# Patient Record
Sex: MALE | Race: WHITE | Employment: FULL TIME | URBAN - METROPOLITAN AREA
[De-identification: names, ages, dates, MRNs, and addresses within clinical notes are randomized per-mention and may not be internally consistent; named-entity substitution may affect disease eponyms.]

---

## 2020-08-05 ENCOUNTER — APPOINTMENT (OUTPATIENT)
Dept: GENERAL RADIOLOGY | Age: 30
DRG: 870 | End: 2020-08-05
Attending: PHYSICIAN ASSISTANT
Payer: COMMERCIAL

## 2020-08-05 ENCOUNTER — HOSPITAL ENCOUNTER (INPATIENT)
Age: 30
LOS: 13 days | Discharge: OTHER INSTITUTION | DRG: 870 | End: 2020-08-18
Attending: EMERGENCY MEDICINE | Admitting: INTERNAL MEDICINE
Payer: COMMERCIAL

## 2020-08-05 DIAGNOSIS — J12.82 PNEUMONIA DUE TO COVID-19 VIRUS: Primary | ICD-10-CM

## 2020-08-05 DIAGNOSIS — R00.0 TACHYCARDIA: ICD-10-CM

## 2020-08-05 DIAGNOSIS — R09.02 HYPOXIA: ICD-10-CM

## 2020-08-05 DIAGNOSIS — U07.1 PNEUMONIA DUE TO COVID-19 VIRUS: Primary | ICD-10-CM

## 2020-08-05 PROBLEM — J96.91 RESPIRATORY FAILURE WITH HYPOXIA (HCC): Status: ACTIVE | Noted: 2020-08-05

## 2020-08-05 LAB
ALBUMIN SERPL-MCNC: 2.9 G/DL (ref 3.5–5)
ALBUMIN/GLOB SERPL: 0.5 {RATIO} (ref 1.1–2.2)
ALP SERPL-CCNC: 151 U/L (ref 45–117)
ALT SERPL-CCNC: 140 U/L (ref 12–78)
ANION GAP SERPL CALC-SCNC: 7 MMOL/L (ref 5–15)
AST SERPL-CCNC: 86 U/L (ref 15–37)
BASOPHILS # BLD: 0 K/UL (ref 0–0.1)
BASOPHILS NFR BLD: 0 % (ref 0–1)
BILIRUB SERPL-MCNC: 0.8 MG/DL (ref 0.2–1)
BUN SERPL-MCNC: 13 MG/DL (ref 6–20)
BUN/CREAT SERPL: 15 (ref 12–20)
CALCIUM SERPL-MCNC: 9 MG/DL (ref 8.5–10.1)
CHLORIDE SERPL-SCNC: 97 MMOL/L (ref 97–108)
CO2 SERPL-SCNC: 25 MMOL/L (ref 21–32)
CREAT SERPL-MCNC: 0.84 MG/DL (ref 0.7–1.3)
DIFFERENTIAL METHOD BLD: ABNORMAL
EOSINOPHIL # BLD: 0 K/UL (ref 0–0.4)
EOSINOPHIL NFR BLD: 0 % (ref 0–7)
ERYTHROCYTE [DISTWIDTH] IN BLOOD BY AUTOMATED COUNT: 12.4 % (ref 11.5–14.5)
GLOBULIN SER CALC-MCNC: 5.3 G/DL (ref 2–4)
GLUCOSE SERPL-MCNC: 155 MG/DL (ref 65–100)
HCT VFR BLD AUTO: 44.1 % (ref 36.6–50.3)
HGB BLD-MCNC: 15.7 G/DL (ref 12.1–17)
IMM GRANULOCYTES # BLD AUTO: 0 K/UL
IMM GRANULOCYTES NFR BLD AUTO: 0 %
LACTATE BLD-SCNC: 0.99 MMOL/L (ref 0.4–2)
LYMPHOCYTES # BLD: 0.7 K/UL (ref 0.8–3.5)
LYMPHOCYTES NFR BLD: 4 % (ref 12–49)
MCH RBC QN AUTO: 31 PG (ref 26–34)
MCHC RBC AUTO-ENTMCNC: 35.6 G/DL (ref 30–36.5)
MCV RBC AUTO: 87 FL (ref 80–99)
METAMYELOCYTES NFR BLD MANUAL: 1 %
MONOCYTES # BLD: 0.3 K/UL (ref 0–1)
MONOCYTES NFR BLD: 2 % (ref 5–13)
NEUTS BAND NFR BLD MANUAL: 1 % (ref 0–6)
NEUTS SEG # BLD: 15.3 K/UL (ref 1.8–8)
NEUTS SEG NFR BLD: 92 % (ref 32–75)
NRBC # BLD: 0 K/UL (ref 0–0.01)
NRBC BLD-RTO: 0 PER 100 WBC
PLATELET # BLD AUTO: 315 K/UL (ref 150–400)
PLATELET COMMENTS,PCOM: ABNORMAL
PMV BLD AUTO: 9.7 FL (ref 8.9–12.9)
POTASSIUM SERPL-SCNC: 3.8 MMOL/L (ref 3.5–5.1)
PROT SERPL-MCNC: 8.2 G/DL (ref 6.4–8.2)
RBC # BLD AUTO: 5.07 M/UL (ref 4.1–5.7)
RBC MORPH BLD: ABNORMAL
SODIUM SERPL-SCNC: 129 MMOL/L (ref 136–145)
TROPONIN I SERPL-MCNC: <0.05 NG/ML
WBC # BLD AUTO: 16.5 K/UL (ref 4.1–11.1)

## 2020-08-05 PROCEDURE — 84484 ASSAY OF TROPONIN QUANT: CPT

## 2020-08-05 PROCEDURE — 96375 TX/PRO/DX INJ NEW DRUG ADDON: CPT

## 2020-08-05 PROCEDURE — 74011250636 HC RX REV CODE- 250/636: Performed by: PHYSICIAN ASSISTANT

## 2020-08-05 PROCEDURE — 99285 EMERGENCY DEPT VISIT HI MDM: CPT

## 2020-08-05 PROCEDURE — 65660000000 HC RM CCU STEPDOWN

## 2020-08-05 PROCEDURE — 71045 X-RAY EXAM CHEST 1 VIEW: CPT

## 2020-08-05 PROCEDURE — 85025 COMPLETE CBC W/AUTO DIFF WBC: CPT

## 2020-08-05 PROCEDURE — 5A09557 ASSISTANCE WITH RESPIRATORY VENTILATION, GREATER THAN 96 CONSECUTIVE HOURS, CONTINUOUS POSITIVE AIRWAY PRESSURE: ICD-10-PCS | Performed by: INTERNAL MEDICINE

## 2020-08-05 PROCEDURE — 94660 CPAP INITIATION&MGMT: CPT

## 2020-08-05 PROCEDURE — 82728 ASSAY OF FERRITIN: CPT

## 2020-08-05 PROCEDURE — 82803 BLOOD GASES ANY COMBINATION: CPT

## 2020-08-05 PROCEDURE — 36600 WITHDRAWAL OF ARTERIAL BLOOD: CPT

## 2020-08-05 PROCEDURE — 96365 THER/PROPH/DIAG IV INF INIT: CPT

## 2020-08-05 PROCEDURE — 85379 FIBRIN DEGRADATION QUANT: CPT

## 2020-08-05 PROCEDURE — 84145 PROCALCITONIN (PCT): CPT

## 2020-08-05 PROCEDURE — 83520 IMMUNOASSAY QUANT NOS NONAB: CPT

## 2020-08-05 PROCEDURE — 93005 ELECTROCARDIOGRAM TRACING: CPT

## 2020-08-05 PROCEDURE — 83605 ASSAY OF LACTIC ACID: CPT

## 2020-08-05 PROCEDURE — 87040 BLOOD CULTURE FOR BACTERIA: CPT

## 2020-08-05 PROCEDURE — 83615 LACTATE (LD) (LDH) ENZYME: CPT

## 2020-08-05 PROCEDURE — 74011000250 HC RX REV CODE- 250: Performed by: PHYSICIAN ASSISTANT

## 2020-08-05 PROCEDURE — 36415 COLL VENOUS BLD VENIPUNCTURE: CPT

## 2020-08-05 PROCEDURE — 80053 COMPREHEN METABOLIC PANEL: CPT

## 2020-08-05 RX ADMIN — SODIUM CHLORIDE 1000 ML: 900 INJECTION, SOLUTION INTRAVENOUS at 22:00

## 2020-08-05 RX ADMIN — AZITHROMYCIN 500 MG: 500 INJECTION, POWDER, LYOPHILIZED, FOR SOLUTION INTRAVENOUS at 21:28

## 2020-08-05 RX ADMIN — WATER 1 G: 1 INJECTION INTRAMUSCULAR; INTRAVENOUS; SUBCUTANEOUS at 21:27

## 2020-08-05 NOTE — Clinical Note
Dr. Mela Hampton gained access via upper left chest with micropuncture. Wire inserted with drain following. Drain sutured in place, tegaderm placed over in sterile fashion. Chest tube connected to box. Spoke with MD and RN concerning placing box to suction.

## 2020-08-06 ENCOUNTER — APPOINTMENT (OUTPATIENT)
Dept: VASCULAR SURGERY | Age: 30
DRG: 870 | End: 2020-08-06
Attending: INTERNAL MEDICINE
Payer: COMMERCIAL

## 2020-08-06 LAB
ALBUMIN SERPL-MCNC: 2.7 G/DL (ref 3.5–5)
ALBUMIN/GLOB SERPL: 0.5 {RATIO} (ref 1.1–2.2)
ALP SERPL-CCNC: 144 U/L (ref 45–117)
ALT SERPL-CCNC: 159 U/L (ref 12–78)
ANION GAP SERPL CALC-SCNC: 7 MMOL/L (ref 5–15)
ARTERIAL PATENCY WRIST A: YES
AST SERPL-CCNC: 92 U/L (ref 15–37)
ATRIAL RATE: 123 BPM
BASE DEFICIT BLDA-SCNC: 1.9 MMOL/L
BASOPHILS # BLD: 0 K/UL (ref 0–0.1)
BASOPHILS NFR BLD: 0 % (ref 0–1)
BDY SITE: ABNORMAL
BILIRUB SERPL-MCNC: 0.6 MG/DL (ref 0.2–1)
BUN SERPL-MCNC: 16 MG/DL (ref 6–20)
BUN/CREAT SERPL: 18 (ref 12–20)
CALCIUM SERPL-MCNC: 9 MG/DL (ref 8.5–10.1)
CALCULATED P AXIS, ECG09: 46 DEGREES
CALCULATED R AXIS, ECG10: -14 DEGREES
CALCULATED T AXIS, ECG11: 23 DEGREES
CHLORIDE SERPL-SCNC: 102 MMOL/L (ref 97–108)
CO2 SERPL-SCNC: 28 MMOL/L (ref 21–32)
COMMENT, HOLDF: NORMAL
CREAT SERPL-MCNC: 0.88 MG/DL (ref 0.7–1.3)
CRP SERPL-MCNC: 19.3 MG/DL (ref 0–0.6)
D DIMER PPP FEU-MCNC: 0.81 MG/L FEU (ref 0–0.65)
D DIMER PPP FEU-MCNC: 0.86 MG/L FEU (ref 0–0.65)
DIAGNOSIS, 93000: NORMAL
DIFFERENTIAL METHOD BLD: ABNORMAL
EOSINOPHIL # BLD: 0 K/UL (ref 0–0.4)
EOSINOPHIL NFR BLD: 0 % (ref 0–7)
ERYTHROCYTE [DISTWIDTH] IN BLOOD BY AUTOMATED COUNT: 12.8 % (ref 11.5–14.5)
FERRITIN SERPL-MCNC: 5114 NG/ML (ref 26–388)
FIO2 ON VENT: ABNORMAL %
GAS FLOW.O2 O2 DELIVERY SYS: 35 L/MIN
GLOBULIN SER CALC-MCNC: 5.3 G/DL (ref 2–4)
GLUCOSE SERPL-MCNC: 115 MG/DL (ref 65–100)
HCO3 BLDA-SCNC: 21 MMOL/L (ref 22–26)
HCT VFR BLD AUTO: 44.1 % (ref 36.6–50.3)
HGB BLD-MCNC: 15.1 G/DL (ref 12.1–17)
IMM GRANULOCYTES # BLD AUTO: 0 K/UL
IMM GRANULOCYTES NFR BLD AUTO: 0 %
LACTATE SERPL-SCNC: 1.2 MMOL/L (ref 0.4–2)
LDH SERPL L TO P-CCNC: 796 U/L (ref 85–241)
LYMPHOCYTES # BLD: 0.9 K/UL (ref 0.8–3.5)
LYMPHOCYTES NFR BLD: 7 % (ref 12–49)
MAGNESIUM SERPL-MCNC: 3 MG/DL (ref 1.6–2.4)
MCH RBC QN AUTO: 30.9 PG (ref 26–34)
MCHC RBC AUTO-ENTMCNC: 34.2 G/DL (ref 30–36.5)
MCV RBC AUTO: 90.2 FL (ref 80–99)
METAMYELOCYTES NFR BLD MANUAL: 1 %
MONOCYTES # BLD: 0.1 K/UL (ref 0–1)
MONOCYTES NFR BLD: 1 % (ref 5–13)
NEUTS BAND NFR BLD MANUAL: 1 % (ref 0–6)
NEUTS SEG # BLD: 11.8 K/UL (ref 1.8–8)
NEUTS SEG NFR BLD: 90 % (ref 32–75)
NRBC # BLD: 0 K/UL (ref 0–0.01)
NRBC BLD-RTO: 0 PER 100 WBC
P-R INTERVAL, ECG05: 154 MS
PCO2 BLDA: 29 MMHG (ref 35–45)
PH BLDA: 7.46 [PH] (ref 7.35–7.45)
PHOSPHATE SERPL-MCNC: 5.3 MG/DL (ref 2.6–4.7)
PLATELET # BLD AUTO: 338 K/UL (ref 150–400)
PLATELET COMMENTS,PCOM: ABNORMAL
PMV BLD AUTO: 9.8 FL (ref 8.9–12.9)
PO2 BLDA: 71 MMHG (ref 80–100)
POTASSIUM SERPL-SCNC: 4.2 MMOL/L (ref 3.5–5.1)
PROCALCITONIN SERPL-MCNC: 0.29 NG/ML
PROCALCITONIN SERPL-MCNC: 0.33 NG/ML
PROT SERPL-MCNC: 8 G/DL (ref 6.4–8.2)
Q-T INTERVAL, ECG07: 318 MS
QRS DURATION, ECG06: 106 MS
QTC CALCULATION (BEZET), ECG08: 455 MS
RBC # BLD AUTO: 4.89 M/UL (ref 4.1–5.7)
RBC MORPH BLD: ABNORMAL
SAMPLES BEING HELD,HOLD: NORMAL
SAO2 % BLD: 95 % (ref 92–97)
SAO2% DEVICE SAO2% SENSOR NAME: ABNORMAL
SODIUM SERPL-SCNC: 137 MMOL/L (ref 136–145)
SPECIMEN SITE: ABNORMAL
VENTRICULAR RATE, ECG03: 123 BPM
WBC # BLD AUTO: 13 K/UL (ref 4.1–11.1)

## 2020-08-06 PROCEDURE — 85379 FIBRIN DEGRADATION QUANT: CPT

## 2020-08-06 PROCEDURE — 74011000258 HC RX REV CODE- 258: Performed by: INTERNAL MEDICINE

## 2020-08-06 PROCEDURE — 94660 CPAP INITIATION&MGMT: CPT

## 2020-08-06 PROCEDURE — 85025 COMPLETE CBC W/AUTO DIFF WBC: CPT

## 2020-08-06 PROCEDURE — 74011000250 HC RX REV CODE- 250: Performed by: INTERNAL MEDICINE

## 2020-08-06 PROCEDURE — 84145 PROCALCITONIN (PCT): CPT

## 2020-08-06 PROCEDURE — 74011250636 HC RX REV CODE- 250/636: Performed by: INTERNAL MEDICINE

## 2020-08-06 PROCEDURE — 83605 ASSAY OF LACTIC ACID: CPT

## 2020-08-06 PROCEDURE — 93970 EXTREMITY STUDY: CPT

## 2020-08-06 PROCEDURE — 36415 COLL VENOUS BLD VENIPUNCTURE: CPT

## 2020-08-06 PROCEDURE — 86140 C-REACTIVE PROTEIN: CPT

## 2020-08-06 PROCEDURE — 83735 ASSAY OF MAGNESIUM: CPT

## 2020-08-06 PROCEDURE — 94762 N-INVAS EAR/PLS OXIMTRY CONT: CPT

## 2020-08-06 PROCEDURE — 84100 ASSAY OF PHOSPHORUS: CPT

## 2020-08-06 PROCEDURE — 80053 COMPREHEN METABOLIC PANEL: CPT

## 2020-08-06 PROCEDURE — 65660000000 HC RM CCU STEPDOWN

## 2020-08-06 PROCEDURE — 74011250637 HC RX REV CODE- 250/637: Performed by: INTERNAL MEDICINE

## 2020-08-06 RX ORDER — ENOXAPARIN SODIUM 100 MG/ML
40 INJECTION SUBCUTANEOUS DAILY
Status: DISCONTINUED | OUTPATIENT
Start: 2020-08-06 | End: 2020-08-07

## 2020-08-06 RX ORDER — ZINC SULFATE 50(220)MG
1 CAPSULE ORAL DAILY
Status: DISCONTINUED | OUTPATIENT
Start: 2020-08-06 | End: 2020-08-14

## 2020-08-06 RX ORDER — ASCORBIC ACID 500 MG
500 TABLET ORAL 2 TIMES DAILY
Status: DISCONTINUED | OUTPATIENT
Start: 2020-08-06 | End: 2020-08-17

## 2020-08-06 RX ORDER — ONDANSETRON 2 MG/ML
4 INJECTION INTRAMUSCULAR; INTRAVENOUS
Status: DISCONTINUED | OUTPATIENT
Start: 2020-08-06 | End: 2020-08-18 | Stop reason: HOSPADM

## 2020-08-06 RX ORDER — ALBUTEROL SULFATE 90 UG/1
4 AEROSOL, METERED RESPIRATORY (INHALATION)
Status: DISCONTINUED | OUTPATIENT
Start: 2020-08-06 | End: 2020-08-10

## 2020-08-06 RX ORDER — SODIUM CHLORIDE 9 MG/ML
100 INJECTION, SOLUTION INTRAVENOUS CONTINUOUS
Status: DISCONTINUED | OUTPATIENT
Start: 2020-08-06 | End: 2020-08-06

## 2020-08-06 RX ORDER — SODIUM CHLORIDE 9 MG/ML
100 INJECTION, SOLUTION INTRAVENOUS CONTINUOUS
Status: DISCONTINUED | OUTPATIENT
Start: 2020-08-06 | End: 2020-08-08

## 2020-08-06 RX ORDER — DEXAMETHASONE SODIUM PHOSPHATE 10 MG/ML
6 INJECTION INTRAMUSCULAR; INTRAVENOUS DAILY
Status: DISCONTINUED | OUTPATIENT
Start: 2020-08-06 | End: 2020-08-06

## 2020-08-06 RX ORDER — POLYETHYLENE GLYCOL 3350 17 G/17G
17 POWDER, FOR SOLUTION ORAL DAILY PRN
Status: DISCONTINUED | OUTPATIENT
Start: 2020-08-06 | End: 2020-08-18 | Stop reason: HOSPADM

## 2020-08-06 RX ORDER — SODIUM CHLORIDE 0.9 % (FLUSH) 0.9 %
5-40 SYRINGE (ML) INJECTION EVERY 8 HOURS
Status: DISCONTINUED | OUTPATIENT
Start: 2020-08-06 | End: 2020-08-18 | Stop reason: HOSPADM

## 2020-08-06 RX ORDER — PROMETHAZINE HYDROCHLORIDE 25 MG/1
12.5 TABLET ORAL
Status: DISCONTINUED | OUTPATIENT
Start: 2020-08-06 | End: 2020-08-18 | Stop reason: HOSPADM

## 2020-08-06 RX ORDER — ACETAMINOPHEN 325 MG/1
650 TABLET ORAL
Status: DISCONTINUED | OUTPATIENT
Start: 2020-08-06 | End: 2020-08-18 | Stop reason: HOSPADM

## 2020-08-06 RX ORDER — BENZONATATE 100 MG/1
100 CAPSULE ORAL
Status: DISCONTINUED | OUTPATIENT
Start: 2020-08-06 | End: 2020-08-18 | Stop reason: HOSPADM

## 2020-08-06 RX ORDER — BENZONATATE 100 MG/1
100 CAPSULE ORAL
COMMUNITY
End: 2020-11-16

## 2020-08-06 RX ORDER — PREDNISONE 10 MG/1
TABLET ORAL SEE ADMIN INSTRUCTIONS
COMMUNITY
Start: 2020-07-31 | End: 2020-08-11

## 2020-08-06 RX ORDER — DEXAMETHASONE SODIUM PHOSPHATE 10 MG/ML
6 INJECTION INTRAMUSCULAR; INTRAVENOUS EVERY 6 HOURS
Status: DISCONTINUED | OUTPATIENT
Start: 2020-08-06 | End: 2020-08-09

## 2020-08-06 RX ORDER — ALPRAZOLAM 0.5 MG/1
0.5 TABLET ORAL
Status: DISCONTINUED | OUTPATIENT
Start: 2020-08-06 | End: 2020-08-08

## 2020-08-06 RX ORDER — SODIUM CHLORIDE 0.9 % (FLUSH) 0.9 %
5-40 SYRINGE (ML) INJECTION AS NEEDED
Status: DISCONTINUED | OUTPATIENT
Start: 2020-08-06 | End: 2020-08-18 | Stop reason: HOSPADM

## 2020-08-06 RX ORDER — ACETAMINOPHEN 650 MG/1
650 SUPPOSITORY RECTAL
Status: DISCONTINUED | OUTPATIENT
Start: 2020-08-06 | End: 2020-08-18 | Stop reason: HOSPADM

## 2020-08-06 RX ADMIN — AZITHROMYCIN MONOHYDRATE 500 MG: 500 INJECTION, POWDER, LYOPHILIZED, FOR SOLUTION INTRAVENOUS at 02:17

## 2020-08-06 RX ADMIN — ENOXAPARIN SODIUM 40 MG: 40 INJECTION SUBCUTANEOUS at 10:09

## 2020-08-06 RX ADMIN — Medication 10 ML: at 12:33

## 2020-08-06 RX ADMIN — SODIUM CHLORIDE 100 ML/HR: 900 INJECTION, SOLUTION INTRAVENOUS at 13:30

## 2020-08-06 RX ADMIN — DEXAMETHASONE SODIUM PHOSPHATE 6 MG: 10 INJECTION INTRAMUSCULAR; INTRAVENOUS at 02:16

## 2020-08-06 RX ADMIN — Medication 10 ML: at 20:47

## 2020-08-06 RX ADMIN — SODIUM CHLORIDE 100 ML/HR: 900 INJECTION, SOLUTION INTRAVENOUS at 02:19

## 2020-08-06 RX ADMIN — Medication 1 CAPSULE: at 10:08

## 2020-08-06 RX ADMIN — Medication 500 MG: at 10:08

## 2020-08-06 RX ADMIN — SODIUM CHLORIDE 100 ML/HR: 900 INJECTION, SOLUTION INTRAVENOUS at 23:21

## 2020-08-06 RX ADMIN — CEFTRIAXONE SODIUM 1 G: 1 INJECTION, POWDER, FOR SOLUTION INTRAMUSCULAR; INTRAVENOUS at 20:47

## 2020-08-06 RX ADMIN — REMDESIVIR 200 MG: 100 INJECTION, POWDER, LYOPHILIZED, FOR SOLUTION INTRAVENOUS at 12:17

## 2020-08-06 RX ADMIN — DEXAMETHASONE SODIUM PHOSPHATE 6 MG: 10 INJECTION INTRAMUSCULAR; INTRAVENOUS at 23:23

## 2020-08-06 RX ADMIN — Medication 500 MG: at 18:20

## 2020-08-06 RX ADMIN — DEXAMETHASONE SODIUM PHOSPHATE 6 MG: 10 INJECTION INTRAMUSCULAR; INTRAVENOUS at 12:17

## 2020-08-06 RX ADMIN — DEXAMETHASONE SODIUM PHOSPHATE 6 MG: 10 INJECTION INTRAMUSCULAR; INTRAVENOUS at 18:30

## 2020-08-06 RX ADMIN — ALPRAZOLAM 0.5 MG: 0.5 TABLET ORAL at 21:03

## 2020-08-06 RX ADMIN — Medication 10 ML: at 07:00

## 2020-08-06 NOTE — H&P
[facility] [facility] [facility]  HISTORY AND PHYSICAL    Name:  Shaniqua Calvert  MR#:  [id number]  :  1990  ACCOUNT #:  [de-identified]    DATE OF SERVICE:  2020    PRIMARY CARE PHYSICIAN:  None. CHIEF COMPLAINT:  Cough, shortness of breath, chills and fevers. HISTORY OF PRESENT ILLNESS:  68-year-old gentleman with no significant past medical history was brought to the emergency room from home via EMS for complaints of an approximately two- to three-day history of progressively worsening cough, shortness of breath, chills and fevers as high as 103 degrees Fahrenheit. Of note, patient stated that he tested positive for COVID-19 virus about nine days prior to this emergency room presentation of 2020. Further, patient stated that despite taking the prescribed Zithromax, prednisone, and other respiratory therapies, did not have any significant improvement in symptoms. Patient denied associated headaches, dizziness, visual deficits, chest pains, palpitations, sore throat, nausea, vomiting, abdominal pain, bladder or bowel irregularities. PAST MEDICAL HISTORY:  Nil of note. PAST SURGICAL HISTORY:  Nil of note. HOME MEDICATIONS:  1. Tylenol as needed for fever. 2.  Z-Terry. 3.  Prednisone taper. ALLERGIES:  NO KNOWN DRUG ALLERGIES. SOCIAL HISTORY:  Significant for building granite countertops, admits to smoking half a pack to one pack of cigarettes every few days. Denied alcohol use disorder. Denied any recent travels or known sick contacts. FAMILY HISTORY:  Reviewed and negative for any premature coronary artery disease or death. Negative for malignancy. Negative for diabetes mellitus. REVIEW OF SYSTEMS:  A complete system review conducted essentially negative, otherwise as outlined in the history of the presenting illness. PHYSICAL EXAMINATION:  GENERAL: Patient appeared mildly agitated, however, no acute distress.   VITAL SIGNS:  Blood pressure of 143/86, heart rate 94, respiratory rate 32, afebrile, saturating 97% on BiPAP. HEENT:  Head exam, pupils equal and reactive to light without any nystagmus. No scleral icterus. ENT exam, ear, nose, throat clear. NECK:  No jugular venous distention or carotid bruits. CARDIOVASCULAR:  S1, S2 tachycardic without murmurs. RESPIRATORY:  Lungs with decreased air entry at both bases without any crepitations or rhonchi. GI:  Bowel sounds present. The abdomen is soft, nontender. No rebound, no guarding. GENITOURINARY:  No suprapubic or flank tenderness. MUSCULOSKELETAL:  No asymmetry of the extremities. SKIN:  No rashes, petechiae, or ecchymosis. CNS: Awake; alert; oriented to time, date, place, person. LABORATORY/RADIOGRAPHIC FINDINGS:    Portable chest x-ray with hypoinflated lungs and hazy interstitial airspace opacity. This could be seen with COVID-19 pneumonia or other atypical infection. No pneumothorax or pleural effusion. 12-lead EKG personally reviewed with sinus tachycardia at 123 per minute. metabolic panel with a sodium of 129, potassium 3.8, chloride 97, bicarb 25, BUN of 13, creatinine 0.84, glucose 185, calcium 9, albumin 2.9. Total bilirubin 0.8, ALT of 140, AST of 86, alkaline phosphatase 151. First troponin less than 0.05. CBC with a WBC of 16.5, 92% polys, 1% bands, 4% lymphocytes, hemoglobin 15.7, hematocrit 44.1, platelet count of 907. ASSESSMENT/PLAN:  1. Respiratory:  Acute hypoxemic respiratory failure secondary to COVID-19 viral pneumonia, present on admission. As-needed BiPAP, FiO2 to maintain saturation at least 94%, metered-dose inhaler bronchodilators, judicious steroids, ascorbic acid, zinc, as-needed followup chest imaging. Pulmonary consult. 2.  Infectious Disease:  Sepsis secondary to COVID-19 viral pneumonia without shock, present on admission.  Fevers, leukocytosis with a marked left shift, bandemia, lymphocytopenia, cultures, inflammatory markers, repeat SARS-CoV-2 PCR.  IV azithromycin. As-needed Infectious Disease consult. 3.  Electrolytes. Probable multifactorial hyponatremia, present on admission. 4.  Gastroenterology:  Probable transaminitis. Trending of liver function tests. 5.  Prophylaxis for deep venous thrombosis with Lovenox. 6.  Directives:  Full code with a very guarded prognosis. In summary, a 80-year-old gentleman without any significant past medical history except a recent COVID-19 positive test, is being admitted to the inpatient level for acute hypoxemic respiratory failure and sepsis secondary to probable COVID-19 viral pneumonia, probable multifactorial hyponatremia and transaminitis, necessitating ongoing close monitoring and management including with respiratory therapies, cultures, inflammatory markers, repeat SARS-CoV-2 PCR, empiric anti-infective therapy, trending of liver function tests, as-needed followup chest imaging, and Pulmonary consult. The entire admission plan discussed in detail with patient. All questions and concerns were addressed. Patient's functional status prior to admission significant for patient being able to ambulate unassisted. Total time for admission, 40 minutes, of which at least 50% of the time was spent in plan of care and counseling of patient.       Jael Portillo MD      [id number]  D:  08/05/2020 23:59  T:  08/06/2020 1:23  JOB #:  [id number]

## 2020-08-06 NOTE — ED PROVIDER NOTES
77-year-old male with no significant past medical history presents to emergency department via EMS due to shortness of breath. Patient began having fevers, fatigue, cough 9 days ago and had a positive COVID test at that time. The last few days he has been having more difficulty getting around the house due to being short of breath and sweating. Patient has headaches exacerbated by coughing. Today he presents because he feels like he has to breathe more quickly. Notes less coughing today, fevers have been as high as 103 °F.  Last took 1 g Tylenol at 5 PM.  Has been trying to drink extra fluids at home. Takes no regular medications. Denies chest pain, nausea, vomiting, diarrhea. No past medical history on file. No past surgical history on file. No family history on file.     Social History     Socioeconomic History    Marital status: SINGLE     Spouse name: Not on file    Number of children: Not on file    Years of education: Not on file    Highest education level: Not on file   Occupational History    Not on file   Social Needs    Financial resource strain: Not on file    Food insecurity     Worry: Not on file     Inability: Not on file    Transportation needs     Medical: Not on file     Non-medical: Not on file   Tobacco Use    Smoking status: Not on file   Substance and Sexual Activity    Alcohol use: Not on file    Drug use: Not on file    Sexual activity: Not on file   Lifestyle    Physical activity     Days per week: Not on file     Minutes per session: Not on file    Stress: Not on file   Relationships    Social connections     Talks on phone: Not on file     Gets together: Not on file     Attends Yarsani service: Not on file     Active member of club or organization: Not on file     Attends meetings of clubs or organizations: Not on file     Relationship status: Not on file    Intimate partner violence     Fear of current or ex partner: Not on file     Emotionally abused: Not on file     Physically abused: Not on file     Forced sexual activity: Not on file   Other Topics Concern    Not on file   Social History Narrative    Not on file         ALLERGIES: Patient has no known allergies. Review of Systems   Constitutional: Positive for chills, fatigue and fever. HENT: Negative for congestion and sore throat. Respiratory: Positive for cough and shortness of breath. Cardiovascular: Negative for chest pain and leg swelling. Gastrointestinal: Negative for abdominal pain, nausea and vomiting. Genitourinary: Negative for dysuria. Musculoskeletal: Negative for myalgias. Skin: Negative for rash. Neurological: Positive for headaches. Negative for dizziness and weakness. Vitals:    08/05/20 2058 08/05/20 2100 08/05/20 2105 08/05/20 2130   BP:  127/77  131/70   Pulse:       Resp:       Temp:       SpO2: 92% 94% 95% 93%   Weight:       Height:                Physical Exam  Vitals signs and nursing note reviewed. Constitutional:       General: He is not in acute distress. Appearance: He is ill-appearing and diaphoretic. HENT:      Head: Normocephalic. Nose: No rhinorrhea. Mouth/Throat:      Mouth: Mucous membranes are moist.      Pharynx: Oropharynx is clear. No posterior oropharyngeal erythema. Eyes:      Pupils: Pupils are equal, round, and reactive to light. Neck:      Musculoskeletal: Normal range of motion. Cardiovascular:      Rate and Rhythm: Regular rhythm. Tachycardia present. Pulses: Normal pulses. Heart sounds: Normal heart sounds. Pulmonary:      Effort: Pulmonary effort is normal. Tachypnea present. No accessory muscle usage or respiratory distress. Breath sounds: Normal breath sounds. No decreased breath sounds or wheezing. Abdominal:      General: Bowel sounds are normal. There is no distension. Palpations: Abdomen is soft. Tenderness: There is no abdominal tenderness.    Musculoskeletal: Right lower leg: No edema. Left lower leg: No edema. Skin:     General: Skin is warm. Capillary Refill: Capillary refill takes less than 2 seconds. Findings: No erythema or rash. Neurological:      Mental Status: He is alert. Psychiatric:         Mood and Affect: Mood normal.         Behavior: Behavior normal.        Medications   azithromycin (ZITHROMAX) 500 mg in 0.9% sodium chloride (MBP/ADV) 250 mL (0 mg IntraVENous IV Completed 8/5/20 2236)   cefTRIAXone (ROCEPHIN) 1 g in sterile water (preservative free) 10 mL IV syringe (1 g IntraVENous Given 8/5/20 2127)   sodium chloride 0.9 % bolus infusion 1,000 mL (1,000 mL IntraVENous New Bag 8/5/20 2200)     Labs Reviewed   CBC WITH AUTOMATED DIFF - Abnormal; Notable for the following components:       Result Value    WBC 16.5 (*)     NEUTROPHILS 92 (*)     LYMPHOCYTES 4 (*)     MONOCYTES 2 (*)     METAMYELOCYTES 1 (*)     ABS. NEUTROPHILS 15.3 (*)     ABS. LYMPHOCYTES 0.7 (*)     All other components within normal limits   METABOLIC PANEL, COMPREHENSIVE - Abnormal; Notable for the following components:    Sodium 129 (*)     Glucose 155 (*)     ALT (SGPT) 140 (*)     AST (SGOT) 86 (*)     Alk. phosphatase 151 (*)     Albumin 2.9 (*)     Globulin 5.3 (*)     A-G Ratio 0.5 (*)     All other components within normal limits   CULTURE, BLOOD, PAIRED   TROPONIN I   LACTIC ACID   BLOOD GAS, ARTERIAL   FERRITIN   D DIMER   INTERLEUKIN 6   LD   PROCALCITONIN   POC LACTIC ACID     XR CHEST PORT   Final Result   IMPRESSION:    COVID-19 pneumonia suspected. The findings were called to Ottoniel Rizo on 8/5/2020 8:54 PM by Dr. Lanny Joseph. The patient is known COVID-19 positive.  1               MDM  Number of Diagnoses or Management Options  Hypoxia:   Pneumonia due to COVID-19 virus:   Tachycardia:      Amount and/or Complexity of Data Reviewed  Clinical lab tests: reviewed  Tests in the radiology section of CPT®: reviewed    Critical Care  Total time providing critical care: 30-74 minutes (45)    ED Course as of Aug 05 2329   Wed Aug 05, 2020   2118 EKG: sinus tachycardia, rate of 123 bpm, no ST elevation or depression indicative of acute ischemia    [AS]      ED Course User Index  [AS] Kinsey Hoffman PA-C       Procedures      Hospitalist Perfect Serve for Admission  10:32 PM    ED Room Number: ER10/10  Patient Name and age:  Jose Serrano 27 y.o.  male  Working Diagnosis:   1. Pneumonia due to COVID-19 virus    2. Hypoxia    3. Tachycardia        COVID-19 Suspicion:  yes    Code Status:  Full Code  Readmission: no  Isolation Requirements:  yes  Recommended Level of Care:  step down  Department:[facility] - [phone]  Other: 80-year-old male with no significant past medical history who is known COVID positive presents to ED with shortness of breath. Initially satting 76% on room air. ABG reveals ph 7.464, pCO2 29.2, pO2 71.3. Initially trial of high flow O2 then switched to bipap, tolerating well, o2 sat 98%, pt appears comfortable. Xray consistent with COVID pneumonia. WBC 16.5 suggestive of possible secondary pneumonia. Started on azithromycin and rocephin. Vitals stable.

## 2020-08-06 NOTE — PROGRESS NOTES
Shift Change:  Bedside and Verbal shift change report given to Garrett Quinones RN (oncoming nurse) by Christian Casillas RN (offgoing nurse). Report included the following information SBAR, Kardex and Quality Measures. End of Shift Report:  Bedside and Verbal shift change report given to Jasmeet DOMINIQUE (oncoming nurse) by Garrett Quinones RN (offgoing nurse). Report included the following information SBAR, Kardex and Quality Measures.

## 2020-08-06 NOTE — PROGRESS NOTES
Remdesivir Initiation Note    Maury Johnson meets criteria for initiation of remdesivir under the emergency use authorization (EUA) based on the following:   Known or suspected COVID-19   Severe disease (SpO2 ? 94% on RA, requiring supplemental O2, or requiring invasive mechanical ventilation)   Acceptable renal function  o CrCl ? 30 ml/min based on SCr obtained prior to initiation OR   o CrCl < 30 ml/min but the potential benefit of remdesivir outweighs the risk   Acceptable hepatic function (ALT within 5 times ULN)    I have discussed with the patient/proxy information consistent with the Fact Sheet for Patients and Parents/Caregivers\" and the patient/proxy has agreed to initiating remdesivir after being:   Given the Fact Sheet for Patients and Parents/Caregivers   Informed of the alternatives to receiving remdesivir, and    Informed that remdesivir is an unapproved drug that is authorized for use under EUA    Liver function tests will be monitored daily while on remdesivir.

## 2020-08-06 NOTE — ED NOTES
TRANSFER - OUT REPORT:    Verbal report given to Ridge Huitron RN(name) on [employer]  being transferred to 3rd floor(unit) for routine progression of care       Report consisted of patients Situation, Background, Assessment and   Recommendations(SBAR). Information from the following report(s) SBAR, ED Summary, Intake/Output, MAR, Recent Results and Cardiac Rhythm Sinus Tach was reviewed with the receiving nurse. Lines:   Peripheral IV 08/05/20 Right Antecubital (Active)   Site Assessment Clean, dry, & intact 08/05/20 2055   Phlebitis Assessment 0 08/05/20 2055   Infiltration Assessment 0 08/05/20 2055   Dressing Status Clean, dry, & intact 08/05/20 2055       Peripheral IV 08/05/20 Left Antecubital (Active)   Site Assessment Clean, dry, & intact 08/05/20 2056   Phlebitis Assessment 0 08/05/20 2056   Infiltration Assessment 0 08/05/20 2056   Dressing Status Clean, dry, & intact 08/05/20 2056        Opportunity for questions and clarification was provided.       Patient transported with:   Monitor  O2 @ BiPap liters  Registered Nurse

## 2020-08-06 NOTE — PROGRESS NOTES
[address] [address] Pharmacy Dosing Services: 8/6/2020    Consult for Remdesivir dosing by Dr. Lyndon Mccollum therapy  Pharmacist ordered   Hospitalized adults not requiring invasive mechanical ventilation: Remdesivir 200 mg by IV infusion on day 1, followed by 100 mg by IV infusion once daily on days 2-5.     Pharmacist: Tiffani Fam Contact information:[phone]

## 2020-08-06 NOTE — PROGRESS NOTES
Admission Medication Reconciliation:     Pharmacist called the patient's room and cell phone with no answer. Pharmacist will call back at a later time.     Thank you,      Yanelis Watts, PharmD, BCPS

## 2020-08-06 NOTE — ED TRIAGE NOTES
Triage: pt reports having a positive covid test lat Monday. Today per ems he presents with shortness of breath worsening on exertion.

## 2020-08-06 NOTE — PROGRESS NOTES
Reason for Admission:   Covid 19 positive,pneumonia due to Covid 19,acute hypoxemic respiratory failure. sepsis                   RUR Score:        8%             Plan for utilizing home health: There are no current home health needs identified. PCP: First and Last name:  I tried to contact pt but he did not answer phone   Name of Practice: ?? Are you a current patient: Yes/No: ?? Approximate date of last visit: ?? Can you participate in a virtual visit with your PCP: OLIVER                    Current Advanced Directive/Advance Care Plan: full code,no AMD                         Transition of Care Plan:                    Pt was [facility] to [facility] with worsening tachypnea and acute hypoxemic respiratory failure. In talking with pt.'s nurse was on BiPaP this am.In talking with pulmonologist,pt has gomer from high flow to BiPaP and now back on high flow oxygen. Plan:  1. I attempted to contact pt by hospital phone and on his cell phone but pt did not answer. 2.I was not able to complete a thorough initial assessment or ACP because of this. 3.I am following pt for home oxygen needs and any other discharge needs. 4.Emergency contact is Thom Ibrahim @ [phone]. This is his mother. I did not contact her as I do not currently have pt.'s consent. Pt has [employer]. Dolly Recio  [phone]  5. P

## 2020-08-06 NOTE — ED NOTES
Bedside and Verbal shift change report given to Kiana Suarez RN (oncoming nurse) by Braulio Cho RN (offgoing nurse). Report included the following information SBAR, ED Summary, MAR and Recent Results.

## 2020-08-06 NOTE — PROGRESS NOTES
Admission Medication Reconciliation:     Information obtained from:    Patient via phone call to room [address]:  YES    Comments/Recommendations:   Patient able to confirm name, , allergies, and preferred pharmacy  Updated PTA medication list       ¹[employer] pharmacy benefit data reflects medications filled and processed through the patient's insurance, however   this data does NOT capture whether the medication was picked up or is currently being taken by the patient. Prior to Admission Medications   Prescriptions Last Dose Informant Taking?   benzonatate (Tessalon Perles) 100 mg capsule   Yes   Sig: Take 100 mg by mouth three (3) times daily as needed for Cough. predniSONE (STERAPRED DS) 10 mg dose pack 2020 at Unknown time  Yes   Sig: Take  by mouth See Admin Instructions. See administration instruction per 10mg dose pack      Facility-Administered Medications: None         Please contact the main inpatient pharmacy with any questions or concerns at [phone] and we will direct you to the clinical pharmacist covering this patient's care while in-house.    Nate Fall, RcD, BCPS

## 2020-08-06 NOTE — PROGRESS NOTES
[facility]  [address] [facility], [address] [place] [address]  [phone]      Medical Progress Note      NAME: Maikol Harry   :  1990  MRM:  [id number]    Date/Time: 2020        Assessment / Plan:       Acute respiratory failure with hypoxia: due to COVID-19 infection/pneumonia. Continue NPPV. Prognosis guarded, monitor closely. Appreciate PCCM evaluation. Agree with remdesivir, dexamethasone, vitamin C, zinc. Will defer statin for now due to transaminitis. Also on azithromycin. Will cont IV CTX for now as well. Trend inflammatory markers. Low threshold to increase Lovenox dose. D-dimer 0.8, remaining flat. LE duplex negative for DVT. elevated liver enzymes: likely due to COVID-19. No RUQ pain/TTP. No hyperbilirubinemia to suggest biliary obstruction. Monitor on remdesivir. Obesity:  Body mass index is 33.19 kg/m². Would benefit from weight loss and dietary / lifestyle modifications    Total time spent: 35 minutes  Time spent in the care of this patient including reviewing records, discussing with nursing and/or other providers on the treatment team, obtaining history and examining the patient, and discussing treatment plans. Care Plan discussed with: Patient, Nursing Staff and >50% of time spent in counseling and coordination of care    Discussed:  Care Plan and D/C Planning    Prophylaxis:  Lovenox    Disposition:  Home w/Family         Subjective:     Chief Complaint:  Follow up dyspnea    Chart/notes/labs/studies reviewed, patient examined. Feels \"so so\". +dyspnea. No fevers. Objective:       Vitals:        Last 24hrs VS reviewed since prior progress note.  Most recent are:    Visit Vitals  /83 (BP 1 Location: Left arm, BP Patient Position: At rest)   Pulse 87   Temp 97.8 °F (36.6 °C)   Resp 18   Ht 5' 11\" (1.803 m)   Wt 108 kg (238 lb)   SpO2 95%   BMI 33.19 kg/m²     SpO2 Readings from Last 6 Encounters: 08/06/20 95%    O2 Flow Rate (L/min): 36 l/min       Intake/Output Summary (Last 24 hours) at 8/6/2020 1726  Last data filed at 8/6/2020 1252  Gross per 24 hour   Intake 500 ml   Output 450 ml   Net 50 ml          Exam:     Physical Exam:    Gen:  Well-developed, well-nourished, in no acute distress. HEENT:  Sclerae nonicteric, hearing intact to voice. BiPAP mask in place  Neck:  Supple, without masses. Resp:  No accessory muscle use, increased WOB. Mildly diminished without overt wheezing, rales, or rhonchi  Card: RRR, without m/r/g. No LE edema. Abd:  +bowel sounds, soft, NTTP, nondistended. No HSM. Neuro: Face symmetric, follows commands appropriately. No focal weakness. Psych:  Alert, oriented x 3.  Good insight     Medications Reviewed: (see below)    Lab Data Reviewed: (see below)    ______________________________________________________________________    Medications:     Current Facility-Administered Medications   Medication Dose Route Frequency    sodium chloride (NS) flush 5-40 mL  5-40 mL IntraVENous Q8H    sodium chloride (NS) flush 5-40 mL  5-40 mL IntraVENous PRN    acetaminophen (TYLENOL) tablet 650 mg  650 mg Oral Q6H PRN    Or    acetaminophen (TYLENOL) suppository 650 mg  650 mg Rectal Q6H PRN    polyethylene glycol (MIRALAX) packet 17 g  17 g Oral DAILY PRN    promethazine (PHENERGAN) tablet 12.5 mg  12.5 mg Oral Q6H PRN    Or    ondansetron (ZOFRAN) injection 4 mg  4 mg IntraVENous Q6H PRN    enoxaparin (LOVENOX) injection 40 mg  40 mg SubCUTAneous DAILY    azithromycin (ZITHROMAX) 500 mg in 0.9% sodium chloride (MBP/ADV) 250 mL  500 mg IntraVENous Q24H    ascorbic acid (vitamin C) (VITAMIN C) tablet 500 mg  500 mg Oral BID    zinc sulfate (ZINCATE) 220 (50) mg capsule 1 Cap  1 Cap Oral DAILY    albuterol (PROVENTIL HFA, VENTOLIN HFA, PROAIR HFA) inhaler 4 Puff  4 Puff Inhalation Q4H PRN    benzonatate (TESSALON) capsule 100 mg  100 mg Oral TID PRN    dexamethasone (PF) (DECADRON) 10 mg/mL injection 6 mg  6 mg IntraVENous Q6H    [START ON 8/7/2020] remdesivir 100 mg in 0.9% sodium chloride 250 mL IVPB  100 mg IntraVENous Q24H    0.9% sodium chloride infusion  100 mL/hr IntraVENous CONTINUOUS            Lab Review:     Recent Labs     08/06/20  1324 08/05/20 2049   WBC 13.0* 16.5*   HGB 15.1 15.7   HCT 44.1 44.1    315     Recent Labs     08/06/20  1324 08/05/20  2049    129*   K 4.2 3.8    97   CO2 28 25   * 155*   BUN 16 13   CREA 0.88 0.84   CA 9.0 9.0   MG 3.0*  --    PHOS 5.3*  --    ALB 2.7* 2.9*   * 140*     No components found for: [place]  Recent Labs     08/05/20  2145   PH 7.46*   PCO2 29*   PO2 71*   HCO3 21*   FIO2 90%     No results for input(s): INR, INREXT in the last 72 hours.   No results found for: SDES  Lab Results   Component Value Date/Time    Culture result: NO GROWTH AFTER 7 HOURS 08/05/2020 08:49 PM              ___________________________________________________    Attending Physician: Holli Garcia MD

## 2020-08-06 NOTE — CONSULTS
Name: [facility]: [address]   : 1990 Admit Date: 2020   Phone: [phone]  Room: [facility]/01   PCP: Suze Kilgore MD  MRN: [id number]   Date: 2020  Code: Full Code          Chart and notes reviewed. Data reviewed. I review the patient's current medications in the medical record at each encounter. I have evaluated and examined the patient. HPI:    10:47 AM       History was obtained from patient. I was asked by Salo Ryder MD to see David Petersone in consultation for a chief complaint of COVID-19 Pneumonia. History of Present Illness:  Mr. Shelli White is a 28 yo gentleman with a history of tobacco use who is admitted with COVID-19 pneumonia and acute respiratory failure with hypoxia. He was diagnosed about 10 days ago at an urgent care facility with COVID-19. Last Thursday he began to have fevers, inability to take a deep breath that was progressively worse, dry cough, and general malaise/faitigue. He was prescribed azithromycin and prednisone without improvement. Denies n/v, abdominal pain, changes in taste/smell. Hypoxic as low as 76% on RA. Placed on BiPAP yesterday evening due to increased work of breathing.     Labs reviewed: WBC 16.5, d-dimer 0.86, Na 129, cr 0.84, AST 86,, , Procalcitonin 0.33, ferritin 5114, IL6 in lab; crp pending    Images:  CXR with hazy interstitial changes and airspace opacification      PMH: tobacco use    FH: no family history pertinent to presents complaint    SH: +tobacco use    Social History     Tobacco Use    Smoking status: Current Some Day Smoker    Smokeless tobacco: Never Used   Substance Use Topics    Alcohol use: Not on file       No Known Allergies    Current Facility-Administered Medications   Medication Dose Route Frequency    sodium chloride (NS) flush 5-40 mL  5-40 mL IntraVENous Q8H    sodium chloride (NS) flush 5-40 mL  5-40 mL IntraVENous PRN    acetaminophen (TYLENOL) tablet 650 mg  650 mg Oral Q6H PRN    Or    acetaminophen (TYLENOL) suppository 650 mg  650 mg Rectal Q6H PRN    polyethylene glycol (MIRALAX) packet 17 g  17 g Oral DAILY PRN    promethazine (PHENERGAN) tablet 12.5 mg  12.5 mg Oral Q6H PRN    Or    ondansetron (ZOFRAN) injection 4 mg  4 mg IntraVENous Q6H PRN    enoxaparin (LOVENOX) injection 40 mg  40 mg SubCUTAneous DAILY    azithromycin (ZITHROMAX) 500 mg in 0.9% sodium chloride (MBP/ADV) 250 mL  500 mg IntraVENous Q24H    ascorbic acid (vitamin C) (VITAMIN C) tablet 500 mg  500 mg Oral BID    zinc sulfate (ZINCATE) 220 (50) mg capsule 1 Cap  1 Cap Oral DAILY    albuterol (PROVENTIL HFA, VENTOLIN HFA, PROAIR HFA) inhaler 4 Puff  4 Puff Inhalation Q4H PRN    benzonatate (TESSALON) capsule 100 mg  100 mg Oral TID PRN    dexamethasone (PF) (DECADRON) 10 mg/mL injection 6 mg  6 mg IntraVENous Q6H         REVIEW OF SYSTEMS   12 point ROS negative except as stated in the HPI. Physical Exam:   Visit Vitals  /83 (BP 1 Location: Left arm, BP Patient Position: At rest)   Pulse 94   Temp 98.6 °F (37 °C)   Resp 30   Ht 5' 11\" (1.803 m)   Wt 108 kg (238 lb)   SpO2 96%   BMI 33.19 kg/m²       General:  Alert, cooperative, on BiPAP   Head:  Normocephalic, without obvious abnormality, atraumatic. Eyes:  Conjunctivae/corneas clear. Nose: Nares normal   Throat: Lips, mucosa, and tongue normal.    Neck: Supple, symmetrical, trachea midline   Lungs:   Clear to auscultation bilaterally. Respirations non-labored on BiPAP   Chest wall:  No tenderness or deformity. Heart:  Regular rate and rhythm, S1, S2 normal, no murmur, click, rub or gallop. Abdomen:   Soft, non-tender. Bowel sounds normal. Obese   Extremities: Extremities normal, atraumatic, no cyanosis or edema. Pulses: 2+ and symmetric all extremities.    Skin: Skin color, texture, turgor normal. No rashes or lesions       Neurologic: Grossly nonfocal       Lab Results   Component Value Date/Time    Sodium 129 (L) 08/05/2020 08:49 PM    Potassium 3.8 08/05/2020 08:49 PM    Chloride 97 08/05/2020 08:49 PM    CO2 25 08/05/2020 08:49 PM    BUN 13 08/05/2020 08:49 PM    Creatinine 0.84 08/05/2020 08:49 PM    Glucose 155 (H) 08/05/2020 08:49 PM    Calcium 9.0 08/05/2020 08:49 PM       Lab Results   Component Value Date/Time    WBC 16.5 (H) 08/05/2020 08:49 PM    HGB 15.7 08/05/2020 08:49 PM    PLATELET 342 43/69/6078 08:49 PM    MCV 87.0 08/05/2020 08:49 PM       Lab Results   Component Value Date/Time    Alk.  phosphatase 151 (H) 08/05/2020 08:49 PM    Protein, total 8.2 08/05/2020 08:49 PM    Albumin 2.9 (L) 08/05/2020 08:49 PM    Globulin 5.3 (H) 08/05/2020 08:49 PM       Lab Results   Component Value Date/Time    Ferritin 5,114 (H) 08/05/2020 11:57 PM       No results found for: SR, CRP, RAMILA, ANAIGG, RA, RPR, RPRT, VDRLT, VDRLS, TSH, TSHEXT     Lab Results   Component Value Date/Time    PH 7.46 (H) 08/05/2020 09:45 PM    PCO2 29 (L) 08/05/2020 09:45 PM    PO2 71 (L) 08/05/2020 09:45 PM    HCO3 21 (L) 08/05/2020 09:45 PM    FIO2 90% 08/05/2020 09:45 PM       Lab Results   Component Value Date/Time    Troponin-I, Qt. <0.05 08/05/2020 08:49 PM        Lab Results   Component Value Date/Time    Culture result: NO GROWTH AFTER 7 HOURS 08/05/2020 08:49 PM       No results found for: TOXA1, RPR, HBCM, HBSAG, HAAB, HCAB1, HAAT, G6PD, CRYAC, HIVGT, HIVR, HIV1, HIV12, HIVPC, HIVRPI    No results found for: VANCT, CPK    No results found for: COLOR, APPRN, SPGRU, RAE, PROTU, GLUCU, KETU, BILU, BLDU, UROU, ROBLES, LEUKU, WBCU, RBCU, UEPI, BACTU, CASTS, UCRY    IMPRESSION  · COVID-19 Pneumonia  · Acute respiratory failure with hypoxia  · Tobacco  use    PLAN  · Goal sats 88% or higher, can try off of BiPAP on HF NC and would place back on BiPAP for worsening hypoxia and/or increased WOB  · Zinc, vitamin C  · Azithromycin  · Will increase decadron dosing  · Will initiate remdesivir  · Monitor renal function, LFTs, inflammatory markers, d-dimer  · Follow-up IL6  · PRN proventil  · Lovenox, low threshold to increase to therapeutic dosing  · NPO except meds until pulmonary status stabilizes      Thank you for allowing us to participate in the care of this patient. We will be happy to follow along in his/her progress with you.     Tahira Valencia MD

## 2020-08-07 ENCOUNTER — APPOINTMENT (OUTPATIENT)
Dept: GENERAL RADIOLOGY | Age: 30
DRG: 870 | End: 2020-08-07
Attending: PHYSICIAN ASSISTANT
Payer: COMMERCIAL

## 2020-08-07 LAB
ABO + RH BLD: NORMAL
ALBUMIN SERPL-MCNC: 2.7 G/DL (ref 3.5–5)
ALBUMIN/GLOB SERPL: 0.6 {RATIO} (ref 1.1–2.2)
ALP SERPL-CCNC: 149 U/L (ref 45–117)
ALT SERPL-CCNC: 173 U/L (ref 12–78)
ANION GAP SERPL CALC-SCNC: 7 MMOL/L (ref 5–15)
AST SERPL-CCNC: 66 U/L (ref 15–37)
BASOPHILS # BLD: 0 K/UL (ref 0–0.1)
BASOPHILS NFR BLD: 0 % (ref 0–1)
BILIRUB SERPL-MCNC: 0.6 MG/DL (ref 0.2–1)
BLOOD GROUP ANTIBODIES SERPL: NORMAL
BNP SERPL-MCNC: 26 PG/ML
BUN SERPL-MCNC: 23 MG/DL (ref 6–20)
BUN/CREAT SERPL: 29 (ref 12–20)
CALCIUM SERPL-MCNC: 8.7 MG/DL (ref 8.5–10.1)
CHLORIDE SERPL-SCNC: 104 MMOL/L (ref 97–108)
CO2 SERPL-SCNC: 26 MMOL/L (ref 21–32)
CREAT SERPL-MCNC: 0.78 MG/DL (ref 0.7–1.3)
CRP SERPL HS-MCNC: >9.5 MG/L
CRP SERPL-MCNC: 12.7 MG/DL (ref 0–0.6)
D DIMER PPP FEU-MCNC: 2.06 MG/L FEU (ref 0–0.65)
DIFFERENTIAL METHOD BLD: ABNORMAL
EOSINOPHIL # BLD: 0 K/UL (ref 0–0.4)
EOSINOPHIL NFR BLD: 0 % (ref 0–7)
ERYTHROCYTE [DISTWIDTH] IN BLOOD BY AUTOMATED COUNT: 12.7 % (ref 11.5–14.5)
FERRITIN SERPL-MCNC: 4623 NG/ML (ref 26–388)
FIBRINOGEN PPP-MCNC: >800 MG/DL (ref 200–475)
GLOBULIN SER CALC-MCNC: 4.4 G/DL (ref 2–4)
GLUCOSE SERPL-MCNC: 129 MG/DL (ref 65–100)
HCT VFR BLD AUTO: 44 % (ref 36.6–50.3)
HGB BLD-MCNC: 14.8 G/DL (ref 12.1–17)
IMM GRANULOCYTES # BLD AUTO: 0 K/UL (ref 0–0.04)
IMM GRANULOCYTES NFR BLD AUTO: 0 % (ref 0–0.5)
LDH SERPL L TO P-CCNC: 697 U/L (ref 85–241)
LYMPHOCYTES # BLD: 0.7 K/UL (ref 0.8–3.5)
LYMPHOCYTES NFR BLD: 5 % (ref 12–49)
MAGNESIUM SERPL-MCNC: 2.9 MG/DL (ref 1.6–2.4)
MCH RBC QN AUTO: 30.6 PG (ref 26–34)
MCHC RBC AUTO-ENTMCNC: 33.6 G/DL (ref 30–36.5)
MCV RBC AUTO: 90.9 FL (ref 80–99)
METAMYELOCYTES NFR BLD MANUAL: 2 %
MONOCYTES # BLD: 0.6 K/UL (ref 0–1)
MONOCYTES NFR BLD: 4 % (ref 5–13)
MYELOCYTES NFR BLD MANUAL: 1 %
NEUTS BAND NFR BLD MANUAL: 1 %
NEUTS SEG # BLD: 13.1 K/UL (ref 1.8–8)
NEUTS SEG NFR BLD: 87 % (ref 32–75)
NRBC # BLD: 0 K/UL (ref 0–0.01)
NRBC BLD-RTO: 0 PER 100 WBC
PHOSPHATE SERPL-MCNC: 5.2 MG/DL (ref 2.6–4.7)
PLATELET # BLD AUTO: 302 K/UL (ref 150–400)
PMV BLD AUTO: 10.2 FL (ref 8.9–12.9)
POTASSIUM SERPL-SCNC: 4.8 MMOL/L (ref 3.5–5.1)
PROT SERPL-MCNC: 7.1 G/DL (ref 6.4–8.2)
RBC # BLD AUTO: 4.84 M/UL (ref 4.1–5.7)
RBC MORPH BLD: ABNORMAL
SODIUM SERPL-SCNC: 137 MMOL/L (ref 136–145)
SPECIMEN EXP DATE BLD: NORMAL
WBC # BLD AUTO: 14.9 K/UL (ref 4.1–11.1)

## 2020-08-07 PROCEDURE — 94760 N-INVAS EAR/PLS OXIMETRY 1: CPT

## 2020-08-07 PROCEDURE — 83615 LACTATE (LD) (LDH) ENZYME: CPT

## 2020-08-07 PROCEDURE — 77030011256 HC DRSG MEPILEX <16IN NO BORD MOLN -A

## 2020-08-07 PROCEDURE — 86900 BLOOD TYPING SEROLOGIC ABO: CPT

## 2020-08-07 PROCEDURE — 74011250636 HC RX REV CODE- 250/636: Performed by: INTERNAL MEDICINE

## 2020-08-07 PROCEDURE — 85379 FIBRIN DEGRADATION QUANT: CPT

## 2020-08-07 PROCEDURE — 82728 ASSAY OF FERRITIN: CPT

## 2020-08-07 PROCEDURE — 84100 ASSAY OF PHOSPHORUS: CPT

## 2020-08-07 PROCEDURE — 74011000258 HC RX REV CODE- 258: Performed by: INTERNAL MEDICINE

## 2020-08-07 PROCEDURE — 83735 ASSAY OF MAGNESIUM: CPT

## 2020-08-07 PROCEDURE — 85384 FIBRINOGEN ACTIVITY: CPT

## 2020-08-07 PROCEDURE — 65660000000 HC RM CCU STEPDOWN

## 2020-08-07 PROCEDURE — 36415 COLL VENOUS BLD VENIPUNCTURE: CPT

## 2020-08-07 PROCEDURE — 83880 ASSAY OF NATRIURETIC PEPTIDE: CPT

## 2020-08-07 PROCEDURE — 80053 COMPREHEN METABOLIC PANEL: CPT

## 2020-08-07 PROCEDURE — 86140 C-REACTIVE PROTEIN: CPT

## 2020-08-07 PROCEDURE — 71045 X-RAY EXAM CHEST 1 VIEW: CPT

## 2020-08-07 PROCEDURE — 77010033711 HC HIGH FLOW OXYGEN

## 2020-08-07 PROCEDURE — 85025 COMPLETE CBC W/AUTO DIFF WBC: CPT

## 2020-08-07 PROCEDURE — 74011000250 HC RX REV CODE- 250: Performed by: INTERNAL MEDICINE

## 2020-08-07 PROCEDURE — 86141 C-REACTIVE PROTEIN HS: CPT

## 2020-08-07 PROCEDURE — 74011250637 HC RX REV CODE- 250/637: Performed by: INTERNAL MEDICINE

## 2020-08-07 PROCEDURE — 94660 CPAP INITIATION&MGMT: CPT

## 2020-08-07 PROCEDURE — 74011250636 HC RX REV CODE- 250/636: Performed by: PHYSICIAN ASSISTANT

## 2020-08-07 RX ORDER — ENOXAPARIN SODIUM 100 MG/ML
60 INJECTION SUBCUTANEOUS EVERY 12 HOURS
Status: DISCONTINUED | OUTPATIENT
Start: 2020-08-07 | End: 2020-08-07

## 2020-08-07 RX ORDER — SODIUM CHLORIDE 9 MG/ML
250 INJECTION, SOLUTION INTRAVENOUS AS NEEDED
Status: DISCONTINUED | OUTPATIENT
Start: 2020-08-07 | End: 2020-08-18 | Stop reason: HOSPADM

## 2020-08-07 RX ORDER — ENOXAPARIN SODIUM 100 MG/ML
100 INJECTION SUBCUTANEOUS DAILY
Status: DISCONTINUED | OUTPATIENT
Start: 2020-08-08 | End: 2020-08-07

## 2020-08-07 RX ORDER — ENOXAPARIN SODIUM 100 MG/ML
40 INJECTION SUBCUTANEOUS EVERY 12 HOURS
Status: DISCONTINUED | OUTPATIENT
Start: 2020-08-07 | End: 2020-08-08

## 2020-08-07 RX ADMIN — DEXAMETHASONE SODIUM PHOSPHATE 6 MG: 10 INJECTION INTRAMUSCULAR; INTRAVENOUS at 04:38

## 2020-08-07 RX ADMIN — REMDESIVIR 100 MG: 100 INJECTION, POWDER, LYOPHILIZED, FOR SOLUTION INTRAVENOUS at 17:14

## 2020-08-07 RX ADMIN — ENOXAPARIN SODIUM 40 MG: 40 INJECTION SUBCUTANEOUS at 19:55

## 2020-08-07 RX ADMIN — DEXAMETHASONE SODIUM PHOSPHATE 6 MG: 10 INJECTION INTRAMUSCULAR; INTRAVENOUS at 23:04

## 2020-08-07 RX ADMIN — AZITHROMYCIN MONOHYDRATE 500 MG: 500 INJECTION, POWDER, LYOPHILIZED, FOR SOLUTION INTRAVENOUS at 04:22

## 2020-08-07 RX ADMIN — Medication 10 ML: at 23:04

## 2020-08-07 RX ADMIN — DEXAMETHASONE SODIUM PHOSPHATE 6 MG: 10 INJECTION INTRAMUSCULAR; INTRAVENOUS at 17:12

## 2020-08-07 RX ADMIN — Medication 1 CAPSULE: at 08:14

## 2020-08-07 RX ADMIN — Medication 10 ML: at 17:11

## 2020-08-07 RX ADMIN — Medication 10 ML: at 04:38

## 2020-08-07 RX ADMIN — DEXAMETHASONE SODIUM PHOSPHATE 6 MG: 10 INJECTION INTRAMUSCULAR; INTRAVENOUS at 11:26

## 2020-08-07 RX ADMIN — Medication 500 MG: at 08:14

## 2020-08-07 RX ADMIN — Medication 500 MG: at 17:12

## 2020-08-07 RX ADMIN — SODIUM CHLORIDE 100 ML/HR: 900 INJECTION, SOLUTION INTRAVENOUS at 11:26

## 2020-08-07 RX ADMIN — ENOXAPARIN SODIUM 40 MG: 40 INJECTION SUBCUTANEOUS at 08:10

## 2020-08-07 RX ADMIN — ALPRAZOLAM 0.5 MG: 0.5 TABLET ORAL at 10:16

## 2020-08-07 NOTE — PROGRESS NOTES
1900- Bedside and Verbal shift change report given to Rai Reed (oncoming nurse) by [facility] RN (offgoing nurse). Report included the following information SBAR, Kardex, MAR, Recent Results and Cardiac Rhythm NSR.     2000- Shift assessment completed. Patient in NSR with HR in the 70's, BP within range   On Bipap 14/5, 100% FiO2 satting @ 94%, lung sounds coarse/diminished bilaterally, dyspnea @ rest   A&O x4, pupils round and reactive, follows commands. 0000- Reassessment completed. No changes    0300- Patient had large watery stool     0430- Had another large watery BM    0700- Bedside and Verbal shift change report given to Marielos Lopez RN (oncoming nurse) by Karrie Suarez RN (offgoing nurse). Report included the following information SBAR, Kardex, MAR, Recent Results and Cardiac Rhythm NSR.

## 2020-08-07 NOTE — CONSULTS
Infectious Disease Consult    Impression/Plan   · Severe COVID-19 pneumonia with hypoxic respiratory failure. Continue remdesivir. Will order convalescent plasma once type and screen resulted. This was discussed with the patient at bedside and he has agreed to sign consent form. Continue dexamethasone, azithromycin, zinc, and vitamin C. Follow inflammatory markers. · Elevated transaminases. Likely related to COVID-19. Check acute hepatitis panel  · Tobacco abuse        Anti-infectives:     Subjective:   Date of Consultation:  August 7, 2020  Date of Admission: 8/5/2020   Referring Physician:     Mr. Tahira Vides is a 26 yo gentleman with a history of tobacco use who is admitted with COVID-19 pneumonia and acute respiratory failure with hypoxia. He was diagnosed about 10 days ago at an urgent care facility with COVID-19. Last Thursday he began to have fevers, inability to take a deep breath that was progressively worse, dry cough, and general malaise/faitigue. He was prescribed azithromycin and prednisone without improvement. Denies n/v, abdominal pain, changes in taste/smell. Hypoxic as low as 76% on RA. Placed on BiPAP yesterday evening due to increased work of breathing. Started remdesivir yesterday. The infectious diseases service has been asked to evaluate the patient for convalescent plasma    Patient Active Problem List   Diagnosis Code    [facility] J96.91     History reviewed. No pertinent past medical history. History reviewed. No pertinent family history. Social History     Tobacco Use    Smoking status: Current Some Day Smoker    Smokeless tobacco: Never Used   Substance Use Topics    Alcohol use: Not on file     History reviewed. No pertinent surgical history. Prior to Admission medications    Medication Sig Start Date End Date Taking?  Authorizing Provider   benzonatate (Tessalon Perles) 100 mg capsule Take 100 mg by mouth three (3) times daily as needed for Cough. Yes Provider, Historical   predniSONE (STERAPRED DS) 10 mg dose pack Take  by mouth See Admin Instructions. See administration instruction per 10mg dose pack 20 Yes Provider, Historical     No Known Allergies     Review of Systems:  A comprehensive review of systems was negative except for that written in the History of Present Illness. Objective:   Blood pressure 98/76, pulse 90, temperature 99.2 °F (37.3 °C), resp. rate 25, height 5' 11\" (1.803 m), weight 238 lb (108 kg), SpO2 92 %. Temp (24hrs), Av.8 °F (37.1 °C), Min:98.6 °F (37 °C), Max:99.2 °F (37.3 °C)       Exam:    General:  Alert, cooperative. Moderate distress   Eyes:  Sclera anicteric. Mouth/Throat:  BiPAP   Neck: Supple   Lungs: Moderate distress   CV:     Abdomen:    Nondistended   Extremities:  Moves all   Skin:  No rash   Lymph nodes:    Musculoskeletal:    Lines/Devices:     Psych: Alert and oriented, normal mood affect given the setting       Data Review:   Recent Results (from the past 24 hour(s))   CRP, HIGH SENSITIVITY    Collection Time: 20  4:37 AM   Result Value Ref Range    CRP, High sensitivity >9.5 mg/L   FERRITIN    Collection Time: 20  4:37 AM   Result Value Ref Range    Ferritin 4,623 (H) 26 - 388 NG/ML   CBC WITH AUTOMATED DIFF    Collection Time: 20  4:37 AM   Result Value Ref Range    WBC 14.9 (H) 4.1 - 11.1 K/uL    RBC 4.84 4.10 - 5.70 M/uL    HGB 14.8 12.1 - 17.0 g/dL    HCT 44.0 36.6 - 50.3 %    MCV 90.9 80.0 - 99.0 FL    MCH 30.6 26.0 - 34.0 PG    MCHC 33.6 30.0 - 36.5 g/dL    RDW 12.7 11.5 - 14.5 %    PLATELET 794 109 - 359 K/uL    MPV 10.2 8.9 - 12.9 FL    NRBC 0.0 0  WBC    ABSOLUTE NRBC 0.00 0.00 - 0.01 K/uL    NEUTROPHILS 87 (H) 32 - 75 %    BAND NEUTROPHILS 1 %    LYMPHOCYTES 5 (L) 12 - 49 %    MONOCYTES 4 (L) 5 - 13 %    EOSINOPHILS 0 0 - 7 %    BASOPHILS 0 0 - 1 %    METAMYELOCYTES 2 %    MYELOCYTES 1 %    IMMATURE GRANULOCYTES 0 0.0 - 0.5 %    ABS.  NEUTROPHILS 13.1 (H) 1.8 - 8.0 K/UL    ABS. LYMPHOCYTES 0.7 (L) 0.8 - 3.5 K/UL    ABS. MONOCYTES 0.6 0.0 - 1.0 K/UL    ABS. EOSINOPHILS 0.0 0.0 - 0.4 K/UL    ABS. BASOPHILS 0.0 0.0 - 0.1 K/UL    ABS. IMM. GRANS. 0.0 0.00 - 0.04 K/UL    DF MANUAL      RBC COMMENTS NORMOCYTIC, NORMOCHROMIC     METABOLIC PANEL, COMPREHENSIVE    Collection Time: 08/07/20  4:37 AM   Result Value Ref Range    Sodium 137 136 - 145 mmol/L    Potassium 4.8 3.5 - 5.1 mmol/L    Chloride 104 97 - 108 mmol/L    CO2 26 21 - 32 mmol/L    Anion gap 7 5 - 15 mmol/L    Glucose 129 (H) 65 - 100 mg/dL    BUN 23 (H) 6 - 20 MG/DL    Creatinine 0.78 0.70 - 1.30 MG/DL    BUN/Creatinine ratio 29 (H) 12 - 20      GFR est AA >60 >60 ml/min/1.73m2    GFR est non-AA >60 >60 ml/min/1.73m2    Calcium 8.7 8.5 - 10.1 MG/DL    Bilirubin, total 0.6 0.2 - 1.0 MG/DL    ALT (SGPT) 173 (H) 12 - 78 U/L    AST (SGOT) 66 (H) 15 - 37 U/L    Alk.  phosphatase 149 (H) 45 - 117 U/L    Protein, total 7.1 6.4 - 8.2 g/dL    Albumin 2.7 (L) 3.5 - 5.0 g/dL    Globulin 4.4 (H) 2.0 - 4.0 g/dL    A-G Ratio 0.6 (L) 1.1 - 2.2     MAGNESIUM    Collection Time: 08/07/20  4:37 AM   Result Value Ref Range    Magnesium 2.9 (H) 1.6 - 2.4 mg/dL   PHOSPHORUS    Collection Time: 08/07/20  4:37 AM   Result Value Ref Range    Phosphorus 5.2 (H) 2.6 - 4.7 MG/DL   LD    Collection Time: 08/07/20  4:37 AM   Result Value Ref Range     (H) 85 - 241 U/L   C REACTIVE PROTEIN, QT    Collection Time: 08/07/20  4:37 AM   Result Value Ref Range    C-Reactive protein 12.70 (H) 0.00 - 0.60 mg/dL   NT-PRO BNP    Collection Time: 08/07/20  4:37 AM   Result Value Ref Range    NT pro-BNP 26 <125 PG/ML   D DIMER    Collection Time: 08/07/20  8:27 AM   Result Value Ref Range    D-dimer 2.06 (H) 0.00 - 0.65 mg/L FEU   FIBRINOGEN    Collection Time: 08/07/20  8:27 AM   Result Value Ref Range    Fibrinogen >800 (H) 200 - 475 mg/dL        Microbiology:      Studies:      Signed By: Johana Alba DO     August 7, 2020

## 2020-08-07 NOTE — PROGRESS NOTES
Problem: Falls - Risk of  Goal: *Absence of Falls  Description: Document Shelly Mchugh Fall Risk and appropriate interventions in the flowsheet. Outcome: Progressing Towards Goal  Note: Fall Risk Interventions:            Medication Interventions: Bed/chair exit alarm, Patient to call before getting OOB                   Problem: Patient Education: Go to Patient Education Activity  Goal: Patient/Family Education  Outcome: Progressing Towards Goal     Problem: Pressure Injury - Risk of  Goal: *Prevention of pressure injury  Description: Document Enrique Scale and appropriate interventions in the flowsheet. Outcome: Progressing Towards Goal  Note: Pressure Injury Interventions: Activity Interventions: Increase time out of bed, Pressure redistribution bed/mattress(bed type), PT/OT evaluation    Mobility Interventions: Assess need for specialty bed, Pressure redistribution bed/mattress (bed type), PT/OT evaluation, Turn and reposition approx.  every two hours(pillow and wedges)    Nutrition Interventions: Document food/fluid/supplement intake, Discuss nutritional consult with provider              Problem: Patient Education: Go to Patient Education Activity  Goal: Patient/Family Education  Outcome: Progressing Towards Goal

## 2020-08-07 NOTE — PROGRESS NOTES
[facility]  [address] [facility], [address] [place] [address]  [phone]      Medical Progress Note      NAME: Keyon Steinberg   :  1990  MRM:  [id number]    Date/Time: 2020        Assessment / Plan:       Acute respiratory failure with hypoxia: Due to COVID-19 infection / pneumonia. Severe hypoxia requiring NPPV. Remains on BiPAP. CXR worse , showing worsening diffuse bilateral interstitial and patchy airspace disease. Also seen was new, bilateral supraclavicular subcutaneous emphysema and pneumomediastinum. Discussed with PCCM and BiPAP setting adjusted. Agree with convalescent plasma. Consider Actemra, IL-6 level pending. Continue remdesivir (day 2/5), dexamethasone, vitamin C, zinc, azithromycin. Statin deferred for now due to transaminitis. LE duplex negative for DVT. Lovenox increased to intermediate dose. Trend inflammatory markers. Elevated liver enzymes: likely due to COVID-19. No RUQ pain/TTP. No hyperbilirubinemia to suggest biliary obstruction. Follow closely on remdesivir. Obesity:  Body mass index is 33.19 kg/m². Would benefit from weight loss and dietary / lifestyle modifications      Total time spent: 35 minutes, updated pt's mother on phone  Time spent in the care of this patient including reviewing records, discussing with nursing and/or other providers on the treatment team, obtaining history and examining the patient, and discussing treatment plans. Care Plan discussed with: Patient, Nursing Staff and >50% of time spent in counseling and coordination of care    Discussed:  Care Plan and D/C Planning    Prophylaxis:  Lovenox    Disposition:  Home w/Family         Subjective:     Chief Complaint:  Follow up dyspnea    Chart/notes/labs/studies reviewed, patient examined. Feels \"okay\". No fevers. SOB. Objective:       Vitals:        Last 24hrs VS reviewed since prior progress note.  Most recent are:    Visit Vitals  BP 98/76 (BP 1 Location: Right arm, BP Patient Position: At rest)   Pulse 90   Temp 99.2 °F (37.3 °C)   Resp 25   Ht 5' 11\" (1.803 m)   Wt 108 kg (238 lb)   SpO2 92%   BMI 33.19 kg/m²     SpO2 Readings from Last 6 Encounters:   08/07/20 92%    O2 Flow Rate (L/min): 36 l/min       Intake/Output Summary (Last 24 hours) at 8/7/2020 1638  Last data filed at 8/7/2020 1508  Gross per 24 hour   Intake 2493.33 ml   Output 2080 ml   Net 413.33 ml          Exam:     Physical Exam:    Gen:  Well-developed, well-nourished, in no acute distress. HEENT:  Sclerae nonicteric, hearing intact to voice. BiPAP mask in place  Neck:  Supple, without masses. Resp:  No accessory muscle use, increased WOB. Mildly diminished without wheezing, rales, or rhonchi  Card: HR 90s, without m/r/g. No LE edema. Abd:  +bowel sounds, soft, NTTP, nondistended. No HSM. Neuro: Face symmetric, follows commands appropriately. No focal weakness. Psych:  Alert, oriented x 3.  Good insight     Medications Reviewed: (see below)    Lab Data Reviewed: (see below)    ______________________________________________________________________    Medications:     Current Facility-Administered Medications   Medication Dose Route Frequency    enoxaparin (LOVENOX) injection 40 mg  40 mg SubCUTAneous Q12H    sodium chloride (NS) flush 5-40 mL  5-40 mL IntraVENous Q8H    sodium chloride (NS) flush 5-40 mL  5-40 mL IntraVENous PRN    acetaminophen (TYLENOL) tablet 650 mg  650 mg Oral Q6H PRN    Or    acetaminophen (TYLENOL) suppository 650 mg  650 mg Rectal Q6H PRN    polyethylene glycol (MIRALAX) packet 17 g  17 g Oral DAILY PRN    promethazine (PHENERGAN) tablet 12.5 mg  12.5 mg Oral Q6H PRN    Or    ondansetron (ZOFRAN) injection 4 mg  4 mg IntraVENous Q6H PRN    azithromycin (ZITHROMAX) 500 mg in 0.9% sodium chloride (MBP/ADV) 250 mL  500 mg IntraVENous Q24H    ascorbic acid (vitamin C) (VITAMIN C) tablet 500 mg  500 mg Oral BID    zinc sulfate (ZINCATE) 220 (50) mg capsule 1 Cap  1 Cap Oral DAILY    albuterol (PROVENTIL HFA, VENTOLIN HFA, PROAIR HFA) inhaler 4 Puff  4 Puff Inhalation Q4H PRN    benzonatate (TESSALON) capsule 100 mg  100 mg Oral TID PRN    dexamethasone (PF) (DECADRON) 10 mg/mL injection 6 mg  6 mg IntraVENous Q6H    remdesivir 100 mg in 0.9% sodium chloride 250 mL IVPB  100 mg IntraVENous Q24H    0.9% sodium chloride infusion  100 mL/hr IntraVENous CONTINUOUS    ALPRAZolam (XANAX) tablet 0.5 mg  0.5 mg Oral BID PRN            Lab Review:     Recent Labs     08/07/20  0437 08/06/20  1324 08/05/20  2049   WBC 14.9* 13.0* 16.5*   HGB 14.8 15.1 15.7   HCT 44.0 44.1 44.1    338 315     Recent Labs     08/07/20  0437 08/06/20  1324 08/05/20  2049    137 129*   K 4.8 4.2 3.8    102 97   CO2 26 28 25   * 115* 155*   BUN 23* 16 13   CREA 0.78 0.88 0.84   CA 8.7 9.0 9.0   MG 2.9* 3.0*  --    PHOS 5.2* 5.3*  --    ALB 2.7* 2.7* 2.9*   * 159* 140*     No components found for: [place]  Recent Labs     08/05/20  2145   PH 7.46*   PCO2 29*   PO2 71*   HCO3 21*   FIO2 90%     No results for input(s): INR, INREXT, INREXT in the last 72 hours.   No results found for: SDES  Lab Results   Component Value Date/Time    Culture result: NO GROWTH 2 DAYS 08/05/2020 08:49 PM              ___________________________________________________    Attending Physician: Ligia Quigley MD

## 2020-08-07 NOTE — PROGRESS NOTES
Name: [facility]: [facility]   : 1990 Admit Date: 2020   Phone: [phone]  Room: [facility]/01   PCP: Veronica Mcgovern MD  MRN: [id number]   Date: 2020  Code: Full Code          Chart and notes reviewed. Data reviewed. I review the patient's current medications in the medical record at each encounter. I have evaluated and examined the patient. History of Present Illness:  Mr. Stephanie Tapia is a [address] [place] yo gentleman with a history of tobacco use who is admitted with COVID-19 pneumonia and acute respiratory failure with hypoxia. He was diagnosed about 10 days ago at an urgent care facility with COVID-19. Last Thursday he began to have fevers, inability to take a deep breath that was progressively worse, dry cough, and general malaise/faitigue. He was prescribed azithromycin and prednisone without improvement. Denies n/v, abdominal pain, changes in taste/smell. Hypoxic as low as 76% on RA. Placed on BiPAP yesterday evening due to increased work of breathing. Labs reviewed: WBC 16.5, d-dimer 0.86, Na 129, cr 0.84, AST 86, , , Procalcitonin 0.33, ferritin 5114, IL6 in lab; crp pending    Images:  CXR with hazy interstitial changes and airspace opacification      PMH: tobacco use    FH: no family history pertinent to presents complaint    SH: +tobacco use    Interval history  Afebrile  Sats 94% on BiPAP 16/10 FiO2 100%; Vt 600-700s  BP stable  RR 30s during my exam  WBC 14.9  Ferritin 4623 - down from 5114  CRP 12.70 - down from 19.30  Ld 697 - down from 796  LFTs mild elevated but stable  PCT 0.29  8/6 pm d-dimer 0.81; am value pending  IL-6 in process  Blood cx no growth x 2 days  BLE VD: technically difficult exam due to body habitus; negative for DVT    ROS: Patient reports continued SOB. Notes BiPAP helping with increased WOB. Denies CP. Denies fever or chills. Denies abd pain or LE pain/swelling.   Per nursing, placed on HF briefly for meds and desats to 87%. Social History     Tobacco Use    Smoking status: Current Some Day Smoker    Smokeless tobacco: Never Used   Substance Use Topics    Alcohol use: Not on file       No Known Allergies    Current Facility-Administered Medications   Medication Dose Route Frequency    sodium chloride (NS) flush 5-40 mL  5-40 mL IntraVENous Q8H    sodium chloride (NS) flush 5-40 mL  5-40 mL IntraVENous PRN    acetaminophen (TYLENOL) tablet 650 mg  650 mg Oral Q6H PRN    Or    acetaminophen (TYLENOL) suppository 650 mg  650 mg Rectal Q6H PRN    polyethylene glycol (MIRALAX) packet 17 g  17 g Oral DAILY PRN    promethazine (PHENERGAN) tablet 12.5 mg  12.5 mg Oral Q6H PRN    Or    ondansetron (ZOFRAN) injection 4 mg  4 mg IntraVENous Q6H PRN    enoxaparin (LOVENOX) injection 40 mg  40 mg SubCUTAneous DAILY    azithromycin (ZITHROMAX) 500 mg in 0.9% sodium chloride (MBP/ADV) 250 mL  500 mg IntraVENous Q24H    ascorbic acid (vitamin C) (VITAMIN C) tablet 500 mg  500 mg Oral BID    zinc sulfate (ZINCATE) 220 (50) mg capsule 1 Cap  1 Cap Oral DAILY    albuterol (PROVENTIL HFA, VENTOLIN HFA, PROAIR HFA) inhaler 4 Puff  4 Puff Inhalation Q4H PRN    benzonatate (TESSALON) capsule 100 mg  100 mg Oral TID PRN    dexamethasone (PF) (DECADRON) 10 mg/mL injection 6 mg  6 mg IntraVENous Q6H    remdesivir 100 mg in 0.9% sodium chloride 250 mL IVPB  100 mg IntraVENous Q24H    0.9% sodium chloride infusion  100 mL/hr IntraVENous CONTINUOUS    cefTRIAXone (ROCEPHIN) 1 g in 0.9% sodium chloride (MBP/ADV) 50 mL  1 g IntraVENous Q24H    ALPRAZolam (XANAX) tablet 0.5 mg  0.5 mg Oral BID PRN         REVIEW OF SYSTEMS   12 point ROS negative except as stated in the HPI.       Physical Exam:   Visit Vitals  /81   Pulse 86   Temp 98.6 °F (37 °C)   Resp 22   Ht 5' 11\" (1.803 m)   Wt 108 kg (238 lb)   SpO2 94%   BMI 33.19 kg/m²       General:  Alert, cooperative, on BiPAP   Head:  Normocephalic, without obvious abnormality, atraumatic. Eyes:  Conjunctivae/corneas clear. Nose: Nares normal   Throat: Lips, mucosa, and tongue normal.    Neck: Supple, symmetrical, trachea midline   Lungs:   Diminished bilaterally but mostly clear to auscultation. Mild increase WOB with BiPAP in place. Chest wall:  No tenderness or deformity. Heart:  Regular rate and rhythm, S1, S2 normal, no murmur, click, rub or gallop. Abdomen:   Soft, non-tender. Bowel sounds normal. Obese   Extremities: Extremities normal, atraumatic, no cyanosis or edema. Pulses: 2+ and symmetric all extremities. Skin: Skin color, texture, turgor normal. No rashes or lesions   Neurologic: Grossly nonfocal       Lab Results   Component Value Date/Time    Sodium 137 08/07/2020 04:37 AM    Potassium 4.8 08/07/2020 04:37 AM    Chloride 104 08/07/2020 04:37 AM    CO2 26 08/07/2020 04:37 AM    BUN 23 (H) 08/07/2020 04:37 AM    Creatinine 0.78 08/07/2020 04:37 AM    Glucose 129 (H) 08/07/2020 04:37 AM    Calcium 8.7 08/07/2020 04:37 AM    Magnesium 2.9 (H) 08/07/2020 04:37 AM    Phosphorus 5.2 (H) 08/07/2020 04:37 AM    Lactic acid 1.2 08/06/2020 01:24 PM       Lab Results   Component Value Date/Time    WBC 14.9 (H) 08/07/2020 04:37 AM    HGB 14.8 08/07/2020 04:37 AM    PLATELET 713 63/84/6795 04:37 AM    MCV 90.9 08/07/2020 04:37 AM       Lab Results   Component Value Date/Time    Alk.  phosphatase 149 (H) 08/07/2020 04:37 AM    Protein, total 7.1 08/07/2020 04:37 AM    Albumin 2.7 (L) 08/07/2020 04:37 AM    Globulin 4.4 (H) 08/07/2020 04:37 AM       Lab Results   Component Value Date/Time    Ferritin 4,623 (H) 08/07/2020 04:37 AM       Lab Results   Component Value Date/Time    C-Reactive protein 12.70 (H) 08/07/2020 04:37 AM        No results found for: PH, PHI, PCO2, PCO2I, PO2, PO2I, HCO3, HCO3I, FIO2, FIO2I    Lab Results   Component Value Date/Time    Troponin-I, Qt. <0.05 08/05/2020 08:49 PM        Lab Results   Component Value Date/Time    Culture result: NO GROWTH 2 DAYS 08/05/2020 08:49 PM       No results found for: TOXA1, RPR, HBCM, HBSAG, HAAB, HCAB1, HAAT, G6PD, CRYAC, HIVGT, HIVR, HIV1, HIV12, HIVPC, HIVRPI    No results found for: VANCT, CPK    No results found for: COLOR, APPRN, SPGRU, RAE, PROTU, GLUCU, KETU, BILU, BLDU, UROU, ROBLES, LEUKU, WBCU, RBCU, UEPI, BACTU, CASTS, UCRY    IMPRESSION  · COVID-19 Pneumonia  · Acute respiratory failure with hypoxia  · Elevated LFTs  · Tobacco  use    PLAN  · Continuous BiPAP for worsening hypoxia and/ increased WOB. Desats noted on short trial of HF this morning. · Continue Zinc, vitamin C  · Continue Azithromycin for 5 days. Can stop ceftriaxone. · Continue decadron for 10 days  · Cotinue remdesivir - day 2/5  · Repeat CXR  · Will consult ID for opinion on convalescent plasma  · Monitor renal function, LFTs, inflammatory markers, d-dimer  · Follow-up IL6  · PRN proventil  · Increase Lovenox dose to 40mg q 12hr - discussed with pharmacy  · NPO except meds until pulmonary status stabilizes  · Discussed with patient potential for intubation if he decompensates further on continuous BiPAP. He affirms his wish for intubation if needed for worsening respiratory failure. · Full code    Patient is critically ill with worsening acute hypoxic respiratory failure requiring continuous BiPAP and is high risk for further decompensation. CC time EOP 40min. Discussed with nursing and RT.     Ottoniel Phelan

## 2020-08-07 NOTE — PROGRESS NOTES
Transition of care note:  Covid 19 positive    RUR 10%    Plan: 1. Continue to manage pt.'s Covid 19/pneumonia with remdesivir,dexamethasone,vitamin C , zinc and antibiotics. 2.Pt remains on BiPaP. 3.I am following pt for potential home oxygen needs and other discharge needs.     Pepper Reilly  [phone]

## 2020-08-07 NOTE — PROGRESS NOTES
1900  Bedside and Verbal shift change report given to [address] [place] [address] (oncoming nurse) by Bing Cruz (offgoing nurse). Report included the following information SBAR, Kardex, Procedure Summary, Intake/Output, MAR, Accordion and Recent Results. Initial assessment done. No complaints of pain but still tachypneic. Patient anxious. Dr. Jl Steen aware and ordered xanax. Visit Vitals  /81   Pulse 75   Temp 98.6 °F (37 °C)   Resp 22   Ht 5' 11\" (1.803 m)   Wt 108 kg (238 lb)   SpO2 93%   BMI 33.19 kg/m²     0730  Bedside and Verbal shift change report given to [facility] RN (oncoming nurse) by Quinn Hartman RN (offgoing nurse). Report included the following information SBAR, Kardex, Procedure Summary, Intake/Output, MAR, Accordion and Recent Results.

## 2020-08-07 NOTE — PROGRESS NOTES
RRT RN Note:    1903:  Accessed patient chart for MEWS of 3. Patient is COVID +, PNA, w/Acute resp. fail. O2 sats 76% yesterday and put on BIPAP. O2 sats 92%. Patient on BiPAP now with O2 sat at 94%. Patient's temp 99.2, with soft BP. MAP still 81. Will f/u with night shift RN.

## 2020-08-08 ENCOUNTER — APPOINTMENT (OUTPATIENT)
Dept: GENERAL RADIOLOGY | Age: 30
DRG: 870 | End: 2020-08-08
Attending: INTERNAL MEDICINE
Payer: COMMERCIAL

## 2020-08-08 PROBLEM — U07.1 PNEUMONIA DUE TO SEVERE ACUTE RESPIRATORY SYNDROME CORONAVIRUS 2 (SARS-COV-2): Status: ACTIVE | Noted: 2020-08-08

## 2020-08-08 PROBLEM — J12.82 PNEUMONIA DUE TO SEVERE ACUTE RESPIRATORY SYNDROME CORONAVIRUS 2 (SARS-COV-2): Status: ACTIVE | Noted: 2020-08-08

## 2020-08-08 LAB
ALBUMIN SERPL-MCNC: 2.5 G/DL (ref 3.5–5)
ALBUMIN/GLOB SERPL: 0.5 {RATIO} (ref 1.1–2.2)
ALP SERPL-CCNC: 123 U/L (ref 45–117)
ALT SERPL-CCNC: 123 U/L (ref 12–78)
ANION GAP SERPL CALC-SCNC: 9 MMOL/L (ref 5–15)
ARTERIAL PATENCY WRIST A: YES
AST SERPL-CCNC: 36 U/L (ref 15–37)
BASE DEFICIT BLDA-SCNC: 2 MMOL/L
BDY SITE: ABNORMAL
BILIRUB SERPL-MCNC: 0.7 MG/DL (ref 0.2–1)
BREATHS.SPONTANEOUS ON VENT: 39
BUN SERPL-MCNC: 22 MG/DL (ref 6–20)
BUN/CREAT SERPL: 24 (ref 12–20)
CALCIUM SERPL-MCNC: 8.9 MG/DL (ref 8.5–10.1)
CHLORIDE SERPL-SCNC: 101 MMOL/L (ref 97–108)
CO2 SERPL-SCNC: 25 MMOL/L (ref 21–32)
CREAT SERPL-MCNC: 0.91 MG/DL (ref 0.7–1.3)
CRP SERPL HS-MCNC: >9.5 MG/L
D DIMER PPP FEU-MCNC: 1.68 MG/L FEU (ref 0–0.65)
EPAP/CPAP/PEEP, PAPEEP: 5
ERYTHROCYTE [DISTWIDTH] IN BLOOD BY AUTOMATED COUNT: 12.3 % (ref 11.5–14.5)
FERRITIN SERPL-MCNC: 2905 NG/ML (ref 26–388)
FIO2 ON VENT: 100 %
GLOBULIN SER CALC-MCNC: 4.9 G/DL (ref 2–4)
GLUCOSE SERPL-MCNC: 135 MG/DL (ref 65–100)
HCO3 BLDA-SCNC: 21 MMOL/L (ref 22–26)
HCT VFR BLD AUTO: 43 % (ref 36.6–50.3)
HGB BLD-MCNC: 14.5 G/DL (ref 12.1–17)
IL6 SERPL-MCNC: 39.5 PG/ML (ref 0–15.5)
IPAP/PIP, IPAPIP: 14
LDH SERPL L TO P-CCNC: 472 U/L (ref 85–241)
MAGNESIUM SERPL-MCNC: 2.5 MG/DL (ref 1.6–2.4)
MCH RBC QN AUTO: 30.3 PG (ref 26–34)
MCHC RBC AUTO-ENTMCNC: 33.7 G/DL (ref 30–36.5)
MCV RBC AUTO: 90 FL (ref 80–99)
NRBC # BLD: 0 K/UL (ref 0–0.01)
NRBC BLD-RTO: 0 PER 100 WBC
PCO2 BLDA: 32 MMHG (ref 35–45)
PH BLDA: 7.44 [PH] (ref 7.35–7.45)
PHOSPHATE SERPL-MCNC: 5.3 MG/DL (ref 2.6–4.7)
PLATELET # BLD AUTO: 325 K/UL (ref 150–400)
PMV BLD AUTO: 10 FL (ref 8.9–12.9)
PO2 BLDA: 66 MMHG (ref 80–100)
POTASSIUM SERPL-SCNC: 4.2 MMOL/L (ref 3.5–5.1)
PROT SERPL-MCNC: 7.4 G/DL (ref 6.4–8.2)
RBC # BLD AUTO: 4.78 M/UL (ref 4.1–5.7)
SAO2 % BLD: 94 % (ref 92–97)
SAO2% DEVICE SAO2% SENSOR NAME: ABNORMAL
SODIUM SERPL-SCNC: 135 MMOL/L (ref 136–145)
SPECIMEN SITE: ABNORMAL
TROPONIN I SERPL-MCNC: <0.05 NG/ML
VENTILATION MODE VENT: ABNORMAL
WBC # BLD AUTO: 17.7 K/UL (ref 4.1–11.1)

## 2020-08-08 PROCEDURE — 0107U C DIFF TOX AG DETCJ IA STOOL: CPT

## 2020-08-08 PROCEDURE — 74011250636 HC RX REV CODE- 250/636: Performed by: INTERNAL MEDICINE

## 2020-08-08 PROCEDURE — 82803 BLOOD GASES ANY COMBINATION: CPT

## 2020-08-08 PROCEDURE — 80053 COMPREHEN METABOLIC PANEL: CPT

## 2020-08-08 PROCEDURE — 83735 ASSAY OF MAGNESIUM: CPT

## 2020-08-08 PROCEDURE — 74011000250 HC RX REV CODE- 250: Performed by: INTERNAL MEDICINE

## 2020-08-08 PROCEDURE — 74011250636 HC RX REV CODE- 250/636: Performed by: PHYSICIAN ASSISTANT

## 2020-08-08 PROCEDURE — 74011250637 HC RX REV CODE- 250/637: Performed by: INTERNAL MEDICINE

## 2020-08-08 PROCEDURE — 94660 CPAP INITIATION&MGMT: CPT

## 2020-08-08 PROCEDURE — 94645 CONT INHLJ TX EACH ADDL HOUR: CPT

## 2020-08-08 PROCEDURE — 74011000258 HC RX REV CODE- 258: Performed by: INTERNAL MEDICINE

## 2020-08-08 PROCEDURE — 83615 LACTATE (LD) (LDH) ENZYME: CPT

## 2020-08-08 PROCEDURE — 94002 VENT MGMT INPAT INIT DAY: CPT

## 2020-08-08 PROCEDURE — 94644 CONT INHLJ TX 1ST HOUR: CPT

## 2020-08-08 PROCEDURE — 71045 X-RAY EXAM CHEST 1 VIEW: CPT

## 2020-08-08 PROCEDURE — 65610000006 HC RM INTENSIVE CARE

## 2020-08-08 PROCEDURE — 86480 TB TEST CELL IMMUN MEASURE: CPT

## 2020-08-08 PROCEDURE — 85027 COMPLETE CBC AUTOMATED: CPT

## 2020-08-08 PROCEDURE — 36415 COLL VENOUS BLD VENIPUNCTURE: CPT

## 2020-08-08 PROCEDURE — 86141 C-REACTIVE PROTEIN HS: CPT

## 2020-08-08 PROCEDURE — 36600 WITHDRAWAL OF ARTERIAL BLOOD: CPT

## 2020-08-08 PROCEDURE — 84484 ASSAY OF TROPONIN QUANT: CPT

## 2020-08-08 PROCEDURE — 85379 FIBRIN DEGRADATION QUANT: CPT

## 2020-08-08 PROCEDURE — 77010033678 HC OXYGEN DAILY

## 2020-08-08 PROCEDURE — 82728 ASSAY OF FERRITIN: CPT

## 2020-08-08 PROCEDURE — 84100 ASSAY OF PHOSPHORUS: CPT

## 2020-08-08 RX ORDER — MORPHINE SULFATE 2 MG/ML
1 INJECTION, SOLUTION INTRAMUSCULAR; INTRAVENOUS
Status: DISPENSED | OUTPATIENT
Start: 2020-08-08 | End: 2020-08-15

## 2020-08-08 RX ORDER — MORPHINE SULFATE 2 MG/ML
1 INJECTION, SOLUTION INTRAMUSCULAR; INTRAVENOUS ONCE
Status: COMPLETED | OUTPATIENT
Start: 2020-08-08 | End: 2020-08-08

## 2020-08-08 RX ORDER — LORAZEPAM 2 MG/ML
0.5 INJECTION INTRAMUSCULAR
Status: DISCONTINUED | OUTPATIENT
Start: 2020-08-08 | End: 2020-08-18 | Stop reason: HOSPADM

## 2020-08-08 RX ORDER — BUMETANIDE 0.25 MG/ML
1 INJECTION INTRAMUSCULAR; INTRAVENOUS ONCE
Status: COMPLETED | OUTPATIENT
Start: 2020-08-08 | End: 2020-08-08

## 2020-08-08 RX ORDER — ENOXAPARIN SODIUM 100 MG/ML
60 INJECTION SUBCUTANEOUS ONCE
Status: COMPLETED | OUTPATIENT
Start: 2020-08-08 | End: 2020-08-08

## 2020-08-08 RX ORDER — ENOXAPARIN SODIUM 150 MG/ML
105 INJECTION SUBCUTANEOUS EVERY 12 HOURS
Status: DISCONTINUED | OUTPATIENT
Start: 2020-08-08 | End: 2020-08-12

## 2020-08-08 RX ADMIN — MORPHINE SULFATE 1 MG: 2 INJECTION, SOLUTION INTRAMUSCULAR; INTRAVENOUS at 11:49

## 2020-08-08 RX ADMIN — Medication 1 CAPSULE: at 08:23

## 2020-08-08 RX ADMIN — DEXAMETHASONE SODIUM PHOSPHATE 6 MG: 10 INJECTION INTRAMUSCULAR; INTRAVENOUS at 06:28

## 2020-08-08 RX ADMIN — MORPHINE SULFATE 1 MG: 2 INJECTION, SOLUTION INTRAMUSCULAR; INTRAVENOUS at 18:05

## 2020-08-08 RX ADMIN — Medication 10 ML: at 06:28

## 2020-08-08 RX ADMIN — Medication 500 MG: at 08:23

## 2020-08-08 RX ADMIN — DEXAMETHASONE SODIUM PHOSPHATE 6 MG: 10 INJECTION INTRAMUSCULAR; INTRAVENOUS at 18:06

## 2020-08-08 RX ADMIN — ENOXAPARIN SODIUM 105 MG: 120 INJECTION SUBCUTANEOUS at 21:13

## 2020-08-08 RX ADMIN — DEXAMETHASONE SODIUM PHOSPHATE 6 MG: 10 INJECTION INTRAMUSCULAR; INTRAVENOUS at 11:49

## 2020-08-08 RX ADMIN — ENOXAPARIN SODIUM 60 MG: 100 INJECTION SUBCUTANEOUS at 11:40

## 2020-08-08 RX ADMIN — LORAZEPAM 0.5 MG: 2 INJECTION INTRAMUSCULAR; INTRAVENOUS at 22:15

## 2020-08-08 RX ADMIN — Medication 10 ML: at 14:00

## 2020-08-08 RX ADMIN — REMDESIVIR 100 MG: 100 INJECTION, POWDER, LYOPHILIZED, FOR SOLUTION INTRAVENOUS at 18:21

## 2020-08-08 RX ADMIN — BUMETANIDE 1 MG: 0.25 INJECTION INTRAMUSCULAR; INTRAVENOUS at 11:49

## 2020-08-08 RX ADMIN — LORAZEPAM: 2 INJECTION INTRAMUSCULAR; INTRAVENOUS at 16:09

## 2020-08-08 RX ADMIN — EPOPROSTENOL 50 NG/KG/MIN: 1.5 INJECTION, POWDER, LYOPHILIZED, FOR SOLUTION INTRAVENOUS at 18:17

## 2020-08-08 RX ADMIN — ENOXAPARIN SODIUM 40 MG: 40 INJECTION SUBCUTANEOUS at 08:23

## 2020-08-08 RX ADMIN — BENZONATATE 100 MG: 100 CAPSULE ORAL at 22:19

## 2020-08-08 RX ADMIN — BUMETANIDE 1 MG: 0.25 INJECTION INTRAMUSCULAR; INTRAVENOUS at 19:48

## 2020-08-08 RX ADMIN — AZITHROMYCIN MONOHYDRATE 500 MG: 500 INJECTION, POWDER, LYOPHILIZED, FOR SOLUTION INTRAVENOUS at 02:43

## 2020-08-08 NOTE — PROGRESS NOTES
@1700 TRANSFER - IN REPORT:    Verbal report received from [facility]enc[facility] CATINA(name) on [employer]  being received from Telemetry(unit) for routine progression of care      Report consisted of patients Situation, Background, Assessment and   Recommendations(SBAR). Information from the following report(s) SBAR, Kardex, ED Summary, Intake/Output, MAR, Accordion, Recent Results, Med Rec Status, Cardiac Rhythm Sinus and Alarm Parameters  was reviewed with the receiving nurse. Opportunity for questions and clarification was provided. Assessment completed upon patients arrival to unit and care assumed. @3920 Notified Dr. Cook Friday of chest pressure associated with breathing relieved when leaning forward. Orders received to give morphine 1 mg IV push for effect. @8113 Veletri started. @3370 Dr. Promise Vargas paged regarding CXR results. @1900 Bedside and Verbal shift change report given to Kadie Reveles RN (oncoming nurse) by Wolf Mendoza RN (offgoing nurse). Report included the following information SBAR, Kardex, ED Summary, Procedure Summary, Intake/Output, MAR, Accordion, Recent Results, Med Rec Status, Cardiac Rhythm Sinus and Alarm Parameters .

## 2020-08-08 NOTE — PROGRESS NOTES
Full note to follow. Chest pressure and REHMAN with minimal ambulation. SpO2 drops to 80s within seconds of taking off BiPAP to take meds. Discussed with PCCM Dr. Iris Blank. Will use therapeutic Lovenox. Hg WNR. No known hx of major bleeding. Hopefully can receive convalescent plasma today. PCCM considering use of Veletri.

## 2020-08-08 NOTE — PROGRESS NOTES
Patient transferred from [facility] #[id number] to ICU #3016 negative pressure room to begin veletri. Patient tolerated transfer well with stable vitals.

## 2020-08-08 NOTE — PROGRESS NOTES
Shift Summary  0730: Bedside and Verbal shift change report given to Jazmyn Murillo RN (oncoming nurse) by Renae Bolanos RN (offgoing nurse). Report included the following information SBAR, Kardex, Procedure Summary, Intake/Output, MAR, Accordion, Recent Results and Cardiac Rhythm NSR.     0850: Patient on continuous BIPAP, sats around 94%. Placed on high flow for AM meds, tolerated for about 5-10 minutes before started desaturating in mid 80s, returned to BIPAP    1020: Administered Xanax for anxiety    1110: Assisted to bedside commode, tolerated well. Gave bath/linen change    1320: CXR showed worsening airspace disease, subcut emphema. Notified Dr. Fleming [facility]. Bipap settings adjusted down. ID already consulted    1450: Patient signed consent for convalescent plasma, placed in paper chart. 1600: type and screen sent for convalescent plasma. 1930: Bedside and Verbal shift change report given to Florian Bone RN (oncoming nurse) by Jazmyn Murillo RN (offgoing nurse). Report included the following information SBAR, Kardex, Procedure Summary, Intake/Output, MAR, Accordion, Recent Results and Cardiac Rhythm NSR, Stac.

## 2020-08-08 NOTE — PROGRESS NOTES
[facility]  [address] [facility], [address] [place] [address]  [phone]      Medical Progress Note      NAME: Camron Wen   :  1990  MRM:  [id number]    Date/Time: 2020        Assessment / Plan:       Acute respiratory failure with hypoxia: from pneumonia due to severe acute respiratory syndrome coronavirus 2 (SARS-CoV-2). Severe hypoxia, remains on NPPV, 100% FiO2. CXR worse , showing worsening diffuse bilateral interstitial and patchy airspace disease. Also seen was new, bilateral supraclavicular subcutaneous emphysema and pneumomediastinum. Discussed with PCCM and BiPAP setting adjusted. PCCM added diuresis. Patient to be transferred to ICU for Veletri. Administer convalescent plasma when available. Check quantiferon gold in anticipation of possible use of IL-6 inhibitor (Actemra). Lovenox increased to therapeutic dose. Continue remdesivir (day 3/5), dexamethasone, vitamin C, zinc, azithromycin. Statin deferred for now due to transaminitis. Trend inflammatory markers. Elevated liver enzymes: likely due to COVID-19. No RUQ pain/TTP. No hyperbilirubinemia to suggest biliary obstruction. Monitoron remdesivir. Obesity:  Body mass index is 33.19 kg/m². Would benefit from weight loss and dietary / lifestyle modifications      Total time spent: 35 minutes CC  Time spent in the care of this patient including reviewing records, discussing with nursing and/or other providers on the treatment team, obtaining history and examining the patient, and discussing treatment plans. Critical Care:  I personally spent 35 minutes in providing critical care.  The reason for providing this level of medical care for this critically ill patient was due to a critical illness (severe acute hypoxic respiratory failure due to severe acute respiratory syndrome coronavirus 2 (SARS-CoV-2)) that impaired one or more vital organ systems such that there was a high probability of imminent or life threatening deterioration in the patient's condition. This care involved high complexity decision making to assess, manipulate, and support vital system functions. Care Plan discussed with: Patient, Nursing Staff and >50% of time spent in counseling and coordination of care    Discussed:  Care Plan and D/C Planning    Prophylaxis:  Lovenox    Disposition:  Home w/Family         Subjective:     Chief Complaint:  Follow up dyspnea    Chart/notes/labs/studies reviewed, patient examined. Feeling anxious. +SOB and chest tightness with little exertion. No fevers         Objective:       Vitals:        Last 24hrs VS reviewed since prior progress note. Most recent are:    Visit Vitals  /89   Pulse (!) 102   Temp 98.6 °F (37 °C)   Resp 29   Ht 5' 11\" (1.803 m)   Wt 108 kg (238 lb)   SpO2 95%   BMI 33.19 kg/m²     SpO2 Readings from Last 6 Encounters:   08/08/20 95%    O2 Flow Rate (L/min): 36 l/min       Intake/Output Summary (Last 24 hours) at 8/8/2020 1507  Last data filed at 8/8/2020 1332  Gross per 24 hour   Intake 2436.67 ml   Output 2700 ml   Net -263.33 ml          Exam:     Physical Exam:    Gen:  Well-developed, well-nourished, in mild distress  HEENT:  Sclerae nonicteric, hearing intact to voice. BiPAP mask in place  Neck:  Supple, without masses. Resp:  Accessory muscle use, increased WOB. Diminished without wheezing, rales, or rhonchi  Card: HR 90s, without m/r/g. No LE edema. Abd:  +bowel sounds, soft, NTTP, nondistended. No HSM. Neuro: Face symmetric, follows commands appropriately. No focal weakness. Psych:  Alert, oriented x 3.  Good insight     Medications Reviewed: (see below)    Lab Data Reviewed: (see below)    ______________________________________________________________________    Medications:     Current Facility-Administered Medications   Medication Dose Route Frequency    enoxaparin (LOVENOX) injection 105 mg  105 mg SubCUTAneous Q12H    LORazepam (ATIVAN) injection 0.5 mg  0.5 mg IntraVENous Q6H PRN    0.9% sodium chloride infusion 250 mL  250 mL IntraVENous PRN    sodium chloride (NS) flush 5-40 mL  5-40 mL IntraVENous Q8H    sodium chloride (NS) flush 5-40 mL  5-40 mL IntraVENous PRN    acetaminophen (TYLENOL) tablet 650 mg  650 mg Oral Q6H PRN    Or    acetaminophen (TYLENOL) suppository 650 mg  650 mg Rectal Q6H PRN    polyethylene glycol (MIRALAX) packet 17 g  17 g Oral DAILY PRN    promethazine (PHENERGAN) tablet 12.5 mg  12.5 mg Oral Q6H PRN    Or    ondansetron (ZOFRAN) injection 4 mg  4 mg IntraVENous Q6H PRN    azithromycin (ZITHROMAX) 500 mg in 0.9% sodium chloride (MBP/ADV) 250 mL  500 mg IntraVENous Q24H    ascorbic acid (vitamin C) (VITAMIN C) tablet 500 mg  500 mg Oral BID    zinc sulfate (ZINCATE) 220 (50) mg capsule 1 Cap  1 Cap Oral DAILY    albuterol (PROVENTIL HFA, VENTOLIN HFA, PROAIR HFA) inhaler 4 Puff  4 Puff Inhalation Q4H PRN    benzonatate (TESSALON) capsule 100 mg  100 mg Oral TID PRN    dexamethasone (PF) (DECADRON) 10 mg/mL injection 6 mg  6 mg IntraVENous Q6H    remdesivir 100 mg in 0.9% sodium chloride 250 mL IVPB  100 mg IntraVENous Q24H    0.9% sodium chloride infusion  100 mL/hr IntraVENous CONTINUOUS            Lab Review:     Recent Labs     08/08/20  0500 08/07/20  0437 08/06/20  1324   WBC 17.7* 14.9* 13.0*   HGB 14.5 14.8 15.1   HCT 43.0 44.0 44.1    302 338     Recent Labs     08/08/20  0500 08/07/20  0437 08/06/20  1324   * 137 137   K 4.2 4.8 4.2    104 102   CO2 25 26 28   * 129* 115*   BUN 22* 23* 16   CREA 0.91 0.78 0.88   CA 8.9 8.7 9.0   MG 2.5* 2.9* 3.0*   PHOS 5.3* 5.2* 5.3*   ALB 2.5* 2.7* 2.7*   * 173* 159*     No components found for: [place]  Recent Labs     08/08/20  0510 08/05/20 2145   PH 7.44 7.46*   PCO2 32* 29*   PO2 66* 71*   HCO3 21* 21*   FIO2 100 90%     No results for input(s): INR, INREXT, INREXT in the last 72 hours.   No results found for: OLIVER  Lab Results   Component Value Date/Time    Culture result: NO GROWTH 3 DAYS 08/05/2020 08:49 PM              ___________________________________________________    Attending Physician: Juan Carlos Pradhan MD

## 2020-08-08 NOTE — PROGRESS NOTES
Bedside and Verbal shift change report given to Shae Conrad RN (oncoming nurse) by Marc Perkins RN (offgoing nurse). Report included the following information SBAR, Kardex, MAR and Cardiac Rhythm NSR, Sinus Tach. 0730: Patient on BiPAP. FiO2 at 90%. O2 sats >90%. 0830: O2 sats dropped to the mid 80% while giving patient PO medications. Increased FiO2 to 100%. O2 sats >90%. FiO2 back to 90%. O2 sats 91-93%. 0935: Patient c/o chest pressure after coughing and getting up to [facility]. Pt also stated he was very tired even after getting some sleep last night. 1200: Helped patient to bedside commode. Blood drawn and C-Diff collected and sent to lab. 1515: Patient stated he feels a little anxious. PO Xanax on MAR. Dr. Gay Gonsalez informed. TRANSFER - OUT REPORT:    Verbal report given to [employer] RN(name) on [facility]  being transferred to ICU (unit) for routine progression of care       Report consisted of patients Situation, Background, Assessment and   Recommendations(SBAR). Information from the following report(s) SBAR, Kardex, [facility] and Cardiac Rhythm NSR/Sinus Tach was reviewed with the receiving nurse. Lines:   Peripheral IV 08/05/20 Right Antecubital (Active)   Site Assessment Clean, dry, & intact 08/08/20 0700   Phlebitis Assessment 0 08/08/20 0700   Infiltration Assessment 0 08/08/20 0700   Dressing Status Clean, dry, & intact 08/08/20 0700   Dressing Type Transparent;Tape 08/08/20 0700   Hub Color/Line Status Green; Infusing;Flushed 08/08/20 0700   Action Taken Open ports on tubing capped 08/08/20 0700   Alcohol Cap Used Yes 08/08/20 0700       Peripheral IV 08/05/20 Left Antecubital (Active)   Site Assessment Clean, dry, & intact 08/08/20 0700   Phlebitis Assessment 0 08/08/20 0700   Infiltration Assessment 0 08/08/20 0700   Dressing Status Clean, dry, & intact 08/08/20 0700   Dressing Type Transparent;Tape 08/08/20 0700   Hub Color/Line Status Pink;Capped;Flushed;Patent 08/08/20 0777 Action Taken Open ports on tubing capped 08/08/20 0700   Alcohol Cap Used Yes 08/08/20 0700        Opportunity for questions and clarification was provided.       Patient transported with:   Monitor  O2 @ BIPAP liters  Registered Nurse  Tech

## 2020-08-08 NOTE — PROGRESS NOTES
Name: [facility] CAMPUS: [facility]   : 1990 Admit Date: 2020   Phone: [phone]  Room: [facility]/   PCP: Price Howard MD  MRN: [id number]   Date: 2020  Code: Full Code          Chart and notes reviewed. Data reviewed. I review the patient's current medications in the medical record at each encounter. I have evaluated and examined the patient. History of Present Illness:  Mr. Tahira Vides is a 28 yo gentleman with a history of tobacco use who is admitted with COVID-19 pneumonia and acute respiratory failure with hypoxia. He was diagnosed about 10 days ago at an urgent care facility with COVID-19. Last Thursday he began to have fevers, inability to take a deep breath that was progressively worse, dry cough, and general malaise/faitigue. He was prescribed azithromycin and prednisone without improvement. Denies n/v, abdominal pain, changes in taste/smell. Hypoxic as low as 76% on RA. Placed on BiPAP yesterday evening due to increased work of breathing. Labs reviewed: WBC 16.5, d-dimer 0.86, Na 129, cr 0.84, AST 86, , , Procalcitonin 0.33, ferritin 5114, IL6 in lab; crp pending    Images:  CXR with hazy interstitial changes and airspace opacification      PMH: tobacco use    FH: no family history pertinent to presents complaint    SH: +tobacco use    Interval history  Breathing unchanged since yesterday. BIPAP settings unchanged. Is complaining of some chest tightness with deep breathing.     Social History     Tobacco Use    Smoking status: Current Some Day Smoker    Smokeless tobacco: Never Used   Substance Use Topics    Alcohol use: Not on file       No Known Allergies    Current Facility-Administered Medications   Medication Dose Route Frequency    enoxaparin (LOVENOX) injection 105 mg  105 mg SubCUTAneous Q12H    enoxaparin (LOVENOX) injection 60 mg  60 mg SubCUTAneous ONCE    0.9% sodium chloride infusion 250 mL  250 mL IntraVENous PRN    sodium chloride (NS) flush 5-40 mL  5-40 mL IntraVENous Q8H    sodium chloride (NS) flush 5-40 mL  5-40 mL IntraVENous PRN    acetaminophen (TYLENOL) tablet 650 mg  650 mg Oral Q6H PRN    Or    acetaminophen (TYLENOL) suppository 650 mg  650 mg Rectal Q6H PRN    polyethylene glycol (MIRALAX) packet 17 g  17 g Oral DAILY PRN    promethazine (PHENERGAN) tablet 12.5 mg  12.5 mg Oral Q6H PRN    Or    ondansetron (ZOFRAN) injection 4 mg  4 mg IntraVENous Q6H PRN    azithromycin (ZITHROMAX) 500 mg in 0.9% sodium chloride (MBP/ADV) 250 mL  500 mg IntraVENous Q24H    ascorbic acid (vitamin C) (VITAMIN C) tablet 500 mg  500 mg Oral BID    zinc sulfate (ZINCATE) 220 (50) mg capsule 1 Cap  1 Cap Oral DAILY    albuterol (PROVENTIL HFA, VENTOLIN HFA, PROAIR HFA) inhaler 4 Puff  4 Puff Inhalation Q4H PRN    benzonatate (TESSALON) capsule 100 mg  100 mg Oral TID PRN    dexamethasone (PF) (DECADRON) 10 mg/mL injection 6 mg  6 mg IntraVENous Q6H    remdesivir 100 mg in 0.9% sodium chloride 250 mL IVPB  100 mg IntraVENous Q24H    0.9% sodium chloride infusion  100 mL/hr IntraVENous CONTINUOUS    ALPRAZolam (XANAX) tablet 0.5 mg  0.5 mg Oral BID PRN         REVIEW OF SYSTEMS   12 point ROS negative except as stated in the HPI. Physical Exam:   Visit Vitals  /85   Pulse 99   Temp 98.5 °F (36.9 °C)   Resp 26   Ht 5' 11\" (1.803 m)   Wt 108 kg (238 lb)   SpO2 90%   BMI 33.19 kg/m²       General:  Alert, cooperative, on BiPAP   Head:  Normocephalic, without obvious abnormality, atraumatic. Eyes:  Conjunctivae/corneas clear. Nose: Nares normal   Throat: Lips, mucosa, and tongue normal.    Neck: Supple, symmetrical, trachea midline   Lungs:   Mild increase WOB with BiPAP in place. Chest wall:  No tenderness or deformity. Heart:  Regular rate and rhythm, S1, S2 normal, no murmur, click, rub or gallop. Abdomen:   Soft, non-tender.  Bowel sounds normal. Obese   Extremities: Extremities normal, atraumatic, no cyanosis or edema. Skin: Skin color, texture, turgor normal. No rashes or lesions   Neurologic: Grossly nonfocal       Lab Results   Component Value Date/Time    Sodium 135 (L) 08/08/2020 05:00 AM    Potassium 4.2 08/08/2020 05:00 AM    Chloride 101 08/08/2020 05:00 AM    CO2 25 08/08/2020 05:00 AM    BUN 22 (H) 08/08/2020 05:00 AM    Creatinine 0.91 08/08/2020 05:00 AM    Glucose 135 (H) 08/08/2020 05:00 AM    Calcium 8.9 08/08/2020 05:00 AM    Magnesium 2.5 (H) 08/08/2020 05:00 AM    Phosphorus 5.3 (H) 08/08/2020 05:00 AM    Lactic acid 1.2 08/06/2020 01:24 PM       Lab Results   Component Value Date/Time    WBC 17.7 (H) 08/08/2020 05:00 AM    HGB 14.5 08/08/2020 05:00 AM    PLATELET 427 31/96/5841 05:00 AM    MCV 90.0 08/08/2020 05:00 AM       Lab Results   Component Value Date/Time    Alk.  phosphatase 123 (H) 08/08/2020 05:00 AM    Protein, total 7.4 08/08/2020 05:00 AM    Albumin 2.5 (L) 08/08/2020 05:00 AM    Globulin 4.9 (H) 08/08/2020 05:00 AM       Lab Results   Component Value Date/Time    Ferritin 4,623 (H) 08/07/2020 04:37 AM       Lab Results   Component Value Date/Time    C-Reactive protein 12.70 (H) 08/07/2020 04:37 AM        Lab Results   Component Value Date/Time    PH 7.44 08/08/2020 05:10 AM    PCO2 32 (L) 08/08/2020 05:10 AM    PO2 66 (L) 08/08/2020 05:10 AM    HCO3 21 (L) 08/08/2020 05:10 AM    FIO2 100 08/08/2020 05:10 AM       Lab Results   Component Value Date/Time    Troponin-I, Qt. <0.05 08/05/2020 08:49 PM        Lab Results   Component Value Date/Time    Culture result: NO GROWTH 3 DAYS 08/05/2020 08:49 PM       No results found for: TOXA1, RPR, HBCM, HBSAG, HAAB, HCAB1, HAAT, G6PD, CRYAC, HIVGT, HIVR, HIV1, HIV12, HIVPC, HIVRPI    No results found for: VANCT, CPK    No results found for: COLOR, APPRN, SPGRU, RAE, PROTU, GLUCU, KETU, BILU, BLDU, UROU, ROBLES, LEUKU, WBCU, RBCU, UEPI, BACTU, CASTS, UCRY    IMPRESSION  · COVID-19 Pneumonia  · Acute respiratory failure with hypoxia  · Elevated LFTs  · Tobacco  use    PLAN  · C/w BIPAP, FiO2 decreased to 80% this AM  · Will plan to transfer to ICU to use veletri via NIV  · Repeat CXR  · diurese  · Continue Zinc, vitamin C  · Continue Azithromycin for 5 days. Can stop ceftriaxone. · Continue decadron for 10 days  · Cotinue remdesivir   · convalescent plasma pending  · Monitor renal function, LFTs, inflammatory markers, d-dimer, lfts  · Follow-up IL6  · PRN proventil  · Increase Lovenox to therapeutic dosing   · NPO except meds until pulmonary status stabilizes  · Discussed with patient potential for intubation if he decompensates further on continuous BiPAP. He affirms his wish for intubation if needed for worsening respiratory failure. · Full code    Patient is critically ill with worsening acute hypoxic respiratory failure requiring continuous BiPAP and is high risk for further decompensation. CC time EOP 35min. Discussed with nursing and RT.     Tawana Shah MD

## 2020-08-09 ENCOUNTER — APPOINTMENT (OUTPATIENT)
Dept: GENERAL RADIOLOGY | Age: 30
DRG: 870 | End: 2020-08-09
Attending: INTERNAL MEDICINE
Payer: COMMERCIAL

## 2020-08-09 LAB
ALBUMIN SERPL-MCNC: 2.8 G/DL (ref 3.5–5)
ALBUMIN/GLOB SERPL: 0.5 {RATIO} (ref 1.1–2.2)
ALP SERPL-CCNC: 116 U/L (ref 45–117)
ALT SERPL-CCNC: 94 U/L (ref 12–78)
ANION GAP SERPL CALC-SCNC: 9 MMOL/L (ref 5–15)
ARTERIAL PATENCY WRIST A: YES
AST SERPL-CCNC: 31 U/L (ref 15–37)
BASE DEFICIT BLDA-SCNC: 0.2 MMOL/L
BDY SITE: ABNORMAL
BILIRUB SERPL-MCNC: 0.9 MG/DL (ref 0.2–1)
BUN SERPL-MCNC: 26 MG/DL (ref 6–20)
BUN/CREAT SERPL: 29 (ref 12–20)
C DIFF GDH STL QL: NEGATIVE
C DIFF TOX A+B STL QL IA: NEGATIVE
CALCIUM SERPL-MCNC: 9.5 MG/DL (ref 8.5–10.1)
CHLORIDE SERPL-SCNC: 100 MMOL/L (ref 97–108)
CO2 SERPL-SCNC: 25 MMOL/L (ref 21–32)
COMMENT, HOLDF: NORMAL
CREAT SERPL-MCNC: 0.91 MG/DL (ref 0.7–1.3)
CRP SERPL HS-MCNC: >9.5 MG/L
CRP SERPL-MCNC: 8.21 MG/DL (ref 0–0.6)
D DIMER PPP FEU-MCNC: 0.68 MG/L FEU (ref 0–0.65)
EPAP/CPAP/PEEP, PAPEEP: 6
ERYTHROCYTE [DISTWIDTH] IN BLOOD BY AUTOMATED COUNT: 12 % (ref 11.5–14.5)
FERRITIN SERPL-MCNC: 2673 NG/ML (ref 26–388)
FIO2 ON VENT: 100 %
GLOBULIN SER CALC-MCNC: 5.5 G/DL (ref 2–4)
GLUCOSE SERPL-MCNC: 148 MG/DL (ref 65–100)
HCO3 BLDA-SCNC: 23 MMOL/L (ref 22–26)
HCT VFR BLD AUTO: 46.4 % (ref 36.6–50.3)
HGB BLD-MCNC: 15.9 G/DL (ref 12.1–17)
INTERPRETATION: NORMAL
LDH SERPL L TO P-CCNC: 526 U/L (ref 85–241)
MAGNESIUM SERPL-MCNC: 2.8 MG/DL (ref 1.6–2.4)
MCH RBC QN AUTO: 30.5 PG (ref 26–34)
MCHC RBC AUTO-ENTMCNC: 34.3 G/DL (ref 30–36.5)
MCV RBC AUTO: 89.1 FL (ref 80–99)
NRBC # BLD: 0 K/UL (ref 0–0.01)
NRBC BLD-RTO: 0 PER 100 WBC
PCO2 BLDA: 34 MMHG (ref 35–45)
PH BLDA: 7.45 [PH] (ref 7.35–7.45)
PHOSPHATE SERPL-MCNC: 4.4 MG/DL (ref 2.6–4.7)
PLATELET # BLD AUTO: 336 K/UL (ref 150–400)
PMV BLD AUTO: 10.3 FL (ref 8.9–12.9)
PO2 BLDA: 68 MMHG (ref 80–100)
POTASSIUM SERPL-SCNC: 4.2 MMOL/L (ref 3.5–5.1)
PRESSURE SUPPORT SETTING VENT: 12 CM[H2O]
PROCALCITONIN SERPL-MCNC: 0.26 NG/ML
PROT SERPL-MCNC: 8.3 G/DL (ref 6.4–8.2)
RBC # BLD AUTO: 5.21 M/UL (ref 4.1–5.7)
SAMPLES BEING HELD,HOLD: NORMAL
SAO2 % BLD: 95 % (ref 92–97)
SAO2% DEVICE SAO2% SENSOR NAME: ABNORMAL
SODIUM SERPL-SCNC: 134 MMOL/L (ref 136–145)
SPECIMEN SITE: ABNORMAL
VENTILATION MODE VENT: ABNORMAL
WBC # BLD AUTO: 20.7 K/UL (ref 4.1–11.1)

## 2020-08-09 PROCEDURE — 74011250637 HC RX REV CODE- 250/637: Performed by: INTERNAL MEDICINE

## 2020-08-09 PROCEDURE — 74011250636 HC RX REV CODE- 250/636: Performed by: INTERNAL MEDICINE

## 2020-08-09 PROCEDURE — 83615 LACTATE (LD) (LDH) ENZYME: CPT

## 2020-08-09 PROCEDURE — 74011000258 HC RX REV CODE- 258: Performed by: INTERNAL MEDICINE

## 2020-08-09 PROCEDURE — 82728 ASSAY OF FERRITIN: CPT

## 2020-08-09 PROCEDURE — 86141 C-REACTIVE PROTEIN HS: CPT

## 2020-08-09 PROCEDURE — 83735 ASSAY OF MAGNESIUM: CPT

## 2020-08-09 PROCEDURE — 77030011256 HC DRSG MEPILEX <16IN NO BORD MOLN -A

## 2020-08-09 PROCEDURE — 80053 COMPREHEN METABOLIC PANEL: CPT

## 2020-08-09 PROCEDURE — 94645 CONT INHLJ TX EACH ADDL HOUR: CPT

## 2020-08-09 PROCEDURE — 94003 VENT MGMT INPAT SUBQ DAY: CPT

## 2020-08-09 PROCEDURE — 36415 COLL VENOUS BLD VENIPUNCTURE: CPT

## 2020-08-09 PROCEDURE — 84100 ASSAY OF PHOSPHORUS: CPT

## 2020-08-09 PROCEDURE — 94644 CONT INHLJ TX 1ST HOUR: CPT

## 2020-08-09 PROCEDURE — 82803 BLOOD GASES ANY COMBINATION: CPT

## 2020-08-09 PROCEDURE — 36600 WITHDRAWAL OF ARTERIAL BLOOD: CPT

## 2020-08-09 PROCEDURE — 74011000250 HC RX REV CODE- 250: Performed by: INTERNAL MEDICINE

## 2020-08-09 PROCEDURE — 87040 BLOOD CULTURE FOR BACTERIA: CPT

## 2020-08-09 PROCEDURE — 71045 X-RAY EXAM CHEST 1 VIEW: CPT

## 2020-08-09 PROCEDURE — 85379 FIBRIN DEGRADATION QUANT: CPT

## 2020-08-09 PROCEDURE — 85027 COMPLETE CBC AUTOMATED: CPT

## 2020-08-09 PROCEDURE — 84145 PROCALCITONIN (PCT): CPT

## 2020-08-09 PROCEDURE — 65610000006 HC RM INTENSIVE CARE

## 2020-08-09 PROCEDURE — 86140 C-REACTIVE PROTEIN: CPT

## 2020-08-09 RX ORDER — DEXAMETHASONE SODIUM PHOSPHATE 10 MG/ML
6 INJECTION INTRAMUSCULAR; INTRAVENOUS DAILY
Status: DISCONTINUED | OUTPATIENT
Start: 2020-08-10 | End: 2020-08-10

## 2020-08-09 RX ORDER — BUMETANIDE 0.25 MG/ML
1 INJECTION INTRAMUSCULAR; INTRAVENOUS ONCE
Status: COMPLETED | OUTPATIENT
Start: 2020-08-09 | End: 2020-08-09

## 2020-08-09 RX ORDER — ATORVASTATIN CALCIUM 20 MG/1
20 TABLET, FILM COATED ORAL
Status: DISCONTINUED | OUTPATIENT
Start: 2020-08-09 | End: 2020-08-17

## 2020-08-09 RX ADMIN — FAMOTIDINE 20 MG: 10 INJECTION, SOLUTION INTRAVENOUS at 09:08

## 2020-08-09 RX ADMIN — Medication 10 ML: at 13:58

## 2020-08-09 RX ADMIN — Medication 1 CAPSULE: at 09:00

## 2020-08-09 RX ADMIN — EPOPROSTENOL 50 NG/KG/MIN: 1.5 INJECTION, POWDER, LYOPHILIZED, FOR SOLUTION INTRAVENOUS at 00:26

## 2020-08-09 RX ADMIN — EPOPROSTENOL 50 NG/KG/MIN: 1.5 INJECTION, POWDER, LYOPHILIZED, FOR SOLUTION INTRAVENOUS at 21:52

## 2020-08-09 RX ADMIN — DEXAMETHASONE SODIUM PHOSPHATE 6 MG: 10 INJECTION INTRAMUSCULAR; INTRAVENOUS at 00:29

## 2020-08-09 RX ADMIN — CEFEPIME HYDROCHLORIDE 2 G: 2 INJECTION, POWDER, FOR SOLUTION INTRAVENOUS at 19:35

## 2020-08-09 RX ADMIN — LORAZEPAM 0.5 MG: 2 INJECTION INTRAMUSCULAR; INTRAVENOUS at 22:50

## 2020-08-09 RX ADMIN — Medication 500 MG: at 17:00

## 2020-08-09 RX ADMIN — MORPHINE SULFATE 1 MG: 2 INJECTION, SOLUTION INTRAMUSCULAR; INTRAVENOUS at 21:34

## 2020-08-09 RX ADMIN — ATORVASTATIN CALCIUM 20 MG: 20 TABLET, FILM COATED ORAL at 21:33

## 2020-08-09 RX ADMIN — ENOXAPARIN SODIUM 105 MG: 120 INJECTION SUBCUTANEOUS at 20:00

## 2020-08-09 RX ADMIN — ENOXAPARIN SODIUM 105 MG: 120 INJECTION SUBCUTANEOUS at 09:11

## 2020-08-09 RX ADMIN — BUMETANIDE 1 MG: 0.25 INJECTION INTRAMUSCULAR; INTRAVENOUS at 09:07

## 2020-08-09 RX ADMIN — LORAZEPAM 0.5 MG: 2 INJECTION INTRAMUSCULAR; INTRAVENOUS at 16:53

## 2020-08-09 RX ADMIN — REMDESIVIR 100 MG: 100 INJECTION, POWDER, LYOPHILIZED, FOR SOLUTION INTRAVENOUS at 16:17

## 2020-08-09 RX ADMIN — ACETAMINOPHEN 650 MG: 325 TABLET ORAL at 10:02

## 2020-08-09 RX ADMIN — EPOPROSTENOL 50 NG/KG/MIN: 1.5 INJECTION, POWDER, LYOPHILIZED, FOR SOLUTION INTRAVENOUS at 06:53

## 2020-08-09 RX ADMIN — AZITHROMYCIN MONOHYDRATE 500 MG: 500 INJECTION, POWDER, LYOPHILIZED, FOR SOLUTION INTRAVENOUS at 04:30

## 2020-08-09 RX ADMIN — Medication 500 MG: at 09:08

## 2020-08-09 RX ADMIN — FAMOTIDINE 20 MG: 10 INJECTION, SOLUTION INTRAVENOUS at 20:02

## 2020-08-09 RX ADMIN — EPOPROSTENOL 50 NG/KG/MIN: 1.5 INJECTION, POWDER, LYOPHILIZED, FOR SOLUTION INTRAVENOUS at 13:40

## 2020-08-09 RX ADMIN — DEXAMETHASONE SODIUM PHOSPHATE 6 MG: 10 INJECTION INTRAMUSCULAR; INTRAVENOUS at 05:39

## 2020-08-09 NOTE — PROGRESS NOTES
Name: [facility] CAMPUS: [address] [place] [address] Dr QUINTANA: 1990 Admit Date: 2020   Phone: [phone]  Room: [address] [place] [address]   PCP: Mague Aiken MD  MRN: [id number]   Date: 2020  Code: Full Code          Chart and notes reviewed. Data reviewed. I review the patient's current medications in the medical record at each encounter. I have evaluated and examined the patient. History of Present Illness:  Mr. Eric Garcia is a 26 yo gentleman with a history of tobacco use who is admitted with COVID-19 pneumonia and acute respiratory failure with hypoxia. He was diagnosed about 10 days ago at an urgent care facility with COVID-19. Last Thursday he began to have fevers, inability to take a deep breath that was progressively worse, dry cough, and general malaise/faitigue. He was prescribed azithromycin and prednisone without improvement. Denies n/v, abdominal pain, changes in taste/smell. Hypoxic as low as 76% on RA. Placed on BiPAP yesterday evening due to increased work of breathing. Labs reviewed: WBC 16.5, d-dimer 0.86, Na 129, cr 0.84, AST 86, , , Procalcitonin 0.33, ferritin 5114, IL6 in lab; crp pending    Images:  CXR with hazy interstitial changes and airspace opacification      PMH: tobacco use    FH: no family history pertinent to presents complaint    SH: +tobacco use    Interval history  Afebrile. Transferred to ICU yesterday for veletri. States that he is feeling better this AM. I/Os: -2.4L.      Social History     Tobacco Use    Smoking status: Current Some Day Smoker    Smokeless tobacco: Never Used   Substance Use Topics    Alcohol use: Not on file       No Known Allergies    Current Facility-Administered Medications   Medication Dose Route Frequency    enoxaparin (LOVENOX) injection 105 mg  105 mg SubCUTAneous Q12H    LORazepam (ATIVAN) injection 0.5 mg  0.5 mg IntraVENous Q6H PRN    epoprostenol (VELETRI) 30 mcg/mL in 0.9% sodium chloride 50 mL inhalation solution  50 ng/kg/min (Ideal) Inhalation CONTINUOUS    morphine injection 1 mg  1 mg IntraVENous Q6H PRN    0.9% sodium chloride infusion 250 mL  250 mL IntraVENous PRN    sodium chloride (NS) flush 5-40 mL  5-40 mL IntraVENous Q8H    sodium chloride (NS) flush 5-40 mL  5-40 mL IntraVENous PRN    acetaminophen (TYLENOL) tablet 650 mg  650 mg Oral Q6H PRN    Or    acetaminophen (TYLENOL) suppository 650 mg  650 mg Rectal Q6H PRN    polyethylene glycol (MIRALAX) packet 17 g  17 g Oral DAILY PRN    promethazine (PHENERGAN) tablet 12.5 mg  12.5 mg Oral Q6H PRN    Or    ondansetron (ZOFRAN) injection 4 mg  4 mg IntraVENous Q6H PRN    azithromycin (ZITHROMAX) 500 mg in 0.9% sodium chloride (MBP/ADV) 250 mL  500 mg IntraVENous Q24H    ascorbic acid (vitamin C) (VITAMIN C) tablet 500 mg  500 mg Oral BID    zinc sulfate (ZINCATE) 220 (50) mg capsule 1 Cap  1 Cap Oral DAILY    albuterol (PROVENTIL HFA, VENTOLIN HFA, PROAIR HFA) inhaler 4 Puff  4 Puff Inhalation Q4H PRN    benzonatate (TESSALON) capsule 100 mg  100 mg Oral TID PRN    dexamethasone (PF) (DECADRON) 10 mg/mL injection 6 mg  6 mg IntraVENous Q6H    remdesivir 100 mg in 0.9% sodium chloride 250 mL IVPB  100 mg IntraVENous Q24H         REVIEW OF SYSTEMS   12 point ROS negative except as stated in the HPI. Physical Exam:   Visit Vitals  /75   Pulse 95   Temp 98.3 °F (36.8 °C)   Resp 29   Ht 5' 11\" (1.803 m)   Wt 108 kg (238 lb)   SpO2 90%   BMI 33.19 kg/m²       General:  Alert, cooperative, on BiPAP   Head:  Normocephalic, without obvious abnormality, atraumatic. Eyes:  Conjunctivae/corneas clear. Nose: Nares normal   Throat: Lips, mucosa, and tongue normal.    Neck: Supple, symmetrical, trachea midline   Lungs:   Mild increase WOB with BiPAP in place. Chest wall:  No tenderness or deformity. Heart:  Regular rate and rhythm, S1, S2 normal, no murmur, click, rub or gallop. Abdomen:   Soft, non-tender.  Bowel sounds normal. Obese Extremities: Extremities normal, atraumatic, no cyanosis or edema. Skin: Skin color, texture, turgor normal. No rashes or lesions   Neurologic: Grossly nonfocal       Lab Results   Component Value Date/Time    Sodium 134 (L) 08/09/2020 04:35 AM    Potassium 4.2 08/09/2020 04:35 AM    Chloride 100 08/09/2020 04:35 AM    CO2 25 08/09/2020 04:35 AM    BUN 26 (H) 08/09/2020 04:35 AM    Creatinine 0.91 08/09/2020 04:35 AM    Glucose 148 (H) 08/09/2020 04:35 AM    Calcium 9.5 08/09/2020 04:35 AM    Magnesium 2.8 (H) 08/09/2020 04:35 AM    Phosphorus 4.4 08/09/2020 04:35 AM    Lactic acid 1.2 08/06/2020 01:24 PM       Lab Results   Component Value Date/Time    WBC 20.7 (H) 08/09/2020 04:35 AM    HGB 15.9 08/09/2020 04:35 AM    PLATELET 864 44/45/6749 04:35 AM    MCV 89.1 08/09/2020 04:35 AM       Lab Results   Component Value Date/Time    Alk.  phosphatase 116 08/09/2020 04:35 AM    Protein, total 8.3 (H) 08/09/2020 04:35 AM    Albumin 2.8 (L) 08/09/2020 04:35 AM    Globulin 5.5 (H) 08/09/2020 04:35 AM       Lab Results   Component Value Date/Time    Ferritin 2,905 (H) 08/08/2020 05:00 AM       Lab Results   Component Value Date/Time    C-Reactive protein 8.21 (H) 08/09/2020 04:35 AM        No results found for: PH, PHI, PCO2, PCO2I, PO2, PO2I, HCO3, HCO3I, FIO2, FIO2I    Lab Results   Component Value Date/Time    Troponin-I, Qt. <0.05 08/08/2020 12:13 PM        Lab Results   Component Value Date/Time    Culture result: NO GROWTH 4 DAYS 08/05/2020 08:49 PM       No results found for: TOXA1, RPR, HBCM, HBSAG, HAAB, HCAB1, HAAT, G6PD, CRYAC, HIVGT, HIVR, HIV1, HIV12, HIVPC, HIVRPI    No results found for: VANCT, CPK    No results found for: COLOR, APPRN, SPGRU, RAE, PROTU, GLUCU, KETU, BILU, BLDU, UROU, ROBLES, LEUKU, WBCU, RBCU, UEPI, BACTU, CASTS, UCRY    IMPRESSION  · COVID-19 Pneumonia  · Acute respiratory failure with hypoxia  · Pneumomediastinum with small b/l pneumothoraces  · Elevated LFTs  · Tobacco use    PLAN  · C/w NIV with veletri, FiO2 decreased to 90% this AM  · Follow CXR (this PM and tomorrow AM)  · diurese  · Continue Zinc, vitamin C  · Continue Azithromycin for 5 days. Can stop ceftriaxone. · Continue decadron for 10 days  · Cotinue remdesivir   · convalescent plasma pending  · Monitor renal function, LFTs, inflammatory markers, d-dimer, lfts  · Follow-up IL6  · PRN proventil  · Increase Lovenox to therapeutic dosing   · NPO except meds until pulmonary status stabilizes  · Full code    Patient is critically ill with worsening acute hypoxic respiratory failure requiring continuous BiPAP and is high risk for further decompensation. CC time EOP 40min. Discussed with nursing.     Jad Diaz MD

## 2020-08-09 NOTE — PROGRESS NOTES
1930 Bedside and Verbal shift change report given to Ele Bruno RN (oncoming nurse) by Carlos Rios RN. (offgoing nurse). Report included the following information SBAR, Kardex, ED Summary, Procedure Summary, Intake/Output, MAR, and Recent Results. Primary Nurse Christiana Ferrari RN and Carlos Rios, RN performed a dual skin assessment on this patient No impairment noted. Enrique score is 16. Patient is COVID 19 positive and also rule out C-diff. On isolation for Airborne contact and droplet. Patient is on BIPAP and Veletri. Remdesivir is currently infusing. Carlos Rios RN spoke with Dr. Ilda Montana and Bumex was ordered and administered per MD. Patient is AAO times 4 and gave permission for his mother to receive information and updates on his condition. 2100 Care plan updated. Patient given sponge for dry throat. 2200 Patient given tussin pearls and ativan for cough and anxiety. 0400 Patient has requested ice packs throughout the night because he stated that he feels hot in the negative pressure rooms. Pt is not febrile at this time. WIll continue to monitor.

## 2020-08-09 NOTE — PROGRESS NOTES
0700- Bedside and Verbal shift change report given to Mesha Vee RN (oncoming nurse) by Dima Lowery (offgoing nurse). Report included the following information SBAR, Kardex, ED Summary, Procedure Summary, Intake/Output, MAR, Recent Results, Cardiac Rhythm NSR/SINUS U [place] and Alarm Parameters . Primary Nurse Mayra Munson and Jeferson Estrella., RN performed a dual skin assessment on this patient No impairment noted  Enrique score is 16  Received patient in bed AAOx4, able to make needs known; on BIPAP pressure support @ 10, Rate 15, PEEP 5, FiO2 100% with Veletri running. Respirations even easy unlabored. No SOB noted while paralyzed. Abdomen soft nontender nondistended. +BS noted. Bed locked in lowest position, bed alarm on. Pt able to turn self, turned to right at this time. Urinal at bedside. Call bell in reach for safety. Patient on airborne precautions for COVID, rule out CDiff- waiting for results. 523 [facility]- Spoke to Dr. Lai Side- to follow up on convalescent plasma. 0720- Spoke to blood bank- no plasma has been received yet to blood bank, may take up to 72 hours to receive plasma. 0800- VSS; no changes to patient. Coughing up thick yellow sputum, able to pull bipap on and off- education provided on how to do this incase of more sputum. Afebrile. Provided with ice packs. 1186- Dr. Hector Morin at bedside, discussing pneumothorax, stable. Will complete CXR later to monitor any changes. No chest tube at this time. Pt dropped to 78% when BiPAP removed, coughed up sputum and MD made aware. Reduced to 90% by Dr. Hilario Landeros, [place] to continue to wean if Sp02 >90%. 0900- Condom catheter replaced, old one falling off.  0910- Provided with medictions, tolerated well. To complete blood cultures per order. 0919- Tolerated Po medications well, only dropped to 80% while off BiPAP for 1 minute. Rebounded well with replacing BiPAP, deep breathing techqniue. 0945- Blood culture drawn and sent. Placed patient on bedpan, passing gas.   1000- No BM noted, removed from bedpan. Provided with tylenol for headache. Tolerated well. 1100- VSS.; Noted nose is red where bipap is, placing mepilex to site. 1200- Reassessment completed- no changes noted. Spoke to patient's mom on phone, provided with update. 1220- Provided with new ice packs, helping to cool down patient in warm room and with headache.  1249- Pt resting at this time. Pt remains NPO with BIPAP.  1300- VSS at this time. 1345- Veletri changed with RT, Pt decreased to 80% FiO2.  1400- VSS, no changes noted at this time. 1439- Increased FiO2 to 90%, pt felt he was more SOB @ 80%. Tolerating well with 90% FiO2, education provided to patient, will attempt weaning when patient feels ready. 1500- Pt assisted to bedpan, had medium bowel movement. 1515- Pt was OOB to recliner, cleaned up, gown changed and linen changed. Assisted back into bed. Pt tolerated exercise well, mild SOB but remained on BiPAP the entire time. Back in bed, locked, call bell in reach. CXR being complete now. Found fan for pt- to place in room and provide with more ice bags. 1600- No changes noted at this time. 1620- Remdesivir started on patient per order. 1655- Pt feeling anxious, tightness in chest, providing with Ativan to help. Also, patient coughed up large amount of secretions, helped to clear airway. Increased FiO2 to 100%. 1700- Education provided on proning, pt wants to try tonight and even now going to left side. 1708- RT at bedside. 1800- Pt feeling condom cath keeps coming off despite utilizing spray to \"stick. \" Pt encouraged to utilize urinal, condom cath may not be able to stay in place. 1900- Bedside and Verbal shift change report given to Aditya Maldonado (oncoming nurse) by Kristel Irving RN (offgoing nurse). Report included the following information SBAR, Kardex, ED Summary, Procedure Summary, Intake/Output, MAR, Recent Results, Cardiac Rhythm NSR/Sinus Tach and Alarm Parameters .

## 2020-08-09 NOTE — PROGRESS NOTES
Bill Zepeda [facility] [address]  [address] [facility], [address] [place] [address]  [phone]      Medical Progress Note      NAME: Camron Wen   :  1990  MRM:  [id number]    Date/Time: 2020        Assessment / Plan:       Acute respiratory failure with hypoxia: from pneumonia due to severe acute respiratory syndrome coronavirus 2 (SARS-CoV-2). Severe hypoxia, remains on NPPV, 100% FiO2. CXR  again showing diffuse interstitial and airspace opacities without significant change. Small biapical pneumothoraces are again note. Discussed with PCCM and BiPAP setting adjusted. On Veletri per PCCM. Continue remdesivir (day 4/5), dexamethasone, vitamin C, zinc, azithromycin, therapeutic Lovenox. Start statin but monitor LFTs. Will start cefepime given rise in WBC in case bacterial PNA     Elevated liver enzymes: mild, improved. Likely due to COVID-19. No RUQ pain/TTP. Monitor on remdesivir. Obesity:  Body mass index is 33.19 kg/m². Would benefit from weight loss and dietary / lifestyle modifications      Total time spent: 25 minutes                    Care Plan discussed with: Patient, Nursing Staff and >50% of time spent in counseling and coordination of care    Discussed:  Care Plan and D/C Planning    Prophylaxis:  Lovenox    Disposition:  Home w/Family         Subjective:     Chief Complaint:  Follow up dyspnea    Chart/notes/labs/studies reviewed, patient examined. +SOB. No fevers       Objective:       Vitals:        Last 24hrs VS reviewed since prior progress note.  Most recent are:    Visit Vitals  /79   Pulse 98   Temp 98.5 °F (36.9 °C)   Resp 27   Ht 5' 11\" (1.803 m)   Wt 108 kg (238 lb)   SpO2 92%   BMI 33.19 kg/m²     SpO2 Readings from Last 6 Encounters:   20 92%    O2 Flow Rate (L/min): 36 l/min       Intake/Output Summary (Last 24 hours) at 2020 1604  Last data filed at 2020 1530  Gross per 24 hour   Intake 350.82 ml   Output 2850 ml   Net -8549.18 ml          Exam:     Physical Exam:    Gen:  Well-developed, well-nourished, in mild distress  HEENT:  BiPAP mask in place  Resp:  Accessory muscle use, increased WOB. Card: HR 90s, reg rhythm   Abd:  nondistended  Neuro:  Face symmetric, moving extremities     Medications Reviewed: (see below)    Lab Data Reviewed: (see below)    ______________________________________________________________________    Medications:     Current Facility-Administered Medications   Medication Dose Route Frequency    [START ON 8/10/2020] dexamethasone (PF) (DECADRON) 10 mg/mL injection 6 mg  6 mg IntraVENous DAILY    famotidine (PF) (PEPCID) 20 mg in 0.9% sodium chloride 10 mL injection  20 mg IntraVENous Q12H    enoxaparin (LOVENOX) injection 105 mg  105 mg SubCUTAneous Q12H    LORazepam (ATIVAN) injection 0.5 mg  0.5 mg IntraVENous Q6H PRN    epoprostenol (VELETRI) 30 mcg/mL in 0.9% sodium chloride 50 mL inhalation solution  50 ng/kg/min (Ideal) Inhalation CONTINUOUS    morphine injection 1 mg  1 mg IntraVENous Q6H PRN    0.9% sodium chloride infusion 250 mL  250 mL IntraVENous PRN    sodium chloride (NS) flush 5-40 mL  5-40 mL IntraVENous Q8H    sodium chloride (NS) flush 5-40 mL  5-40 mL IntraVENous PRN    acetaminophen (TYLENOL) tablet 650 mg  650 mg Oral Q6H PRN    Or    acetaminophen (TYLENOL) suppository 650 mg  650 mg Rectal Q6H PRN    polyethylene glycol (MIRALAX) packet 17 g  17 g Oral DAILY PRN    promethazine (PHENERGAN) tablet 12.5 mg  12.5 mg Oral Q6H PRN    Or    ondansetron (ZOFRAN) injection 4 mg  4 mg IntraVENous Q6H PRN    azithromycin (ZITHROMAX) 500 mg in 0.9% sodium chloride (MBP/ADV) 250 mL  500 mg IntraVENous Q24H    ascorbic acid (vitamin C) (VITAMIN C) tablet 500 mg  500 mg Oral BID    zinc sulfate (ZINCATE) 220 (50) mg capsule 1 Cap  1 Cap Oral DAILY    albuterol (PROVENTIL HFA, VENTOLIN HFA, PROAIR HFA) inhaler 4 Puff  4 Puff Inhalation Q4H PRN    benzonatate (TESSALON) capsule 100 mg  100 mg Oral TID PRN    remdesivir 100 mg in 0.9% sodium chloride 250 mL IVPB  100 mg IntraVENous Q24H            Lab Review:     Recent Labs     08/09/20  0435 08/08/20  0500 08/07/20  0437   WBC 20.7* 17.7* 14.9*   HGB 15.9 14.5 14.8   HCT 46.4 43.0 44.0    325 302     Recent Labs     08/09/20  0435 08/08/20  0500 08/07/20  0437   * 135* 137   K 4.2 4.2 4.8    101 104   CO2 25 25 26   * 135* 129*   BUN 26* 22* 23*   CREA 0.91 0.91 0.78   CA 9.5 8.9 8.7   MG 2.8* 2.5* 2.9*   PHOS 4.4 5.3* 5.2*   ALB 2.8* 2.5* 2.7*   ALT 94* 123* 173*     No components found for: GLPOC  Recent Labs     08/08/20  0510   PH 7.44   PCO2 32*   PO2 66*   HCO3 21*   FIO2 100     No results for input(s): INR, INREXT, INREXT in the last 72 hours.   No results found for: SDES  Lab Results   Component Value Date/Time    Culture result: NO GROWTH 4 DAYS 08/05/2020 08:49 PM              ___________________________________________________    Attending Physician: Saran Echeverria MD

## 2020-08-10 ENCOUNTER — APPOINTMENT (OUTPATIENT)
Dept: GENERAL RADIOLOGY | Age: 30
DRG: 870 | End: 2020-08-10
Attending: INTERNAL MEDICINE
Payer: COMMERCIAL

## 2020-08-10 LAB
ALBUMIN SERPL-MCNC: 2.2 G/DL (ref 3.5–5)
ALBUMIN/GLOB SERPL: 0.4 {RATIO} (ref 1.1–2.2)
ALP SERPL-CCNC: 97 U/L (ref 45–117)
ALT SERPL-CCNC: 51 U/L (ref 12–78)
ANION GAP SERPL CALC-SCNC: 8 MMOL/L (ref 5–15)
ARTERIAL PATENCY WRIST A: YES
ARTERIAL PATENCY WRIST A: YES
AST SERPL-CCNC: 33 U/L (ref 15–37)
ATRIAL RATE: 144 BPM
BACTERIA SPEC CULT: NORMAL
BASE DEFICIT BLDA-SCNC: 4 MMOL/L
BASE DEFICIT BLDA-SCNC: 4.3 MMOL/L
BASOPHILS # BLD: 0 K/UL (ref 0–0.1)
BASOPHILS NFR BLD: 0 % (ref 0–1)
BDY SITE: ABNORMAL
BDY SITE: ABNORMAL
BILIRUB DIRECT SERPL-MCNC: 1.4 MG/DL (ref 0–0.2)
BILIRUB SERPL-MCNC: 2 MG/DL (ref 0.2–1)
BUN SERPL-MCNC: 35 MG/DL (ref 6–20)
BUN/CREAT SERPL: 32 (ref 12–20)
CALCIUM SERPL-MCNC: 8.9 MG/DL (ref 8.5–10.1)
CALCULATED P AXIS, ECG09: 60 DEGREES
CALCULATED R AXIS, ECG10: -16 DEGREES
CALCULATED T AXIS, ECG11: 50 DEGREES
CHLORIDE SERPL-SCNC: 104 MMOL/L (ref 97–108)
CO2 SERPL-SCNC: 24 MMOL/L (ref 21–32)
CREAT SERPL-MCNC: 1.1 MG/DL (ref 0.7–1.3)
CRP SERPL HS-MCNC: >9.5 MG/L
CRP SERPL-MCNC: 17.5 MG/DL (ref 0–0.6)
D DIMER PPP FEU-MCNC: 1.92 MG/L FEU (ref 0–0.65)
DIAGNOSIS, 93000: NORMAL
DIFFERENTIAL METHOD BLD: ABNORMAL
EOSINOPHIL # BLD: 0 K/UL (ref 0–0.4)
EOSINOPHIL NFR BLD: 0 % (ref 0–7)
EPAP/CPAP/PEEP, PAPEEP: 15
ERYTHROCYTE [DISTWIDTH] IN BLOOD BY AUTOMATED COUNT: 12.2 % (ref 11.5–14.5)
FERRITIN SERPL-MCNC: 3227 NG/ML (ref 26–388)
FIO2 ON VENT: 100 %
FIO2 ON VENT: 100 %
GAS FLOW.O2 SETTING OXYMISER: 22 L/MIN
GAS FLOW.O2 SETTING OXYMISER: 22 L/MIN
GLOBULIN SER CALC-MCNC: 4.9 G/DL (ref 2–4)
GLUCOSE SERPL-MCNC: 147 MG/DL (ref 65–100)
HCO3 BLDA-SCNC: 21 MMOL/L (ref 22–26)
HCO3 BLDA-SCNC: 23 MMOL/L (ref 22–26)
HCT VFR BLD AUTO: 44.8 % (ref 36.6–50.3)
HGB BLD-MCNC: 14.9 G/DL (ref 12.1–17)
IMM GRANULOCYTES # BLD AUTO: 0 K/UL
IMM GRANULOCYTES NFR BLD AUTO: 0 %
LDH SERPL L TO P-CCNC: 596 U/L (ref 85–241)
LYMPHOCYTES # BLD: 1.2 K/UL (ref 0.8–3.5)
LYMPHOCYTES NFR BLD: 4 % (ref 12–49)
MAGNESIUM SERPL-MCNC: 2.8 MG/DL (ref 1.6–2.4)
MCH RBC QN AUTO: 30.4 PG (ref 26–34)
MCHC RBC AUTO-ENTMCNC: 33.3 G/DL (ref 30–36.5)
MCV RBC AUTO: 91.4 FL (ref 80–99)
METAMYELOCYTES NFR BLD MANUAL: 3 %
MONOCYTES # BLD: 0.3 K/UL (ref 0–1)
MONOCYTES NFR BLD: 1 % (ref 5–13)
NEUTS BAND NFR BLD MANUAL: 3 % (ref 0–6)
NEUTS SEG # BLD: 26.7 K/UL (ref 1.8–8)
NEUTS SEG NFR BLD: 89 % (ref 32–75)
NRBC # BLD: 0 K/UL (ref 0–0.01)
NRBC BLD-RTO: 0 PER 100 WBC
P-R INTERVAL, ECG05: 146 MS
PCO2 BLDA: 38 MMHG (ref 35–45)
PCO2 BLDA: 48 MMHG (ref 35–45)
PEEP MAX SETTING VENT: 15 CM[H2O]
PH BLDA: 7.29 [PH] (ref 7.35–7.45)
PH BLDA: 7.36 [PH] (ref 7.35–7.45)
PHOSPHATE SERPL-MCNC: 4.1 MG/DL (ref 2.6–4.7)
PLATELET # BLD AUTO: 383 K/UL (ref 150–400)
PMV BLD AUTO: 10.7 FL (ref 8.9–12.9)
PO2 BLDA: 91 MMHG (ref 80–100)
PO2 BLDA: 92 MMHG (ref 80–100)
POTASSIUM SERPL-SCNC: 4.2 MMOL/L (ref 3.5–5.1)
PROT SERPL-MCNC: 7.1 G/DL (ref 6.4–8.2)
Q-T INTERVAL, ECG07: 264 MS
QRS DURATION, ECG06: 88 MS
QTC CALCULATION (BEZET), ECG08: 408 MS
RBC # BLD AUTO: 4.9 M/UL (ref 4.1–5.7)
RBC MORPH BLD: ABNORMAL
SAO2 % BLD: 96 % (ref 92–97)
SAO2 % BLD: 97 % (ref 92–97)
SAO2% DEVICE SAO2% SENSOR NAME: ABNORMAL
SAO2% DEVICE SAO2% SENSOR NAME: ABNORMAL
SERVICE CMNT-IMP: NORMAL
SODIUM SERPL-SCNC: 136 MMOL/L (ref 136–145)
SPECIMEN SITE: ABNORMAL
SPECIMEN SITE: ABNORMAL
VENTILATION MODE VENT: ABNORMAL
VENTILATION MODE VENT: ABNORMAL
VENTRICULAR RATE, ECG03: 144 BPM
VT SETTING VENT: 400 ML
VT SETTING VENT: 450 ML
WBC # BLD AUTO: 29 K/UL (ref 4.1–11.1)

## 2020-08-10 PROCEDURE — 74011000250 HC RX REV CODE- 250: Performed by: INTERNAL MEDICINE

## 2020-08-10 PROCEDURE — 86141 C-REACTIVE PROTEIN HS: CPT

## 2020-08-10 PROCEDURE — 84100 ASSAY OF PHOSPHORUS: CPT

## 2020-08-10 PROCEDURE — 74011000258 HC RX REV CODE- 258: Performed by: INTERNAL MEDICINE

## 2020-08-10 PROCEDURE — 74011250636 HC RX REV CODE- 250/636: Performed by: INTERNAL MEDICINE

## 2020-08-10 PROCEDURE — 02HV33Z INSERTION OF INFUSION DEVICE INTO SUPERIOR VENA CAVA, PERCUTANEOUS APPROACH: ICD-10-PCS | Performed by: INTERNAL MEDICINE

## 2020-08-10 PROCEDURE — 94003 VENT MGMT INPAT SUBQ DAY: CPT

## 2020-08-10 PROCEDURE — 0BH17EZ INSERTION OF ENDOTRACHEAL AIRWAY INTO TRACHEA, VIA NATURAL OR ARTIFICIAL OPENING: ICD-10-PCS | Performed by: INTERNAL MEDICINE

## 2020-08-10 PROCEDURE — 76937 US GUIDE VASCULAR ACCESS: CPT

## 2020-08-10 PROCEDURE — 94645 CONT INHLJ TX EACH ADDL HOUR: CPT

## 2020-08-10 PROCEDURE — 74018 RADEX ABDOMEN 1 VIEW: CPT

## 2020-08-10 PROCEDURE — 83735 ASSAY OF MAGNESIUM: CPT

## 2020-08-10 PROCEDURE — 36573 INSJ PICC RS&I 5 YR+: CPT | Performed by: INTERNAL MEDICINE

## 2020-08-10 PROCEDURE — 77030008771 HC TU NG SALEM SUMP -A

## 2020-08-10 PROCEDURE — 5A1955Z RESPIRATORY VENTILATION, GREATER THAN 96 CONSECUTIVE HOURS: ICD-10-PCS | Performed by: INTERNAL MEDICINE

## 2020-08-10 PROCEDURE — 80048 BASIC METABOLIC PNL TOTAL CA: CPT

## 2020-08-10 PROCEDURE — 83615 LACTATE (LD) (LDH) ENZYME: CPT

## 2020-08-10 PROCEDURE — 94644 CONT INHLJ TX 1ST HOUR: CPT

## 2020-08-10 PROCEDURE — 77010033678 HC OXYGEN DAILY

## 2020-08-10 PROCEDURE — 86140 C-REACTIVE PROTEIN: CPT

## 2020-08-10 PROCEDURE — 77030018786 HC NDL GD F/USND BARD -B

## 2020-08-10 PROCEDURE — 82803 BLOOD GASES ANY COMBINATION: CPT

## 2020-08-10 PROCEDURE — 93005 ELECTROCARDIOGRAM TRACING: CPT

## 2020-08-10 PROCEDURE — 74011250637 HC RX REV CODE- 250/637: Performed by: INTERNAL MEDICINE

## 2020-08-10 PROCEDURE — 74011250636 HC RX REV CODE- 250/636

## 2020-08-10 PROCEDURE — C1751 CATH, INF, PER/CENT/MIDLINE: HCPCS

## 2020-08-10 PROCEDURE — 36430 TRANSFUSION BLD/BLD COMPNT: CPT

## 2020-08-10 PROCEDURE — 77030041247 HC PROTECTOR HEEL HEELMEDIX MDII -B

## 2020-08-10 PROCEDURE — 94002 VENT MGMT INPAT INIT DAY: CPT

## 2020-08-10 PROCEDURE — 71045 X-RAY EXAM CHEST 1 VIEW: CPT

## 2020-08-10 PROCEDURE — 31500 INSERT EMERGENCY AIRWAY: CPT

## 2020-08-10 PROCEDURE — 85379 FIBRIN DEGRADATION QUANT: CPT

## 2020-08-10 PROCEDURE — 82728 ASSAY OF FERRITIN: CPT

## 2020-08-10 PROCEDURE — 36415 COLL VENOUS BLD VENIPUNCTURE: CPT

## 2020-08-10 PROCEDURE — 77030005513 HC CATH URETH FOL11 MDII -B

## 2020-08-10 PROCEDURE — 36600 WITHDRAWAL OF ARTERIAL BLOOD: CPT

## 2020-08-10 PROCEDURE — 80076 HEPATIC FUNCTION PANEL: CPT

## 2020-08-10 PROCEDURE — 65610000006 HC RM INTENSIVE CARE

## 2020-08-10 PROCEDURE — 85025 COMPLETE CBC W/AUTO DIFF WBC: CPT

## 2020-08-10 RX ORDER — PROPOFOL 10 MG/ML
0-50 VIAL (ML) INTRAVENOUS
Status: DISCONTINUED | OUTPATIENT
Start: 2020-08-10 | End: 2020-08-10

## 2020-08-10 RX ORDER — PROPOFOL 10 MG/ML
0-40 VIAL (ML) INTRAVENOUS
Status: DISCONTINUED | OUTPATIENT
Start: 2020-08-10 | End: 2020-08-16

## 2020-08-10 RX ORDER — DEXAMETHASONE SODIUM PHOSPHATE 10 MG/ML
6 INJECTION INTRAMUSCULAR; INTRAVENOUS EVERY 6 HOURS
Status: DISCONTINUED | OUTPATIENT
Start: 2020-08-10 | End: 2020-08-18 | Stop reason: HOSPADM

## 2020-08-10 RX ORDER — VANCOMYCIN/0.9 % SOD CHLORIDE 1.5G/250ML
1500 PLASTIC BAG, INJECTION (ML) INTRAVENOUS EVERY 8 HOURS
Status: COMPLETED | OUTPATIENT
Start: 2020-08-10 | End: 2020-08-11

## 2020-08-10 RX ORDER — PROPOFOL 10 MG/ML
INJECTION, EMULSION INTRAVENOUS
Status: DISPENSED
Start: 2020-08-10 | End: 2020-08-10

## 2020-08-10 RX ORDER — SODIUM CHLORIDE 9 MG/ML
250 INJECTION, SOLUTION INTRAVENOUS AS NEEDED
Status: DISCONTINUED | OUTPATIENT
Start: 2020-08-10 | End: 2020-08-18 | Stop reason: HOSPADM

## 2020-08-10 RX ADMIN — SODIUM CHLORIDE 3 MCG/KG/MIN: 900 INJECTION, SOLUTION INTRAVENOUS at 04:14

## 2020-08-10 RX ADMIN — CEFEPIME HYDROCHLORIDE 2 G: 2 INJECTION, POWDER, FOR SOLUTION INTRAVENOUS at 09:22

## 2020-08-10 RX ADMIN — CEFEPIME HYDROCHLORIDE 2 G: 2 INJECTION, POWDER, FOR SOLUTION INTRAVENOUS at 17:09

## 2020-08-10 RX ADMIN — SODIUM CHLORIDE 1 MCG/KG/MIN: 900 INJECTION, SOLUTION INTRAVENOUS at 20:24

## 2020-08-10 RX ADMIN — ENOXAPARIN SODIUM 105 MG: 120 INJECTION SUBCUTANEOUS at 10:13

## 2020-08-10 RX ADMIN — FAMOTIDINE 20 MG: 10 INJECTION, SOLUTION INTRAVENOUS at 21:10

## 2020-08-10 RX ADMIN — PROPOFOL 50 MCG/KG/MIN: 10 INJECTION, EMULSION INTRAVENOUS at 22:08

## 2020-08-10 RX ADMIN — PHENYLEPHRINE HYDROCHLORIDE 30 MCG/MIN: 10 INJECTION INTRAVENOUS at 04:00

## 2020-08-10 RX ADMIN — Medication 100 MCG/HR: at 14:21

## 2020-08-10 RX ADMIN — AZITHROMYCIN MONOHYDRATE 500 MG: 500 INJECTION, POWDER, LYOPHILIZED, FOR SOLUTION INTRAVENOUS at 09:56

## 2020-08-10 RX ADMIN — Medication 500 MG: at 17:09

## 2020-08-10 RX ADMIN — PHENYLEPHRINE HYDROCHLORIDE 90 MCG/MIN: 10 INJECTION INTRAVENOUS at 09:53

## 2020-08-10 RX ADMIN — PROPOFOL 50 MCG/KG/MIN: 10 INJECTION, EMULSION INTRAVENOUS at 13:31

## 2020-08-10 RX ADMIN — SODIUM CHLORIDE 2.5 MCG/KG/MIN: 900 INJECTION, SOLUTION INTRAVENOUS at 07:00

## 2020-08-10 RX ADMIN — Medication 50 MCG/HR: at 03:34

## 2020-08-10 RX ADMIN — PROPOFOL 50 MCG/KG/MIN: 10 INJECTION, EMULSION INTRAVENOUS at 18:53

## 2020-08-10 RX ADMIN — EPOPROSTENOL 50 NG/KG/MIN: 1.5 INJECTION, POWDER, LYOPHILIZED, FOR SOLUTION INTRAVENOUS at 21:07

## 2020-08-10 RX ADMIN — REMDESIVIR 100 MG: 100 INJECTION, POWDER, LYOPHILIZED, FOR SOLUTION INTRAVENOUS at 16:06

## 2020-08-10 RX ADMIN — Medication 10 ML: at 21:10

## 2020-08-10 RX ADMIN — FAMOTIDINE 20 MG: 10 INJECTION, SOLUTION INTRAVENOUS at 09:22

## 2020-08-10 RX ADMIN — ATORVASTATIN CALCIUM 20 MG: 20 TABLET, FILM COATED ORAL at 21:11

## 2020-08-10 RX ADMIN — Medication 75 MCG/HR: at 05:00

## 2020-08-10 RX ADMIN — DEXAMETHASONE SODIUM PHOSPHATE 6 MG: 10 INJECTION, SOLUTION INTRAMUSCULAR; INTRAVENOUS at 17:09

## 2020-08-10 RX ADMIN — VANCOMYCIN HYDROCHLORIDE 2500 MG: 10 INJECTION, POWDER, LYOPHILIZED, FOR SOLUTION INTRAVENOUS at 11:37

## 2020-08-10 RX ADMIN — EPOPROSTENOL 50 NG/KG/MIN: 1.5 INJECTION, POWDER, LYOPHILIZED, FOR SOLUTION INTRAVENOUS at 14:04

## 2020-08-10 RX ADMIN — PROPOFOL 50 MCG/KG/MIN: 10 INJECTION, EMULSION INTRAVENOUS at 15:40

## 2020-08-10 RX ADMIN — Medication 10 ML: at 13:34

## 2020-08-10 RX ADMIN — PROPOFOL INJECTABLE EMULSION 25 MCG/KG/MIN: 10 INJECTION, EMULSION INTRAVENOUS at 03:05

## 2020-08-10 RX ADMIN — VANCOMYCIN HYDROCHLORIDE 1500 MG: 10 INJECTION, POWDER, LYOPHILIZED, FOR SOLUTION INTRAVENOUS at 19:40

## 2020-08-10 RX ADMIN — DEXAMETHASONE SODIUM PHOSPHATE 6 MG: 10 INJECTION, SOLUTION INTRAMUSCULAR; INTRAVENOUS at 09:21

## 2020-08-10 RX ADMIN — PROPOFOL INJECTABLE EMULSION 50 MCG/KG/MIN: 10 INJECTION, EMULSION INTRAVENOUS at 11:00

## 2020-08-10 RX ADMIN — EPOPROSTENOL 50 NG/KG/MIN: 1.5 INJECTION, POWDER, LYOPHILIZED, FOR SOLUTION INTRAVENOUS at 02:12

## 2020-08-10 RX ADMIN — PROPOFOL INJECTABLE EMULSION 50 MCG/KG/MIN: 10 INJECTION, EMULSION INTRAVENOUS at 05:15

## 2020-08-10 RX ADMIN — ENOXAPARIN SODIUM 105 MG: 120 INJECTION SUBCUTANEOUS at 21:11

## 2020-08-10 RX ADMIN — EPOPROSTENOL 50 NG/KG/MIN: 1.5 INJECTION, POWDER, LYOPHILIZED, FOR SOLUTION INTRAVENOUS at 07:42

## 2020-08-10 NOTE — PROGRESS NOTES
VAT note- To acknowledge order for PICC placement due to hemodynamically unstable pt with covid. .  Chart reviewed for PICC placement evaluation. Spoke with Dr Caryle Aus regarding WBC 29 and recent blood cultures. She feels this is due to Covid and being treated with steroids which has exacerbated the WBCs. Will plan to place PICC this morning. Will obtain consent from pts Kelsea Hernandez.

## 2020-08-10 NOTE — WOUND CARE
Wound care consult per nursing - intubated in ICU-- on ICU surface. Plan of care and skin assessment discussed with staff nurse. COVID positive isolation   Skin intact--per staff area on intact blanching redness on bridge of nose from BIPAP  Now intubated    Recommendations/Plan  Skin care preventative measures- add off loading boots and comfort glide system  Turn, reposition every 2 hours as tolerated, float heels, off loading boots   Incontinent care --- Apply Zinc to all open areas, moisture barrier as needed. Will follow, reconsult as needed.     [address] [facility]

## 2020-08-10 NOTE — PROGRESS NOTES
2000 Bedside and Verbal shift change report given to Lawrence Gaitan RN (oncoming nurse) by Donald Juan, RN (offgoing nurse). Report included the following information SBAR, Kardex, ED Summary, Procedure Summary, Intake/Output, MAR, and Recent Results. Primary Nurse Sonia Castro RN and Donald Juan, RN., performed a dual skin assessment on this patient No impairment noted. Enrique score is 15. Attempted to get patient to cough up a sputum sample into cup for lab. Pt unable to expectorate enough to send at this time. Will attempt again later. 2030 RT to bedside to assess patient and hang Veletri. 0000 Since last note patient has been c/o pressure in lungs and morphine was administered. Patient was anxious and ativan was administered. HR continues to elevate to 140 in ST[facility] ABG and ordered CXR. Still awaiting CXR and ABG's resulted. Paged Pulmonary to update on situation.

## 2020-08-10 NOTE — PROGRESS NOTES
Transition of care note:    1. RUR 14%  2. Pt is Covid 19 positive  3. Pt had discussed intubation with the covering weekend  intensivist before he was intubated and pt chose to be intubated and to be a full code. 3.I am reaching out to attending to ask about a Palliative Medicine consult to provide additional support to the family plus goals of care discussions. 4.Attending discussed pt.'s clinical status with pt.'s mother and plans to considr Palliative consult tomorrow.     Bandar Yousif

## 2020-08-10 NOTE — PROGRESS NOTES
Chart accessed for primary RN. Noted TOF- 0 twitches, reducing Nimbex to 1. Cameron placed back on @ 5 for low BP.  1705- Increased Cameron to 10.

## 2020-08-10 NOTE — PROGRESS NOTES
[facility]       ICU -  Resident Daily Progress Note  Name: Meryle Lapine   : 1990   MRN: [id number]   Date: 8/10/2020 9:29 AM     I have reviewed the flowsheet and previous days notes. The patient is unable to give any meaningful history or review of systems because the patient is intubated and critically ill. Overnight events reviewed:  · Patient was intubated around 0200     Vital Signs:    Visit Vitals  /66   Pulse (!) 111   Temp (!) 49.1 °F (9.5 °C)   Resp 22   Ht 5' 11\" (1.803 m)   Wt 238 lb (108 kg)   SpO2 91%   BMI 33.19 kg/m²       O2 Device: Ventilator   O2 Flow Rate (L/min): 36 l/min   Temp (24hrs), Av.1 °F (30.1 °C), Min:49.1 °F (9.5 °C), Max:98.5 °F (36.9 °C)       Intake/Output:   Last shift:      08/10 07 - 08/10 1900  In: 294.5 [I.V.:294.5]  Out: -   Last 3 shifts:  190 - 08/10 0700  In: 911.1 [P.O.:240;  I.V.:671.1]  Out: 2950 [Urine:2950]    Intake/Output Summary (Last 24 hours) at 8/10/2020 0929  Last data filed at 8/10/2020 0925  Gross per 24 hour   Intake 974.78 ml   Output 1550 ml   Net -575.22 ml       Ventilator Settings:  Ventilator Mode: PRVC  Respiratory Rate  Back-Up Rate: 15  Insp Time (sec): 1 sec  I:E Ratio: 1:1.2  Ventilator Volumes  Vt Set (ml): 450 ml  Vt Exhaled (Machine Breath) (ml): 469 ml  Vt Spont (ml): 629 ml  Ve Observed (l/min): 10.3 l/min  Ventilator Pressures  Pressure Support (cm H2O): 10 cm H2O  PIP Observed (cm H2O): 31 cm H2O  Plateau Pressure (cm H2O): 19 cm H2O  MAP (cm H2O): 22  PEEP/VENT (cm H2O): 15 cm H20      Physical Exam: *Physical exam deferred to attending physician as patient is COVID+ to limit exposure*    DATA:   Current Facility-Administered Medications   Medication Dose Route Frequency    propofol (DIPRIVAN) 10 mg/mL infusion  0-50 mcg/kg/min IntraVENous TITRATE    fentaNYL (PF) 1,500 mcg/30 mL (50 mcg/mL) infusion  0-200 mcg/hr IntraVENous TITRATE    cisatracurium (NIMBEX) 200 mg in 0.9% sodium chloride 100 mL (2 mg/mL) infusion  0-10 mcg/kg/min IntraVENous TITRATE    PHENYLephrine (TONNY-SYNEPHRINE) 30 mg in 0.9% sodium chloride 250 mL infusion   mcg/min IntraVENous TITRATE    dexamethasone (PF) (DECADRON) 10 mg/mL injection 6 mg  6 mg IntraVENous DAILY    famotidine (PF) (PEPCID) 20 mg in 0.9% sodium chloride 10 mL injection  20 mg IntraVENous Q12H    atorvastatin (LIPITOR) tablet 20 mg  20 mg Oral QHS    cefepime (MAXIPIME) 2 g in 0.9% sodium chloride (MBP/ADV) 100 mL  2 g IntraVENous Q8H    enoxaparin (LOVENOX) injection 105 mg  105 mg SubCUTAneous Q12H    LORazepam (ATIVAN) injection 0.5 mg  0.5 mg IntraVENous Q6H PRN    epoprostenol (VELETRI) 30 mcg/mL in 0.9% sodium chloride 50 mL inhalation solution  50 ng/kg/min (Ideal) Inhalation CONTINUOUS    morphine injection 1 mg  1 mg IntraVENous Q6H PRN    0.9% sodium chloride infusion 250 mL  250 mL IntraVENous PRN    sodium chloride (NS) flush 5-40 mL  5-40 mL IntraVENous Q8H    sodium chloride (NS) flush 5-40 mL  5-40 mL IntraVENous PRN    acetaminophen (TYLENOL) tablet 650 mg  650 mg Oral Q6H PRN    Or    acetaminophen (TYLENOL) suppository 650 mg  650 mg Rectal Q6H PRN    polyethylene glycol (MIRALAX) packet 17 g  17 g Oral DAILY PRN    promethazine (PHENERGAN) tablet 12.5 mg  12.5 mg Oral Q6H PRN    Or    ondansetron (ZOFRAN) injection 4 mg  4 mg IntraVENous Q6H PRN    azithromycin (ZITHROMAX) 500 mg in 0.9% sodium chloride (MBP/ADV) 250 mL  500 mg IntraVENous Q24H    ascorbic acid (vitamin C) (VITAMIN C) tablet 500 mg  500 mg Oral BID    zinc sulfate (ZINCATE) 220 (50) mg capsule 1 Cap  1 Cap Oral DAILY    albuterol (PROVENTIL HFA, VENTOLIN HFA, PROAIR HFA) inhaler 4 Puff  4 Puff Inhalation Q4H PRN    benzonatate (TESSALON) capsule 100 mg  100 mg Oral TID PRN    remdesivir 100 mg in 0.9% sodium chloride 250 mL IVPB  100 mg IntraVENous Q24H             Labs:  Recent Labs     08/10/20  0502 08/09/20  0435 08/08/20  0500   WBC 29.0* 20.7* 17.7*   HGB 14.9 15.9 14.5   HCT 44.8 46.4 43.0    336 325     Recent Labs     08/10/20  0555 08/09/20  0435 08/08/20  0500    134* 135*   K 4.2 4.2 4.2    100 101   CO2 24 25 25   * 148* 135*   BUN 35* 26* 22*   CREA 1.10 0.91 0.91   CA 8.9 9.5 8.9   MG 2.8* 2.8* 2.5*   PHOS 4.1 4.4 5.3*   ALB 2.2* 2.8* 2.5*   ALT 51 94* 123*     Recent Labs     08/10/20  0502 08/09/20  2333 08/08/20  0510   PH 7.29* 7.45 7.44   PCO2 48* 34* 32*   PO2 92 68* 66*   HCO3 23 23 21*   FIO2 100 100 100       Imaging:  I have personally reviewed the patients radiographs and reports.     Micro:  Results     Procedure Component Value Units Date/Time    CULTURE, RESPIRATORY/SPUTUM/BRONCH Rohan Payer [[id number]]     Order Status:  Sent Specimen:  Sputum     CULTURE, BLOOD [[id number]] Collected:  08/09/20 0924    Order Status:  Completed Specimen:  Blood Updated:  08/10/20 0608     Special Requests: NO SPECIAL REQUESTS        Culture result: NO GROWTH AFTER 20 HOURS       C. DIFFICILE AG & TOXIN A/B [[id number]] Collected:  08/08/20 1213    Order Status:  Completed Specimen:  Stool Updated:  08/09/20 0927     [address] ANTIGEN Negative        C. difficile toxin Negative        INTERPRETATION       NEGATIVE FOR TOXIGENIC C. DIFFICILE          CULTURE, BLOOD, PAIRED [[id number]] Collected:  08/05/20 2049    Order Status:  Completed Specimen:  Blood Updated:  08/10/20 0608     Special Requests: NO SPECIAL REQUESTS        Culture result: NO GROWTH 5 DAYS              IMPRESSION:   · COVID pneumonia  · Acute hypoxic respiratory failure   · Tobacco use  · Transaminitis   PLAN:   · Continue vent support  · Fentanyl, propofol  · Ativan prn  · Decadron, Remdesivir   · Convalescent plasma pending   · Monitor COVID labs   · Cefepime  · Zinc, Vit C, lipitor   · Cameron  · Therapeutic lovenox  · Replete electrolytes as needed  · Tight glycemic control  · Nutrition: NPO except meds    GLOBAL ISSUES:   · Head of bed elevated  · GI Prophylaxis: Pepcid  · DVT Prophylaxis: Therapeutic lovenox   · ETT placed on 8/10     My assessment/plan will be discussed with Dr. Paul Hudson, ICU Attending Physician.     Sujey Corey DO  Family Medicine Resident, PGY-2

## 2020-08-10 NOTE — PROGRESS NOTES
Name: [facility]: [address]   : 1990 Admit Date: 2020   Phone: [phone]  Room: [facility]/   PCP: Sydney Marsh MD  MRN: [id number]   Date: 8/10/2020  Code: Full Code          Chart and notes reviewed. Data reviewed. I review the patient's current medications in the medical record at each encounter. I have evaluated and examined the patient. History of Present Illness:  Mr. Ying Jimenez is a 26 yo gentleman with a history of tobacco use who is admitted with COVID-19 pneumonia and acute respiratory failure with hypoxia. He was diagnosed about 10 days ago at an urgent care facility with COVID-19. Last Thursday he began to have fevers, inability to take a deep breath that was progressively worse, dry cough, and general malaise/faitigue. He was prescribed azithromycin and prednisone without improvement. Denies n/v, abdominal pain, changes in taste/smell. Hypoxic as low as 76% on RA. Placed on BiPAP yesterday evening due to increased work of breathing.     Labs reviewed: WBC 16.5, d-dimer 0.86, Na 129, cr 0.84, AST 86, , , Procalcitonin 0.33, ferritin 5114, IL6 in lab; crp pending    Images:  CXR with hazy interstitial changes and airspace opacification      PMH: tobacco use    FH: no family history pertinent to presents complaint    SH: +tobacco use    Interval history  Intubated at 3 am  On Veletri  Paralyzed  Currently on PEEP 15, Peak pressure 31  CXR with small apical bilateral pneumothoraxes and pneumomediastinum    Social History     Tobacco Use    Smoking status: Current Some Day Smoker    Smokeless tobacco: Never Used   Substance Use Topics    Alcohol use: Not on file       No Known Allergies    Current Facility-Administered Medications   Medication Dose Route Frequency    propofol (DIPRIVAN) 10 mg/mL infusion  0-50 mcg/kg/min IntraVENous TITRATE    fentaNYL (PF) 1,500 mcg/30 mL (50 mcg/mL) infusion  0-200 mcg/hr IntraVENous TITRATE    cisatracurium (NIMBEX) 200 mg in 0.9% sodium chloride 100 mL (2 mg/mL) infusion  0-10 mcg/kg/min IntraVENous TITRATE    PHENYLephrine (TONNY-SYNEPHRINE) 30 mg in 0.9% sodium chloride 250 mL infusion   mcg/min IntraVENous TITRATE    dexamethasone (PF) (DECADRON) 10 mg/mL injection 6 mg  6 mg IntraVENous DAILY    famotidine (PF) (PEPCID) 20 mg in 0.9% sodium chloride 10 mL injection  20 mg IntraVENous Q12H    atorvastatin (LIPITOR) tablet 20 mg  20 mg Oral QHS    cefepime (MAXIPIME) 2 g in 0.9% sodium chloride (MBP/ADV) 100 mL  2 g IntraVENous Q8H    enoxaparin (LOVENOX) injection 105 mg  105 mg SubCUTAneous Q12H    LORazepam (ATIVAN) injection 0.5 mg  0.5 mg IntraVENous Q6H PRN    epoprostenol (VELETRI) 30 mcg/mL in 0.9% sodium chloride 50 mL inhalation solution  50 ng/kg/min (Ideal) Inhalation CONTINUOUS    morphine injection 1 mg  1 mg IntraVENous Q6H PRN    0.9% sodium chloride infusion 250 mL  250 mL IntraVENous PRN    sodium chloride (NS) flush 5-40 mL  5-40 mL IntraVENous Q8H    sodium chloride (NS) flush 5-40 mL  5-40 mL IntraVENous PRN    acetaminophen (TYLENOL) tablet 650 mg  650 mg Oral Q6H PRN    Or    acetaminophen (TYLENOL) suppository 650 mg  650 mg Rectal Q6H PRN    polyethylene glycol (MIRALAX) packet 17 g  17 g Oral DAILY PRN    promethazine (PHENERGAN) tablet 12.5 mg  12.5 mg Oral Q6H PRN    Or    ondansetron (ZOFRAN) injection 4 mg  4 mg IntraVENous Q6H PRN    azithromycin (ZITHROMAX) 500 mg in 0.9% sodium chloride (MBP/ADV) 250 mL  500 mg IntraVENous Q24H    ascorbic acid (vitamin C) (VITAMIN C) tablet 500 mg  500 mg Oral BID    zinc sulfate (ZINCATE) 220 (50) mg capsule 1 Cap  1 Cap Oral DAILY    albuterol (PROVENTIL HFA, VENTOLIN HFA, PROAIR HFA) inhaler 4 Puff  4 Puff Inhalation Q4H PRN    benzonatate (TESSALON) capsule 100 mg  100 mg Oral TID PRN    remdesivir 100 mg in 0.9% sodium chloride 250 mL IVPB  100 mg IntraVENous Q24H         REVIEW OF SYSTEMS   12 point ROS negative except as stated in the HPI. Physical Exam:   Visit Vitals  /66   Pulse (!) 111   Temp 98.3 °F (36.8 °C)   Resp 22   Ht 5' 11\" (1.803 m)   Wt 108 kg (238 lb)   SpO2 91%   BMI 33.19 kg/m²       General:  Intubated, sedated,paralyzed   Head:  Normocephalic, without obvious abnormality, atraumatic. Eyes:  Conjunctivae/corneas clear. Nose: Nares normal   Throat: Lips, mucosa, and tongue normal.    Neck: Supple, symmetrical, trachea midline   Lungs:   Course bilaterally   Chest wall:  No tenderness or deformity. Heart:  Regular rate and rhythm, S1, S2 normal, no murmur, click, rub or gallop. Abdomen:   Soft, non-tender. Bowel sounds normal. Obese   Extremities: Extremities normal, atraumatic, no cyanosis or edema. Skin: Skin color, texture, turgor normal. No rashes or lesions   Neurologic: Paralyzed, pupils pinpoint       Lab Results   Component Value Date/Time    Sodium 136 08/10/2020 05:55 AM    Potassium 4.2 08/10/2020 05:55 AM    Chloride 104 08/10/2020 05:55 AM    CO2 24 08/10/2020 05:55 AM    BUN 35 (H) 08/10/2020 05:55 AM    Creatinine 1.10 08/10/2020 05:55 AM    Glucose 147 (H) 08/10/2020 05:55 AM    Calcium 8.9 08/10/2020 05:55 AM    Magnesium 2.8 (H) 08/10/2020 05:55 AM    Phosphorus 4.1 08/10/2020 05:55 AM    Lactic acid 1.2 08/06/2020 01:24 PM       Lab Results   Component Value Date/Time    WBC 29.0 (H) 08/10/2020 05:55 AM    HGB 14.9 08/10/2020 05:55 AM    PLATELET 915 88/33/4072 05:55 AM    MCV 91.4 08/10/2020 05:55 AM       Lab Results   Component Value Date/Time    Alk.  phosphatase 97 08/10/2020 05:55 AM    Protein, total 7.1 08/10/2020 05:55 AM    Albumin 2.2 (L) 08/10/2020 05:55 AM    Globulin 4.9 (H) 08/10/2020 05:55 AM       Lab Results   Component Value Date/Time    Ferritin 2,673 (H) 08/09/2020 04:35 AM       Lab Results   Component Value Date/Time    C-Reactive protein 17.50 (H) 08/10/2020 05:55 AM        Lab Results   Component Value Date/Time    PH 7.29 (L) 08/10/2020 05:02 AM    PCO2 48 (H) 08/10/2020 05:02 AM    PO2 92 08/10/2020 05:02 AM    HCO3 23 08/10/2020 05:02 AM    FIO2 100 08/10/2020 05:02 AM       Lab Results   Component Value Date/Time    Troponin-I, Qt. <0.05 08/08/2020 12:13 PM        Lab Results   Component Value Date/Time    Culture result: NO GROWTH AFTER 20 HOURS 08/09/2020 09:24 AM    Culture result: NO GROWTH 5 DAYS 08/05/2020 08:49 PM       No results found for: TOXA1, RPR, HBCM, HBSAG, HAAB, HCAB1, HAAT, G6PD, CRYAC, HIVGT, HIVR, HIV1, HIV12, HIVPC, HIVRPI    No results found for: VANCT, CPK    No results found for: COLOR, APPRN, SPGRU, RAE, PROTU, GLUCU, KETU, BILU, BLDU, UROU, ROBLES, LEUKU, WBCU, RBCU, UEPI, BACTU, CASTS, UCRY    IMPRESSION  · COVID-19 Pneumonia  · Acute respiratory failure with hypoxia  · Elevated LFTs  · Tobacco  use    PLAN  · Continue full vent support  · Continue Zinc, vitamin C  · Stop Azithromycin   · Start cefepime  · Continue yfn  · PICC line ordered  · Continue decadron for 10 days  · Cotinue remdesivir -last day today  · convalescent plasma pending  · Monitor renal function, LFTs, inflammatory markers, d-dimer, lfts  · PRN proventil  · Continue Lovenox therapeutic dosing for now- d-dimer trending up  · NPO except meds until pulmonary status stabilizes  · Full code    Patient is critically ill with worsening acute hypoxic respiratory failure and COVID 19  Pneumonia. CC time EOP 41min. Discussed with nursing and RT.     Jose Orozco MD

## 2020-08-10 NOTE — PROGRESS NOTES
Comprehensive Nutrition Assessment    Type and Reason for Visit: Initial, RD nutrition re-screen/LOS    Nutrition Recommendations/Plan:     1. Recommend initiating nutrition support as pt has been NPO x 5 days. Recommend ordering EN via OGT as medically appropriate:  TF Rec's:  2CalHN at 20ml/hr, increasing by 10ml q8h to goal rate of 40ml/hr while receiving Propofol. Add ProSource TF 4x/day and flush with 160ml h20 q3h. (Goal TF will provide 3175 kcals including 855 kcals from Propofol; 134 gm protein; 2036 ml fluid - this will meet 100% pt's kcal needs, 95% protein needs). If unable to initiate EN, recommend ordering TPN:  TPN Rec's: Day 1: D20, 5% AA at 42ml/hr            Day 2: D20, 5% AA at 63ml/hr            Day 3: Goal of D20, 5% AA at 83ml/hr with 500ml of 20% lipids at 42 ml/hr x 12 hrs  3x/wk. (Goal TPN will provide 3040 kcals including 855kcals from Propofol, 100 gm protein - this will meet 99% kcal needs and 71% protein needs). Nutrition Assessment:      7/7: 28 yo male admitted for respiratory failure with hypoxia. No PMhx. Class I obese per BMI of 33 (based on stated weight). No weight hx available in EMR. Pt has been on Bipap and NPO since admission. Transferred to ICU and was intubated 8/9. Now paralyzed, requiring pressor support, and  being sedated with Propofol. Order for OGT placement. Pt is COVID-19 +. Unable to obtain nutrition hx at this time. Labs reviewed. Meds: Cameron at 50mcg/min, Propofol at 50mcg/kg/min, Nimbex, Remdesivir, Decadron, statin, Vit C, zinc.  Loose BM noted 8/9.      Malnutrition Assessment:  Malnutrition Status:    N/A    Estimated Daily Nutrient Needs:  Energy (kcal):  4521 kcals(PAL 2552 x AF 1.2)  Protein (g):  140-156 gm(1.8-2gm/kg IBW/d)       Fluid (ml/day):  6807 ml(1ml/kg)    Nutrition Related Findings:  N/A      Wounds:    (PI to nose)       Current Nutrition Therapies:   DIET NPO Except Meds    Anthropometric Measures:  · Height:  5' 11\" (180.3 cm)  · Current Body Wt:  107.9 kg (237 lb 14 oz)   · Admission Body Wt:       · Usual Body Wt:        · Ideal Body Wt:   :      · Adjusted Body Weight:   ; Weight Adjustment for: No adjustment   · Adjusted BMI:       · BMI Category:  Obese class 1 (BMI 30.0-34. 9)       Nutrition Diagnosis:   · Inadequate energy intake related to inadequate protein-energy intake as evidenced by intubation, NPO or clear liquid status due to medical condition      Nutrition Interventions:   Food and/or Nutrient Delivery: Start tube feeding  Nutrition Education and Counseling: No recommendations at this time  Coordination of Nutrition Care: Continued inpatient monitoring    Goals:  Initiate EN to meet > 60% EEN's within next 2-3 days       Nutrition Monitoring and Evaluation:   Behavioral-Environmental Outcomes:    Food/Nutrient Intake Outcomes: Enteral nutrition intake/tolerance  Physical Signs/Symptoms Outcomes: GI status, Weight    Discharge Planning:     Too soon to determine     Electronically signed by Иван Moser, [address] [place] [address] on 8/10/2020 at 2:57 PM    Contact: [phone]

## 2020-08-10 NOTE — PROGRESS NOTES
Bill Zepeda [facility] 79  [address] [facility], [address] [place] [address]  [phone]      Medical Progress Note      NAME: Tristan Mead   :  1990  MRM:  [id number]    Date of service: 8/10/2020  2:35 PM       Assessment and Plan:   1. Acute respiratory failure with hypoxia due to COVID 19 pneumonia. Intubated on 8/10. Vent support per intensivist.  CXR 8/10: significantly increased diffuse bilateral airspace  disease. Persistent pneumomediastinum, small biapical pneumothoraces, and subcutaneous emphysema. Remdesivir, dexamethasone, vitamin C, zinc, therapeutic Lovenox, broad spectrum ABx, statin       2.  Elevated liver enzymes: improved. 3.  Obesity:  Body mass index is 33.19 kg/m². Would benefit from weight loss and dietary / lifestyle modifications          Subjective:     Chief Complaint[de-identified] Patient was seen and examined as a follow up for COVID 19. Chart was reviewed. intubated and sedated. I discussed with Dr Reilly Silverio, intensivist. Didn't see pt in person. Objective:     Last 24hrs VS reviewed since prior progress note. Most recent are:    Visit Vitals  /64   Pulse 76   Temp (!) 101.7 °F (38.7 °C)   Resp 22   Ht 5' 11\" (1.803 m)   Wt 108 kg (238 lb)   SpO2 90%   BMI 33.19 kg/m²     SpO2 Readings from Last 6 Encounters:   08/10/20 90%    O2 Flow Rate (L/min): 36 l/min       Intake/Output Summary (Last 24 hours) at 8/10/2020 1435  Last data filed at 8/10/2020 1100  Gross per 24 hour   Intake 1105.38 ml   Output 950 ml   Net 155.38 ml        Physical Exam:    Gen:  Well-developed, well-nourished, in no acute distress   Resp:  No accessory muscle use, intubated  Abd:  Moves with respiration  Neuro:  Intubated and sedated   Psych:   Intubated and sedated  __________________________________________________________________  Medications Reviewed: (see below)  Medications:     Current Facility-Administered Medications   Medication Dose Route Frequency    cisatracurium (NIMBEX) 200 mg in 0.9% sodium chloride 100 mL (2 mg/mL) infusion  0-10 mcg/kg/min IntraVENous TITRATE    PHENYLephrine (TONNY-SYNEPHRINE) 30 mg in 0.9% sodium chloride 250 mL infusion   mcg/min IntraVENous TITRATE    alteplase (CATHFLO) 1 mg in sterile water (preservative free) 1 mL injection  1 mg InterCATHeter PRN    fentaNYL (PF) 1,500 mcg/30 mL (50 mcg/mL) infusion  0-200 mcg/hr IntraVENous TITRATE    propofol (DIPRIVAN) 10 mg/mL infusion  0-50 mcg/kg/min IntraVENous TITRATE    dexamethasone (PF) (DECADRON) 10 mg/mL injection 6 mg  6 mg IntraVENous Q6H    vancomycin (VANCOCIN) 1500 mg in  ml infusion  1,500 mg IntraVENous Q8H    [START ON 8/11/2020] Vancomycin trough 8/11 prior to 1200 dose   Other ONCE    famotidine (PF) (PEPCID) 20 mg in 0.9% sodium chloride 10 mL injection  20 mg IntraVENous Q12H    atorvastatin (LIPITOR) tablet 20 mg  20 mg Oral QHS    cefepime (MAXIPIME) 2 g in 0.9% sodium chloride (MBP/ADV) 100 mL  2 g IntraVENous Q8H    enoxaparin (LOVENOX) injection 105 mg  105 mg SubCUTAneous Q12H    LORazepam (ATIVAN) injection 0.5 mg  0.5 mg IntraVENous Q6H PRN    epoprostenol (VELETRI) 30 mcg/mL in 0.9% sodium chloride 50 mL inhalation solution  50 ng/kg/min (Ideal) Inhalation CONTINUOUS    morphine injection 1 mg  1 mg IntraVENous Q6H PRN    0.9% sodium chloride infusion 250 mL  250 mL IntraVENous PRN    sodium chloride (NS) flush 5-40 mL  5-40 mL IntraVENous Q8H    sodium chloride (NS) flush 5-40 mL  5-40 mL IntraVENous PRN    acetaminophen (TYLENOL) tablet 650 mg  650 mg Oral Q6H PRN    Or    acetaminophen (TYLENOL) suppository 650 mg  650 mg Rectal Q6H PRN    polyethylene glycol (MIRALAX) packet 17 g  17 g Oral DAILY PRN    promethazine (PHENERGAN) tablet 12.5 mg  12.5 mg Oral Q6H PRN    Or    ondansetron (ZOFRAN) injection 4 mg  4 mg IntraVENous Q6H PRN    ascorbic acid (vitamin C) (VITAMIN C) tablet 500 mg  500 mg Oral BID    zinc sulfate (ZINCATE) 220 (50) mg capsule 1 Cap  1 Cap Oral DAILY    benzonatate (TESSALON) capsule 100 mg  100 mg Oral TID PRN    remdesivir 100 mg in 0.9% sodium chloride 250 mL IVPB  100 mg IntraVENous Q24H        Lab Data Reviewed: (see below)  Lab Review:     Recent Labs     08/10/20  0555 08/09/20  0435 08/08/20  0500   WBC 29.0* 20.7* 17.7*   HGB 14.9 15.9 14.5   HCT 44.8 46.4 43.0    336 325     Recent Labs     08/10/20  0555 08/09/20  0435 08/08/20  0500    134* 135*   K 4.2 4.2 4.2    100 101   CO2 24 25 25   * 148* 135*   BUN 35* 26* 22*   CREA 1.10 0.91 0.91   CA 8.9 9.5 8.9   MG 2.8* 2.8* 2.5*   PHOS 4.1 4.4 5.3*   ALB 2.2* 2.8* 2.5*   TBILI 2.0* 0.9 0.7   ALT 51 94* 123*     No results found for: GLUCPOC  Recent Labs     08/10/20  1359 08/10/20  0502 08/09/20  2333   PH 7.36 7.29* 7.45   PCO2 38 48* 34*   PO2 91 92 68*   HCO3 21* 23 23   FIO2 100 100 100     No results for input(s): INR, INREXT in the last 72 hours. All Micro Results     Procedure Component Value Units Date/Time    CULTURE, BLOOD, PAIRED [[id number]] Collected:  08/05/20 2049    Order Status:  Completed Specimen:  Blood Updated:  08/10/20 0608     Special Requests: NO SPECIAL REQUESTS        Culture result: NO GROWTH 5 DAYS       CULTURE, BLOOD [[id number]] Collected:  08/09/20 0924    Order Status:  Completed Specimen:  Blood Updated:  08/10/20 0608     Special Requests: NO SPECIAL REQUESTS        Culture result: NO GROWTH AFTER 20 HOURS       CULTURE, RESPIRATORY/SPUTUM/BRONCH Brooke Pimple STAIN [[id number]]     Order Status:  Sent Specimen:  Sputum     C. DIFFICILE AG & TOXIN A/B [[id number]] Collected:  08/08/20 1213    Order Status:  Completed Specimen:  Stool Updated:  08/09/20 0927     GD ANTIGEN Negative        C. difficile toxin Negative        INTERPRETATION       NEGATIVE FOR TOXIGENIC C. DIFFICILE                I have reviewed notes of prior 24hr. Other pertinent lab:      Total time spent with patient: 30 Minutes                  Care Plan discussed with: Family, Nursing Staff, Consultant/Specialist and >50% of time spent in counseling and coordination of care    Discussed:  Care Plan    Prophylaxis:  Lovenox    Disposition:  prognosis: guarded            ___________________________________________________    Attending Physician: Pancho Hopkins MD

## 2020-08-10 NOTE — PROGRESS NOTES
PICC Placement Note    PRE-PROCEDURE VERIFICATION  Correct Procedure: yes  Correct Site:  yes  Temperature: Temp: (!) 101.5 °F (38.6 °C), Temperature Source: Temp Source: Axillary  Recent Labs     08/10/20  0555   BUN 35*   CREA 1.10      WBC 29.0*     Allergies: Patient has no known allergies. Education materials for PICC Care given: yes. See Patient Education activity for further details. PICC Booklet placed at bedside: yes    Closed Ended PICC Catheters:  Flush Lumens as Follows:  Intermittent Medication:   Flush before and after each medication with 10 ml NS. Unused Ports:  Flush every 8 hours with 10 ml NS.  TPN Ports:  Flush every 24 hours with 20 ml NS prior to hanging new bag. Blood Draws: Stop infusion, draw off and waste 10 ml of blood. Draw sample with 10cc syringe or greater. DO NOT USE VACUTAINER . Transfer with appropriate device to lab  tubes. Flush with 20 ml NS. Dressing Change:  Every 7 days, and PRN using sterile technique if integrity of dressing is compromised. Initial dressing change for central line 24-48 hours post insertion if gauze is used. Apply new dressing per policy. PROCEDURE DETAIL  Consent was obtained and all questions were answered related to risks and benefits. A triple lumen PICC line was inserted, as a sterile procedure using ultrasound and modified Seldinger technique for vascular access. The following documentation is in addition to the PICC properties in the lines/airways flowsheet :  Lot #: [id number]  Lidocaine 1% administered intradermally :yes  Internal Catheter Total Length: 45 (cm)  Vein Selection for PICC:left basilic  Central Line Bundle followed yes  Complication Related to Insertion:no    The placement was verified by EKG, MAX P WAVE @ 45 (cm). PER EKG PICC TIP @ C/A junction.       X-ray: no.     Line is okay to use: yes    Foster Kerns RN

## 2020-08-10 NOTE — PROGRESS NOTES
Called by ICU for intubation in patient with worsening hypoxia  Pt evaluated at bedside, COVID pos in isolation sats 85-88% on Bipap, tachypneic  CHart reviewed  Attempted to preoxygenate but patient began to desaturate  RSI with 120mg Propofol and 120mg Succinylcholine IV  Glidescope intubation and placement of #8 ETT  Pos ETCO2 and b/l chest rise  CHest Xray pending  O2 sats dropped to 70s during intubation but returned to high 80s after tube placement and ambu bag ventilation    CHest X ray pending    Jose Lee MD

## 2020-08-10 NOTE — PROGRESS NOTES
0830- Bedside and Verbal shift change report given to Kelley Bernal RN (oncoming nurse) by Yessi Zavala RN (offgoing nurse). Report included the following information SBAR, Kardex, Intake/Output, MAR, Recent Results and Cardiac Rhythm sinus tach. 0900- assessment complete. Pt paralyzed, no twitches noted will titrate Nimbex appropriately. Pt remains on Cameron @150, changed to 100 d/t order. Nimbex @ 1.5, propofol @ 50. Lines assessed for patency, no issues noted. Pt turned and repositioned, cooling blanket placed under pt for temp of 101.7,linens changed,  OGT placed per Dr. Reilly Silverio order- waiting on Xray for confirmation of placement. Pt unable to communicate d/t parlayzed and intubated. Unable to follow commands, move extremities. Pt pupils very sluggish, pt has zero twitches noted. Titrated Nimbex to 0.5, pt then became more alert, pt attempting to open eyes on his own, 4 twitches noted. Nimbex titrated to 1. PICC team at bedside for PICC placement. 1200- reassessment complete. PICC line placed at this time. Spoke to mother, gave update. Reassessment complete. 2 twitches noted. 1300- TOF assessed. See flowsheet. 1400- TOF assessed. See flowsheet. 1552- called blood bank to have plasma thawed. Cooling blanket turned off of patient. 1700- pt repositioned. TOF noted. 1900- Bedside and Verbal shift change report given to Joe Grimaldo RN (oncoming nurse) by Kelley Bernal RN (offgoing nurse). Report included the following information SBAR, Kardex, Intake/Output, MAR, Recent Results and Cardiac Rhythm NSR.

## 2020-08-10 NOTE — PROGRESS NOTES
Spiritual Care Assessment/Progress Note  [address]      NAME: Maikol Harry      MRN: [id number]  AGE: 27 y.o. SEX: male  Scientologist Affiliation:    Language: English     8/10/2020     Total Time (in minutes): 30     Spiritual Assessment begun in [facility] INTENSIVE CARE through conversation with:         [x]Patient        [x] Family    [] Friend(s)        Reason for Consult: Initial/Spiritual assessment, critical care     Spiritual beliefs: (Please include comment if needed)     [x] Identifies with a eloisa tradition: Presbyterian         [] Supported by a eloisa community:            [] Claims no spiritual orientation:           [] Seeking spiritual identity:                [] Adheres to an individual form of spirituality:           [] Not able to assess:                           Identified resources for coping:      [x] Prayer                               [] Music                  [] Guided Imagery     [] Family/friends                 [] Pet visits     [] Devotional reading                         [] Unknown     [] Other:                                           Interventions offered during this visit: (See comments for more details)    Patient Interventions: Other (comment)(Unable to assess)     Family/Friend(s):  Affirmation of emotions/emotional suffering, Catharsis/review of pertinent events in supportive environment, Prayer (assurance of), Prayer (actual), Normalization of emotional/spiritual concerns     Plan of Care:     [] Support spiritual and/or cultural needs    [] Support AMD and/or advance care planning process      [] Support grieving process   [] Coordinate Rites and/or Rituals    [] Coordination with community clergy   [] No spiritual needs identified at this time   [] Detailed Plan of Care below (See Comments)  [] Make referral to Music Therapy  [] Make referral to Pet Therapy     [] Make referral to Addiction services  [] Make referral to [facility]  [] Make referral to Spiritual Care Partner  [] No future visits requested        [x] Follow up visits as needed     Comments:  visited Mr. James Mckenzie for an initial spiritual assessment in the ICU. Mr. James Mckenzie is on contact precautions, intubated, and sedated. Consulted with his nurse who suggested the family could use some additional support. While on the unit,  called Mr. Cochran's mother, Shirleyann Galeazzi, introduced herself and extended support. Shirleyann Galeazzi was very tearful and seemed to be in great distress. She shared that this is her baby boy in our ICU and her older son is in [place] dealing with health challenged of his own.  affirmed her feelings and acknowledged the difficulty of the situation. Continued to provide emotional support as Shirleyann Galeazzi shared her fears, feelings of uncertainty, and her hopes for her son. She tearfully shared about the community of friends and family who are surrounding her during this time and expressed gratitude for such great support. She shared that numerous prayers are being spoken on her son's behalf.  inquired how she could support her son during this time. Mercedes shared that Mr. James Mckenzie is [place] and requested chaplains continue to pray for him.  provided prayer outside of Mr. Zhanna Soares room. Mercedes thanked the  for her call and is aware of the 's availability. 's will follow up as able and/or needed  [employer]. Jaron Marc.      Paging Service: [phone])

## 2020-08-10 NOTE — PROGRESS NOTES
[address] [address] Pharmacy Dosing Services: Antimicrobial Stewardship Daily Doc 8/10/2020    Consult for antibiotic dosing of Vancomycin by Dr. Babita Cassidy  Indication: Worsening bilat PNA, leukocytosis and fevers in setting of COVID-19 infection  Day of Therapy 1    Ht Readings from Last 1 Encounters:   08/05/20 180.3 cm (71\")        Wt Readings from Last 1 Encounters:   08/05/20 108 kg (238 lb)      Vancomycin therapy:  Current maintenance dose: Initial dosing  Dose calculated to approximate a therapeutic trough of 15-20 mcg/mL. Last trough level: Initial dosing   Plan for level / Adjustment in Therapy: Patient does not have a vancomycin trough history in CC. Will give 2500 mg (~23 mg/kg) loading dose followed by 1500 mg Q8 hrs in setting of good UOP, stable creatinine, age and CrCl >100 ml/min. Calculated kinetics predict a trough of ~16 mcg/ml on this regimen AUC/. Plan for trough prior to 4th total dose on this regimen. Dose administration notes:   Doses given appropriately as scheduled    Date Dose & Interval Measured (mcg/mL) Extrapolated (mcg/mL)   ? ? ? ?   ? ? ? ?   ? ? ? ? Other Antimicrobial   (not dosed by pharmacist) Cefepime 2 grams Q8 hrs (CrCl >100 ml/min)   Cultures 8/9 Blood: NGTD, Prelim    8/5 Blood (paired): NG, Final  8/8 C. Diff: Negative   Serum Creatinine Lab Results   Component Value Date/Time    Creatinine 1.10 08/10/2020 05:55 AM         Creatinine Clearance Estimated Creatinine Clearance: 122.8 mL/min (based on SCr of 1.1 mg/dL).      Temp Temp: (!) 101.7 °F (38.7 °C)       WBC Lab Results   Component Value Date/Time    WBC 29.0 (H) 08/10/2020 05:55 AM        H/H Lab Results   Component Value Date/Time    HGB 14.9 08/10/2020 05:55 AM        Platelets    Lab Results   Component Value Date/Time    PLATELET 794 16/17/9262 05:55 AM          Thanks,  Pharmacist Ruthie Alcantar, PHARMD Contact information: [phone]

## 2020-08-10 NOTE — PROGRESS NOTES
1900 - Bedside and Verbal shift change report given to Chelo Jansen (oncoming nurse) by Marcelo Schuler RN (offgoing nurse). Report included the following information SBAR, Kardex, ED Summary, Intake/Output, MAR, Recent Results and Cardiac Rhythm NSR. Primary Nurse Le Jacobo and Marcelo Schuler RN performed a dual skin assessment on this patient Impairment noted- see wound doc flow sheet (bridge of nose). Enrique score is 11.  1942 - MAP < 65, Cameron increased to 15 mcg/min. 2000 - Shift assessment completed. See flow sheet. Patient withdraws from pain. No eye opening to any stimulus at this time. Cameron infusing at 15 mcg/min, Fentanyl at 100 mcg/hr, Propofol at 50 mcg/kg/min, and Nimbex at 1 mcg/kg/min. Mcwilliams in place and draining. Mcwilliams care done. Mouth care and suctioning completed. Patient turned and repositioned, heel boots on.  2100 - Patient becoming restless, coughing with suction and reaching for ETT. Fentanyl increased to 125 mcg/hr. 2127 - Patient continues to cough with suctioning. Patient now moving all limbs, reaching for ETT. When reorienting patient to place and situation, patient nods heads. Nimbex increased to 1.5 mcg/kg/min. 2140 - Patient increasingly restless. Fentanyl increased to 150 mcg/hr. Nimbex increased to 2 mcg/kg/min. 2200 -  Mouth care and suctioning completed. Patient turned and repositioned, heel boots on.  2257 - MAP < 65, Cameron increased to 20 mcg/min. 0000 - Reassessment completed. Changes documented on flow sheet. Mouth care and suctioning completed. Patient turned and repositioned, heel boots on.  0103 - MAP < 65, Cameron increased to 25 mcg/min.  0200 -  Mouth care and suctioning completed. Patient turned and repositioned, heel boots on.  0300 - Temp 100 (bladder). Cooling blanket turned on.  0400 - Reassessment completed. No changes to previous assessment. Mouth care and suctioning completed. Patient turned and repositioned, heel boots on.  0500 - AM labs drawn.   0600 - Mouth care and suctioning completed. Patient turned and repositioned, heel boots on.  0705 - Bedside and Verbal shift change report given to Martha Irene RN (oncoming nurse) by Kapil Johns RN (offgoing nurse). Report included the following information SBAR, Kardex, ED Summary, Intake/Output, MAR, Recent Results and Cardiac Rhythm NSR.

## 2020-08-10 NOTE — PROGRESS NOTES
2000 Bedside and Verbal shift change report given to Loi Rivera RN (oncoming nurse) by Estefani Singh, RN (offgoing nurse). Report included the following information SBAR, Kardex, ED Summary, Procedure Summary, Intake/Output, MAR, and Recent Results. Primary Nurse Rohan Euceda RN and Estefani Singh, RN., performed a dual skin assessment on this patient No impairment noted. Enrique score is 15. Attempted to get patient to cough up a sputum sample into cup for lab. Pt unable to expectorate enough to send at this time. Will attempt again later. Pt. Coughs frequently and repositions mask causing alarm to sound. 2030 RT to bedside to assess patient and hang Veletri. Pt very anxious and cannot be restrained due to BIPAP. Per nursing supervisor no sitter available at this time. Pt. Unable to prone at this time. Preferred to sit up.  0000 Since last note patient has been c/o pressure in lungs and morphine was administered. Patient was anxious and ativan was administered. HR continues to elevate to 140 in ST. Jarod ABG and ordered CXR. Still awaiting CXR and ABG's resulted. Paged Pulmonary to update on situation. 3660 Did EKG showing ST and placed on the chart. 56 Talked with Dr. Kamilla Coronado and updated him on patient using SBAR format. Informed MD of Prior CXR results. Reviewed ABG and still awaiting CXR to be done. Per MD he does not want to order anymore anxiety medication at this time. Per MD patient HR up to 140 and RR to 40. Sat upper 80's to 92%. No new orders at this time. Will continue to monitor and await. CXR to be done. Will continue to monitor. 0300 Since last note patient very agitated and pulling at lines and BIPAP. Pt pulled BIPAP apart from machine. RT and RN to bedside. Patient very agitated. Anesthesia called to intubate patient. Pt Sat's low, Tacky and hypotensive. Dr. Nichelle Suggs pushed 120 mg Propofol, 120 sucks and 50 of [place]. See MD note. After intubation patient very restless.  Started Propofol, Cameron for low BP, Fentanyl, and Nimbex. Neuro checks and TOF performed per protocol. See TOF and neuro check documentation. Placed TOF on patient forehead. Started a new IV and thao labs. Spoke with pharmacy and rescheduled IV antibiotics now that a new line is available. Tube needed to be advanced per CXR advance done and Diane Vilchis called and aware. Repeat CXR performed. 0700 Since intubation patient placed on cooling blanket. Temp 101. Will continue to monitor. Spoke with Dr. Alex Gao at patient bedside and updated MD on last nights events and gave report to Prachi Montanez and verified medication. 0830 Called and spoke with patients mother, Gerardine Dys, and updated her on situation and answered questions.

## 2020-08-11 ENCOUNTER — APPOINTMENT (OUTPATIENT)
Dept: GENERAL RADIOLOGY | Age: 30
DRG: 870 | End: 2020-08-11
Attending: INTERNAL MEDICINE
Payer: COMMERCIAL

## 2020-08-11 ENCOUNTER — APPOINTMENT (OUTPATIENT)
Dept: GENERAL RADIOLOGY | Age: 30
DRG: 870 | End: 2020-08-11
Attending: RADIOLOGY
Payer: COMMERCIAL

## 2020-08-11 ENCOUNTER — APPOINTMENT (OUTPATIENT)
Dept: INTERVENTIONAL RADIOLOGY/VASCULAR | Age: 30
DRG: 870 | End: 2020-08-11
Attending: INTERNAL MEDICINE
Payer: COMMERCIAL

## 2020-08-11 LAB
ALBUMIN SERPL-MCNC: 2 G/DL (ref 3.5–5)
ALBUMIN/GLOB SERPL: 0.4 {RATIO} (ref 1.1–2.2)
ALP SERPL-CCNC: 77 U/L (ref 45–117)
ALT SERPL-CCNC: 34 U/L (ref 12–78)
ANION GAP SERPL CALC-SCNC: 4 MMOL/L (ref 5–15)
ARTERIAL PATENCY WRIST A: YES
AST SERPL-CCNC: 19 U/L (ref 15–37)
BASE DEFICIT BLDA-SCNC: 1.7 MMOL/L
BDY SITE: NORMAL
BILIRUB SERPL-MCNC: 0.7 MG/DL (ref 0.2–1)
BLD PROD TYP BPU: NORMAL
BPU ID: NORMAL
BUN SERPL-MCNC: 19 MG/DL (ref 6–20)
BUN/CREAT SERPL: 32 (ref 12–20)
CALCIUM SERPL-MCNC: 8.4 MG/DL (ref 8.5–10.1)
CHLORIDE SERPL-SCNC: 106 MMOL/L (ref 97–108)
CO2 SERPL-SCNC: 28 MMOL/L (ref 21–32)
CREAT SERPL-MCNC: 0.59 MG/DL (ref 0.7–1.3)
CRP SERPL HS-MCNC: >9.5 MG/L
D DIMER PPP FEU-MCNC: 0.81 MG/L FEU (ref 0–0.65)
DATE LAST DOSE: NORMAL
EPAP/CPAP/PEEP, PAPEEP: 15
ERYTHROCYTE [DISTWIDTH] IN BLOOD BY AUTOMATED COUNT: 12.4 % (ref 11.5–14.5)
FIO2 ON VENT: 90 %
GAS FLOW.O2 SETTING OXYMISER: 22 L/MIN
GLOBULIN SER CALC-MCNC: 4.5 G/DL (ref 2–4)
GLUCOSE SERPL-MCNC: 122 MG/DL (ref 65–100)
HCO3 BLDA-SCNC: 23 MMOL/L (ref 22–26)
HCT VFR BLD AUTO: 39 % (ref 36.6–50.3)
HGB BLD-MCNC: 12.6 G/DL (ref 12.1–17)
LDH SERPL L TO P-CCNC: 397 U/L (ref 85–241)
M TB IFN-G BLD-IMP: ABNORMAL
MCH RBC QN AUTO: 30 PG (ref 26–34)
MCHC RBC AUTO-ENTMCNC: 32.3 G/DL (ref 30–36.5)
MCV RBC AUTO: 92.9 FL (ref 80–99)
NRBC # BLD: 0 K/UL (ref 0–0.01)
NRBC BLD-RTO: 0 PER 100 WBC
PCO2 BLDA: 39 MMHG (ref 35–45)
PH BLDA: 7.39 [PH] (ref 7.35–7.45)
PLATELET # BLD AUTO: 263 K/UL (ref 150–400)
PMV BLD AUTO: 10.8 FL (ref 8.9–12.9)
PO2 BLDA: 81 MMHG (ref 80–100)
POTASSIUM SERPL-SCNC: 4.3 MMOL/L (ref 3.5–5.1)
PROT SERPL-MCNC: 6.5 G/DL (ref 6.4–8.2)
QUANTIFERON CRITERIA, QFI1T: ABNORMAL
QUANTIFERON MITOGEN VALUE: 0.04 IU/ML
QUANTIFERON NIL VALUE: 0.01 IU/ML
QUANTIFERON TB1 AG: 0.02 IU/ML
QUANTIFERON TB2 AG: 0.01 IU/ML
RBC # BLD AUTO: 4.2 M/UL (ref 4.1–5.7)
REPORTED DOSE,DOSE: NORMAL UNITS
REPORTED DOSE/TIME,TMG: NORMAL
SAO2 % BLD: 96 % (ref 92–97)
SAO2% DEVICE SAO2% SENSOR NAME: NORMAL
SODIUM SERPL-SCNC: 138 MMOL/L (ref 136–145)
SPECIMEN SITE: NORMAL
STATUS OF UNIT,%ST: NORMAL
UNIT DIVISION, %UDIV: 0
VANCOMYCIN TROUGH SERPL-MCNC: 7.6 UG/ML (ref 5–10)
VENTILATION MODE VENT: NORMAL
VT SETTING VENT: 45 ML
WBC # BLD AUTO: 11.5 K/UL (ref 4.1–11.1)

## 2020-08-11 PROCEDURE — 94644 CONT INHLJ TX 1ST HOUR: CPT

## 2020-08-11 PROCEDURE — 74011250637 HC RX REV CODE- 250/637: Performed by: INTERNAL MEDICINE

## 2020-08-11 PROCEDURE — 82803 BLOOD GASES ANY COMBINATION: CPT

## 2020-08-11 PROCEDURE — 85027 COMPLETE CBC AUTOMATED: CPT

## 2020-08-11 PROCEDURE — 32556 INSERT CATH PLEURA W/O IMAGE: CPT

## 2020-08-11 PROCEDURE — 85379 FIBRIN DEGRADATION QUANT: CPT

## 2020-08-11 PROCEDURE — C1769 GUIDE WIRE: HCPCS

## 2020-08-11 PROCEDURE — 94003 VENT MGMT INPAT SUBQ DAY: CPT

## 2020-08-11 PROCEDURE — 80053 COMPREHEN METABOLIC PANEL: CPT

## 2020-08-11 PROCEDURE — 94645 CONT INHLJ TX EACH ADDL HOUR: CPT

## 2020-08-11 PROCEDURE — 36600 WITHDRAWAL OF ARTERIAL BLOOD: CPT

## 2020-08-11 PROCEDURE — 80202 ASSAY OF VANCOMYCIN: CPT

## 2020-08-11 PROCEDURE — 36415 COLL VENOUS BLD VENIPUNCTURE: CPT

## 2020-08-11 PROCEDURE — C1894 INTRO/SHEATH, NON-LASER: HCPCS

## 2020-08-11 PROCEDURE — 71045 X-RAY EXAM CHEST 1 VIEW: CPT

## 2020-08-11 PROCEDURE — 74011000258 HC RX REV CODE- 258: Performed by: INTERNAL MEDICINE

## 2020-08-11 PROCEDURE — 77030012390 HC DRN CHST BTL GTNG -B

## 2020-08-11 PROCEDURE — 74011250636 HC RX REV CODE- 250/636: Performed by: INTERNAL MEDICINE

## 2020-08-11 PROCEDURE — 83615 LACTATE (LD) (LDH) ENZYME: CPT

## 2020-08-11 PROCEDURE — 74011000250 HC RX REV CODE- 250: Performed by: INTERNAL MEDICINE

## 2020-08-11 PROCEDURE — C1729 CATH, DRAINAGE: HCPCS

## 2020-08-11 PROCEDURE — 0W9B30Z DRAINAGE OF LEFT PLEURAL CAVITY WITH DRAINAGE DEVICE, PERCUTANEOUS APPROACH: ICD-10-PCS | Performed by: RADIOLOGY

## 2020-08-11 PROCEDURE — 86141 C-REACTIVE PROTEIN HS: CPT

## 2020-08-11 PROCEDURE — 65610000006 HC RM INTENSIVE CARE

## 2020-08-11 RX ORDER — MIDAZOLAM IN 0.9 % SOD.CHLORID 1 MG/ML
0-20 PLASTIC BAG, INJECTION (ML) INTRAVENOUS
Status: DISCONTINUED | OUTPATIENT
Start: 2020-08-11 | End: 2020-08-17

## 2020-08-11 RX ADMIN — Medication 500 MG: at 08:39

## 2020-08-11 RX ADMIN — Medication 10 ML: at 13:26

## 2020-08-11 RX ADMIN — VANCOMYCIN HYDROCHLORIDE 1500 MG: 10 INJECTION, POWDER, LYOPHILIZED, FOR SOLUTION INTRAVENOUS at 03:37

## 2020-08-11 RX ADMIN — EPOPROSTENOL 50 NG/KG/MIN: 1.5 INJECTION, POWDER, LYOPHILIZED, FOR SOLUTION INTRAVENOUS at 23:22

## 2020-08-11 RX ADMIN — PROPOFOL 30 MCG/KG/MIN: 10 INJECTION, EMULSION INTRAVENOUS at 19:27

## 2020-08-11 RX ADMIN — DEXAMETHASONE SODIUM PHOSPHATE 6 MG: 10 INJECTION, SOLUTION INTRAMUSCULAR; INTRAVENOUS at 17:31

## 2020-08-11 RX ADMIN — SODIUM CHLORIDE 2 MCG/KG/MIN: 900 INJECTION, SOLUTION INTRAVENOUS at 18:19

## 2020-08-11 RX ADMIN — DEXAMETHASONE SODIUM PHOSPHATE 6 MG: 10 INJECTION, SOLUTION INTRAMUSCULAR; INTRAVENOUS at 11:33

## 2020-08-11 RX ADMIN — PHENYLEPHRINE HYDROCHLORIDE 20 MCG/MIN: 10 INJECTION INTRAVENOUS at 00:03

## 2020-08-11 RX ADMIN — ENOXAPARIN SODIUM 105 MG: 120 INJECTION SUBCUTANEOUS at 21:11

## 2020-08-11 RX ADMIN — MIDAZOLAM HYDROCHLORIDE 6 MG/HR: 5 INJECTION, SOLUTION INTRAMUSCULAR; INTRAVENOUS at 17:29

## 2020-08-11 RX ADMIN — VANCOMYCIN HYDROCHLORIDE 1750 MG: 10 INJECTION, POWDER, LYOPHILIZED, FOR SOLUTION INTRAVENOUS at 19:27

## 2020-08-11 RX ADMIN — MIDAZOLAM HYDROCHLORIDE 6 MG/HR: 5 INJECTION, SOLUTION INTRAMUSCULAR; INTRAVENOUS at 21:05

## 2020-08-11 RX ADMIN — Medication 10 ML: at 21:11

## 2020-08-11 RX ADMIN — EPOPROSTENOL 50 NG/KG/MIN: 1.5 INJECTION, POWDER, LYOPHILIZED, FOR SOLUTION INTRAVENOUS at 10:53

## 2020-08-11 RX ADMIN — DEXAMETHASONE SODIUM PHOSPHATE 6 MG: 10 INJECTION, SOLUTION INTRAMUSCULAR; INTRAVENOUS at 05:00

## 2020-08-11 RX ADMIN — Medication 500 MG: at 17:31

## 2020-08-11 RX ADMIN — Medication 150 MCG/HR: at 03:10

## 2020-08-11 RX ADMIN — DEXAMETHASONE SODIUM PHOSPHATE 6 MG: 10 INJECTION, SOLUTION INTRAMUSCULAR; INTRAVENOUS at 23:49

## 2020-08-11 RX ADMIN — CEFEPIME HYDROCHLORIDE 2 G: 2 INJECTION, POWDER, FOR SOLUTION INTRAVENOUS at 18:18

## 2020-08-11 RX ADMIN — PROPOFOL 50 MCG/KG/MIN: 10 INJECTION, EMULSION INTRAVENOUS at 04:26

## 2020-08-11 RX ADMIN — PROPOFOL 50 MCG/KG/MIN: 10 INJECTION, EMULSION INTRAVENOUS at 01:17

## 2020-08-11 RX ADMIN — Medication 200 MCG/HR: at 12:24

## 2020-08-11 RX ADMIN — Medication 10 ML: at 05:01

## 2020-08-11 RX ADMIN — CEFEPIME HYDROCHLORIDE 2 G: 2 INJECTION, POWDER, FOR SOLUTION INTRAVENOUS at 08:48

## 2020-08-11 RX ADMIN — CEFEPIME HYDROCHLORIDE 2 G: 2 INJECTION, POWDER, FOR SOLUTION INTRAVENOUS at 00:43

## 2020-08-11 RX ADMIN — DEXAMETHASONE SODIUM PHOSPHATE 6 MG: 10 INJECTION, SOLUTION INTRAMUSCULAR; INTRAVENOUS at 00:06

## 2020-08-11 RX ADMIN — FAMOTIDINE 20 MG: 10 INJECTION, SOLUTION INTRAVENOUS at 21:11

## 2020-08-11 RX ADMIN — FAMOTIDINE 20 MG: 10 INJECTION, SOLUTION INTRAVENOUS at 08:39

## 2020-08-11 RX ADMIN — EPOPROSTENOL 50 NG/KG/MIN: 1.5 INJECTION, POWDER, LYOPHILIZED, FOR SOLUTION INTRAVENOUS at 04:00

## 2020-08-11 RX ADMIN — PROPOFOL 50 MCG/KG/MIN: 10 INJECTION, EMULSION INTRAVENOUS at 07:36

## 2020-08-11 RX ADMIN — Medication 1 CAPSULE: at 08:39

## 2020-08-11 RX ADMIN — ENOXAPARIN SODIUM 105 MG: 120 INJECTION SUBCUTANEOUS at 08:39

## 2020-08-11 RX ADMIN — PROPOFOL 30 MCG/KG/MIN: 10 INJECTION, EMULSION INTRAVENOUS at 13:44

## 2020-08-11 RX ADMIN — MIDAZOLAM HYDROCHLORIDE 1 MG/HR: 5 INJECTION, SOLUTION INTRAMUSCULAR; INTRAVENOUS at 09:09

## 2020-08-11 RX ADMIN — VANCOMYCIN HYDROCHLORIDE 1500 MG: 10 INJECTION, POWDER, LYOPHILIZED, FOR SOLUTION INTRAVENOUS at 13:25

## 2020-08-11 RX ADMIN — EPOPROSTENOL 50 NG/KG/MIN: 1.5 INJECTION, POWDER, LYOPHILIZED, FOR SOLUTION INTRAVENOUS at 17:00

## 2020-08-11 RX ADMIN — PROPOFOL 25 MCG/KG/MIN: 10 INJECTION, EMULSION INTRAVENOUS at 21:45

## 2020-08-11 RX ADMIN — Medication 200 MCG/HR: at 20:17

## 2020-08-11 RX ADMIN — ATORVASTATIN CALCIUM 20 MG: 20 TABLET, FILM COATED ORAL at 21:10

## 2020-08-11 NOTE — PROGRESS NOTES
On my review of CXR left PTX noted to be extending. Discussed case with Dr. Shirin Allen who will place left sided pigtail this morning. Updated Mrs Gloria Wallace (mother) over the phone and explained the procedure as well as the risks. Answered all her questions. She consented to the chest tube.  I will call her again later this morning to update her again

## 2020-08-11 NOTE — PROGRESS NOTES
Bill Zepeda [facility] 79  [address] [facility], [address] [place] [address]  [phone]      Medical Progress Note      NAME: Joanna Blake   :  1990  MRM:  [id number]    Date of service: 2020  2:35 PM       Assessment and Plan:   1. Acute respiratory failure with hypoxia due to COVID 19 pneumonia. Intubated on 8/10. Vent support per intensivist.  CXR 8/10: significantly increased diffuse bilateral airspace  disease. Persistent pneumomediastinum, small biapical pneumothoraces, and subcutaneous emphysema. Remdesivir(finished on 8/10) and convalesce plasma on 8/10. Continue  dexamethasone, vitamin C, zinc, therapeutic Lovenox, broad spectrum ABx, statin. Poor prognosis.        2.  Elevated liver enzymes: improved. 3.  Obesity:  Body mass index is 33.19 kg/m². Would benefit from weight loss and dietary / lifestyle modifications          Subjective:     Chief Complaint[de-identified] Patient was seen and examined as a follow up for COVID 19. Chart was reviewed. intubated and sedated. I discussed with Dr Paul Hudson, intensivist. Didn't see pt in person. Objective:     Last 24hrs VS reviewed since prior progress note. Most recent are:    Visit Vitals  /50   Pulse (!) 54   Temp 97.7 °F (36.5 °C)   Resp 22   Ht 5' 11\" (1.803 m)   Wt 108 kg (238 lb)   SpO2 94%   BMI 33.19 kg/m²     SpO2 Readings from Last 6 Encounters:   20 94%    O2 Flow Rate (L/min): 36 l/min       Intake/Output Summary (Last 24 hours) at 2020 1336  Last data filed at 2020 1200  Gross per 24 hour   Intake 4206.74 ml   Output 2385 ml   Net 1821.74 ml        Physical Exam:    Gen:  Well-developed, well-nourished, in no acute distress   Resp:  No accessory muscle use, intubated  Abd:  Moves with respiration  Neuro:  Intubated and sedated   Psych:   Intubated and sedated  __________________________________________________________________  Medications Reviewed: (see below)  Medications:     Current Facility-Administered Medications   Medication Dose Route Frequency    midazolam in normal saline (VERSED) 1 mg/mL infusion  0-4 mg/hr IntraVENous TITRATE    cisatracurium (NIMBEX) 200 mg in 0.9% sodium chloride 100 mL (2 mg/mL) infusion  0-10 mcg/kg/min IntraVENous TITRATE    PHENYLephrine (TONNY-SYNEPHRINE) 30 mg in 0.9% sodium chloride 250 mL infusion   mcg/min IntraVENous TITRATE    alteplase (CATHFLO) 1 mg in sterile water (preservative free) 1 mL injection  1 mg InterCATHeter PRN    fentaNYL (PF) 1,500 mcg/30 mL (50 mcg/mL) infusion  0-200 mcg/hr IntraVENous TITRATE    propofol (DIPRIVAN) 10 mg/mL infusion  0-50 mcg/kg/min IntraVENous TITRATE    dexamethasone (PF) (DECADRON) 10 mg/mL injection 6 mg  6 mg IntraVENous Q6H    vancomycin (VANCOCIN) 1500 mg in  ml infusion  1,500 mg IntraVENous Q8H    0.9% sodium chloride infusion 250 mL  250 mL IntraVENous PRN    famotidine (PF) (PEPCID) 20 mg in 0.9% sodium chloride 10 mL injection  20 mg IntraVENous Q12H    atorvastatin (LIPITOR) tablet 20 mg  20 mg Oral QHS    cefepime (MAXIPIME) 2 g in 0.9% sodium chloride (MBP/ADV) 100 mL  2 g IntraVENous Q8H    enoxaparin (LOVENOX) injection 105 mg  105 mg SubCUTAneous Q12H    LORazepam (ATIVAN) injection 0.5 mg  0.5 mg IntraVENous Q6H PRN    epoprostenol (VELETRI) 30 mcg/mL in 0.9% sodium chloride 50 mL inhalation solution  50 ng/kg/min (Ideal) Inhalation CONTINUOUS    morphine injection 1 mg  1 mg IntraVENous Q6H PRN    0.9% sodium chloride infusion 250 mL  250 mL IntraVENous PRN    sodium chloride (NS) flush 5-40 mL  5-40 mL IntraVENous Q8H    sodium chloride (NS) flush 5-40 mL  5-40 mL IntraVENous PRN    acetaminophen (TYLENOL) tablet 650 mg  650 mg Oral Q6H PRN    Or    acetaminophen (TYLENOL) suppository 650 mg  650 mg Rectal Q6H PRN    polyethylene glycol (MIRALAX) packet 17 g  17 g Oral DAILY PRN    promethazine (PHENERGAN) tablet 12.5 mg  12.5 mg Oral Q6H PRN    Or    ondansetron [facility]) injection 4 mg  4 mg IntraVENous Q6H PRN    ascorbic acid (vitamin C) (VITAMIN C) tablet 500 mg  500 mg Oral BID    zinc sulfate (ZINCATE) 220 (50) mg capsule 1 Cap  1 Cap Oral DAILY    benzonatate (TESSALON) capsule 100 mg  100 mg Oral TID PRN        Lab Data Reviewed: (see below)  Lab Review:     Recent Labs     08/11/20  0509 08/10/20  0555 08/09/20  0435   WBC 11.5* 29.0* 20.7*   HGB 12.6 14.9 15.9   HCT 39.0 44.8 46.4    383 336     Recent Labs     08/11/20  0509 08/10/20  0555 08/09/20  0435    136 134*   K 4.3 4.2 4.2    104 100   CO2 28 24 25   * 147* 148*   BUN 19 35* 26*   CREA 0.59* 1.10 0.91   CA 8.4* 8.9 9.5   MG  --  2.8* 2.8*   PHOS  --  4.1 4.4   ALB 2.0* 2.2* 2.8*   TBILI 0.7 2.0* 0.9   ALT 34 51 94*     No results found for: [facility]  Recent Labs     08/11/20  0505 08/10/20  1359 08/10/20  0502   PH 7.39 7.36 7.29*   PCO2 39 38 48*   PO2 81 91 92   HCO3 23 21* 23   FIO2 90 100 100     No results for input(s): INR, INREXT, INREXT in the last 72 hours. All Micro Results     Procedure Component Value Units Date/Time    CULTURE, BLOOD [[id number]] Collected:  08/09/20 0924    Order Status:  Completed Specimen:  Blood Updated:  08/11/20 0618     Special Requests: NO SPECIAL REQUESTS        Culture result: NO GROWTH 2 DAYS       CULTURE, BLOOD, PAIRED [[id number]] Collected:  08/05/20 2049    Order Status:  Completed Specimen:  Blood Updated:  08/10/20 0608     Special Requests: NO SPECIAL REQUESTS        Culture result: NO GROWTH 5 DAYS       CULTURE, RESPIRATORY/SPUTUM/BRONCH Rolena [place] STAIN [[id number]]     Order Status:  Sent Specimen:  Sputum     C. DIFFICILE AG & TOXIN A/B [[id number]] Collected:  08/08/20 1213    Order Status:  Completed Specimen:  Stool Updated:  08/09/20 0927     GDH ANTIGEN Negative        C. difficile toxin Negative        INTERPRETATION       NEGATIVE FOR TOXIGENIC C. DIFFICILE                I have reviewed notes of prior 24hr.     Other pertinent lab:      Total time spent with patient: [id number] Traci discussed with: Family, Nursing Staff, Consultant/Specialist and >50% of time spent in counseling and coordination of care    Discussed:  Care Plan    Prophylaxis:  Lovenox    Disposition:  prognosis: guarded            ___________________________________________________    Attending Physician: Arianna Martínez MD

## 2020-08-11 NOTE — PROGRESS NOTES
0700- Bedside and Verbal shift change report given to Lilliam Painter RN (oncoming nurse) by Rusty Soto RN (offgoing nurse). Report included the following information SBAR, Kardex, Intake/Output, MAR, Recent Results and Cardiac Rhythm Sinus Jose Armando Denton. Pt is sinus martine in the 40s. Dr. Sulema Harmon aware. 26- IR and Dr. Sulema Harmon gaining consent and performing the chest tube insertion. At bedside at this time. Unable to titrate drips appropriately d/t pt in procedure. 0830  Assessment complete. TOF NOTED no TWITCHES NOTED. Pt paralyzed, sedated, intubated. Dr. Sulema Harmon wants to titrate the propofol down by 10 and titrate Versed up until HR is in mid 50s-60s sustained. Dr. Sulema Harmon advised the bradycardia may be d/t pneumothorax or the propofol. 1000- TOF noted. No twitches noted. Erica Ng RN at bedside as well, no twitched noted at lowest dose of Nimbex, Dr. Sulema Harmon states to leave the Nimbex at 0.5mcg/kg/min. Mouth care performed, pt turned and repositioned. Pt suctioned, scant secretions noted. 1130- pt turned down to 80% FiO2. Pt opens and closed eyes. Sedation titrated appropriately. Per Dr. Sulema Harmon, leave Nimbex at 0.5 where it is. Pt tolerating ventilator well. Mouth care performed, pt repositioned. 1200- reassessment complete. TOF noted, mouth care performed, pt already repositioned. 1300- TOF noted. titrating Nimbex up d/t pt able to move R hand, open eyes and nod head. Propofol titrated up as well. Pt tolerating vent. Will continue to monitor. 1400- TOF noted. Pt tolerating vent well. .   1500- TOF noted. Pt able to open eyes and move R hand again. Pt redirected easily. Paging Dr. Sulema Harmon to speak about plan for sedation and paralytic. 1530- TOF noted. Spoke to Dr. Sulema Harmon to get a clear plan. She states to turn the Nimbex OFF, titrate Versed up, change Max of 8ml/hr, and place the patient in restraints d/t to him reaching up and potential to grab tubes. Chest tube in place.  IR on the way to advance tube to correct position. 1545- Restraints started, bilateral wrist restraints. IR at bedside to start procedure. 1630- IR left, states they hooked chest tube back to suction, placed STAT chest Xray. Will continue to monitor. 1700- Xray at bedside. Knocked over chest tube system. system replaced by Lit Harris RN. Pt tolerated well. System taped to floor. Pt turned and repositioned. Pt then began to desat to the 70s, pt coughing. Pt suctioned, repositioned, placed back on Nimbex, and sedation titrated up by [facility] CAT[facility] ROJAS and [facility], pt placed on 100% FiO2 by Rukhsana White RN. Pt sats are at adequate level. pt synchronized with the vent. Pt tolerating vent at this time. Restraints removed d/t pt placed on Nimbex. 1740- TOF 4/4 @10. serosanguianous drainage noted in chest tube. Nimbex titrated accordingly. Pt  has large  crepitus area noted to chest.   Will continue to monitor. 5259 [facility]- Paged on call pulmonologist.     7978- Dr. Bria Diggs made aware of changes. States keep him sedated and on paralytic.     1900- Bedside and Verbal shift change report given to Indy Hernandez RN (oncoming nurse) by Dali Gilmore RN (offgoing nurse). Report included the following information SBAR, Kardex, Intake/Output, MAR, Recent Results and Cardiac Rhythm NSR.

## 2020-08-11 NOTE — PROGRESS NOTES
TRANSFER - IN REPORT:    Verbal report received from ICU RN on Agata Skains  being received from for ordered procedure      Report consisted of patients Situation, Background, Assessment and   Recommendations(SBAR). Information from the following report(s) SBAR was reviewed with the receiving nurse. Opportunity for questions and clarification was provided. Assessment completed upon patients arrival to unit and care assumed.

## 2020-08-11 NOTE — PROGRESS NOTES
Physician Progress Note      PATIENT:               Kyle Meek  CSN #:                  [id number]  :                       1990  ADMIT DATE:       2020 8:23 PM  [address] [place] DATE:  RESPONDING  PROVIDER #:        Mendez Ochoa MD          QUERY TEXT:    Dear Hospitalist Team,    Pt admitted with Acute Hypoxic respiratory Failure with Positive COVID-19. Pt noted to have hypotension noted on 8/10 in which patient was placed on vasopressors for BP support and management. If possible, please document in the progress notes and discharge summary if you are evaluating and/or treating any of the following: The medical record reflects the following:    Risk Factors: 27 Yr M admitted with Acute Hypoxic Respiratory Failure and Positive COVID-19    Clinical Indicators: Patient undergoing treatment since 20 for COVID-19 with Acute Hypoxic Respiratory Failure and noted to become hypotensive and hypoxic on bipap with agitation needing intubation and placed on vasopressor support for BP management. On 8/10 at 0345 patient's BP dropped to 79/57, 64/39, 88/47 with RR 40 and O2 saturation of 78% on Bipap. Patient was provided phenylephrine gtt IVF initially at 30 mcg now at 5 mcg. Patient remains in ICU on ventilator. Treatment: Daily CBC/BMP, Frequent CXR, ICU level of care, Pulmonary consult, Intubation/ventilation and  phenylephrine gtt IVF initially at 30 mcg now at 5 mcg. Thank you,  Farshad Matamoros RN, [facility]  [phone]  Options provided:  -- Cardiogenic Shock  -- Hypovolemic Shock  -- Hypovolemia without Shock  -- Hypotension without Shock  -- Other - I will add my own diagnosis  -- Disagree - Not applicable / Not valid  -- Disagree - Clinically unable to determine / Unknown  -- Refer to Clinical Documentation Reviewer    PROVIDER RESPONSE TEXT:    This patient has hypotension without shock. Query created by:  Kate Rodriguez on 2020 3:46 PM      Electronically signed by:  Mendez Ochoa MD Dustin Gomez MD 8/11/2020 6:31 PM

## 2020-08-11 NOTE — PROGRESS NOTES
Transition of care note:  RUR 15%    Pt is covid 19 positive. Plan: 1. Pt remains intubated ,has been on nimbex and veletri. 2.Pt has Covid pneumonia and bilateral pneumothoraces. 3.Left pigtail cathether  Placed today(8/11)  4. Pt completed the remdesivir on 8/10. 5.He received convalescent plasma on 8/10/20. 6.I discussed Palliative consult with both intensivist and attending. 7.Currently,the plan is for the intensivist and/or attending to update pt.'s parents daily on pt.'s clinical status. 8.Case Management will continue to follow pt for discharge needs.     Maria G Hidalgo

## 2020-08-11 NOTE — PROGRESS NOTES
Called mother again and went through her son's current status and the severity of his lung dz. Explained that he is on maximum vent settings and at risk of dying from COVID 19 PNA. Explained that he is sedated/paralyzed so we can ventilate and that COVID 19 patients are at high risk for thromboembolic dz when they are this ill. She understands the severity of his illness and is trying to think positive through all of this. I answered all questions that she has currently. Pt's father was on speaker phone as well.

## 2020-08-11 NOTE — PROGRESS NOTES
1900 - Bedside and Verbal shift change report given to Rosi Gomez (oncoming nurse) by Quinn Rene RN (offgoing nurse). Report included the following information SBAR, Kardex, ED Summary, Intake/Output, MAR, Recent Results and Cardiac Rhythm NSR. Primary Nurse Dahiana Andres and Quinn Rene RN performed a dual skin assessment on this patient Impairment noted- see wound doc flow sheet (bridge of nose). Enrique score is 11.  2000 - Shift assessment completed. See flow sheet. Patient unresponsive due to being paralyzed and sedated. No response to any stimulus. TOF baseline 10, 3 twitches at 40. Nimbex infusing at 2 mcg/kg/min, Propofol at 30 mcg/kg/min, Versed at 6 mg/hr, and Fentanyl at 200 mcg/hr. ETT at 28 at the lip. OGT clamped at 54. Mouth care and suctioning completed. Chest tube in place connected to continuous suction. Mcwilliams in place and draining. Mcwilliams care completed. Cooling blanket on. Patient turned and repositioned, heel boots on.  2100 - TOF baseline 10, 3 twitches at 40.  2144 - HR dropping to the 50s. Propofol decreased to 25 mcg/kg/min. 2200 -TOF baseline 10, 3 twitches at 40. Mouth care and suctioning completed. Patient turned and repositioned, heel boots on.  2219 - HR continues to drop to the 50s. Propofol decreased to 20 mcg/kg/min.  2300 - TOF baseline 10, 3 twitches at 40.  2310 - HR sustaining in the 50s. Propofol decreased to 15 mcg/kg/min. 2327 - HR sustaining in the 46s. Propofol decreased to 10 mcg/kg/min. 0000 - Reassessment completed. No change to previous assessment. TOF baseline 10, 4 twitches at 40. Nimbex increased to 2.5 mcg/kg/min. HR sustaining in the 50s. Propofol decreased to 5 mcg/kg/min. Mouth care and suctioning completed. Patient turned and repositioned, heel boots on. Cooling blanket turned off.  0036 - HR continues to sustain in the 50s. Propofol stopped. 0100 - TOF baseline 10, 2 twitches at 40.  0200 - TOF baseline 10, 2 twitches at 40. Mouth care and suctioning completed. Patient turned and repositioned, heel boots on. AM labs drawn. 0300 - TOF baseline 10, 2 twitches at 40.  0400 - Reassessment completed. No changes to previous assessment. TOF baseline 10, 2 twitches at 40. Mouth care and suctioning completed. Patient turned and repositioned, heel boots on.  0500 - TOF baseline 10, 2 twitches at 40.  0600 - TOF baseline 10, 2 twitches at 40. Mouth care and suctioning completed. Patient turned and repositioned, heel boots on.  9045 - Patient HR sustaining in the 130s to 140s. Propofol restarted at 25 mcg/kg/min.  0643 - HR still in the 130s to 140s. Propofol increased to 30 mcg/kg/min. 8405 - HR still in the 130s to 140s. Propofol increased to 35 mcg/kg/min.  0653 - HR still in the 130s to 140s. Propofol increased to 40 mcg/kg/min.  6938 - Bedside and Verbal shift change report given to Joey Tierney RN (oncoming nurse) by Travis Russo RN (offgoing nurse). Report included the following information SBAR, Kardex, ED Summary, Intake/Output, MAR, Recent Results and Cardiac Rhythm NSR.

## 2020-08-11 NOTE — ACP (ADVANCE CARE PLANNING)
Official ACP not completed but I reviewed the weekend intensivist's note who discussed code status with pt prior to pt being intubated. Pt chose to be intubated and to be a full code so an official ACP is not being completed @ this time due to pt.'s current clinical status and daily discussions physicians are having with pt.'s mother & father.     Angie Pappas

## 2020-08-11 NOTE — PROGRESS NOTES
[address] Dosing Services: Antimicrobial Stewardship Daily Doc 8/11/2020    Consult for antibiotic dosing of Vancomycin by Dr. Babita Cassidy  Indication: Worsening bilat PNA, leukocytosis and fevers in setting of COVID-19 infection  Day of Therapy 2    Ht Readings from Last 1 Encounters:   08/05/20 180.3 cm (71\")        Wt Readings from Last 1 Encounters:   08/05/20 108 kg (238 lb)      Vancomycin therapy:  MD: 1500 mg Q8 hrs   Goal trough: 15-20 mcg/ml    Trough before 4th dose 7.6 mcg/ml  Increase dose to 1750 mg IV Q6h for an estimated trough 15 mcg/ml    Date Dose & Interval Measured (mcg/mL) Extrapolated (mcg/mL)   ? 8/11/2020 ?1500 mg IV Q8h ?7.6 ? 7.6   ? ? ? ?   ? ? ? ? Other Antimicrobial   (not dosed by pharmacist) Continue Cefepime 2 grams Q8 hrs (CrCl >100 ml/min)   Cultures 8/9 Blood: NGTD, Prelim    8/5 Blood (paired): NG, Final  8/8 C. Diff: Negative   Serum Creatinine Lab Results   Component Value Date/Time    Creatinine 0.59 (L) 08/11/2020 05:09 AM         Creatinine Clearance Estimated Creatinine Clearance: 192.9 mL/min (A) (by C-G formula based on SCr of 0.59 mg/dL (L)).      Temp Temp: 97.7 °F (36.5 °C)       WBC Lab Results   Component Value Date/Time    WBC 11.5 (H) 08/11/2020 05:09 AM        H/H Lab Results   Component Value Date/Time    HGB 12.6 08/11/2020 05:09 AM        Platelets    Lab Results   Component Value Date/Time    PLATELET 825 80/40/8446 05:09 AM          Thanks,  Pharmacist Steph Dhillon, PHARMD Contact information: [phone]

## 2020-08-11 NOTE — PROGRESS NOTES
[facility]       ICU -  Resident Daily Progress Note  Name: Edgar Rothman   : 1990   MRN: [id number]   Date: 2020 9:29 AM     I have reviewed the flowsheet and previous days notes. The patient is unable to give any meaningful history or review of systems because the patient is intubated and critically ill. Overnight events reviewed:  · Pigtail catheter was placed this morning due to extension of L pneumothorax on CXR    Vital Signs:    Visit Vitals  /70   Pulse (!) 44   Temp 99.4 °F (37.4 °C)   Resp 18   Ht 5' 11\" (1.803 m)   Wt 238 lb (108 kg)   SpO2 98%   BMI 33.19 kg/m²       O2 Device: Ventilator   O2 Flow Rate (L/min): 36 l/min   Temp (24hrs), Av.3 °F (37.4 °C), Min:98 °F (36.7 °C), Max:101.7 °F (38.7 °C)       Intake/Output:   Last shift:      No intake/output data recorded.   Last 3 shifts:  1901 -  0700  In: 4392.7 [I.V.:3863.2]  Out: 2535 [Urine:2535]    Intake/Output Summary (Last 24 hours) at 2020 0840  Last data filed at 2020 0600  Gross per 24 hour   Intake 4392.67 ml   Output 2435 ml   Net 1957.67 ml       Ventilator Settings:  Ventilator Mode: (P) PRVC  Respiratory Rate  Back-Up Rate: 15  Insp Time (sec): (P) 1.25 sec  I:E Ratio: 1:1.2  Ventilator Volumes  Vt Set (ml): (P) 450 ml  Vt Exhaled (Machine Breath) (ml): (P) 455 ml  Vt Spont (ml): 629 ml  Ve Observed (l/min): (P) 10.2 l/min  Ventilator Pressures  Pressure Support (cm H2O): 10 cm H2O  PIP Observed (cm H2O): (P) 32 cm H2O  Plateau Pressure (cm H2O): (P) 31 cm H2O  MAP (cm H2O): (P) 22  PEEP/VENT (cm H2O): (P) 15 cm H20  Auto PEEP Observed (cm H2O): (P) 17 cm H2O      Physical Exam: *Physical exam deferred to attending physician as patient is COVID+ to limit exposure*    DATA:   Current Facility-Administered Medications   Medication Dose Route Frequency    midazolam in normal saline (VERSED) 1 mg/mL infusion  0-4 mg/hr IntraVENous TITRATE    cisatracurium (NIMBEX) 200 mg in 0.9% sodium chloride 100 mL (2 mg/mL) infusion  0-10 mcg/kg/min IntraVENous TITRATE    PHENYLephrine (TONNY-SYNEPHRINE) 30 mg in 0.9% sodium chloride 250 mL infusion   mcg/min IntraVENous TITRATE    alteplase (CATHFLO) 1 mg in sterile water (preservative free) 1 mL injection  1 mg InterCATHeter PRN    fentaNYL (PF) 1,500 mcg/30 mL (50 mcg/mL) infusion  0-200 mcg/hr IntraVENous TITRATE    propofol (DIPRIVAN) 10 mg/mL infusion  0-50 mcg/kg/min IntraVENous TITRATE    dexamethasone (PF) (DECADRON) 10 mg/mL injection 6 mg  6 mg IntraVENous Q6H    vancomycin (VANCOCIN) 1500 mg in  ml infusion  1,500 mg IntraVENous Q8H    Vancomycin trough 8/11 prior to 1200 dose   Other ONCE    0.9% sodium chloride infusion 250 mL  250 mL IntraVENous PRN    famotidine (PF) (PEPCID) 20 mg in 0.9% sodium chloride 10 mL injection  20 mg IntraVENous Q12H    atorvastatin (LIPITOR) tablet 20 mg  20 mg Oral QHS    cefepime (MAXIPIME) 2 g in 0.9% sodium chloride (MBP/ADV) 100 mL  2 g IntraVENous Q8H    enoxaparin (LOVENOX) injection 105 mg  105 mg SubCUTAneous Q12H    LORazepam (ATIVAN) injection 0.5 mg  0.5 mg IntraVENous Q6H PRN    epoprostenol (VELETRI) 30 mcg/mL in 0.9% sodium chloride 50 mL inhalation solution  50 ng/kg/min (Ideal) Inhalation CONTINUOUS    morphine injection 1 mg  1 mg IntraVENous Q6H PRN    0.9% sodium chloride infusion 250 mL  250 mL IntraVENous PRN    sodium chloride (NS) flush 5-40 mL  5-40 mL IntraVENous Q8H    sodium chloride (NS) flush 5-40 mL  5-40 mL IntraVENous PRN    acetaminophen (TYLENOL) tablet 650 mg  650 mg Oral Q6H PRN    Or    acetaminophen (TYLENOL) suppository 650 mg  650 mg Rectal Q6H PRN    polyethylene glycol (MIRALAX) packet 17 g  17 g Oral DAILY PRN    promethazine (PHENERGAN) tablet 12.5 mg  12.5 mg Oral Q6H PRN    Or    ondansetron (ZOFRAN) injection 4 mg  4 mg IntraVENous Q6H PRN    ascorbic acid (vitamin C) (VITAMIN C) tablet 500 mg  500 mg Oral BID    zinc sulfate (ZINCATE) 220 (50) mg capsule 1 Cap  1 Cap Oral DAILY    benzonatate (TESSALON) capsule 100 mg  100 mg Oral TID PRN             Labs:  Recent Labs     08/11/20  0509 08/10/20  0555 08/09/20  0435   WBC 11.5* 29.0* 20.7*   HGB 12.6 14.9 15.9   HCT 39.0 44.8 46.4    383 336     Recent Labs     08/11/20  0509 08/10/20  0555 08/09/20  0435    136 134*   K 4.3 4.2 4.2    104 100   CO2 28 24 25   * 147* 148*   BUN 19 35* 26*   CREA 0.59* 1.10 0.91   CA 8.4* 8.9 9.5   MG  --  2.8* 2.8*   PHOS  --  4.1 4.4   ALB 2.0* 2.2* 2.8*   ALT 34 51 94*     Recent Labs     08/11/20  0505 08/10/20  1359 08/10/20  0502   PH 7.39 7.36 7.29*   PCO2 39 38 48*   PO2 81 91 92   HCO3 23 21* 23   FIO2 90 100 100       Imaging:  I have personally reviewed the patients radiographs and reports.     Micro:  Results     Procedure Component Value Units Date/Time    CULTURE, RESPIRATORY/SPUTUM/BRONCH Boom Officer [[id number]]     Order Status:  Sent Specimen:  Sputum     CULTURE, BLOOD [[id number]] Collected:  08/09/20 0924    Order Status:  Completed Specimen:  Blood Updated:  08/11/20 0618     Special Requests: NO SPECIAL REQUESTS        Culture result: NO GROWTH 2 DAYS       C. DIFFICILE AG & TOXIN A/B [[id number]] Collected:  08/08/20 1213    Order Status:  Completed Specimen:  Stool Updated:  08/09/20 0927     [address] ANTIGEN Negative        C. difficile toxin Negative        INTERPRETATION       NEGATIVE FOR TOXIGENIC C. DIFFICILE          CULTURE, BLOOD, PAIRED [[id number]] Collected:  08/05/20 2049    Order Status:  Completed Specimen:  Blood Updated:  08/10/20 0608     Special Requests: NO SPECIAL REQUESTS        Culture result: NO GROWTH 5 DAYS              IMPRESSION:   · COVID pneumonia  · Acute hypoxic respiratory failure   · Bilateral pneumothoraces (L>R)  · Tobacco use  · Transaminitis   PLAN:   · Continue full vent support  · Fentanyl, propofol (decreased today due to bradycardia), versed  · Decadron (day 6/10)  · S/p Convalescent plasma on 8/10 and Remdesivir completed on 8/10  · Veletri  · Pigtail catheter placed 8/11 due to extension of L pneumothorax. Set to suction  · Monitor COVID labs daily  · Cameron  · Cefepime, Vanc  · Zinc, Vit C, lipitor   · Therapeutic lovenox for elevated D dimer   · Replete electrolytes as needed  · Nutrition: NPO except meds    GLOBAL ISSUES:   · Head of bed elevated  · GI Prophylaxis: Pepcid  · DVT Prophylaxis: Therapeutic lovenox   · ETT placed on 8/10     My assessment/plan will be discussed with Dr. Sydnie Duckworth, ICU Attending Physician.     Anish Germain DO  Family Medicine Resident, PGY-2

## 2020-08-11 NOTE — PROGRESS NOTES
Name: [facility]: [address]   : 1990 Admit Date: 2020   Phone: [phone]  Room: [facility] [place]/   PCP: Mariusz Hooker MD  MRN: [id number]   Date: 2020  Code: Full Code          Chart and notes reviewed. Data reviewed. I review the patient's current medications in the medical record at each encounter. I have evaluated and examined the patient. History of Present Illness:  Mr. Aleja Bazzi is a 26 yo gentleman with a history of tobacco use who is admitted with COVID-19 pneumonia and acute respiratory failure with hypoxia. He was diagnosed about 10 days ago at an urgent care facility with COVID-19. Last Thursday he began to have fevers, inability to take a deep breath that was progressively worse, dry cough, and general malaise/faitigue. He was prescribed azithromycin and prednisone without improvement. Denies n/v, abdominal pain, changes in taste/smell. Hypoxic as low as 76% on RA. Placed on BiPAP yesterday evening due to increased work of breathing. Labs reviewed: WBC 16.5, d-dimer 0.86, Na 129, cr 0.84, AST 86, , , Procalcitonin 0.33, ferritin 5114, IL6 in lab; crp pending    Images:  CXR with hazy interstitial changes and airspace opacification      PMH: tobacco use    FH: no family history pertinent to presents complaint    SH: +tobacco use    Interval history  Intubated  On Veletri  Paralyzed  CXR with expanding left sided PTX, IR called and pigtailed placed.  CXR ordered to verify placement  New bradycardia this AM, sats 98% and yfn 25  Fentanyl 150  Prop 50  Currently on PEEP 15, Peak pressure 32    Social History     Tobacco Use    Smoking status: Current Some Day Smoker    Smokeless tobacco: Never Used   Substance Use Topics    Alcohol use: Not on file       No Known Allergies    Current Facility-Administered Medications   Medication Dose Route Frequency    midazolam in normal saline (VERSED) 1 mg/mL infusion  0-4 mg/hr IntraVENous TITRATE    cisatracurium (NIMBEX) 200 mg in 0.9% sodium chloride 100 mL (2 mg/mL) infusion  0-10 mcg/kg/min IntraVENous TITRATE    PHENYLephrine (TONNY-SYNEPHRINE) 30 mg in 0.9% sodium chloride 250 mL infusion   mcg/min IntraVENous TITRATE    alteplase (CATHFLO) 1 mg in sterile water (preservative free) 1 mL injection  1 mg InterCATHeter PRN    fentaNYL (PF) 1,500 mcg/30 mL (50 mcg/mL) infusion  0-200 mcg/hr IntraVENous TITRATE    propofol (DIPRIVAN) 10 mg/mL infusion  0-50 mcg/kg/min IntraVENous TITRATE    dexamethasone (PF) (DECADRON) 10 mg/mL injection 6 mg  6 mg IntraVENous Q6H    vancomycin (VANCOCIN) 1500 mg in  ml infusion  1,500 mg IntraVENous Q8H    Vancomycin trough 8/11 prior to 1200 dose   Other ONCE    0.9% sodium chloride infusion 250 mL  250 mL IntraVENous PRN    famotidine (PF) (PEPCID) 20 mg in 0.9% sodium chloride 10 mL injection  20 mg IntraVENous Q12H    atorvastatin (LIPITOR) tablet 20 mg  20 mg Oral QHS    cefepime (MAXIPIME) 2 g in 0.9% sodium chloride (MBP/ADV) 100 mL  2 g IntraVENous Q8H    enoxaparin (LOVENOX) injection 105 mg  105 mg SubCUTAneous Q12H    LORazepam (ATIVAN) injection 0.5 mg  0.5 mg IntraVENous Q6H PRN    epoprostenol (VELETRI) 30 mcg/mL in 0.9% sodium chloride 50 mL inhalation solution  50 ng/kg/min (Ideal) Inhalation CONTINUOUS    morphine injection 1 mg  1 mg IntraVENous Q6H PRN    0.9% sodium chloride infusion 250 mL  250 mL IntraVENous PRN    sodium chloride (NS) flush 5-40 mL  5-40 mL IntraVENous Q8H    sodium chloride (NS) flush 5-40 mL  5-40 mL IntraVENous PRN    acetaminophen (TYLENOL) tablet 650 mg  650 mg Oral Q6H PRN    Or    acetaminophen (TYLENOL) suppository 650 mg  650 mg Rectal Q6H PRN    polyethylene glycol (MIRALAX) packet 17 g  17 g Oral DAILY PRN    promethazine (PHENERGAN) tablet 12.5 mg  12.5 mg Oral Q6H PRN    Or    ondansetron (ZOFRAN) injection 4 mg  4 mg IntraVENous Q6H PRN    ascorbic acid (vitamin C) (VITAMIN C) tablet 500 mg  500 mg Oral BID    zinc sulfate (ZINCATE) 220 (50) mg capsule 1 Cap  1 Cap Oral DAILY    benzonatate (TESSALON) capsule 100 mg  100 mg Oral TID PRN         REVIEW OF SYSTEMS   12 point ROS negative except as stated in the HPI. Physical Exam:   Visit Vitals  /70   Pulse (!) 44   Temp 99.4 °F (37.4 °C)   Resp 18   Ht 5' 11\" (1.803 m)   Wt 108 kg (238 lb)   SpO2 98%   BMI 33.19 kg/m²       General:  Intubated, sedated,paralyzed   Head:  Normocephalic, without obvious abnormality, atraumatic. Eyes:  Conjunctivae/corneas clear. Nose: Nares normal   Throat: Lips, mucosa, and tongue normal.    Neck: Supple, symmetrical, trachea midline   Lungs:   Course bilaterally   Chest wall:  No tenderness or deformity. Heart:  Regular rate and rhythm, S1, S2 normal, no murmur, click, rub or gallop. Abdomen:   Soft, non-tender. Bowel sounds normal. Obese   Extremities: Extremities normal, atraumatic, no cyanosis or edema. Skin: Skin color, texture, turgor normal. No rashes or lesions   Neurologic: Paralyzed, pupils pinpoint       Lab Results   Component Value Date/Time    Sodium 138 08/11/2020 05:09 AM    Potassium 4.3 08/11/2020 05:09 AM    Chloride 106 08/11/2020 05:09 AM    CO2 28 08/11/2020 05:09 AM    BUN 19 08/11/2020 05:09 AM    Creatinine 0.59 (L) 08/11/2020 05:09 AM    Glucose 122 (H) 08/11/2020 05:09 AM    Calcium 8.4 (L) 08/11/2020 05:09 AM    Magnesium 2.8 (H) 08/10/2020 05:55 AM    Phosphorus 4.1 08/10/2020 05:55 AM    Lactic acid 1.2 08/06/2020 01:24 PM       Lab Results   Component Value Date/Time    WBC 11.5 (H) 08/11/2020 05:09 AM    HGB 12.6 08/11/2020 05:09 AM    PLATELET 858 93/64/3136 05:09 AM    MCV 92.9 08/11/2020 05:09 AM       Lab Results   Component Value Date/Time    Alk.  phosphatase 77 08/11/2020 05:09 AM    Protein, total 6.5 08/11/2020 05:09 AM    Albumin 2.0 (L) 08/11/2020 05:09 AM    Globulin 4.5 (H) 08/11/2020 05:09 AM       Lab Results   Component Value Date/Time    Ferritin 3,227 (H) 08/10/2020 05:55 AM       Lab Results   Component Value Date/Time    C-Reactive protein 17.50 (H) 08/10/2020 05:55 AM        Lab Results   Component Value Date/Time    PH 7.39 08/11/2020 05:05 AM    PCO2 39 08/11/2020 05:05 AM    PO2 81 08/11/2020 05:05 AM    HCO3 23 08/11/2020 05:05 AM    FIO2 90 08/11/2020 05:05 AM       Lab Results   Component Value Date/Time    Troponin-I, Qt. <0.05 08/08/2020 12:13 PM        Lab Results   Component Value Date/Time    Culture result: NO GROWTH 2 DAYS 08/09/2020 09:24 AM    Culture result: NO GROWTH 5 DAYS 08/05/2020 08:49 PM       No results found for: TOXA1, RPR, HBCM, HBSAG, HAAB, HCAB1, HAAT, G6PD, CRYAC, HIVGT, HIVR, HIV1, HIV12, HIVPC, HIVRPI    No results found for: VANCT, CPK    No results found for: COLOR, APPRN, SPGRU, RAE, PROTU, GLUCU, KETU, BILU, BLDU, UROU, ROBLES, LEUKU, WBCU, RBCU, UEPI, BACTU, CASTS, UCRY    IMPRESSION  · COVID-19 Pneumonia  · Acute respiratory failure with hypoxia  · PTX bilaterally  · Elevated LFTs  · Tobacco  use    PLAN  · Continue full vent support  · Pigtail to suction, CXR pending, suspect chest tube will need to be re-adjusted  · Reduced prop in hopes to improve bradycardia, add versed for goal rass of -4  · Continue Zinc, vitamin C  · Continue cefepime and Vanc  · Continue yfn  · PICC line placed 8/10  · Continue decadron for 10 days  · remdesivir finished 8/10   · convalescent plasma given 8/10  · Monitor renal function, LFTs,  · Inflammatory markers, d-dimer increasing  · PRN proventil  · Continue Lovenox therapeutic dosing for now- d-dimer trending up  · NPO except meds until pulmonary status stabilizes  · Full code  · Update mother over the phone  · Prognosis very guarded    Patient is critically ill with worsening acute hypoxic respiratory failure and COVID 19  Pneumonia. CC time EOP 61min. Discussed with nursing and RT.     Jordyn Ortiz MD

## 2020-08-12 ENCOUNTER — APPOINTMENT (OUTPATIENT)
Dept: GENERAL RADIOLOGY | Age: 30
DRG: 870 | End: 2020-08-12
Attending: INTERNAL MEDICINE
Payer: COMMERCIAL

## 2020-08-12 LAB
ALBUMIN SERPL-MCNC: 1.9 G/DL (ref 3.5–5)
ALBUMIN/GLOB SERPL: 0.4 {RATIO} (ref 1.1–2.2)
ALP SERPL-CCNC: 67 U/L (ref 45–117)
ALT SERPL-CCNC: 29 U/L (ref 12–78)
ANION GAP SERPL CALC-SCNC: 6 MMOL/L (ref 5–15)
ARTERIAL PATENCY WRIST A: YES
AST SERPL-CCNC: 15 U/L (ref 15–37)
BASE DEFICIT BLDA-SCNC: 1.1 MMOL/L
BDY SITE: ABNORMAL
BILIRUB SERPL-MCNC: 0.7 MG/DL (ref 0.2–1)
BUN SERPL-MCNC: 21 MG/DL (ref 6–20)
BUN/CREAT SERPL: 36 (ref 12–20)
CALCIUM SERPL-MCNC: 8.4 MG/DL (ref 8.5–10.1)
CHLORIDE SERPL-SCNC: 106 MMOL/L (ref 97–108)
CO2 SERPL-SCNC: 26 MMOL/L (ref 21–32)
CREAT SERPL-MCNC: 0.58 MG/DL (ref 0.7–1.3)
CRP SERPL HS-MCNC: >9.5 MG/L
D DIMER PPP FEU-MCNC: 0.64 MG/L FEU (ref 0–0.65)
DATE LAST DOSE: ABNORMAL
EPAP/CPAP/PEEP, PAPEEP: 15
ERYTHROCYTE [DISTWIDTH] IN BLOOD BY AUTOMATED COUNT: 12.5 % (ref 11.5–14.5)
FERRITIN SERPL-MCNC: 2412 NG/ML (ref 26–388)
FIO2 ON VENT: 95 %
GAS FLOW.O2 SETTING OXYMISER: 22 L/MIN
GLOBULIN SER CALC-MCNC: 4.3 G/DL (ref 2–4)
GLUCOSE SERPL-MCNC: 137 MG/DL (ref 65–100)
HCO3 BLDA-SCNC: 24 MMOL/L (ref 22–26)
HCT VFR BLD AUTO: 36.9 % (ref 36.6–50.3)
HGB BLD-MCNC: 12.4 G/DL (ref 12.1–17)
LDH SERPL L TO P-CCNC: 330 U/L (ref 85–241)
MCH RBC QN AUTO: 30.9 PG (ref 26–34)
MCHC RBC AUTO-ENTMCNC: 33.6 G/DL (ref 30–36.5)
MCV RBC AUTO: 92 FL (ref 80–99)
NRBC # BLD: 0 K/UL (ref 0–0.01)
NRBC BLD-RTO: 0 PER 100 WBC
PCO2 BLDA: 42 MMHG (ref 35–45)
PH BLDA: 7.38 [PH] (ref 7.35–7.45)
PLATELET # BLD AUTO: 214 K/UL (ref 150–400)
PMV BLD AUTO: 11.1 FL (ref 8.9–12.9)
PO2 BLDA: 114 MMHG (ref 80–100)
POTASSIUM SERPL-SCNC: 4.3 MMOL/L (ref 3.5–5.1)
PROT SERPL-MCNC: 6.2 G/DL (ref 6.4–8.2)
RBC # BLD AUTO: 4.01 M/UL (ref 4.1–5.7)
REPORTED DOSE,DOSE: ABNORMAL UNITS
REPORTED DOSE/TIME,TMG: ABNORMAL
SAO2 % BLD: 98 % (ref 92–97)
SAO2% DEVICE SAO2% SENSOR NAME: ABNORMAL
SODIUM SERPL-SCNC: 138 MMOL/L (ref 136–145)
SPECIMEN SITE: ABNORMAL
VANCOMYCIN TROUGH SERPL-MCNC: 16.4 UG/ML (ref 5–10)
VENTILATION MODE VENT: ABNORMAL
VT SETTING VENT: 450 ML
WBC # BLD AUTO: 11.1 K/UL (ref 4.1–11.1)

## 2020-08-12 PROCEDURE — 83615 LACTATE (LD) (LDH) ENZYME: CPT

## 2020-08-12 PROCEDURE — 74011000250 HC RX REV CODE- 250: Performed by: INTERNAL MEDICINE

## 2020-08-12 PROCEDURE — 74011000258 HC RX REV CODE- 258: Performed by: INTERNAL MEDICINE

## 2020-08-12 PROCEDURE — 71045 X-RAY EXAM CHEST 1 VIEW: CPT

## 2020-08-12 PROCEDURE — 80202 ASSAY OF VANCOMYCIN: CPT

## 2020-08-12 PROCEDURE — 82728 ASSAY OF FERRITIN: CPT

## 2020-08-12 PROCEDURE — 74011250637 HC RX REV CODE- 250/637: Performed by: INTERNAL MEDICINE

## 2020-08-12 PROCEDURE — 36415 COLL VENOUS BLD VENIPUNCTURE: CPT

## 2020-08-12 PROCEDURE — 85379 FIBRIN DEGRADATION QUANT: CPT

## 2020-08-12 PROCEDURE — 74011250636 HC RX REV CODE- 250/636: Performed by: INTERNAL MEDICINE

## 2020-08-12 PROCEDURE — 36600 WITHDRAWAL OF ARTERIAL BLOOD: CPT

## 2020-08-12 PROCEDURE — 94645 CONT INHLJ TX EACH ADDL HOUR: CPT

## 2020-08-12 PROCEDURE — 86141 C-REACTIVE PROTEIN HS: CPT

## 2020-08-12 PROCEDURE — 65610000006 HC RM INTENSIVE CARE

## 2020-08-12 PROCEDURE — 94003 VENT MGMT INPAT SUBQ DAY: CPT

## 2020-08-12 PROCEDURE — 85027 COMPLETE CBC AUTOMATED: CPT

## 2020-08-12 PROCEDURE — 82803 BLOOD GASES ANY COMBINATION: CPT

## 2020-08-12 PROCEDURE — 80053 COMPREHEN METABOLIC PANEL: CPT

## 2020-08-12 RX ORDER — ENOXAPARIN SODIUM 100 MG/ML
0.5 INJECTION SUBCUTANEOUS EVERY 12 HOURS
Status: DISCONTINUED | OUTPATIENT
Start: 2020-08-12 | End: 2020-08-18 | Stop reason: HOSPADM

## 2020-08-12 RX ORDER — SODIUM CHLORIDE 9 MG/ML
250 INJECTION, SOLUTION INTRAVENOUS AS NEEDED
Status: DISPENSED | OUTPATIENT
Start: 2020-08-12 | End: 2020-08-12

## 2020-08-12 RX ADMIN — MIDAZOLAM HYDROCHLORIDE 7 MG/HR: 5 INJECTION, SOLUTION INTRAMUSCULAR; INTRAVENOUS at 13:15

## 2020-08-12 RX ADMIN — DEXAMETHASONE SODIUM PHOSPHATE 6 MG: 10 INJECTION, SOLUTION INTRAMUSCULAR; INTRAVENOUS at 23:35

## 2020-08-12 RX ADMIN — CEFEPIME HYDROCHLORIDE 2 G: 2 INJECTION, POWDER, FOR SOLUTION INTRAVENOUS at 00:34

## 2020-08-12 RX ADMIN — PROPOFOL 30 MCG/KG/MIN: 10 INJECTION, EMULSION INTRAVENOUS at 09:33

## 2020-08-12 RX ADMIN — DEXAMETHASONE SODIUM PHOSPHATE 6 MG: 10 INJECTION, SOLUTION INTRAMUSCULAR; INTRAVENOUS at 17:32

## 2020-08-12 RX ADMIN — ATORVASTATIN CALCIUM 20 MG: 20 TABLET, FILM COATED ORAL at 21:04

## 2020-08-12 RX ADMIN — PROPOFOL 20 MCG/KG/MIN: 10 INJECTION, EMULSION INTRAVENOUS at 17:00

## 2020-08-12 RX ADMIN — Medication 10 ML: at 05:20

## 2020-08-12 RX ADMIN — Medication 1 CAPSULE: at 08:15

## 2020-08-12 RX ADMIN — ENOXAPARIN SODIUM 50 MG: 100 INJECTION SUBCUTANEOUS at 21:03

## 2020-08-12 RX ADMIN — CEFEPIME HYDROCHLORIDE 2 G: 2 INJECTION, POWDER, FOR SOLUTION INTRAVENOUS at 16:37

## 2020-08-12 RX ADMIN — Medication 10 ML: at 21:03

## 2020-08-12 RX ADMIN — EPOPROSTENOL 50 NG/KG/MIN: 1.5 INJECTION, POWDER, LYOPHILIZED, FOR SOLUTION INTRAVENOUS at 12:01

## 2020-08-12 RX ADMIN — Medication 500 MG: at 17:32

## 2020-08-12 RX ADMIN — Medication 200 MCG/HR: at 12:14

## 2020-08-12 RX ADMIN — VANCOMYCIN HYDROCHLORIDE 1750 MG: 10 INJECTION, POWDER, LYOPHILIZED, FOR SOLUTION INTRAVENOUS at 01:22

## 2020-08-12 RX ADMIN — Medication 10 ML: at 13:15

## 2020-08-12 RX ADMIN — SODIUM CHLORIDE 3.5 MCG/KG/MIN: 900 INJECTION, SOLUTION INTRAVENOUS at 19:02

## 2020-08-12 RX ADMIN — EPOPROSTENOL 50 NG/KG/MIN: 1.5 INJECTION, POWDER, LYOPHILIZED, FOR SOLUTION INTRAVENOUS at 06:19

## 2020-08-12 RX ADMIN — VANCOMYCIN HYDROCHLORIDE 1750 MG: 10 INJECTION, POWDER, LYOPHILIZED, FOR SOLUTION INTRAVENOUS at 18:37

## 2020-08-12 RX ADMIN — DEXAMETHASONE SODIUM PHOSPHATE 6 MG: 10 INJECTION, SOLUTION INTRAMUSCULAR; INTRAVENOUS at 05:20

## 2020-08-12 RX ADMIN — VANCOMYCIN HYDROCHLORIDE 1750 MG: 10 INJECTION, POWDER, LYOPHILIZED, FOR SOLUTION INTRAVENOUS at 13:11

## 2020-08-12 RX ADMIN — FAMOTIDINE 20 MG: 10 INJECTION, SOLUTION INTRAVENOUS at 21:03

## 2020-08-12 RX ADMIN — Medication 200 MCG/HR: at 19:59

## 2020-08-12 RX ADMIN — SODIUM CHLORIDE 3 MCG/KG/MIN: 900 INJECTION, SOLUTION INTRAVENOUS at 11:04

## 2020-08-12 RX ADMIN — ENOXAPARIN SODIUM 105 MG: 120 INJECTION SUBCUTANEOUS at 08:15

## 2020-08-12 RX ADMIN — FAMOTIDINE 20 MG: 10 INJECTION, SOLUTION INTRAVENOUS at 08:15

## 2020-08-12 RX ADMIN — VANCOMYCIN HYDROCHLORIDE 1750 MG: 10 INJECTION, POWDER, LYOPHILIZED, FOR SOLUTION INTRAVENOUS at 06:48

## 2020-08-12 RX ADMIN — CEFEPIME HYDROCHLORIDE 2 G: 2 INJECTION, POWDER, FOR SOLUTION INTRAVENOUS at 08:32

## 2020-08-12 RX ADMIN — DEXAMETHASONE SODIUM PHOSPHATE 6 MG: 10 INJECTION, SOLUTION INTRAMUSCULAR; INTRAVENOUS at 12:08

## 2020-08-12 RX ADMIN — Medication 500 MG: at 08:15

## 2020-08-12 RX ADMIN — PROPOFOL 10 MCG/KG/MIN: 10 INJECTION, EMULSION INTRAVENOUS at 17:01

## 2020-08-12 RX ADMIN — EPOPROSTENOL 50 NG/KG/MIN: 1.5 INJECTION, POWDER, LYOPHILIZED, FOR SOLUTION INTRAVENOUS at 18:05

## 2020-08-12 RX ADMIN — Medication 200 MCG/HR: at 04:10

## 2020-08-12 NOTE — PROGRESS NOTES
Comprehensive Nutrition Assessment    Type and Reason for Visit: Reassess    Nutrition Recommendations/Plan:     1. Recommend initiating nutrition support as pt has been NPO x 7 days. Recommend ordering EN via OGT as medically appropriate:  TF Rec's:  2CalHN at 20ml/hr, increasing by 10ml q8h to goal rate of 40ml/hr while receiving Propofol. Add ProSource TF 4x/day and flush with 160ml h20 q3h. (Goal TF will provide 2432 kcals including 512 kcals from Propofol; 134 gm protein; 2036 ml fluid - this will meet 96% pt's kcal needs, 95% protein needs). If unable to initiate EN, recommend ordering TPN:  TPN Rec's: Day 1: D20, 5% AA at 42ml/hr            Day 2: D20, 5% AA at 63ml/hr            Day 3: Goal of D20, 5% AA at 83ml/hr with 500ml of 20% lipids at 42 ml/hr x 12 hrs  3x/wk. (Goal TPN will provide 2697 kcals including 855kcals from Propofol, 100 gm protein - this will meet 100% kcal needs and 71% protein needs). Nutrition Assessment:      8/12: F/u. Pt remains intubated, paralyzed, and sedated with Propofol at 30mcg/kg/min (now providing 512 kcals/d). Still NPO (x 7 days today). MD notes no plan for nutrition support at this time due to his condition. Labs reviewed. Meds: Vit C, zinc, statin, Decadron, Nimbex, Propofol. Loose BM noted on 8/9.    8/10: 26 yo male admitted for respiratory failure with hypoxia. No PMhx. Class I obese per BMI of 33 (based on stated weight). No weight hx available in EMR. Pt has been on Bipap and NPO since admission. Transferred to ICU and was intubated 8/9. Now paralyzed, requiring pressor support, and  being sedated with Propofol. Order for OGT placement. Pt is COVID-19 +. Unable to obtain nutrition hx at this time. Labs reviewed. Meds: Cameron at 50mcg/min, Propofol at 50mcg/kg/min, Nimbex, Remdesivir, Decadron, statin, Vit C, zinc.  Loose BM noted 8/9.      Malnutrition Assessment:  Malnutrition Status:    N/A    Estimated Daily Nutrient Needs:  Energy (kcal):  0308 kcals(PAL 2097 x AF 1.2)  Protein (g):  140-156 gm(1.8-2gm/kg IBW/d)       Fluid (ml/day):  2516ml    Nutrition Related Findings:  N/A      Wounds:    (PI to nose)       Current Nutrition Therapies:   DIET NPO Except Meds    Anthropometric Measures:  · Height:  5' 11\" (180.3 cm)  · Current Body Wt:  107.9 kg (237 lb 14 oz)   · Admission Body Wt:       · Usual Body Wt:        · Ideal Body Wt:   :      · Adjusted Body Weight:   ; Weight Adjustment for: No adjustment   · Adjusted BMI:       · BMI Category:  Obese class 1 (BMI 30.0-34. 9)       Nutrition Diagnosis:   · Inadequate energy intake related to inadequate protein-energy intake as evidenced by intubation, NPO or clear liquid status due to medical condition    8/12: Nutrition Dx continues. Nutrition Interventions:   Food and/or Nutrient Delivery: Start tube feeding  Nutrition Education and Counseling: No recommendations at this time  Coordination of Nutrition Care: Continued inpatient monitoring    Goals:  Initiate EN to meet > 60% EEN's within next 2-3 days       Nutrition Monitoring and Evaluation:   Behavioral-Environmental Outcomes:    Food/Nutrient Intake Outcomes: Enteral nutrition intake/tolerance  Physical Signs/Symptoms Outcomes: GI status, Weight    Discharge Planning:     Too soon to determine     Electronically signed by Clint Tidwell, [address] [place] [address] on 8/12/2020 at 1:57 PM    Contact: [phone]

## 2020-08-12 NOTE — PROGRESS NOTES
Transition of Care Plan - RUR 17%  1. CM following  2. COVID-19 Pt remains intubated   3. Intensivist and/or attending have been providing updates to pt.'s parents daily on pt.'s clinical status.     Beau Barnes LCSW

## 2020-08-12 NOTE — PROGRESS NOTES
1900 - Bedside and Verbal shift change report given to Lilli Au RN (oncoming nurse) by Danette Walker RN (offgoing nurse). Report included the following information SBAR, Kardex, ED Summary, Intake/Output, MAR, Recent Results and Cardiac Rhythm NSR. Primary Nurse Mal Recinos and Danette Walker RN performed a dual skin assessment on this patient Impairment noted- see wound doc flow sheet (bridge of nose). Enrique score is 10. Drips verified with Danette Walker RN (Versed at 7 mg/hr, Nimbex at 4 mcg/kg/min, Fentanyl at 200 mcg/hr, Propofol at 10 mcg/kg/min). 2000 - Shift assessment completed. See flow sheet. Patient paralyzed and sedated. Unresponsive to all stimuli. No eye opening. TOF baseline 10, 2 twitches at 60. Nimbex infusing at 3.5 mg/kg/min, Versed at 7 mg/hr, Fentanyl at 200 mcg/hr, Propofol at 10 mcg/kg/min. ETT at 28 at the lip. OGT clamped at 54. Mouth care and suctioning completed. Chest tube in place connected to continuous suction. Mcwilliams in place and draining. Mcwilliams care given. Patient turned and repositioned, heel boots on.  2100 - TOF baseline 10 with 2 twitches at 60.  2115 - Patient HR increasing to the 130s to 140s with stimulation. Versed increased to 8 mg/hr. 2120 - TOF baseline 10 with 2 twitches at 60. Nimbex decreased to 3 mcg/kg/min. 2200 - TOF baseline 10 with 2 twitches at 50. Mouth care and suctioning completed. Patient turned and repositioned, heel boots on.  2300 - TOF baseline 10 with 2 twitches at 40.   0000 - Reassessment completed. No changes to previous assessment. TOF baseline 10 with 2 twitches at 40. Mouth care and suctioning completed. Mcwilliams care given. Patient turned and repositioned, heel boots on.  0100 - TOF baseline 10 with 2 twitches at 40.   0154 - AM labs drawn. 0200 - TOF baseline 10 with 2 twitches at 40. Mouth care and suctioning completed. Patient turned and repositioned, heel boots on.  0300 - TOF baseline 10 with 2 twitches at 40.  0400 - Reassessment completed.  No changes to previous assessment. TOF baseline 10 with 1 twitch at 40. Mouth care and suctioning completed. Mcwilliams care given. Patient turned and repositioned, heel boots on.  0500 - TOF baseline 10 with 1 twitch at 40.  0600 - TOF baseline 10 with 1 twitch at 50. Nimbex decreased to 2.5 mcg/kg/min. Mouth care and suctioning completed. Patient turned and repositioned, heel boots on.  0608 - HR in the 120s to 130s. Versed increased to 9 mg/hr. Propofol increased to 15 mcg/kg/min.  0616 - HR continues to be in the 120s to 130s. Versed increased to 10 mg/hr. Propofol increased to 20 mcg/kg/min.  0621 - HR continues to be in the 120s to 130s. Propofol increased to 25 mcg/kg/min.  0628 - HR continues to be in the 120s to 130s. Propofol increased to 30 mcg/kg/min. 7840 - O2 dropping to 88%. FiO2 increased to 80%. Respiratory at bedside to assess cuff.  0705 - Bedside and Verbal shift change report given to Debbie Solitario RN (oncoming nurse) by Carter Pavon RN (offgoing nurse). Report included the following information SBAR, Kardex, ED Summary, Intake/Output, MAR, Recent Results and Cardiac Rhythm NSR/Sinus Tach.

## 2020-08-12 NOTE — PROGRESS NOTES
visited Mr. Stephanie Tapia for a follow up visit in the ICU.  unable to visit with Mr. Stephanie Tapia at this time. He is on contact precautions and is intubated and sedated. Consulted with his RN who informed  that he appeared to be holding steady and family had just been updated. Provided silent prayer outside of Mr. Casimiro Cavazos room per family's previous request. 's will follow up as able and/or needed  [facility]. Amrit López.      Paging Service: [phone])

## 2020-08-12 NOTE — PROGRESS NOTES
Pharmacy Dosing Services:     Vancomycin Level 16.4 mcg/ml at 1300  Dose # 3 was given at 0648  So this is a trough level and it is therapeutic   Scr stable  Continue Vancomycin 1750 mg IV Q6h    Thank you,  Michael Ramirez, PharmD

## 2020-08-12 NOTE — PROGRESS NOTES
Name: [facility] CAMPUS: [facility]   : 1990 Admit Date: 2020   Phone: [phone]  Room: [address] [place] [address]   PCP: John Schrader MD  MRN: [id number]   Date: 2020  Code: Full Code          Chart and notes reviewed. Data reviewed. I review the patient's current medications in the medical record at each encounter. I have evaluated and examined the patient. History of Present Illness:  Mr. Zbigniew Stratton is a 26 yo gentleman with a history of tobacco use who is admitted with COVID-19 pneumonia and acute respiratory failure with hypoxia. He was diagnosed about 10 days ago at an urgent care facility with COVID-19. Last Thursday he began to have fevers, inability to take a deep breath that was progressively worse, dry cough, and general malaise/faitigue. He was prescribed azithromycin and prednisone without improvement. Denies n/v, abdominal pain, changes in taste/smell. Hypoxic as low as 76% on RA. Placed on BiPAP yesterday evening due to increased work of breathing. Labs reviewed: WBC 16.5, d-dimer 0.86, Na 129, cr 0.84, AST 86, , , Procalcitonin 0.33, ferritin 5114, IL6 in lab; crp pending    Images:  CXR with hazy interstitial changes and airspace opacification      PMH: tobacco use    FH: no family history pertinent to presents complaint    SH: +tobacco use    Interval history  Intubated, sedated  On Veletri  Paralyzed  Paralytics temporarily off yesterday afternoon. Pt desaturated to 70s when turned or coughing, Paralytics turned on again.    Left lung re-expanded with chest tube  New bradycardia this AM, sats 98% and yfn 25  Fentanyl 200  Prop 40  Versed 7  Currently on PEEP 15, Peak pressure 29    Social History     Tobacco Use    Smoking status: Current Some Day Smoker    Smokeless tobacco: Never Used   Substance Use Topics    Alcohol use: Not on file       No Known Allergies    Current Facility-Administered Medications   Medication Dose Route Frequency  midazolam in normal saline (VERSED) 1 mg/mL infusion  0-8 mg/hr IntraVENous TITRATE    vancomycin (VANCOCIN) 1,750 mg in 0.9% sodium chloride 500 mL IVPB  1,750 mg IntraVENous Q6H    cisatracurium (NIMBEX) 200 mg in 0.9% sodium chloride 100 mL (2 mg/mL) infusion  0-10 mcg/kg/min IntraVENous TITRATE    PHENYLephrine (TONNY-SYNEPHRINE) 30 mg in 0.9% sodium chloride 250 mL infusion   mcg/min IntraVENous TITRATE    alteplase (CATHFLO) 1 mg in sterile water (preservative free) 1 mL injection  1 mg InterCATHeter PRN    fentaNYL (PF) 1,500 mcg/30 mL (50 mcg/mL) infusion  0-200 mcg/hr IntraVENous TITRATE    propofol (DIPRIVAN) 10 mg/mL infusion  0-50 mcg/kg/min IntraVENous TITRATE    dexamethasone (PF) (DECADRON) 10 mg/mL injection 6 mg  6 mg IntraVENous Q6H    0.9% sodium chloride infusion 250 mL  250 mL IntraVENous PRN    famotidine (PF) (PEPCID) 20 mg in 0.9% sodium chloride 10 mL injection  20 mg IntraVENous Q12H    atorvastatin (LIPITOR) tablet 20 mg  20 mg Oral QHS    cefepime (MAXIPIME) 2 g in 0.9% sodium chloride (MBP/ADV) 100 mL  2 g IntraVENous Q8H    enoxaparin (LOVENOX) injection 105 mg  105 mg SubCUTAneous Q12H    LORazepam (ATIVAN) injection 0.5 mg  0.5 mg IntraVENous Q6H PRN    epoprostenol (VELETRI) 30 mcg/mL in 0.9% sodium chloride 50 mL inhalation solution  50 ng/kg/min (Ideal) Inhalation CONTINUOUS    morphine injection 1 mg  1 mg IntraVENous Q6H PRN    0.9% sodium chloride infusion 250 mL  250 mL IntraVENous PRN    sodium chloride (NS) flush 5-40 mL  5-40 mL IntraVENous Q8H    sodium chloride (NS) flush 5-40 mL  5-40 mL IntraVENous PRN    acetaminophen (TYLENOL) tablet 650 mg  650 mg Oral Q6H PRN    Or    acetaminophen (TYLENOL) suppository 650 mg  650 mg Rectal Q6H PRN    polyethylene glycol (MIRALAX) packet 17 g  17 g Oral DAILY PRN    promethazine (PHENERGAN) tablet 12.5 mg  12.5 mg Oral Q6H PRN    Or    ondansetron (ZOFRAN) injection 4 mg  4 mg IntraVENous Q6H PRN    ascorbic acid (vitamin C) (VITAMIN C) tablet 500 mg  500 mg Oral BID    zinc sulfate (ZINCATE) 220 (50) mg capsule 1 Cap  1 Cap Oral DAILY    benzonatate (TESSALON) capsule 100 mg  100 mg Oral TID PRN         REVIEW OF SYSTEMS   12 point ROS negative except as stated in the HPI. Physical Exam:   Visit Vitals  /87   Pulse 97   Temp 97.6 °F (36.4 °C)   Resp 22   Ht 5' 11\" (1.803 m)   Wt 108 kg (238 lb)   SpO2 95%   BMI 33.19 kg/m²       General:  Intubated, sedated,paralyzed   Head:  Normocephalic, without obvious abnormality, atraumatic. Eyes:  Conjunctivae/corneas clear. Nose: Nares normal   Throat: Lips, mucosa, and tongue normal.    Neck: Supple, symmetrical, trachea midline   Lungs:   CTA, no w/r/r   Chest wall:  No tenderness or deformity. Heart:  Regular rate and rhythm, S1, S2 normal, no murmur, click, rub or gallop. Abdomen:   Soft, non-tender. Bowel sounds normal. Obese   Extremities: Extremities normal, atraumatic, no cyanosis or edema. Skin: Skin color, texture, turgor normal. No rashes or lesions   Neurologic: Paralyzed, pupils pinpoint       Lab Results   Component Value Date/Time    Sodium 138 08/12/2020 02:07 AM    Potassium 4.3 08/12/2020 02:07 AM    Chloride 106 08/12/2020 02:07 AM    CO2 26 08/12/2020 02:07 AM    BUN 21 (H) 08/12/2020 02:07 AM    Creatinine 0.58 (L) 08/12/2020 02:07 AM    Glucose 137 (H) 08/12/2020 02:07 AM    Calcium 8.4 (L) 08/12/2020 02:07 AM    Magnesium 2.8 (H) 08/10/2020 05:55 AM    Phosphorus 4.1 08/10/2020 05:55 AM    Lactic acid 1.2 08/06/2020 01:24 PM       Lab Results   Component Value Date/Time    WBC 11.1 08/12/2020 02:07 AM    HGB 12.4 08/12/2020 02:07 AM    PLATELET 187 87/74/8594 02:07 AM    MCV 92.0 08/12/2020 02:07 AM       Lab Results   Component Value Date/Time    Alk.  phosphatase 67 08/12/2020 02:07 AM    Protein, total 6.2 (L) 08/12/2020 02:07 AM    Albumin 1.9 (L) 08/12/2020 02:07 AM    Globulin 4.3 (H) 08/12/2020 02:07 AM       Lab Results   Component Value Date/Time    Ferritin 3,227 (H) 08/10/2020 05:55 AM       Lab Results   Component Value Date/Time    C-Reactive protein 17.50 (H) 08/10/2020 05:55 AM        Lab Results   Component Value Date/Time    PH 7.38 08/12/2020 04:48 AM    PCO2 42 08/12/2020 04:48 AM    PO2 114 (H) 08/12/2020 04:48 AM    HCO3 24 08/12/2020 04:48 AM    FIO2 95 08/12/2020 04:48 AM       Lab Results   Component Value Date/Time    Troponin-I, Qt. <0.05 08/08/2020 12:13 PM        Lab Results   Component Value Date/Time    Culture result: NO GROWTH 3 DAYS 08/09/2020 09:24 AM    Culture result: NO GROWTH 5 DAYS 08/05/2020 08:49 PM       No results found for: TOXA1, RPR, HBCM, HBSAG, HAAB, HCAB1, HAAT, G6PD, CRYAC, HIVGT, HIVR, HIV1, HIV12, HIVPC, HIVRPI    Lab Results   Component Value Date/Time    Vancomycin,trough 7.6 08/11/2020 11:34 AM       No results found for: COLOR, APPRN, SPGRU, RAE, PROTU, GLUCU, KETU, BILU, BLDU, UROU, ROBLES, LEUKU, WBCU, RBCU, UEPI, BACTU, CASTS, UCRY    IMPRESSION  · COVID-19 Pneumonia  · Acute respiratory failure with hypoxia  · PTX bilaterally  · Elevated LFTs  · Tobacco  use    PLAN  · Continue full vent support  · Pigtail to suction  · Keep prop dose below 30 due bradycardia in higher doses, cont versed/fentanyl. · Continue Zinc, vitamin C  · Continue cefepime and Vanc  · Cameron off  · PICC line placed 8/10  · Continue decadron for 10 days  · remdesivir finished 8/10   · convalescent plasma given 8/10, may give second dose today  · Monitor renal function, LFTs,  · Inflammatory markers, d-dimer  · PRN proventil  · Continue Lovenox therapeutic dosing for now- d-dimer trending up  · NPO except meds until pulmonary status stabilizes  · Full code  · Prognosis very guarded    Patient is critically ill with worsening acute hypoxic respiratory failure and COVID 19  Pneumonia. CC time EOP 41 min. Discussed with nursing and RT.     Franklin Barros MD

## 2020-08-12 NOTE — PROGRESS NOTES
0700: Bedside and Verbal shift change report given to CATINA Alfaro (oncoming nurse) by Indy Hernandez RN (offgoing nurse). Report included the following information SBAR, Procedure Summary, Intake/Output, MAR, Recent Results and Cardiac Rhythm NSR. Primary Nurse Danica Flores and CATINA Plaza performed a dual skin assessment on this patient No impairment noted  Enrique score is see flow sheet  2865: All drips verified. 0730: Dr. Bria Diggs present at bedside. Decreased FiO2 to 85%. Orders to keep chest tube to suction. 0800: Assessment completed. Patient on nimbex drip, TOF 2/4, PERRLA, HR ranging from 120-150, BP stable, lungs diminished, on ventilator  FiO2 85% PEEP 15 RR 22 SpO2 mid 90s, bowel sounds hypoactive, OGT present 40 mL yellow/green gastric residual, live in place and draining, live care provided, skin intact with small abrasion to bridge of nose sacrum pink and blanchable. Linens and gown changed, CHG bath completed. Endotracheal and mouth care completed. 0827: HR up to 150, per Dr Bria Diggs can increase Versed drip to 10 mcg. Keep propofol 20-30 mcg. See MAR.  0900: TOF 1/4. Nimbex drip at 2.5 mcg. Goal 1-2 twitches. Labs drawn and sent. PERRLA  1000: TOF 1/4. Patient turned and repositioned. Endotracheal and mouth care provided. PERRLA. TV at 320 and gurgling noted suspect cuff leak, 1 mL air added to patients cuff, TV Improved to 450. RT and Dr Bria Diggs informed. Will continue to monitor. 1100: TOF 3/4. Nimbex rate changed to 3 mcg. PERRLA. 1120: TOF 2/4. Will continue to monitor. 1130: Dr B[facility] Dig[facility] Orders to modify dosage of propofol to max of 30 mcg.   1200: Reassessment completed. TOF 2/4. PERRLA. Endotracheal and mouth care provided. Patient turned and repositioned. 1230: Call from blood bank states [place] is low on convalescent plasma, could be 24-72 hours. 1255: Called Dr. Lavelle Amaya to let him know there is a delay on convalescent plasma. 1300: TOF 1/4. PERRLA. Labs drawn and sent. 1400: TOF 2/4. PERRLA. Patient turned and repositioned. Endotracheal and mouth care provided. 1500: TOF 0/4. PERRLA. TV at 260, audible gurgling from patient. RT present at bedside, more air added to cuff, TV up to 450. SpO2 mid to upper 90s.  1507: Nimbex changed to 2.5 mcg.  1522: TOF 0/4. Nimbex changed to 2 mcg.  1530: Dr. Russell Medrano paged. Awaiting return call. 1538: TOF 1/4.  1552: Return call from Dr. Russell Medrano, informed on suspected cuff leak and bloody drainage around chest tube insertion site. Orders to have RT check cuff pressures. Patient does not need to be reintubated at this time. 1600: TOF 2/4. PERRLA. Reassessment completed. Patient turned and repositioned. Endotracheal and mouth care provided. 1605: Neal RT present at bedside to assess cuff pressure and placed  on cuff end.  1700: TOF 4/4. Nimbex changed to 2.5 mcg. PERRLA. SpO2 89% on FiO2 70%, patient suctioned, scant amount of clear from Et  Tube. SpO2 increased to 93 on 100% boost, quickly decreased back to 89%. Respiratory called and FiO2 increased to 75%. Patient tolerating with SpO2 at 93%. 1715: TOF 4/4. Nimbex changed to 3 mcg.  1731: TOF 4/4/. Nimbex changed to 3.5 mcg.  1746: TOF 4/4. Nimbex changed to 4 mcg.  1800: TOF 1/4. PERRLA. Patient turned and repositioned. Endotracheal and mouth care provided. Mcwilliams bag emptied. 1814: RT present at bedside. 1900: TOF 0/4. Nimbex changed to 3.5 mcg. PERRLA  Bedside shift change report given to Stella Acosta RN (oncoming nurse) by Stefania/CATINA Casey (offgoing nurse). Report included the following information SBAR, Intake/Output, MAR, Recent Results and Cardiac Rhythm NSR.

## 2020-08-12 NOTE — PROGRESS NOTES
Bill Zepeda [facility] [address]  [address]  [phone]      Medical Progress Note      NAME: Pennie Quinones   :  1990  MRM:  [id number]    Date of service: 2020  2:35 PM       Assessment and Plan:   1. Acute respiratory failure with hypoxia due to COVID 19 pneumonia. Intubated on 8/10. Vent support per intensivist.  CXR 8/10: significantly increased diffuse bilateral airspace  disease. Persistent pneumomediastinum, small biapical pneumothoraces, and subcutaneous emphysema. Remdesivir(finished on 8/10) and convalesce plasma on 8/10. Continue  dexamethasone, vitamin C, zinc, therapeutic Lovenox, broad spectrum ABx, statin. Poor prognosis.        2.  Elevated liver enzymes: improved. 3.  Obesity:  Body mass index is 33.19 kg/m². Would benefit from weight loss and dietary / lifestyle modifications          Subjective:     Chief Complaint[de-identified] Patient was seen and examined as a follow up for COVID 19. Chart was reviewed. intubated and sedated. I discussed with Dr Nataliia Brown, intensivist. Didn't see pt in person due to [place]. Objective:     Last 24hrs VS reviewed since prior progress note. Most recent are:    Visit Vitals  /62   Pulse 80   Temp 99.6 °F (37.6 °C)   Resp 22   Ht 5' 11\" (1.803 m)   Wt 108 kg (238 lb)   SpO2 93%   BMI 33.19 kg/m²     SpO2 Readings from Last 6 Encounters:   20 93%    O2 Flow Rate (L/min): 36 l/min       Intake/Output Summary (Last 24 hours) at 2020 1402  Last data filed at 2020 1300  Gross per 24 hour   Intake 3394.21 ml   Output 2646 ml   Net 748.21 ml        Physical Exam:    Gen:  Well-developed, well-nourished, in no acute distress   Resp:  No accessory muscle use, intubated  Abd:  Moves with respiration  Neuro:  Intubated and sedated   Psych:   Intubated and sedated  __________________________________________________________________  Medications Reviewed: (see below)  Medications: Current Facility-Administered Medications   Medication Dose Route Frequency    0.9% sodium chloride infusion 250 mL  250 mL IntraVENous PRN    enoxaparin (LOVENOX) injection 50 mg  0.5 mg/kg SubCUTAneous Q12H    midazolam in normal saline (VERSED) 1 mg/mL infusion  0-10 mg/hr IntraVENous TITRATE    vancomycin (VANCOCIN) 1,750 mg in 0.9% sodium chloride 500 mL IVPB  1,750 mg IntraVENous Q6H    cisatracurium (NIMBEX) 200 mg in 0.9% sodium chloride 100 mL (2 mg/mL) infusion  0-10 mcg/kg/min IntraVENous TITRATE    PHENYLephrine (TONNY-SYNEPHRINE) 30 mg in 0.9% sodium chloride 250 mL infusion   mcg/min IntraVENous TITRATE    alteplase (CATHFLO) 1 mg in sterile water (preservative free) 1 mL injection  1 mg InterCATHeter PRN    fentaNYL (PF) 1,500 mcg/30 mL (50 mcg/mL) infusion  0-200 mcg/hr IntraVENous TITRATE    propofol (DIPRIVAN) 10 mg/mL infusion  0-30 mcg/kg/min IntraVENous TITRATE    dexamethasone (PF) (DECADRON) 10 mg/mL injection 6 mg  6 mg IntraVENous Q6H    0.9% sodium chloride infusion 250 mL  250 mL IntraVENous PRN    famotidine (PF) (PEPCID) 20 mg in 0.9% sodium chloride 10 mL injection  20 mg IntraVENous Q12H    atorvastatin (LIPITOR) tablet 20 mg  20 mg Oral QHS    cefepime (MAXIPIME) 2 g in 0.9% sodium chloride (MBP/ADV) 100 mL  2 g IntraVENous Q8H    LORazepam (ATIVAN) injection 0.5 mg  0.5 mg IntraVENous Q6H PRN    epoprostenol (VELETRI) 30 mcg/mL in 0.9% sodium chloride 50 mL inhalation solution  50 ng/kg/min (Ideal) Inhalation CONTINUOUS    morphine injection 1 mg  1 mg IntraVENous Q6H PRN    0.9% sodium chloride infusion 250 mL  250 mL IntraVENous PRN    sodium chloride (NS) flush 5-40 mL  5-40 mL IntraVENous Q8H    sodium chloride (NS) flush 5-40 mL  5-40 mL IntraVENous PRN    acetaminophen (TYLENOL) tablet 650 mg  650 mg Oral Q6H PRN    Or    acetaminophen (TYLENOL) suppository 650 mg  650 mg Rectal Q6H PRN    polyethylene glycol (MIRALAX) packet 17 g  17 g Oral DAILY PRN    promethazine (PHENERGAN) tablet 12.5 mg  12.5 mg Oral Q6H PRN    Or    ondansetron (ZOFRAN) injection 4 mg  4 mg IntraVENous Q6H PRN    ascorbic acid (vitamin C) (VITAMIN C) tablet 500 mg  500 mg Oral BID    zinc sulfate (ZINCATE) 220 (50) mg capsule 1 Cap  1 Cap Oral DAILY    benzonatate (TESSALON) capsule 100 mg  100 mg Oral TID PRN        Lab Data Reviewed: (see below)  Lab Review:     Recent Labs     08/12/20  0207 08/11/20  0509 08/10/20  0555   WBC 11.1 11.5* 29.0*   HGB 12.4 12.6 14.9   HCT 36.9 39.0 44.8    263 383     Recent Labs     08/12/20  0207 08/11/20  0509 08/10/20  0555    138 136   K 4.3 4.3 4.2    106 104   CO2 26 28 24   * 122* 147*   BUN 21* 19 35*   CREA 0.58* 0.59* 1.10   CA 8.4* 8.4* 8.9   MG  --   --  2.8*   PHOS  --   --  4.1   ALB 1.9* 2.0* 2.2*   TBILI 0.7 0.7 2.0*   ALT 29 34 51     No results found for: GLUCPOC  Recent Labs     08/12/20  0448 08/11/20  0505 08/10/20  1359   PH 7.38 7.39 7.36   PCO2 42 39 38   PO2 114* 81 91   HCO3 24 23 21*   FIO2 95 90 100     No results for input(s): INR, INREXT, INREXT in the last 72 hours.   All Micro Results     Procedure Component Value Units Date/Time    CULTURE, BLOOD [[id number]] Collected:  08/09/20 0924    Order Status:  Completed Specimen:  Blood Updated:  08/12/20 0436     Special Requests: NO SPECIAL REQUESTS        Culture result: NO GROWTH 3 DAYS       CULTURE, BLOOD, PAIRED [[id number]] Collected:  08/05/20 2049    Order Status:  Completed Specimen:  Blood Updated:  08/10/20 0608     Special Requests: NO SPECIAL REQUESTS        Culture result: NO GROWTH 5 DAYS       CULTURE, RESPIRATORY/SPUTUM/BRONCH Thereasa Gravely STAIN [[id number]] Collected:  08/09/20 1845    Order Status:  Canceled Specimen:  Sputum     C. DIFFICILE AG & TOXIN A/B [[id number]] Collected:  08/08/20 1213    Order Status:  Completed Specimen:  Stool Updated:  08/09/20 0927     GD ANTIGEN Negative        C. difficile toxin Negative INTERPRETATION       NEGATIVE FOR TOXIGENIC C. DIFFICILE                I have reviewed notes of prior 24hr. Other pertinent lab:      Total time spent with patient: [address] discussed with: Family, Nursing Staff, Consultant/Specialist and >50% of time spent in counseling and coordination of care    Discussed:  Care Plan    Prophylaxis:  Lovenox    Disposition:  prognosis: guarded            ___________________________________________________    Attending Physician: Amadou Honeycutt MD

## 2020-08-12 NOTE — PROGRESS NOTES
Update mother over the phone. Informed her that this morning there is not much change from yesterday.

## 2020-08-13 ENCOUNTER — APPOINTMENT (OUTPATIENT)
Dept: GENERAL RADIOLOGY | Age: 30
DRG: 870 | End: 2020-08-13
Attending: INTERNAL MEDICINE
Payer: COMMERCIAL

## 2020-08-13 LAB
ALBUMIN SERPL-MCNC: 1.9 G/DL (ref 3.5–5)
ALBUMIN/GLOB SERPL: 0.5 {RATIO} (ref 1.1–2.2)
ALP SERPL-CCNC: 68 U/L (ref 45–117)
ALT SERPL-CCNC: 27 U/L (ref 12–78)
ANION GAP SERPL CALC-SCNC: 4 MMOL/L (ref 5–15)
ARTERIAL PATENCY WRIST A: YES
AST SERPL-CCNC: 17 U/L (ref 15–37)
BASE DEFICIT BLDA-SCNC: 1.6 MMOL/L
BDY SITE: NORMAL
BILIRUB SERPL-MCNC: 0.7 MG/DL (ref 0.2–1)
BUN SERPL-MCNC: 23 MG/DL (ref 6–20)
BUN/CREAT SERPL: 42 (ref 12–20)
CALCIUM SERPL-MCNC: 8.4 MG/DL (ref 8.5–10.1)
CHLORIDE SERPL-SCNC: 105 MMOL/L (ref 97–108)
CO2 SERPL-SCNC: 28 MMOL/L (ref 21–32)
CREAT SERPL-MCNC: 0.55 MG/DL (ref 0.7–1.3)
CRP SERPL HS-MCNC: >9.5 MG/L
D DIMER PPP FEU-MCNC: 0.66 MG/L FEU (ref 0–0.65)
EPAP/CPAP/PEEP, PAPEEP: 15
ERYTHROCYTE [DISTWIDTH] IN BLOOD BY AUTOMATED COUNT: 12 % (ref 11.5–14.5)
FERRITIN SERPL-MCNC: 1762 NG/ML (ref 26–388)
FIO2 ON VENT: 75 %
GAS FLOW.O2 SETTING OXYMISER: 22 L/MIN
GLOBULIN SER CALC-MCNC: 4.1 G/DL (ref 2–4)
GLUCOSE SERPL-MCNC: 129 MG/DL (ref 65–100)
HCO3 BLDA-SCNC: 23 MMOL/L (ref 22–26)
HCT VFR BLD AUTO: 36.1 % (ref 36.6–50.3)
HGB BLD-MCNC: 11.7 G/DL (ref 12.1–17)
LDH SERPL L TO P-CCNC: 287 U/L (ref 85–241)
MCH RBC QN AUTO: 29.9 PG (ref 26–34)
MCHC RBC AUTO-ENTMCNC: 32.4 G/DL (ref 30–36.5)
MCV RBC AUTO: 92.3 FL (ref 80–99)
NRBC # BLD: 0 K/UL (ref 0–0.01)
NRBC BLD-RTO: 0 PER 100 WBC
PCO2 BLDA: 37 MMHG (ref 35–45)
PH BLDA: 7.41 [PH] (ref 7.35–7.45)
PLATELET # BLD AUTO: 204 K/UL (ref 150–400)
PMV BLD AUTO: 10.8 FL (ref 8.9–12.9)
PO2 BLDA: 81 MMHG (ref 80–100)
POTASSIUM SERPL-SCNC: 4.2 MMOL/L (ref 3.5–5.1)
PROT SERPL-MCNC: 6 G/DL (ref 6.4–8.2)
RBC # BLD AUTO: 3.91 M/UL (ref 4.1–5.7)
SAO2 % BLD: 96 % (ref 92–97)
SAO2% DEVICE SAO2% SENSOR NAME: NORMAL
SODIUM SERPL-SCNC: 137 MMOL/L (ref 136–145)
SPECIMEN SITE: NORMAL
VENTILATION MODE VENT: NORMAL
VT SETTING VENT: 450 ML
WBC # BLD AUTO: 12.3 K/UL (ref 4.1–11.1)

## 2020-08-13 PROCEDURE — 74011250636 HC RX REV CODE- 250/636: Performed by: INTERNAL MEDICINE

## 2020-08-13 PROCEDURE — 82728 ASSAY OF FERRITIN: CPT

## 2020-08-13 PROCEDURE — 71045 X-RAY EXAM CHEST 1 VIEW: CPT

## 2020-08-13 PROCEDURE — 74011250637 HC RX REV CODE- 250/637: Performed by: INTERNAL MEDICINE

## 2020-08-13 PROCEDURE — 74011000258 HC RX REV CODE- 258: Performed by: INTERNAL MEDICINE

## 2020-08-13 PROCEDURE — 94645 CONT INHLJ TX EACH ADDL HOUR: CPT

## 2020-08-13 PROCEDURE — 94003 VENT MGMT INPAT SUBQ DAY: CPT

## 2020-08-13 PROCEDURE — 82803 BLOOD GASES ANY COMBINATION: CPT

## 2020-08-13 PROCEDURE — 74011000250 HC RX REV CODE- 250: Performed by: INTERNAL MEDICINE

## 2020-08-13 PROCEDURE — 85379 FIBRIN DEGRADATION QUANT: CPT

## 2020-08-13 PROCEDURE — 65610000006 HC RM INTENSIVE CARE

## 2020-08-13 PROCEDURE — 36600 WITHDRAWAL OF ARTERIAL BLOOD: CPT

## 2020-08-13 PROCEDURE — 36415 COLL VENOUS BLD VENIPUNCTURE: CPT

## 2020-08-13 PROCEDURE — 83615 LACTATE (LD) (LDH) ENZYME: CPT

## 2020-08-13 PROCEDURE — 94644 CONT INHLJ TX 1ST HOUR: CPT

## 2020-08-13 PROCEDURE — 80053 COMPREHEN METABOLIC PANEL: CPT

## 2020-08-13 PROCEDURE — 85027 COMPLETE CBC AUTOMATED: CPT

## 2020-08-13 PROCEDURE — 86141 C-REACTIVE PROTEIN HS: CPT

## 2020-08-13 RX ORDER — FUROSEMIDE 10 MG/ML
20 INJECTION INTRAMUSCULAR; INTRAVENOUS ONCE
Status: COMPLETED | OUTPATIENT
Start: 2020-08-13 | End: 2020-08-13

## 2020-08-13 RX ADMIN — MORPHINE SULFATE 1 MG: 2 INJECTION, SOLUTION INTRAMUSCULAR; INTRAVENOUS at 23:01

## 2020-08-13 RX ADMIN — MIDAZOLAM HYDROCHLORIDE 8 MG/HR: 5 INJECTION, SOLUTION INTRAMUSCULAR; INTRAVENOUS at 03:31

## 2020-08-13 RX ADMIN — ATORVASTATIN CALCIUM 20 MG: 20 TABLET, FILM COATED ORAL at 21:00

## 2020-08-13 RX ADMIN — Medication 200 MCG/HR: at 19:45

## 2020-08-13 RX ADMIN — SODIUM CHLORIDE 3 MCG/KG/MIN: 900 INJECTION, SOLUTION INTRAVENOUS at 05:43

## 2020-08-13 RX ADMIN — Medication 500 MG: at 17:44

## 2020-08-13 RX ADMIN — DEXAMETHASONE SODIUM PHOSPHATE 6 MG: 10 INJECTION, SOLUTION INTRAMUSCULAR; INTRAVENOUS at 05:20

## 2020-08-13 RX ADMIN — FAMOTIDINE 20 MG: 10 INJECTION, SOLUTION INTRAVENOUS at 20:58

## 2020-08-13 RX ADMIN — CEFEPIME HYDROCHLORIDE 2 G: 2 INJECTION, POWDER, FOR SOLUTION INTRAVENOUS at 00:27

## 2020-08-13 RX ADMIN — EPOPROSTENOL 50 NG/KG/MIN: 1.5 INJECTION, POWDER, LYOPHILIZED, FOR SOLUTION INTRAVENOUS at 13:37

## 2020-08-13 RX ADMIN — VANCOMYCIN HYDROCHLORIDE 1750 MG: 10 INJECTION, POWDER, LYOPHILIZED, FOR SOLUTION INTRAVENOUS at 01:10

## 2020-08-13 RX ADMIN — CEFEPIME HYDROCHLORIDE 2 G: 2 INJECTION, POWDER, FOR SOLUTION INTRAVENOUS at 08:06

## 2020-08-13 RX ADMIN — FUROSEMIDE 20 MG: 10 INJECTION, SOLUTION INTRAMUSCULAR; INTRAVENOUS at 09:19

## 2020-08-13 RX ADMIN — ENOXAPARIN SODIUM 50 MG: 100 INJECTION SUBCUTANEOUS at 20:58

## 2020-08-13 RX ADMIN — PROPOFOL 10 MCG/KG/MIN: 10 INJECTION, EMULSION INTRAVENOUS at 00:29

## 2020-08-13 RX ADMIN — Medication 10 ML: at 14:56

## 2020-08-13 RX ADMIN — EPOPROSTENOL 50 NG/KG/MIN: 1.5 INJECTION, POWDER, LYOPHILIZED, FOR SOLUTION INTRAVENOUS at 20:55

## 2020-08-13 RX ADMIN — VANCOMYCIN HYDROCHLORIDE 1750 MG: 10 INJECTION, POWDER, LYOPHILIZED, FOR SOLUTION INTRAVENOUS at 13:06

## 2020-08-13 RX ADMIN — DEXAMETHASONE SODIUM PHOSPHATE 0.6 MG: 10 INJECTION, SOLUTION INTRAMUSCULAR; INTRAVENOUS at 17:43

## 2020-08-13 RX ADMIN — Medication 200 MCG/HR: at 11:45

## 2020-08-13 RX ADMIN — FAMOTIDINE 20 MG: 10 INJECTION, SOLUTION INTRAVENOUS at 08:05

## 2020-08-13 RX ADMIN — ENOXAPARIN SODIUM 50 MG: 100 INJECTION SUBCUTANEOUS at 08:08

## 2020-08-13 RX ADMIN — PROPOFOL 30 MCG/KG/MIN: 10 INJECTION, EMULSION INTRAVENOUS at 09:17

## 2020-08-13 RX ADMIN — PROPOFOL 40 MCG/KG/MIN: 10 INJECTION, EMULSION INTRAVENOUS at 22:06

## 2020-08-13 RX ADMIN — MIDAZOLAM HYDROCHLORIDE 10 MG/HR: 5 INJECTION, SOLUTION INTRAMUSCULAR; INTRAVENOUS at 16:07

## 2020-08-13 RX ADMIN — Medication 1 CAPSULE: at 08:06

## 2020-08-13 RX ADMIN — VANCOMYCIN HYDROCHLORIDE 1750 MG: 10 INJECTION, POWDER, LYOPHILIZED, FOR SOLUTION INTRAVENOUS at 06:30

## 2020-08-13 RX ADMIN — DEXAMETHASONE SODIUM PHOSPHATE 6 MG: 10 INJECTION, SOLUTION INTRAMUSCULAR; INTRAVENOUS at 11:53

## 2020-08-13 RX ADMIN — EPOPROSTENOL 50 NG/KG/MIN: 1.5 INJECTION, POWDER, LYOPHILIZED, FOR SOLUTION INTRAVENOUS at 00:50

## 2020-08-13 RX ADMIN — PROPOFOL 30 MCG/KG/MIN: 10 INJECTION, EMULSION INTRAVENOUS at 18:49

## 2020-08-13 RX ADMIN — Medication 10 ML: at 05:20

## 2020-08-13 RX ADMIN — CEFEPIME HYDROCHLORIDE 2 G: 2 INJECTION, POWDER, FOR SOLUTION INTRAVENOUS at 18:17

## 2020-08-13 RX ADMIN — VANCOMYCIN HYDROCHLORIDE 1750 MG: 10 INJECTION, POWDER, LYOPHILIZED, FOR SOLUTION INTRAVENOUS at 19:40

## 2020-08-13 RX ADMIN — Medication 200 MCG/HR: at 03:54

## 2020-08-13 RX ADMIN — Medication 500 MG: at 08:06

## 2020-08-13 RX ADMIN — PROPOFOL 30 MCG/KG/MIN: 10 INJECTION, EMULSION INTRAVENOUS at 13:22

## 2020-08-13 RX ADMIN — EPOPROSTENOL 50 NG/KG/MIN: 1.5 INJECTION, POWDER, LYOPHILIZED, FOR SOLUTION INTRAVENOUS at 06:44

## 2020-08-13 RX ADMIN — DEXAMETHASONE SODIUM PHOSPHATE 6 MG: 10 INJECTION, SOLUTION INTRAMUSCULAR; INTRAVENOUS at 23:01

## 2020-08-13 RX ADMIN — Medication 10 ML: at 23:01

## 2020-08-13 NOTE — PROGRESS NOTES
Bill Mckeonelsen [facility] 79  [address] [facility], [address] [place] [address]  [phone]      Medical Progress Note      NAME: Rosi Baker   :  1990  MRM:  [id number]    Date of service: 2020  2:35 PM       Assessment and Plan:   1. Acute respiratory failure with hypoxia due to COVID 19 pneumonia. Intubated on 8/10. Vent support per intensivist.  CXR: significantly increased diffuse bilateral airspace  disease. Remdesivir(finished on 8/10) and convalesce plasma on 8/10. Continue  dexamethasone, vitamin C, zinc, therapeutic Lovenox, broad spectrum ABx, statin. Poor prognosis.        2. Pneumothorax/ subcutaneous emphysema. Pigtail placed on  with expansion of L lung. CXR shows improvement. 3.  Elevated liver enzymes: improved. 4.  Obesity:  Body mass index is 33.19 kg/m². Would benefit from weight loss and dietary / lifestyle modifications          Subjective:     Chief Complaint[de-identified] Patient was seen and examined as a follow up for COVID 19. Chart was reviewed. intubated and sedated. I discussed with Dr Jesse Stubbs, intensivist. Didn't see pt in person due to [place]. Objective:     Last 24hrs VS reviewed since prior progress note. Most recent are:    Visit Vitals  /73   Pulse 70   Temp 97.6 °F (36.4 °C)   Resp 22   Ht 5' 11\" (1.803 m)   Wt 108 kg (238 lb)   SpO2 90%   BMI 33.19 kg/m²     SpO2 Readings from Last 6 Encounters:   20 90%    O2 Flow Rate (L/min): 36 l/min       Intake/Output Summary (Last 24 hours) at 2020 1213  Last data filed at 2020 1100  Gross per 24 hour   Intake 3299.66 ml   Output 3154 ml   Net 145.66 ml        Physical Exam:    Gen:  Well-developed, well-nourished, in no acute distress   Resp:  No accessory muscle use, intubated  Abd:  Moves with respiration  Neuro:  Intubated and sedated   Psych:   Intubated and sedated  __________________________________________________________________  Medications Reviewed: (see below)  Medications:     Current Facility-Administered Medications   Medication Dose Route Frequency    enoxaparin (LOVENOX) injection 50 mg  0.5 mg/kg SubCUTAneous Q12H    midazolam in normal saline (VERSED) 1 mg/mL infusion  0-10 mg/hr IntraVENous TITRATE    vancomycin (VANCOCIN) 1,750 mg in 0.9% sodium chloride 500 mL IVPB  1,750 mg IntraVENous Q6H    cisatracurium (NIMBEX) 200 mg in 0.9% sodium chloride 100 mL (2 mg/mL) infusion  0-10 mcg/kg/min IntraVENous TITRATE    PHENYLephrine (TONNY-SYNEPHRINE) 30 mg in 0.9% sodium chloride 250 mL infusion   mcg/min IntraVENous TITRATE    alteplase (CATHFLO) 1 mg in sterile water (preservative free) 1 mL injection  1 mg InterCATHeter PRN    fentaNYL (PF) 1,500 mcg/30 mL (50 mcg/mL) infusion  0-200 mcg/hr IntraVENous TITRATE    propofol (DIPRIVAN) 10 mg/mL infusion  0-30 mcg/kg/min IntraVENous TITRATE    dexamethasone (PF) (DECADRON) 10 mg/mL injection 6 mg  6 mg IntraVENous Q6H    0.9% sodium chloride infusion 250 mL  250 mL IntraVENous PRN    famotidine (PF) (PEPCID) 20 mg in 0.9% sodium chloride 10 mL injection  20 mg IntraVENous Q12H    atorvastatin (LIPITOR) tablet 20 mg  20 mg Oral QHS    cefepime (MAXIPIME) 2 g in 0.9% sodium chloride (MBP/ADV) 100 mL  2 g IntraVENous Q8H    LORazepam (ATIVAN) injection 0.5 mg  0.5 mg IntraVENous Q6H PRN    epoprostenol (VELETRI) 30 mcg/mL in 0.9% sodium chloride 50 mL inhalation solution  50 ng/kg/min (Ideal) Inhalation CONTINUOUS    morphine injection 1 mg  1 mg IntraVENous Q6H PRN    0.9% sodium chloride infusion 250 mL  250 mL IntraVENous PRN    sodium chloride (NS) flush 5-40 mL  5-40 mL IntraVENous Q8H    sodium chloride (NS) flush 5-40 mL  5-40 mL IntraVENous PRN    acetaminophen (TYLENOL) tablet 650 mg  650 mg Oral Q6H PRN    Or    acetaminophen (TYLENOL) suppository 650 mg  650 mg Rectal Q6H PRN    polyethylene glycol (MIRALAX) packet 17 g  17 g Oral DAILY PRN    promethazine (PHENERGAN) tablet 12.5 mg  12.5 mg Oral Q6H PRN    Or    ondansetron (ZOFRAN) injection 4 mg  4 mg IntraVENous Q6H PRN    ascorbic acid (vitamin C) (VITAMIN C) tablet 500 mg  500 mg Oral BID    zinc sulfate (ZINCATE) 220 (50) mg capsule 1 Cap  1 Cap Oral DAILY    benzonatate (TESSALON) capsule 100 mg  100 mg Oral TID PRN        Lab Data Reviewed: (see below)  Lab Review:     Recent Labs     08/13/20  0154 08/12/20  0207 08/11/20  0509   WBC 12.3* 11.1 11.5*   HGB 11.7* 12.4 12.6   HCT 36.1* 36.9 39.0    214 263     Recent Labs     08/13/20  0154 08/12/20  0207 08/11/20  0509    138 138   K 4.2 4.3 4.3    106 106   CO2 28 26 28   * 137* 122*   BUN 23* 21* 19   CREA 0.55* 0.58* 0.59*   CA 8.4* 8.4* 8.4*   ALB 1.9* 1.9* 2.0*   TBILI 0.7 0.7 0.7   ALT 27 29 34     No results found for: [facility]  Recent Labs     08/13/20  0433 08/12/20  0448 08/11/20  0505   PH 7.41 7.38 7.39   PCO2 37 42 39   PO2 81 114* 81   HCO3 23 24 23   FIO2 75 95 90     No results for input(s): INR, INREXT, INREXT in the last 72 hours.   All Micro Results     Procedure Component Value Units Date/Time    CULTURE, BLOOD [[id number]] Collected:  08/09/20 0924    Order Status:  Completed Specimen:  Blood Updated:  08/13/20 0601     Special Requests: NO SPECIAL REQUESTS        Culture result: NO GROWTH 4 DAYS       CULTURE, BLOOD, PAIRED [[id number]] Collected:  08/05/20 2049    Order Status:  Completed Specimen:  Blood Updated:  08/10/20 0608     Special Requests: NO SPECIAL REQUESTS        Culture result: NO GROWTH 5 DAYS       CULTURE, RESPIRATORY/SPUTUM/BRONCH Danitza Brian STAIN [[id number]] Collected:  08/09/20 1845    Order Status:  Canceled Specimen:  Sputum     C. DIFFICILE AG & TOXIN A/B [[id number]] Collected:  08/08/20 1213    Order Status:  Completed Specimen:  Stool Updated:  08/09/20 0927     [facility] ANTIGEN Negative        C. difficile toxin Negative        INTERPRETATION       NEGATIVE FOR TOXIGENIC C. DIFFICILE                I have reviewed notes of prior 24hr. Other pertinent lab:      Total time spent with patient: 30 [address] discussed with: Family, Nursing Staff, Consultant/Specialist and >50% of time spent in counseling and coordination of care    Discussed:  Care Plan    Prophylaxis:  Lovenox    Disposition:  prognosis: guarded            ___________________________________________________    Attending Physician: Loreto Winters MD

## 2020-08-13 NOTE — PROGRESS NOTES
[address] [facility]       ICU -  Resident Daily Progress Note  Name: Belle Hylton   : 1990   MRN: [id number]   Date: 2020 9:29 AM     I have reviewed the flowsheet and previous days notes. The patient is unable to give any meaningful history or review of systems because the patient is intubated and critically ill. Overnight events reviewed:  · HR increased to 120-130s, propofol increased  · O2 dropped to 88%, FiO2 increased to 80%    Vital Signs:    Visit Vitals  /77   Pulse 70   Temp 97.7 °F (36.5 °C)   Resp 22   Ht 5' 11\" (1.803 m)   Wt 238 lb (108 kg)   SpO2 91%   BMI 33.19 kg/m²       O2 Device: Endotracheal tube, Ventilator   O2 Flow Rate (L/min): 36 l/min   Temp (24hrs), Av °F (37.2 °C), Min:97.7 °F (36.5 °C), Max:99.6 °F (37.6 °C)       Intake/Output:   Last shift:      No intake/output data recorded.   Last 3 shifts:  190 -  0700  In: 5313.1 [I.V.:5233.1]  Out: 4517 [Urine:4475]    Intake/Output Summary (Last 24 hours) at 2020 9787  Last data filed at 2020 0700  Gross per 24 hour   Intake 3253.41 ml   Output 2917 ml   Net 336.41 ml       Ventilator Settings:  Ventilator Mode: PRVC  Respiratory Rate  Back-Up Rate: 22  Insp Time (sec): 0.9 sec  I:E Ratio: 1:1.2  Ventilator Volumes  Vt Set (ml): 450 ml  Vt Exhaled (Machine Breath) (ml): 466 ml  Vt Spont (ml): 629 ml  Ve Observed (l/min): 10.2 l/min  Ventilator Pressures  Pressure Support (cm H2O): 10 cm H2O  PIP Observed (cm H2O): 34 cm H2O  Plateau Pressure (cm H2O): 32 cm H2O  MAP (cm H2O): 22  PEEP/VENT (cm H2O): 15 cm H20  Auto PEEP Observed (cm H2O): 17 cm H2O      Physical Exam: *Physical exam deferred to attending physician as patient is COVID+ to limit exposure*    DATA:   Current Facility-Administered Medications   Medication Dose Route Frequency    enoxaparin (LOVENOX) injection 50 mg  0.5 mg/kg SubCUTAneous Q12H    midazolam in normal saline (VERSED) 1 mg/mL infusion  0-10 mg/hr IntraVENous TITRATE    vancomycin (VANCOCIN) 1,750 mg in 0.9% sodium chloride 500 mL IVPB  1,750 mg IntraVENous Q6H    cisatracurium (NIMBEX) 200 mg in 0.9% sodium chloride 100 mL (2 mg/mL) infusion  0-10 mcg/kg/min IntraVENous TITRATE    PHENYLephrine (TONNY-SYNEPHRINE) 30 mg in 0.9% sodium chloride 250 mL infusion   mcg/min IntraVENous TITRATE    alteplase (CATHFLO) 1 mg in sterile water (preservative free) 1 mL injection  1 mg InterCATHeter PRN    fentaNYL (PF) 1,500 mcg/30 mL (50 mcg/mL) infusion  0-200 mcg/hr IntraVENous TITRATE    propofol (DIPRIVAN) 10 mg/mL infusion  0-30 mcg/kg/min IntraVENous TITRATE    dexamethasone (PF) (DECADRON) 10 mg/mL injection 6 mg  6 mg IntraVENous Q6H    0.9% sodium chloride infusion 250 mL  250 mL IntraVENous PRN    famotidine (PF) (PEPCID) 20 mg in 0.9% sodium chloride 10 mL injection  20 mg IntraVENous Q12H    atorvastatin (LIPITOR) tablet 20 mg  20 mg Oral QHS    cefepime (MAXIPIME) 2 g in 0.9% sodium chloride (MBP/ADV) 100 mL  2 g IntraVENous Q8H    LORazepam (ATIVAN) injection 0.5 mg  0.5 mg IntraVENous Q6H PRN    epoprostenol (VELETRI) 30 mcg/mL in 0.9% sodium chloride 50 mL inhalation solution  50 ng/kg/min (Ideal) Inhalation CONTINUOUS    morphine injection 1 mg  1 mg IntraVENous Q6H PRN    0.9% sodium chloride infusion 250 mL  250 mL IntraVENous PRN    sodium chloride (NS) flush 5-40 mL  5-40 mL IntraVENous Q8H    sodium chloride (NS) flush 5-40 mL  5-40 mL IntraVENous PRN    acetaminophen (TYLENOL) tablet 650 mg  650 mg Oral Q6H PRN    Or    acetaminophen (TYLENOL) suppository 650 mg  650 mg Rectal Q6H PRN    polyethylene glycol (MIRALAX) packet 17 g  17 g Oral DAILY PRN    promethazine (PHENERGAN) tablet 12.5 mg  12.5 mg Oral Q6H PRN    Or    ondansetron (ZOFRAN) injection 4 mg  4 mg IntraVENous Q6H PRN    ascorbic acid (vitamin C) (VITAMIN C) tablet 500 mg  500 mg Oral BID    zinc sulfate (ZINCATE) 220 (50) mg capsule 1 Cap  1 Cap Oral DAILY    benzonatate (TESSALON) capsule 100 mg  100 mg Oral TID PRN             Labs:  Recent Labs     08/13/20  0154 08/12/20  0207 08/11/20  0509   WBC 12.3* 11.1 11.5*   HGB 11.7* 12.4 12.6   HCT 36.1* 36.9 39.0    214 263     Recent Labs     08/13/20  0154 08/12/20  0207 08/11/20  0509    138 138   K 4.2 4.3 4.3    106 106   CO2 28 26 28   * 137* 122*   BUN 23* 21* 19   CREA 0.55* 0.58* 0.59*   CA 8.4* 8.4* 8.4*   ALB 1.9* 1.9* 2.0*   ALT 27 29 34     Recent Labs     08/13/20  0433 08/12/20  0448 08/11/20  0505   PH 7.41 7.38 7.39   PCO2 37 42 39   PO2 81 114* 81   HCO3 23 24 23   FIO2 75 95 90       Imaging:  I have personally reviewed the patients radiographs and reports. Micro:  Results     Procedure Component Value Units Date/Time    CULTURE, RESPIRATORY/SPUTUM/BRONCH Romi Ledesma [[id number]] Collected:  08/09/20 1845    Order Status:  Canceled Specimen:  Sputum     CULTURE, BLOOD [[id number]] Collected:  08/09/20 0924    Order Status:  Completed Specimen:  Blood Updated:  08/13/20 0601     Special Requests: NO SPECIAL REQUESTS        Culture result: NO GROWTH 4 DAYS       C. DIFFICILE AG & TOXIN A/B [[id number]] Collected:  08/08/20 1213    Order Status:  Completed Specimen:  Stool Updated:  08/09/20 0927     [address] [place] ANTIGEN Negative        C. difficile toxin Negative        INTERPRETATION       NEGATIVE FOR TOXIGENIC C. DIFFICILE          CULTURE, BLOOD, PAIRED [[id number]] Collected:  08/05/20 2049    Order Status:  Completed Specimen:  Blood Updated:  08/10/20 0608     Special Requests: NO SPECIAL REQUESTS        Culture result: NO GROWTH 5 DAYS              IMPRESSION:   · COVID pneumonia  · Acute hypoxic respiratory failure   · Bilateral pneumothoraces  · Tobacco use  · Transaminitis   PLAN:   · Continue full vent support  · Fentanyl, propofol, versed  · Nimbex  · Decadron (day 8/10)  · S/p Convalescent plasma on 8/10.  Second dose ordered on 8/12  · S/p Remdesivir on 8/10  · Veletri  · Pigtail placed on 8/11 with expansion of L lung  · Monitor COVID labs daily  · Cameron  · Lasix 20 IV x1 today for positive fluid status  · Cefepime, Vanc  · Zinc, Vit C, lipitor   · Lovenox for elevated D dimer   · Replete electrolytes as needed  · Nutrition: NPO except meds    GLOBAL ISSUES:   · Head of bed elevated  · GI Prophylaxis: Pepcid  · DVT Prophylaxis: Lovenox   · ETT placed on 8/10     My assessment/plan will be discussed with Dr. Jesse Stubbs, ICU Attending Physician.     Sydney Sharma DO  Family Medicine Resident, PGY-2

## 2020-08-13 NOTE — PROGRESS NOTES
Name: [facility]: [address]   : 1990 Admit Date: 2020   Phone: [phone]  Room: [facility]6/   PCP: Yifan Denise MD  MRN: [id number]   Date: 2020  Code: Full Code          Chart and notes reviewed. Data reviewed. I review the patient's current medications in the medical record at each encounter. I have evaluated and examined the patient. History of Present Illness:  Mr. Devon Prasad is a 28 yo gentleman with a history of tobacco use who is admitted with COVID-19 pneumonia and acute respiratory failure with hypoxia. He was diagnosed about 10 days ago at an urgent care facility with COVID-19. Last Thursday he began to have fevers, inability to take a deep breath that was progressively worse, dry cough, and general malaise/faitigue. He was prescribed azithromycin and prednisone without improvement. Denies n/v, abdominal pain, changes in taste/smell. Hypoxic as low as 76% on RA. Placed on BiPAP yesterday evening due to increased work of breathing.     Labs reviewed: WBC 16.5, d-dimer 0.86, Na 129, cr 0.84, AST 86, , , Procalcitonin 0.33, ferritin 5114, IL6 in lab; crp pending    Images:  CXR with hazy interstitial changes and airspace opacification      PMH: tobacco use    FH: no family history pertinent to presents complaint    SH: +tobacco use    Interval history  Intubated, sedated  On Veletri  Paralyzed  Desaturated overnight and now up to FiO2 90% from 70%  Left lung remains re-expanded with chest tube  Fentanyl 200  Prop 30  Versed 10      Social History     Tobacco Use    Smoking status: Current Some Day Smoker    Smokeless tobacco: Never Used   Substance Use Topics    Alcohol use: Not on file       No Known Allergies    Current Facility-Administered Medications   Medication Dose Route Frequency    furosemide (LASIX) injection 20 mg  20 mg IntraVENous ONCE    enoxaparin (LOVENOX) injection 50 mg  0.5 mg/kg SubCUTAneous Q12H    midazolam in normal saline (VERSED) 1 mg/mL infusion  0-10 mg/hr IntraVENous TITRATE    vancomycin (VANCOCIN) 1,750 mg in 0.9% sodium chloride 500 mL IVPB  1,750 mg IntraVENous Q6H    cisatracurium (NIMBEX) 200 mg in 0.9% sodium chloride 100 mL (2 mg/mL) infusion  0-10 mcg/kg/min IntraVENous TITRATE    PHENYLephrine (TONNY-SYNEPHRINE) 30 mg in 0.9% sodium chloride 250 mL infusion   mcg/min IntraVENous TITRATE    alteplase (CATHFLO) 1 mg in sterile water (preservative free) 1 mL injection  1 mg InterCATHeter PRN    fentaNYL (PF) 1,500 mcg/30 mL (50 mcg/mL) infusion  0-200 mcg/hr IntraVENous TITRATE    propofol (DIPRIVAN) 10 mg/mL infusion  0-30 mcg/kg/min IntraVENous TITRATE    dexamethasone (PF) (DECADRON) 10 mg/mL injection 6 mg  6 mg IntraVENous Q6H    0.9% sodium chloride infusion 250 mL  250 mL IntraVENous PRN    famotidine (PF) (PEPCID) 20 mg in 0.9% sodium chloride 10 mL injection  20 mg IntraVENous Q12H    atorvastatin (LIPITOR) tablet 20 mg  20 mg Oral QHS    cefepime (MAXIPIME) 2 g in 0.9% sodium chloride (MBP/ADV) 100 mL  2 g IntraVENous Q8H    LORazepam (ATIVAN) injection 0.5 mg  0.5 mg IntraVENous Q6H PRN    epoprostenol (VELETRI) 30 mcg/mL in 0.9% sodium chloride 50 mL inhalation solution  50 ng/kg/min (Ideal) Inhalation CONTINUOUS    morphine injection 1 mg  1 mg IntraVENous Q6H PRN    0.9% sodium chloride infusion 250 mL  250 mL IntraVENous PRN    sodium chloride (NS) flush 5-40 mL  5-40 mL IntraVENous Q8H    sodium chloride (NS) flush 5-40 mL  5-40 mL IntraVENous PRN    acetaminophen (TYLENOL) tablet 650 mg  650 mg Oral Q6H PRN    Or    acetaminophen (TYLENOL) suppository 650 mg  650 mg Rectal Q6H PRN    polyethylene glycol (MIRALAX) packet 17 g  17 g Oral DAILY PRN    promethazine (PHENERGAN) tablet 12.5 mg  12.5 mg Oral Q6H PRN    Or    ondansetron (ZOFRAN) injection 4 mg  4 mg IntraVENous Q6H PRN    ascorbic acid (vitamin C) (VITAMIN C) tablet 500 mg  500 mg Oral BID    zinc sulfate (ZINCATE) 220 (50) mg capsule 1 Cap  1 Cap Oral DAILY    benzonatate (TESSALON) capsule 100 mg  100 mg Oral TID PRN         REVIEW OF SYSTEMS   12 point ROS negative except as stated in the HPI. Physical Exam:   Visit Vitals  /77   Pulse 70   Temp 97.7 °F (36.5 °C)   Resp 22   Ht 5' 11\" (1.803 m)   Wt 108 kg (238 lb)   SpO2 91%   BMI 33.19 kg/m²       General:  Intubated, sedated,paralyzed   Head:  Normocephalic, without obvious abnormality, atraumatic. Eyes:  Conjunctivae/corneas clear. Nose: Nares normal   Throat: Lips, mucosa, and tongue normal.    Neck: Supple, symmetrical, trachea midline   Lungs:   CTA, no w/r/r   Chest wall:  No tenderness or deformity. Heart:  Regular rate and rhythm, S1, S2 normal, no murmur, click, rub or gallop. Abdomen:   Soft, non-tender. Bowel sounds normal. Obese   Extremities: Extremities normal, atraumatic, no cyanosis or edema. Skin: Skin color, texture, turgor normal. No rashes or lesions   Neurologic: Paralyzed, pupils pinpoint       Lab Results   Component Value Date/Time    Sodium 137 08/13/2020 01:54 AM    Potassium 4.2 08/13/2020 01:54 AM    Chloride 105 08/13/2020 01:54 AM    CO2 28 08/13/2020 01:54 AM    BUN 23 (H) 08/13/2020 01:54 AM    Creatinine 0.55 (L) 08/13/2020 01:54 AM    Glucose 129 (H) 08/13/2020 01:54 AM    Calcium 8.4 (L) 08/13/2020 01:54 AM    Magnesium 2.8 (H) 08/10/2020 05:55 AM    Phosphorus 4.1 08/10/2020 05:55 AM    Lactic acid 1.2 08/06/2020 01:24 PM       Lab Results   Component Value Date/Time    WBC 12.3 (H) 08/13/2020 01:54 AM    HGB 11.7 (L) 08/13/2020 01:54 AM    PLATELET 668 04/49/6597 01:54 AM    MCV 92.3 08/13/2020 01:54 AM       Lab Results   Component Value Date/Time    Alk.  phosphatase 68 08/13/2020 01:54 AM    Protein, total 6.0 (L) 08/13/2020 01:54 AM    Albumin 1.9 (L) 08/13/2020 01:54 AM    Globulin 4.1 (H) 08/13/2020 01:54 AM       Lab Results   Component Value Date/Time    Ferritin 1,762 (H) 08/13/2020 01:54 AM       Lab Results   Component Value Date/Time    C-Reactive protein 17.50 (H) 08/10/2020 05:55 AM        Lab Results   Component Value Date/Time    PH 7.41 08/13/2020 04:33 AM    PCO2 37 08/13/2020 04:33 AM    PO2 81 08/13/2020 04:33 AM    HCO3 23 08/13/2020 04:33 AM    FIO2 75 08/13/2020 04:33 AM       Lab Results   Component Value Date/Time    Troponin-I, Qt. <0.05 08/08/2020 12:13 PM        Lab Results   Component Value Date/Time    Culture result: NO GROWTH 4 DAYS 08/09/2020 09:24 AM    Culture result: NO GROWTH 5 DAYS 08/05/2020 08:49 PM       No results found for: TOXA1, RPR, HBCM, HBSAG, HAAB, HCAB1, HAAT, G6PD, CRYAC, HIVGT, HIVR, HIV1, HIV12, HIVPC, HIVRPI    Lab Results   Component Value Date/Time    Vancomycin,trough 16.4 (H) 08/12/2020 01:00 PM    Vancomycin,trough 7.6 08/11/2020 11:34 AM       No results found for: COLOR, APPRN, SPGRU, RAE, PROTU, GLUCU, KETU, BILU, BLDU, UROU, ROBLES, LEUKU, WBCU, RBCU, UEPI, BACTU, CASTS, UCRY    IMPRESSION  · COVID-19 Pneumonia  · Acute respiratory failure with hypoxia  · PTX bilaterally  · Elevated LFTs  · Tobacco  use    PLAN  · Continue full vent support  · Pigtail to suction  · Keep prop dose below 30 due bradycardia in higher doses, cont versed/fentanyl. · Continue nimbex  · Continue Zinc, vitamin C  · Continue cefepime and Vanc  · Cameron 8/11  · PICC line placed 8/10  · Continue decadron for 10 days  · remdesivir finished 8/10   · convalescent plasma given 8/10, second dose ordered 8/12  · Monitor renal function, LFTs,  · Inflammatory markers decreasing some, d-dimer only slightly elevated. · Enoxaparin decreased to 0.5 mg/kg q12  · Lasix 20 mg IV once today to help with overall positive fluid status  · PRN proventil  · NPO except meds until pulmonary status stabilizes  · Full code  · Prognosis very guarded  · Discussed with nursing    Patient is critically ill with worsening acute hypoxic respiratory failure and COVID 19  Pneumonia. CC time EOP 37 min. Discussed with nursing and RT.     Tez Braswell MD

## 2020-08-13 NOTE — PROGRESS NOTES
Updated patient mother Sisi Salguero over the phone. Informed her that pt is doing a little worse today from an oxygenation standpoint. She was enquiring about second dose of convalescent plasma and I informed her that it was ordered, but there is a delay due to shortage. She states that patient's cousin works at a blood bank in [place] and they are ready to send us some. I told her that we would talk to our blood bank and look into it.

## 2020-08-13 NOTE — WOUND CARE
Follow up visit for skin assessment, low enrique, remains in ICU- intubated- Enrique 9 LOS 6  days- In covid isolation  Plan of care and skin care preventative care discussed with staff- will add foam dressing to sacrum to assist with moisture management and shear prevention.     Will follow  [address]

## 2020-08-13 NOTE — PROGRESS NOTES
8- CASE MANAGEMENT NOTE:  Transition of Care:  1. Patient is COVID 19 positive  2. Pt is intubated and sedated  3. MD providing parents medical updates daily  4.  CM to follow and assist with discharge needs as indiecate    ERIC Kessler, CM

## 2020-08-13 NOTE — PROGRESS NOTES
0700- Bedside and Verbal shift change report given to Leigha Figueroa RN (oncoming nurse) by Heidi Chen (offgoing nurse). Report included the following information SBAR, Kardex, ED Summary, Procedure Summary, Intake/Output, MAR, Recent Results and Cardiac Rhythm NSR. Primary Nurse Maddi Lennon and Roman Leung, RN performed a dual skin assessment on this patient No impairment noted  Enrique score is 9  Drips verified with Lindy RN (Fentanyl at 200 mcg/hr, Propofol at 30 mcg/kg/min, Versed at 10 mg/hr, Nimbex at 2.5 mcg/kg/min). 0800- Shift assessment performed. Sedated, paralyzed, intubated. TOF performed 0/4 will adjust nimbex. Pupils round, reactive. No cough on ET suction. No movement to extremities. Lungs course throughout, diminised in bases. Subq crepitous to bilateral upper chest, Suctioned ET tube, no secretions, mouth care performed. Remains on 90% FI02. Sat 92% respiratory rate 22. Left anterior chest tube. Draining serous, no air leak, connected to 20 cm suction. Veletri infusion through ventilator. NSR, BP stable, Edema in upper extremities 1+ non pitting. Abdomen soft, obese, bowel sounds hypoactive in all four quadrants. OG tube verified placement with auscultation, clamped at 54 cm at the teeth. Live cath site assessed and live care performed, draining, patent. Bilateral pedal pulses present, skin intact, turned and repositioned on left side. 0830- Dr. Diana Troy at bedside, assessed patient, updated regarding patient condition. Orders received. 4741-  Nimbex decreased to 2.0 mcg/kg/min d/t TOF 0/4. Will assess TOF in 15 min  0925- reassessed TOF, now 2/4   1000- pt. Turned, applied foam dressing to buttox to prevent skin breakdown. Sacrum and buttock intact. Pt. Repositioned onto right side. Mouth care performed. TOF 4/4.   1038-  Nimbex increased to 2.5 mcg/kg/min d/t TOF 4/4, will reassess TOF in 15 mins.    1057- Pt. TOF reassessed at 4/4  1058- Nimbex rate change to 3.0 mcg/kg/min d/t 4/4 TOF, will reassess in 15 mins. O2 SAT 88%, FIO2 increased to 100%, RT Neal made aware. 0- mother called about update on pt. Mother informed of pt. Current condition by Rito Morales RN.   1163- TOF reassessed 4/4. Pt. Lavaged and suctioned, no secretions. 1132- Nimbex rate changed to 3.5 mcg/kg/min due to 4/4 TOF, will reassess in 15 mins. 1135- IDR bedside. Dr. Cindy Hein updated on pt condition and TOF of 4/4 on 3.5 mcg/kg/min. Will wait for updated orders for paralytics. 1150- Pt. Reassessed, TOF 2/4 twitches. Turned and repositioned, mouth care performed, SAT 96% decreased FIO2 to 90%. SAT 91-92%. 12- Dr. Cindy Hein updated pt's mother, Carly, over phone. Updated her on patient condition. 1325- TOF assessed at 0/4.   1342- Nimbex rate change to 3.25 mcg/kg/min. Will reassess TOF in 15 mins. 1400- pt. Turned, repositioned onto right side. Mouth care performed. TOF 0/4. Spoke with Dr. Cindy Hein earlier regarding changing paralytic. Notified pharmacy to reenter order. SAT 86%, increased FIO2 to 100%. Cooling blanket turned on d/t diaphoresis on patient's forehead, temperature 98.6 bladder. 1458- Stopped Nimbex d/t 0/4 TOF per Dr. Cindy Hein. Will assess TOF baseline in 30 mins. Will start on vecuronium once baseline TOF is established. 1530-  TOF remains at 0/4. If patient remains stable on no paralytic, may remain off paralytics per Dr. Edgard Meza. May restart if patient becomes unstable. 1545- reassessed patient. Sedated and intubated. Pupils round, reactive to light. No cough on ET suction, No movement to extremities, no response to pain stimulus. Bilateral rhonchi, diminished breath sounds in bases. Subq crepitous to bilateral upper chest, Suctioned ET tube, no secretions, mouth care performed. Live cath site assessed and live care performed, draining, patent. Diaphoresis on forehead has resolved, Temp. 98.8 via bladder. 1800- TOF 4/4 twitches, pt.  Opening eyes to stimuli, moves all four extremities, cough with suction, follows commands, remains off paralytics. VSS at this time. Performed mouth care, suctioned ETT and oral suctioning, placed in supine position. Removed RAC PIV d/t unable to flush and blood around site w/o redness, swelling, bleeding; gauze applied. 1910- Bedside and Verbal shift change report given to Rai Reed (oncoming nurse) by Annalee Rico RN (offgoing nurse). Report included the following information SBAR, Kardex, ED Summary, Procedure Summary, Intake/Output, MAR, Recent Results and Cardiac Rhythm NSR. During coughing spells, RASS +1 restless, patient desats into 70s, low 80s, suctioned ETT moderate amount of white secretions. Once coughing stops the pt. Remains calm and SATs low 90s. Sinus tach during coughing, patient without bradycardia. Telephone order may increase propofol to 50 mcg/kg/min MAX if needed per Dr. Cassie Waterman. Watch HR closely. Propofol remains at 30 mcg/kg/min. RASS now -1.

## 2020-08-13 NOTE — PROGRESS NOTES
1900- Bedside and Verbal shift change report given to Rai 60 (oncoming nurse) by Neela Rivera RN (offgoing nurse). Report included the following information SBAR, Kardex, MAR, Recent Results and Cardiac Rhythm NSR.    2000- Shift assessment completed   Patient in NSR with HR in the 80's  On ventilator- FiO2 100%, rate 22, and peep 15. ETT 27 @ lip. Satting @ 92%. Desatting to 70's when coughing- recovered to 92%    Pupils round and reactive,  equal bilaterally, follows commands.   -Mouth care completed. Suctioned patient- thick, white secretions. Turned and repositioned to right side   -Primary Nurse Haroldo Fall and Kathy SCHWAB RN performed a dual skin assessment on this patient No impairment noted  Enrique score is 12  -Blanchable redness on sacrum, wound care consulted, foam dressing intact. Heel boots on     2045- Order received for bilateral wrist restraints. Notified mother of patient     2100- Patients temp increased to 100.1, ice packs placed     2200- Mouth care completed. Suctioned. Small amount thick white secretions. .   Turned and repositioned     2250- Patient's HR decreasing, titrated propofol gtt down to 30.     2300- Patient grimacing and tearing. RASS +1, unable to titrate sedation d/t HR. Treated with morphine 1mg. Satting @ 95%. Will continue to monitor. 0000-Suctioned- Large amounts of clear, thick secretions  Turned/repositioned     0045- Patient fighting vent, very restless. Unable to titrate propfol d/t HR. Paged pulmonary. Orders received from Dr. Lela Berry to increase fentanyl range 0-300 and versed 0-20    0200- Oral care completed. Suctioned. Large amounts of thick, tan secretions patient is coughing up    0300- Patient tachycardic, restless, pulling against restraints. Administered 0.5mg ativan     0400- Patient resting quietly in bed, compliant with vent settings. Suctioned- large amounts of thick secretions. Chg bath and live care completed.    Mouth care completed     0600- Mouth care and suctioned completed. Patient very restless, HR in the 120's and pulling against restraints. Propofol increased to 25. Will monitor HR     0700- Bedside and Verbal shift change report given to Jasmeet DOMINIQUE (oncoming nurse) by Zoran Jenkins RN (offgoing nurse). Report included the following information SBAR, Kardex, MAR, Recent Results and Cardiac Rhythm NSR/Sinus Tach.

## 2020-08-14 ENCOUNTER — APPOINTMENT (OUTPATIENT)
Dept: GENERAL RADIOLOGY | Age: 30
DRG: 870 | End: 2020-08-14
Attending: INTERNAL MEDICINE
Payer: COMMERCIAL

## 2020-08-14 PROBLEM — J93.9 PNEUMOTHORAX ON LEFT: Status: ACTIVE | Noted: 2020-08-14

## 2020-08-14 LAB
ALBUMIN SERPL-MCNC: 2 G/DL (ref 3.5–5)
ALBUMIN/GLOB SERPL: 0.5 {RATIO} (ref 1.1–2.2)
ALP SERPL-CCNC: 79 U/L (ref 45–117)
ALT SERPL-CCNC: 38 U/L (ref 12–78)
ANION GAP SERPL CALC-SCNC: 4 MMOL/L (ref 5–15)
ARTERIAL PATENCY WRIST A: ABNORMAL
AST SERPL-CCNC: 44 U/L (ref 15–37)
BASE EXCESS BLDA CALC-SCNC: 3 MMOL/L
BDY SITE: ABNORMAL
BILIRUB SERPL-MCNC: 0.8 MG/DL (ref 0.2–1)
BUN SERPL-MCNC: 18 MG/DL (ref 6–20)
BUN/CREAT SERPL: 49 (ref 12–20)
CALCIUM SERPL-MCNC: 8.1 MG/DL (ref 8.5–10.1)
CHLORIDE SERPL-SCNC: 102 MMOL/L (ref 97–108)
CO2 SERPL-SCNC: 29 MMOL/L (ref 21–32)
CREAT SERPL-MCNC: 0.37 MG/DL (ref 0.7–1.3)
CRP SERPL HS-MCNC: >9.5 MG/L
D DIMER PPP FEU-MCNC: 1.46 MG/L FEU (ref 0–0.65)
EPAP/CPAP/PEEP, PAPEEP: 15
ERYTHROCYTE [DISTWIDTH] IN BLOOD BY AUTOMATED COUNT: 11.8 % (ref 11.5–14.5)
FERRITIN SERPL-MCNC: 1472 NG/ML (ref 26–388)
FIO2 ON VENT: 100 %
GAS FLOW.O2 SETTING OXYMISER: 22 L/MIN
GLOBULIN SER CALC-MCNC: 4 G/DL (ref 2–4)
GLUCOSE SERPL-MCNC: 102 MG/DL (ref 65–100)
HCO3 BLDA-SCNC: 28 MMOL/L (ref 22–26)
HCT VFR BLD AUTO: 36.3 % (ref 36.6–50.3)
HGB BLD-MCNC: 11.9 G/DL (ref 12.1–17)
LDH SERPL L TO P-CCNC: 436 U/L (ref 85–241)
MCH RBC QN AUTO: 30.3 PG (ref 26–34)
MCHC RBC AUTO-ENTMCNC: 32.8 G/DL (ref 30–36.5)
MCV RBC AUTO: 92.4 FL (ref 80–99)
NRBC # BLD: 0 K/UL (ref 0–0.01)
NRBC BLD-RTO: 0 PER 100 WBC
PCO2 BLDA: 43 MMHG (ref 35–45)
PH BLDA: 7.43 [PH] (ref 7.35–7.45)
PHOSPHATE SERPL-MCNC: 3.8 MG/DL (ref 2.6–4.7)
PLATELET # BLD AUTO: 183 K/UL (ref 150–400)
PMV BLD AUTO: 10.8 FL (ref 8.9–12.9)
PO2 BLDA: 64 MMHG (ref 80–100)
POTASSIUM SERPL-SCNC: 3.8 MMOL/L (ref 3.5–5.1)
PROT SERPL-MCNC: 6 G/DL (ref 6.4–8.2)
RBC # BLD AUTO: 3.93 M/UL (ref 4.1–5.7)
SAO2 % BLD: 93 % (ref 92–97)
SAO2% DEVICE SAO2% SENSOR NAME: ABNORMAL
SODIUM SERPL-SCNC: 135 MMOL/L (ref 136–145)
SPECIMEN SITE: ABNORMAL
VENTILATION MODE VENT: ABNORMAL
VT SETTING VENT: 450 ML
WBC # BLD AUTO: 12.2 K/UL (ref 4.1–11.1)

## 2020-08-14 PROCEDURE — 36430 TRANSFUSION BLD/BLD COMPNT: CPT

## 2020-08-14 PROCEDURE — 74011250636 HC RX REV CODE- 250/636: Performed by: INTERNAL MEDICINE

## 2020-08-14 PROCEDURE — 74011000258 HC RX REV CODE- 258: Performed by: INTERNAL MEDICINE

## 2020-08-14 PROCEDURE — 71045 X-RAY EXAM CHEST 1 VIEW: CPT

## 2020-08-14 PROCEDURE — 94645 CONT INHLJ TX EACH ADDL HOUR: CPT

## 2020-08-14 PROCEDURE — 94003 VENT MGMT INPAT SUBQ DAY: CPT

## 2020-08-14 PROCEDURE — 74011250637 HC RX REV CODE- 250/637: Performed by: INTERNAL MEDICINE

## 2020-08-14 PROCEDURE — 82803 BLOOD GASES ANY COMBINATION: CPT

## 2020-08-14 PROCEDURE — 80053 COMPREHEN METABOLIC PANEL: CPT

## 2020-08-14 PROCEDURE — 83615 LACTATE (LD) (LDH) ENZYME: CPT

## 2020-08-14 PROCEDURE — 84100 ASSAY OF PHOSPHORUS: CPT

## 2020-08-14 PROCEDURE — 83520 IMMUNOASSAY QUANT NOS NONAB: CPT

## 2020-08-14 PROCEDURE — 82728 ASSAY OF FERRITIN: CPT

## 2020-08-14 PROCEDURE — 86141 C-REACTIVE PROTEIN HS: CPT

## 2020-08-14 PROCEDURE — 77030037759

## 2020-08-14 PROCEDURE — 94644 CONT INHLJ TX 1ST HOUR: CPT

## 2020-08-14 PROCEDURE — 36415 COLL VENOUS BLD VENIPUNCTURE: CPT

## 2020-08-14 PROCEDURE — 85027 COMPLETE CBC AUTOMATED: CPT

## 2020-08-14 PROCEDURE — 74011000250 HC RX REV CODE- 250: Performed by: INTERNAL MEDICINE

## 2020-08-14 PROCEDURE — 65610000006 HC RM INTENSIVE CARE

## 2020-08-14 PROCEDURE — 85379 FIBRIN DEGRADATION QUANT: CPT

## 2020-08-14 PROCEDURE — 36600 WITHDRAWAL OF ARTERIAL BLOOD: CPT

## 2020-08-14 RX ADMIN — ENOXAPARIN SODIUM 50 MG: 100 INJECTION SUBCUTANEOUS at 20:00

## 2020-08-14 RX ADMIN — ATORVASTATIN CALCIUM 20 MG: 20 TABLET, FILM COATED ORAL at 21:16

## 2020-08-14 RX ADMIN — VECURONIUM BROMIDE 0.8 MCG/KG/MIN: 1 INJECTION, POWDER, LYOPHILIZED, FOR SOLUTION INTRAVENOUS at 12:07

## 2020-08-14 RX ADMIN — CEFEPIME HYDROCHLORIDE 2 G: 2 INJECTION, POWDER, FOR SOLUTION INTRAVENOUS at 00:28

## 2020-08-14 RX ADMIN — Medication 225 MCG/HR: at 10:36

## 2020-08-14 RX ADMIN — Medication 500 MG: at 08:00

## 2020-08-14 RX ADMIN — MIDAZOLAM HYDROCHLORIDE 11 MG/HR: 5 INJECTION, SOLUTION INTRAMUSCULAR; INTRAVENOUS at 01:51

## 2020-08-14 RX ADMIN — VANCOMYCIN HYDROCHLORIDE 1750 MG: 10 INJECTION, POWDER, LYOPHILIZED, FOR SOLUTION INTRAVENOUS at 19:31

## 2020-08-14 RX ADMIN — DEXAMETHASONE SODIUM PHOSPHATE 6 MG: 10 INJECTION, SOLUTION INTRAMUSCULAR; INTRAVENOUS at 12:12

## 2020-08-14 RX ADMIN — CEFEPIME HYDROCHLORIDE 2 G: 2 INJECTION, POWDER, FOR SOLUTION INTRAVENOUS at 09:36

## 2020-08-14 RX ADMIN — ASCORBIC ACID, VITAMIN A PALMITATE, CHOLECALCIFEROL, THIAMINE HYDROCHLORIDE, RIBOFLAVIN-5 PHOSPHATE SODIUM, PYRIDOXINE HYDROCHLORIDE, NIACINAMIDE, DEXPANTHENOL, ALPHA-TOCOPHEROL ACETATE, VITAMIN K1, FOLIC ACID, BIOTIN, CYANOCOBALAMIN: 200; 3300; 200; 6; 3.6; 6; 40; 15; 10; 150; 600; 60; 5 INJECTION, SOLUTION INTRAVENOUS at 17:14

## 2020-08-14 RX ADMIN — FAMOTIDINE 20 MG: 10 INJECTION, SOLUTION INTRAVENOUS at 08:00

## 2020-08-14 RX ADMIN — DEXAMETHASONE SODIUM PHOSPHATE 6 MG: 10 INJECTION, SOLUTION INTRAMUSCULAR; INTRAVENOUS at 06:43

## 2020-08-14 RX ADMIN — VECURONIUM BROMIDE 1.2 MCG/KG/MIN: 1 INJECTION, POWDER, LYOPHILIZED, FOR SOLUTION INTRAVENOUS at 16:58

## 2020-08-14 RX ADMIN — EPOPROSTENOL 50 NG/KG/MIN: 1.5 INJECTION, POWDER, LYOPHILIZED, FOR SOLUTION INTRAVENOUS at 22:01

## 2020-08-14 RX ADMIN — Medication 225 MCG/HR: at 03:36

## 2020-08-14 RX ADMIN — VANCOMYCIN HYDROCHLORIDE 1750 MG: 10 INJECTION, POWDER, LYOPHILIZED, FOR SOLUTION INTRAVENOUS at 01:35

## 2020-08-14 RX ADMIN — FAMOTIDINE 20 MG: 10 INJECTION, SOLUTION INTRAVENOUS at 20:00

## 2020-08-14 RX ADMIN — Medication 10 ML: at 13:34

## 2020-08-14 RX ADMIN — Medication 10 ML: at 06:43

## 2020-08-14 RX ADMIN — PROPOFOL 25 MCG/KG/MIN: 10 INJECTION, EMULSION INTRAVENOUS at 07:23

## 2020-08-14 RX ADMIN — VANCOMYCIN HYDROCHLORIDE 1750 MG: 10 INJECTION, POWDER, LYOPHILIZED, FOR SOLUTION INTRAVENOUS at 07:38

## 2020-08-14 RX ADMIN — EPOPROSTENOL 50 NG/KG/MIN: 1.5 INJECTION, POWDER, LYOPHILIZED, FOR SOLUTION INTRAVENOUS at 08:16

## 2020-08-14 RX ADMIN — VANCOMYCIN HYDROCHLORIDE 1750 MG: 10 INJECTION, POWDER, LYOPHILIZED, FOR SOLUTION INTRAVENOUS at 13:34

## 2020-08-14 RX ADMIN — MIDAZOLAM HYDROCHLORIDE 13 MG/HR: 5 INJECTION, SOLUTION INTRAMUSCULAR; INTRAVENOUS at 19:48

## 2020-08-14 RX ADMIN — PROPOFOL 25 MCG/KG/MIN: 10 INJECTION, EMULSION INTRAVENOUS at 00:32

## 2020-08-14 RX ADMIN — Medication 1 CAPSULE: at 08:00

## 2020-08-14 RX ADMIN — Medication 10 ML: at 21:16

## 2020-08-14 RX ADMIN — MIDAZOLAM HYDROCHLORIDE 12 MG/HR: 5 INJECTION, SOLUTION INTRAMUSCULAR; INTRAVENOUS at 11:27

## 2020-08-14 RX ADMIN — Medication 300 MCG/HR: at 16:04

## 2020-08-14 RX ADMIN — Medication 300 MCG/HR: at 21:25

## 2020-08-14 RX ADMIN — PROPOFOL 25 MCG/KG/MIN: 10 INJECTION, EMULSION INTRAVENOUS at 06:06

## 2020-08-14 RX ADMIN — ENOXAPARIN SODIUM 50 MG: 100 INJECTION SUBCUTANEOUS at 08:00

## 2020-08-14 RX ADMIN — LORAZEPAM 0.5 MG: 2 INJECTION INTRAMUSCULAR; INTRAVENOUS at 03:03

## 2020-08-14 RX ADMIN — WATER 1 MG: 1 INJECTION INTRAMUSCULAR; INTRAVENOUS; SUBCUTANEOUS at 11:40

## 2020-08-14 RX ADMIN — ACETAMINOPHEN 650 MG: 325 TABLET ORAL at 13:00

## 2020-08-14 RX ADMIN — DEXAMETHASONE SODIUM PHOSPHATE 6 MG: 10 INJECTION, SOLUTION INTRAMUSCULAR; INTRAVENOUS at 18:01

## 2020-08-14 RX ADMIN — EPOPROSTENOL 50 NG/KG/MIN: 1.5 INJECTION, POWDER, LYOPHILIZED, FOR SOLUTION INTRAVENOUS at 15:20

## 2020-08-14 RX ADMIN — CEFEPIME HYDROCHLORIDE 2 G: 2 INJECTION, POWDER, FOR SOLUTION INTRAVENOUS at 17:00

## 2020-08-14 RX ADMIN — PROPOFOL 25 MCG/KG/MIN: 10 INJECTION, EMULSION INTRAVENOUS at 17:50

## 2020-08-14 RX ADMIN — Medication 500 MG: at 18:00

## 2020-08-14 RX ADMIN — EPOPROSTENOL 50 NG/KG/MIN: 1.5 INJECTION, POWDER, LYOPHILIZED, FOR SOLUTION INTRAVENOUS at 03:28

## 2020-08-14 NOTE — PROGRESS NOTES
participated in interdisciplinary rounds with staff on the ICU, where pt's care plan was dicussed. Visited by: Min Feliciano.   To letitia valenciain: [phone])

## 2020-08-14 NOTE — PROGRESS NOTES
Problem: Falls - Risk of  Goal: *Absence of Falls  Description: Document Joceline Umana Fall Risk and appropriate interventions in the flowsheet.   Outcome: Progressing Towards Goal  Note: Fall Risk Interventions:  Mobility Interventions: Bed/chair exit alarm, Communicate number of staff needed for ambulation/transfer  Mentation Interventions: Adequate sleep, hydration, pain control, Bed/chair exit alarm, Reorient patient, Room close to nurse's station  Medication Interventions: Bed/chair exit alarm  Elimination Interventions: Bed/chair exit alarm  History of Falls Interventions: Bed/chair exit alarm     Problem: Ventilator Management  Goal: *Adequate oxygenation and ventilation  Outcome: Progressing Towards Goal  Goal: *Patient maintains clear airway/free of aspiration  Outcome: Progressing Towards Goal  Goal: *Absence of infection signs and symptoms  Outcome: Progressing Towards Goal     Problem: Non-Violent Restraints  Goal: *Removal from restraints as soon as assessed to be safe  Outcome: Progressing Towards Goal  Goal: *No harm/injury to patient while restraints in use  Outcome: Progressing Towards Goal  Goal: *Patient's dignity will be maintained  Outcome: Progressing Towards Goal  Goal: Non-violent Restaints:Standard Interventions  Outcome: Progressing Towards Goal

## 2020-08-14 NOTE — PROGRESS NOTES
[facility]  [address] [facility] [place], [place], [address] [place] [address]  [phone]      Medical Progress Note      NAME: Ariadna Ford   :  1990  MRM:  [id number]    Date of service: 2020  2:35 PM       Assessment and Plan:   1. Acute respiratory failure with hypoxia due to COVID 19 pneumonia. Intubated on 8/10. Vent support per intensivist.  CXR: significantly increased diffuse bilateral airspace  disease. Remdesivir(finished on 8/10) and convalesce plasma on 8/10. Continue vitamin C, zinc, therapeutic Lovenox, broad spectrum ABx, statin. Poor prognosis.        2. Pneumothorax/ subcutaneous emphysema. Chest tube placed placed on  with expansion of L lung. CXR shows improvement. 3.  Elevated liver enzymes: improved. 4.  Obesity:  Body mass index is 33.19 kg/m². Would benefit from weight loss and dietary / lifestyle modifications          Subjective:     Chief Complaint[de-identified] Patient was seen and examined as a follow up for COVID 19. Chart was reviewed. intubated and sedated. I discussed with Dr Marc Vila, intensivist. Didn't see pt in person due to COVID to,preserve PPE. Objective:     Last 24hrs VS reviewed since prior progress note. Most recent are:    Visit Vitals  /72   Pulse (!) 103   Temp 100 °F (37.8 °C)   Resp 27   Ht 5' 11\" (1.803 m)   Wt 108 kg (238 lb)   SpO2 (!) 77%   BMI 33.19 kg/m²     SpO2 Readings from Last 6 Encounters:   20 (!) 77%    O2 Flow Rate (L/min): 36 l/min       Intake/Output Summary (Last 24 hours) at 2020 1226  Last data filed at 2020 1200  Gross per 24 hour   Intake 2037.54 ml   Output 3043 ml   Net -1005.46 ml        Physical Exam:    Gen:  Well-developed, well-nourished, in no acute distress   Resp:  No accessory muscle use, intubated  Abd:  Moves with respiration  Neuro:  Intubated and sedated   Psych:   Intubated and sedated  __________________________________________________________________  Medications Reviewed: (see below)  Medications:     Current Facility-Administered Medications   Medication Dose Route Frequency    [START ON 8/15/2020] Vancomycin - Please draw trough prior to 0100 dose on 8/15/20 (Saturday).  Thank you!!   Other ONCE    vecuronium (NORCURON) 50 mg in 0.9% sodium chloride 100 mL infusion  0-1.2 mcg/kg/min (Order-Specific) IntraVENous TITRATE    enoxaparin (LOVENOX) injection 50 mg  0.5 mg/kg SubCUTAneous Q12H    midazolam in normal saline (VERSED) 1 mg/mL infusion  0-20 mg/hr IntraVENous TITRATE    vancomycin (VANCOCIN) 1,750 mg in 0.9% sodium chloride 500 mL IVPB  1,750 mg IntraVENous Q6H    PHENYLephrine (TONNY-SYNEPHRINE) 30 mg in 0.9% sodium chloride 250 mL infusion   mcg/min IntraVENous TITRATE    alteplase (CATHFLO) 1 mg in sterile water (preservative free) 1 mL injection  1 mg InterCATHeter PRN    fentaNYL (PF) 1,500 mcg/30 mL (50 mcg/mL) infusion  0-300 mcg/hr IntraVENous TITRATE    propofol (DIPRIVAN) 10 mg/mL infusion  0-40 mcg/kg/min IntraVENous TITRATE    dexamethasone (PF) (DECADRON) 10 mg/mL injection 6 mg  6 mg IntraVENous Q6H    0.9% sodium chloride infusion 250 mL  250 mL IntraVENous PRN    famotidine (PF) (PEPCID) 20 mg in 0.9% sodium chloride 10 mL injection  20 mg IntraVENous Q12H    atorvastatin (LIPITOR) tablet 20 mg  20 mg Oral QHS    cefepime (MAXIPIME) 2 g in 0.9% sodium chloride (MBP/ADV) 100 mL  2 g IntraVENous Q8H    LORazepam (ATIVAN) injection 0.5 mg  0.5 mg IntraVENous Q6H PRN    epoprostenol (VELETRI) 30 mcg/mL in 0.9% sodium chloride 50 mL inhalation solution  50 ng/kg/min (Ideal) Inhalation CONTINUOUS    morphine injection 1 mg  1 mg IntraVENous Q6H PRN    0.9% sodium chloride infusion 250 mL  250 mL IntraVENous PRN    sodium chloride (NS) flush 5-40 mL  5-40 mL IntraVENous Q8H    sodium chloride (NS) flush 5-40 mL  5-40 mL IntraVENous PRN    acetaminophen (TYLENOL) tablet 650 mg  650 mg Oral Q6H PRN    Or    acetaminophen (TYLENOL) suppository 650 mg  650 mg Rectal Q6H PRN    polyethylene glycol (MIRALAX) packet 17 g  17 g Oral DAILY PRN    promethazine (PHENERGAN) tablet 12.5 mg  12.5 mg Oral Q6H PRN    Or    ondansetron (ZOFRAN) injection 4 mg  4 mg IntraVENous Q6H PRN    ascorbic acid (vitamin C) (VITAMIN C) tablet 500 mg  500 mg Oral BID    zinc sulfate (ZINCATE) 220 (50) mg capsule 1 Cap  1 Cap Oral DAILY    benzonatate (TESSALON) capsule 100 mg  100 mg Oral TID PRN        Lab Data Reviewed: (see below)  Lab Review:     Recent Labs     08/14/20 0440 08/13/20  0154 08/12/20  0207   WBC 12.2* 12.3* 11.1   HGB 11.9* 11.7* 12.4   HCT 36.3* 36.1* 36.9    204 214     Recent Labs     08/14/20 0440 08/13/20  0154 08/12/20  0207   * 137 138   K 3.8 4.2 4.3    105 106   CO2 29 28 26   * 129* 137*   BUN 18 23* 21*   CREA 0.37* 0.55* 0.58*   CA 8.1* 8.4* 8.4*   ALB 2.0* 1.9* 1.9*   TBILI 0.8 0.7 0.7   ALT 38 27 29     No results found for: [facility]  Recent Labs     08/14/20  0558 08/13/20  0433 08/12/20  0448   PH 7.43 7.41 7.38   PCO2 43 37 42   PO2 64* 81 114*   HCO3 28* 23 24   FIO2 100 75 95     No results for input(s): INR, INREXT, INREXT in the last 72 hours.   All Micro Results     Procedure Component Value Units Date/Time    CULTURE, BLOOD [[id number]] Collected:  08/09/20 0924    Order Status:  Completed Specimen:  Blood Updated:  08/13/20 0601     Special Requests: NO SPECIAL REQUESTS        Culture result: NO GROWTH 4 DAYS       CULTURE, BLOOD, PAIRED [[id number]] Collected:  08/05/20 2049    Order Status:  Completed Specimen:  Blood Updated:  08/10/20 0608     Special Requests: NO SPECIAL REQUESTS        Culture result: NO GROWTH 5 DAYS       CULTURE, RESPIRATORY/SPUTUM/BRONCH Aneita Comer STAIN [[id number]] Collected:  08/09/20 1845    Order Status:  Canceled Specimen:  Sputum     C. DIFFICILE AG & TOXIN A/B [[id number]] Collected:  08/08/20 1213    Order Status:  Completed Specimen:  Stool Updated:  08/09/20 0927     [facility] ANTIGEN Negative        C. difficile toxin Negative        INTERPRETATION       NEGATIVE FOR TOXIGENIC C. DIFFICILE                I have reviewed notes of prior 24hr. Other pertinent lab:      Total time spent with patient: [address] discussed with: Family, Nursing Staff, Consultant/Specialist and >50% of time spent in counseling and coordination of care    Discussed:  Care Plan    Prophylaxis:  Lovenox    Disposition:  prognosis: guarded            ___________________________________________________    Attending Physician: Gricel Burris MD

## 2020-08-14 NOTE — PROGRESS NOTES
[address]       ICU -  Resident Daily Progress Note  Name: Belle Hylton   : 1990   MRN: [id number]   Date: 2020 9:29 AM     I have reviewed the flowsheet and previous days notes. The patient is unable to give any meaningful history or review of systems because the patient is intubated and critically ill. Overnight events reviewed:  - Paralytics stopped yesterday afternoon      Vital Signs:    Visit Vitals  /65   Pulse 80   Temp 97.9 °F (36.6 °C)   Resp 22   Ht 5' 11\" (1.803 m)   Wt 238 lb (108 kg)   SpO2 96%   BMI 33.19 kg/m²       O2 Device: Endotracheal tube, Ventilator   O2 Flow Rate (L/min): 36 l/min   Temp (24hrs), Av.1 °F (37.3 °C), Min:97.9 °F (36.6 °C), Max:100 °F (37.8 °C)       Intake/Output:   Last shift:      No intake/output data recorded.   Last 3 shifts:  1901 -  0700  In: 3984.3 [I.V.:3764.3]  Out: 5263 [Urine:5220]    Intake/Output Summary (Last 24 hours) at 2020 0841  Last data filed at 2020 0600  Gross per 24 hour   Intake 2226.13 ml   Output 3313 ml   Net -1086.87 ml       Ventilator Settings:  Ventilator Mode: PRVC  Respiratory Rate  Back-Up Rate: 22  Insp Time (sec): 1.25 sec  I:E Ratio: 1:1.2  Ventilator Volumes  Vt Set (ml): 450 ml  Vt Exhaled (Machine Breath) (ml): 468 ml  Vt Spont (ml): 629 ml  Ve Observed (l/min): 10.3 l/min  Ventilator Pressures  Pressure Support (cm H2O): 10 cm H2O  PIP Observed (cm H2O): 37 cm H2O  Plateau Pressure (cm H2O): 34 cm H2O  MAP (cm H2O): 25  PEEP/VENT (cm H2O): 15 cm H20  Auto PEEP Observed (cm H2O): 3 cm H2O      Physical Exam: *Physical exam deferred to attending physician as patient is COVID+ to limit exposure*    DATA:   Current Facility-Administered Medications   Medication Dose Route Frequency    vecuronium (NORCURON) 50 mg in 0.9% sodium chloride 100 mL infusion  0-1.2 mcg/kg/min (Order-Specific) IntraVENous TITRATE    enoxaparin (LOVENOX) injection 50 mg 0.5 mg/kg SubCUTAneous Q12H    midazolam in normal saline (VERSED) 1 mg/mL infusion  0-20 mg/hr IntraVENous TITRATE    vancomycin (VANCOCIN) 1,750 mg in 0.9% sodium chloride 500 mL IVPB  1,750 mg IntraVENous Q6H    PHENYLephrine (TONNY-SYNEPHRINE) 30 mg in 0.9% sodium chloride 250 mL infusion   mcg/min IntraVENous TITRATE    alteplase (CATHFLO) 1 mg in sterile water (preservative free) 1 mL injection  1 mg InterCATHeter PRN    fentaNYL (PF) 1,500 mcg/30 mL (50 mcg/mL) infusion  0-300 mcg/hr IntraVENous TITRATE    propofol (DIPRIVAN) 10 mg/mL infusion  0-50 mcg/kg/min IntraVENous TITRATE    dexamethasone (PF) (DECADRON) 10 mg/mL injection 6 mg  6 mg IntraVENous Q6H    0.9% sodium chloride infusion 250 mL  250 mL IntraVENous PRN    famotidine (PF) (PEPCID) 20 mg in 0.9% sodium chloride 10 mL injection  20 mg IntraVENous Q12H    atorvastatin (LIPITOR) tablet 20 mg  20 mg Oral QHS    cefepime (MAXIPIME) 2 g in 0.9% sodium chloride (MBP/ADV) 100 mL  2 g IntraVENous Q8H    LORazepam (ATIVAN) injection 0.5 mg  0.5 mg IntraVENous Q6H PRN    epoprostenol (VELETRI) 30 mcg/mL in 0.9% sodium chloride 50 mL inhalation solution  50 ng/kg/min (Ideal) Inhalation CONTINUOUS    morphine injection 1 mg  1 mg IntraVENous Q6H PRN    0.9% sodium chloride infusion 250 mL  250 mL IntraVENous PRN    sodium chloride (NS) flush 5-40 mL  5-40 mL IntraVENous Q8H    sodium chloride (NS) flush 5-40 mL  5-40 mL IntraVENous PRN    acetaminophen (TYLENOL) tablet 650 mg  650 mg Oral Q6H PRN    Or    acetaminophen (TYLENOL) suppository 650 mg  650 mg Rectal Q6H PRN    polyethylene glycol (MIRALAX) packet 17 g  17 g Oral DAILY PRN    promethazine (PHENERGAN) tablet 12.5 mg  12.5 mg Oral Q6H PRN    Or    ondansetron (ZOFRAN) injection 4 mg  4 mg IntraVENous Q6H PRN    ascorbic acid (vitamin C) (VITAMIN C) tablet 500 mg  500 mg Oral BID    zinc sulfate (ZINCATE) 220 (50) mg capsule 1 Cap  1 Cap Oral DAILY    benzonatate (TESSALON) capsule 100 mg  100 mg Oral TID PRN             Labs:  Recent Labs     08/14/20  0440 08/13/20  0154 08/12/20  0207   WBC 12.2* 12.3* 11.1   HGB 11.9* 11.7* 12.4   HCT 36.3* 36.1* 36.9    204 214     Recent Labs     08/14/20  0440 08/13/20  0154 08/12/20  0207   * 137 138   K 3.8 4.2 4.3    105 106   CO2 29 28 26   * 129* 137*   BUN 18 23* 21*   CREA 0.37* 0.55* 0.58*   CA 8.1* 8.4* 8.4*   ALB 2.0* 1.9* 1.9*   ALT 38 27 29     Recent Labs     08/14/20  0558 08/13/20  0433 08/12/20  0448   PH 7.43 7.41 7.38   PCO2 43 37 42   PO2 64* 81 114*   HCO3 28* 23 24   FIO2 100 75 95       Imaging:  I have personally reviewed the patients radiographs and reports. Micro:  Results     Procedure Component Value Units Date/Time    CULTURE, RESPIRATORY/SPUTUM/BRONCH Cristo Sullivan [[id number]] Collected:  08/09/20 1845    Order Status:  Canceled Specimen:  Sputum     CULTURE, BLOOD [[id number]] Collected:  08/09/20 0924    Order Status:  Completed Specimen:  Blood Updated:  08/13/20 0601     Special Requests: NO SPECIAL REQUESTS        Culture result: NO GROWTH 4 DAYS       C. DIFFICILE AG & TOXIN A/B [[id number]] Collected:  08/08/20 1213    Order Status:  Completed Specimen:  Stool Updated:  08/09/20 0927     [address] ANTIGEN Negative        C. difficile toxin Negative        INTERPRETATION       NEGATIVE FOR TOXIGENIC C. DIFFICILE          CULTURE, BLOOD, PAIRED [[id number]] Collected:  08/05/20 2049    Order Status:  Completed Specimen:  Blood Updated:  08/10/20 0608     Special Requests: NO SPECIAL REQUESTS        Culture result: NO GROWTH 5 DAYS              IMPRESSION:   · COVID pneumonia  · Acute hypoxic respiratory failure   · Bilateral pneumothoraces  · Tobacco use  · Transaminitis   PLAN:   · Continue full vent support  · Fentanyl, propofol, versed  · Decadron (day 9/10)  · S/p Convalescent plasma on 8/10.  Second dose ordered, pending availability  · S/p Remdesivir on 8/10  · IL 6 reordered  · Off paralytics since yesterday afternoon  · Veletri  · Pigtail placed on 8/11 with expansion of L lung  · Unable to prone due to chest tube  · Monitor COVID labs daily  · S/p Cameron  · Lasix 20 IV x1 today for positive fluid status  · Cefepime, Vanc  · Zinc, Vit C, lipitor   · Lovenox for elevated D dimer   · Replete electrolytes as needed  · Nutrition: NPO except meds    GLOBAL ISSUES:   · Head of bed elevated  · GI Prophylaxis: Pepcid  · DVT Prophylaxis: Lovenox   · ETT placed on 8/10     My assessment/plan will be discussed with Dr. Jeffery Miranda, ICU Attending Physician.     Lyndon Mccollum DO  Family Medicine Resident, PGY-2

## 2020-08-14 NOTE — PROGRESS NOTES
46- Spoke with Dr. Rosalia Staples about plan for the day. Dr. Rosalia Staples reduced O2 will continue to monitor and reduce O2 as tolerated. 0750- Vent check performed patient currently on Vt- 450, RR- 22, O2- 90%, PEEP- 15. Tube moved to left side to prevent breakdown. Suctions copious amounts of clear/yellow thick secretions. BS are diminished. Veletri checked continuing to run at 7.53 mL/HR, Nebulizer on continuous and neb cup full. 2821- New Veletri syringe started. Verified by CATINA Zavala. 7.53mL/HR, 30 mcg/mL, 50 ng/kg/min. 1130- Check check performed currently on Vt-450, RR- 22, O2-85%, PEEP 15. Sating 87%. Oxygen increased to 90% due to low saturations. Veletri checked currently running at 7.53 mL/hr. Air added to cuff due to audible cuff leak, Cuff patent and capped. 1230- Patient had severe episode desated into low 80's. Patient placed on 100% FiO2 with a PEEP of 15. Patient sating 96%. Cuff pressure checked with cuff manometer Air added to a safe level and re-capped    1520- New Veletri started verified with CATINA Alamo. 7.53mL/HR, 30 mcg/,L, 50 ng/kg/min. Vent check completed patient currently on Vt, 450, RR- 22, O2- 85%, PEEP 15. Suctioned large amount of tan/bloody thick secretions.

## 2020-08-14 NOTE — PROGRESS NOTES
Name: [facility]: [address]   : 1990 Admit Date: 2020   Phone: [phone]  Room: [address] [place] [address]   PCP: Suze Kilgore MD  MRN: [id number]   Date: 2020  Code: Full Code          Chart and notes reviewed. Data reviewed. I review the patient's current medications in the medical record at each encounter. I have evaluated and examined the patient. History of Present Illness:  Mr. Shelli White is a 26 yo gentleman with a history of tobacco use who is admitted with COVID-19 pneumonia and acute respiratory failure with hypoxia. He was diagnosed about 10 days ago at an urgent care facility with COVID-19. Last Thursday he began to have fevers, inability to take a deep breath that was progressively worse, dry cough, and general malaise/faitigue. He was prescribed azithromycin and prednisone without improvement. Denies n/v, abdominal pain, changes in taste/smell. Hypoxic as low as 76% on RA. Placed on BiPAP yesterday evening due to increased work of breathing.     Labs reviewed: WBC 16.5, d-dimer 0.86, Na 129, cr 0.84, AST 86, , , Procalcitonin 0.33, ferritin 5114, IL6 in lab; crp pending    Images:  CXR with hazy interstitial changes and airspace opacification      PMH: tobacco use    FH: no family history pertinent to presents complaint    SH: +tobacco use    Interval history  Intubated, sedated  On Veletri  Paralytics stopped yesterday afternoon  Afebrile  Left lung remains re-expanded with chest tube  Fentanyl 225  Prop 25  Versed 12  I/O: -1261      Social History     Tobacco Use    Smoking status: Current Some Day Smoker    Smokeless tobacco: Never Used   Substance Use Topics    Alcohol use: Not on file       No Known Allergies    Current Facility-Administered Medications   Medication Dose Route Frequency    vecuronium (NORCURON) 50 mg in 0.9% sodium chloride 100 mL infusion  0-1.2 mcg/kg/min (Order-Specific) IntraVENous TITRATE    enoxaparin (LOVENOX) injection 50 mg  0.5 mg/kg SubCUTAneous Q12H    midazolam in normal saline (VERSED) 1 mg/mL infusion  0-20 mg/hr IntraVENous TITRATE    vancomycin (VANCOCIN) 1,750 mg in 0.9% sodium chloride 500 mL IVPB  1,750 mg IntraVENous Q6H    PHENYLephrine (TONNY-SYNEPHRINE) 30 mg in 0.9% sodium chloride 250 mL infusion   mcg/min IntraVENous TITRATE    alteplase (CATHFLO) 1 mg in sterile water (preservative free) 1 mL injection  1 mg InterCATHeter PRN    fentaNYL (PF) 1,500 mcg/30 mL (50 mcg/mL) infusion  0-300 mcg/hr IntraVENous TITRATE    propofol (DIPRIVAN) 10 mg/mL infusion  0-50 mcg/kg/min IntraVENous TITRATE    dexamethasone (PF) (DECADRON) 10 mg/mL injection 6 mg  6 mg IntraVENous Q6H    0.9% sodium chloride infusion 250 mL  250 mL IntraVENous PRN    famotidine (PF) (PEPCID) 20 mg in 0.9% sodium chloride 10 mL injection  20 mg IntraVENous Q12H    atorvastatin (LIPITOR) tablet 20 mg  20 mg Oral QHS    cefepime (MAXIPIME) 2 g in 0.9% sodium chloride (MBP/ADV) 100 mL  2 g IntraVENous Q8H    LORazepam (ATIVAN) injection 0.5 mg  0.5 mg IntraVENous Q6H PRN    epoprostenol (VELETRI) 30 mcg/mL in 0.9% sodium chloride 50 mL inhalation solution  50 ng/kg/min (Ideal) Inhalation CONTINUOUS    morphine injection 1 mg  1 mg IntraVENous Q6H PRN    0.9% sodium chloride infusion 250 mL  250 mL IntraVENous PRN    sodium chloride (NS) flush 5-40 mL  5-40 mL IntraVENous Q8H    sodium chloride (NS) flush 5-40 mL  5-40 mL IntraVENous PRN    acetaminophen (TYLENOL) tablet 650 mg  650 mg Oral Q6H PRN    Or    acetaminophen (TYLENOL) suppository 650 mg  650 mg Rectal Q6H PRN    polyethylene glycol (MIRALAX) packet 17 g  17 g Oral DAILY PRN    promethazine (PHENERGAN) tablet 12.5 mg  12.5 mg Oral Q6H PRN    Or    ondansetron (ZOFRAN) injection 4 mg  4 mg IntraVENous Q6H PRN    ascorbic acid (vitamin C) (VITAMIN C) tablet 500 mg  500 mg Oral BID    zinc sulfate (ZINCATE) 220 (50) mg capsule 1 Cap  1 Cap Oral DAILY    benzonatate (TESSALON) capsule 100 mg  100 mg Oral TID PRN         REVIEW OF SYSTEMS   12 point ROS negative except as stated in the HPI. Physical Exam:   Visit Vitals  /65   Pulse 80   Temp 97.9 °F (36.6 °C)   Resp 22   Ht 5' 11\" (1.803 m)   Wt 108 kg (238 lb)   SpO2 95%   BMI 33.19 kg/m²       General:  Intubated, sedated,   Head:  Normocephalic, without obvious abnormality, atraumatic. Eyes:  Conjunctivae/corneas clear. Nose: Nares normal   Throat: Lips, mucosa, and tongue normal.    Neck: Supple, symmetrical, trachea midline   Lungs:   CTA, no w/r/r   Chest wall:  No tenderness or deformity. Heart:  Regular rate and rhythm, S1, S2 normal, no murmur, click, rub or gallop. Abdomen:   Soft, non-tender. Bowel sounds normal. Obese   Extremities: Extremities normal, atraumatic, trace edema in arms and legs       Skin: Skin color, texture, turgor normal. No rashes or lesions   Neurologic: Paralyzed, pupils reactive, RASS -4       Lab Results   Component Value Date/Time    Sodium 135 (L) 08/14/2020 04:40 AM    Potassium 3.8 08/14/2020 04:40 AM    Chloride 102 08/14/2020 04:40 AM    CO2 29 08/14/2020 04:40 AM    BUN 18 08/14/2020 04:40 AM    Creatinine 0.37 (L) 08/14/2020 04:40 AM    Glucose 102 (H) 08/14/2020 04:40 AM    Calcium 8.1 (L) 08/14/2020 04:40 AM    Magnesium 2.8 (H) 08/10/2020 05:55 AM    Phosphorus 4.1 08/10/2020 05:55 AM    Lactic acid 1.2 08/06/2020 01:24 PM       Lab Results   Component Value Date/Time    WBC 12.2 (H) 08/14/2020 04:40 AM    HGB 11.9 (L) 08/14/2020 04:40 AM    PLATELET 253 03/89/8695 04:40 AM    MCV 92.4 08/14/2020 04:40 AM       Lab Results   Component Value Date/Time    Alk.  phosphatase 79 08/14/2020 04:40 AM    Protein, total 6.0 (L) 08/14/2020 04:40 AM    Albumin 2.0 (L) 08/14/2020 04:40 AM    Globulin 4.0 08/14/2020 04:40 AM       Lab Results   Component Value Date/Time    Ferritin 1,762 (H) 08/13/2020 01:54 AM       Lab Results   Component Value Date/Time C-Reactive protein 17.50 (H) 08/10/2020 05:55 AM        Lab Results   Component Value Date/Time    PH 7.43 08/14/2020 05:58 AM    PCO2 43 08/14/2020 05:58 AM    PO2 64 (L) 08/14/2020 05:58 AM    HCO3 28 (H) 08/14/2020 05:58 AM    FIO2 100 08/14/2020 05:58 AM       Lab Results   Component Value Date/Time    Troponin-I, Qt. <0.05 08/08/2020 12:13 PM        Lab Results   Component Value Date/Time    Culture result: NO GROWTH 4 DAYS 08/09/2020 09:24 AM    Culture result: NO GROWTH 5 DAYS 08/05/2020 08:49 PM       No results found for: TOXA1, RPR, HBCM, HBSAG, HAAB, HCAB1, HAAT, G6PD, CRYAC, HIVGT, HIVR, HIV1, HIV12, HIVPC, HIVRPI    Lab Results   Component Value Date/Time    Vancomycin,trough 16.4 (H) 08/12/2020 01:00 PM    Vancomycin,trough 7.6 08/11/2020 11:34 AM       No results found for: COLOR, APPRN, SPGRU, RAE, PROTU, GLUCU, KETU, BILU, BLDU, UROU, ROBLES, LEUKU, WBCU, RBCU, UEPI, BACTU, CASTS, UCRY    IMPRESSION  · COVID-19 Pneumonia  · Acute respiratory failure with hypoxia  · PTX bilaterally  · Elevated LFTs  · Tobacco  use    PLAN  · Continue full vent support  · Unable to do proning protocol due to left sided PTX and chest tube  · Pigtail to suction  · Keep prop dose below 30 due bradycardia in higher doses, cont versed/fentanyl. · Low threshold to restart paralytics  · Continue Zinc, vitamin C  · Continue cefepime and Vanc  · Cameron off 8/11  · PICC line placed 8/10  · Continue decadron for 10 days  · remdesivir finished 8/10   · convalescent plasma given 8/10, second dose ordered 8/12- pending availability  · Monitor renal function, LFTs,  · Inflammatory markers decreasing some, d-dimer only slightly elevated.   · IL 6 reordered  · Enoxaparin decreased to 0.5 mg/kg q12  · Lasix 20 mg IV once today to help with overall positive fluid status  · PRN proventil  · NPO except meds until pulmonary status stabilizes  · Full code  · Prognosis very guarded  · Discussed with nursing and RT    Patient is critically ill with worsening acute hypoxic respiratory failure and COVID 19  Pneumonia. CC time EOP 39 min. Discussed with nursing and RT.     Les Forrest MD

## 2020-08-14 NOTE — PROGRESS NOTES
Comprehensive Nutrition Assessment    Type and Reason for Visit: Reassess    Nutrition Recommendations/Plan:     1. Recommend initiating nutrition support now that paralytics d/c as pt has been NPO x 9 days. Recommend ordering EN via OGT as medically appropriate:  TF Rec's:  2CalHN at 20ml/hr, increasing by 10ml q8h to goal rate of 40ml/hr while receiving Propofol. Add ProSource TF 4x/day and flush with 160ml h20 q3h. (Goal TF will provide 2347 kcals including 427 kcals from Propofol; 134 gm protein; 2036 ml fluid - this will meet 93% pt's kcal needs, 95% protein needs). If unable to initiate EN, recommend ordering TPN:  TPN Rec's: Day 1: D20, 5% AA at 42ml/hr            Day 2: D20, 5% AA at 63ml/hr            Day 3: Goal of D20, 5% AA at 83ml/hr with 500ml of 20% lipids at 42 ml/hr x 12 hrs  3x/wk. (Goal TPN will provide 2612 kcals including 427kcals from Propofol, 100 gm protein - this will meet 100% kcal needs and 71% protein needs). Nutrition Assessment:      8/14:  F/u. Pt remains intubated but has been off paralytics for last 24h. Still being sedated with Propofol now at 25mcg/kg/min (providing 427 kcals/d). Still NPO (x 9 days now). OGT in place. Will recommend starting EN now that pt is no longer paralyzed. Labs reviewed. Meds: Vit C, zinc, statin, Decadron. No new BM's documented. 8/12: F/u. Pt remains intubated, paralyzed, and sedated with Propofol at 30mcg/kg/min (now providing 512 kcals/d). Still NPO (x 7 days today). MD notes no plan for nutrition support at this time due to his condition. Labs reviewed. Meds: Vit C, zinc, statin, Decadron, Nimbex, Propofol. Loose BM noted on 8/9.    8/10: 26 yo male admitted for respiratory failure with hypoxia. No PMhx. Class I obese per BMI of 33 (based on stated weight). No weight hx available in EMR. Pt has been on Bipap and NPO since admission. Transferred to ICU and was intubated 8/9.   Now paralyzed, requiring pressor support, and  being sedated with Propofol. Order for OGT placement. Pt is COVID-19 +. Unable to obtain nutrition hx at this time. Labs reviewed. Meds: Cameron at 50mcg/min, Propofol at 50mcg/kg/min, Nimbex, Remdesivir, Decadron, statin, Vit C, zinc.  Loose BM noted 8/9. Malnutrition Assessment:  Malnutrition Status:    N/A    Estimated Daily Nutrient Needs:  Energy (kcal):  7528 kcals(PAL 2097 x AF 1.2)  Protein (g):  140-156 gm(1.8-2gm/kg IBW/d)       Fluid (ml/day):  2516ml    Nutrition Related Findings:  N/A      Wounds:    (PI to nose)       Current Nutrition Therapies:   DIET NPO Except Meds    Anthropometric Measures:  · Height:  5' 11\" (180.3 cm)  · Current Body Wt:  107.9 kg (237 lb 14 oz)   · Admission Body Wt:       · Usual Body Wt:        · Ideal Body Wt:   :      · Adjusted Body Weight:   ; Weight Adjustment for: No adjustment   · Adjusted BMI:       · BMI Category:  Obese class 1 (BMI 30.0-34. 9)       Nutrition Diagnosis:   · Inadequate energy intake related to inadequate protein-energy intake as evidenced by intubation, NPO or clear liquid status due to medical condition    8/12: Nutrition Dx continues. 8/14: Nutrition Dx continues. Nutrition Interventions:   Food and/or Nutrient Delivery: Start tube feeding  Nutrition Education and Counseling: No recommendations at this time  Coordination of Nutrition Care: Continued inpatient monitoring    Goals:  Initiate EN to meet > 60% EEN's within next 2-3 days       Nutrition Monitoring and Evaluation:   Behavioral-Environmental Outcomes:    Food/Nutrient Intake Outcomes: Enteral nutrition intake/tolerance  Physical Signs/Symptoms Outcomes: GI status, Weight    Discharge Planning:     Too soon to determine     Electronically signed by Aleja Johnson, [address] [place] [address] on 8/14/2020 at 10:57 AM    Contact: [phone]

## 2020-08-14 NOTE — PROGRESS NOTES
1900- Bedside and Verbal shift change report given to Rai Reed (oncoming nurse) by Fam Martell (offgoing nurse). Report included the following information SBAR, Kardex, MAR, Recent Results and Cardiac Rhythm NSR.     2000- Shift assessment completed  Patient in NSR with HR in the 60's  Compliant with vent settings- FiO2 85%, PEEP 15, and rate 22  Chest tube intact-drainage level @ 65  -Drips verified. Vec @ 1.2, fentanyl @ 300, versed @ 13, and propofol @ 20   -cooling blanket turned off for temp 97.4    2200- TOF had 1 twitch @ 70 and baseline is 40, titrated vec down to 0.9   -Suction and mouth care completed     2230- Rechecked TOF, 1 twitch @ 60. Titrated vec down to 0.6    2300- TOF 1 twitch @ 40     0000- Reassessment completed   No changes. Patient TOF 2 twitches @ 40     0200- Turned/repositioned. Mouth care and suction completed     0400- TOF 2 twitches @ 40   Full chg bath, linen change, and turned/repositioned. Mouth care/suction  -Patients temp trending up, currently 99.6, placed ice packs    0500- TOF 3 twitches @ 40, increased vec to 0.9    0600- TOF 2 twitch @ 40    0700- Bedside and Verbal shift change report given to Jasmeet DOMINIQUE (oncoming nurse) by Bella Rangel RN (offgoing nurse). Report included the following information SBAR, Kardex, [facility], Recent Results and Cardiac Rhythm NSR/Sinus Jose Armando Denton.

## 2020-08-14 NOTE — DISCHARGE SUMMARY
Physician Interim Summary   Patient ID:  Belle Hylton  [id number]  56 y.o.  1990    PCP: Mariam Lowe MD     Consults: Pulmonary/Intensive care    Covering dates: 8/5/2020 through 8/14/2020    Admission Diagnoses: Respiratory failure with hypoxia [place] Course:    40-year-old gentleman without significant past medical history was admitted with  progressively worsening cough, shortness of breath, chills and fevers. Patient stated that he tested positive for COVID-19 virus before this admission. After admission, patient's condition continue to worsen requiring more oxygen and BiPAP. Later in the week, patient was transferred to ICU was intubated. His inflammatory markers worsened. Status post Remdesivir, convalesce plasma on 8/10. Intubated on 8/10. On vitamin C, zinc, therapeutic Lovenox, broad spectrum ABx, statin. Poor prognosis    Please see daily progress note for detail      Additional hospital course and discharge summary will be done by discharging physician.

## 2020-08-14 NOTE — PROGRESS NOTES
8/14/2020  4:04 PM   COVID 19+  Pt discussed in IDR rounds, is continuing to require medical management for Acute respiratory failure w/ hypoxia, COVID 19 PNA  Transitions of Care Plan:  COVID 19+   RUR 16%  1. Acute Respiratory Failure, pt intubated, sedated, full vent support, chest tube Lt lung, IV Vanc, IV decadron  2. Convalescent plasma given 8/10, second dose pending availability  3. Monitor LFT and renal function  4. CM to follow for treatment/response  5. Dispo pending medical stability and progress   TIKA Lira

## 2020-08-14 NOTE — PROGRESS NOTES
Updated mother Afsaneh Machado over the phone. Informed her that pt still critical and no major change in the last 24 hrs. Informed her that second dose of convalescent plasma being transfused this afternoon.

## 2020-08-15 ENCOUNTER — APPOINTMENT (OUTPATIENT)
Dept: GENERAL RADIOLOGY | Age: 30
DRG: 870 | End: 2020-08-15
Attending: INTERNAL MEDICINE
Payer: COMMERCIAL

## 2020-08-15 LAB
ALBUMIN SERPL-MCNC: 1.8 G/DL (ref 3.5–5)
ALBUMIN/GLOB SERPL: 0.5 {RATIO} (ref 1.1–2.2)
ALP SERPL-CCNC: 90 U/L (ref 45–117)
ALT SERPL-CCNC: 52 U/L (ref 12–78)
ANION GAP SERPL CALC-SCNC: 6 MMOL/L (ref 5–15)
ARTERIAL PATENCY WRIST A: ABNORMAL
AST SERPL-CCNC: 48 U/L (ref 15–37)
BACTERIA SPEC CULT: NORMAL
BASE EXCESS BLDA CALC-SCNC: 5.1 MMOL/L
BDY SITE: ABNORMAL
BILIRUB SERPL-MCNC: 0.8 MG/DL (ref 0.2–1)
BLD PROD TYP BPU: NORMAL
BPU ID: NORMAL
BUN SERPL-MCNC: 15 MG/DL (ref 6–20)
BUN/CREAT SERPL: 31 (ref 12–20)
CALCIUM SERPL-MCNC: 7.8 MG/DL (ref 8.5–10.1)
CHLORIDE SERPL-SCNC: 106 MMOL/L (ref 97–108)
CO2 SERPL-SCNC: 28 MMOL/L (ref 21–32)
CREAT SERPL-MCNC: 0.49 MG/DL (ref 0.7–1.3)
CRP SERPL HS-MCNC: >9.5 MG/L
D DIMER PPP FEU-MCNC: 1.38 MG/L FEU (ref 0–0.65)
DATE LAST DOSE: ABNORMAL
EPAP/CPAP/PEEP, PAPEEP: 15
ERYTHROCYTE [DISTWIDTH] IN BLOOD BY AUTOMATED COUNT: 12.1 % (ref 11.5–14.5)
FERRITIN SERPL-MCNC: 1557 NG/ML (ref 26–388)
FIO2 ON VENT: 90 %
GAS FLOW.O2 SETTING OXYMISER: 22 L/MIN
GLOBULIN SER CALC-MCNC: 3.4 G/DL (ref 2–4)
GLUCOSE SERPL-MCNC: 123 MG/DL (ref 65–100)
HCO3 BLDA-SCNC: 30 MMOL/L (ref 22–26)
HCT VFR BLD AUTO: 30.7 % (ref 36.6–50.3)
HGB BLD-MCNC: 10.4 G/DL (ref 12.1–17)
LDH SERPL L TO P-CCNC: 582 U/L (ref 85–241)
MCH RBC QN AUTO: 30.8 PG (ref 26–34)
MCHC RBC AUTO-ENTMCNC: 33.9 G/DL (ref 30–36.5)
MCV RBC AUTO: 90.8 FL (ref 80–99)
NRBC # BLD: 0 K/UL (ref 0–0.01)
NRBC BLD-RTO: 0 PER 100 WBC
PCO2 BLDA: 47 MMHG (ref 35–45)
PH BLDA: 7.43 [PH] (ref 7.35–7.45)
PHOSPHATE SERPL-MCNC: 3.1 MG/DL (ref 2.6–4.7)
PLATELET # BLD AUTO: 206 K/UL (ref 150–400)
PMV BLD AUTO: 11.6 FL (ref 8.9–12.9)
PO2 BLDA: 113 MMHG (ref 80–100)
POTASSIUM SERPL-SCNC: 4.8 MMOL/L (ref 3.5–5.1)
PROT SERPL-MCNC: 5.2 G/DL (ref 6.4–8.2)
RBC # BLD AUTO: 3.38 M/UL (ref 4.1–5.7)
REPORTED DOSE,DOSE: ABNORMAL UNITS
REPORTED DOSE/TIME,TMG: ABNORMAL
SAO2 % BLD: 98 % (ref 92–97)
SAO2% DEVICE SAO2% SENSOR NAME: ABNORMAL
SERVICE CMNT-IMP: NORMAL
SODIUM SERPL-SCNC: 140 MMOL/L (ref 136–145)
SPECIMEN SITE: ABNORMAL
STATUS OF UNIT,%ST: NORMAL
UNIT DIVISION, %UDIV: 0
VANCOMYCIN TROUGH SERPL-MCNC: 19.7 UG/ML (ref 5–10)
VENTILATION MODE VENT: ABNORMAL
VT SETTING VENT: 450 ML
WBC # BLD AUTO: 9 K/UL (ref 4.1–11.1)

## 2020-08-15 PROCEDURE — 74011250637 HC RX REV CODE- 250/637: Performed by: INTERNAL MEDICINE

## 2020-08-15 PROCEDURE — 80202 ASSAY OF VANCOMYCIN: CPT

## 2020-08-15 PROCEDURE — 74011000250 HC RX REV CODE- 250: Performed by: INTERNAL MEDICINE

## 2020-08-15 PROCEDURE — 94645 CONT INHLJ TX EACH ADDL HOUR: CPT

## 2020-08-15 PROCEDURE — 74011250636 HC RX REV CODE- 250/636: Performed by: INTERNAL MEDICINE

## 2020-08-15 PROCEDURE — 85379 FIBRIN DEGRADATION QUANT: CPT

## 2020-08-15 PROCEDURE — 94664 DEMO&/EVAL PT USE INHALER: CPT

## 2020-08-15 PROCEDURE — 82728 ASSAY OF FERRITIN: CPT

## 2020-08-15 PROCEDURE — 94644 CONT INHLJ TX 1ST HOUR: CPT

## 2020-08-15 PROCEDURE — 94003 VENT MGMT INPAT SUBQ DAY: CPT

## 2020-08-15 PROCEDURE — 74011000258 HC RX REV CODE- 258: Performed by: INTERNAL MEDICINE

## 2020-08-15 PROCEDURE — 65610000006 HC RM INTENSIVE CARE

## 2020-08-15 PROCEDURE — 82803 BLOOD GASES ANY COMBINATION: CPT

## 2020-08-15 PROCEDURE — 85027 COMPLETE CBC AUTOMATED: CPT

## 2020-08-15 PROCEDURE — 71045 X-RAY EXAM CHEST 1 VIEW: CPT

## 2020-08-15 PROCEDURE — 80053 COMPREHEN METABOLIC PANEL: CPT

## 2020-08-15 PROCEDURE — 84100 ASSAY OF PHOSPHORUS: CPT

## 2020-08-15 PROCEDURE — 83615 LACTATE (LD) (LDH) ENZYME: CPT

## 2020-08-15 PROCEDURE — 36415 COLL VENOUS BLD VENIPUNCTURE: CPT

## 2020-08-15 PROCEDURE — 86141 C-REACTIVE PROTEIN HS: CPT

## 2020-08-15 RX ORDER — HYDROMORPHONE HCL IN 0.9% NACL 15 MG/30ML
.25-1 PATIENT CONTROLLED ANALGESIA VIAL INTRAVENOUS
Status: DISCONTINUED | OUTPATIENT
Start: 2020-08-15 | End: 2020-08-17

## 2020-08-15 RX ORDER — HYDROMORPHONE HCL IN 0.9% NACL 15 MG/30ML
.25-1 PATIENT CONTROLLED ANALGESIA VIAL INTRAVENOUS
Status: DISCONTINUED | OUTPATIENT
Start: 2020-08-15 | End: 2020-08-15

## 2020-08-15 RX ADMIN — Medication 500 MG: at 08:42

## 2020-08-15 RX ADMIN — Medication 10 ML: at 11:30

## 2020-08-15 RX ADMIN — DEXAMETHASONE SODIUM PHOSPHATE 6 MG: 10 INJECTION, SOLUTION INTRAMUSCULAR; INTRAVENOUS at 00:55

## 2020-08-15 RX ADMIN — VANCOMYCIN HYDROCHLORIDE 1750 MG: 10 INJECTION, POWDER, LYOPHILIZED, FOR SOLUTION INTRAVENOUS at 06:54

## 2020-08-15 RX ADMIN — VECURONIUM BROMIDE 1.2 MCG/KG/MIN: 1 INJECTION, POWDER, LYOPHILIZED, FOR SOLUTION INTRAVENOUS at 15:35

## 2020-08-15 RX ADMIN — EPOPROSTENOL 50 NG/KG/MIN: 1.5 INJECTION, POWDER, LYOPHILIZED, FOR SOLUTION INTRAVENOUS at 16:48

## 2020-08-15 RX ADMIN — FAMOTIDINE 20 MG: 10 INJECTION, SOLUTION INTRAVENOUS at 21:05

## 2020-08-15 RX ADMIN — DEXTRAN 70, GLYCERIN, HYPROMELLOSE 1 DROP: 1; 2; 3 SOLUTION/ DROPS OPHTHALMIC at 18:14

## 2020-08-15 RX ADMIN — CEFEPIME HYDROCHLORIDE 2 G: 2 INJECTION, POWDER, FOR SOLUTION INTRAVENOUS at 08:42

## 2020-08-15 RX ADMIN — DEXAMETHASONE SODIUM PHOSPHATE 6 MG: 10 INJECTION, SOLUTION INTRAMUSCULAR; INTRAVENOUS at 17:26

## 2020-08-15 RX ADMIN — ATORVASTATIN CALCIUM 20 MG: 20 TABLET, FILM COATED ORAL at 21:05

## 2020-08-15 RX ADMIN — Medication 10 ML: at 21:05

## 2020-08-15 RX ADMIN — VANCOMYCIN HYDROCHLORIDE 1750 MG: 10 INJECTION, POWDER, LYOPHILIZED, FOR SOLUTION INTRAVENOUS at 19:28

## 2020-08-15 RX ADMIN — EPOPROSTENOL 50 NG/KG/MIN: 1.5 INJECTION, POWDER, LYOPHILIZED, FOR SOLUTION INTRAVENOUS at 04:57

## 2020-08-15 RX ADMIN — MIDAZOLAM HYDROCHLORIDE 15 MG/HR: 5 INJECTION, SOLUTION INTRAMUSCULAR; INTRAVENOUS at 10:57

## 2020-08-15 RX ADMIN — CEFEPIME HYDROCHLORIDE 2 G: 2 INJECTION, POWDER, FOR SOLUTION INTRAVENOUS at 00:50

## 2020-08-15 RX ADMIN — DEXTRAN 70, GLYCERIN, HYPROMELLOSE 1 DROP: 1; 2; 3 SOLUTION/ DROPS OPHTHALMIC at 02:13

## 2020-08-15 RX ADMIN — DEXAMETHASONE SODIUM PHOSPHATE 6 MG: 10 INJECTION, SOLUTION INTRAMUSCULAR; INTRAVENOUS at 23:05

## 2020-08-15 RX ADMIN — VECURONIUM BROMIDE 0.6 MCG/KG/MIN: 1 INJECTION, POWDER, LYOPHILIZED, FOR SOLUTION INTRAVENOUS at 01:33

## 2020-08-15 RX ADMIN — Medication 10 ML: at 05:40

## 2020-08-15 RX ADMIN — Medication 500 MG: at 17:26

## 2020-08-15 RX ADMIN — FAMOTIDINE 20 MG: 10 INJECTION, SOLUTION INTRAVENOUS at 08:42

## 2020-08-15 RX ADMIN — Medication 300 MCG/HR: at 08:02

## 2020-08-15 RX ADMIN — Medication 300 MCG/HR: at 02:42

## 2020-08-15 RX ADMIN — DEXAMETHASONE SODIUM PHOSPHATE 6 MG: 10 INJECTION, SOLUTION INTRAMUSCULAR; INTRAVENOUS at 11:31

## 2020-08-15 RX ADMIN — ENOXAPARIN SODIUM 50 MG: 100 INJECTION SUBCUTANEOUS at 21:04

## 2020-08-15 RX ADMIN — VANCOMYCIN HYDROCHLORIDE 1750 MG: 10 INJECTION, POWDER, LYOPHILIZED, FOR SOLUTION INTRAVENOUS at 12:30

## 2020-08-15 RX ADMIN — VANCOMYCIN HYDROCHLORIDE 1750 MG: 10 INJECTION, POWDER, LYOPHILIZED, FOR SOLUTION INTRAVENOUS at 01:23

## 2020-08-15 RX ADMIN — PROPOFOL 25 MCG/KG/MIN: 10 INJECTION, EMULSION INTRAVENOUS at 08:41

## 2020-08-15 RX ADMIN — DEXAMETHASONE SODIUM PHOSPHATE 6 MG: 10 INJECTION, SOLUTION INTRAMUSCULAR; INTRAVENOUS at 05:37

## 2020-08-15 RX ADMIN — EPOPROSTENOL 50 NG/KG/MIN: 1.5 INJECTION, POWDER, LYOPHILIZED, FOR SOLUTION INTRAVENOUS at 10:47

## 2020-08-15 RX ADMIN — MIDAZOLAM HYDROCHLORIDE 17 MG/HR: 5 INJECTION, SOLUTION INTRAMUSCULAR; INTRAVENOUS at 18:34

## 2020-08-15 RX ADMIN — Medication 1.35 MG/HR: at 16:25

## 2020-08-15 RX ADMIN — CEFEPIME HYDROCHLORIDE 2 G: 2 INJECTION, POWDER, FOR SOLUTION INTRAVENOUS at 17:26

## 2020-08-15 RX ADMIN — MIDAZOLAM HYDROCHLORIDE 14 MG/HR: 5 INJECTION, SOLUTION INTRAMUSCULAR; INTRAVENOUS at 03:28

## 2020-08-15 RX ADMIN — ASCORBIC ACID, VITAMIN A PALMITATE, CHOLECALCIFEROL, THIAMINE HYDROCHLORIDE, RIBOFLAVIN-5 PHOSPHATE SODIUM, PYRIDOXINE HYDROCHLORIDE, NIACINAMIDE, DEXPANTHENOL, ALPHA-TOCOPHEROL ACETATE, VITAMIN K1, FOLIC ACID, BIOTIN, CYANOCOBALAMIN: 200; 3300; 200; 6; 3.6; 6; 40; 15; 10; 150; 600; 60; 5 INJECTION, SOLUTION INTRAVENOUS at 17:44

## 2020-08-15 RX ADMIN — ENOXAPARIN SODIUM 50 MG: 100 INJECTION SUBCUTANEOUS at 08:46

## 2020-08-15 RX ADMIN — DEXTRAN 70, GLYCERIN, HYPROMELLOSE 1 DROP: 1; 2; 3 SOLUTION/ DROPS OPHTHALMIC at 08:14

## 2020-08-15 RX ADMIN — PROPOFOL 22 MCG/KG/MIN: 10 INJECTION, EMULSION INTRAVENOUS at 22:41

## 2020-08-15 RX ADMIN — EPOPROSTENOL 50 NG/KG/MIN: 1.5 INJECTION, POWDER, LYOPHILIZED, FOR SOLUTION INTRAVENOUS at 23:52

## 2020-08-15 RX ADMIN — PROPOFOL 18 MCG/KG/MIN: 10 INJECTION, EMULSION INTRAVENOUS at 14:17

## 2020-08-15 NOTE — PROGRESS NOTES
Name: [facility]: Minerva Chacon   : 1990 Admit Date: 2020   Phone: [phone]  Room: [address] [place] [address]   PCP: Danika Yates MD  MRN: [id number]   Date: 8/15/2020  Code: Full Code          Chart and notes reviewed. Data reviewed. I review the patient's current medications in the medical record at each encounter. I have evaluated and examined the patient. History of Present Illness:  Mr. Chun Coughlin is a 26 yo gentleman with a history of tobacco use who is admitted with COVID-19 pneumonia and acute respiratory failure with hypoxia. He was diagnosed about 10 days ago at an urgent care facility with COVID-19. Last Thursday he began to have fevers, inability to take a deep breath that was progressively worse, dry cough, and general malaise/faitigue. He was prescribed azithromycin and prednisone without improvement. Denies n/v, abdominal pain, changes in taste/smell. Hypoxic as low as 76% on RA. Placed on BiPAP yesterday evening due to increased work of breathing. Labs reviewed: WBC 16.5, d-dimer 0.86, Na 129, cr 0.84, AST 86, , , Procalcitonin 0.33, ferritin 5114, IL6 in lab; crp pending    Images:  CXR with hazy interstitial changes and airspace opacification      PMH: tobacco use    FH: no family history pertinent to presents complaint    SH: +tobacco use    Interval history  Intubated, sedated  On Veletri  On vec at 0.9, per report opened eyes twice overnight. Has been synchronous with vent.   Afebrile  Left lung remains re-expanded with chest tube  Fentanyl 300  Prop 25  Versed 15  I/O: +1084      Social History     Tobacco Use    Smoking status: Current Some Day Smoker    Smokeless tobacco: Never Used   Substance Use Topics    Alcohol use: Not on file       No Known Allergies    Current Facility-Administered Medications   Medication Dose Route Frequency    HYDROmorphone (DILAUDID) 100 mg/100 mL NS infusion  0.3-10 mg/hr IntraVENous TITRATE    TPN ADULT - CENTRAL AA 5% D20% W/ ELECTROLYTES AND CA   IntraVENous CONTINUOUS    artificial tears (dextran-hypromellose-glycerin) (GENTEAL) ophthalmic solution 1 Drop  1 Drop Both Eyes PRN    vecuronium (NORCURON) 50 mg in 0.9% sodium chloride 100 mL infusion  0-1.2 mcg/kg/min (Order-Specific) IntraVENous TITRATE    enoxaparin (LOVENOX) injection 50 mg  0.5 mg/kg SubCUTAneous Q12H    midazolam in normal saline (VERSED) 1 mg/mL infusion  0-20 mg/hr IntraVENous TITRATE    vancomycin (VANCOCIN) 1,750 mg in 0.9% sodium chloride 500 mL IVPB  1,750 mg IntraVENous Q6H    PHENYLephrine (TONNY-SYNEPHRINE) 30 mg in 0.9% sodium chloride 250 mL infusion   mcg/min IntraVENous TITRATE    alteplase (CATHFLO) 1 mg in sterile water (preservative free) 1 mL injection  1 mg InterCATHeter PRN    fentaNYL (PF) 1,500 mcg/30 mL (50 mcg/mL) infusion  0-300 mcg/hr IntraVENous TITRATE    propofol (DIPRIVAN) 10 mg/mL infusion  0-40 mcg/kg/min IntraVENous TITRATE    dexamethasone (PF) (DECADRON) 10 mg/mL injection 6 mg  6 mg IntraVENous Q6H    0.9% sodium chloride infusion 250 mL  250 mL IntraVENous PRN    famotidine (PF) (PEPCID) 20 mg in 0.9% sodium chloride 10 mL injection  20 mg IntraVENous Q12H    atorvastatin (LIPITOR) tablet 20 mg  20 mg Oral QHS    cefepime (MAXIPIME) 2 g in 0.9% sodium chloride (MBP/ADV) 100 mL  2 g IntraVENous Q8H    LORazepam (ATIVAN) injection 0.5 mg  0.5 mg IntraVENous Q6H PRN    epoprostenol (VELETRI) 30 mcg/mL in 0.9% sodium chloride 50 mL inhalation solution  50 ng/kg/min (Ideal) Inhalation CONTINUOUS    morphine injection 1 mg  1 mg IntraVENous Q6H PRN    0.9% sodium chloride infusion 250 mL  250 mL IntraVENous PRN    sodium chloride (NS) flush 5-40 mL  5-40 mL IntraVENous Q8H    sodium chloride (NS) flush 5-40 mL  5-40 mL IntraVENous PRN    acetaminophen (TYLENOL) tablet 650 mg  650 mg Oral Q6H PRN    Or    acetaminophen (TYLENOL) suppository 650 mg  650 mg Rectal Q6H PRN    polyethylene glycol (MIRALAX) packet 17 g  17 g Oral DAILY PRN    promethazine (PHENERGAN) tablet 12.5 mg  12.5 mg Oral Q6H PRN    Or    ondansetron (ZOFRAN) injection 4 mg  4 mg IntraVENous Q6H PRN    ascorbic acid (vitamin C) (VITAMIN C) tablet 500 mg  500 mg Oral BID    benzonatate (TESSALON) capsule 100 mg  100 mg Oral TID PRN         REVIEW OF SYSTEMS   12 point ROS negative except as stated in the HPI. Physical Exam:   Visit Vitals  /74   Pulse (!) 55   Temp 98 °F (36.7 °C)   Resp 22   Ht 5' 11\" (1.803 m)   Wt 110 kg (242 lb 8.1 oz)   SpO2 98%   BMI 33.82 kg/m²       General:  Intubated, sedated,   Head:  Normocephalic, without obvious abnormality, atraumatic. Eyes:  Conjunctivae/corneas clear. Nose: Nares normal   Throat: Lips, mucosa, and tongue normal.    Neck: Supple, symmetrical, trachea midline   Lungs:   CTA, no w/r/r   Chest wall:  No tenderness or deformity. Heart:  Regular rate and rhythm, S1, S2 normal, no murmur, click, rub or gallop. Abdomen:   Soft, non-tender. Bowel sounds normal. Obese   Extremities: Extremities normal, atraumatic, trace edema in arms and legs       Skin: Skin color, texture, turgor normal. No rashes or lesions   Neurologic: Paralyzed, pupils reactive and equal, RASS -4       Lab Results   Component Value Date/Time    Sodium 140 08/15/2020 05:15 AM    Potassium 4.8 08/15/2020 05:15 AM    Chloride 106 08/15/2020 05:15 AM    CO2 28 08/15/2020 05:15 AM    BUN 15 08/15/2020 05:15 AM    Creatinine 0.49 (L) 08/15/2020 05:15 AM    Glucose 123 (H) 08/15/2020 05:15 AM    Calcium 7.8 (L) 08/15/2020 05:15 AM    Magnesium 2.8 (H) 08/10/2020 05:55 AM    Phosphorus 3.1 08/15/2020 05:15 AM    Lactic acid 1.2 08/06/2020 01:24 PM       Lab Results   Component Value Date/Time    WBC 9.0 08/15/2020 03:17 AM    HGB 10.4 (L) 08/15/2020 03:17 AM    PLATELET 826 56/14/0224 03:17 AM    MCV 90.8 08/15/2020 03:17 AM       Lab Results   Component Value Date/Time    Alk.  phosphatase 90 08/15/2020 05:15 AM    Protein, total 5.2 (L) 08/15/2020 05:15 AM    Albumin 1.8 (L) 08/15/2020 05:15 AM    Globulin 3.4 08/15/2020 05:15 AM       Lab Results   Component Value Date/Time    Ferritin 1,472 (H) 08/14/2020 04:40 AM       Lab Results   Component Value Date/Time    C-Reactive protein 17.50 (H) 08/10/2020 05:55 AM        Lab Results   Component Value Date/Time    PH 7.43 08/15/2020 05:17 AM    PCO2 47 (H) 08/15/2020 05:17 AM    PO2 113 (H) 08/15/2020 05:17 AM    HCO3 30 (H) 08/15/2020 05:17 AM    FIO2 90 08/15/2020 05:17 AM       Lab Results   Component Value Date/Time    Troponin-I, Qt. <0.05 08/08/2020 12:13 PM        Lab Results   Component Value Date/Time    Culture result: NO GROWTH 6 DAYS 08/09/2020 09:24 AM    Culture result: NO GROWTH 5 DAYS 08/05/2020 08:49 PM       No results found for: TOXA1, RPR, HBCM, HBSAG, HAAB, HCAB1, HAAT, G6PD, CRYAC, HIVGT, HIVR, HIV1, HIV12, HIVPC, HIVRPI    Lab Results   Component Value Date/Time    Vancomycin,trough 19.7 (H) 08/15/2020 01:11 AM    Vancomycin,trough 16.4 (H) 08/12/2020 01:00 PM       No results found for: COLOR, APPRN, SPGRU, RAE, PROTU, GLUCU, KETU, BILU, BLDU, UROU, ROBLES, LEUKU, WBCU, RBCU, UEPI, BACTU, CASTS, UCRY    IMPRESSION  · COVID-19 Pneumonia  · Acute respiratory failure with hypoxia  · PTX bilaterally  · Elevated LFTs  · Tobacco  use    PLAN  · Continue full vent support  · Unable to do proning protocol due to left sided PTX and chest tube  · Pigtail to suction  · Keep prop dose below 30 due bradycardia in higher doses, cont versed/fentanyl.    · Will transition fentanyl to dilaudid as he still appears awake at times  · Paralytics, minimize as above  · Continue Zinc, vitamin C  · Continue cefepime and Vanc  · Cameron off 8/11  · PICC line placed 8/10  · Continue decadron for 10 days  · remdesivir finished 8/10   · convalescent plasma given 8/10, second dose 8/12  · Monitor renal function, LFTs,  · Inflammatory markers decreasing some, d-dimer only slightly elevated. · IL 6 reordered  · Enoxaparin 0.5 mg/kg q12  · Can give additional lasix if needed  · PRN proventil  · NPO except meds until pulmonary status stabilizes  · Full code  · Prognosis very guarded  · Discussed with nursing and RT  · TPN    Patient is critically ill with worsening acute hypoxic respiratory failure and COVID 19  Pneumonia. CC time EOP 40 min. Discussed with nursing and RT.     Nayeli Ayala MD

## 2020-08-15 NOTE — PROGRESS NOTES
Vancomycin trough, drawn 5.7 hours post-dose, was 19.7 mcg/ml. This extrapolates to 19.0 mcg/ml, which is within goal.  Will continue same dosing.

## 2020-08-15 NOTE — PROGRESS NOTES
1900 - Bedside and Verbal shift change report given to Mary Ann Chew (oncoming nurse) by Jae Wisdom RN (offgoing nurse). Report included the following information SBAR, Kardex, ED Summary, Intake/Output, MAR, Recent Results and Cardiac Rhythm NSR. Primary Nurse Rhoda Lamar and Jae Wisdom RN performed a dual skin assessment on this patient No impairment noted. Enrique score is 10. Drips verified with off going nurse. 2000 - Shift assessment completed. See flow sheet. Patient not interactive due to being paralyzed and sedated. No movement, but eyes open spontaneously. TOF baseline 40, 4 twitches at 40. Dilaudid infusing at 2 mg/hr, Versed at 17 mg/hr, Propofol at 17 mcg/kg/min, Vecuronium at 1.2 mcg/kg/min, TPN at 63 ml/hr. ETT at 28 at the lip. OGT clamped at 54. Mouth care and suctioning completed. Left chest tube in place connected to continuous suction. Mcwilliams in place and draining. Mcwilliams care given. Patient turned and repositioned, heel boots on.  2100 - TOF baseline 40, 4 twitches at 40.  2113 - Patient eyes continue to open spontaneously. Propofol increased to 22 mcg/kg/min and Dilaudid increased to 3 mg/hr. 2200 - Mouth care and suctioning done. Patient turned and repositioned, heel boots on.  2300 - TOF baseline 40, 4 twitches at 40.  0000 - Reassessment completed. Changes documented on flow sheet. Eyes do not open to any stimuli. TOF baseline 40, 4 twitches at 40. Mouth care and suctioning completed. Left chest tube in place connected to continuous suction. Mcwilliams in place and draining. Mcwilliams care given. Patient turned and repositioned, heel boots on.  0100 - TOF baseline 40, 4 twitches at 40.  0153 - AM labs drawn. 0200 - TOF baseline 40, 4 twitches at 40. Propofol increased to 25 mcg/kg/min.  0300 - TOF baseline 40, 4 twitches at 40. CHG bath given. 0400 - Reassessment completed. No changes to previous assessment. Eyes do not open to any stimuli. TOF baseline 40, 4 twitches at 40.  Mouth care and suctioning completed. Left chest tube in place connected to continuous suction. Mcwilliams in place and draining. Mcwilliams care given. Patient turned and repositioned, heel boots on.  0455 - HR < 60. Propofol decreased to 22 mcg/kg/min.  0500 - TOF baseline 40, 4 twitches at 40.  0600 -  Mouth care and suctioning done. Patient turned and repositioned, heels elevated on pillows. TOF baseline 40, 4 twitches at 40.  0617 - HR < 60. Propofol decreased to 17 mcg/kg/min. 0647 - HR < 60. Propofol decreased to 12 mcg/kg/min.  9708 - Bedside and Verbal shift change report given to Jeanne Bryant RN (oncoming nurse) by Ruth Barton RN (offgoing nurse). Report included the following information SBAR, Kardex, ED Summary, Intake/Output, MAR, Recent Results and Cardiac Rhythm Sinus Jose Armando Denton.

## 2020-08-15 NOTE — ROUTINE PROCESS
While giving patient care/repositioning patient, patient spontaneously opened his eyes, despite being on max dose of paralytic, and sedation as high as his vitals can tolerate. Patient sensitive to propofol. MD aware of these findings. Orders received: retain max dose of 1.2 of Vecuronium, continue up-titrating sedation for RASS of -4, as vital signs can tolerate. Also, switching to Dilaudid infusion because patient is maxed out on fentanyl infusion. Will wean fentanyl off. Have had success weaning FiO2 today. Began shift with patient on 90%. Have been able to gradually wean down to 65%, patient's oxygen saturation holding steady at 93-94%. HR in 60's, NSR. 's/70's. Afebrile today thus far.

## 2020-08-15 NOTE — PROGRESS NOTES
Problem: Falls - Risk of  Goal: *Absence of Falls  Description: Document John Hutton Fall Risk and appropriate interventions in the flowsheet. Outcome: Progressing Towards Goal  Note: Fall Risk Interventions:  Mobility Interventions: Bed/chair exit alarm    Mentation Interventions: Evaluate medications/consider consulting pharmacy, More frequent rounding    Medication Interventions: Evaluate medications/consider consulting pharmacy    Elimination Interventions: Bed/chair exit alarm    History of Falls Interventions: Evaluate medications/consider consulting pharmacy         Problem: Pressure Injury - Risk of  Goal: *Prevention of pressure injury  Description: Document Enrique Scale and appropriate interventions in the flowsheet. Outcome: Progressing Towards Goal  Note: Pressure Injury Interventions:  Sensory Interventions: Assess changes in LOC, Assess need for specialty bed, Float heels, Keep linens dry and wrinkle-free, Minimize linen layers, Monitor skin under medical devices, Pressure redistribution bed/mattress (bed type), Turn and reposition approx. every two hours (pillows and wedges if needed)    Moisture Interventions: Absorbent underpads, Assess need for specialty bed, Check for incontinence Q2 hours and as needed, Internal/External urinary devices, Minimize layers    Activity Interventions: Assess need for specialty bed, Pressure redistribution bed/mattress(bed type), PT/OT evaluation    Mobility Interventions: Assess need for specialty bed, Float heels, HOB 30 degrees or less, Pressure redistribution bed/mattress (bed type), PT/OT evaluation, Turn and reposition approx.  every two hours(pillow and wedges)    Nutrition Interventions: Document food/fluid/supplement intake, Discuss nutritional consult with provider    Friction and Shear Interventions: Foam dressings/transparent film/skin sealants, HOB 30 degrees or less, Minimize layers, Transferring/repositioning devices                Problem: Risk for Spread of Infection  Goal: Prevent transmission of infectious organism to others  Description: Prevent the transmission of infectious organisms to other patients, staff members, and visitors.   Outcome: Progressing Towards Goal     Problem: Ventilator Management  Goal: *Adequate oxygenation and ventilation  Outcome: Progressing Towards Goal

## 2020-08-15 NOTE — PROGRESS NOTES
Bill Zepeda [facility] [address]  [address] [facility], [address] [place] [address]  [phone]      Medical Progress Note      NAME:         Annie Burr   :        1990  MRM:        [id number]    Date of service: 8/15/2020      Subjective: Patient has been seen and examined as a follow up for multiple medical issues. Chart, labs, diagnostics reviewed. Patient remains sedated and paralysed. Discussed with his nurse for collaborative Hx    Objective:    Vital Signs:    Visit Vitals  /65   Pulse 60   Temp 98.3 °F (36.8 °C)   Resp 22   Ht 5' 11\" (1.803 m)   Wt 110 kg (242 lb 8.1 oz)   SpO2 95%   BMI 33.82 kg/m²          Intake/Output Summary (Last 24 hours) at 8/15/2020 1403  Last data filed at 8/15/2020 1200  Gross per 24 hour   Intake 3604.2 ml   Output 3215 ml   Net 389.2 ml        Physical Examination:    General: critically ill male patient, intubated   Eyes:   pink conjunctivae, no discharge. ENT:   no ottorrhea or rhinorrhea with dry mucous membranes, orally intubated  Pulm:  decreased breath sounds with isolated crackles. No wheezes  Card:  has regular and normal S1, S2 without thrills, bruits or peripheral edema  Abd:  Soft, non-tender, non-distended, normoactive bowel sounds   Musc:  No cyanosis, clubbing, atrophy or deformities. Skin:  No rashes, bruising or ulcers.    Neuro: sedated and intubated     Current Facility-Administered Medications   Medication Dose Route Frequency    HYDROmorphone (PF) 25 mg/50 mL (0.5 mg/mL) infusion  0.25-10 mg/hr IntraVENous TITRATE    TPN ADULT - CENTRAL AA 5% D20% W/ ELECTROLYTES AND CA   IntraVENous CONTINUOUS    TPN ADULT - CENTRAL AA 5% D20% W/ ELECTROLYTES AND CA   IntraVENous CONTINUOUS    artificial tears (dextran-hypromellose-glycerin) (GENTEAL) ophthalmic solution 1 Drop  1 Drop Both Eyes PRN    vecuronium (NORCURON) 50 mg in 0.9% sodium chloride 100 mL infusion  0-1.2 mcg/kg/min (Order-Specific) IntraVENous TITRATE    enoxaparin (LOVENOX) injection 50 mg  0.5 mg/kg SubCUTAneous Q12H    midazolam in normal saline (VERSED) 1 mg/mL infusion  0-20 mg/hr IntraVENous TITRATE    vancomycin (VANCOCIN) 1,750 mg in 0.9% sodium chloride 500 mL IVPB  1,750 mg IntraVENous Q6H    PHENYLephrine (TONNY-SYNEPHRINE) 30 mg in 0.9% sodium chloride 250 mL infusion   mcg/min IntraVENous TITRATE    alteplase (CATHFLO) 1 mg in sterile water (preservative free) 1 mL injection  1 mg InterCATHeter PRN    fentaNYL (PF) 1,500 mcg/30 mL (50 mcg/mL) infusion  0-300 mcg/hr IntraVENous TITRATE    propofol (DIPRIVAN) 10 mg/mL infusion  0-40 mcg/kg/min IntraVENous TITRATE    dexamethasone (PF) (DECADRON) 10 mg/mL injection 6 mg  6 mg IntraVENous Q6H    0.9% sodium chloride infusion 250 mL  250 mL IntraVENous PRN    famotidine (PF) (PEPCID) 20 mg in 0.9% sodium chloride 10 mL injection  20 mg IntraVENous Q12H    atorvastatin (LIPITOR) tablet 20 mg  20 mg Oral QHS    cefepime (MAXIPIME) 2 g in 0.9% sodium chloride (MBP/ADV) 100 mL  2 g IntraVENous Q8H    LORazepam (ATIVAN) injection 0.5 mg  0.5 mg IntraVENous Q6H PRN    epoprostenol (VELETRI) 30 mcg/mL in 0.9% sodium chloride 50 mL inhalation solution  50 ng/kg/min (Ideal) Inhalation CONTINUOUS    morphine injection 1 mg  1 mg IntraVENous Q6H PRN    0.9% sodium chloride infusion 250 mL  250 mL IntraVENous PRN    sodium chloride (NS) flush 5-40 mL  5-40 mL IntraVENous Q8H    sodium chloride (NS) flush 5-40 mL  5-40 mL IntraVENous PRN    acetaminophen (TYLENOL) tablet 650 mg  650 mg Oral Q6H PRN    Or    acetaminophen (TYLENOL) suppository 650 mg  650 mg Rectal Q6H PRN    polyethylene glycol (MIRALAX) packet 17 g  17 g Oral DAILY PRN    promethazine (PHENERGAN) tablet 12.5 mg  12.5 mg Oral Q6H PRN    Or    ondansetron (ZOFRAN) injection 4 mg  4 mg IntraVENous Q6H PRN    ascorbic acid (vitamin C) (VITAMIN C) tablet 500 mg  500 mg Oral BID  benzonatate (TESSALON) capsule 100 mg  100 mg Oral TID PRN        Laboratory data and review:    Recent Labs     08/15/20  0317 08/14/20  0440 08/13/20  0154   WBC 9.0 12.2* 12.3*   HGB 10.4* 11.9* 11.7*   HCT 30.7* 36.3* 36.1*    183 204     Recent Labs     08/15/20  0515 08/14/20  1857 08/14/20  0440 08/13/20  0154     --  135* 137   K 4.8  --  3.8 4.2     --  102 105   CO2 28  --  29 28   *  --  102* 129*   BUN 15  --  18 23*   CREA 0.49*  --  0.37* 0.55*   CA 7.8*  --  8.1* 8.4*   PHOS 3.1 3.8  --   --    ALB 1.8*  --  2.0* 1.9*   ALT 52  --  38 27     No components found for: [place]    Diagnostics: CXR reviewed    Telemetry reviewed by me:   normal sinus rhythm    Assessment and Plan:    Pneumonia due to severe acute respiratory syndrome coronavirus 2 (SARS-CoV-2) (8/8/2020) POA: diagnosed 7/27. Progressively worsened leading to intubation. He is sp Remdesivir (completed 8/10) and convalescent plasma x 2 (8/10 and 8/12). Continue dexamethasone, cefepime, Ascorbic acid, Lipitor and enoxaparin. Supportive care    Acute respiratory failure with hypoxia ([address] (8/5/2020): due to COVID pneumonia. Intensivist managing vent. Pneumothorax bilaterally (8/14/2020): sp chest tube placement 8/11.  Continue to monitor    Total time spent for the patient's care: Kevin1 Magdalene Walsh discussed with: Nursing Staff and Consultant/Specialist    Discussed:  Care Plan    Prophylaxis:  Lovenox    Anticipated Disposition:  Home w/Family           ___________________________________________________    Attending Physician:   Marvene Pallas, MD

## 2020-08-16 ENCOUNTER — APPOINTMENT (OUTPATIENT)
Dept: GENERAL RADIOLOGY | Age: 30
DRG: 870 | End: 2020-08-16
Attending: INTERNAL MEDICINE
Payer: COMMERCIAL

## 2020-08-16 LAB
ALBUMIN SERPL-MCNC: 1.9 G/DL (ref 3.5–5)
ALBUMIN/GLOB SERPL: 0.5 {RATIO} (ref 1.1–2.2)
ALP SERPL-CCNC: 85 U/L (ref 45–117)
ALT SERPL-CCNC: 53 U/L (ref 12–78)
ANION GAP SERPL CALC-SCNC: 5 MMOL/L (ref 5–15)
ARTERIAL PATENCY WRIST A: ABNORMAL
AST SERPL-CCNC: 24 U/L (ref 15–37)
BASE EXCESS BLDA CALC-SCNC: 4.9 MMOL/L
BDY SITE: ABNORMAL
BILIRUB SERPL-MCNC: 0.5 MG/DL (ref 0.2–1)
BUN SERPL-MCNC: 14 MG/DL (ref 6–20)
BUN/CREAT SERPL: 29 (ref 12–20)
CALCIUM SERPL-MCNC: 8.2 MG/DL (ref 8.5–10.1)
CHLORIDE SERPL-SCNC: 108 MMOL/L (ref 97–108)
CO2 SERPL-SCNC: 31 MMOL/L (ref 21–32)
CREAT SERPL-MCNC: 0.49 MG/DL (ref 0.7–1.3)
CRP SERPL HS-MCNC: >9.5 MG/L
D DIMER PPP FEU-MCNC: 1.54 MG/L FEU (ref 0–0.65)
EPAP/CPAP/PEEP, PAPEEP: 15
ERYTHROCYTE [DISTWIDTH] IN BLOOD BY AUTOMATED COUNT: 12 % (ref 11.5–14.5)
FERRITIN SERPL-MCNC: 1610 NG/ML (ref 26–388)
FIO2 ON VENT: 90 %
GAS FLOW.O2 SETTING OXYMISER: 22 L/MIN
GLOBULIN SER CALC-MCNC: 4.1 G/DL (ref 2–4)
GLUCOSE BLD STRIP.AUTO-MCNC: 168 MG/DL (ref 65–100)
GLUCOSE BLD STRIP.AUTO-MCNC: 197 MG/DL (ref 65–100)
GLUCOSE BLD STRIP.AUTO-MCNC: 199 MG/DL (ref 65–100)
GLUCOSE SERPL-MCNC: 192 MG/DL (ref 65–100)
HCO3 BLDA-SCNC: 30 MMOL/L (ref 22–26)
HCT VFR BLD AUTO: 32.2 % (ref 36.6–50.3)
HGB BLD-MCNC: 10.8 G/DL (ref 12.1–17)
LDH SERPL L TO P-CCNC: 268 U/L (ref 85–241)
MCH RBC QN AUTO: 30.9 PG (ref 26–34)
MCHC RBC AUTO-ENTMCNC: 33.5 G/DL (ref 30–36.5)
MCV RBC AUTO: 92 FL (ref 80–99)
NRBC # BLD: 0 K/UL (ref 0–0.01)
NRBC BLD-RTO: 0 PER 100 WBC
PCO2 BLDA: 48 MMHG (ref 35–45)
PH BLDA: 7.42 [PH] (ref 7.35–7.45)
PHOSPHATE SERPL-MCNC: 2.7 MG/DL (ref 2.6–4.7)
PLATELET # BLD AUTO: 186 K/UL (ref 150–400)
PMV BLD AUTO: 11.4 FL (ref 8.9–12.9)
PO2 BLDA: 81 MMHG (ref 80–100)
POTASSIUM SERPL-SCNC: 3.9 MMOL/L (ref 3.5–5.1)
PROT SERPL-MCNC: 6 G/DL (ref 6.4–8.2)
RBC # BLD AUTO: 3.5 M/UL (ref 4.1–5.7)
SAO2 % BLD: 96 % (ref 92–97)
SAO2% DEVICE SAO2% SENSOR NAME: ABNORMAL
SERVICE CMNT-IMP: ABNORMAL
SODIUM SERPL-SCNC: 144 MMOL/L (ref 136–145)
SPECIMEN SITE: ABNORMAL
VENTILATION MODE VENT: ABNORMAL
VT SETTING VENT: 450 ML
WBC # BLD AUTO: 8.8 K/UL (ref 4.1–11.1)

## 2020-08-16 PROCEDURE — 74011000250 HC RX REV CODE- 250: Performed by: INTERNAL MEDICINE

## 2020-08-16 PROCEDURE — 74011000258 HC RX REV CODE- 258: Performed by: INTERNAL MEDICINE

## 2020-08-16 PROCEDURE — 94645 CONT INHLJ TX EACH ADDL HOUR: CPT

## 2020-08-16 PROCEDURE — 74011250636 HC RX REV CODE- 250/636: Performed by: INTERNAL MEDICINE

## 2020-08-16 PROCEDURE — 65610000006 HC RM INTENSIVE CARE

## 2020-08-16 PROCEDURE — 83615 LACTATE (LD) (LDH) ENZYME: CPT

## 2020-08-16 PROCEDURE — 71045 X-RAY EXAM CHEST 1 VIEW: CPT

## 2020-08-16 PROCEDURE — 80053 COMPREHEN METABOLIC PANEL: CPT

## 2020-08-16 PROCEDURE — 82803 BLOOD GASES ANY COMBINATION: CPT

## 2020-08-16 PROCEDURE — 74011250637 HC RX REV CODE- 250/637: Performed by: INTERNAL MEDICINE

## 2020-08-16 PROCEDURE — 94644 CONT INHLJ TX 1ST HOUR: CPT

## 2020-08-16 PROCEDURE — 84100 ASSAY OF PHOSPHORUS: CPT

## 2020-08-16 PROCEDURE — 94003 VENT MGMT INPAT SUBQ DAY: CPT

## 2020-08-16 PROCEDURE — 82728 ASSAY OF FERRITIN: CPT

## 2020-08-16 PROCEDURE — 36415 COLL VENOUS BLD VENIPUNCTURE: CPT

## 2020-08-16 PROCEDURE — 85027 COMPLETE CBC AUTOMATED: CPT

## 2020-08-16 PROCEDURE — 86141 C-REACTIVE PROTEIN HS: CPT

## 2020-08-16 PROCEDURE — 36600 WITHDRAWAL OF ARTERIAL BLOOD: CPT

## 2020-08-16 PROCEDURE — 77010033678 HC OXYGEN DAILY

## 2020-08-16 PROCEDURE — 82962 GLUCOSE BLOOD TEST: CPT

## 2020-08-16 PROCEDURE — 85379 FIBRIN DEGRADATION QUANT: CPT

## 2020-08-16 PROCEDURE — 94640 AIRWAY INHALATION TREATMENT: CPT

## 2020-08-16 RX ORDER — BUMETANIDE 0.25 MG/ML
1 INJECTION INTRAMUSCULAR; INTRAVENOUS ONCE
Status: COMPLETED | OUTPATIENT
Start: 2020-08-16 | End: 2020-08-16

## 2020-08-16 RX ADMIN — EPOPROSTENOL 50 NG/KG/MIN: 1.5 INJECTION, POWDER, LYOPHILIZED, FOR SOLUTION INTRAVENOUS at 06:24

## 2020-08-16 RX ADMIN — Medication 4 MG/HR: at 15:24

## 2020-08-16 RX ADMIN — Medication 500 MG: at 17:18

## 2020-08-16 RX ADMIN — ENOXAPARIN SODIUM 50 MG: 100 INJECTION SUBCUTANEOUS at 08:08

## 2020-08-16 RX ADMIN — MIDAZOLAM HYDROCHLORIDE 19 MG/HR: 5 INJECTION, SOLUTION INTRAMUSCULAR; INTRAVENOUS at 13:25

## 2020-08-16 RX ADMIN — DEXAMETHASONE SODIUM PHOSPHATE 6 MG: 10 INJECTION, SOLUTION INTRAMUSCULAR; INTRAVENOUS at 12:03

## 2020-08-16 RX ADMIN — ATORVASTATIN CALCIUM 20 MG: 20 TABLET, FILM COATED ORAL at 21:01

## 2020-08-16 RX ADMIN — VANCOMYCIN HYDROCHLORIDE 1750 MG: 10 INJECTION, POWDER, LYOPHILIZED, FOR SOLUTION INTRAVENOUS at 01:21

## 2020-08-16 RX ADMIN — EPOPROSTENOL 50 NG/KG/MIN: 1.5 INJECTION, POWDER, LYOPHILIZED, FOR SOLUTION INTRAVENOUS at 23:47

## 2020-08-16 RX ADMIN — VECURONIUM BROMIDE 1.2 MCG/KG/MIN: 1 INJECTION, POWDER, LYOPHILIZED, FOR SOLUTION INTRAVENOUS at 00:43

## 2020-08-16 RX ADMIN — VECURONIUM BROMIDE 1.2 MCG/KG/MIN: 1 INJECTION, POWDER, LYOPHILIZED, FOR SOLUTION INTRAVENOUS at 10:31

## 2020-08-16 RX ADMIN — MIDAZOLAM HYDROCHLORIDE 17 MG/HR: 5 INJECTION, SOLUTION INTRAMUSCULAR; INTRAVENOUS at 00:42

## 2020-08-16 RX ADMIN — Medication 500 MG: at 08:08

## 2020-08-16 RX ADMIN — MIDAZOLAM HYDROCHLORIDE 20 MG/HR: 5 INJECTION, SOLUTION INTRAMUSCULAR; INTRAVENOUS at 19:34

## 2020-08-16 RX ADMIN — DEXAMETHASONE SODIUM PHOSPHATE 6 MG: 10 INJECTION, SOLUTION INTRAMUSCULAR; INTRAVENOUS at 23:48

## 2020-08-16 RX ADMIN — EPOPROSTENOL 50 NG/KG/MIN: 1.5 INJECTION, POWDER, LYOPHILIZED, FOR SOLUTION INTRAVENOUS at 17:23

## 2020-08-16 RX ADMIN — Medication 10 ML: at 14:08

## 2020-08-16 RX ADMIN — DEXAMETHASONE SODIUM PHOSPHATE 6 MG: 10 INJECTION, SOLUTION INTRAMUSCULAR; INTRAVENOUS at 17:19

## 2020-08-16 RX ADMIN — VANCOMYCIN HYDROCHLORIDE 1750 MG: 10 INJECTION, POWDER, LYOPHILIZED, FOR SOLUTION INTRAVENOUS at 12:51

## 2020-08-16 RX ADMIN — FAMOTIDINE 20 MG: 10 INJECTION, SOLUTION INTRAVENOUS at 20:58

## 2020-08-16 RX ADMIN — VANCOMYCIN HYDROCHLORIDE 1750 MG: 10 INJECTION, POWDER, LYOPHILIZED, FOR SOLUTION INTRAVENOUS at 19:28

## 2020-08-16 RX ADMIN — VECURONIUM BROMIDE 1.3 MCG/KG/MIN: 1 INJECTION, POWDER, LYOPHILIZED, FOR SOLUTION INTRAVENOUS at 20:26

## 2020-08-16 RX ADMIN — CEFEPIME HYDROCHLORIDE 2 G: 2 INJECTION, POWDER, FOR SOLUTION INTRAVENOUS at 00:36

## 2020-08-16 RX ADMIN — VANCOMYCIN HYDROCHLORIDE 1750 MG: 10 INJECTION, POWDER, LYOPHILIZED, FOR SOLUTION INTRAVENOUS at 06:25

## 2020-08-16 RX ADMIN — BUMETANIDE 1 MG: 0.25 INJECTION INTRAMUSCULAR; INTRAVENOUS at 15:43

## 2020-08-16 RX ADMIN — FAMOTIDINE 20 MG: 10 INJECTION, SOLUTION INTRAVENOUS at 08:08

## 2020-08-16 RX ADMIN — ENOXAPARIN SODIUM 50 MG: 100 INJECTION SUBCUTANEOUS at 20:58

## 2020-08-16 RX ADMIN — MIDAZOLAM HYDROCHLORIDE 18 MG/HR: 5 INJECTION, SOLUTION INTRAMUSCULAR; INTRAVENOUS at 07:14

## 2020-08-16 RX ADMIN — CEFEPIME HYDROCHLORIDE 2 G: 2 INJECTION, POWDER, FOR SOLUTION INTRAVENOUS at 08:13

## 2020-08-16 RX ADMIN — Medication 5 MG/HR: at 21:29

## 2020-08-16 RX ADMIN — Medication 3 MG/HR: at 00:38

## 2020-08-16 RX ADMIN — Medication 4 MG/HR: at 08:53

## 2020-08-16 RX ADMIN — EPOPROSTENOL 50 NG/KG/MIN: 1.5 INJECTION, POWDER, LYOPHILIZED, FOR SOLUTION INTRAVENOUS at 11:09

## 2020-08-16 RX ADMIN — ASCORBIC ACID, VITAMIN A PALMITATE, CHOLECALCIFEROL, THIAMINE HYDROCHLORIDE, RIBOFLAVIN-5 PHOSPHATE SODIUM, PYRIDOXINE HYDROCHLORIDE, NIACINAMIDE, DEXPANTHENOL, ALPHA-TOCOPHEROL ACETATE, VITAMIN K1, FOLIC ACID, BIOTIN, CYANOCOBALAMIN: 200; 3300; 200; 6; 3.6; 6; 40; 15; 10; 150; 600; 60; 5 INJECTION, SOLUTION INTRAVENOUS at 17:01

## 2020-08-16 RX ADMIN — Medication 10 ML: at 21:01

## 2020-08-16 RX ADMIN — DEXAMETHASONE SODIUM PHOSPHATE 6 MG: 10 INJECTION, SOLUTION INTRAMUSCULAR; INTRAVENOUS at 05:22

## 2020-08-16 RX ADMIN — PROPOFOL 10 MCG/KG/MIN: 10 INJECTION, EMULSION INTRAVENOUS at 07:15

## 2020-08-16 RX ADMIN — PROPOFOL 22 MCG/KG/MIN: 10 INJECTION, EMULSION INTRAVENOUS at 05:39

## 2020-08-16 RX ADMIN — Medication 10 ML: at 05:22

## 2020-08-16 NOTE — PROGRESS NOTES
0700: Bedside shift change report given to CATINA Alfaro (oncoming nurse) by Joanne Rodarte RN (offgoing nurse). Report included the following information SBAR, MAR, Recent Results, Med Rec Status and Cardiac Rhythm Sinus Jim Blades. Primary Nurse Car Candelario and CATINA Plaza performed a dual skin assessment on this patient No impairment noted  Enrique score is see flowsheet. 0727: HR 40-50s, propofol stopped. Will update Dr. Raine Thomas. Versed and Dilaudid increased for sedation, see MAR.  0800: Assessment completed. TOF 4/4. PERRLA. Eyes open spontaneously. Patient on 4 dilaudid, versed at 17, vec at 1.2. HR ranging . Lung sounds coarse. On ventilator PRVC mode , PEEP 15, RR 2, FiO2 75%, SpO2 mid 90s. Small amount of clear, blood tinged sputum with clots from ET tube. Chest tube in place, dressing with old bloody drainage, chamber at 60 mL. BS hypoactive, OGT in place. Gastric residual 65, green. Live in place, live care provided. Redness on bridge of nose and sacrum. 0830: Dr. Raine Thomas present at bedside. Informed Dr. Raine Thomas of HR and eye opening, propofol discontinuation, 4/4 twitches on maxed out vec, bloody sputum, blood in chest tube, accuchecks, and increased urine output. Per Dr. Raine Thomas okay to increase dilaudid and versed for patient sedation, keep propofol off. TOF 4/4 okay as long as patient synchronous with vent. Chest tube, sputum, and urine output okay. Will continue to monitor. Orders for q6 accuchecks. 0900: TOF 2/4. PERRLA. Eyes do not open to stimulus. 5180: Updated Dr. Sarah Morales. No new orders. 1000: TOF 4/4. PERRLA. Patient turned and repositioned. Endotracheal and mouth care provided. 1100: TOF 4/4. PERRLA. 1200: Reassessment completed. TOF 0/4. Vec changed to 1.1. PERRLA. Eyes open spontaneously. FiO2 at 65%, SpO2 dropped to 88, endotracheal suctioning, turned and repositioned, and O2 boost given. FIO2 increased to 85%, SpO2 92%. 1228: TOF 4/4. Vec changed to 1.2.  1300: TOF 4/4. PERRLA. 1400: TOF 4/4. PERRLA. Patient turned and repositioned. Endotracheal and mouth care provided. During turning noticed section of blue tubing exposed from chest tube, unable to see insertion site due to multiple dressings and reinforcements. Patient satting 90% on 80% FiO2, no crepitus palpitated. Dr. Anamika Guevara, awaiting return call. 1419: Telephone orders for STAT chest xray by Dr. Hilaria Zaman. 1430: Xray present at bedside. 1500: TOF 4/4. PERRLA.  2078: On call intensivist paged in regards to chest tube. 1516: Return call from Dr. Hilaria Zaman, updated on chest xray results and patient condition. Chest tube okay, orders for 1 mg bumex IV now. 1545: Chest tube dressing changed. Mcwilliams bag emptied. 1600: Reassessment completed. TOF 4/4/. PERRLA. Eyes open spontaneously. Patient turned and repositioned. Endotracheal and mouth care provided. 1700: TOF 4/4. PERRLA. 1800: TOF 4/4. PERRLA. Patient turned and repositioned. Endotracheal and mouth care provided. 1900: TOF 4/4. PERRLA. Bedside shift change report given to Gordon Amor RN (oncoming nurse) by Stefania/CATINA Casey (offgoing nurse). Report included the following information SBAR, Intake/Output, MAR, Recent Results and Cardiac Rhythm NSR.

## 2020-08-16 NOTE — PROGRESS NOTES
visited Mr. Nena Gomez for a follow up visit in the ICU. He continued to be on contact precautions, intubated, and sedated. Consulted with his nurse who suggested family may like the  to reach out. While on the unit,  contacted Mr. Cochran's mother, Julissa Mcfadden, introduced herself and extended support. Mercedes shared that she is hanging in there and trying to keep her strength up as well. They have their moments, good days and bad, but overall are doing as well as they can.  affirmed her feelings and shared that the chaplains have been praying for Mr. Nena Gomez and their family as requested. Julissa Mcfadden also shared that her son is on various prayer list, across numerous states, and that they are well supported during this time. She thanked the  for her call. 's will follow up as able and/or needed  Nonp[employer]il. aSrah Kinsey.      Paging Service: [phone])

## 2020-08-16 NOTE — PROGRESS NOTES
Name: [facility]: Minerva Chacon   : 1990 Admit Date: 2020   Phone: [phone]  Room: [address] [place] [address]   PCP: Danika Yates MD  MRN: [id number]   Date: 2020  Code: Full Code          Chart and notes reviewed. Data reviewed. I review the patient's current medications in the medical record at each encounter. I have evaluated and examined the patient. History of Present Illness:  Mr. Chun Coughlin is a 26 yo gentleman with a history of tobacco use who is admitted with COVID-19 pneumonia and acute respiratory failure with hypoxia. He was diagnosed about 10 days ago at an urgent care facility with COVID-19. Last Thursday he began to have fevers, inability to take a deep breath that was progressively worse, dry cough, and general malaise/faitigue. He was prescribed azithromycin and prednisone without improvement. Denies n/v, abdominal pain, changes in taste/smell. Hypoxic as low as 76% on RA. Placed on BiPAP yesterday evening due to increased work of breathing. Labs reviewed: WBC 16.5, d-dimer 0.86, Na 129, cr 0.84, AST 86, , , Procalcitonin 0.33, ferritin 5114, IL6 in lab; crp pending    Images:  CXR with hazy interstitial changes and airspace opacification      PMH: tobacco use    FH: no family history pertinent to presents complaint    SH: +tobacco use    Interval history  Intubated, sedated  On Veletri  On vec at 1.2, continues to opens eyes, has not moved extremities. Has been synchronous with vent.   Afebrile  Left lung remains re-expanded with chest tube  Fentanyl 300  Prop off as he had sustained bradycardia in the 30's on 12 of propofol  Versed 18  I/O: 66758/8295/9118      Social History     Tobacco Use    Smoking status: Current Some Day Smoker    Smokeless tobacco: Never Used   Substance Use Topics    Alcohol use: Not on file       No Known Allergies    Current Facility-Administered Medications   Medication Dose Route Frequency    TPN ADULT - CENTRAL AA 5% D20% W/ ELECTROLYTES AND CA   IntraVENous CONTINUOUS    TPN ADULT - CENTRAL AA 5% D20% W/ ELECTROLYTES AND CA   IntraVENous CONTINUOUS    HYDROmorphone (PF) 25 mg/50 mL (0.5 mg/mL) infusion  0.25-10 mg/hr IntraVENous TITRATE    artificial tears (dextran-hypromellose-glycerin) (GENTEAL) ophthalmic solution 1 Drop  1 Drop Both Eyes PRN    vecuronium (NORCURON) 50 mg in 0.9% sodium chloride 100 mL infusion  0-1.2 mcg/kg/min (Order-Specific) IntraVENous TITRATE    enoxaparin (LOVENOX) injection 50 mg  0.5 mg/kg SubCUTAneous Q12H    midazolam in normal saline (VERSED) 1 mg/mL infusion  0-20 mg/hr IntraVENous TITRATE    vancomycin (VANCOCIN) 1,750 mg in 0.9% sodium chloride 500 mL IVPB  1,750 mg IntraVENous Q6H    PHENYLephrine (TONNY-SYNEPHRINE) 30 mg in 0.9% sodium chloride 250 mL infusion   mcg/min IntraVENous TITRATE    alteplase (CATHFLO) 1 mg in sterile water (preservative free) 1 mL injection  1 mg InterCATHeter PRN    dexamethasone (PF) (DECADRON) 10 mg/mL injection 6 mg  6 mg IntraVENous Q6H    0.9% sodium chloride infusion 250 mL  250 mL IntraVENous PRN    famotidine (PF) (PEPCID) 20 mg in 0.9% sodium chloride 10 mL injection  20 mg IntraVENous Q12H    atorvastatin (LIPITOR) tablet 20 mg  20 mg Oral QHS    cefepime (MAXIPIME) 2 g in 0.9% sodium chloride (MBP/ADV) 100 mL  2 g IntraVENous Q8H    LORazepam (ATIVAN) injection 0.5 mg  0.5 mg IntraVENous Q6H PRN    epoprostenol (VELETRI) 30 mcg/mL in 0.9% sodium chloride 50 mL inhalation solution  50 ng/kg/min (Ideal) Inhalation CONTINUOUS    0.9% sodium chloride infusion 250 mL  250 mL IntraVENous PRN    sodium chloride (NS) flush 5-40 mL  5-40 mL IntraVENous Q8H    sodium chloride (NS) flush 5-40 mL  5-40 mL IntraVENous PRN    acetaminophen (TYLENOL) tablet 650 mg  650 mg Oral Q6H PRN    Or    acetaminophen (TYLENOL) suppository 650 mg  650 mg Rectal Q6H PRN    polyethylene glycol (MIRALAX) packet 17 g  17 g Oral DAILY PRN    promethazine (PHENERGAN) tablet 12.5 mg  12.5 mg Oral Q6H PRN    Or    ondansetron (ZOFRAN) injection 4 mg  4 mg IntraVENous Q6H PRN    ascorbic acid (vitamin C) (VITAMIN C) tablet 500 mg  500 mg Oral BID    benzonatate (TESSALON) capsule 100 mg  100 mg Oral TID PRN         REVIEW OF SYSTEMS   12 point ROS negative except as stated in the HPI. Physical Exam:   Visit Vitals  /82   Pulse (!) 47   Temp 97.1 °F (36.2 °C)   Resp 22   Ht 5' 11\" (1.803 m)   Wt 109 kg (240 lb 4.8 oz)   SpO2 95%   BMI 33.52 kg/m²       General:  Intubated, sedated,   Head:  Normocephalic, without obvious abnormality, atraumatic. Eyes:  Conjunctivae/corneas clear. Nose: Nares normal   Throat: Lips, mucosa, and tongue normal.    Neck: Supple, symmetrical, trachea midline   Lungs:   CTA, no w/r/r   Chest wall:  No tenderness or deformity. Heart:  Regular rate and rhythm, S1, S2 normal, no murmur, click, rub or gallop. Abdomen:   Soft, non-tender. Bowel sounds normal. Obese   Extremities: Extremities normal, atraumatic, trace edema in arms and legs       Skin: Skin color, texture, turgor normal. No rashes or lesions   Neurologic: Paralyzed, pupils reactive and equal, RASS -4       Lab Results   Component Value Date/Time    Sodium 144 08/16/2020 01:53 AM    Potassium 3.9 08/16/2020 01:53 AM    Chloride 108 08/16/2020 01:53 AM    CO2 31 08/16/2020 01:53 AM    BUN 14 08/16/2020 01:53 AM    Creatinine 0.49 (L) 08/16/2020 01:53 AM    Glucose 192 (H) 08/16/2020 01:53 AM    Calcium 8.2 (L) 08/16/2020 01:53 AM    Magnesium 2.8 (H) 08/10/2020 05:55 AM    Phosphorus 2.7 08/16/2020 01:53 AM    Lactic acid 1.2 08/06/2020 01:24 PM       Lab Results   Component Value Date/Time    WBC 8.8 08/16/2020 01:53 AM    HGB 10.8 (L) 08/16/2020 01:53 AM    PLATELET 174 22/13/7185 01:53 AM    MCV 92.0 08/16/2020 01:53 AM       Lab Results   Component Value Date/Time    Alk.  phosphatase 85 08/16/2020 01:53 AM    Protein, total 6.0 (L) 08/16/2020 01:53 AM    Albumin 1.9 (L) 08/16/2020 01:53 AM    Globulin 4.1 (H) 08/16/2020 01:53 AM       Lab Results   Component Value Date/Time    Ferritin 1,557 (H) 08/15/2020 10:11 AM       Lab Results   Component Value Date/Time    C-Reactive protein 17.50 (H) 08/10/2020 05:55 AM        Lab Results   Component Value Date/Time    PH 7.42 08/16/2020 04:11 AM    PCO2 48 (H) 08/16/2020 04:11 AM    PO2 81 08/16/2020 04:11 AM    HCO3 30 (H) 08/16/2020 04:11 AM    FIO2 90 08/16/2020 04:11 AM       Lab Results   Component Value Date/Time    Troponin-I, Qt. <0.05 08/08/2020 12:13 PM        Lab Results   Component Value Date/Time    Culture result: NO GROWTH 6 DAYS 08/09/2020 09:24 AM    Culture result: NO GROWTH 5 DAYS 08/05/2020 08:49 PM       No results found for: TOXA1, RPR, HBCM, HBSAG, HAAB, HCAB1, HAAT, G6PD, CRYAC, HIVGT, HIVR, HIV1, HIV12, HIVPC, HIVRPI    Lab Results   Component Value Date/Time    Vancomycin,trough 19.7 (H) 08/15/2020 01:11 AM    Vancomycin,trough 16.4 (H) 08/12/2020 01:00 PM       No results found for: COLOR, APPRN, SPGRU, RAE, PROTU, GLUCU, KETU, BILU, BLDU, UROU, ROBLES, LEUKU, WBCU, RBCU, UEPI, BACTU, CASTS, UCRY    IMPRESSION  · COVID-19 Pneumonia  · Acute respiratory failure with hypoxia  · PTX bilaterally  · Elevated LFTs  · Tobacco  use    PLAN  · Continue full vent support  · Unable to do proning protocol due to left sided PTX and chest tube  · Pigtail to suction  · Stop propofol due to sustained bradycardia on low dose propofol, cont versed/fentanyl. · Transitioned fentanyl to dilaudid as he still appears awake at times, can increase rate if needed  · Paralytics  · Continue Zinc, vitamin C  · Continue cefepime and Vanc  · Cameron off 8/11  · PICC line placed 8/10  · Continue decadron for 10 days  · remdesivir finished 8/10   · convalescent plasma given 8/10, second dose 8/12  · Monitor renal function, LFTs,  · Inflammatory markers decreasing some, d-dimer only slightly elevated.   · IL 6 reordered  · Enoxaparin 0.5 mg/kg q12  · Can give additional lasix if needed, putting out significant UOP today but if having issues with worsening O2 status will given a prn dose  · PRN proventil  · NPO except meds until pulmonary status stabilizes  · Full code  · Prognosis very guarded  · Discussed with nursing and RT  · TPN    Patient is critically ill with worsening acute hypoxic respiratory failure and COVID 19  Pneumonia. CC time EOP 40 min. Discussed with nursing and RT.     Alison Cabrera MD

## 2020-08-16 NOTE — PROGRESS NOTES
1900 - Bedside and Verbal shift change report given to Brady Brandon RN (oncoming nurse) by Bridget Dietz RN (offgoing nurse). Report included the following information SBAR, Kardex, ED Summary, Intake/Output, MAR, Recent Results and Cardiac Rhythm NSR. Primary Nurse Omaira Kramer and Bridget Dietz RN performed a dual skin assessment on this patient No impairment noted. Enrique score is 12. Drips verified. Patient opening eyes and moving head. Versed increased to 20 mg/hr and Vecuronium increased to 1.3 mcg/kg/min. 2000 - Shift assessment completed. See flow sheet. Patient not interactive, paralyzed and sedated. No movement, but eyes open spontaneously. TOF baseline 40, 4 twitches at 40. Dilaudid infusing at 4 mg/hr, Versed at 20 mg/hr, Vecuronium at 1.3 mcg/kg/min, and TPN at 83 ml/hr. ETT at 28 at the lip. OGT clamped at 54. Mouth care and suctioning completed. Left chest tube in place connected to continuous suction. Mcwilliams in place and draining. Mcwilliams care given. Patient turned and repositioned, heel boots on.  2100 - TOF baseline 40, 4 twitches at 40.  2200 - TOF baseline 40, 4 twitches at 40. Mouth care and suctioning completed. Patient turned and repositioned, heel boots on.  2218 - HR < 60. Versed decreased to 19 mg/hr. 2248 - HR < 60. Versed decreased to 18 mg/hr. 2300 - TOF baseline 40, 4 twitches at 40.  0000 - Reassessment completed. No changes to previous assessment. Mouth care and suctioning completed. Patient turned and repositioned, heel boots on. TOF baseline 40, 3 twitches at 40. HR increasing to the 140s/150s and sustaining. Versed increased to 19 mg/hr and Dilaudid increased to 6 mg/hr. 0016 - HR still in the 140s/150s. Versed increased to 20 mg/hr. 0028 - HR remains greater than 100. Dilaudid increased to 7 mg/hr. 0100 - TOF baseline 40, 3 twitches at 36.   0108 - Dr. Nic Tejada notified of patient's HR in the 140s/150s sustaining. Order given for Precedex gtt.   0117 - Precedex gtt started at 0.4 mcg/kg/hr. 0130 - Patient O2 sats dropping to mid 80s. Respiratory therapist called to bedside. FiO2 gradually increased to 100%. 0134 - AM labs drawn. 0153 - Dilaudid decreased to 6 mg/hr and Versed decreased to 19 mg/hr. 0200 - Mouth care and suctioning completed. Patient turned and repositioned, heel boots on. TOF baseline 40, 3 twitches at 40.   0216 - MAP < 65, Dilaudid decreased to 4 mg/hr and Versed decreased to 18 mg/hr. 0300 - TOF baseline 40, 4 twitches at 40.  0400 - Reassessment completed. Changes documented on flow sheet. Patient not interactive, paralyzed and sedated. Eyes do not open to any stimuli. TOF baseline 40, 4 twitches at 40. Mouth care and suctioning completed. Patient turned and repositioned, heel boots on.   0500 - TOF baseline 40, 4 twitches at 40.  0600 - Mouth care and suctioning completed. Patient repositioned, heel boots on. TOF baseline 40, 4 twitches at 40.   0645 - Vanc trough drawn. 0700 - TOF baseline 40, 4 twitches at 40.  0705 - Bedside and Verbal shift change report given to Jade Gorman RN (oncoming nurse) by Edgard Lam RN (offgoing nurse). Report included the following information SBAR, Kardex, ED Summary, Intake/Output, MAR, Recent Results and Cardiac Rhythm NSR.

## 2020-08-16 NOTE — PROGRESS NOTES
Bill Mckeonelsen [facility]  [address] [facility], [address] [place] [address]  [phone]      Medical Progress Note      NAME:         Lazarus Mcbride   :        1990  MRM:        [id number]    Date of service: 2020      Subjective: Patient has been seen and examined as a follow up for multiple medical issues. Chart, labs, diagnostics reviewed. Patient remains sedated and paralysed. Not much change.  Discussed with ICU staff for collaborative Hx    Objective:    Vital Signs:    Visit Vitals  /64   Pulse 71   Temp 97.1 °F (36.2 °C)   Resp 22   Ht 5' 11\" (1.803 m)   Wt 109 kg (240 lb 4.8 oz)   SpO2 95%   BMI 33.52 kg/m²          Intake/Output Summary (Last 24 hours) at 2020 1136  Last data filed at 2020 1100  Gross per 24 hour   Intake 20536.07 ml   Output 4665 ml   Net 6217.07 ml      Patient observed from door window, to save PPE and minimize exposure - having been no sig change and having been seen by intensivist    General: critically ill male patient, intubated and not restless  Pulm:  Not tachypneic or in any distress   Neuro: sedated and intubated     Current Facility-Administered Medications   Medication Dose Route Frequency    TPN ADULT - CENTRAL AA 5% D20% W/ ELECTROLYTES AND CA   IntraVENous CONTINUOUS    TPN ADULT - CENTRAL AA 5% D20% W/ ELECTROLYTES AND CA   IntraVENous CONTINUOUS    HYDROmorphone (PF) 25 mg/50 mL (0.5 mg/mL) infusion  0.25-10 mg/hr IntraVENous TITRATE    artificial tears (dextran-hypromellose-glycerin) (GENTEAL) ophthalmic solution 1 Drop  1 Drop Both Eyes PRN    vecuronium (NORCURON) 50 mg in 0.9% sodium chloride 100 mL infusion  0-1.7 mcg/kg/min (Order-Specific) IntraVENous TITRATE    enoxaparin (LOVENOX) injection 50 mg  0.5 mg/kg SubCUTAneous Q12H    midazolam in normal saline (VERSED) 1 mg/mL infusion  0-20 mg/hr IntraVENous TITRATE    vancomycin (VANCOCIN) 1,750 mg in 0.9% sodium chloride 500 mL IVPB  1,750 mg IntraVENous Q6H    PHENYLephrine (TONNY-SYNEPHRINE) 30 mg in 0.9% sodium chloride 250 mL infusion   mcg/min IntraVENous TITRATE    alteplase (CATHFLO) 1 mg in sterile water (preservative free) 1 mL injection  1 mg InterCATHeter PRN    dexamethasone (PF) (DECADRON) 10 mg/mL injection 6 mg  6 mg IntraVENous Q6H    0.9% sodium chloride infusion 250 mL  250 mL IntraVENous PRN    famotidine (PF) (PEPCID) 20 mg in 0.9% sodium chloride 10 mL injection  20 mg IntraVENous Q12H    atorvastatin (LIPITOR) tablet 20 mg  20 mg Oral QHS    cefepime (MAXIPIME) 2 g in 0.9% sodium chloride (MBP/ADV) 100 mL  2 g IntraVENous Q8H    LORazepam (ATIVAN) injection 0.5 mg  0.5 mg IntraVENous Q6H PRN    epoprostenol (VELETRI) 30 mcg/mL in 0.9% sodium chloride 50 mL inhalation solution  50 ng/kg/min (Ideal) Inhalation CONTINUOUS    0.9% sodium chloride infusion 250 mL  250 mL IntraVENous PRN    sodium chloride (NS) flush 5-40 mL  5-40 mL IntraVENous Q8H    sodium chloride (NS) flush 5-40 mL  5-40 mL IntraVENous PRN    acetaminophen (TYLENOL) tablet 650 mg  650 mg Oral Q6H PRN    Or    acetaminophen (TYLENOL) suppository 650 mg  650 mg Rectal Q6H PRN    polyethylene glycol (MIRALAX) packet 17 g  17 g Oral DAILY PRN    promethazine (PHENERGAN) tablet 12.5 mg  12.5 mg Oral Q6H PRN    Or    ondansetron (ZOFRAN) injection 4 mg  4 mg IntraVENous Q6H PRN    ascorbic acid (vitamin C) (VITAMIN C) tablet 500 mg  500 mg Oral BID    benzonatate (TESSALON) capsule 100 mg  100 mg Oral TID PRN        Laboratory data and review:    Recent Labs     08/16/20  0153 08/15/20  0317 08/14/20  0440   WBC 8.8 9.0 12.2*   HGB 10.8* 10.4* 11.9*   HCT 32.2* 30.7* 36.3*    206 183     Recent Labs     08/16/20  0153 08/15/20  0515 08/14/20  1857 08/14/20  0440    140  --  135*   K 3.9 4.8  --  3.8    106  --  102   CO2 31 28  --  29   * 123*  --  102*   BUN 14 15  --  18   CREA 0.49* 0.49*  --  0.37*   CA 8.2* 7.8*  --  8.1*   PHOS 2.7 3.1 3.8  --    ALB 1.9* 1.8*  --  2.0*   ALT 53 52  --  38     No components found for: [place]    Diagnostics: CXR reviewed    Telemetry reviewed by me:   normal sinus rhythm    Assessment and Plan:    Pneumonia due to severe acute respiratory syndrome coronavirus 2 (SARS-CoV-2) (8/8/2020) POA: diagnosed 7/27. Progressively worsened leading to intubation. He is sp Remdesivir (completed 8/10) and convalescent plasma x 2 (8/10 and 8/12). Continue dexamethasone, Cefepime, Ascorbic acid, Lipitor and enoxaparin. Continue supportive care    Acute respiratory failure with hypoxia ([address] (8/5/2020): due to COVID pneumonia. Intensivist managing vent. Pneumothorax bilaterally (8/14/2020): sp chest tube placement 8/11.  Continue to monitor    Total time spent for the patient's care: [facility] [facility] discussed with: Nursing Staff and Consultant/Specialist    Discussed:  Care Plan    Prophylaxis:  Lovenox, Famotidine    Anticipated Disposition:  Home w/Family           ___________________________________________________    Attending Physician:   Elicia Parker MD

## 2020-08-17 ENCOUNTER — APPOINTMENT (OUTPATIENT)
Dept: GENERAL RADIOLOGY | Age: 30
DRG: 870 | End: 2020-08-17
Attending: INTERNAL MEDICINE
Payer: COMMERCIAL

## 2020-08-17 LAB
ALBUMIN SERPL-MCNC: 2.1 G/DL (ref 3.5–5)
ALBUMIN/GLOB SERPL: 0.5 {RATIO} (ref 1.1–2.2)
ALP SERPL-CCNC: 94 U/L (ref 45–117)
ALT SERPL-CCNC: 81 U/L (ref 12–78)
ANION GAP SERPL CALC-SCNC: 4 MMOL/L (ref 5–15)
ARTERIAL PATENCY WRIST A: ABNORMAL
AST SERPL-CCNC: 32 U/L (ref 15–37)
BASE EXCESS BLDA CALC-SCNC: 4.9 MMOL/L
BDY SITE: ABNORMAL
BILIRUB SERPL-MCNC: 0.5 MG/DL (ref 0.2–1)
BNP SERPL-MCNC: 8 PG/ML
BUN SERPL-MCNC: 15 MG/DL (ref 6–20)
BUN/CREAT SERPL: 29 (ref 12–20)
CALCIUM SERPL-MCNC: 8.1 MG/DL (ref 8.5–10.1)
CHLORIDE SERPL-SCNC: 104 MMOL/L (ref 97–108)
CO2 SERPL-SCNC: 33 MMOL/L (ref 21–32)
CREAT SERPL-MCNC: 0.52 MG/DL (ref 0.7–1.3)
CRP SERPL HS-MCNC: >9.5 MG/L
D DIMER PPP FEU-MCNC: 1.83 MG/L FEU (ref 0–0.65)
DATE LAST DOSE: ABNORMAL
EPAP/CPAP/PEEP, PAPEEP: 15
ERYTHROCYTE [DISTWIDTH] IN BLOOD BY AUTOMATED COUNT: 12.1 % (ref 11.5–14.5)
FERRITIN SERPL-MCNC: 1194 NG/ML (ref 26–388)
FIO2 ON VENT: 85 %
GAS FLOW.O2 SETTING OXYMISER: 22 L/MIN
GLOBULIN SER CALC-MCNC: 4 G/DL (ref 2–4)
GLUCOSE BLD STRIP.AUTO-MCNC: 186 MG/DL (ref 65–100)
GLUCOSE BLD STRIP.AUTO-MCNC: 219 MG/DL (ref 65–100)
GLUCOSE BLD STRIP.AUTO-MCNC: 227 MG/DL (ref 65–100)
GLUCOSE BLD STRIP.AUTO-MCNC: 309 MG/DL (ref 65–100)
GLUCOSE SERPL-MCNC: 177 MG/DL (ref 65–100)
HAV IGM SER QL: NONREACTIVE
HBV CORE IGM SER QL: NONREACTIVE
HBV SURFACE AG SER QL: <0.1 INDEX
HBV SURFACE AG SER QL: NEGATIVE
HCO3 BLDA-SCNC: 31 MMOL/L (ref 22–26)
HCT VFR BLD AUTO: 35.7 % (ref 36.6–50.3)
HCV AB SERPL QL IA: NONREACTIVE
HCV COMMENT,HCGAC: NORMAL
HGB BLD-MCNC: 11.8 G/DL (ref 12.1–17)
HIV 1+2 AB+HIV1 P24 AG SERPL QL IA: NONREACTIVE
HIV12 RESULT COMMENT, HHIVC: NORMAL
LDH SERPL L TO P-CCNC: 287 U/L (ref 85–241)
M PNEUMO IGM SER IA-ACNC: NONREACTIVE
MCH RBC QN AUTO: 30.6 PG (ref 26–34)
MCHC RBC AUTO-ENTMCNC: 33.1 G/DL (ref 30–36.5)
MCV RBC AUTO: 92.5 FL (ref 80–99)
NRBC # BLD: 0 K/UL (ref 0–0.01)
NRBC BLD-RTO: 0 PER 100 WBC
PCO2 BLDA: 51 MMHG (ref 35–45)
PH BLDA: 7.4 [PH] (ref 7.35–7.45)
PHOSPHATE SERPL-MCNC: 2.9 MG/DL (ref 2.6–4.7)
PLATELET # BLD AUTO: 213 K/UL (ref 150–400)
PMV BLD AUTO: 10.6 FL (ref 8.9–12.9)
PO2 BLDA: 84 MMHG (ref 80–100)
POTASSIUM SERPL-SCNC: 3.2 MMOL/L (ref 3.5–5.1)
PROT SERPL-MCNC: 6.1 G/DL (ref 6.4–8.2)
RBC # BLD AUTO: 3.86 M/UL (ref 4.1–5.7)
REPORTED DOSE,DOSE: ABNORMAL UNITS
REPORTED DOSE/TIME,TMG: ABNORMAL
SAO2 % BLD: 96 % (ref 92–97)
SAO2% DEVICE SAO2% SENSOR NAME: ABNORMAL
SERVICE CMNT-IMP: ABNORMAL
SODIUM SERPL-SCNC: 141 MMOL/L (ref 136–145)
SP1: NORMAL
SP2: NORMAL
SP3: NORMAL
SPECIMEN SITE: ABNORMAL
TRIGL SERPL-MCNC: 89 MG/DL (ref ?–150)
VANCOMYCIN TROUGH SERPL-MCNC: 18.2 UG/ML (ref 5–10)
VENTILATION MODE VENT: ABNORMAL
VT SETTING VENT: 450 ML
WBC # BLD AUTO: 15.3 K/UL (ref 4.1–11.1)

## 2020-08-17 PROCEDURE — 65610000006 HC RM INTENSIVE CARE

## 2020-08-17 PROCEDURE — 74011250636 HC RX REV CODE- 250/636: Performed by: INTERNAL MEDICINE

## 2020-08-17 PROCEDURE — 80053 COMPREHEN METABOLIC PANEL: CPT

## 2020-08-17 PROCEDURE — 74011000250 HC RX REV CODE- 250: Performed by: INTERNAL MEDICINE

## 2020-08-17 PROCEDURE — 87070 CULTURE OTHR SPECIMN AEROBIC: CPT

## 2020-08-17 PROCEDURE — 84478 ASSAY OF TRIGLYCERIDES: CPT

## 2020-08-17 PROCEDURE — 74011000258 HC RX REV CODE- 258: Performed by: INTERNAL MEDICINE

## 2020-08-17 PROCEDURE — 94003 VENT MGMT INPAT SUBQ DAY: CPT

## 2020-08-17 PROCEDURE — 74011250637 HC RX REV CODE- 250/637: Performed by: INTERNAL MEDICINE

## 2020-08-17 PROCEDURE — 85379 FIBRIN DEGRADATION QUANT: CPT

## 2020-08-17 PROCEDURE — 36415 COLL VENOUS BLD VENIPUNCTURE: CPT

## 2020-08-17 PROCEDURE — 80202 ASSAY OF VANCOMYCIN: CPT

## 2020-08-17 PROCEDURE — 82728 ASSAY OF FERRITIN: CPT

## 2020-08-17 PROCEDURE — 80074 ACUTE HEPATITIS PANEL: CPT

## 2020-08-17 PROCEDURE — 71045 X-RAY EXAM CHEST 1 VIEW: CPT

## 2020-08-17 PROCEDURE — 87899 AGENT NOS ASSAY W/OPTIC: CPT

## 2020-08-17 PROCEDURE — 74011636637 HC RX REV CODE- 636/637: Performed by: INTERNAL MEDICINE

## 2020-08-17 PROCEDURE — 94640 AIRWAY INHALATION TREATMENT: CPT

## 2020-08-17 PROCEDURE — 82962 GLUCOSE BLOOD TEST: CPT

## 2020-08-17 PROCEDURE — 87389 HIV-1 AG W/HIV-1&-2 AB AG IA: CPT

## 2020-08-17 PROCEDURE — 36600 WITHDRAWAL OF ARTERIAL BLOOD: CPT

## 2020-08-17 PROCEDURE — 86141 C-REACTIVE PROTEIN HS: CPT

## 2020-08-17 PROCEDURE — 87449 NOS EACH ORGANISM AG IA: CPT

## 2020-08-17 PROCEDURE — 83615 LACTATE (LD) (LDH) ENZYME: CPT

## 2020-08-17 PROCEDURE — 84100 ASSAY OF PHOSPHORUS: CPT

## 2020-08-17 PROCEDURE — 94644 CONT INHLJ TX 1ST HOUR: CPT

## 2020-08-17 PROCEDURE — 83880 ASSAY OF NATRIURETIC PEPTIDE: CPT

## 2020-08-17 PROCEDURE — 86738 MYCOPLASMA ANTIBODY: CPT

## 2020-08-17 PROCEDURE — 85027 COMPLETE CBC AUTOMATED: CPT

## 2020-08-17 PROCEDURE — 94645 CONT INHLJ TX EACH ADDL HOUR: CPT

## 2020-08-17 PROCEDURE — 82803 BLOOD GASES ANY COMBINATION: CPT

## 2020-08-17 PROCEDURE — 77030018798 HC PMP KT ENTRL FED COVD -A

## 2020-08-17 RX ORDER — SENNOSIDES 8.6 MG/1
1 TABLET ORAL 2 TIMES DAILY
Status: DISCONTINUED | OUTPATIENT
Start: 2020-08-17 | End: 2020-08-18 | Stop reason: HOSPADM

## 2020-08-17 RX ORDER — ATORVASTATIN CALCIUM 20 MG/1
40 TABLET, FILM COATED ORAL
Status: DISCONTINUED | OUTPATIENT
Start: 2020-08-17 | End: 2020-08-18 | Stop reason: HOSPADM

## 2020-08-17 RX ORDER — INSULIN LISPRO 100 [IU]/ML
INJECTION, SOLUTION INTRAVENOUS; SUBCUTANEOUS EVERY 6 HOURS
Status: DISCONTINUED | OUTPATIENT
Start: 2020-08-17 | End: 2020-08-18 | Stop reason: HOSPADM

## 2020-08-17 RX ORDER — DOCUSATE SODIUM 50 MG/5ML
100 LIQUID ORAL 2 TIMES DAILY
Status: DISCONTINUED | OUTPATIENT
Start: 2020-08-17 | End: 2020-08-18 | Stop reason: HOSPADM

## 2020-08-17 RX ORDER — DEXMEDETOMIDINE HYDROCHLORIDE 4 UG/ML
.2-1.4 INJECTION, SOLUTION INTRAVENOUS
Status: DISCONTINUED | OUTPATIENT
Start: 2020-08-17 | End: 2020-08-18 | Stop reason: HOSPADM

## 2020-08-17 RX ORDER — IPRATROPIUM BROMIDE AND ALBUTEROL SULFATE 2.5; .5 MG/3ML; MG/3ML
3 SOLUTION RESPIRATORY (INHALATION)
Status: DISCONTINUED | OUTPATIENT
Start: 2020-08-17 | End: 2020-08-18 | Stop reason: HOSPADM

## 2020-08-17 RX ORDER — GUAIFENESIN 100 MG/5ML
400 SOLUTION ORAL EVERY 4 HOURS
Status: DISCONTINUED | OUTPATIENT
Start: 2020-08-17 | End: 2020-08-18 | Stop reason: HOSPADM

## 2020-08-17 RX ORDER — DEXTROSE 50 % IN WATER (D50W) INTRAVENOUS SYRINGE
12.5-25 AS NEEDED
Status: DISCONTINUED | OUTPATIENT
Start: 2020-08-17 | End: 2020-08-18 | Stop reason: HOSPADM

## 2020-08-17 RX ORDER — BUMETANIDE 0.25 MG/ML
1 INJECTION INTRAMUSCULAR; INTRAVENOUS 2 TIMES DAILY
Status: DISCONTINUED | OUTPATIENT
Start: 2020-08-17 | End: 2020-08-18 | Stop reason: HOSPADM

## 2020-08-17 RX ORDER — MIDAZOLAM IN 0.9 % SOD.CHLORID 1 MG/ML
0-20 PLASTIC BAG, INJECTION (ML) INTRAVENOUS
Status: DISCONTINUED | OUTPATIENT
Start: 2020-08-17 | End: 2020-08-18 | Stop reason: HOSPADM

## 2020-08-17 RX ORDER — HYDROMORPHONE HCL IN 0.9% NACL 15 MG/30ML
.25-1 PATIENT CONTROLLED ANALGESIA VIAL INTRAVENOUS
Status: DISCONTINUED | OUTPATIENT
Start: 2020-08-17 | End: 2020-08-18 | Stop reason: HOSPADM

## 2020-08-17 RX ORDER — POTASSIUM CHLORIDE 7.45 MG/ML
10 INJECTION INTRAVENOUS
Status: COMPLETED | OUTPATIENT
Start: 2020-08-17 | End: 2020-08-17

## 2020-08-17 RX ORDER — DEXMEDETOMIDINE HYDROCHLORIDE 4 UG/ML
.2-1.4 INJECTION, SOLUTION INTRAVENOUS
Status: DISCONTINUED | OUTPATIENT
Start: 2020-08-17 | End: 2020-08-17

## 2020-08-17 RX ORDER — MAGNESIUM SULFATE 100 %
4 CRYSTALS MISCELLANEOUS AS NEEDED
Status: DISCONTINUED | OUTPATIENT
Start: 2020-08-17 | End: 2020-08-18 | Stop reason: HOSPADM

## 2020-08-17 RX ADMIN — EPOPROSTENOL 50 NG/KG/MIN: 1.5 INJECTION, POWDER, LYOPHILIZED, FOR SOLUTION INTRAVENOUS at 19:04

## 2020-08-17 RX ADMIN — POTASSIUM CHLORIDE 10 MEQ: 10 INJECTION, SOLUTION INTRAVENOUS at 09:49

## 2020-08-17 RX ADMIN — GUAIFENESIN 400 MG: 200 SOLUTION ORAL at 23:43

## 2020-08-17 RX ADMIN — GUAIFENESIN 400 MG: 200 SOLUTION ORAL at 12:08

## 2020-08-17 RX ADMIN — VECURONIUM BROMIDE 1.3 MCG/KG/MIN: 1 INJECTION, POWDER, LYOPHILIZED, FOR SOLUTION INTRAVENOUS at 05:51

## 2020-08-17 RX ADMIN — VECURONIUM BROMIDE 1.3 MCG/KG/MIN: 1 INJECTION, POWDER, LYOPHILIZED, FOR SOLUTION INTRAVENOUS at 14:31

## 2020-08-17 RX ADMIN — ENOXAPARIN SODIUM 50 MG: 100 INJECTION SUBCUTANEOUS at 09:58

## 2020-08-17 RX ADMIN — VANCOMYCIN HYDROCHLORIDE 1750 MG: 10 INJECTION, POWDER, LYOPHILIZED, FOR SOLUTION INTRAVENOUS at 09:43

## 2020-08-17 RX ADMIN — Medication 10 ML: at 21:19

## 2020-08-17 RX ADMIN — MIDAZOLAM HYDROCHLORIDE 18 MG/HR: 5 INJECTION, SOLUTION INTRAMUSCULAR; INTRAVENOUS at 19:37

## 2020-08-17 RX ADMIN — Medication 10 ML: at 05:47

## 2020-08-17 RX ADMIN — SENNOSIDES 8.6 MG: 8.6 TABLET, FILM COATED ORAL at 12:09

## 2020-08-17 RX ADMIN — DEXAMETHASONE SODIUM PHOSPHATE 6 MG: 10 INJECTION, SOLUTION INTRAMUSCULAR; INTRAVENOUS at 05:47

## 2020-08-17 RX ADMIN — DEXMEDETOMIDINE HYDROCHLORIDE 0.4 MCG/KG/HR: 400 INJECTION INTRAVENOUS at 01:17

## 2020-08-17 RX ADMIN — POTASSIUM CHLORIDE 10 MEQ: 10 INJECTION, SOLUTION INTRAVENOUS at 09:52

## 2020-08-17 RX ADMIN — MIDAZOLAM HYDROCHLORIDE 18 MG/HR: 5 INJECTION, SOLUTION INTRAMUSCULAR; INTRAVENOUS at 07:17

## 2020-08-17 RX ADMIN — VECURONIUM BROMIDE 1.6 MCG/KG/MIN: 1 INJECTION, POWDER, LYOPHILIZED, FOR SOLUTION INTRAVENOUS at 23:17

## 2020-08-17 RX ADMIN — GUAIFENESIN 400 MG: 200 SOLUTION ORAL at 17:06

## 2020-08-17 RX ADMIN — DEXAMETHASONE SODIUM PHOSPHATE 6 MG: 10 INJECTION, SOLUTION INTRAMUSCULAR; INTRAVENOUS at 17:06

## 2020-08-17 RX ADMIN — LORAZEPAM 0.5 MG: 2 INJECTION INTRAMUSCULAR; INTRAVENOUS at 21:18

## 2020-08-17 RX ADMIN — IPRATROPIUM BROMIDE AND ALBUTEROL SULFATE 3 ML: .5; 3 SOLUTION RESPIRATORY (INHALATION) at 22:28

## 2020-08-17 RX ADMIN — BUMETANIDE 1 MG: 0.25 INJECTION INTRAMUSCULAR; INTRAVENOUS at 17:06

## 2020-08-17 RX ADMIN — Medication 500 MG: at 08:09

## 2020-08-17 RX ADMIN — SENNOSIDES 8.6 MG: 8.6 TABLET, FILM COATED ORAL at 21:00

## 2020-08-17 RX ADMIN — EPOPROSTENOL 50 NG/KG/MIN: 1.5 INJECTION, POWDER, LYOPHILIZED, FOR SOLUTION INTRAVENOUS at 00:37

## 2020-08-17 RX ADMIN — DOCUSATE SODIUM 100 MG: 50 LIQUID ORAL at 17:07

## 2020-08-17 RX ADMIN — EPOPROSTENOL 50 NG/KG/MIN: 1.5 INJECTION, POWDER, LYOPHILIZED, FOR SOLUTION INTRAVENOUS at 06:55

## 2020-08-17 RX ADMIN — POTASSIUM CHLORIDE 10 MEQ: 10 INJECTION, SOLUTION INTRAVENOUS at 10:57

## 2020-08-17 RX ADMIN — ENOXAPARIN SODIUM 50 MG: 100 INJECTION SUBCUTANEOUS at 20:13

## 2020-08-17 RX ADMIN — FAMOTIDINE 20 MG: 10 INJECTION, SOLUTION INTRAVENOUS at 08:09

## 2020-08-17 RX ADMIN — MIDAZOLAM HYDROCHLORIDE 20 MG/HR: 5 INJECTION, SOLUTION INTRAMUSCULAR; INTRAVENOUS at 00:59

## 2020-08-17 RX ADMIN — Medication 10 ML: at 10:00

## 2020-08-17 RX ADMIN — POTASSIUM CHLORIDE 10 MEQ: 10 INJECTION, SOLUTION INTRAVENOUS at 08:43

## 2020-08-17 RX ADMIN — DEXAMETHASONE SODIUM PHOSPHATE 6 MG: 10 INJECTION, SOLUTION INTRAMUSCULAR; INTRAVENOUS at 23:17

## 2020-08-17 RX ADMIN — ASCORBIC ACID: 500 INJECTION, SOLUTION INTRAMUSCULAR; INTRAVENOUS; SUBCUTANEOUS at 17:07

## 2020-08-17 RX ADMIN — Medication 8 MG/HR: at 01:51

## 2020-08-17 RX ADMIN — VANCOMYCIN HYDROCHLORIDE 1750 MG: 10 INJECTION, POWDER, LYOPHILIZED, FOR SOLUTION INTRAVENOUS at 01:48

## 2020-08-17 RX ADMIN — Medication 4 MG/HR: at 08:09

## 2020-08-17 RX ADMIN — DEXMEDETOMIDINE HYDROCHLORIDE 0.4 MCG/KG/HR: 400 INJECTION INTRAVENOUS at 14:57

## 2020-08-17 RX ADMIN — MIDAZOLAM HYDROCHLORIDE 18 MG/HR: 5 INJECTION, SOLUTION INTRAMUSCULAR; INTRAVENOUS at 12:38

## 2020-08-17 RX ADMIN — I.V. FAT EMULSION 500 ML: 20 EMULSION INTRAVENOUS at 17:07

## 2020-08-17 RX ADMIN — INSULIN LISPRO 2 UNITS: 100 INJECTION, SOLUTION INTRAVENOUS; SUBCUTANEOUS at 17:28

## 2020-08-17 RX ADMIN — Medication 4 MG/HR: at 14:45

## 2020-08-17 RX ADMIN — Medication 10 ML: at 15:52

## 2020-08-17 RX ADMIN — EPOPROSTENOL 50 NG/KG/MIN: 1.5 INJECTION, POWDER, LYOPHILIZED, FOR SOLUTION INTRAVENOUS at 13:54

## 2020-08-17 RX ADMIN — BUMETANIDE 1 MG: 0.25 INJECTION INTRAMUSCULAR; INTRAVENOUS at 12:09

## 2020-08-17 RX ADMIN — GUAIFENESIN 400 MG: 200 SOLUTION ORAL at 20:13

## 2020-08-17 RX ADMIN — IPRATROPIUM BROMIDE AND ALBUTEROL SULFATE 3 ML: .5; 3 SOLUTION RESPIRATORY (INHALATION) at 14:09

## 2020-08-17 RX ADMIN — POTASSIUM BICARBONATE 40 MEQ: 782 TABLET, EFFERVESCENT ORAL at 12:08

## 2020-08-17 RX ADMIN — INSULIN LISPRO 7 UNITS: 100 INJECTION, SOLUTION INTRAVENOUS; SUBCUTANEOUS at 12:00

## 2020-08-17 RX ADMIN — Medication 4 MG/HR: at 20:10

## 2020-08-17 RX ADMIN — DEXAMETHASONE SODIUM PHOSPHATE 6 MG: 10 INJECTION, SOLUTION INTRAMUSCULAR; INTRAVENOUS at 12:08

## 2020-08-17 RX ADMIN — ATORVASTATIN CALCIUM 40 MG: 20 TABLET, FILM COATED ORAL at 21:18

## 2020-08-17 RX ADMIN — DEXMEDETOMIDINE HYDROCHLORIDE 0.4 MCG/KG/HR: 400 INJECTION INTRAVENOUS at 05:56

## 2020-08-17 RX ADMIN — DOCUSATE SODIUM 100 MG: 50 LIQUID ORAL at 12:08

## 2020-08-17 NOTE — PROGRESS NOTES
[address]       ICU - FM Resident Daily Progress Note  Name: Karan Dunaway   : 1990   MRN: [id number]   Date: 2020 9:29 AM     I have reviewed the flowsheet and previous days notes. The patient is unable to give any meaningful history or review of systems because the patient is intubated and critically ill. Overnight events reviewed      Vital Signs:    Visit Vitals  /74   Pulse 77   Temp 98.4 °F (36.9 °C)   Resp 22   Ht 5' 11\" (1.803 m)   Wt 230 lb 9.6 oz (104.6 kg)   SpO2 96%   BMI 32.16 kg/m²       O2 Device: Endotracheal tube, Ventilator   O2 Flow Rate (L/min): 36 l/min   Temp (24hrs), Av.6 °F (37 °C), Min:97.4 °F (36.3 °C), Max:99.2 °F (37.3 °C)       Intake/Output:   Last shift:      No intake/output data recorded.   Last 3 shifts: 08/15 190 -  0700  In: 7432 [I.V.:7308]  Out: 9375 [Urine:9375]    Intake/Output Summary (Last 24 hours) at 2020 0845  Last data filed at 2020 0700  Gross per 24 hour   Intake 4311.67 ml   Output 6825 ml   Net -2513.33 ml       Ventilator Settings:  Ventilator Mode: PRVC  Respiratory Rate  Back-Up Rate: 22  Insp Time (sec): 1.25 sec  I:E Ratio: 1.1.2  Ventilator Volumes  Vt Set (ml): 450 ml  Vt Exhaled (Machine Breath) (ml): 440 ml  Vt Spont (ml): 629 ml  Ve Observed (l/min): 9.6 l/min  Ventilator Pressures  Pressure Support (cm H2O): 10 cm H2O  PIP Observed (cm H2O): 31 cm H2O  Plateau Pressure (cm H2O): 31 cm H2O  MAP (cm H2O): 22  PEEP/VENT (cm H2O): 15 cm H20  Auto PEEP Observed (cm H2O): 2 cm H2O      Physical Exam: *Physical exam deferred to attending physician as patient is COVID+ to limit exposure*    DATA:   Current Facility-Administered Medications   Medication Dose Route Frequency    dexmedeTOMidine in 0.9 % NaCl (PRECEDEX) 400 mcg/100 mL (4 mcg/mL) infusion soln  0.2-1.4 mcg/kg/hr IntraVENous TITRATE    potassium chloride 10 mEq in 100 ml IVPB  10 mEq IntraVENous Q1H    TPN ADULT - CENTRAL AA 5% D20% W/ ELECTROLYTES AND CA   IntraVENous CONTINUOUS    HYDROmorphone (PF) 25 mg/50 mL (0.5 mg/mL) infusion  0.25-10 mg/hr IntraVENous TITRATE    artificial tears (dextran-hypromellose-glycerin) (GENTEAL) ophthalmic solution 1 Drop  1 Drop Both Eyes PRN    vecuronium (NORCURON) 50 mg in 0.9% sodium chloride 100 mL infusion  0-1.7 mcg/kg/min (Order-Specific) IntraVENous TITRATE    enoxaparin (LOVENOX) injection 50 mg  0.5 mg/kg SubCUTAneous Q12H    midazolam in normal saline (VERSED) 1 mg/mL infusion  0-20 mg/hr IntraVENous TITRATE    vancomycin (VANCOCIN) 1,750 mg in 0.9% sodium chloride 500 mL IVPB  1,750 mg IntraVENous Q6H    alteplase (CATHFLO) 1 mg in sterile water (preservative free) 1 mL injection  1 mg InterCATHeter PRN    dexamethasone (PF) (DECADRON) 10 mg/mL injection 6 mg  6 mg IntraVENous Q6H    0.9% sodium chloride infusion 250 mL  250 mL IntraVENous PRN    famotidine (PF) (PEPCID) 20 mg in 0.9% sodium chloride 10 mL injection  20 mg IntraVENous Q12H    atorvastatin (LIPITOR) tablet 20 mg  20 mg Oral QHS    LORazepam (ATIVAN) injection 0.5 mg  0.5 mg IntraVENous Q6H PRN    epoprostenol (VELETRI) 30 mcg/mL in 0.9% sodium chloride 50 mL inhalation solution  50 ng/kg/min (Ideal) Inhalation CONTINUOUS    0.9% sodium chloride infusion 250 mL  250 mL IntraVENous PRN    sodium chloride (NS) flush 5-40 mL  5-40 mL IntraVENous Q8H    sodium chloride (NS) flush 5-40 mL  5-40 mL IntraVENous PRN    acetaminophen (TYLENOL) tablet 650 mg  650 mg Oral Q6H PRN    Or    acetaminophen (TYLENOL) suppository 650 mg  650 mg Rectal Q6H PRN    polyethylene glycol (MIRALAX) packet 17 g  17 g Oral DAILY PRN    promethazine (PHENERGAN) tablet 12.5 mg  12.5 mg Oral Q6H PRN    Or    ondansetron (ZOFRAN) injection 4 mg  4 mg IntraVENous Q6H PRN    ascorbic acid (vitamin C) (VITAMIN C) tablet 500 mg  500 mg Oral BID    benzonatate (TESSALON) capsule 100 mg  100 mg Oral TID PRN Labs:  Recent Labs     08/17/20  0134 08/16/20  0153 08/15/20  0317   WBC 15.3* 8.8 9.0   HGB 11.8* 10.8* 10.4*   HCT 35.7* 32.2* 30.7*    186 206     Recent Labs     08/17/20  0134 08/16/20  0153 08/15/20  0515    144 140   K 3.2* 3.9 4.8    108 106   CO2 33* 31 28   * 192* 123*   BUN 15 14 15   CREA 0.52* 0.49* 0.49*   CA 8.1* 8.2* 7.8*   PHOS 2.9 2.7 3.1   ALB 2.1* 1.9* 1.8*   ALT 81* 53 52     Recent Labs     08/17/20  0510 08/16/20  0411 08/15/20  0517   PH 7.40 7.42 7.43   PCO2 51* 48* 47*   PO2 84 81 113*   HCO3 31* 30* 30*   FIO2 85 90 90       Imaging:  I have personally reviewed the patients radiographs and reports.     Micro:  Results     Procedure Component Value Units Date/Time    CULTURE, RESPIRATORY/SPUTUM/BRONCH Mayur Parekh [[id number]] Collected:  08/09/20 1845    Order Status:  Canceled Specimen:  Sputum     CULTURE, BLOOD [[id number]] Collected:  08/09/20 0924    Order Status:  Completed Specimen:  Blood Updated:  08/15/20 0717     Special Requests: NO SPECIAL REQUESTS        Culture result: NO GROWTH 6 DAYS       C. DIFFICILE AG & TOXIN A/B [[id number]] Collected:  08/08/20 1213    Order Status:  Completed Specimen:  Stool Updated:  08/09/20 0927     [address] ANTIGEN Negative        C. difficile toxin Negative        INTERPRETATION       NEGATIVE FOR TOXIGENIC C. DIFFICILE          CULTURE, BLOOD, PAIRED [[id number]] Collected:  08/05/20 2049    Order Status:  Completed Specimen:  Blood Updated:  08/10/20 0608     Special Requests: NO SPECIAL REQUESTS        Culture result: NO GROWTH 5 DAYS              IMPRESSION:   · COVID pneumonia  · Acute hypoxic respiratory failure   · Bilateral pneumothoraces  · Tobacco use  · Transaminitis   PLAN:   · Continue full vent support  · Fentanyl transitioned to dilaudid  · Versed, precedex  · Off propofol due to sustained bradycardia   · Decadron   · S/p Convalescent plasma on 8/10 and 8/14  · S/p Remdesivir on 8/10  · IL 6 reordered  · Vecuronium   · Veletri  · Pigtail placed on 8/11 with expansion of L lung  · Unable to prone due to chest tube  · Monitor COVID labs daily  · S/p Cameron  · Diuresis with IV Lasix prn   · Completed 7 days Cefepime on 8/16  · Vanc  · Zinc, Vit C, lipitor   · Lovenox for elevated D dimer   · Replete electrolytes as needed  · Nutrition: TPN   GLOBAL ISSUES:   · Head of bed elevated  · GI Prophylaxis: Pepcid  · DVT Prophylaxis: Lovenox   · ETT placed on 8/10     My assessment/plan will be discussed with Dr. Son Henao, ICU Attending Physician.     Chicho Castaneda DO  Family Medicine Resident, PGY-2

## 2020-08-17 NOTE — CONSULTS
Consult received from Dr. Phillip Jang. Chart reviewed in its entirety and case later discussed directly with Dr. Phillip Jang. Current plan is to consider transfer to another facility for consideration of higher level of care. Considering this I will hold off on reaching out to family on this date. I am available to reach out at any time that primary team/intensivist team feels support will be beneficial.  I'll follow along by chart for now. Thank you for considering our team in the care of Mr. Aleja Bazzi.

## 2020-08-17 NOTE — DISCHARGE SUMMARY
Bill Santos [place] [facility]  [address]  Tel: [phone]    Physician Discharge Summary    Patient ID:    Qasim Deutsch  Age:              27 y.o.    : 1990  MRN:             [id number]     PCP: Barbara Santoyo MD     Date of Admission: 2020    Date of Discharge:  2020    Principal admission Diagnosis:   Respiratory failure with hypoxia [facility]) [J96.91]    Discharge Diagnoses:  Principal Problem:    Pneumonia due to severe acute respiratory syndrome coronavirus 2 (SARS-CoV-2) (2020)    Respiratory failure with hypoxia ([address] (2020)    Pneumothorax on left (2020)    Consults: Pulmonary/Intensive care    Hospital Course:     Mr. Janeth Dubose is a 27 y.o. admitted to [facility] and treated for the following:    Pneumonia due to severe acute respiratory syndrome coronavirus 2 (SARS-CoV-2) (2020) POA: diagnosed . Progressively worsened leading to intubation. He is sp Remdesivir (completed 8/10) and convalescent plasma x 2 (8/10 and ). He has completed course of antibiotics with IV Cefepime and vancomycin. Continue dexamethasone, Ascorbic acid, Lipitor and enoxaparin. Being transferred to [facility] for ECMO. I had discussed with Intensivist at [facility]. Dr Catrachita Mcfarlane has also reviewed chart and will continue further management.      Acute respiratory failure with hypoxia ([address] (2020): due to COVID pneumonia. Intensivist managing vent. Being transferred to [facility]     Pneumothorax bilaterally (2020): sp left chest tube placement . Discussed with thoracic surgery at [facility] who will continue further management.      Discharge Exam:    Visit Vitals  /78   Pulse 66   Temp (!) 96 °F (35.6 °C)   Resp 22   Ht 5' 11\" (1.803 m)   Wt 102.7 kg (226 lb 6.6 oz)   SpO2 92%   BMI 31.58 kg/m²        Patient has been seen and examined.     General: critically ill patient, not in any acute distress   Pulm: Decreased breath sounds without wheezes. Chest tube left   Card: no murmurs, normal S1, S2 without thrills, bruits.  + edema   Abd:    soft, non-tender, normoactive bowel sounds  Skin: no rashes or ulcers, skin turgor is good  Neuro:  sedated and intubated     Activity: complete bed rest    Diet: TPN    Transfer medications: see current MAR    Discharge Condition: Critical    Disposition: [facility]    Time taken to arrange discharge:  55 minutes Critical care    Signed:  Gwendolyn John MD     [facility]  8/17/2020   4:53 PM

## 2020-08-17 NOTE — PROGRESS NOTES
Opened patient's chart, precepted Magalys for the day. TRANSFER - OUT REPORT:    Verbal report given to [facility] RN(name) on Maikol Harry  being transferred to [facility] ICU (unit) for urgent transfer       Report consisted of patients Situation, Background, Assessment and   Recommendations(SBAR). Information from the following report(s) SBAR, Kardex, Intake/Output, MAR, Accordion, Recent Results, Med Rec Status and Cardiac Rhythm sinus rhythm to sinus tachy was reviewed with the receiving nurse. Lines:   PICC Triple Lumen 81/04/34 Left;Basilic (Active)   Central Line Being Utilized Yes 08/17/20 1200   Criteria for Appropriate Use Hemodynamically unstable, requiring monitoring lines, vasopressors, or volume resuscitation 08/17/20 1200   Site Assessment Clean, dry, & intact 08/17/20 1200   Phlebitis Assessment 0 08/17/20 1200   Infiltration Assessment 0 08/17/20 1200   Arm Circumference (cm) 36.5 cm 08/10/20 1140   Date of Last Dressing Change 08/17/20 08/17/20 0507   Dressing Status Clean, dry, & intact 08/17/20 1200   External Catheter Length (cm) 0 centimeters 08/10/20 1140   Dressing Type Disk with Chlorhexadine gluconate (CHG); Transparent 08/17/20 0400   Action Taken Dressing changed 08/17/20 0507   Hub Color/Line Status White;Flushed 08/17/20 1200   Positive Blood Return (Site #1) Yes 08/17/20 1200   Hub Color/Line Status Red;Flushed 08/17/20 1200   Positive Blood Return (Site #2) Yes 08/17/20 1200   Hub Color/Line Status Gray;Flushed 08/17/20 1200   Positive Blood Return (Site #3) No 08/17/20 1200   Alcohol Cap Used Yes 08/17/20 1200       Peripheral IV 08/14/20 Distal;Right Antecubital (Active)   Site Assessment Clean, dry, & intact 08/17/20 1200   Phlebitis Assessment 0 08/17/20 1200   Infiltration Assessment 0 08/17/20 1200   Dressing Status Clean, dry, & intact 08/17/20 1200   Dressing Type Transparent 08/17/20 1200   Hub Color/Line Status Pink;Flushed 08/17/20 1200   Action Taken Open ports on tubing capped 08/17/20 1200   Alcohol Cap Used Yes 08/17/20 1200        Opportunity for questions and clarification was provided.       Patient transported with:   Monitor  Ventilator  Patient-specific medications from [facility] [address] Team

## 2020-08-17 NOTE — PROGRESS NOTES
Comprehensive Nutrition Assessment    Type and Reason for Visit: Reassess    Nutrition Recommendations/Plan:     1. Recommend continuing TPN while on paralytics:  TPN Rec's: Goal of D20, 5% AA at 83ml/hr with 500ml of 20% lipids at 42 ml/hr x 12 hrs  3x/wk. (Goal TPN providing 2185 kcalsl, 100 gm protein - this is meting 100% kcal needs and 71% protein needs). Check TG weekly while on TPN (next due 8/24)      2. Recommend initiating EN via OGT once paralytics d/c:  TF Rec's:  2CalHN at 20ml/hr, increasing by 10ml q8h to goal rate of 40ml/hr while receiving Propofol. Add ProSource TF 4x/day and flush with 160ml h20 q3h. (Goal TF will provide 1920 kcals; 134 gm protein; 2036 ml fluid - this will meet 76% pt's kcal needs, 95% protein needs). Nutrition Assessment:      8/17: F/u. Pt remains intubated - now off Propofol but back on paralytics. Still NPO (x 12 days) with OGT in and clamped. TPN started and currently running at goal rate as listed above which is meeting 86% kcal needs and 71% pro needs. Labs: , K+ 3.2, TG 89, POC -482-007. Meds: Vit C, statin, Decadron, Vecuronium (paralytic). No new BM's noted since 8/9.    8/14:  F/u. Pt remains intubated but has been off paralytics for last 24h. Still being sedated with Propofol now at 25mcg/kg/min (providing 427 kcals/d). Still NPO (x 9 days now). OGT in place. Will recommend starting EN now that pt is no longer paralyzed. Labs reviewed. Meds: Vit C, zinc, statin, Decadron. No new BM's documented. 8/12: F/u. Pt remains intubated, paralyzed, and sedated with Propofol at 30mcg/kg/min (now providing 512 kcals/d). Still NPO (x 7 days today). MD notes no plan for nutrition support at this time due to his condition. Labs reviewed. Meds: Vit C, zinc, statin, Decadron, Nimbex, Propofol. Loose BM noted on 8/9.    8/10: 28 yo male admitted for respiratory failure with hypoxia. No PMhx.   Class I obese per BMI of 33 (based on stated weight). No weight hx available in EMR. Pt has been on Bipap and NPO since admission. Transferred to ICU and was intubated 8/9. Now paralyzed, requiring pressor support, and  being sedated with Propofol. Order for OGT placement. Pt is COVID-19 +. Unable to obtain nutrition hx at this time. Labs reviewed. Meds: Cameron at 50mcg/min, Propofol at 50mcg/kg/min, Nimbex, Remdesivir, Decadron, statin, Vit C, zinc.  Loose BM noted 8/9. Malnutrition Assessment:  Malnutrition Status:    N/A    Estimated Daily Nutrient Needs:  Energy (kcal):  8851 kcals(PAL 2097 x AF 1.2)  Protein (g):  140-156 gm(1.8-2gm/kg IBW/d)       Fluid (ml/day):  2516ml    Nutrition Related Findings:  1+ BLE edema      Wounds:    None       Current Nutrition Therapies:   DIET NPO Except Meds  TPN ADULT - CENTRAL AA 5% D20% W/ ELECTROLYTES AND CA  DIET TUBE FEEDING  TPN ADULT - CENTRAL    Anthropometric Measures:  · Height:  5' 11\" (180.3 cm)  · Current Body Wt:  104.6 kg (230 lb 9.6 oz)   · Admission Body Wt:       · Usual Body Wt:        · Ideal Body Wt:   :      · Adjusted Body Weight:   ; Weight Adjustment for: No adjustment   · Adjusted BMI:       · BMI Category:  Obese class 1 (BMI 30.0-34. 9)       Nutrition Diagnosis:   · Inadequate energy intake related to inadequate protein-energy intake as evidenced by intubation, NPO or clear liquid status due to medical condition    8/12: Nutrition Dx continues. 8/14: Nutrition Dx continues. 8/17: Nutrition Dx resolved at this time with TPN meeting > 75% kcal needs.       Nutrition Interventions:   Food and/or Nutrient Delivery: Continue parenteral nutrition  Nutrition Education and Counseling: No recommendations at this time  Coordination of Nutrition Care: Continued inpatient monitoring    Goals:  Meet > 75% EEN's with PN within next 3-4 days       Nutrition Monitoring and Evaluation:   Behavioral-Environmental Outcomes:    Food/Nutrient Intake Outcomes: Parenteral nutrition intake/tolerance  Physical Signs/Symptoms Outcomes: GI status, Weight    Discharge Planning:     Too soon to determine     Electronically signed by Vin Gupta, [address] on 8/17/2020     Contact: [phone]

## 2020-08-17 NOTE — PROGRESS NOTES
1900: Bedside and Verbal shift change report given to [address] (oncoming nurse) by Joanne/Magalys RN (offgoing nurse). Report included the following information SBAR, Kardex, ED Summary, Intake/Output, MAR, Recent Results and Cardiac Rhythm NSR. Primary Nurse Monica Manzo and Devin Justice RN performed a dual skin assessment on this patient No impairment noted  Precedex at 0.4   Vec at 1.3  TPN at 83   Lipids at 41.6  Versed at 18  Per day time nurses, patient to be transferred to [facility] for General acute hospital at 8 pm. Delay due to EMS not having the proper equipment and will be back around 8 pm.     2000: Shift  Assessment completed            Mental Status:  oriented x 0, moving eyebrows and opening eyes slightly. Moving arms slightly. Pupils 4mm and reactive. Train of 4, 4 twitches. Increased Vec. Increased Dilaudid PCA drip due to patient obvious agitation and inaccurate payalziation. Respiratory: rhonchi lung sounds, coarse. Sating 94% and above on Vent. PRVC mode. , Rate 22, PEEP 15, Fi02 70%. Suctioned patient, clear and pink rowan tinged secretions suctioned from ET tube. 28 cm at the lip. Oral care provided. Chest tube in place, CDI. 60 ML output in chest tube container from a day or 2 ago, nothing fresh. Site intact. Cardiac: s1 s2 noted. Palpable pulses. 's due to agitation. Skin WNL. Temp 99.2-cooling blanket on. Placed cool rags on face. BP high, due to patient not properly sedated/paralyzed. GI/: live in place draining large amounts of yellow urine. Live care provided. Abdomen soft, intact, hypoactive bowel sounds on bowel prep. OGT in place, residual checked, 150 ML of brown liquid, gave back 100 ML and meds. Holding tube feeds currently. No bm. [facility] RN called checking on status of patient transfer, notified her that patient is still here due to EMS team not having proper equipment for Velitri vent treatment.  SIN PHAM RN was with the accepting surgeon and he (Elise Vilchis) stated since the transfer is so delayed now, we will post pone transfer until the morning. I placed RN and surgeon on hold, notified Director of ICU Vitor Bonilla who transferred [facility] RN to our house supervisor, Allen Viera. Allen Viera spoke to surgeon. I called Allen Viera to check on status of transfer to [place], he stated post poning transfer to 8 am to [facility] per surgeons request. I notified the transfer center and all parties involved including the patients mother. Plan is to keep patient until morning transfer. Continue to monitor and care for patient. Turned patient. 2100: train of four 3 twitches at 30. Pupils 4mm and reactive. HR still intermittenly high, increased Precedex to 0.5. drained live cath. 0 ml of chest tube. Given ativan Iv for high HR.     2200: train of four 3 twitches at 30. Pupils 3 mm reactive. Performed oral care and suctioning. Turned patient. 2300: Provided scheduled medications. Train of four 3 twitches at 30. Pupils 3mm and reactive. 0000: Reassessment completed. No changes from previous assessment. Mental Status:  oriented x 0 well paralyzed and sedated now. Pupils 4mm and reactive. Train of 4, 4 twitches. Respiratory: rhonchi lung sounds, coarse. Sating 94% and above on Vent. PRVC mode. , Rate 22, PEEP 15, Fi02 70%. Suctioned patient, clear and pink rowan tinged secretions suctioned from ET tube. 28 cm at the lip. Oral care provided. Chest tube in place, CDI. 60 ML output in chest tube container from a day or 2 ago, nothing fresh. Site intact. Cardiac: s1 s2 noted. Palpable pulses. HR 70-80's. Skin WNL. Temp 99.2-cooling blanket on. Placed cool rags on face. BP better now with increased sedation drips. GI/: live in place draining large amounts of yellow urine. Live care provided. Abdomen soft, intact, hypoactive bowel sounds on bowel prep.  OGT in place, residual checked, 300 ML of brown liquid, gave back 100 ML and meds. Holding tube feeds currently. No bm. Turned patient. 0100: pupils 3mm and brisk. Train of four 3 twitches at 30. Vitals stable. 0200: pupils 3mm and brisk. Train of four 3 twitches at 30. Vitals stable. Turned patient. Suctioned and provided oral care. Measured and dumped live. Temp 98.4- turned cooling     0300: collected labs and sent for processing. 3 Twitches at 30. Vitals stable. Bathed patient. Changed linens. Pupils 4mm and reactive. 0400: Reassessment completed. No changes from previous assessment. Mental Status:  oriented x 0 well paralyzed and sedated now. Pupils 4mm and reactive. Train of 4, 3 twitches at 30. Respiratory: rhonchi lung sounds, coarse. Sating 94% and above on Vent. PRVC mode. , Rate 22, PEEP 15, Fi02 70%. Suctioned patient, clear and pink rowan tinged secretions suctioned from ET tube. 28 cm at the lip. Oral care provided. Chest tube in place, CDI. 60 ML output in chest tube container from a day or 2 ago, nothing fresh. Site intact. Cardiac: s1 s2 noted. Palpable pulses. HR 70-80's. Skin WNL. Temp 97.7-cooling blanket off. Placed cool rags on face. BP with systolic 476'X 526'K. GI/: live in place draining large amounts of yellow urine. Live care provided. Abdomen soft, intact, hypoactive bowel sounds on bowel prep. OGT in place, 54 cm at lip residual checked, 5 ML of brown liquid. Holding tube feeds currently now due to upcoming transfer to [facility]. No bm. Turned patient. Spoke to transfer center over the phone patient to be picked up for [facility] transfer around 6-7 am. Notified them of patient drips, lines, drains, and velitri treatment. 0500: Called report to [facility] ICU RN formerly [facility]. Opportunity for questions. Train of four tested, 2 twitches at 30. Pupils 4mm and reactive.     0600: Pupils 4mm and brisk. Train of four 2 twitches at 30. Vitals stable. Suctioned patient, provided mouth care. Turned.  Spoke to transfer center regarding up coming patient transfer to [facility], they asked if we could place patient on nitric through vent, asked RT who asked Director of RT, the answer was no we cannot we do not have resources or supplies to do so. Notified supervisor Cristiana Trimble of this and transfer center. Transfer center spoke to this RN and stated they will place patient themselves on nitric for transfer at 7 am. They are aware patient is on velitri and they cannot do it. Everyone is aware MD wanted patient at [facility] by 8 am however with complexity of this situation it was not possible. There was constant communication between myself and the transfer situation and the nursing supervisor. 0700: Bedside and Verbal shift change report given to brian WEINBERG (oncoming nurse) by Stephanie Bower RN (offgoing nurse). Report included the following information SBAR, Kardex, ED Summary, Intake/Output, MAR, Recent Results and Cardiac Rhythm NSR   Verified drips and updated brian on issues with transfer situation . Transfer to get patient at 745 am. Notified the mom, donavon, of this.     emtala done, labels printed and face sheet.

## 2020-08-17 NOTE — PROGRESS NOTES
ARDS  Acute hypoxoc respiratory failure  COVID positive    Called for COVID positive patient in need of VV ECMO    Discussed with team - getting fluoro compatable stretcher upstairs on 7th floor    Will plan to go on up there    Do not need TEVIN    Having to re-arrange some beds on the 7th floor     Dicussed with Thoracic Surgery    Discussed with ICU attending    Discussed with team     Addendum:    Going over access issues with ICU attending and cardiac anesthesiology    Will need to get IJ access for IJ cannula     Will plan to set up fluoro table in the patient's room and slide him over     After cannulation will place him in ICU bed    Addendum:    Equipment still not available to transport COVID patient    At this point, the patient is relatively stable and want him transferred during daylight hours    Would avoid transport in the middle of the night with limited staff     Discussed with team and ICU attending

## 2020-08-17 NOTE — PROGRESS NOTES
[facility] Dosing Services: Antimicrobial Stewardship Daily Doc    Consult for antibiotic dosing of vancomycin by Dr. Rosalia Staples  Indication: Septic shock, worsening bilateral pneumonia in setting of COVID-19 infection  Day of Therapy: 8    Ht Readings from Last 1 Encounters:   08/05/20 180.3 cm (71\")        Wt Readings from Last 1 Encounters:   08/16/20 104.6 kg (230 lb 9.6 oz)     Vancomycin therapy:  Current maintenance dose: 1750 mg IV every 6 hours  Dose calculated to approximate a therapeutic trough of 15-20 mcg/mL. Assessment/Plan:   SCr remains stable, leukocytosis resolved WBC = 8.8, procalcitonin on 8/9 = 0.26, afebrile in past 24 hours, all culture results have finalized, urine output is high   Vancomycin trough level drawn this morning extrapolates to be within therapeutic target range. Continue current dosing.  Today is day 8 of vancomycin. Would recommend discontinuing as no significant culture growth. Cefepime order had stop date of 8/16 and finished yesterday.  CMP daily to follow renal function  Dose administration notes:   Doses given appropriately as scheduled    Date Dose & Interval Measured (mcg/mL) Extrapolated (mcg/mL)   ? 8/11 at 1134 ? 1500 mg IV q8h ?7.6 ? 7.6   ? 8/12 at 1300 ?1750 mg IV q6h ?16.4 ?16.4   ? 8/15 at 0111 ?1750 mg IV q6h ?19.7 ?19   8/17 at 0632 1750 mg IV q6h 18.2 15.2       Other Antimicrobial   (not dosed by pharmacist) None   Cultures 8/9 Blood: NGTD, FINAL     8/5 Blood (paired): NG, Final  8/8 C. Diff: Negative   Serum Creatinine Lab Results   Component Value Date/Time    Creatinine 0.52 (L) 08/17/2020 01:34 AM         Creatinine Clearance Estimated Creatinine Clearance: 189.9 mL/min (A) (by C-G formula based on SCr of 0.52 mg/dL (L)).      Temp Temp: 98.4 °F (36.9 °C)       WBC Lab Results   Component Value Date/Time    WBC 15.3 (H) 08/17/2020 01:34 AM        H/H Lab Results   Component Value Date/Time    HGB 11.8 (L) 08/17/2020 01:34 AM        Platelets    Lab Results   Component Value Date/Time    PLATELET 988 01/57/0760 01:34 AM          For Antifungals, Metronidazole, and Nafcillin:  ALT:        AST:      Alk Phos:      T Bili:    Pharmacist ELIS VasquezD Contact information:

## 2020-08-17 NOTE — PROGRESS NOTES
..0700 Bedside shift change report given to Jefry Hazel RN (oncoming nurse) by [facility] (offgoing nurse). Report included the following information SBAR, Intake/Output, Recent Results, Cardiac Rhythm NSR and Dual Neuro Assessment. 0800 Morning Assessment  Neuro A & O x1  Cardiac NSR  Respiratory  Peep 15 AC 22  FIO2 85%   GI OG tube TPN NPO   Mcwilliams  Skin Chest tube site  IV L PICC R AC #20    0800 Morning assessment. TOF baseline at 30 and 4 twitches. 1000 Repositioned patient, gave meds. Oral care, CHG bath. 1200 Reassessment, repositioned. Oral care, gave meds, thao labs, VSS TOF 30 and 4.  1400 Repositioned patient. Oral care. Lab called to reject blood draw so we will redraw and resend. Hydromorphone PCA was time to be changed. Upon opening the syringe the tip broke off and it was damaged so another syringe was pulled which was also broken and damaged. Pharmacist Hernandez Frye was consulted and witnessed waste of 100 ml of Hydromorphone PCA syringe. Another syringe was pulled and successfully secured into the PCA pump. TOF 30 and 4. Veletri changed with respiratory. 1600 Patient will be going to [facility] at some point today for ECMO he has been approved by cardiothoracic surgery once a bed is available. Pt reassessment completed. Oral care. Labs redrawn and sent. Notified Dr. Kathy Salvador about note for pt to move out of our ICU for ECMO. TOF 27 and 4.   1815 Met Alex family member in the ER to handoff personal belongings and let them know that he was being transported at 200. Preparing patient for transport now. [facility] to  between [id number].   1919 Bedside shift change report given to [facility] (oncoming nurse) by Yolanda Mcgowan RN (offgoing nurse). Report included the following information SBAR, Kardex, MAR, Accordion, Med Rec Status, Cardiac Rhythm NSR and Dual Neuro Assessment. Patient transfer delayed.  Shiela Orlando to watch over patient until transport returns with proper equipment to handle to veletri.

## 2020-08-17 NOTE — PROGRESS NOTES
Name: [facility]: [address]   : 1990 Admit Date: 2020   Phone: [phone]  Room: [facility] [place]/   PCP: Vishal Cole MD  MRN: [id number]   Date: 2020  Code: Full Code              History of Present Illness:  Mr. Shepherd is a 26 yo gentleman with a history of tobacco use who is admitted with COVID-19 pneumonia and acute respiratory failure with hypoxia. He was diagnosed about 10 days ago at an urgent care facility with COVID-19. Last Thursday he began to have fevers, inability to take a deep breath that was progressively worse, dry cough, and general malaise/faitigue. He was prescribed azithromycin and prednisone without improvement. Denies n/v, abdominal pain, changes in taste/smell. Hypoxic as low as 76% on RA. Placed on BiPAP yesterday evening due to increased work of breathing. Labs reviewed: WBC 16.5, d-dimer 0.86, Na 129, cr 0.84, AST 86, , , Procalcitonin 0.33, ferritin 5114, IL6 in lab; crp pending    Images:  CXR with hazy interstitial changes and airspace opacification      PMH: tobacco use    FH: no family history pertinent to presents complaint    SH: +tobacco use     - LE dopplers: negative for DVT     - started on veletri    8/10 - intubated, paralyzed     - s/p L chest tube placement    8/15 - started on TPN    *all cultures NGTD  *cdiff negative   *quant gold indeterminate    Interval history  Remains intubated, sedated, paralyzed and on veletri  Tm 100, BP stable  sats 96% on 85% FiO2, 15 PEEP.   P/F 99, Pplat < 30  Unable to prone due to presence of chest tube  Has thick tan secretions from ETT that are pink tinged      Social History     Tobacco Use    Smoking status: Current Some Day Smoker    Smokeless tobacco: Never Used   Substance Use Topics    Alcohol use: Not on file       No Known Allergies    Current Facility-Administered Medications   Medication Dose Route Frequency    dexmedeTOMidine in 0.9 % NaCl (PRECEDEX) 400 mcg/100 mL (4 mcg/mL) infusion soln  0.2-1.4 mcg/kg/hr IntraVENous TITRATE    potassium chloride 10 mEq in 100 ml IVPB  10 mEq IntraVENous Q1H    TPN ADULT - CENTRAL AA 5% D20% W/ ELECTROLYTES AND CA   IntraVENous CONTINUOUS    HYDROmorphone (PF) 25 mg/50 mL (0.5 mg/mL) infusion  0.25-10 mg/hr IntraVENous TITRATE    artificial tears (dextran-hypromellose-glycerin) (GENTEAL) ophthalmic solution 1 Drop  1 Drop Both Eyes PRN    vecuronium (NORCURON) 50 mg in 0.9% sodium chloride 100 mL infusion  0-1.7 mcg/kg/min (Order-Specific) IntraVENous TITRATE    enoxaparin (LOVENOX) injection 50 mg  0.5 mg/kg SubCUTAneous Q12H    midazolam in normal saline (VERSED) 1 mg/mL infusion  0-20 mg/hr IntraVENous TITRATE    vancomycin (VANCOCIN) 1,750 mg in 0.9% sodium chloride 500 mL IVPB  1,750 mg IntraVENous Q6H    alteplase (CATHFLO) 1 mg in sterile water (preservative free) 1 mL injection  1 mg InterCATHeter PRN    dexamethasone (PF) (DECADRON) 10 mg/mL injection 6 mg  6 mg IntraVENous Q6H    0.9% sodium chloride infusion 250 mL  250 mL IntraVENous PRN    famotidine (PF) (PEPCID) 20 mg in 0.9% sodium chloride 10 mL injection  20 mg IntraVENous Q12H    atorvastatin (LIPITOR) tablet 20 mg  20 mg Oral QHS    LORazepam (ATIVAN) injection 0.5 mg  0.5 mg IntraVENous Q6H PRN    epoprostenol (VELETRI) 30 mcg/mL in 0.9% sodium chloride 50 mL inhalation solution  50 ng/kg/min (Ideal) Inhalation CONTINUOUS    0.9% sodium chloride infusion 250 mL  250 mL IntraVENous PRN    sodium chloride (NS) flush 5-40 mL  5-40 mL IntraVENous Q8H    sodium chloride (NS) flush 5-40 mL  5-40 mL IntraVENous PRN    acetaminophen (TYLENOL) tablet 650 mg  650 mg Oral Q6H PRN    Or    acetaminophen (TYLENOL) suppository 650 mg  650 mg Rectal Q6H PRN    polyethylene glycol (MIRALAX) packet 17 g  17 g Oral DAILY PRN    promethazine (PHENERGAN) tablet 12.5 mg  12.5 mg Oral Q6H PRN    Or    ondansetron (ZOFRAN) injection 4 mg  4 mg IntraVENous Q6H PRN    ascorbic acid (vitamin C) (VITAMIN C) tablet 500 mg  500 mg Oral BID    benzonatate (TESSALON) capsule 100 mg  100 mg Oral TID PRN         REVIEW OF SYSTEMS   12 point ROS negative except as stated in the HPI. Physical Exam:   Visit Vitals  /69   Pulse 75   Temp 100 °F (37.8 °C)   Resp 22   Ht 5' 11\" (1.803 m)   Wt 104.6 kg (230 lb 9.6 oz)   SpO2 97%   BMI 32.16 kg/m²       General:  Intubated, sedated   Head:  Normocephalic, without obvious abnormality, atraumatic   Eyes:  Conjunctivae clear   Nose: Nares normal   Throat: Orally intubated   Neck: Trachea midline   Lungs:   Symmetric expansion   Chest wall:  L chest tube in place   Heart:  Regular rate and rhythm   Abdomen:   Non-distended   Extremities: Trace edema       Skin: No rashes   Neurologic: Paralyzed, pupils reactive and equal    Exam limited to avoid unnecessary exposure and to preserve PPE during COVID-19 pandemic. Lab Results   Component Value Date/Time    Sodium 141 08/17/2020 01:34 AM    Potassium 3.2 (L) 08/17/2020 01:34 AM    Chloride 104 08/17/2020 01:34 AM    CO2 33 (H) 08/17/2020 01:34 AM    BUN 15 08/17/2020 01:34 AM    Creatinine 0.52 (L) 08/17/2020 01:34 AM    Glucose 177 (H) 08/17/2020 01:34 AM    Calcium 8.1 (L) 08/17/2020 01:34 AM    Magnesium 2.8 (H) 08/10/2020 05:55 AM    Phosphorus 2.9 08/17/2020 01:34 AM    Lactic acid 1.2 08/06/2020 01:24 PM       Lab Results   Component Value Date/Time    WBC 15.3 (H) 08/17/2020 01:34 AM    HGB 11.8 (L) 08/17/2020 01:34 AM    PLATELET 643 03/35/0454 01:34 AM    MCV 92.5 08/17/2020 01:34 AM       Lab Results   Component Value Date/Time    Alk.  phosphatase 94 08/17/2020 01:34 AM    Protein, total 6.1 (L) 08/17/2020 01:34 AM    Albumin 2.1 (L) 08/17/2020 01:34 AM    Globulin 4.0 08/17/2020 01:34 AM       Lab Results   Component Value Date/Time    Ferritin 1,194 (H) 08/17/2020 01:34 AM       Lab Results   Component Value Date/Time    C-Reactive protein 17.50 (H) 08/10/2020 05:55 AM        Lab Results   Component Value Date/Time    PH 7.40 08/17/2020 05:10 AM    PCO2 51 (H) 08/17/2020 05:10 AM    PO2 84 08/17/2020 05:10 AM    HCO3 31 (H) 08/17/2020 05:10 AM    FIO2 85 08/17/2020 05:10 AM       Lab Results   Component Value Date/Time    Troponin-I, Qt. <0.05 08/08/2020 12:13 PM        Lab Results   Component Value Date/Time    Culture result: NO GROWTH 6 DAYS 08/09/2020 09:24 AM    Culture result: NO GROWTH 5 DAYS 08/05/2020 08:49 PM       No results found for: TOXA1, RPR, HBCM, HBSAG, HAAB, HCAB1, HAAT, G6PD, CRYAC, HIVGT, HIVR, HIV1, HIV12, HIVPC, HIVRPI    Lab Results   Component Value Date/Time    Vancomycin,trough 18.2 (H) 08/17/2020 06:32 AM    Vancomycin,trough 19.7 (H) 08/15/2020 01:11 AM       No results found for: COLOR, APPRN, SPGRU, RAE, PROTU, GLUCU, KETU, BILU, BLDU, UROU, ROBLES, LEUKU, WBCU, RBCU, UEPI, BACTU, CASTS, UCRY    IMPRESSION  · Acute hypoxemic respiratory failure / ARDS, intubated 8/10  · Pneumonia, completed course of azithro 8/13 and cefepime 8/16  · COVID-19+, completed remdesivir 8/10, s/p convalescent plasma 8/7 and 8/14  · Pneumomediastinum  · Bilateral ptx, s/p L chest tube placement 8/11  · Elevated LFTs  · Hyperglycemia    PLAN  · Continue low tidal volume ventilation (6cc/kg = 452)  · Wean FiO2/PEEP to keep sats > 88%  · Unable to do proning protocol due to left sided PTX and chest tube  · Keep chest tube to suction. Daily CXR  · Continue vecuronium  · Continue veletri  · Continue current dose of IV steroids  · Diurese  · Continue Zinc, vitamin C - placed in TPN  · Stop vanc  · Trend inflammatory markers  · Repeat IL-6 pending.   Consider actemra if not improving  · Add scheduled duonebs and mucinex  · Send sputum culture if able  · TPN renewed  · Replete lytes  · Check HIV, hepatitis panel  · Add lipitor, check CK  · Add SSI  · Add bowel regimen  · As patient is young w/ no pmh and still has just single organ dysfunction, should consider transfer to ECMO capable facility in the event he decompensates further. D/w hospitalist who will d/w [facility] staff    DVT ppx: continue intermediate dose lovenox  GI ppx: pepcid - placed in TPN  NPO.   Start trickle tube feeds today    Pt is critically ill and at high risk of decompensation  CCT: 54 minutes    Elio Burnette MD

## 2020-08-17 NOTE — PROGRESS NOTES
Transition of care note:    RUR 19%  LOS 11 days GLOS 9.8    Plan: 1. Noted is Dr Haider Downey note regarding preparing for pt to be transferred to [facility] . 2 If transfer occurs this evening,please have nurse supervisor assist you with transfer details . 3. Typically,cardiothoracic surgery arranges transport but nurse supervisor can assist with details to insure this is the case.       Garland Bryant

## 2020-08-17 NOTE — PROGRESS NOTES
Spoke to pt mother Suleman Diamond and clarified patient move to [facility] for ECMO and let her know we would contact her once the patient is leaving and en route.

## 2020-08-18 ENCOUNTER — APPOINTMENT (OUTPATIENT)
Dept: GENERAL RADIOLOGY | Age: 30
DRG: 004 | End: 2020-08-18
Attending: HOSPITALIST
Payer: COMMERCIAL

## 2020-08-18 ENCOUNTER — APPOINTMENT (OUTPATIENT)
Dept: GENERAL RADIOLOGY | Age: 30
DRG: 870 | End: 2020-08-18
Attending: INTERNAL MEDICINE
Payer: COMMERCIAL

## 2020-08-18 ENCOUNTER — HOSPITAL ENCOUNTER (INPATIENT)
Age: 30
LOS: 55 days | Discharge: NURSING, REHAB OR LONG-TERM CARE | DRG: 004 | End: 2020-10-12
Attending: THORACIC SURGERY (CARDIOTHORACIC VASCULAR SURGERY) | Admitting: THORACIC SURGERY (CARDIOTHORACIC VASCULAR SURGERY)
Payer: COMMERCIAL

## 2020-08-18 VITALS
TEMPERATURE: 99 F | WEIGHT: 222 LBS | HEART RATE: 77 BPM | BODY MASS INDEX: 31.08 KG/M2 | RESPIRATION RATE: 22 BRPM | OXYGEN SATURATION: 94 % | DIASTOLIC BLOOD PRESSURE: 69 MMHG | HEIGHT: 71 IN | SYSTOLIC BLOOD PRESSURE: 127 MMHG

## 2020-08-18 DIAGNOSIS — J96.01 ACUTE RESPIRATORY FAILURE WITH HYPOXIA AND HYPERCAPNIA (HCC): ICD-10-CM

## 2020-08-18 DIAGNOSIS — R90.89 ABNORMAL CT OF BRAIN: ICD-10-CM

## 2020-08-18 DIAGNOSIS — J96.01 ACUTE RESPIRATORY FAILURE WITH HYPOXIA (HCC): ICD-10-CM

## 2020-08-18 DIAGNOSIS — J93.9 PNEUMOTHORAX ON LEFT: ICD-10-CM

## 2020-08-18 DIAGNOSIS — J12.82 PNEUMONIA DUE TO SEVERE ACUTE RESPIRATORY SYNDROME CORONAVIRUS 2 (SARS-COV-2): ICD-10-CM

## 2020-08-18 DIAGNOSIS — F41.9 ANXIETY: Primary | ICD-10-CM

## 2020-08-18 DIAGNOSIS — U07.1 COVID-19: ICD-10-CM

## 2020-08-18 DIAGNOSIS — I95.89 OTHER SPECIFIED HYPOTENSION: ICD-10-CM

## 2020-08-18 DIAGNOSIS — R29.898 WEAKNESS OF BOTH ARMS: ICD-10-CM

## 2020-08-18 DIAGNOSIS — J96.02 ACUTE RESPIRATORY FAILURE WITH HYPOXIA AND HYPERCAPNIA (HCC): ICD-10-CM

## 2020-08-18 DIAGNOSIS — R29.898 RIGHT ARM WEAKNESS: ICD-10-CM

## 2020-08-18 DIAGNOSIS — U07.1 PNEUMONIA DUE TO SEVERE ACUTE RESPIRATORY SYNDROME CORONAVIRUS 2 (SARS-COV-2): ICD-10-CM

## 2020-08-18 DIAGNOSIS — I46.9 ASYSTOLE (HCC): ICD-10-CM

## 2020-08-18 PROBLEM — J96.00 ACUTE RESPIRATORY FAILURE (HCC): Status: ACTIVE | Noted: 2020-08-18

## 2020-08-18 LAB
ALBUMIN SERPL-MCNC: 2.3 G/DL (ref 3.5–5)
ALBUMIN/GLOB SERPL: 0.5 {RATIO} (ref 1.1–2.2)
ALP SERPL-CCNC: 98 U/L (ref 45–117)
ALT SERPL-CCNC: 99 U/L (ref 12–78)
ANION GAP SERPL CALC-SCNC: 5 MMOL/L (ref 5–15)
ARTERIAL PATENCY WRIST A: ABNORMAL
ARTERIAL PATENCY WRIST A: NO
ARTERIAL PATENCY WRIST A: NO
AST SERPL-CCNC: 30 U/L (ref 15–37)
BASE EXCESS BLD CALC-SCNC: 11 MMOL/L
BASE EXCESS BLD CALC-SCNC: 11 MMOL/L
BASE EXCESS BLDA CALC-SCNC: 3.4 MMOL/L
BASOPHILS # BLD: 0 K/UL (ref 0–0.1)
BASOPHILS NFR BLD: 0 % (ref 0–1)
BDY SITE: ABNORMAL
BILIRUB SERPL-MCNC: 0.6 MG/DL (ref 0.2–1)
BUN SERPL-MCNC: 18 MG/DL (ref 6–20)
BUN/CREAT SERPL: 25 (ref 12–20)
CA-I BLD-SCNC: 1.24 MMOL/L (ref 1.12–1.32)
CA-I BLD-SCNC: 1.28 MMOL/L (ref 1.12–1.32)
CALCIUM SERPL-MCNC: 8.8 MG/DL (ref 8.5–10.1)
CHLORIDE SERPL-SCNC: 101 MMOL/L (ref 97–108)
CK SERPL-CCNC: 21 U/L (ref 39–308)
CO2 SERPL-SCNC: 33 MMOL/L (ref 21–32)
COMMENT, HOLDF: NORMAL
CREAT SERPL-MCNC: 0.71 MG/DL (ref 0.7–1.3)
CRP SERPL-MCNC: 1.13 MG/DL (ref 0–0.6)
D DIMER PPP FEU-MCNC: 1.52 MG/L FEU (ref 0–0.65)
DIFFERENTIAL METHOD BLD: ABNORMAL
EOSINOPHIL # BLD: 0 K/UL (ref 0–0.4)
EOSINOPHIL NFR BLD: 0 % (ref 0–7)
EPAP/CPAP/PEEP, PAPEEP: 15
ERYTHROCYTE [DISTWIDTH] IN BLOOD BY AUTOMATED COUNT: 11.8 % (ref 11.5–14.5)
FERRITIN SERPL-MCNC: 1034 NG/ML (ref 26–388)
FIBRINOGEN PPP-MCNC: 589 MG/DL (ref 200–475)
FIO2 ON VENT: 70 %
FLUID CULTURE, SPNG2: NORMAL
GAS FLOW.O2 O2 DELIVERY SYS: ABNORMAL L/MIN
GAS FLOW.O2 O2 DELIVERY SYS: ABNORMAL L/MIN
GAS FLOW.O2 SETTING OXYMISER: 22 L/MIN
GAS FLOW.O2 SETTING OXYMISER: 28 BPM
GAS FLOW.O2 SETTING OXYMISER: 28 BPM
GLOBULIN SER CALC-MCNC: 4.3 G/DL (ref 2–4)
GLUCOSE BLD STRIP.AUTO-MCNC: 112 MG/DL (ref 65–100)
GLUCOSE BLD STRIP.AUTO-MCNC: 136 MG/DL (ref 65–100)
GLUCOSE BLD STRIP.AUTO-MCNC: 198 MG/DL (ref 65–100)
GLUCOSE BLD STRIP.AUTO-MCNC: 216 MG/DL (ref 65–100)
GLUCOSE SERPL-MCNC: 221 MG/DL (ref 65–100)
HCO3 BLD-SCNC: 36.2 MMOL/L (ref 22–26)
HCO3 BLD-SCNC: 36.3 MMOL/L (ref 22–26)
HCO3 BLDA-SCNC: 30 MMOL/L (ref 22–26)
HCT VFR BLD AUTO: 37.1 % (ref 36.6–50.3)
HGB BLD-MCNC: 12.1 G/DL (ref 12.1–17)
IL6 SERPL-MCNC: 104.3 PG/ML (ref 0–15.5)
IMM GRANULOCYTES # BLD AUTO: 0 K/UL
IMM GRANULOCYTES NFR BLD AUTO: 0 %
INSPIRATION.DURATION SETTING TIME VENT: 1 SEC
INSPIRATION.DURATION SETTING TIME VENT: 1 SEC
L PNEUMO1 AG UR QL IA: NEGATIVE
LDH SERPL L TO P-CCNC: 291 U/L (ref 85–241)
LYMPHOCYTES # BLD: 0.4 K/UL (ref 0.8–3.5)
LYMPHOCYTES NFR BLD: 4 % (ref 12–49)
MAGNESIUM SERPL-MCNC: 2.1 MG/DL (ref 1.6–2.4)
MCH RBC QN AUTO: 30.3 PG (ref 26–34)
MCHC RBC AUTO-ENTMCNC: 32.6 G/DL (ref 30–36.5)
MCV RBC AUTO: 93 FL (ref 80–99)
MONOCYTES # BLD: 0.8 K/UL (ref 0–1)
MONOCYTES NFR BLD: 7 % (ref 5–13)
NEUTS BAND NFR BLD MANUAL: 2 % (ref 0–6)
NEUTS SEG # BLD: 9.9 K/UL (ref 1.8–8)
NEUTS SEG NFR BLD: 87 % (ref 32–75)
NITRIC:PPM ISTAT,INITR: 40 PPM
NITRIC:PPM ISTAT,INITR: 40 PPM
NRBC # BLD: 0 K/UL (ref 0–0.01)
NRBC BLD-RTO: 0 PER 100 WBC
O2/TOTAL GAS SETTING VFR VENT: 70 %
O2/TOTAL GAS SETTING VFR VENT: 70 %
ORGANISM ID, SPNG3: NORMAL
PCO2 BLD: 61.8 MMHG (ref 35–45)
PCO2 BLD: 66.9 MMHG (ref 35–45)
PCO2 BLDA: 53 MMHG (ref 35–45)
PEEP RESPIRATORY: 15 CMH2O
PEEP RESPIRATORY: 15 CMH2O
PH BLD: 7.34 [PH] (ref 7.35–7.45)
PH BLD: 7.38 [PH] (ref 7.35–7.45)
PH BLDA: 7.37 [PH] (ref 7.35–7.45)
PHOSPHATE SERPL-MCNC: 2.9 MG/DL (ref 2.6–4.7)
PIP ISTAT,IPIP: 14
PIP ISTAT,IPIP: 14
PLATELET # BLD AUTO: 188 K/UL (ref 150–400)
PLEASE NOTE, SPNG4: NORMAL
PMV BLD AUTO: 10.9 FL (ref 8.9–12.9)
PO2 BLD: 108 MMHG (ref 80–100)
PO2 BLD: 160 MMHG (ref 80–100)
PO2 BLDA: 79 MMHG (ref 80–100)
POTASSIUM SERPL-SCNC: 4 MMOL/L (ref 3.5–5.1)
PROCALCITONIN SERPL-MCNC: 0.06 NG/ML
PROT SERPL-MCNC: 6.6 G/DL (ref 6.4–8.2)
RBC # BLD AUTO: 3.99 M/UL (ref 4.1–5.7)
RBC MORPH BLD: ABNORMAL
S PNEUM AG SPEC QL LA: NEGATIVE
SAMPLES BEING HELD,HOLD: NORMAL
SAO2 % BLD: 95 % (ref 92–97)
SAO2 % BLD: 98 % (ref 92–97)
SAO2 % BLD: 99 % (ref 92–97)
SAO2% DEVICE SAO2% SENSOR NAME: ABNORMAL
SERVICE CMNT-IMP: ABNORMAL
SODIUM SERPL-SCNC: 139 MMOL/L (ref 136–145)
SPECIMEN SITE: ABNORMAL
SPECIMEN SOURCE: NORMAL
SPECIMEN SOURCE: NORMAL
SPECIMEN TYPE: ABNORMAL
SPECIMEN TYPE: ABNORMAL
SPECIMEN, SPNG1: NORMAL
TSH SERPL DL<=0.05 MIU/L-ACNC: 0.25 UIU/ML (ref 0.36–3.74)
VENTILATION MODE VENT: ABNORMAL
VT SETTING VENT: 450 ML
WBC # BLD AUTO: 11.1 K/UL (ref 4.1–11.1)

## 2020-08-18 PROCEDURE — 82962 GLUCOSE BLOOD TEST: CPT

## 2020-08-18 PROCEDURE — 82803 BLOOD GASES ANY COMBINATION: CPT

## 2020-08-18 PROCEDURE — 74011000250 HC RX REV CODE- 250: Performed by: INTERNAL MEDICINE

## 2020-08-18 PROCEDURE — 85379 FIBRIN DEGRADATION QUANT: CPT

## 2020-08-18 PROCEDURE — 94640 AIRWAY INHALATION TREATMENT: CPT

## 2020-08-18 PROCEDURE — 36600 WITHDRAWAL OF ARTERIAL BLOOD: CPT

## 2020-08-18 PROCEDURE — 74011250636 HC RX REV CODE- 250/636: Performed by: NURSE PRACTITIONER

## 2020-08-18 PROCEDURE — 74011000258 HC RX REV CODE- 258: Performed by: THORACIC SURGERY (CARDIOTHORACIC VASCULAR SURGERY)

## 2020-08-18 PROCEDURE — 86140 C-REACTIVE PROTEIN: CPT

## 2020-08-18 PROCEDURE — 94002 VENT MGMT INPAT INIT DAY: CPT

## 2020-08-18 PROCEDURE — 65610000006 HC RM INTENSIVE CARE

## 2020-08-18 PROCEDURE — 85384 FIBRINOGEN ACTIVITY: CPT

## 2020-08-18 PROCEDURE — 74011000250 HC RX REV CODE- 250: Performed by: HOSPITALIST

## 2020-08-18 PROCEDURE — 74011000258 HC RX REV CODE- 258: Performed by: HOSPITALIST

## 2020-08-18 PROCEDURE — 74011250636 HC RX REV CODE- 250/636: Performed by: HOSPITALIST

## 2020-08-18 PROCEDURE — 77030005402 HC CATH RAD ART LN KT TELE -B

## 2020-08-18 PROCEDURE — 84145 PROCALCITONIN (PCT): CPT

## 2020-08-18 PROCEDURE — 74011000250 HC RX REV CODE- 250: Performed by: NURSE PRACTITIONER

## 2020-08-18 PROCEDURE — 74011250636 HC RX REV CODE- 250/636: Performed by: INTERNAL MEDICINE

## 2020-08-18 PROCEDURE — 73610000005 HC INO THERAPY INITIAL

## 2020-08-18 PROCEDURE — 83735 ASSAY OF MAGNESIUM: CPT

## 2020-08-18 PROCEDURE — 74011636637 HC RX REV CODE- 636/637: Performed by: INTERNAL MEDICINE

## 2020-08-18 PROCEDURE — 82728 ASSAY OF FERRITIN: CPT

## 2020-08-18 PROCEDURE — 84443 ASSAY THYROID STIM HORMONE: CPT

## 2020-08-18 PROCEDURE — 77010033678 HC OXYGEN DAILY

## 2020-08-18 PROCEDURE — 94003 VENT MGMT INPAT SUBQ DAY: CPT

## 2020-08-18 PROCEDURE — 5A1955Z RESPIRATORY VENTILATION, GREATER THAN 96 CONSECUTIVE HOURS: ICD-10-PCS | Performed by: INTERNAL MEDICINE

## 2020-08-18 PROCEDURE — 74011000250 HC RX REV CODE- 250: Performed by: THORACIC SURGERY (CARDIOTHORACIC VASCULAR SURGERY)

## 2020-08-18 PROCEDURE — 85025 COMPLETE CBC W/AUTO DIFF WBC: CPT

## 2020-08-18 PROCEDURE — 82550 ASSAY OF CK (CPK): CPT

## 2020-08-18 PROCEDURE — 74011250637 HC RX REV CODE- 250/637: Performed by: HOSPITALIST

## 2020-08-18 PROCEDURE — 99291 CRITICAL CARE FIRST HOUR: CPT | Performed by: THORACIC SURGERY (CARDIOTHORACIC VASCULAR SURGERY)

## 2020-08-18 PROCEDURE — 74011000258 HC RX REV CODE- 258: Performed by: INTERNAL MEDICINE

## 2020-08-18 PROCEDURE — 94760 N-INVAS EAR/PLS OXIMETRY 1: CPT

## 2020-08-18 PROCEDURE — 77030018798 HC PMP KT ENTRL FED COVD -A

## 2020-08-18 PROCEDURE — 3E0336Z INTRODUCTION OF NUTRITIONAL SUBSTANCE INTO PERIPHERAL VEIN, PERCUTANEOUS APPROACH: ICD-10-PCS | Performed by: INTERNAL MEDICINE

## 2020-08-18 PROCEDURE — 84100 ASSAY OF PHOSPHORUS: CPT

## 2020-08-18 PROCEDURE — 73610000026 HC INO THERAPY EACH HOUR

## 2020-08-18 PROCEDURE — 74011636637 HC RX REV CODE- 636/637: Performed by: NURSE PRACTITIONER

## 2020-08-18 PROCEDURE — 74011250637 HC RX REV CODE- 250/637: Performed by: INTERNAL MEDICINE

## 2020-08-18 PROCEDURE — 71045 X-RAY EXAM CHEST 1 VIEW: CPT

## 2020-08-18 PROCEDURE — 36415 COLL VENOUS BLD VENIPUNCTURE: CPT

## 2020-08-18 PROCEDURE — 83615 LACTATE (LD) (LDH) ENZYME: CPT

## 2020-08-18 PROCEDURE — 80053 COMPREHEN METABOLIC PANEL: CPT

## 2020-08-18 PROCEDURE — 74011250637 HC RX REV CODE- 250/637: Performed by: NURSE PRACTITIONER

## 2020-08-18 RX ORDER — ONDANSETRON 2 MG/ML
4 INJECTION INTRAMUSCULAR; INTRAVENOUS
Status: DISCONTINUED | OUTPATIENT
Start: 2020-08-18 | End: 2020-09-22

## 2020-08-18 RX ORDER — HYDROMORPHONE HCL IN 0.9% NACL 15 MG/30ML
.25-1 PATIENT CONTROLLED ANALGESIA VIAL INTRAVENOUS
Status: DISCONTINUED | OUTPATIENT
Start: 2020-08-18 | End: 2020-08-18 | Stop reason: SDUPTHER

## 2020-08-18 RX ORDER — BENZONATATE 100 MG/1
100 CAPSULE ORAL
Status: DISCONTINUED | OUTPATIENT
Start: 2020-08-18 | End: 2020-08-18

## 2020-08-18 RX ORDER — LORAZEPAM 2 MG/ML
0.5 INJECTION INTRAMUSCULAR
Status: DISCONTINUED | OUTPATIENT
Start: 2020-08-18 | End: 2020-08-22

## 2020-08-18 RX ORDER — GUAIFENESIN 100 MG/5ML
400 SOLUTION ORAL EVERY 4 HOURS
Status: DISCONTINUED | OUTPATIENT
Start: 2020-08-18 | End: 2020-08-28

## 2020-08-18 RX ORDER — MIDAZOLAM HYDROCHLORIDE 5 MG/ML
5 INJECTION, SOLUTION INTRAMUSCULAR; INTRAVENOUS ONCE
Status: COMPLETED | OUTPATIENT
Start: 2020-08-18 | End: 2020-08-18

## 2020-08-18 RX ORDER — ACETAMINOPHEN 650 MG/1
650 SUPPOSITORY RECTAL
Status: DISCONTINUED | OUTPATIENT
Start: 2020-08-18 | End: 2020-10-12 | Stop reason: HOSPADM

## 2020-08-18 RX ORDER — IPRATROPIUM BROMIDE AND ALBUTEROL SULFATE 2.5; .5 MG/3ML; MG/3ML
3 SOLUTION RESPIRATORY (INHALATION)
Status: DISCONTINUED | OUTPATIENT
Start: 2020-08-18 | End: 2020-10-12 | Stop reason: HOSPADM

## 2020-08-18 RX ORDER — PROMETHAZINE HYDROCHLORIDE 25 MG/1
12.5 TABLET ORAL
Status: DISCONTINUED | OUTPATIENT
Start: 2020-08-18 | End: 2020-09-15

## 2020-08-18 RX ORDER — DEXTROSE MONOHYDRATE 100 MG/ML
0-250 INJECTION, SOLUTION INTRAVENOUS AS NEEDED
Status: DISCONTINUED | OUTPATIENT
Start: 2020-08-18 | End: 2020-08-18 | Stop reason: SDUPTHER

## 2020-08-18 RX ORDER — INSULIN LISPRO 100 [IU]/ML
INJECTION, SOLUTION INTRAVENOUS; SUBCUTANEOUS EVERY 6 HOURS
Status: DISCONTINUED | OUTPATIENT
Start: 2020-08-18 | End: 2020-08-27

## 2020-08-18 RX ORDER — INSULIN LISPRO 100 [IU]/ML
INJECTION, SOLUTION INTRAVENOUS; SUBCUTANEOUS EVERY 6 HOURS
Status: DISCONTINUED | OUTPATIENT
Start: 2020-08-18 | End: 2020-08-18

## 2020-08-18 RX ORDER — DEXAMETHASONE SODIUM PHOSPHATE 10 MG/ML
6 INJECTION INTRAMUSCULAR; INTRAVENOUS DAILY
Status: DISCONTINUED | OUTPATIENT
Start: 2020-08-19 | End: 2020-08-27

## 2020-08-18 RX ORDER — DOCUSATE SODIUM 50 MG/5ML
100 LIQUID ORAL 2 TIMES DAILY
Status: DISCONTINUED | OUTPATIENT
Start: 2020-08-18 | End: 2020-09-25

## 2020-08-18 RX ORDER — MAGNESIUM SULFATE 100 %
4 CRYSTALS MISCELLANEOUS AS NEEDED
Status: DISCONTINUED | OUTPATIENT
Start: 2020-08-18 | End: 2020-08-18 | Stop reason: SDUPTHER

## 2020-08-18 RX ORDER — ENOXAPARIN SODIUM 100 MG/ML
0.5 INJECTION SUBCUTANEOUS EVERY 12 HOURS
Status: DISCONTINUED | OUTPATIENT
Start: 2020-08-18 | End: 2020-09-16

## 2020-08-18 RX ORDER — PROPOFOL 10 MG/ML
0-50 VIAL (ML) INTRAVENOUS
Status: DISCONTINUED | OUTPATIENT
Start: 2020-08-18 | End: 2020-08-26

## 2020-08-18 RX ORDER — NOREPINEPHRINE BITARTRATE/D5W 8 MG/250ML
.5-3 PLASTIC BAG, INJECTION (ML) INTRAVENOUS
Status: DISCONTINUED | OUTPATIENT
Start: 2020-08-18 | End: 2020-08-27

## 2020-08-18 RX ORDER — GUAIFENESIN 100 MG/5ML
81 LIQUID (ML) ORAL DAILY
Status: DISCONTINUED | OUTPATIENT
Start: 2020-08-18 | End: 2020-10-12 | Stop reason: HOSPADM

## 2020-08-18 RX ORDER — CHLORHEXIDINE GLUCONATE 0.12 MG/ML
15 RINSE ORAL EVERY 12 HOURS
Status: DISCONTINUED | OUTPATIENT
Start: 2020-08-18 | End: 2020-10-02

## 2020-08-18 RX ORDER — IPRATROPIUM BROMIDE AND ALBUTEROL SULFATE 2.5; .5 MG/3ML; MG/3ML
3 SOLUTION RESPIRATORY (INHALATION)
Status: DISCONTINUED | OUTPATIENT
Start: 2020-08-18 | End: 2020-08-18

## 2020-08-18 RX ORDER — ACETAMINOPHEN 325 MG/1
650 TABLET ORAL
Status: DISCONTINUED | OUTPATIENT
Start: 2020-08-18 | End: 2020-08-28

## 2020-08-18 RX ORDER — DEXMEDETOMIDINE HYDROCHLORIDE 4 UG/ML
.2-1.4 INJECTION, SOLUTION INTRAVENOUS
Status: DISCONTINUED | OUTPATIENT
Start: 2020-08-18 | End: 2020-08-18 | Stop reason: SDUPTHER

## 2020-08-18 RX ORDER — POLYETHYLENE GLYCOL 3350 17 G/17G
17 POWDER, FOR SOLUTION ORAL DAILY PRN
Status: DISCONTINUED | OUTPATIENT
Start: 2020-08-18 | End: 2020-08-24

## 2020-08-18 RX ORDER — SENNOSIDES 8.6 MG/1
1 TABLET ORAL 2 TIMES DAILY
Status: DISCONTINUED | OUTPATIENT
Start: 2020-08-18 | End: 2020-08-25

## 2020-08-18 RX ORDER — SODIUM CHLORIDE 0.9 % (FLUSH) 0.9 %
5-40 SYRINGE (ML) INJECTION AS NEEDED
Status: DISCONTINUED | OUTPATIENT
Start: 2020-08-18 | End: 2020-10-12 | Stop reason: HOSPADM

## 2020-08-18 RX ORDER — SODIUM CHLORIDE 9 MG/ML
75 INJECTION, SOLUTION INTRAVENOUS CONTINUOUS
Status: DISCONTINUED | OUTPATIENT
Start: 2020-08-18 | End: 2020-09-23

## 2020-08-18 RX ORDER — DEXTROSE MONOHYDRATE 100 MG/ML
0-250 INJECTION, SOLUTION INTRAVENOUS AS NEEDED
Status: DISCONTINUED | OUTPATIENT
Start: 2020-08-18 | End: 2020-10-12 | Stop reason: HOSPADM

## 2020-08-18 RX ORDER — DEXAMETHASONE SODIUM PHOSPHATE 10 MG/ML
6 INJECTION INTRAMUSCULAR; INTRAVENOUS EVERY 6 HOURS
Status: DISCONTINUED | OUTPATIENT
Start: 2020-08-18 | End: 2020-08-18

## 2020-08-18 RX ORDER — HYDROMORPHONE HCL IN 0.9% NACL 15 MG/30ML
.25-1 PATIENT CONTROLLED ANALGESIA VIAL INTRAVENOUS
Status: DISCONTINUED | OUTPATIENT
Start: 2020-08-18 | End: 2020-08-18

## 2020-08-18 RX ORDER — SODIUM CHLORIDE 0.9 % (FLUSH) 0.9 %
5-40 SYRINGE (ML) INJECTION EVERY 8 HOURS
Status: DISCONTINUED | OUTPATIENT
Start: 2020-08-18 | End: 2020-10-12 | Stop reason: HOSPADM

## 2020-08-18 RX ORDER — MIDAZOLAM IN 0.9 % SOD.CHLORID 1 MG/ML
0-20 PLASTIC BAG, INJECTION (ML) INTRAVENOUS
Status: DISCONTINUED | OUTPATIENT
Start: 2020-08-18 | End: 2020-08-18

## 2020-08-18 RX ORDER — BUMETANIDE 0.25 MG/ML
1 INJECTION INTRAMUSCULAR; INTRAVENOUS 2 TIMES DAILY
Status: DISCONTINUED | OUTPATIENT
Start: 2020-08-18 | End: 2020-08-22

## 2020-08-18 RX ORDER — MAGNESIUM SULFATE 100 %
4 CRYSTALS MISCELLANEOUS AS NEEDED
Status: DISCONTINUED | OUTPATIENT
Start: 2020-08-18 | End: 2020-10-12 | Stop reason: HOSPADM

## 2020-08-18 RX ADMIN — GUAIFENESIN 400 MG: 100 SOLUTION ORAL at 23:17

## 2020-08-18 RX ADMIN — IPRATROPIUM BROMIDE AND ALBUTEROL SULFATE 3 ML: .5; 3 SOLUTION RESPIRATORY (INHALATION) at 01:51

## 2020-08-18 RX ADMIN — GUAIFENESIN 400 MG: 100 SOLUTION ORAL at 11:23

## 2020-08-18 RX ADMIN — Medication 30 ML: at 14:53

## 2020-08-18 RX ADMIN — CISATRACURIUM BESYLATE 3 MCG/KG/MIN: 20 INJECTION INTRAVENOUS at 11:09

## 2020-08-18 RX ADMIN — PROPOFOL 50 MCG/KG/MIN: 10 INJECTION, EMULSION INTRAVENOUS at 23:59

## 2020-08-18 RX ADMIN — BUMETANIDE 1 MG: 0.25 INJECTION INTRAMUSCULAR; INTRAVENOUS at 17:08

## 2020-08-18 RX ADMIN — CHLORHEXIDINE GLUCONATE 15 ML: 0.12 RINSE ORAL at 20:15

## 2020-08-18 RX ADMIN — PROPOFOL 50 MCG/KG/MIN: 10 INJECTION, EMULSION INTRAVENOUS at 20:42

## 2020-08-18 RX ADMIN — DEXAMETHASONE SODIUM PHOSPHATE 6 MG: 10 INJECTION, SOLUTION INTRAMUSCULAR; INTRAVENOUS at 06:07

## 2020-08-18 RX ADMIN — INSULIN LISPRO 3 UNITS: 100 INJECTION, SOLUTION INTRAVENOUS; SUBCUTANEOUS at 06:00

## 2020-08-18 RX ADMIN — DOCUSATE SODIUM 100 MG: 50 LIQUID ORAL at 10:46

## 2020-08-18 RX ADMIN — SODIUM CHLORIDE 5 ML/HR: 900 INJECTION, SOLUTION INTRAVENOUS at 11:20

## 2020-08-18 RX ADMIN — PROPOFOL 30 MCG/KG/MIN: 10 INJECTION, EMULSION INTRAVENOUS at 11:14

## 2020-08-18 RX ADMIN — CISATRACURIUM BESYLATE 1 MCG/KG/MIN: 20 INJECTION INTRAVENOUS at 20:45

## 2020-08-18 RX ADMIN — PROPOFOL 50 MCG/KG/MIN: 10 INJECTION, EMULSION INTRAVENOUS at 18:01

## 2020-08-18 RX ADMIN — GUAIFENESIN 400 MG: 100 SOLUTION ORAL at 20:14

## 2020-08-18 RX ADMIN — DEXMEDETOMIDINE HYDROCHLORIDE 0.7 MCG/KG/HR: 100 INJECTION, SOLUTION INTRAVENOUS at 11:07

## 2020-08-18 RX ADMIN — ENOXAPARIN SODIUM 50 MG: 60 INJECTION SUBCUTANEOUS at 13:15

## 2020-08-18 RX ADMIN — Medication 200 MCG/HR: at 19:10

## 2020-08-18 RX ADMIN — Medication 100 MCG/HR: at 11:20

## 2020-08-18 RX ADMIN — INSULIN LISPRO 3 UNITS: 100 INJECTION, SOLUTION INTRAVENOUS; SUBCUTANEOUS at 00:00

## 2020-08-18 RX ADMIN — DEXMEDETOMIDINE HYDROCHLORIDE 0.6 MCG/KG/HR: 400 INJECTION INTRAVENOUS at 07:17

## 2020-08-18 RX ADMIN — LORAZEPAM 0.5 MG: 2 INJECTION INTRAMUSCULAR; INTRAVENOUS at 10:47

## 2020-08-18 RX ADMIN — VECURONIUM BROMIDE 1.6 MCG/KG/MIN: 1 INJECTION, POWDER, LYOPHILIZED, FOR SOLUTION INTRAVENOUS at 06:25

## 2020-08-18 RX ADMIN — Medication 10 ML: at 21:00

## 2020-08-18 RX ADMIN — Medication 5 MG/HR: at 01:30

## 2020-08-18 RX ADMIN — ASPIRIN 81 MG CHEWABLE TABLET 81 MG: 81 TABLET CHEWABLE at 11:23

## 2020-08-18 RX ADMIN — DOCUSATE SODIUM 100 MG: 50 LIQUID ORAL at 17:06

## 2020-08-18 RX ADMIN — INSULIN LISPRO 2 UNITS: 100 INJECTION, SOLUTION INTRAVENOUS; SUBCUTANEOUS at 11:13

## 2020-08-18 RX ADMIN — DEXMEDETOMIDINE HYDROCHLORIDE 0.6 MCG/KG/HR: 400 INJECTION INTRAVENOUS at 06:29

## 2020-08-18 RX ADMIN — GUAIFENESIN 400 MG: 200 SOLUTION ORAL at 03:15

## 2020-08-18 RX ADMIN — DEXMEDETOMIDINE HYDROCHLORIDE 0.6 MCG/KG/HR: 400 INJECTION INTRAVENOUS at 00:11

## 2020-08-18 RX ADMIN — SENNOSIDES 8.6 MG: 8.6 TABLET, FILM COATED ORAL at 20:14

## 2020-08-18 RX ADMIN — EPOPROSTENOL 50 NG/KG/MIN: 1.5 INJECTION, POWDER, LYOPHILIZED, FOR SOLUTION INTRAVENOUS at 01:46

## 2020-08-18 RX ADMIN — Medication 5 MG/HR: at 06:40

## 2020-08-18 RX ADMIN — MIDAZOLAM HYDROCHLORIDE 5 MG: 5 INJECTION, SOLUTION INTRAMUSCULAR; INTRAVENOUS at 11:31

## 2020-08-18 RX ADMIN — GUAIFENESIN 400 MG: 100 SOLUTION ORAL at 17:07

## 2020-08-18 RX ADMIN — DEXTROSE MONOHYDRATE 4 MCG/MIN: 5 INJECTION, SOLUTION INTRAVENOUS at 17:26

## 2020-08-18 RX ADMIN — ENOXAPARIN SODIUM 50 MG: 60 INJECTION SUBCUTANEOUS at 21:00

## 2020-08-18 RX ADMIN — PROPOFOL 50 MCG/KG/MIN: 10 INJECTION, EMULSION INTRAVENOUS at 14:50

## 2020-08-18 RX ADMIN — MIDAZOLAM HYDROCHLORIDE 18 MG/HR: 5 INJECTION, SOLUTION INTRAMUSCULAR; INTRAVENOUS at 01:48

## 2020-08-18 RX ADMIN — BUMETANIDE 1 MG: 0.25 INJECTION INTRAMUSCULAR; INTRAVENOUS at 07:48

## 2020-08-18 RX ADMIN — Medication 10 ML: at 06:08

## 2020-08-18 RX ADMIN — CHLORHEXIDINE GLUCONATE 15 ML: 0.12 RINSE ORAL at 10:45

## 2020-08-18 NOTE — PROGRESS NOTES
0935: TRANSFER - IN REPORT:     Verbal report received from Transport team (name) on Yael Ion  being received from [facility] ICU (unit) for urgent transfer     Report consisted of patients Situation, Background, Assessment and    Recommendations(SBAR). Information from the following report(s) SBAR, Intake/Output, MAR, Recent Results and Cardiac Rhythm NSR was reviewed with the receiving nurse. Opportunity for questions and clarification was provided. Assessment completed upon patients arrival to unit and care assumed. CSS team and ICU intensivist aware of pt's arrival. Pt placed on fluoro-compatible stretcher. Pt arrived on 40 PPM of Bob. Current vent settings: A/C - PC 22, PS 16, PEEP 15, FiO2 70%. Vt ~ 512. Current gtts:    Precedex @ 1 mcg/kg/min    Vecuronium @ 1.6 mcg/kg/min (TOF 4/4 twitches at 40 mA)    TPN @ 83 mL/hr. 1010: CS team at rounding. Holding off on ECMO for now. 1020: Intensivist rounding. Plan of care reviewed. Instructed to make vent changes: RR increased to 28; PS decreased to 14. Vt~450. Will get ABG in 1 hr.      Plans:    -Wean FiO2 for SpO2 88-89%. Keep Bob at 40 ppm.   -Sedation plan: start propofol and fentanyl gtt, continue paralytic.   -Nutrition: start tube feeds, may leave TPN today.   -Keep lovenox as ordered.   -Continue bumex as ordered. 1034: Intensivist wants to attempt proning. Also changing paralytic from vec to nimbex. 1109: Nimbex gtt started, vec gtt stopped. 1120: Fentanyl gtt started. 1142: ABG obtained by RT: pH 7.37 / CO2 61.8 / pO2 108 / BE 11 / HCO3 36.2 / Sat 98%. iCa 1.24. Intensivist aware - no new orders. Will repeat ABG at 1600.    1243: Per Dr. Adenike Armas, can cancel the ID and Palliative consults. 1300: Per Dr. Adenike Armas- hold off on proning. Bedside and Verbal shift change report given to Chapo Alves (oncoming nurse) by Perla Law (offgoing nurse).     Report included the following information SBAR, MAR, Med Rec Status and Cardiac Rhythm NSR.

## 2020-08-18 NOTE — PROGRESS NOTES
Transition of care note:    RUR 17%    LOS 12 days,GLOS 9.8    Pt is Covid 19 positive. Plan: 1. Transfer to [facility] this am by critical care transport to the services of Dr Karon Suárez.   2.PCS,demographics with insurance,H&P,discharge note and medication list prepared for transport team.  3.Anticipated time for transport arrival is 7:45 am.      Jessica Cardona

## 2020-08-18 NOTE — PROGRESS NOTES
Bill Zepeda [facility]  [address]  [phone]      Medical Progress Note      NAME:         Maris Pineda   :        1990  MRM:        [id number]    Date of service: 2020      Subjective: Patient has been seen and examined as a follow up for multiple medical issues. Chart, labs, diagnostics reviewed. Patient not transferred last night but going to [facility] this morning. Discussed with his nurse. No new acute changes. Afebrile    Objective:    Vital Signs:    Visit Vitals  /74   Pulse 79   Temp 97.7 °F (36.5 °C)   Resp 22   Ht 5' 11\" (1.803 m)   Wt 100.7 kg (222 lb 0.1 oz)   SpO2 93%   BMI 30.96 kg/m²          Intake/Output Summary (Last 24 hours) at 2020 0718  Last data filed at 2020 0600  Gross per 24 hour   Intake 3996.77 ml   Output 4800 ml   Net -803.23 ml      Examination:     General: critically ill male patient, intubated, comfortable.    Pulm:  Not tachypneic or in any distress   Neuro: sedated and intubated     Current Facility-Administered Medications   Medication Dose Route Frequency    atorvastatin (LIPITOR) tablet 40 mg  40 mg Per NG tube QHS    insulin lispro (HUMALOG) injection   SubCUTAneous Q6H    glucose chewable tablet 16 g  4 Tab Oral PRN    dextrose (D50W) injection syrg 12.5-25 g  12.5-25 g IntraVENous PRN    glucagon (GLUCAGEN) injection 1 mg  1 mg IntraMUSCular PRN    TPN ADULT - CENTRAL   IntraVENous CONTINUOUS    bumetanide (BUMEX) injection 1 mg  1 mg IntraVENous BID    senna (SENOKOT) tablet 8.6 mg  1 Tab Per NG tube BID    docusate (COLACE) 50 mg/5 mL oral liquid 100 mg  100 mg Per NG tube BID    guaiFENesin (ROBITUSSIN) 100 mg/5 mL oral liquid 400 mg  400 mg Oral Q4H    albuterol-ipratropium (DUO-NEB) 2.5 MG-0.5 MG/3 ML  3 mL Nebulization Q6H RT    dexmedeTOMidine in 0.9 % NaCl (PRECEDEX) 400 mcg/100 mL (4 mcg/mL) infusion soln  0.2-1.4 mcg/kg/hr IntraVENous TITRATE    HYDROmorphone (PF) 25 mg/50 mL (0.5 mg/mL) infusion  0.25-10 mg/hr IntraVENous TITRATE    midazolam in normal saline (VERSED) 1 mg/mL infusion  0-20 mg/hr IntraVENous TITRATE    fat emulsion 20% (LIPOSYN, INTRAlipid) infusion 500 mL  500 mL IntraVENous Q MON, WED & FRI    artificial tears (dextran-hypromellose-glycerin) (GENTEAL) ophthalmic solution 1 Drop  1 Drop Both Eyes PRN    vecuronium (NORCURON) 50 mg in 0.9% sodium chloride 100 mL infusion  0-1.7 mcg/kg/min (Order-Specific) IntraVENous TITRATE    enoxaparin (LOVENOX) injection 50 mg  0.5 mg/kg SubCUTAneous Q12H    alteplase (CATHFLO) 1 mg in sterile water (preservative free) 1 mL injection  1 mg InterCATHeter PRN    dexamethasone (PF) (DECADRON) 10 mg/mL injection 6 mg  6 mg IntraVENous Q6H    0.9% sodium chloride infusion 250 mL  250 mL IntraVENous PRN    LORazepam (ATIVAN) injection 0.5 mg  0.5 mg IntraVENous Q6H PRN    epoprostenol (VELETRI) 30 mcg/mL in 0.9% sodium chloride 50 mL inhalation solution  50 ng/kg/min (Ideal) Inhalation CONTINUOUS    0.9% sodium chloride infusion 250 mL  250 mL IntraVENous PRN    sodium chloride (NS) flush 5-40 mL  5-40 mL IntraVENous Q8H    sodium chloride (NS) flush 5-40 mL  5-40 mL IntraVENous PRN    acetaminophen (TYLENOL) tablet 650 mg  650 mg Oral Q6H PRN    Or    acetaminophen (TYLENOL) suppository 650 mg  650 mg Rectal Q6H PRN    polyethylene glycol (MIRALAX) packet 17 g  17 g Oral DAILY PRN    promethazine (PHENERGAN) tablet 12.5 mg  12.5 mg Oral Q6H PRN    Or    ondansetron (ZOFRAN) injection 4 mg  4 mg IntraVENous Q6H PRN    benzonatate (TESSALON) capsule 100 mg  100 mg Oral TID PRN        Laboratory data and review:    Recent Labs     08/18/20  0320 08/17/20  0134 08/16/20  0153   WBC 11.1 15.3* 8.8   HGB 12.1 11.8* 10.8*   HCT 37.1 35.7* 32.2*    213 186     Recent Labs     08/18/20  0320 08/17/20  0134 08/16/20  0153    141 144   K 4.0 3.2* 3.9    104 108   CO2 33* 33* 31   * 177* 192*   BUN 18 15 14   CREA 0.71 0.52* 0.49*   CA 8.8 8.1* 8.2*   MG 2.1  --   --    PHOS 2.9 2.9 2.7   ALB 2.3* 2.1* 1.9*   ALT 99* 81* 53     No components found for: [place]    Diagnostics:    Telemetry reviewed by me:   normal sinus rhythm    Assessment and Plan:    Pneumonia due to severe acute respiratory syndrome coronavirus 2 (SARS-CoV-2) (8/8/2020) POA: diagnosed 7/27. Progressively worsened leading to intubation. He is sp Remdesivir (completed 8/10) and convalescent plasma x 2 (8/10 and 8/12). He completed course of antibiotics with IV Cefepime and vancomycin and has been afebrile. Continue dexamethasone, Lipitor, enoxaparin. Being transferred to [facility] for ECMO. I had discussed with Intensivist at [facility] 8/17. Dr Jake Harrell had also reviewed chart and will arrange for further management.       Acute respiratory failure with hypoxia ([address] (8/5/2020): due to COVID pneumonia. Vent management as per Intensivist. Getting some diuresis. Being transferred to [facility]     Pneumothorax bilaterally (8/14/2020): sp left chest tube placement 8/11. Discussed with thoracic surgery at [facility] also 8/17 and team will review the patient upon transfer.       Total time spent for the patient's care: [address] discussed with: Nursing Staff    Discussed:  Care Plan and D/C Planning    Prophylaxis:  Lovenox    Anticipated Disposition:  [facility] hospital           ___________________________________________________    Attending Physician:   Anjel Farmer MD

## 2020-08-18 NOTE — PROGRESS NOTES
Comprehensive Nutrition Assessment    Type and Reason for Visit: Reassess    Nutrition Recommendations/Plan:    Vital AF 1.2 @ 20 ml/hr with 50 ml water flush q 4 hr     -If pt tolerates above advance to goal 30 ml/hr with 3 packets Prosource bid and 50 ml water flush q 4 hr    Nutrition Assessment:    Pt admitted to [facility] d/t Respiratory failure with hypoxia. No PMHx except smoking. Noted: Acuter hypoxic respiratory failure d/t COVID 19 PNA,  intubated 8/10. ARDS. Transferred to [facility] for possible ECMO support; s/p convalescent plasma x 2, Remdisivir. B/L PTX-s/p left CT. Discussed during interdisciplinary rounds. Enteral feeding not contraindicated when pt on a paralytic (since it does not affect smooth muscle/GI tract). TPN has been discontinued-will start trophic tube feeding today. Advance over the next 1-2 days if well-tolerated. Diprivan @ current rate of 30.2 ml/hr will provide 797 lipid calories per day. Suggested tube feeding: Vital AF 1.2 @ 20 ml/hr with 50 ml water flush q 4 hr. If well tolerated increase to goal 30 ml/hr with 3 packets Prosource bid and 50 ml water flush q 4 hr. This will provide 720 ml, 1224 calories (2024 including Diprivan), 144 gm protein and 1245 ml free water (tube feeding/flush) per day to meet estimated needs. BG elevated d/t steroid and TPN. Should improve with discontinuation of TPN. Lytes WNL. Malnutrition Assessment:  Malnutrition Status:  Insufficient data      Nutritionally Significant Medications:   Diprivan, Fentanyl, Nimbex, Precedex, Bumex, Decadron, correction scale insulin, Senna    Estimated Daily Nutrient Needs:  Energy (kcal):  3649-4654 (18-20 kcal/kg)  Protein (g):  156 (2g/kg IBW)       Fluid (ml/day):  1 ml/kcal    Nutrition Related Findings:  last BM 8/7. No edema.     Wounds:  None       Current Nutrition Therapies:   Diet: NPO    Anthropometric Measures:  · Height:  5' 11\" (180.3 cm)  · Current Body Wt:  100.7 kg (222 lb 0.1 oz)   · Admission Body Wt:  242 lb 8.1 oz         · Ideal Body Wt:   :  129.1 %   · BMI Categories:  Obese class 1 (BMI 30.0-34. 9)     Wt Readings from Last 10 Encounters:   08/18/20 100.7 kg (222 lb 0.1 oz)       Nutrition Diagnosis:   · Inadequate oral intake related to impaired respiratory function as evidenced by NPO or clear liquid status due to medical condition    Nutrition Interventions:   Food and/or Nutrient Delivery: Start tube feeding, Discontinue parenteral nutrition  Nutrition Education and Counseling: No recommendations at this time  Coordination of Nutrition Care: Continued inpatient monitoring, Interdisciplinary rounds    Goals: Tolerate trophic tube feeding in next 24 hr.       Nutrition Monitoring and Evaluation:   Food/Nutrient Intake Outcomes: Enteral nutrition intake/tolerance  Physical Signs/Symptoms Outcomes: GI status, Weight, Biochemical data, Hemodynamic status, Fluid status or edema    Discharge Planning:     Too soon to determine     Sharif CrERIKA erazo CNSC  Contact: Perfect Serve

## 2020-08-18 NOTE — CONSULTS
Pulmonology Intensive Care Unit Initial Assessment    Subjective:        Subjective:     Critical Care Initial Evaluation Note: 8/18/2020 12:57 PM    Mr. Jailyn Ugalde is a 71-year-old  male with no history of significant past medical history except smoking half pack per day. He was admitted on 8/5/2020 at [facility] with acute hypoxemic respiratory failure from COVID pneumonia. He continued to deteriorate and got intubated on 8/10. Due to worsening hypoxemia, he is transferred to [facility] for ECMO evaluation. He has bee given convalescent plasma twice on 8/7 and 8/14 and treated with Remdesivir which was completed on 8/10. He is also on dexamethasone. He completed course of empiric antibiotics including azithromycin which was completed on 8/13 and cefepime was completed on 8/16. During his hospital course he also developed pneumothorax and has left-sided chest tube since 8/11. He is currently paralyzed with vecuronium and on 70% FiO2 with PEEP of 16 cm of water. I am unable to obtain review of systems. Past Medical History:   Diagnosis Date    History of vascular access device 08/10/2020    5 Fr triple PICC, hemodynamically unstable, 45 cm L basilic      Past Surgical History:   Procedure Laterality Date    IR THORA/INS CHEST TUBE(PNEUMO) WO IMAGE  8/11/2020      Prior to Admission medications    Medication Sig Start Date End Date Taking? Authorizing Provider   benzonatate (Tessalon Perles) 100 mg capsule Take 100 mg by mouth three (3) times daily as needed for Cough. Provider, Historical     No Known Allergies   Social History     Tobacco Use    Smoking status: Current Some Day Smoker    Smokeless tobacco: Never Used   Substance Use Topics    Alcohol use: Not on file      No family history on file. Review of Systems   Unable to perform ROS: Intubated       Objective:     Vital signs reviewed.     08/18 0701 - 08/18 1900  In: 41.4 [I.V.:41.4]  Out: 587 [Urine:525]  No intake/output data recorded. Physical Exam  Constitutional:       Comments: Intubated and sedated. HENT:      Head: Normocephalic and atraumatic. Cardiovascular:      Rate and Rhythm: Normal rate and regular rhythm. Pulmonary:      Comments: On mechanical ventilation. Data Review:     Recent Results (from the past 24 hour(s))   HIV 1/2 AG/AB, 4TH GENERATION,W RFLX CONFIRM    Collection Time: 08/17/20  3:05 PM   Result Value Ref Range    HIV 1/2 Interpretation NONREACTIVE NR      HIV 1/2 result comment SEE NOTE     NT-PRO BNP    Collection Time: 08/17/20  3:05 PM   Result Value Ref Range    NT pro-BNP 8 <125 PG/ML   HEPATITIS PANEL, ACUTE    Collection Time: 08/17/20  3:05 PM   Result Value Ref Range    Hepatitis A, IgM NONREACTIVE NR      __          Hepatitis B surface Ag <0.10 Index    Hep B surface Ag Interp. Negative NEG      __          Hepatitis B core, IgM NONREACTIVE NR      __          Hep C  virus Ab Interp.  NONREACTIVE NR      Hep C  virus Ab comment Method used is Siemens Advia Centaur     MYCOPLASMA AB, IGM    Collection Time: 08/17/20  3:05 PM   Result Value Ref Range    Mycoplasma Ab, IgM NONREACTIVE NR     GLUCOSE, POC    Collection Time: 08/17/20  5:22 PM   Result Value Ref Range    Glucose (POC) 186 (H) 65 - 100 mg/dL    Performed by Juwan Fraction    GLUCOSE, POC    Collection Time: 08/17/20 11:22 PM   Result Value Ref Range    Glucose (POC) 227 (H) 65 - 100 mg/dL    Performed by Levorn Marus    D DIMER    Collection Time: 08/18/20  3:20 AM   Result Value Ref Range    D-dimer 1.52 (H) 0.00 - 0.65 mg/L FEU   FERRITIN    Collection Time: 08/18/20  3:20 AM   Result Value Ref Range    Ferritin 1,034 (H) 26 - 388 NG/ML   C REACTIVE PROTEIN, QT    Collection Time: 08/18/20  3:20 AM   Result Value Ref Range    C-Reactive protein 1.13 (H) 0.00 - 0.60 mg/dL   CK    Collection Time: 08/18/20  3:20 AM   Result Value Ref Range    CK 21 (L) 39 - 308 U/L   FIBRINOGEN Collection Time: 08/18/20  3:20 AM   Result Value Ref Range    Fibrinogen 589 (H) 200 - 475 mg/dL   TSH 3RD GENERATION    Collection Time: 08/18/20  3:20 AM   Result Value Ref Range    TSH 0.25 (L) 0.36 - 3.74 uIU/mL   CBC WITH AUTOMATED DIFF    Collection Time: 08/18/20  3:20 AM   Result Value Ref Range    WBC 11.1 4.1 - 11.1 K/uL    RBC 3.99 (L) 4.10 - 5.70 M/uL    HGB 12.1 12.1 - 17.0 g/dL    HCT 37.1 36.6 - 50.3 %    MCV 93.0 80.0 - 99.0 FL    MCH 30.3 26.0 - 34.0 PG    MCHC 32.6 30.0 - 36.5 g/dL    RDW 11.8 11.5 - 14.5 %    PLATELET 693 999 - 511 K/uL    MPV 10.9 8.9 - 12.9 FL    NRBC 0.0 0  WBC    ABSOLUTE NRBC 0.00 0.00 - 0.01 K/uL    NEUTROPHILS 87 (H) 32 - 75 %    BAND NEUTROPHILS 2 0 - 6 %    LYMPHOCYTES 4 (L) 12 - 49 %    MONOCYTES 7 5 - 13 %    EOSINOPHILS 0 0 - 7 %    BASOPHILS 0 0 - 1 %    IMMATURE GRANULOCYTES 0 %    ABS. NEUTROPHILS 9.9 (H) 1.8 - 8.0 K/UL    ABS. LYMPHOCYTES 0.4 (L) 0.8 - 3.5 K/UL    ABS. MONOCYTES 0.8 0.0 - 1.0 K/UL    ABS. EOSINOPHILS 0.0 0.0 - 0.4 K/UL    ABS. BASOPHILS 0.0 0.0 - 0.1 K/UL    ABS. IMM.  GRANS. 0.0 K/UL    DF MANUAL      RBC COMMENTS NORMOCYTIC, NORMOCHROMIC     MAGNESIUM    Collection Time: 08/18/20  3:20 AM   Result Value Ref Range    Magnesium 2.1 1.6 - 2.4 mg/dL   PHOSPHORUS    Collection Time: 08/18/20  3:20 AM   Result Value Ref Range    Phosphorus 2.9 2.6 - 4.7 MG/DL   LD    Collection Time: 08/18/20  3:20 AM   Result Value Ref Range     (H) 85 - 404 U/L   METABOLIC PANEL, COMPREHENSIVE    Collection Time: 08/18/20  3:20 AM   Result Value Ref Range    Sodium 139 136 - 145 mmol/L    Potassium 4.0 3.5 - 5.1 mmol/L    Chloride 101 97 - 108 mmol/L    CO2 33 (H) 21 - 32 mmol/L    Anion gap 5 5 - 15 mmol/L    Glucose 221 (H) 65 - 100 mg/dL    BUN 18 6 - 20 MG/DL    Creatinine 0.71 0.70 - 1.30 MG/DL    BUN/Creatinine ratio 25 (H) 12 - 20      GFR est AA >60 >60 ml/min/1.73m2    GFR est non-AA >60 >60 ml/min/1.73m2    Calcium 8.8 8.5 - 10.1 MG/DL Bilirubin, total 0.6 0.2 - 1.0 MG/DL    ALT (SGPT) 99 (H) 12 - 78 U/L    AST (SGOT) 30 15 - 37 U/L    Alk. phosphatase 98 45 - 117 U/L    Protein, total 6.6 6.4 - 8.2 g/dL    Albumin 2.3 (L) 3.5 - 5.0 g/dL    Globulin 4.3 (H) 2.0 - 4.0 g/dL    A-G Ratio 0.5 (L) 1.1 - 2.2     SAMPLES BEING HELD    Collection Time: 08/18/20  3:20 AM   Result Value Ref Range    SAMPLES BEING HELD 1BLU     COMMENT        Add-on orders for these samples will be processed based on acceptable specimen integrity and analyte stability, which may vary by analyte.    BLOOD GAS, ARTERIAL    Collection Time: 08/18/20  5:30 AM   Result Value Ref Range    pH 7.37 7.35 - 7.45      PCO2 53 (H) 35.0 - 45.0 mmHg    PO2 79 (L) 80 - 100 mmHg    O2 SAT 95 92 - 97 %    BICARBONATE 30 (H) 22 - 26 mmol/L    BASE EXCESS 3.4 mmol/L    O2 METHOD VENTILATOR      FIO2 70 %    MODE Pressure regulated volume control      Tidal volume 450      SET RATE 22      EPAP/CPAP/PEEP 15      Sample source ARTERIAL      SITE RR      ROSALIND'S TEST N/A     GLUCOSE, POC    Collection Time: 08/18/20  6:14 AM   Result Value Ref Range    Glucose (POC) 216 (H) 65 - 100 mg/dL    Performed by Anabelle Alan    GLUCOSE, POC    Collection Time: 08/18/20 10:56 AM   Result Value Ref Range    Glucose (POC) 198 (H) 65 - 100 mg/dL    Performed by Divine Velasco    POC EG7    Collection Time: 08/18/20 11:36 AM   Result Value Ref Range    Calcium, ionized (POC) 1.24 1.12 - 1.32 mmol/L    FIO2 (POC) 70 %    pH (POC) 7.38 7.35 - 7.45      pCO2 (POC) 61.8 (H) 35.0 - 45.0 MMHG    pO2 (POC) 108 (H) 80 - 100 MMHG    HCO3 (POC) 36.2 (H) 22 - 26 MMOL/L    Base excess (POC) 11 mmol/L    sO2 (POC) 98 (H) 92 - 97 %    Site RIGHT BRACHIAL      Device: VENT      Mode ASSIST CONTROL      Set Rate 28 bpm    PEEP/CPAP (POC) 15 cmH2O    PIP (POC) 14      Allens test (POC) NO      Inspiratory Time 1 sec    Nitric-ppm (POC) 40 ppm    Specimen type (POC) ARTERIAL         Problem list:  - Acute hypoxemic respiratory failure. -ARDS. -COVID pneumonia. -Bacterial pneumonia.    -Left-sided pneumothorax.  -Pneumomediastinum. - Hyperglycemia. Assessment:     -Acute hypoxemic respiratory failure / ARDS, intubated 8/10  -Pneumonia, completed course of azithro 8/13 and cefepime 8/16  -COVID-19+, completed remdesivir 8/10, s/p convalescent plasma 8/7 and 8/14  -Pneumomediastinum  -Bilateral ptx, s/p L chest tube placement 8/11  -Elevated LFTs  -Hyperglycemia    Plan:     -Continue lung protective ventilation.  - Change sedation and paralytics to propofol and fentanyl and Nimbex. - Continue chest tube to suction.  -Obtain stat chest x-ray. - Patient is evaluated by CT surgery, not a candidate for ECMO at this point.  - Wean FiO2 for target saturation of 88 to 92%. - Patient has been intubated since 8/10, plan of tracheostomy has not been discussed yet and also not a candidate this point yet. - Patient completed Remdesivir on 8/10. He received convalescent plasma on 8/7 and 8/14.  - Cut down dexamethasone to 6 mg daily.  - Diuresis with Bumex 1 mg twice a day. Goal for negative fluid balance. - Closely monitor hemodynamics and markers of endorgan perfusion including mental status, lactate and urine output. - Closely monitor for any superadded infection and treat aggressively. - We will start trickle feeding, plan to d/c TPN today. Continue sliding scale insulin for glucose control.  - Serial procalcitonin. - Continue vitamin C and zinc.  -Continue intermediate dose Lovenox.  -Proning was not done due to chest tube. Prophylaxis:  Stress Ulcer Protocol Active: Pepcid  DVT Protocol Active: On lovenox (intermediate dose)        80min of CC time spent.

## 2020-08-18 NOTE — PROGRESS NOTES
Critical care transport team has arrived [facility].     Yolanda Corrales

## 2020-08-18 NOTE — PROGRESS NOTES
1430: Bedside, Verbal and Written shift change report given to PERI Berg RN (oncoming nurse) by ROSALBA Banks RN (offgoing nurse). Report included the following information SBAR, Kardex, Intake/Output, MAR, Med Rec Status and Cardiac Rhythm NSR.     1430: Primary Nurse Cintia Salazar and Lashawn Eugene RN performed a dual skin assessment on this patient No impairment noted  Enrique score is 11    1930: Bedside, Verbal and Written shift change report given to  , RN (oncoming nurse) by PERI Berg RN (offgoing nurse). Report included the following information SBAR, Kardex, Intake/Output, MAR, Recent Results and Cardiac Rhythm NSR.

## 2020-08-18 NOTE — H&P
CSS   History and Physical    Subjective:      Debi Munoz is a 27 y.o.  male who was referred for possible VV ECMO placement by hospitalist team at [facility]. Pt transferred to [facility] for escalation of care. Pt has no prior PMH, current smoker, about 1/2 ppd. Pt is intubated, sedated and paralyzed, information obtained from medical records. Pt presented to [facility] ED on 8/5/20 due to worsening SOB, associated with diaphoresis, cough and fever. 9 days prior to admission he tested positive for COVID-19, initially had fatigue, fever, headaches and cough. He was treated with Zithromax, prednisone and tessalon pearls for his cough without any improvement. Pt took tylenol at home for fever/HA and was trying to increase his fluid intake. Nothing relieved his symptoms. Symptoms were exacerbated by activity. He was admitted for COVID-19 pneumonia and sepsis. Treatment has included IV azithromycin, VIt C, steroids, Remdesivir (completed 8/10/20) and convalescent plasma x 2 doses (8/10/20 and 8/12/20). Ultimately, his symptoms did not improve and required intubation. His oxygenation has continued to deteriorate, transferred to [facility] for possible ECMO. Past Medical History:   Diagnosis Date    History of vascular access device 08/10/2020    5 Fr triple PICC, hemodynamically unstable, 45 cm L basilic     Past Surgical History:   Procedure Laterality Date    IR THORA/INS CHEST TUBE(PNEUMO) WO IMAGE  8/11/2020      Social History     Tobacco Use    Smoking status: Current Some Day Smoker    Smokeless tobacco: Never Used   Substance Use Topics    Alcohol use: Not on file      No family history on file. Prior to Admission medications    Medication Sig Start Date End Date Taking? Authorizing Provider   benzonatate (Tessalon Perles) 100 mg capsule Take 100 mg by mouth three (3) times daily as needed for Cough.     Provider, Historical       No Known Allergies    Review of Systems:   Consititutional: Denies fever or chills. Eyes:  Denies use of glasses or vision problems (cataracts). ENT:  Denies hearing or swallowing difficulty. CV: Denies CP, claudication, HTN. Resp: Denies dyspnea, productive cough. : Denies dialysis or kidney problems. GI: Denies ulcers, esophageal strictures, liver problems. M/S: Denies joint or bone problems, or implanted artificial hardware. Skin: Denies varicose veins, edema. Neuro: Denies strokes, or TIAs. Psych: Denies anxiety or depression. Endocrine: Denies thyroid problems or diabetes. Heme/Lymphatic: Denies easy bruising or lymphedema. Objective:     Physical Exam:    Visit Vitals  BP (!) 155/107   Pulse (!) 125   Temp 99.8 °F (37.7 °C)   Resp 22   SpO2 93%     Remainder of exam deferred, patient on droplet isolation due to COVID-19    Labs:   Recent Labs     08/18/20  1056  08/18/20  0320   WBC  --   --  11.1   HGB  --   --  12.1   HCT  --   --  37.1   PLT  --   --  188   NA  --   --  139   K  --   --  4.0   BUN  --   --  18   CREA  --   --  0.71   GLU  --   --  221*   GLUCPOC 198*   < >  --     < > = values in this interval not displayed. Diagnostics:   Chest Port:   CXR Results  (Last 48 hours)               08/18/20 0354  XR CHEST PORT Final result    Impression:  IMPRESSION: Slight interval improvement of the bilateral infiltration. Narrative:  Portable chest single view dated 8/18/2020       Comparison chest dated 8/17/2020       History is tube placement. A single frontal view of the chest was obtained. There continues to be hazy   density involving both lungs. There has been slight interval improvement of the   aeration of the lungs since the previous examination. The endotracheal tube,   nasogastric tube, pigtail catheter in the region of the left lung base and the   PICC line are unchanged in position. 08/17/20 0600  XR CHEST PORT Final result    Impression:  IMPRESSION: Interval improvement of the bilateral infiltration. Narrative:  Portable chest single view dated 8/17/2020       Comparison chest dated 8/16/2020       History is respiratory failure       A single frontal view of the chest was obtained. The endotracheal tube,   nasogastric tube and PICC line on the left remain unchanged in position. The   cardiac silhouette is normal in size. There has been interval improvement of the   aeration of the lungs. Specifically, less hazy density is noted bilaterally. 08/16/20 1446  XR CHEST PORT Final result    Impression:  Impression: Interval placement of a left-sided chest tube without pneumothorax. Narrative: Indication: Chest tube placement       Comparison to earlier the same day. Portable exam obtained at [phone] demonstrates   interval placement of a left-sided chest tube. No pneumothorax. Stable bilateral   pulmonary infiltrates. EKG 8/10/20: Sinus tachycardia   Otherwise normal ECG      Assessment:     Active Problems:    Acute respiratory failure ([address] (8/18/2020)        Plan:   1. Acute hypoxic respiratory failure secondary to COVID-19: cont vent support, trend ABG's. At this time, pt is not an ECMO candidate, will cont to follow. Further plans per ICU intensivist. Cont diuretics, dexamethasone. Attempting to prone today. Trend labs     2. Pneumonia due to COVID-19: has completed IV abx therapy, ID was following at [facility] - re consult as necessary. Monitor daily xrays - improving. Completed Remdesivir 8/10/20, convalescent plasma x 2 doses (8/10/20, 8/12/20). 3. Pneumothorax, s/p L CT placement: xray improved post pigtail placement. May need to consult thoracic surgery to manage. Cont tube to suction     4. ST: could be due to agitation, HR was in 80's on d/c from [facility]. Monitor,  paralyzed/sedated, monitor    5. Hyperglycemia: likely due to steroid response. Cont SSI    6. Elevated ALT: trend    7. Nutrition: arrived on TPN/lipids, per intensivist. Senna/docusate ordered     8.  DVT/GI ppx: on lovenox bid dose, start protonix    Dispo: remains critically ill, no plans for ECMO as of right now.  Intensivist to primarily manage, discussed with Dr. Cornelius Laughter       Signed By: Danyell Baer NP     August 18, 2020

## 2020-08-18 NOTE — PROGRESS NOTES
ARDS  Acute hypoxoc respiratory failure  COVID positive    Transferred from [facility] this am     Remains intubated, sedated, and paralyzed    Hgb and platelets look good    Creatinine normal    Bilirubin and other LFTs look good    NT pro-BNP normal    ABG - 7.34 / 77 / 160 / 36 / 11    FiO2 70%, PEEP 15, RR 28,     Continues on inhaled NO    Bumex 1 mg IV BID    Continues on steroids    Continues on thrombosis prophylaxis     CXR - diffuse airspace disease      Intake/Output Summary (Last 24 hours) at 8/18/2020 1737  Last data filed at 8/18/2020 1330  Gross per 24 hour   Intake 111.77 ml   Output 627 ml   Net -515.23 ml     Visit Vitals  BP 90/49   Pulse 78   Temp 100.4 °F (38 °C)   Resp 30   Ht 5' 11\" (1.803 m)   SpO2 96%   BMI 30.96 kg/m²       Risk of morbidity and mortality - high  Medical decision making - high complexity    Total critical care time - 30 minutes (CPT 43666)     I personally spent the above critical care time. This is time spent at this critically ill patient's bedside / unit / floor actively involved in patient care as well as the coordination of care and discussions with the patient's family. This does not include any procedural time which has been billed separately.

## 2020-08-19 ENCOUNTER — APPOINTMENT (OUTPATIENT)
Dept: GENERAL RADIOLOGY | Age: 30
DRG: 004 | End: 2020-08-19
Attending: INTERNAL MEDICINE
Payer: COMMERCIAL

## 2020-08-19 ENCOUNTER — APPOINTMENT (OUTPATIENT)
Dept: GENERAL RADIOLOGY | Age: 30
DRG: 004 | End: 2020-08-19
Attending: NURSE PRACTITIONER
Payer: COMMERCIAL

## 2020-08-19 ENCOUNTER — APPOINTMENT (OUTPATIENT)
Dept: GENERAL RADIOLOGY | Age: 30
DRG: 004 | End: 2020-08-19
Attending: HOSPITALIST
Payer: COMMERCIAL

## 2020-08-19 LAB
ALBUMIN SERPL-MCNC: 2.7 G/DL (ref 3.5–5)
ALBUMIN/GLOB SERPL: 0.6 {RATIO} (ref 1.1–2.2)
ALP SERPL-CCNC: 142 U/L (ref 45–117)
ALT SERPL-CCNC: 225 U/L (ref 12–78)
ANION GAP SERPL CALC-SCNC: 6 MMOL/L (ref 5–15)
ARTERIAL PATENCY WRIST A: YES
ARTERIAL PATENCY WRIST A: YES
AST SERPL-CCNC: 80 U/L (ref 15–37)
BACTERIA SPEC CULT: ABNORMAL
BACTERIA SPEC CULT: ABNORMAL
BASE EXCESS BLD CALC-SCNC: 13 MMOL/L
BASE EXCESS BLD CALC-SCNC: 14 MMOL/L
BASOPHILS # BLD: 0 K/UL (ref 0–0.1)
BASOPHILS NFR BLD: 0 % (ref 0–1)
BDY SITE: ABNORMAL
BDY SITE: ABNORMAL
BILIRUB SERPL-MCNC: 1 MG/DL (ref 0.2–1)
BUN SERPL-MCNC: 32 MG/DL (ref 6–20)
BUN/CREAT SERPL: 48 (ref 12–20)
CA-I BLD-SCNC: 1.23 MMOL/L (ref 1.12–1.32)
CA-I BLD-SCNC: 1.23 MMOL/L (ref 1.12–1.32)
CALCIUM SERPL-MCNC: 9.7 MG/DL (ref 8.5–10.1)
CHLORIDE SERPL-SCNC: 94 MMOL/L (ref 97–108)
CK SERPL-CCNC: 133 U/L (ref 39–308)
CO2 SERPL-SCNC: 36 MMOL/L (ref 21–32)
COHGB MFR BLD: 0.9 % (ref 1–2)
CREAT SERPL-MCNC: 0.66 MG/DL (ref 0.7–1.3)
CRP SERPL-MCNC: 0.89 MG/DL (ref 0–0.6)
D DIMER PPP FEU-MCNC: 1.59 MG/L FEU (ref 0–0.65)
DIFFERENTIAL METHOD BLD: ABNORMAL
EOSINOPHIL # BLD: 0 K/UL (ref 0–0.4)
EOSINOPHIL NFR BLD: 0 % (ref 0–7)
ERYTHROCYTE [DISTWIDTH] IN BLOOD BY AUTOMATED COUNT: 12.5 % (ref 11.5–14.5)
ERYTHROCYTE [DISTWIDTH] IN BLOOD BY AUTOMATED COUNT: 12.6 % (ref 11.5–14.5)
FERRITIN SERPL-MCNC: 1190 NG/ML (ref 26–388)
FIBRINOGEN PPP-MCNC: 761 MG/DL (ref 200–475)
GAS FLOW.O2 O2 DELIVERY SYS: ABNORMAL L/MIN
GAS FLOW.O2 O2 DELIVERY SYS: ABNORMAL L/MIN
GAS FLOW.O2 SETTING OXYMISER: 30 BPM
GAS FLOW.O2 SETTING OXYMISER: 30 BPM
GLOBULIN SER CALC-MCNC: 4.9 G/DL (ref 2–4)
GLUCOSE BLD STRIP.AUTO-MCNC: 106 MG/DL (ref 65–100)
GLUCOSE BLD STRIP.AUTO-MCNC: 132 MG/DL (ref 65–100)
GLUCOSE BLD STRIP.AUTO-MCNC: 152 MG/DL (ref 65–100)
GLUCOSE BLD STRIP.AUTO-MCNC: 158 MG/DL (ref 65–100)
GLUCOSE SERPL-MCNC: 137 MG/DL (ref 65–100)
GRAM STN SPEC: ABNORMAL
HCO3 BLD-SCNC: 37.3 MMOL/L (ref 22–26)
HCO3 BLD-SCNC: 38.3 MMOL/L (ref 22–26)
HCT VFR BLD AUTO: 40.7 % (ref 36.6–50.3)
HCT VFR BLD AUTO: 42.5 % (ref 36.6–50.3)
HGB BLD OXIMETRY-MCNC: 14.9 G/DL (ref 14–17)
HGB BLD-MCNC: 13.4 G/DL (ref 12.1–17)
HGB BLD-MCNC: 13.6 G/DL (ref 12.1–17)
IMM GRANULOCYTES # BLD AUTO: 0 K/UL
IMM GRANULOCYTES NFR BLD AUTO: 0 %
INSPIRATION.DURATION SETTING TIME VENT: 1 SEC
INSPIRATION.DURATION SETTING TIME VENT: 1 SEC
LACTATE SERPL-SCNC: 1.8 MMOL/L (ref 0.4–2)
LDH SERPL L TO P-CCNC: 373 U/L (ref 85–241)
LYMPHOCYTES # BLD: 1.1 K/UL (ref 0.8–3.5)
LYMPHOCYTES NFR BLD: 5 % (ref 12–49)
MAGNESIUM SERPL-MCNC: 2.2 MG/DL (ref 1.6–2.4)
MCH RBC QN AUTO: 30.2 PG (ref 26–34)
MCH RBC QN AUTO: 30.2 PG (ref 26–34)
MCHC RBC AUTO-ENTMCNC: 32 G/DL (ref 30–36.5)
MCHC RBC AUTO-ENTMCNC: 32.9 G/DL (ref 30–36.5)
MCV RBC AUTO: 91.9 FL (ref 80–99)
MCV RBC AUTO: 94.2 FL (ref 80–99)
METHGB MFR BLD: 0.8 % (ref 0–1.4)
MONOCYTES # BLD: 1.7 K/UL (ref 0–1)
MONOCYTES NFR BLD: 8 % (ref 5–13)
NEUTS BAND NFR BLD MANUAL: 2 % (ref 0–6)
NEUTS SEG # BLD: 18.2 K/UL (ref 1.8–8)
NEUTS SEG NFR BLD: 85 % (ref 32–75)
NITRIC:PPM ISTAT,INITR: 40 PPM
NITRIC:PPM ISTAT,INITR: 40 PPM
NRBC # BLD: 0 K/UL (ref 0–0.01)
NRBC # BLD: 0 K/UL (ref 0–0.01)
NRBC BLD-RTO: 0 PER 100 WBC
NRBC BLD-RTO: 0 PER 100 WBC
O2/TOTAL GAS SETTING VFR VENT: 60 %
O2/TOTAL GAS SETTING VFR VENT: 60 %
OXYHGB MFR BLD: 94.9 % (ref 94–97)
PCO2 BLD: 56.1 MMHG (ref 35–45)
PCO2 BLD: 59.9 MMHG (ref 35–45)
PEEP RESPIRATORY: 15 CMH2O
PEEP RESPIRATORY: 15 CMH2O
PH BLD: 7.41 [PH] (ref 7.35–7.45)
PH BLD: 7.43 [PH] (ref 7.35–7.45)
PHOSPHATE SERPL-MCNC: 4.1 MG/DL (ref 2.6–4.7)
PIP ISTAT,IPIP: 14
PIP ISTAT,IPIP: 14
PLATELET # BLD AUTO: 227 K/UL (ref 150–400)
PLATELET # BLD AUTO: 248 K/UL (ref 150–400)
PMV BLD AUTO: 11.1 FL (ref 8.9–12.9)
PMV BLD AUTO: 12.1 FL (ref 8.9–12.9)
PO2 BLD: 84 MMHG (ref 80–100)
POTASSIUM SERPL-SCNC: 4.1 MMOL/L (ref 3.5–5.1)
PROCALCITONIN SERPL-MCNC: 0.11 NG/ML
PROT SERPL-MCNC: 7.6 G/DL (ref 6.4–8.2)
RBC # BLD AUTO: 4.43 M/UL (ref 4.1–5.7)
RBC # BLD AUTO: 4.51 M/UL (ref 4.1–5.7)
RBC MORPH BLD: ABNORMAL
SAO2 % BLD: 96 % (ref 92–97)
SAO2 % BLD: 97 % (ref 95–99)
SERVICE CMNT-IMP: ABNORMAL
SODIUM SERPL-SCNC: 136 MMOL/L (ref 136–145)
SPECIMEN TYPE: ABNORMAL
SPECIMEN TYPE: ABNORMAL
TOTAL RESP. RATE, ITRR: 30
TOTAL RESP. RATE, ITRR: 30
VENTILATION MODE VENT: ABNORMAL
VENTILATION MODE VENT: ABNORMAL
WBC # BLD AUTO: 20.8 K/UL (ref 4.1–11.1)
WBC # BLD AUTO: 21 K/UL (ref 4.1–11.1)

## 2020-08-19 PROCEDURE — 82550 ASSAY OF CK (CPK): CPT

## 2020-08-19 PROCEDURE — 77010033678 HC OXYGEN DAILY

## 2020-08-19 PROCEDURE — 80053 COMPREHEN METABOLIC PANEL: CPT

## 2020-08-19 PROCEDURE — 74011250636 HC RX REV CODE- 250/636: Performed by: HOSPITALIST

## 2020-08-19 PROCEDURE — 87040 BLOOD CULTURE FOR BACTERIA: CPT

## 2020-08-19 PROCEDURE — 83615 LACTATE (LD) (LDH) ENZYME: CPT

## 2020-08-19 PROCEDURE — 83605 ASSAY OF LACTIC ACID: CPT

## 2020-08-19 PROCEDURE — 85027 COMPLETE CBC AUTOMATED: CPT

## 2020-08-19 PROCEDURE — 84145 PROCALCITONIN (PCT): CPT

## 2020-08-19 PROCEDURE — 93005 ELECTROCARDIOGRAM TRACING: CPT

## 2020-08-19 PROCEDURE — 74011000258 HC RX REV CODE- 258: Performed by: HOSPITALIST

## 2020-08-19 PROCEDURE — 71045 X-RAY EXAM CHEST 1 VIEW: CPT

## 2020-08-19 PROCEDURE — 74011250636 HC RX REV CODE- 250/636: Performed by: NURSE PRACTITIONER

## 2020-08-19 PROCEDURE — 74011250637 HC RX REV CODE- 250/637: Performed by: NURSE PRACTITIONER

## 2020-08-19 PROCEDURE — 94003 VENT MGMT INPAT SUBQ DAY: CPT

## 2020-08-19 PROCEDURE — 65610000006 HC RM INTENSIVE CARE

## 2020-08-19 PROCEDURE — 82803 BLOOD GASES ANY COMBINATION: CPT

## 2020-08-19 PROCEDURE — 86140 C-REACTIVE PROTEIN: CPT

## 2020-08-19 PROCEDURE — 82728 ASSAY OF FERRITIN: CPT

## 2020-08-19 PROCEDURE — 83735 ASSAY OF MAGNESIUM: CPT

## 2020-08-19 PROCEDURE — 73610000026 HC INO THERAPY EACH HOUR

## 2020-08-19 PROCEDURE — 74011636637 HC RX REV CODE- 636/637: Performed by: NURSE PRACTITIONER

## 2020-08-19 PROCEDURE — 36415 COLL VENOUS BLD VENIPUNCTURE: CPT

## 2020-08-19 PROCEDURE — 84100 ASSAY OF PHOSPHORUS: CPT

## 2020-08-19 PROCEDURE — 85384 FIBRINOGEN ACTIVITY: CPT

## 2020-08-19 PROCEDURE — 74011000250 HC RX REV CODE- 250: Performed by: THORACIC SURGERY (CARDIOTHORACIC VASCULAR SURGERY)

## 2020-08-19 PROCEDURE — 94760 N-INVAS EAR/PLS OXIMETRY 1: CPT

## 2020-08-19 PROCEDURE — 82962 GLUCOSE BLOOD TEST: CPT

## 2020-08-19 PROCEDURE — 82375 ASSAY CARBOXYHB QUANT: CPT

## 2020-08-19 PROCEDURE — 36600 WITHDRAWAL OF ARTERIAL BLOOD: CPT

## 2020-08-19 PROCEDURE — 74011000250 HC RX REV CODE- 250: Performed by: HOSPITALIST

## 2020-08-19 PROCEDURE — 85025 COMPLETE CBC W/AUTO DIFF WBC: CPT

## 2020-08-19 PROCEDURE — 74011000258 HC RX REV CODE- 258: Performed by: THORACIC SURGERY (CARDIOTHORACIC VASCULAR SURGERY)

## 2020-08-19 PROCEDURE — 74011000250 HC RX REV CODE- 250: Performed by: NURSE PRACTITIONER

## 2020-08-19 PROCEDURE — 99291 CRITICAL CARE FIRST HOUR: CPT | Performed by: THORACIC SURGERY (CARDIOTHORACIC VASCULAR SURGERY)

## 2020-08-19 PROCEDURE — 87070 CULTURE OTHR SPECIMN AEROBIC: CPT

## 2020-08-19 PROCEDURE — 74011250637 HC RX REV CODE- 250/637: Performed by: HOSPITALIST

## 2020-08-19 PROCEDURE — 85379 FIBRIN DEGRADATION QUANT: CPT

## 2020-08-19 RX ORDER — MIDAZOLAM HYDROCHLORIDE 1 MG/ML
2 INJECTION, SOLUTION INTRAMUSCULAR; INTRAVENOUS ONCE
Status: COMPLETED | OUTPATIENT
Start: 2020-08-19 | End: 2020-08-19

## 2020-08-19 RX ORDER — MIDAZOLAM HYDROCHLORIDE 1 MG/ML
1-2 INJECTION, SOLUTION INTRAMUSCULAR; INTRAVENOUS
Status: DISCONTINUED | OUTPATIENT
Start: 2020-08-19 | End: 2020-09-22

## 2020-08-19 RX ORDER — VANCOMYCIN/0.9 % SOD CHLORIDE 1.5G/250ML
1500 PLASTIC BAG, INJECTION (ML) INTRAVENOUS EVERY 8 HOURS
Status: DISCONTINUED | OUTPATIENT
Start: 2020-08-19 | End: 2020-08-24

## 2020-08-19 RX ORDER — MIDAZOLAM HYDROCHLORIDE 1 MG/ML
2 INJECTION, SOLUTION INTRAMUSCULAR; INTRAVENOUS ONCE
Status: DISCONTINUED | OUTPATIENT
Start: 2020-08-19 | End: 2020-08-19

## 2020-08-19 RX ORDER — VANCOMYCIN 2 GRAM/500 ML IN 0.9 % SODIUM CHLORIDE INTRAVENOUS
2000 ONCE
Status: COMPLETED | OUTPATIENT
Start: 2020-08-19 | End: 2020-08-20

## 2020-08-19 RX ADMIN — PROPOFOL 50 MCG/KG/MIN: 10 INJECTION, EMULSION INTRAVENOUS at 02:52

## 2020-08-19 RX ADMIN — Medication 200 MCG/HR: at 16:34

## 2020-08-19 RX ADMIN — Medication 10 ML: at 06:38

## 2020-08-19 RX ADMIN — FAMOTIDINE 20 MG: 10 INJECTION, SOLUTION INTRAVENOUS at 20:24

## 2020-08-19 RX ADMIN — Medication 10 ML: at 21:16

## 2020-08-19 RX ADMIN — PROPOFOL 50 MCG/KG/MIN: 10 INJECTION, EMULSION INTRAVENOUS at 20:23

## 2020-08-19 RX ADMIN — CHLORHEXIDINE GLUCONATE 15 ML: 0.12 RINSE ORAL at 20:25

## 2020-08-19 RX ADMIN — GUAIFENESIN 400 MG: 100 SOLUTION ORAL at 04:40

## 2020-08-19 RX ADMIN — MIDAZOLAM 2 MG: 1 INJECTION INTRAMUSCULAR; INTRAVENOUS at 21:03

## 2020-08-19 RX ADMIN — Medication 250 MCG/HR: at 22:53

## 2020-08-19 RX ADMIN — DOCUSATE SODIUM 100 MG: 50 LIQUID ORAL at 17:46

## 2020-08-19 RX ADMIN — DOCUSATE SODIUM 100 MG: 50 LIQUID ORAL at 09:06

## 2020-08-19 RX ADMIN — BUMETANIDE 1 MG: 0.25 INJECTION INTRAMUSCULAR; INTRAVENOUS at 09:07

## 2020-08-19 RX ADMIN — GUAIFENESIN 400 MG: 100 SOLUTION ORAL at 12:24

## 2020-08-19 RX ADMIN — DEXTROSE MONOHYDRATE 0.5 MCG/MIN: 5 INJECTION, SOLUTION INTRAVENOUS at 16:25

## 2020-08-19 RX ADMIN — Medication 200 MCG/HR: at 02:10

## 2020-08-19 RX ADMIN — PROPOFOL 50 MCG/KG/MIN: 10 INJECTION, EMULSION INTRAVENOUS at 22:50

## 2020-08-19 RX ADMIN — PROPOFOL 50 MCG/KG/MIN: 10 INJECTION, EMULSION INTRAVENOUS at 06:15

## 2020-08-19 RX ADMIN — DEXMEDETOMIDINE HYDROCHLORIDE 0.4 MCG/KG/HR: 100 INJECTION, SOLUTION INTRAVENOUS at 14:49

## 2020-08-19 RX ADMIN — INSULIN LISPRO 2 UNITS: 100 INJECTION, SOLUTION INTRAVENOUS; SUBCUTANEOUS at 12:24

## 2020-08-19 RX ADMIN — INSULIN LISPRO 2 UNITS: 100 INJECTION, SOLUTION INTRAVENOUS; SUBCUTANEOUS at 17:38

## 2020-08-19 RX ADMIN — VANCOMYCIN HYDROCHLORIDE 1500 MG: 10 INJECTION, POWDER, LYOPHILIZED, FOR SOLUTION INTRAVENOUS at 20:00

## 2020-08-19 RX ADMIN — PROPOFOL 50 MCG/KG/MIN: 10 INJECTION, EMULSION INTRAVENOUS at 12:44

## 2020-08-19 RX ADMIN — DEXAMETHASONE SODIUM PHOSPHATE 6 MG: 10 INJECTION INTRAMUSCULAR; INTRAVENOUS at 09:07

## 2020-08-19 RX ADMIN — GUAIFENESIN 400 MG: 100 SOLUTION ORAL at 09:06

## 2020-08-19 RX ADMIN — FAMOTIDINE 20 MG: 10 INJECTION, SOLUTION INTRAVENOUS at 12:24

## 2020-08-19 RX ADMIN — Medication 10 ML: at 14:00

## 2020-08-19 RX ADMIN — CHLORHEXIDINE GLUCONATE 15 ML: 0.12 RINSE ORAL at 09:07

## 2020-08-19 RX ADMIN — ENOXAPARIN SODIUM 50 MG: 60 INJECTION SUBCUTANEOUS at 22:40

## 2020-08-19 RX ADMIN — CEFEPIME 2 G: 2 INJECTION, POWDER, FOR SOLUTION INTRAVENOUS at 20:23

## 2020-08-19 RX ADMIN — CEFEPIME 2 G: 2 INJECTION, POWDER, FOR SOLUTION INTRAVENOUS at 12:23

## 2020-08-19 RX ADMIN — ENOXAPARIN SODIUM 50 MG: 60 INJECTION SUBCUTANEOUS at 09:06

## 2020-08-19 RX ADMIN — BUMETANIDE 1 MG: 0.25 INJECTION INTRAMUSCULAR; INTRAVENOUS at 17:43

## 2020-08-19 RX ADMIN — PROPOFOL 50 MCG/KG/MIN: 10 INJECTION, EMULSION INTRAVENOUS at 09:07

## 2020-08-19 RX ADMIN — Medication 200 MCG/HR: at 09:00

## 2020-08-19 RX ADMIN — ASPIRIN 81 MG CHEWABLE TABLET 81 MG: 81 TABLET CHEWABLE at 09:06

## 2020-08-19 RX ADMIN — VANCOMYCIN HYDROCHLORIDE 2000 MG: 10 INJECTION, POWDER, LYOPHILIZED, FOR SOLUTION INTRAVENOUS at 12:24

## 2020-08-19 RX ADMIN — ONDANSETRON 4 MG: 2 INJECTION INTRAMUSCULAR; INTRAVENOUS at 22:43

## 2020-08-19 RX ADMIN — SENNOSIDES 8.6 MG: 8.6 TABLET, FILM COATED ORAL at 20:24

## 2020-08-19 RX ADMIN — GUAIFENESIN 400 MG: 100 SOLUTION ORAL at 20:24

## 2020-08-19 RX ADMIN — GUAIFENESIN 400 MG: 100 SOLUTION ORAL at 15:00

## 2020-08-19 RX ADMIN — ACETAMINOPHEN 650 MG: 650 SUPPOSITORY RECTAL at 23:10

## 2020-08-19 RX ADMIN — SENNOSIDES 8.6 MG: 8.6 TABLET, FILM COATED ORAL at 09:06

## 2020-08-19 RX ADMIN — PROPOFOL 50 MCG/KG/MIN: 10 INJECTION, EMULSION INTRAVENOUS at 16:29

## 2020-08-19 RX ADMIN — DEXMEDETOMIDINE HYDROCHLORIDE 0.4 MCG/KG/HR: 100 INJECTION, SOLUTION INTRAVENOUS at 22:50

## 2020-08-19 NOTE — PROGRESS NOTES
Pharmacist Note - Vancomycin Dosing    Consult provided for this 27 y.o. male for indication of sepsis of unknown etiology - worsening leukocytosis, low-grade fevers; possible PNA  - COVID-19+. Antibiotic regimen(s): Cefepime + Vancomycin  Patient on vancomycin PTA? NO     Recent Labs     20  0255 20  0320 20  0134   WBC 21.0* 11.1 15.3*   CREA 0.66* 0.71 0.52*   BUN 32* 18 15     Frequency of BMP: Daily  Height: 180.3 cm  Weight: 99.4 kg  Est CrCl: >100 ml/min; UO: ~0.9 ml/kg/hr  Temp (24hrs), Av.2 °F (37.3 °C), Min:98.8 °F (37.1 °C), Max:99.5 °F (37.5 °C)    Cultures:   Blood: NG, final   C. diff: (-)   Blood: NG, final   Sputum: few yeast + rare normal greg, final   Sputum: pending    Goal trough = 15 - 20 mcg/mL    Therapy will be initiated with a loading dose of 2000 mg IV x 1 to be followed by a maintenance dose of 1500 mg IV every 8 hours. Pharmacy to follow patient daily and order levels / make dose adjustments as appropriate.

## 2020-08-19 NOTE — PROGRESS NOTES
On observation patient is experiencing decreased VT's , and decreased HR 44-46. Patient was suctioned at this time scant clear secretions were withdrawn from in line harp suction, small tan/clear secretions were withdrawn orally. Cuff pressure was checked at this time. Increased PC to 16, patients VT's increased at this time, HR increased. (see flowsheet)    RN & MD at window, MD notified at this time. Will continue to monitor patient.

## 2020-08-19 NOTE — PROGRESS NOTES
Bedside and Verbal shift change report given to Louis Ye RN (oncoming nurse) by Cate Dahl RN (offgoing nurse). Report included the following information SBAR, Kardex, Procedure Summary, Intake/Output, MAR, Recent Results, Med Rec Status, Cardiac Rhythm NSR, Alarm Parameters  and Dual Neuro Assessment. 0930: Hung new Fentanyl; 200mg/cleared for 82.97    0945: Notified Dr. Loir Reeedr of episodes of tachycardia up to 130's; EKG ordered    1000: EKG Completed; per report \"Sinus Rhythm with Sinus arrhythmia\"; MD Notified    1105: Nimbex decreased to 1.5mcg/kg/min    1135: Nimbex decreased to 1mcg/kg/min    1235: Nimbex decreased to 0.5mcg/kg/min    1250: Nimbex Off    1620: MAP 62; Levo started; MD notified orders to continue to titrate up on pressors as needed; continue sedation    1740: Laid patient flat to change sheets and pt martine'd down to 48; immediately sat patient back up and heart rate improved; pulses palpable swelling noted on left side of neck/jaw; MD notified    Shift Summary: Pt was weaned off of Nimbex drip and started on a Precedex drip to maintain sedation along with Fentanyl and Propofol; pt remains heavily sedated; had an episode of bradycardia when attempted to turn to change linens, MD was notified; pt is on 3mcg of Levo for BP support.

## 2020-08-19 NOTE — PROGRESS NOTES
1930: Bedside and Verbal shift change report given to [address] (oncoming nurse) by Megan Saravia RN (offgoing nurse). Report included the following information SBAR, Kardex, ED Summary, Procedure Summary, Intake/Output, MAR, Recent Results, Cardiac Rhythm NSR, Alarm Parameters  and Dual Neuro Assessment. 0130: Discussed pt's heart rhythm with MD; T waves increasing in height in lead II this shift. MD evaluated rhythm, no orders received. Shift summary: Pt rested in bed, intubated, sedated, and paralyzed. Nimbex gtt titrated per TOF. Otherwise uneventful shift.

## 2020-08-19 NOTE — PROGRESS NOTES
ARDS  Acute hypoxoc respiratory failure  COVID positive    Remains intubated and sedated    Coming off paralytics    PEEP 12    FiO2 coming down    Hgb and platelets look good    Creatinine normal    Bilirubin and other LFTs up a bit    LDH looks good    Lactic acid normal    Pro-calcitonin normal    7.41 / 60 / 84 / 38 / 14     Discussed with ICU attending     CXR - diffuse airspace disease       Intake/Output Summary (Last 24 hours) at 8/19/2020 1439  Last data filed at 8/19/2020 1235  Gross per 24 hour   Intake 1881.39 ml   Output 2720 ml   Net -838.61 ml     Visit Vitals  /83   Pulse 86   Temp 99.2 °F (37.3 °C)   Resp 30   Ht 5' 11\" (1.803 m)   Wt 219 lb 2.2 oz (99.4 kg)   SpO2 91%   BMI 30.56 kg/m²       Risk of morbidity and mortality - high  Medical decision making - high complexity    Total critical care time - 30 minutes (CPT 74269)     I personally spent the above critical care time. This is time spent at this critically ill patient's bedside / unit / floor actively involved in patient care as well as the coordination of care and discussions with the patient's family. This does not include any procedural time which has been billed separately.

## 2020-08-19 NOTE — PROGRESS NOTES
[facility] Adult  Critical Care                                                                                          Critical Care Progress Note  Maye Diop MD  Answering service: [phone] from in house phone        Date of Service:  2020  NAME:  Ann Marie Kelly  :  1990  MRN:  [id number]      Interval history / Subjective:   Patient remain intubated and paralyzed. This morning he had brief episode of hypoxia with bradyacardia but improved quickly. Problem list:  - Acute hypoxemic respiratory failure. -ARDS. -COVID pneumonia. -Bacterial pneumonia.    -Left-sided pneumothorax.  -Pneumomediastinum. - Hyperglycemia.     Assessment:      -Acute hypoxemic respiratory failure / ARDS, intubated 8/10  -Pneumonia, completed course of azithro  and cefepime   -COVID-19+, completed remdesivir 8/10, s/p convalescent plasma  and   -Pneumomediastinum  -Bilateral ptx, s/p L chest tube placement   -Elevated LFTs  -Hyperglycemia     Plan:      -Continue lung protective ventilation.  - Continue propofol and fentanyl, will try to wean Nimbex today. - Continue chest tube to suction.  -Due to episode of hypoxia, I obtained a stat chest x-ray which looks stable. - Patient is evaluated by CT surgery, not a candidate for ECMO at this point.  - Wean FiO2 for target saturation of 88 to 92%. - Patient has been intubated since 8/10, plan of tracheostomy has not been discussed yet and also not a candidate this point yet. - Patient completed Remdesivir on 8/10. He received convalescent plasma on  and .  -  Wean dexamethasone now 6 mg daily, can further cut down tomorrow. - Diuresis with Bumex 1 mg twice a day. Goal for negative fluid balance. - Closely monitor hemodynamics and markers of endorgan perfusion including mental status, lactate and urine output. - Closely monitor for any superadded infection and treat aggressively.  WBC worsened today, will obtain blood and sputum Cx, low threshold to start abx.  - TPN stopped, continue trickle feeding. --Continue sliding scale insulin for glucose control.  - Serial procalcitonin. - Continue vitamin C and zinc.  -Continue intermediate dose Lovenox.  -Proning was not done due to chest tube.       Prophylaxis:  Stress Ulcer Protocol Active: Pepcid  DVT Protocol Active: On lovenox (intermediate dose)           60min of CC time spent. Review of Systems:   Review of systems not obtained due to patient factors. Vital Signs:    Last 24hrs VS reviewed since prior progress note. Most recent are:  Visit Vitals  BP (!) 134/93   Pulse 100   Temp 98.8 °F (37.1 °C)   Resp 30   Ht 5' 11\" (1.803 m)   Wt 99.4 kg (219 lb 2.2 oz)   SpO2 95%   BMI 30.56 kg/m²         Intake/Output Summary (Last 24 hours) at 8/19/2020 1013  Last data filed at 8/19/2020 0912  Gross per 24 hour   Intake 1632.16 ml   Output 2312 ml   Net -679.84 ml        Physical Examination:       Constitutional:  Intubated and sedated. ENT:  Oral mucous moist, oropharynx benign. Resp:  On mechanical ventilator support. FiO2 of 60% and PEEP of 16 cm of water. CV:  Regular rhythm, normal rate, no murmurs, gallops, rubs    GI:  Soft, non distended, non tender. Musculoskeletal:  No edema, warm. Neurologic:  Intubated and sedated.            Labs:     Recent Labs     08/19/20  0255 08/18/20  0320   WBC 21.0* 11.1   HGB 13.6 12.1   HCT 42.5 37.1    188     Recent Labs     08/19/20  0255 08/18/20  0320 08/17/20  0134    139 141   K 4.1 4.0 3.2*   CL 94* 101 104   CO2 36* 33* 33*   BUN 32* 18 15   CREA 0.66* 0.71 0.52*   * 221* 177*   CA 9.7 8.8 8.1*   MG 2.2 2.1  --    PHOS 4.1 2.9 2.9         Medications:     Current Facility-Administered Medications   Medication Dose Route Frequency    bumetanide (BUMEX) injection 1 mg  1 mg IntraVENous BID    docusate (COLACE) 50 mg/5 mL oral liquid 100 mg  100 mg Per NG tube BID    enoxaparin (LOVENOX) injection 50 mg  0.5 mg/kg SubCUTAneous Q12H    guaiFENesin (ROBITUSSIN) 100 mg/5 mL oral liquid 400 mg  400 mg Oral Q4H    insulin lispro (HUMALOG) injection   SubCUTAneous Q6H    senna (SENOKOT) tablet 8.6 mg  1 Tab Per NG tube BID    acetaminophen (TYLENOL) tablet 650 mg  650 mg Oral Q6H PRN    Or    acetaminophen (TYLENOL) suppository 650 mg  650 mg Rectal Q6H PRN    alteplase (CATHFLO) 1 mg in sterile water (preservative free) 1 mL injection  1 mg InterCATHeter PRN    polyvinyl alcohol-povidone (NATURAL TEARS) 0.5-0.6 % ophthalmic solution 1 Drop  1 Drop Both Eyes PRN    dextrose 10% infusion 0-250 mL  0-250 mL IntraVENous PRN    glucagon (GLUCAGEN) injection 1 mg  1 mg IntraMUSCular PRN    glucose chewable tablet 16 g  4 Tab Oral PRN    LORazepam (ATIVAN) injection 0.5 mg  0.5 mg IntraVENous Q6H PRN    polyethylene glycol (MIRALAX) packet 17 g  17 g Oral DAILY PRN    promethazine (PHENERGAN) tablet 12.5 mg  12.5 mg Oral Q6H PRN    Or    ondansetron (ZOFRAN) injection 4 mg  4 mg IntraVENous Q6H PRN    sodium chloride (NS) flush 5-40 mL  5-40 mL IntraVENous Q8H    sodium chloride (NS) flush 5-40 mL  5-40 mL IntraVENous PRN    dexmedeTOMidine (PRECEDEX) 400 mcg in 0.9% sodium chloride 100 mL infusion  0.2-0.7 mcg/kg/hr IntraVENous TITRATE    fentaNYL (PF) 1,500 mcg/30 mL (50 mcg/mL) infusion  0-300 mcg/hr IntraVENous TITRATE    propofol (DIPRIVAN) 10 mg/mL infusion  0-50 mcg/kg/min IntraVENous TITRATE    cisatracurium (NIMBEX) 2 mg/mL IV infusion  0-10 mcg/kg/min IntraVENous TITRATE    aspirin chewable tablet 81 mg  81 mg Per NG tube DAILY    chlorhexidine (ORAL CARE KIT) 0.12 % mouthwash 15 mL  15 mL Oral Q12H    dexamethasone (PF) (DECADRON) 10 mg/mL injection 6 mg  6 mg IntraVENous DAILY    albuterol-ipratropium (DUO-NEB) 2.5 MG-0.5 MG/3 ML  3 mL Nebulization Q6H PRN    0.9% sodium chloride infusion  5 mL/hr IntraVENous CONTINUOUS    NOREPINephrine (LEVOPHED) 8 mg in 5% dextrose 250mL (32 mcg/mL) infusion  0.5-30 mcg/min IntraVENous TITRATE     ______________________________________________________________________  EXPECTED LENGTH OF STAY: 4d 21h  ACTUAL LENGTH OF STAY:          1                 Nancy Castellano MD

## 2020-08-19 NOTE — PROGRESS NOTES
Problem: Ventilator Management  Goal: *Adequate oxygenation and ventilation  Outcome: Progressing Towards Goal  Goal: *Patient maintains clear airway/free of aspiration  Outcome: Progressing Towards Goal  Goal: *Absence of infection signs and symptoms  Outcome: Progressing Towards Goal  Goal: *Normal spontaneous ventilation  Outcome: Progressing Towards Goal     Problem: Patient Education: Go to Patient Education Activity  Goal: Patient/Family Education  Outcome: Progressing Towards Goal     Problem: Breathing Pattern - Ineffective  Goal: *Absence of hypoxia  Outcome: Progressing Towards Goal  Goal: *Use of effective breathing techniques  Outcome: Progressing Towards Goal

## 2020-08-19 NOTE — PROGRESS NOTES
MIGUELINA  Patient presented to [facility] ED with increased shortness of breath, diaphoresis and fever. COVID Positive. Transferred to [facility] for CTS evaluation for possible ECMO. Patient intubated , sedated and paralyzed. RUR: 18%  Disposition: TBD  Patient transferred for possible ECMO. At this time it is not indicated. Patient remains intubated , paralyzed. Left pig tail cath placed on 8/11. Patient's mother Guaman Lever is [facility] [phone]. Care management will follow for transitions of care.  Antonella Aiken RN,CRM

## 2020-08-19 NOTE — PROGRESS NOTES
Spiritual Care Assessment/Progress Note  [facility]      NAME: Donita Vásquez      MRN: [id number]  AGE: 27 y.o. SEX: male  Jew Affiliation:    Language: English     8/19/2020           Spiritual Assessment begun in [facility] 7S2 INTENSIVE CARE through conversation with:         []Patient        [] Family    [] Friend(s)        Reason for Consult: Initial/Spiritual assessment, critical care     Spiritual beliefs: (Please include comment if needed)     [] Identifies with a eloisa tradition:         [] Supported by a eloisa community:            [] Claims no spiritual orientation:           [] Seeking spiritual identity:                [] Adheres to an individual form of spirituality:           [x] Not able to assess:                           Identified resources for coping:      [] Prayer                               [] Music                  [] Guided Imagery     [] Family/friends                 [] Pet visits     [] Devotional reading                         [] Unknown     [] Other: *                                             Interventions offered during this visit: (See comments for more details)                Plan of Care:     [] Support spiritual and/or cultural needs    [] Support AMD and/or advance care planning process      [] Support grieving process   [] Coordinate Rites and/or Rituals    [] Coordination with community clergy   [] No spiritual needs identified at this time   [] Detailed Plan of Care below (See Comments)  [] Make referral to Music Therapy  [] Make referral to Pet Therapy     [] Make referral to Addiction services  [] Make referral to [facility]  [] Make referral to Spiritual Care Partner  [] No future visits requested        [] Follow up visits as needed     Comments: Mr Mikayla Quigley in [address] [place] [address] was COVID+;  did not enter his room. Chaplains remain available for support to family by phone if needed/desired. : . Jocy Sanchez.  Katherine Mari; [facility], to contact Spiritual Care Services call: [phone]

## 2020-08-20 ENCOUNTER — APPOINTMENT (OUTPATIENT)
Dept: GENERAL RADIOLOGY | Age: 30
DRG: 004 | End: 2020-08-20
Attending: INTERNAL MEDICINE
Payer: COMMERCIAL

## 2020-08-20 ENCOUNTER — APPOINTMENT (OUTPATIENT)
Dept: GENERAL RADIOLOGY | Age: 30
DRG: 004 | End: 2020-08-20
Attending: NURSE PRACTITIONER
Payer: COMMERCIAL

## 2020-08-20 LAB
ALBUMIN SERPL-MCNC: 2.4 G/DL (ref 3.5–5)
ALBUMIN/GLOB SERPL: 0.5 {RATIO} (ref 1.1–2.2)
ALP SERPL-CCNC: 119 U/L (ref 45–117)
ALT SERPL-CCNC: 132 U/L (ref 12–78)
ANION GAP SERPL CALC-SCNC: 5 MMOL/L (ref 5–15)
ANION GAP SERPL CALC-SCNC: 6 MMOL/L (ref 5–15)
ARTERIAL PATENCY WRIST A: YES
ARTERIAL PATENCY WRIST A: YES
AST SERPL-CCNC: 31 U/L (ref 15–37)
ATRIAL RATE: 92 BPM
BASE EXCESS BLD CALC-SCNC: 10 MMOL/L
BASE EXCESS BLD CALC-SCNC: 13 MMOL/L
BASOPHILS # BLD: 0 K/UL (ref 0–0.1)
BASOPHILS NFR BLD: 0 % (ref 0–1)
BDY SITE: ABNORMAL
BDY SITE: ABNORMAL
BILIRUB SERPL-MCNC: 0.9 MG/DL (ref 0.2–1)
BUN SERPL-MCNC: 37 MG/DL (ref 6–20)
BUN SERPL-MCNC: 40 MG/DL (ref 6–20)
BUN/CREAT SERPL: 43 (ref 12–20)
BUN/CREAT SERPL: 48 (ref 12–20)
CA-I BLD-SCNC: 1.13 MMOL/L (ref 1.12–1.32)
CA-I BLD-SCNC: 1.19 MMOL/L (ref 1.12–1.32)
CALCIUM SERPL-MCNC: 8.7 MG/DL (ref 8.5–10.1)
CALCIUM SERPL-MCNC: 8.8 MG/DL (ref 8.5–10.1)
CALCULATED P AXIS, ECG09: 27 DEGREES
CALCULATED R AXIS, ECG10: -11 DEGREES
CALCULATED T AXIS, ECG11: 63 DEGREES
CHLORIDE SERPL-SCNC: 94 MMOL/L (ref 97–108)
CHLORIDE SERPL-SCNC: 96 MMOL/L (ref 97–108)
CK SERPL-CCNC: 137 U/L (ref 39–308)
CO2 SERPL-SCNC: 33 MMOL/L (ref 21–32)
CO2 SERPL-SCNC: 34 MMOL/L (ref 21–32)
COHGB MFR BLD: 1.3 % (ref 1–2)
CREAT SERPL-MCNC: 0.84 MG/DL (ref 0.7–1.3)
CREAT SERPL-MCNC: 0.87 MG/DL (ref 0.7–1.3)
CRP SERPL-MCNC: 4.8 MG/DL (ref 0–0.6)
D DIMER PPP FEU-MCNC: 1.91 MG/L FEU (ref 0–0.65)
DATE LAST DOSE: ABNORMAL
DIAGNOSIS, 93000: NORMAL
DIFFERENTIAL METHOD BLD: ABNORMAL
EOSINOPHIL # BLD: 0 K/UL (ref 0–0.4)
EOSINOPHIL NFR BLD: 0 % (ref 0–7)
ERYTHROCYTE [DISTWIDTH] IN BLOOD BY AUTOMATED COUNT: 12.6 % (ref 11.5–14.5)
FERRITIN SERPL-MCNC: 950 NG/ML (ref 26–388)
FIBRINOGEN PPP-MCNC: 686 MG/DL (ref 200–475)
GAS FLOW.O2 O2 DELIVERY SYS: ABNORMAL L/MIN
GAS FLOW.O2 O2 DELIVERY SYS: ABNORMAL L/MIN
GAS FLOW.O2 SETTING OXYMISER: 26 BPM
GAS FLOW.O2 SETTING OXYMISER: 30 BPM
GLOBULIN SER CALC-MCNC: 4.6 G/DL (ref 2–4)
GLUCOSE BLD STRIP.AUTO-MCNC: 123 MG/DL (ref 65–100)
GLUCOSE BLD STRIP.AUTO-MCNC: 132 MG/DL (ref 65–100)
GLUCOSE BLD STRIP.AUTO-MCNC: 159 MG/DL (ref 65–100)
GLUCOSE SERPL-MCNC: 115 MG/DL (ref 65–100)
GLUCOSE SERPL-MCNC: 127 MG/DL (ref 65–100)
HCO3 BLD-SCNC: 33 MMOL/L (ref 22–26)
HCO3 BLD-SCNC: 36.2 MMOL/L (ref 22–26)
HCT VFR BLD AUTO: 36.6 % (ref 36.6–50.3)
HGB BLD OXIMETRY-MCNC: 14 G/DL (ref 14–17)
HGB BLD-MCNC: 12.2 G/DL (ref 12.1–17)
IMM GRANULOCYTES # BLD AUTO: 0.3 K/UL (ref 0–0.04)
IMM GRANULOCYTES NFR BLD AUTO: 2 % (ref 0–0.5)
INSPIRATION.DURATION SETTING TIME VENT: 1 SEC
LACTATE SERPL-SCNC: 1.7 MMOL/L (ref 0.4–2)
LDH SERPL L TO P-CCNC: 281 U/L (ref 85–241)
LYMPHOCYTES # BLD: 1.1 K/UL (ref 0.8–3.5)
LYMPHOCYTES NFR BLD: 7 % (ref 12–49)
MAGNESIUM SERPL-MCNC: 2.4 MG/DL (ref 1.6–2.4)
MAGNESIUM SERPL-MCNC: 2.5 MG/DL (ref 1.6–2.4)
MCH RBC QN AUTO: 30.4 PG (ref 26–34)
MCHC RBC AUTO-ENTMCNC: 33.3 G/DL (ref 30–36.5)
MCV RBC AUTO: 91.3 FL (ref 80–99)
METHGB MFR BLD: 0.9 % (ref 0–1.4)
MONOCYTES # BLD: 1.6 K/UL (ref 0–1)
MONOCYTES NFR BLD: 10 % (ref 5–13)
NEUTS SEG # BLD: 13.3 K/UL (ref 1.8–8)
NEUTS SEG NFR BLD: 81 % (ref 32–75)
NITRIC:PPM ISTAT,INITR: 40 PPM
NITRIC:PPM ISTAT,INITR: 40 PPM
NRBC # BLD: 0 K/UL (ref 0–0.01)
NRBC BLD-RTO: 0 PER 100 WBC
O2/TOTAL GAS SETTING VFR VENT: 0.4 %
O2/TOTAL GAS SETTING VFR VENT: 40 %
OXYHGB MFR BLD: 89.6 % (ref 94–97)
P-R INTERVAL, ECG05: 134 MS
PCO2 BLD: 43.7 MMHG (ref 35–45)
PCO2 BLD: 44.1 MMHG (ref 35–45)
PEEP RESPIRATORY: 15 CMH2O
PEEP RESPIRATORY: 15 CMH2O
PH BLD: 7.48 [PH] (ref 7.35–7.45)
PH BLD: 7.53 [PH] (ref 7.35–7.45)
PHOSPHATE SERPL-MCNC: 2.6 MG/DL (ref 2.6–4.7)
PIP ISTAT,IPIP: 14
PIP ISTAT,IPIP: 14
PLATELET # BLD AUTO: 193 K/UL (ref 150–400)
PMV BLD AUTO: 10.9 FL (ref 8.9–12.9)
PO2 BLD: 60 MMHG (ref 80–100)
PO2 BLD: 75 MMHG (ref 80–100)
POTASSIUM SERPL-SCNC: 3.6 MMOL/L (ref 3.5–5.1)
POTASSIUM SERPL-SCNC: 3.8 MMOL/L (ref 3.5–5.1)
PROCALCITONIN SERPL-MCNC: 0.24 NG/ML
PROT SERPL-MCNC: 7 G/DL (ref 6.4–8.2)
Q-T INTERVAL, ECG07: 314 MS
QRS DURATION, ECG06: 88 MS
QTC CALCULATION (BEZET), ECG08: 388 MS
RBC # BLD AUTO: 4.01 M/UL (ref 4.1–5.7)
RBC MORPH BLD: ABNORMAL
REPORTED DOSE,DOSE: ABNORMAL UNITS
REPORTED DOSE/TIME,TMG: ABNORMAL
SAO2 % BLD: 92 % (ref 95–99)
SAO2 % BLD: 93 % (ref 92–97)
SAO2 % BLD: 96 % (ref 92–97)
SERVICE CMNT-IMP: ABNORMAL
SODIUM SERPL-SCNC: 134 MMOL/L (ref 136–145)
SODIUM SERPL-SCNC: 134 MMOL/L (ref 136–145)
SPECIMEN TYPE: ABNORMAL
SPECIMEN TYPE: ABNORMAL
TOTAL RESP. RATE, ITRR: 26
TOTAL RESP. RATE, ITRR: 31
TROPONIN I SERPL-MCNC: 0.21 NG/ML
VANCOMYCIN TROUGH SERPL-MCNC: 15.8 UG/ML (ref 5–10)
VENTILATION MODE VENT: ABNORMAL
VENTILATION MODE VENT: ABNORMAL
VENTRICULAR RATE, ECG03: 92 BPM
WBC # BLD AUTO: 16.3 K/UL (ref 4.1–11.1)

## 2020-08-20 PROCEDURE — 74011000250 HC RX REV CODE- 250: Performed by: THORACIC SURGERY (CARDIOTHORACIC VASCULAR SURGERY)

## 2020-08-20 PROCEDURE — 82550 ASSAY OF CK (CPK): CPT

## 2020-08-20 PROCEDURE — 82375 ASSAY CARBOXYHB QUANT: CPT

## 2020-08-20 PROCEDURE — 83735 ASSAY OF MAGNESIUM: CPT

## 2020-08-20 PROCEDURE — 82962 GLUCOSE BLOOD TEST: CPT

## 2020-08-20 PROCEDURE — 80202 ASSAY OF VANCOMYCIN: CPT

## 2020-08-20 PROCEDURE — 83615 LACTATE (LD) (LDH) ENZYME: CPT

## 2020-08-20 PROCEDURE — 84100 ASSAY OF PHOSPHORUS: CPT

## 2020-08-20 PROCEDURE — 71045 X-RAY EXAM CHEST 1 VIEW: CPT

## 2020-08-20 PROCEDURE — 36415 COLL VENOUS BLD VENIPUNCTURE: CPT

## 2020-08-20 PROCEDURE — 86140 C-REACTIVE PROTEIN: CPT

## 2020-08-20 PROCEDURE — 80053 COMPREHEN METABOLIC PANEL: CPT

## 2020-08-20 PROCEDURE — 84484 ASSAY OF TROPONIN QUANT: CPT

## 2020-08-20 PROCEDURE — 74011250636 HC RX REV CODE- 250/636: Performed by: NURSE PRACTITIONER

## 2020-08-20 PROCEDURE — 82803 BLOOD GASES ANY COMBINATION: CPT

## 2020-08-20 PROCEDURE — 84145 PROCALCITONIN (PCT): CPT

## 2020-08-20 PROCEDURE — 74011000250 HC RX REV CODE- 250: Performed by: NURSE PRACTITIONER

## 2020-08-20 PROCEDURE — 74011000258 HC RX REV CODE- 258: Performed by: THORACIC SURGERY (CARDIOTHORACIC VASCULAR SURGERY)

## 2020-08-20 PROCEDURE — 74011250637 HC RX REV CODE- 250/637: Performed by: NURSE PRACTITIONER

## 2020-08-20 PROCEDURE — 85025 COMPLETE CBC W/AUTO DIFF WBC: CPT

## 2020-08-20 PROCEDURE — 74011250637 HC RX REV CODE- 250/637: Performed by: HOSPITALIST

## 2020-08-20 PROCEDURE — 85384 FIBRINOGEN ACTIVITY: CPT

## 2020-08-20 PROCEDURE — 65610000006 HC RM INTENSIVE CARE

## 2020-08-20 PROCEDURE — 74011000258 HC RX REV CODE- 258: Performed by: HOSPITALIST

## 2020-08-20 PROCEDURE — 99254 IP/OBS CNSLTJ NEW/EST MOD 60: CPT | Performed by: SPECIALIST

## 2020-08-20 PROCEDURE — 73610000026 HC INO THERAPY EACH HOUR

## 2020-08-20 PROCEDURE — 74011250636 HC RX REV CODE- 250/636: Performed by: HOSPITALIST

## 2020-08-20 PROCEDURE — 36600 WITHDRAWAL OF ARTERIAL BLOOD: CPT

## 2020-08-20 PROCEDURE — 83605 ASSAY OF LACTIC ACID: CPT

## 2020-08-20 PROCEDURE — 77030012390 HC DRN CHST BTL GTNG -B

## 2020-08-20 PROCEDURE — 74011000250 HC RX REV CODE- 250: Performed by: HOSPITALIST

## 2020-08-20 PROCEDURE — 82728 ASSAY OF FERRITIN: CPT

## 2020-08-20 PROCEDURE — 99291 CRITICAL CARE FIRST HOUR: CPT | Performed by: THORACIC SURGERY (CARDIOTHORACIC VASCULAR SURGERY)

## 2020-08-20 PROCEDURE — 94760 N-INVAS EAR/PLS OXIMETRY 1: CPT

## 2020-08-20 PROCEDURE — 77010033678 HC OXYGEN DAILY

## 2020-08-20 PROCEDURE — 85379 FIBRIN DEGRADATION QUANT: CPT

## 2020-08-20 PROCEDURE — 77030018729 HC ELECTRD DEFIB PAD CARD -B

## 2020-08-20 PROCEDURE — 74011636637 HC RX REV CODE- 636/637: Performed by: NURSE PRACTITIONER

## 2020-08-20 PROCEDURE — 94003 VENT MGMT INPAT SUBQ DAY: CPT

## 2020-08-20 RX ORDER — POTASSIUM CHLORIDE 29.8 MG/ML
20 INJECTION INTRAVENOUS
Status: COMPLETED | OUTPATIENT
Start: 2020-08-20 | End: 2020-08-20

## 2020-08-20 RX ADMIN — Medication 250 MCG/HR: at 11:02

## 2020-08-20 RX ADMIN — PROPOFOL 50 MCG/KG/MIN: 10 INJECTION, EMULSION INTRAVENOUS at 22:17

## 2020-08-20 RX ADMIN — CEFEPIME 2 G: 2 INJECTION, POWDER, FOR SOLUTION INTRAVENOUS at 21:04

## 2020-08-20 RX ADMIN — CEFEPIME 2 G: 2 INJECTION, POWDER, FOR SOLUTION INTRAVENOUS at 11:58

## 2020-08-20 RX ADMIN — Medication 10 ML: at 13:32

## 2020-08-20 RX ADMIN — VANCOMYCIN HYDROCHLORIDE 1500 MG: 10 INJECTION, POWDER, LYOPHILIZED, FOR SOLUTION INTRAVENOUS at 21:04

## 2020-08-20 RX ADMIN — BUMETANIDE 1 MG: 0.25 INJECTION INTRAMUSCULAR; INTRAVENOUS at 08:23

## 2020-08-20 RX ADMIN — DOCUSATE SODIUM 100 MG: 50 LIQUID ORAL at 08:23

## 2020-08-20 RX ADMIN — POTASSIUM CHLORIDE 20 MEQ: 400 INJECTION, SOLUTION INTRAVENOUS at 15:00

## 2020-08-20 RX ADMIN — PROPOFOL 50 MCG/KG/MIN: 10 INJECTION, EMULSION INTRAVENOUS at 15:00

## 2020-08-20 RX ADMIN — SENNOSIDES 8.6 MG: 8.6 TABLET, FILM COATED ORAL at 20:56

## 2020-08-20 RX ADMIN — POTASSIUM CHLORIDE 20 MEQ: 400 INJECTION, SOLUTION INTRAVENOUS at 17:00

## 2020-08-20 RX ADMIN — SENNOSIDES 8.6 MG: 8.6 TABLET, FILM COATED ORAL at 08:24

## 2020-08-20 RX ADMIN — FAMOTIDINE 20 MG: 10 INJECTION, SOLUTION INTRAVENOUS at 08:23

## 2020-08-20 RX ADMIN — GUAIFENESIN 400 MG: 100 SOLUTION ORAL at 11:57

## 2020-08-20 RX ADMIN — CHLORHEXIDINE GLUCONATE 15 ML: 0.12 RINSE ORAL at 20:43

## 2020-08-20 RX ADMIN — Medication 10 ML: at 21:09

## 2020-08-20 RX ADMIN — Medication 250 MCG/HR: at 23:24

## 2020-08-20 RX ADMIN — DEXAMETHASONE SODIUM PHOSPHATE 6 MG: 10 INJECTION INTRAMUSCULAR; INTRAVENOUS at 08:23

## 2020-08-20 RX ADMIN — GUAIFENESIN 400 MG: 100 SOLUTION ORAL at 15:00

## 2020-08-20 RX ADMIN — MIDAZOLAM HYDROCHLORIDE 2 MG: 2 INJECTION, SOLUTION INTRAMUSCULAR; INTRAVENOUS at 12:52

## 2020-08-20 RX ADMIN — VANCOMYCIN HYDROCHLORIDE 1500 MG: 10 INJECTION, POWDER, LYOPHILIZED, FOR SOLUTION INTRAVENOUS at 04:35

## 2020-08-20 RX ADMIN — INSULIN LISPRO 2 UNITS: 100 INJECTION, SOLUTION INTRAVENOUS; SUBCUTANEOUS at 12:51

## 2020-08-20 RX ADMIN — PROPOFOL 50 MCG/KG/MIN: 10 INJECTION, EMULSION INTRAVENOUS at 04:54

## 2020-08-20 RX ADMIN — PROPOFOL 50 MCG/KG/MIN: 10 INJECTION, EMULSION INTRAVENOUS at 02:33

## 2020-08-20 RX ADMIN — BUMETANIDE 1 MG: 0.25 INJECTION INTRAMUSCULAR; INTRAVENOUS at 17:00

## 2020-08-20 RX ADMIN — CHLORHEXIDINE GLUCONATE 15 ML: 0.12 RINSE ORAL at 08:24

## 2020-08-20 RX ADMIN — ENOXAPARIN SODIUM 50 MG: 60 INJECTION SUBCUTANEOUS at 09:00

## 2020-08-20 RX ADMIN — CEFEPIME 2 G: 2 INJECTION, POWDER, FOR SOLUTION INTRAVENOUS at 04:35

## 2020-08-20 RX ADMIN — PROPOFOL 50 MCG/KG/MIN: 10 INJECTION, EMULSION INTRAVENOUS at 12:18

## 2020-08-20 RX ADMIN — DEXMEDETOMIDINE HYDROCHLORIDE 0.4 MCG/KG/HR: 100 INJECTION, SOLUTION INTRAVENOUS at 08:58

## 2020-08-20 RX ADMIN — Medication 250 MCG/HR: at 16:58

## 2020-08-20 RX ADMIN — FAMOTIDINE 20 MG: 10 INJECTION, SOLUTION INTRAVENOUS at 20:56

## 2020-08-20 RX ADMIN — PROPOFOL 50 MCG/KG/MIN: 10 INJECTION, EMULSION INTRAVENOUS at 18:54

## 2020-08-20 RX ADMIN — GUAIFENESIN 400 MG: 100 SOLUTION ORAL at 20:56

## 2020-08-20 RX ADMIN — ENOXAPARIN SODIUM 50 MG: 60 INJECTION SUBCUTANEOUS at 22:12

## 2020-08-20 RX ADMIN — PROPOFOL 50 MCG/KG/MIN: 10 INJECTION, EMULSION INTRAVENOUS at 08:46

## 2020-08-20 RX ADMIN — GUAIFENESIN 400 MG: 100 SOLUTION ORAL at 08:23

## 2020-08-20 RX ADMIN — ASPIRIN 81 MG CHEWABLE TABLET 81 MG: 81 TABLET CHEWABLE at 08:24

## 2020-08-20 RX ADMIN — DOCUSATE SODIUM 100 MG: 50 LIQUID ORAL at 17:00

## 2020-08-20 RX ADMIN — Medication 250 MCG/HR: at 04:44

## 2020-08-20 RX ADMIN — Medication 10 ML: at 05:37

## 2020-08-20 RX ADMIN — GUAIFENESIN 400 MG: 100 SOLUTION ORAL at 04:35

## 2020-08-20 RX ADMIN — VANCOMYCIN HYDROCHLORIDE 1500 MG: 10 INJECTION, POWDER, LYOPHILIZED, FOR SOLUTION INTRAVENOUS at 11:58

## 2020-08-20 NOTE — CONSULTS
Date of  Admission: 8/18/2020  9:33 AM     Charisse Lorenzo Morris Mildred is a 27 y.o. male admitted for Acute respiratory failure ([facility] [address] [J96.00]  Subjective: We are asked to see for sinus pauses. The patient is a 79-year-old who was initially hospitalized with respiratory failure at [facility]. He is COVID positive. He was initially responding to treatment and then ended up requiring mechanical ventilation. There was concern he might really require ECMO, so he was transferred to the ICU at [facility] 2 days ago. Thus far there is been no need for full support, with reasonable oxygenation and blood pressure on pressors. Yesterday he had some vomiting and brief bradycardia and then today had 2 pauses 1 of about 3 seconds and more recently 1 at about 6 seconds. According to the nurses there was no evidence of any respiratory or other distress during that time. He has a chest tube and subcu emphysema, and chest x-ray today showed no evidence of pneumothorax. His baseline EKG showed sinus rhythm with normal intervals and axis. Cardiac risk factors: smoking/ tobacco exposure, male gender. Assessment/Plan: The most likely cause for his asystole is probably increased vagal tone though I am not certain what the mechanism is given the fact that currently there are no apparent active GI issues. Making sure he did not have a pneumothorax was a important step. For now I would recommend placing a transcutaneous pacemaker on him with a backup rate of 50 bpm.  This would allow temporary support should his asystole persist.  If it does persist I would treat with atropine as a first step. We should try to avoid temporary pacemaker given his clinical service circumstances, since this would involve the need to go to the Cath Lab, fluoroscopy and would be another source of infection. I will repeat his ekg to make sure there is no evidence of progressive conduction system disease.     Patient Active Problem List Diagnosis Date Noted    Acute respiratory failure ([facility] [address] 08/18/2020    Pneumothorax on left 08/14/2020    Pneumonia due to severe acute respiratory syndrome coronavirus 2 (SARS-CoV-2) 08/08/2020    Respiratory failure with hypoxia ([facility] [address] 08/05/2020      Other, MD Silas  Past Medical History:   Diagnosis Date    History of vascular access device 08/10/2020    5 Fr triple PICC, hemodynamically unstable, 45 cm L basilic      Past Surgical History:   Procedure Laterality Date    IR THORA/INS CHEST TUBE(PNEUMO) WO IMAGE  8/11/2020    IR THORA/INS CHEST TUBE(PNEUMO) WO IMAGE  8/11/2020     No Known Allergies   History reviewed. No pertinent family history. Current Facility-Administered Medications   Medication Dose Route Frequency    Vancomycin trough - due 8/20 prior to the 2000 dose. Thanks!    Other ONCE    cefepime (MAXIPIME) 2 g in 0.9% sodium chloride (MBP/ADV) 100 mL  2 g IntraVENous Q8H    Vancomycin - Pharmacy to Dose   Other Rx Dosing/Monitoring    famotidine (PF) (PEPCID) 20 mg in 0.9% sodium chloride 10 mL injection  20 mg IntraVENous Q12H    vancomycin (VANCOCIN) 1500 mg in  ml infusion  1,500 mg IntraVENous Q8H    midazolam (VERSED) injection 1-2 mg  1-2 mg IntraVENous Q2H PRN    bumetanide (BUMEX) injection 1 mg  1 mg IntraVENous BID    docusate (COLACE) 50 mg/5 mL oral liquid 100 mg  100 mg Per NG tube BID    enoxaparin (LOVENOX) injection 50 mg  0.5 mg/kg SubCUTAneous Q12H    guaiFENesin (ROBITUSSIN) 100 mg/5 mL oral liquid 400 mg  400 mg Oral Q4H    insulin lispro (HUMALOG) injection   SubCUTAneous Q6H    senna (SENOKOT) tablet 8.6 mg  1 Tab Per NG tube BID    acetaminophen (TYLENOL) tablet 650 mg  650 mg Oral Q6H PRN    Or    acetaminophen (TYLENOL) suppository 650 mg  650 mg Rectal Q6H PRN    alteplase (CATHFLO) 1 mg in sterile water (preservative free) 1 mL injection  1 mg InterCATHeter PRN    polyvinyl alcohol-povidone (NATURAL TEARS) 0.5-0.6 % ophthalmic solution 1 Drop  1 Drop Both Eyes PRN    dextrose 10% infusion 0-250 mL  0-250 mL IntraVENous PRN    glucagon (GLUCAGEN) injection 1 mg  1 mg IntraMUSCular PRN    glucose chewable tablet 16 g  4 Tab Oral PRN    LORazepam (ATIVAN) injection 0.5 mg  0.5 mg IntraVENous Q6H PRN    polyethylene glycol (MIRALAX) packet 17 g  17 g Oral DAILY PRN    promethazine (PHENERGAN) tablet 12.5 mg  12.5 mg Oral Q6H PRN    Or    ondansetron (ZOFRAN) injection 4 mg  4 mg IntraVENous Q6H PRN    sodium chloride (NS) flush 5-40 mL  5-40 mL IntraVENous Q8H    sodium chloride (NS) flush 5-40 mL  5-40 mL IntraVENous PRN    dexmedeTOMidine (PRECEDEX) 400 mcg in 0.9% sodium chloride 100 mL infusion  0.2-0.7 mcg/kg/hr IntraVENous TITRATE    fentaNYL (PF) 1,500 mcg/30 mL (50 mcg/mL) infusion  0-300 mcg/hr IntraVENous TITRATE    propofol (DIPRIVAN) 10 mg/mL infusion  0-50 mcg/kg/min IntraVENous TITRATE    cisatracurium (NIMBEX) 2 mg/mL IV infusion  0-10 mcg/kg/min IntraVENous TITRATE    aspirin chewable tablet 81 mg  81 mg Per NG tube DAILY    chlorhexidine (ORAL CARE KIT) 0.12 % mouthwash 15 mL  15 mL Oral Q12H    dexamethasone (PF) (DECADRON) 10 mg/mL injection 6 mg  6 mg IntraVENous DAILY    albuterol-ipratropium (DUO-NEB) 2.5 MG-0.5 MG/3 ML  3 mL Nebulization Q6H PRN    0.9% sodium chloride infusion  5 mL/hr IntraVENous CONTINUOUS    NOREPINephrine (LEVOPHED) 8 mg in 5% dextrose 250mL (32 mcg/mL) infusion  0.5-30 mcg/min IntraVENous TITRATE         Review of Symptoms:  Review of systems not obtained due to patient factors.     Physical Exam    Visit Vitals  /76   Pulse 73   Temp 100.3 °F (37.9 °C)   Resp 15   Ht 5' 11\" (1.803 m)   Wt 219 lb 2.2 oz (99.4 kg)   SpO2 94%   BMI 30.56 kg/m²     Physical exam   Not done due to covid  Cardiographics    Telemetry: normal sinus rhythm  ECG: normal EKG, normal sinus rhythm  Echocardiogram: Not done    Recent radiology, intake/output and wt reviewed    Labs:   Recent Results (from the past 24 hour(s))   GLUCOSE, POC    Collection Time: 08/19/20 11:03 PM   Result Value Ref Range    Glucose (POC) 106 (H) 65 - 100 mg/dL    Performed by Alayna Adair    C REACTIVE PROTEIN, QT    Collection Time: 08/20/20  2:46 AM   Result Value Ref Range    C-Reactive protein 4.80 (H) 0.00 - 0.60 mg/dL   CBC WITH AUTOMATED DIFF    Collection Time: 08/20/20  2:46 AM   Result Value Ref Range    WBC 16.3 (H) 4.1 - 11.1 K/uL    RBC 4.01 (L) 4.10 - 5.70 M/uL    HGB 12.2 12.1 - 17.0 g/dL    HCT 36.6 36.6 - 50.3 %    MCV 91.3 80.0 - 99.0 FL    MCH 30.4 26.0 - 34.0 PG    MCHC 33.3 30.0 - 36.5 g/dL    RDW 12.6 11.5 - 14.5 %    PLATELET 279 233 - 383 K/uL    MPV 10.9 8.9 - 12.9 FL    NRBC 0.0 0  WBC    ABSOLUTE NRBC 0.00 0.00 - 0.01 K/uL    NEUTROPHILS 81 (H) 32 - 75 %    LYMPHOCYTES 7 (L) 12 - 49 %    MONOCYTES 10 5 - 13 %    EOSINOPHILS 0 0 - 7 %    BASOPHILS 0 0 - 1 %    IMMATURE GRANULOCYTES 2 (H) 0.0 - 0.5 %    ABS. NEUTROPHILS 13.3 (H) 1.8 - 8.0 K/UL    ABS. LYMPHOCYTES 1.1 0.8 - 3.5 K/UL    ABS. MONOCYTES 1.6 (H) 0.0 - 1.0 K/UL    ABS. EOSINOPHILS 0.0 0.0 - 0.4 K/UL    ABS. BASOPHILS 0.0 0.0 - 0.1 K/UL    ABS. IMM.  GRANS. 0.3 (H) 0.00 - 0.04 K/UL    DF SMEAR SCANNED      RBC COMMENTS NORMOCYTIC, NORMOCHROMIC     CK    Collection Time: 08/20/20  2:46 AM   Result Value Ref Range     39 - 308 U/L   D DIMER    Collection Time: 08/20/20  2:46 AM   Result Value Ref Range    D-dimer 1.91 (H) 0.00 - 0.65 mg/L FEU   FERRITIN    Collection Time: 08/20/20  2:46 AM   Result Value Ref Range    Ferritin 950 (H) 26 - 388 NG/ML   FIBRINOGEN    Collection Time: 08/20/20  2:46 AM   Result Value Ref Range    Fibrinogen 686 (H) 200 - 475 mg/dL   LD    Collection Time: 08/20/20  2:46 AM   Result Value Ref Range     (H) 85 - 241 U/L   MAGNESIUM    Collection Time: 08/20/20  2:46 AM   Result Value Ref Range    Magnesium 2.4 1.6 - 2.4 mg/dL   METABOLIC PANEL, COMPREHENSIVE    Collection Time: 08/20/20  2:46 AM   Result Value Ref Range    Sodium 134 (L) 136 - 145 mmol/L    Potassium 3.6 3.5 - 5.1 mmol/L    Chloride 94 (L) 97 - 108 mmol/L    CO2 34 (H) 21 - 32 mmol/L    Anion gap 6 5 - 15 mmol/L    Glucose 115 (H) 65 - 100 mg/dL    BUN 40 (H) 6 - 20 MG/DL    Creatinine 0.84 0.70 - 1.30 MG/DL    BUN/Creatinine ratio 48 (H) 12 - 20      GFR est AA >60 >60 ml/min/1.73m2    GFR est non-AA >60 >60 ml/min/1.73m2    Calcium 8.8 8.5 - 10.1 MG/DL    Bilirubin, total 0.9 0.2 - 1.0 MG/DL    ALT (SGPT) 132 (H) 12 - 78 U/L    AST (SGOT) 31 15 - 37 U/L    Alk. phosphatase 119 (H) 45 - 117 U/L    Protein, total 7.0 6.4 - 8.2 g/dL    Albumin 2.4 (L) 3.5 - 5.0 g/dL    Globulin 4.6 (H) 2.0 - 4.0 g/dL    A-G Ratio 0.5 (L) 1.1 - 2.2     PHOSPHORUS    Collection Time: 08/20/20  2:46 AM   Result Value Ref Range    Phosphorus 2.6 2.6 - 4.7 MG/DL   LACTIC ACID    Collection Time: 08/20/20  2:46 AM   Result Value Ref Range    Lactic acid 1.7 0.4 - 2.0 MMOL/L   PROCALCITONIN    Collection Time: 08/20/20  2:46 AM   Result Value Ref Range    Procalcitonin 0.24 ng/mL   CARBOXY HEMOGLOBIN    Collection Time: 08/20/20  4:58 AM   Result Value Ref Range    Carboxy-Hgb 1.3 1 - 2 %    Methemoglobin 0.9 0 - 1.4 %    tHb 14.0 14 - 17 g/dL    Oxyhemoglobin 89.6 (L) 94 - 97 %    O2 SATURATION 92 (L) 95 - 99 %   POC EG7    Collection Time: 08/20/20  5:18 AM   Result Value Ref Range    Calcium, ionized (POC) 1.19 1.12 - 1.32 mmol/L    FIO2 (POC) 40 %    pH (POC) 7.53 (H) 7.35 - 7.45      pCO2 (POC) 43.7 35.0 - 45.0 MMHG    pO2 (POC) 60 (L) 80 - 100 MMHG    HCO3 (POC) 36.2 (H) 22 - 26 MMOL/L    Base excess (POC) 13 mmol/L    sO2 (POC) 93 92 - 97 %    Site RIGHT RADIAL      Device: VENT      Mode ASSIST CONTROL      Set Rate 30 bpm    PEEP/CPAP (POC) 15 cmH2O    PIP (POC) 14      Allens test (POC) YES      Inspiratory Time 1.0 sec    Nitric-ppm (POC) 40 ppm    Specimen type (POC) ARTERIAL      Total resp.  rate 31     GLUCOSE, POC    Collection Time: 08/20/20  5:56 AM   Result Value Ref Range    Glucose (POC) 132 (H) 65 - 100 mg/dL    Performed by Mino Sánchez    GLUCOSE, POC    Collection Time: 08/20/20 12:12 PM   Result Value Ref Range    Glucose (POC) 159 (H) 65 - 100 mg/dL    Performed by Isaura Dawn    POC EG7    Collection Time: 08/20/20  3:39 PM   Result Value Ref Range    Calcium, ionized (POC) 1.13 1.12 - 1.32 mmol/L    FIO2 (POC) 0.40 %    pH (POC) 7.48 (H) 7.35 - 7.45      pCO2 (POC) 44.1 35.0 - 45.0 MMHG    pO2 (POC) 75 (L) 80 - 100 MMHG    HCO3 (POC) 33.0 (H) 22 - 26 MMOL/L    Base excess (POC) 10 mmol/L    sO2 (POC) 96 92 - 97 %    Site RIGHT RADIAL      Device: VENT      Mode ASSIST CONTROL      Set Rate 26 bpm    PEEP/CPAP (POC) 15 cmH2O    PIP (POC) 14      Allens test (POC) YES      Nitric-ppm (POC) 40 ppm    Specimen type (POC) ARTERIAL      Total resp.  rate 26     GLUCOSE, POC    Collection Time: 08/20/20  5:07 PM   Result Value Ref Range    Glucose (POC) 123 (H) 65 - 100 mg/dL    Performed by Isaura Dawn

## 2020-08-20 NOTE — PROGRESS NOTES
Physician Progress Note      PATIENT:               Radha Loza  CSN #:                  [id number]  :                       1990  ADMIT DATE:       2020 8:23 PM  [address] [place] DATE:        2020 8:56 AM  RESPONDING  PROVIDER #:        Nadeen Jack  [facility] TEXT:    Dear Hospitalist Team,    Patient admitted with Sepsis 2/2 COVID-19 PNA. it's noted documentation of sepsis in the patient's H&P on 20 but was not carried through within subsequent progress notes and/or documentation. Please indicate one of the following and document in the medical record: The medical record reflects the following:    Risk Factors: 27 Yr M admitted with Sepsis 2/2 COVID-19 PNA    Clinical Indicators: Patient arrived to the ED with c/o  SOB and increased dyspnea with recent positive COVID-19. On admission patient had a WBC of 16.5, , RR 26-30 with 76% on RA. CXR revealed Pneumonia due to COVID-19. Per H&P, Sepsis secondary to COVID-19 viral pneumonia without shock, present on admission. Fevers, leukocytosis with a marked left shift, bandemia, lymphocytopenia, cultures, inflammatory markers, repeat SARS-CoV-2 PCR. IV azithromycin. As-needed Infectious Disease consult. Patient was initially treated with Rocephin 1 G IV Q 24 hrs, Zithromax 500 mg IV Q 24 hrs and NS IVF at 100 ML/HR. Patient deteriorated over admission with transfer to [facility] for ECMO. Treatment: Daily CBC/WBC, CXR, Rocephin 1 G IV Q 24 hrs, Zithromax 500 mg IV Q 24 hrs and NS IVF at 100 ML/HR. Thank you,  Trixie Bullock RN, [facility]  [phone]  Options provided:  -- Sepsis POA  present as evidenced by, Please document evidence.   -- Sepsis POA was ruled out after study  -- Other - I will add my own diagnosis  -- Disagree - Not applicable / Not valid  -- Disagree - Clinically unable to determine / Unknown  -- Refer to Clinical Documentation Reviewer    PROVIDER RESPONSE TEXT:    Sepsis is present as evidenced by On admission at [address] [place] [address] 8/5/20 patient had a WBC of 16.5, , RR 26-30 with 76% on RA. CXR revealed Pneumonia due to COVID-19    Query created by:  Arlene Monte on 8/19/2020 12:21 PM      Electronically signed by:  Marc Marie MD 8/20/2020 9:51 AM

## 2020-08-20 NOTE — PROGRESS NOTES
Critical Care Update  2115  Patient with tachypnea and alarming vent. Patient vomited and went hypoxic in the mid 80's. Given PRN Versed for further sedation, off paralytics. Continue Precedex and fentanyl gtts. Tylenol VT for temp, may use ice/cooling blanket if needed. Chest xray to eval for aspiration. No tube feeds aspirated from ETT. Already on cefepime and vancomycin. Hold tube feeds and change OGT to LIWS.     0520  Notified by nursing and RT who noted patient with mild crepitus palpated in the left neck down to above the nipple line, new assessment finding. Chest xray done at 2300 for possible aspiration and hypoxia after patient vomited stated subcutaneous air on report, but report was not called to ICU. Repeat chest ray at 0450 shows worsening subcutanous air, lung is still inflated. Patient with chest tube to 20mmHg, no air leak and no drainage. Atrium was knocked over earlier in shift, will change out atrium and increase to 40mmHg. Repeat xray done to evaluate placement of left chest tube which noted to be present and not pulled out. Site looks ok with sutures securing tube in place. If not improved patient may need another chest tube placed. Nursing to monitor for extentsion fo crepitus. No changes on ventilator. Repeat chest xray at 0800. Will notify CTS this am. Clamp OGT and continue to hold tube feeds. Q 6 hr glucose checks. Discussed with bedside nursing. Discussed with Dr. Darline Gloria.   Additional critical care time 40 minutes     Toni Hendricks [facility]      [address] [facility]

## 2020-08-20 NOTE — PROGRESS NOTES
Bedside and Verbal shift change report given to Bk Robles RN (oncoming nurse) by CATINA Soto (offgoing nurse). Report included the following information SBAR, Kardex, Procedure Summary, Intake/Output, MAR, Recent Results, Med Rec Status, Cardiac Rhythm NSR and Alarm Parameters      0800: Assumed care of patient; pt remains heavily sedated on propofol, fentanyl and precedex     1030: Pt had a pause in his HR; MD notified;     1250: Pt experiencing agitation and increased work of breathing; gave PRN dose of Versed; after receiving versed pt is much more comfortable    1500: Pt experience another long pause in his HR; MD notified; K is being replaced     Primary Nurse Maddison Vazquez and CATINA Oliva performed a dual skin assessment on this patient No impairment noted  Enrique score is 9    Shift Summary: Pt remains heavily sedated at this time; required one time dose of PRN Versed for ventilator compliance and agitation; remains at 40% FiO2; pt experienced two brief episodes of asystole (one 3.3 seconds and the other 8.5 seconds) per MD we will replace K and then recheck BMP, Mag and Troponin; pt is on Levo at 3mcgs for blood pressure support; UOP is adequate, no BM, tube feeds were resumed and pt is tolerating well at this time.

## 2020-08-20 NOTE — PROGRESS NOTES
[facility]    ICU TEAM Progress Note    Name: Charley Vasques   : 1990   MRN: [id number]   Date: 2020      I  Subjective:   Progress Note: 2020      Reason for ICU Admission: Respiratory failure due to COVID-19 infection    Interval history: This is a 80-year-old gentleman who presented to [facility] on  with worsening respiratory status. He was diagnosed with COVID-19 infection few days prior to that presentation. His condition continued to worsen and he was intubated on August 10. Despite that her condition continued to worsen as evident with increase in oxygen requirement and he was transferred to our facility on  for possible consideration of ECMO. He was evaluated by cardiac surgeon but there was no need to initiate ECMO at that time. He remained critically ill in the ICU on high ventilator settings    Overnight Events:   Patient developed left-sided subcutaneous emphysema at the site of the chest tube. This was not associated with increasing oxygen requirement or hemodynamic instability. Had an episode of vomiting and tube feeding was placed on hold. Active Problem List:     Problem List  Date Reviewed: 2020          Codes Class    Acute respiratory failure ([facility] [address] ICD-10-CM: J96.00  ICD-9-CM: 518.81         Pneumothorax on left ICD-10-CM: J93.9  ICD-9-CM: 512.89         Pneumonia due to severe acute respiratory syndrome coronavirus 2 (SARS-CoV-2) ICD-10-CM: U07.1, J12.89  ICD-9-CM: 480.8         Respiratory failure with hypoxia (HCC) ICD-10-CM: J96.91  ICD-9-CM: 518.81               Past Medical History:      has a past medical history of History of vascular access device (08/10/2020). Past Surgical History:      has a past surgical history that includes ir thora/ins chest tube(pneumo) wo image (2020) and ir thora/ins chest tube(pneumo) wo image (2020).     Home Medications:     Prior to Admission medications Medication Sig Start Date End Date Taking? Authorizing Provider   benzonatate (Tessalon Perles) 100 mg capsule Take 100 mg by mouth three (3) times daily as needed for Cough. Provider, Historical       Allergies/Social/Family History:     No Known Allergies   Social History     Tobacco Use    Smoking status: Current Some Day Smoker    Smokeless tobacco: Never Used   Substance Use Topics    Alcohol use: Not on file      No family history on file. Review of Systems:     Not able to obtain due to his medical condition    Objective:   Vital Signs:  Visit Vitals  /53   Pulse 79   Temp (!) 100.5 °F (38.1 °C)   Resp 26   Ht 5' 11\" (1.803 m)   Wt 99.4 kg (219 lb 2.2 oz)   SpO2 91%   BMI 30.56 kg/m²      O2 Device: Ventilator, Endotracheal tube Temp (24hrs), Av.7 °F (38.2 °C), Min:99.5 °F (37.5 °C), Max:101.4 °F (38.6 °C)           Intake/Output:     Intake/Output Summary (Last 24 hours) at 2020 1224  Last data filed at 2020 1035  Gross per 24 hour   Intake 3025.31 ml   Output 2575 ml   Net 450.31 ml       Physical Exam:    General:   Young male sedated intubated mechanical ventilation   Eyes:  Sclera anicteric. Pupils equally round and reactive to light. Mouth/Throat: Mucous membranes normal, ET tube   Neck: Supple   Lungs:    Good air entry bilaterally, fine crepitation bilaterally, left pigtail chest tube in place   CV:  Regular rate and rhythm,no murmur, click, rub or gallop   Abdomen:   Soft, non-tender.  bowel sounds normal. non-distended   Extremities: No cyanosis or edema   Skin: Skin color, texture, turgor normal. no acute rash or lesions   Lymph nodes: Cervical and supraclavicular normal   Musculoskeletal: No swelling or deformity   Lines/Devices:  Intact, no erythema, drainage or tenderness   Psych:  Patient is deeply sedated, moves 4 limbs to painful stimuli       LABS AND  DATA: Personally reviewed  Recent Labs     20  0246 20  1343   WBC 16.3* 20.8*   HGB 12.2 13.4 HCT 36.6 40.7    227     Recent Labs     08/20/20  0246 08/19/20  0255   * 136   K 3.6 4.1   CL 94* 94*   CO2 34* 36*   BUN 40* 32*   CREA 0.84 0.66*   * 137*   CA 8.8 9.7   MG 2.4 2.2   PHOS 2.6 4.1     Recent Labs     08/20/20  0246 08/19/20  0255   * 142*   TP 7.0 7.6   ALB 2.4* 2.7*   GLOB 4.6* 4.9*     No results for input(s): INR, PTP, APTT, INREXT in the last 72 hours. Recent Labs     08/20/20  0518 08/19/20  0554   PHI 7.53* 7.41   PCO2I 43.7 59.9*   PO2I 60* 84   FIO2I 40 60     Recent Labs     08/20/20  0246 08/19/20  0255    133       Hemodynamics:   PAP:   CO:     Wedge:   CI:     CVP:    SVR:       PVR:       Ventilator Settings:  Mode Rate Tidal Volume Pressure FiO2 PEEP   Pressure control   0 ml  0 cm H2O 40 % 15 cm H20     Peak airway pressure: 31 cm H2O    Minute ventilation: 12.2 l/min        MEDS: Reviewed    Chest X-Ray:  Reviewed, no pneumothorax on repeated chest x-ray  CXR Results  (Last 48 hours)               08/20/20 0914  XR CHEST PORT Final result    Impression:  IMPRESSION:       Stable bilateral interstitial and patchy airspace opacities. Stable diffuse   chest wall subcutaneous emphysema. Narrative:  EXAM:  XR CHEST PORT       INDICATION: Bilateral lung opacities. COMPARISON: 8/20/2020 at 0440 hours       TECHNIQUE: Portable AP semiupright chest view at 0845 hours       FINDINGS: The support devices are stable. Cardiac monitoring wires overlie the   thorax. The cardiomediastinal contours are stable. There are stable bilateral interstitial and patchy airspace opacities. There is   no pleural effusion or pneumothorax. There is stable diffuse chest wall   subcutaneous emphysema. The bones are stable. 08/20/20 0449  XR CHEST PORT Final result    Impression:  IMPRESSION:       Stable bilateral interstitial and patchy airspace opacities. Narrative:  EXAM:  XR CHEST PORT       INDICATION: Bilateral lung opacities. COMPARISON: 8/19/2020 at 2227 hours       TECHNIQUE: Portable AP semiupright chest view at 0440 hours       FINDINGS: The endotracheal tube and enteric tube are stable. Cardiac monitoring   wires overlie the thorax. The cardiac mediastinal contours are stable. There is stable bilateral interstitial and patchy airspace opacities. There is   no pleural effusion or pneumothorax. The bones and upper abdomen are stable. Diffuse chest wall subcutaneous emphysema is again noted. 08/19/20 0854  XR CHEST PORT Final result    Impression:  IMPRESSION:   1. No complicating features post left-sided chest tube placement. Diffuse   bilateral interstitial and alveolar opacities unchanged           Narrative:  INDICATION:  Chest tube evaluation        EXAM: Portable chest 0841 hours. Comparison earlier same day       FINDINGS: Endotracheal tube and gastric tube not significantly changed. Left arm   PICC line has been pulled back to the level of the right mainstem bronchus. Heart size is normal.       Diffuse interstitial and airspace disease again noted not changed. No   pneumothorax post left-sided pigtail catheter placement which is at the left   base. No right pleural effusion           08/19/20 0519  XR CHEST PORT Final result    Impression:  IMPRESSION:       Stable diffuse interstitial opacities. Decreased focal airspace opacity in the   right lung base. Narrative:  EXAM:  XR CHEST PORT       INDICATION: Diffuse interstitial opacities. COMPARISON: 8/18/2020 at 1318 hours. TECHNIQUE: Portable AP semiupright chest view at 0446 hours       FINDINGS: The endotracheal tube terminates above the victor hugo. The enteric tube   terminates in the stomach. The left PICC terminates in profile with the superior   cavoatrial junction. Cardiac monitoring wires overlie the thorax. The   cardiomediastinal contours are stable. Diffuse interstitial opacities are stable.  Focal airspace opacity in the right   lung base is decreased. There is no pleural effusion or pneumothorax. The bones   and upper abdomen are stable. 08/18/20 1334  XR CHEST PORT Final result    Impression:  IMPRESSION:       Endotracheal tube extends to the right mainstem bronchus origin and should be   pulled back 1 to 2 cm. Slightly increased focal right basilar airspace disease. No other change. Findings discussed with patient's nurse Thomas Aragon at 2:00 PM 8/18/2020       Narrative:  history: Respiratory failure       COMPARISON: 8/18/2020       FINDINGS:       Frontal chest radiograph submitted for review. Endotracheal tube extends to the right mainstem bronchus origin and should be   pulled back 1 to 2 cm. Stable diffuse interstitial opacities. No significant   pleural effusion. No evidence for pneumothorax. Feeding tube extends to the   gastric fundus. Focal right basilar airspace disease. Assessment and Plan:   - ARDS due to COVID-19 bilateral pneumonia status post intubation on August 10  - Left-sided pneumothorax status post left chest tube insertion on August 11  -Pneumomediastinum  - Subcutaneous emphysema    - Ventilator management: Currently on pressure control, driving pressure of 14, rate of 26, PEEP of 16, with this minute ventilation around 11 and peak pressure 30-31. Wean as tolerated, follow-up on ABGs   - Nitric oxide at 40 particle per minute   - Patient completed Remdesivir on 8/10.  He received convalescent plasma on 8/7 and 8/14. Currently on dexamethasone   -On broad-spectrum antibiotic in form of vancomycin and cefepime. Follow-up on culture and sensitivity   -Maintain negative fluid balance, on diuretics. Follow-up urine output renal function and electrolytes, replace as indicated.   - No recurrent pneumothorax, subcutaneous emphysema seems to be improving,  We will leave chest tube in place   -Nutrition support  - Ventilator bundle, DVT prophylaxis [COVID-19 dose] and GI prophylaxis    DISPOSITION  Stay in ICU    CRITICAL CARE CONSULTANT NOTE  I had a face to face encounter with the patient, reviewed and interpreted patient data including clinical events, labs, images, vital signs, I/O's, and examined patient. I have discussed the case and the plan and management of the patient's care with the consulting services, the bedside nurses and the respiratory therapist.      NOTE OF PERSONAL INVOLVEMENT IN CARE   This patient has a high probability of imminent, clinically significant deterioration, which requires the highest level of preparedness to intervene urgently. I participated in the decision-making and personally managed or directed the management of the following life and organ supporting interventions that required my frequent assessment to treat or prevent imminent deterioration. I personally spent 60 minutes of critical care time. This is time spent at this critically ill patient's bedside actively involved in patient care as well as the coordination of care and discussions with the patient's family. This does not include any procedural time which has been billed separately. Pako Simpson M.D.   Staff Intensivist/Pulmonologist  [facility]  8/20/2020

## 2020-08-20 NOTE — PROGRESS NOTES
Problem: Ventilator Management  Goal: *Adequate oxygenation and ventilation  Outcome: Progressing Towards Goal  Goal: *Patient maintains clear airway/free of aspiration  Outcome: Progressing Towards Goal  Goal: *Absence of infection signs and symptoms  Outcome: Progressing Towards Goal  Goal: *Normal spontaneous ventilation  Outcome: Progressing Towards Goal     Problem: Breathing Pattern - Ineffective  Goal: *Absence of hypoxia  Outcome: Progressing Towards Goal  Goal: *Use of effective breathing techniques  Outcome: Progressing Towards Goal

## 2020-08-20 NOTE — PROGRESS NOTES
1930: Bedside and Verbal shift change report [facility] ([facility]) by Devi Walton RN (offgoing nurse). Report included the following information SBAR, Kardex, Intake/Output, MAR, Recent Results, Cardiac Rhythm NSR and Alarm Parameters . 2030: Pt resting in bed, exhibiting labored, abdominal breathing. Precedex gtt increased. 2050: Discussed pt's breathing with Anthony Wallace NP. Orders received for 1 time dose of 2 mg Jase Fountain NP to place PRN orders. 2112: Pt vomited during ET suctioning. Orders received to place OGT to low intermittent suction. 2300: Pt temp 101. 3. PRN Tylenol administered. 0100: Pt temp 101.4 2 hours post Tylenol. Ice packs applied. Notified Anthony Wallace NP and Dr. Josefina Marsh. 0230: Sedation paused ~5 minutes. Pt opened eyes spontaneously, no command following.    0505: Area of crepitus noted on L neck underneath swelling. Notified Anthony Wallace NP and Dr. Josefina Marsh. NP and MD at bedside, will evaluate CXR. Orders received to change atrium. 0530: CT atrium changed with sterile technique. Suction set to -40 per Dr. Carolina West order. 0730: Bedside and Verbal shift change report given to [address] [place] [address] (oncoming nurse) by Pam Latham RN (offgoing nurse). Report included the following information SBAR, Kardex, Intake/Output, MAR, Recent Results, Cardiac Rhythm NSR and Alarm Parameters .

## 2020-08-21 ENCOUNTER — APPOINTMENT (OUTPATIENT)
Dept: GENERAL RADIOLOGY | Age: 30
DRG: 004 | End: 2020-08-21
Attending: INTERNAL MEDICINE
Payer: COMMERCIAL

## 2020-08-21 LAB
ALBUMIN SERPL-MCNC: 2.3 G/DL (ref 3.5–5)
ALBUMIN/GLOB SERPL: 0.5 {RATIO} (ref 1.1–2.2)
ALP SERPL-CCNC: 110 U/L (ref 45–117)
ALT SERPL-CCNC: 94 U/L (ref 12–78)
ANION GAP SERPL CALC-SCNC: 6 MMOL/L (ref 5–15)
ARTERIAL PATENCY WRIST A: YES
AST SERPL-CCNC: 31 U/L (ref 15–37)
BACTERIA SPEC CULT: ABNORMAL
BACTERIA SPEC CULT: ABNORMAL
BASE EXCESS BLD CALC-SCNC: 9 MMOL/L
BASOPHILS # BLD: 0 K/UL (ref 0–0.1)
BASOPHILS NFR BLD: 0 % (ref 0–1)
BDY SITE: ABNORMAL
BILIRUB SERPL-MCNC: 1.3 MG/DL (ref 0.2–1)
BUN SERPL-MCNC: 32 MG/DL (ref 6–20)
BUN/CREAT SERPL: 38 (ref 12–20)
CA-I BLD-SCNC: 1.12 MMOL/L (ref 1.12–1.32)
CALCIUM SERPL-MCNC: 8.3 MG/DL (ref 8.5–10.1)
CHLORIDE SERPL-SCNC: 96 MMOL/L (ref 97–108)
CK SERPL-CCNC: 108 U/L (ref 39–308)
CO2 SERPL-SCNC: 30 MMOL/L (ref 21–32)
COHGB MFR BLD: 1.3 % (ref 1–2)
CREAT SERPL-MCNC: 0.84 MG/DL (ref 0.7–1.3)
CRP SERPL-MCNC: 9.44 MG/DL (ref 0–0.6)
D DIMER PPP FEU-MCNC: 5.49 MG/L FEU (ref 0–0.65)
DIFFERENTIAL METHOD BLD: ABNORMAL
EOSINOPHIL # BLD: 0.1 K/UL (ref 0–0.4)
EOSINOPHIL NFR BLD: 1 % (ref 0–7)
ERYTHROCYTE [DISTWIDTH] IN BLOOD BY AUTOMATED COUNT: 12.9 % (ref 11.5–14.5)
FERRITIN SERPL-MCNC: 1536 NG/ML (ref 26–388)
FIBRINOGEN PPP-MCNC: 795 MG/DL (ref 200–475)
GAS FLOW.O2 O2 DELIVERY SYS: ABNORMAL L/MIN
GAS FLOW.O2 SETTING OXYMISER: 26 BPM
GLOBULIN SER CALC-MCNC: 4.5 G/DL (ref 2–4)
GLUCOSE BLD STRIP.AUTO-MCNC: 129 MG/DL (ref 65–100)
GLUCOSE BLD STRIP.AUTO-MCNC: 135 MG/DL (ref 65–100)
GLUCOSE BLD STRIP.AUTO-MCNC: 154 MG/DL (ref 65–100)
GLUCOSE BLD STRIP.AUTO-MCNC: 169 MG/DL (ref 65–100)
GLUCOSE SERPL-MCNC: 140 MG/DL (ref 65–100)
GRAM STN SPEC: ABNORMAL
HCO3 BLD-SCNC: 31.5 MMOL/L (ref 22–26)
HCT VFR BLD AUTO: 33.5 % (ref 36.6–50.3)
HGB BLD OXIMETRY-MCNC: 12.5 G/DL (ref 14–17)
HGB BLD-MCNC: 11.1 G/DL (ref 12.1–17)
IMM GRANULOCYTES # BLD AUTO: 0 K/UL
IMM GRANULOCYTES NFR BLD AUTO: 0 %
INSPIRATION.DURATION SETTING TIME VENT: 1.15 SEC
LACTATE SERPL-SCNC: 1 MMOL/L (ref 0.4–2)
LDH SERPL L TO P-CCNC: 310 U/L (ref 85–241)
LYMPHOCYTES # BLD: 1.2 K/UL (ref 0.8–3.5)
LYMPHOCYTES NFR BLD: 8 % (ref 12–49)
MAGNESIUM SERPL-MCNC: 2.4 MG/DL (ref 1.6–2.4)
MCH RBC QN AUTO: 30.2 PG (ref 26–34)
MCHC RBC AUTO-ENTMCNC: 33.1 G/DL (ref 30–36.5)
MCV RBC AUTO: 91 FL (ref 80–99)
METHGB MFR BLD: 1.3 % (ref 0–1.4)
MONOCYTES # BLD: 1 K/UL (ref 0–1)
MONOCYTES NFR BLD: 7 % (ref 5–13)
NEUTS BAND NFR BLD MANUAL: 1 % (ref 0–6)
NEUTS SEG # BLD: 12.1 K/UL (ref 1.8–8)
NEUTS SEG NFR BLD: 83 % (ref 32–75)
NITRIC:PPM ISTAT,INITR: 40 PPM
NRBC # BLD: 0 K/UL (ref 0–0.01)
NRBC BLD-RTO: 0 PER 100 WBC
O2/TOTAL GAS SETTING VFR VENT: 40 %
OXYHGB MFR BLD: 93.8 % (ref 94–97)
PCO2 BLD: 38.5 MMHG (ref 35–45)
PEEP RESPIRATORY: 15 CMH2O
PH BLD: 7.52 [PH] (ref 7.35–7.45)
PHOSPHATE SERPL-MCNC: 2.4 MG/DL (ref 2.6–4.7)
PIP ISTAT,IPIP: 14
PLATELET # BLD AUTO: 174 K/UL (ref 150–400)
PMV BLD AUTO: 10.9 FL (ref 8.9–12.9)
PO2 BLD: 75 MMHG (ref 80–100)
POTASSIUM SERPL-SCNC: 3.2 MMOL/L (ref 3.5–5.1)
PROCALCITONIN SERPL-MCNC: 0.15 NG/ML
PROT SERPL-MCNC: 6.8 G/DL (ref 6.4–8.2)
RBC # BLD AUTO: 3.68 M/UL (ref 4.1–5.7)
RBC MORPH BLD: ABNORMAL
SAO2 % BLD: 96 % (ref 92–97)
SAO2 % BLD: 96 % (ref 95–99)
SERVICE CMNT-IMP: ABNORMAL
SODIUM SERPL-SCNC: 132 MMOL/L (ref 136–145)
SPECIMEN TYPE: ABNORMAL
TOTAL RESP. RATE, ITRR: 26
VENTILATION MODE VENT: ABNORMAL
WBC # BLD AUTO: 14.4 K/UL (ref 4.1–11.1)

## 2020-08-21 PROCEDURE — 85379 FIBRIN DEGRADATION QUANT: CPT

## 2020-08-21 PROCEDURE — 74011636637 HC RX REV CODE- 636/637: Performed by: NURSE PRACTITIONER

## 2020-08-21 PROCEDURE — 74011250636 HC RX REV CODE- 250/636: Performed by: NURSE PRACTITIONER

## 2020-08-21 PROCEDURE — 74011000258 HC RX REV CODE- 258: Performed by: HOSPITALIST

## 2020-08-21 PROCEDURE — 83615 LACTATE (LD) (LDH) ENZYME: CPT

## 2020-08-21 PROCEDURE — 74011000250 HC RX REV CODE- 250: Performed by: HOSPITALIST

## 2020-08-21 PROCEDURE — 82728 ASSAY OF FERRITIN: CPT

## 2020-08-21 PROCEDURE — 74011000258 HC RX REV CODE- 258: Performed by: THORACIC SURGERY (CARDIOTHORACIC VASCULAR SURGERY)

## 2020-08-21 PROCEDURE — 82803 BLOOD GASES ANY COMBINATION: CPT

## 2020-08-21 PROCEDURE — 73610000026 HC INO THERAPY EACH HOUR

## 2020-08-21 PROCEDURE — 86140 C-REACTIVE PROTEIN: CPT

## 2020-08-21 PROCEDURE — 83735 ASSAY OF MAGNESIUM: CPT

## 2020-08-21 PROCEDURE — 83605 ASSAY OF LACTIC ACID: CPT

## 2020-08-21 PROCEDURE — 85025 COMPLETE CBC W/AUTO DIFF WBC: CPT

## 2020-08-21 PROCEDURE — 94760 N-INVAS EAR/PLS OXIMETRY 1: CPT

## 2020-08-21 PROCEDURE — 99291 CRITICAL CARE FIRST HOUR: CPT | Performed by: THORACIC SURGERY (CARDIOTHORACIC VASCULAR SURGERY)

## 2020-08-21 PROCEDURE — 65610000006 HC RM INTENSIVE CARE

## 2020-08-21 PROCEDURE — 74011000258 HC RX REV CODE- 258: Performed by: INTERNAL MEDICINE

## 2020-08-21 PROCEDURE — 71045 X-RAY EXAM CHEST 1 VIEW: CPT

## 2020-08-21 PROCEDURE — 84100 ASSAY OF PHOSPHORUS: CPT

## 2020-08-21 PROCEDURE — 74011000250 HC RX REV CODE- 250: Performed by: THORACIC SURGERY (CARDIOTHORACIC VASCULAR SURGERY)

## 2020-08-21 PROCEDURE — 80053 COMPREHEN METABOLIC PANEL: CPT

## 2020-08-21 PROCEDURE — 74011250637 HC RX REV CODE- 250/637: Performed by: NURSE PRACTITIONER

## 2020-08-21 PROCEDURE — 84145 PROCALCITONIN (PCT): CPT

## 2020-08-21 PROCEDURE — 94003 VENT MGMT INPAT SUBQ DAY: CPT

## 2020-08-21 PROCEDURE — 82550 ASSAY OF CK (CPK): CPT

## 2020-08-21 PROCEDURE — 99233 SBSQ HOSP IP/OBS HIGH 50: CPT | Performed by: SPECIALIST

## 2020-08-21 PROCEDURE — 74011250637 HC RX REV CODE- 250/637: Performed by: HOSPITALIST

## 2020-08-21 PROCEDURE — 82962 GLUCOSE BLOOD TEST: CPT

## 2020-08-21 PROCEDURE — 36415 COLL VENOUS BLD VENIPUNCTURE: CPT

## 2020-08-21 PROCEDURE — 74011250636 HC RX REV CODE- 250/636: Performed by: INTERNAL MEDICINE

## 2020-08-21 PROCEDURE — 74011000250 HC RX REV CODE- 250: Performed by: NURSE PRACTITIONER

## 2020-08-21 PROCEDURE — 74011250636 HC RX REV CODE- 250/636: Performed by: HOSPITALIST

## 2020-08-21 PROCEDURE — 36600 WITHDRAWAL OF ARTERIAL BLOOD: CPT

## 2020-08-21 PROCEDURE — 85384 FIBRINOGEN ACTIVITY: CPT

## 2020-08-21 PROCEDURE — 82375 ASSAY CARBOXYHB QUANT: CPT

## 2020-08-21 RX ORDER — MIDAZOLAM IN 0.9 % SOD.CHLORID 1 MG/ML
0-30 PLASTIC BAG, INJECTION (ML) INTRAVENOUS
Status: DISCONTINUED | OUTPATIENT
Start: 2020-08-21 | End: 2020-08-27

## 2020-08-21 RX ORDER — POTASSIUM CHLORIDE 29.8 MG/ML
20 INJECTION INTRAVENOUS ONCE
Status: COMPLETED | OUTPATIENT
Start: 2020-08-21 | End: 2020-08-21

## 2020-08-21 RX ORDER — SODIUM,POTASSIUM PHOSPHATES 280-250MG
1 POWDER IN PACKET (EA) ORAL 2 TIMES DAILY
Status: COMPLETED | OUTPATIENT
Start: 2020-08-21 | End: 2020-08-21

## 2020-08-21 RX ADMIN — CHLORHEXIDINE GLUCONATE 15 ML: 0.12 RINSE ORAL at 09:04

## 2020-08-21 RX ADMIN — POTASSIUM BICARBONATE 40 MEQ: 782 TABLET, EFFERVESCENT ORAL at 06:51

## 2020-08-21 RX ADMIN — FAMOTIDINE 20 MG: 10 INJECTION, SOLUTION INTRAVENOUS at 20:28

## 2020-08-21 RX ADMIN — GUAIFENESIN 400 MG: 100 SOLUTION ORAL at 00:24

## 2020-08-21 RX ADMIN — ASPIRIN 81 MG CHEWABLE TABLET 81 MG: 81 TABLET CHEWABLE at 09:03

## 2020-08-21 RX ADMIN — GUAIFENESIN 400 MG: 100 SOLUTION ORAL at 12:16

## 2020-08-21 RX ADMIN — DEXMEDETOMIDINE HYDROCHLORIDE 0.6 MCG/KG/HR: 100 INJECTION, SOLUTION INTRAVENOUS at 14:46

## 2020-08-21 RX ADMIN — ENOXAPARIN SODIUM 50 MG: 60 INJECTION SUBCUTANEOUS at 09:04

## 2020-08-21 RX ADMIN — PROPOFOL 50 MCG/KG/MIN: 10 INJECTION, EMULSION INTRAVENOUS at 13:07

## 2020-08-21 RX ADMIN — VANCOMYCIN HYDROCHLORIDE 1500 MG: 10 INJECTION, POWDER, LYOPHILIZED, FOR SOLUTION INTRAVENOUS at 12:32

## 2020-08-21 RX ADMIN — POTASSIUM & SODIUM PHOSPHATES POWDER PACK 280-160-250 MG 1 PACKET: 280-160-250 PACK at 09:03

## 2020-08-21 RX ADMIN — DOCUSATE SODIUM 100 MG: 50 LIQUID ORAL at 17:21

## 2020-08-21 RX ADMIN — MIDAZOLAM HYDROCHLORIDE 2 MG/HR: 5 INJECTION, SOLUTION INTRAMUSCULAR; INTRAVENOUS at 17:30

## 2020-08-21 RX ADMIN — DOCUSATE SODIUM 100 MG: 50 LIQUID ORAL at 09:03

## 2020-08-21 RX ADMIN — PROPOFOL 50 MCG/KG/MIN: 10 INJECTION, EMULSION INTRAVENOUS at 01:53

## 2020-08-21 RX ADMIN — Medication 30 ML: at 13:15

## 2020-08-21 RX ADMIN — PROPOFOL 50 MCG/KG/MIN: 10 INJECTION, EMULSION INTRAVENOUS at 16:07

## 2020-08-21 RX ADMIN — BUMETANIDE 1 MG: 0.25 INJECTION INTRAMUSCULAR; INTRAVENOUS at 17:21

## 2020-08-21 RX ADMIN — GUAIFENESIN 400 MG: 100 SOLUTION ORAL at 20:28

## 2020-08-21 RX ADMIN — DEXAMETHASONE SODIUM PHOSPHATE 6 MG: 10 INJECTION INTRAMUSCULAR; INTRAVENOUS at 09:02

## 2020-08-21 RX ADMIN — FAMOTIDINE 20 MG: 10 INJECTION, SOLUTION INTRAVENOUS at 09:03

## 2020-08-21 RX ADMIN — BUMETANIDE 1 MG: 0.25 INJECTION INTRAMUSCULAR; INTRAVENOUS at 09:03

## 2020-08-21 RX ADMIN — MIDAZOLAM HYDROCHLORIDE 6 MG/HR: 5 INJECTION, SOLUTION INTRAMUSCULAR; INTRAVENOUS at 22:39

## 2020-08-21 RX ADMIN — Medication 250 MCG/HR: at 12:12

## 2020-08-21 RX ADMIN — VANCOMYCIN HYDROCHLORIDE 1500 MG: 10 INJECTION, POWDER, LYOPHILIZED, FOR SOLUTION INTRAVENOUS at 20:22

## 2020-08-21 RX ADMIN — LORAZEPAM 0.5 MG: 2 INJECTION INTRAMUSCULAR; INTRAVENOUS at 18:48

## 2020-08-21 RX ADMIN — DEXMEDETOMIDINE HYDROCHLORIDE 0.6 MCG/KG/HR: 100 INJECTION, SOLUTION INTRAVENOUS at 00:24

## 2020-08-21 RX ADMIN — VANCOMYCIN HYDROCHLORIDE 1500 MG: 10 INJECTION, POWDER, LYOPHILIZED, FOR SOLUTION INTRAVENOUS at 05:10

## 2020-08-21 RX ADMIN — CEFEPIME 2 G: 2 INJECTION, POWDER, FOR SOLUTION INTRAVENOUS at 20:25

## 2020-08-21 RX ADMIN — ACETAMINOPHEN 650 MG: 325 TABLET ORAL at 09:17

## 2020-08-21 RX ADMIN — INSULIN LISPRO 2 UNITS: 100 INJECTION, SOLUTION INTRAVENOUS; SUBCUTANEOUS at 06:04

## 2020-08-21 RX ADMIN — SENNOSIDES 8.6 MG: 8.6 TABLET, FILM COATED ORAL at 09:04

## 2020-08-21 RX ADMIN — PROPOFOL 50 MCG/KG/MIN: 10 INJECTION, EMULSION INTRAVENOUS at 05:55

## 2020-08-21 RX ADMIN — CHLORHEXIDINE GLUCONATE 15 ML: 0.12 RINSE ORAL at 20:21

## 2020-08-21 RX ADMIN — CEFEPIME 2 G: 2 INJECTION, POWDER, FOR SOLUTION INTRAVENOUS at 12:15

## 2020-08-21 RX ADMIN — CEFEPIME 2 G: 2 INJECTION, POWDER, FOR SOLUTION INTRAVENOUS at 04:53

## 2020-08-21 RX ADMIN — Medication 250 MCG/HR: at 06:07

## 2020-08-21 RX ADMIN — ENOXAPARIN SODIUM 50 MG: 60 INJECTION SUBCUTANEOUS at 22:36

## 2020-08-21 RX ADMIN — PROPOFOL 50 MCG/KG/MIN: 10 INJECTION, EMULSION INTRAVENOUS at 10:40

## 2020-08-21 RX ADMIN — Medication 300 MCG/HR: at 23:55

## 2020-08-21 RX ADMIN — Medication 300 MCG/HR: at 18:35

## 2020-08-21 RX ADMIN — GUAIFENESIN 400 MG: 100 SOLUTION ORAL at 17:21

## 2020-08-21 RX ADMIN — PROPOFOL 30 MCG/KG/MIN: 10 INJECTION, EMULSION INTRAVENOUS at 20:41

## 2020-08-21 RX ADMIN — DEXTROSE MONOHYDRATE 11 MCG/MIN: 5 INJECTION, SOLUTION INTRAVENOUS at 02:44

## 2020-08-21 RX ADMIN — POTASSIUM CHLORIDE 20 MEQ: 400 INJECTION, SOLUTION INTRAVENOUS at 06:51

## 2020-08-21 RX ADMIN — DEXMEDETOMIDINE HYDROCHLORIDE 0.6 MCG/KG/HR: 100 INJECTION, SOLUTION INTRAVENOUS at 06:51

## 2020-08-21 RX ADMIN — Medication 10 ML: at 06:00

## 2020-08-21 RX ADMIN — GUAIFENESIN 400 MG: 100 SOLUTION ORAL at 04:53

## 2020-08-21 RX ADMIN — INSULIN LISPRO 2 UNITS: 100 INJECTION, SOLUTION INTRAVENOUS; SUBCUTANEOUS at 12:31

## 2020-08-21 RX ADMIN — SENNOSIDES 8.6 MG: 8.6 TABLET, FILM COATED ORAL at 20:28

## 2020-08-21 RX ADMIN — GUAIFENESIN 400 MG: 100 SOLUTION ORAL at 09:02

## 2020-08-21 RX ADMIN — POTASSIUM & SODIUM PHOSPHATES POWDER PACK 280-160-250 MG 1 PACKET: 280-160-250 PACK at 17:21

## 2020-08-21 NOTE — PROGRESS NOTES
Cardiology Progress Note            Admit Date: 8/18/2020  Admit Diagnosis: Acute respiratory failure ([facility] ([place] [address] [J96.00]  Date: 8/21/2020     Time: 9:20 AM    Subjective:  COVID +. Remains in isolation. Telemetry reviewed. NSR     Assessment and Plan     1. Asystole/pause   - telemetry review - only the 2 reported pauses on telemetry review. Has remained in NSR   - 1 pause of 2 seconds, 1 pause of 7.8 seconds   - avoid AV nodals   - continue with transcutaneous pacer pads on patient, with pacer on standby, rate set to 50 bpm   - Atropine at the bedside for any further events  2. Acute hypoxemic respiratory failure   - per CC team   - On vent   - ARDS  3. COVID +   - associated PNA  4. PTx   - B/L   - left sided chest tube    NSR on telemetry review. Only the 2 reported episodes of pauses. Continue with pacer pads in place and   Transcutaneous pacer on standby. Atropine for persistent pauses. Needs no further testing at this time. Patient's chart and telemetry reviewed agree with Advance Practice Provider (DAMIAN, NP,PA)  assessment and plans.   Thus far no recurrent asystole/pauses  Hypoxemia/vagal likely contributors but also direct covid effect a possibility  Continue with bedside atropine and transcutaneous pacer at 50  On levophed  Would avoid yfn synephrine if possible  We will follow        No results found for: SHAKILA   Past Medical History:   Diagnosis Date    History of vascular access device 08/10/2020    5 Fr triple PICC, hemodynamically unstable, 45 cm L basilic      Social History     Tobacco Use    Smoking status: Current Some Day Smoker    Smokeless tobacco: Never Used   Substance Use Topics    Alcohol use: Not on file    Drug use: Not on file           Review of Systems:    Deferred 2/2 COVID PUI      Objective:     Physical Exam:                Visit Vitals  BP 93/48   Pulse 81   Temp (!) 100.8 °F (38.2 °C)   Resp 26   Ht 5' 11\" (1.803 m)   Wt 222 lb 10.6 oz (101 kg)   SpO2 95%   BMI 31.06 kg/m²     Deferred 2/2 COVID PUI        Data Review:    Labs:    Recent Results (from the past 24 hour(s))   GLUCOSE, POC    Collection Time: 08/20/20 12:12 PM   Result Value Ref Range    Glucose (POC) 159 (H) 65 - 100 mg/dL    Performed by Mallory Sheela    POC EG7    Collection Time: 08/20/20  3:39 PM   Result Value Ref Range    Calcium, ionized (POC) 1.13 1.12 - 1.32 mmol/L    FIO2 (POC) 0.40 %    pH (POC) 7.48 (H) 7.35 - 7.45      pCO2 (POC) 44.1 35.0 - 45.0 MMHG    pO2 (POC) 75 (L) 80 - 100 MMHG    HCO3 (POC) 33.0 (H) 22 - 26 MMOL/L    Base excess (POC) 10 mmol/L    sO2 (POC) 96 92 - 97 %    Site RIGHT RADIAL      Device: VENT      Mode ASSIST CONTROL      Set Rate 26 bpm    PEEP/CPAP (POC) 15 cmH2O    PIP (POC) 14      Allens test (POC) YES      Nitric-ppm (POC) 40 ppm    Specimen type (POC) ARTERIAL      Total resp.  rate 26     GLUCOSE, POC    Collection Time: 08/20/20  5:07 PM   Result Value Ref Range    Glucose (POC) 123 (H) 65 - 100 mg/dL    Performed by Kolton Fonseca, TROUGH    Collection Time: 08/20/20  9:10 PM   Result Value Ref Range    Vancomycin,trough 15.8 (H) 5.0 - 10.0 ug/mL    Reported dose date: NOT PROVIDED      Reported dose time: NOT PROVIDED      Reported dose: NOT PROVIDED UNITS   METABOLIC PANEL, BASIC    Collection Time: 08/20/20  9:10 PM   Result Value Ref Range    Sodium 134 (L) 136 - 145 mmol/L    Potassium 3.8 3.5 - 5.1 mmol/L    Chloride 96 (L) 97 - 108 mmol/L    CO2 33 (H) 21 - 32 mmol/L    Anion gap 5 5 - 15 mmol/L    Glucose 127 (H) 65 - 100 mg/dL    BUN 37 (H) 6 - 20 MG/DL    Creatinine 0.87 0.70 - 1.30 MG/DL    BUN/Creatinine ratio 43 (H) 12 - 20      GFR est AA >60 >60 ml/min/1.73m2    GFR est non-AA >60 >60 ml/min/1.73m2    Calcium 8.7 8.5 - 10.1 MG/DL   MAGNESIUM    Collection Time: 08/20/20  9:10 PM   Result Value Ref Range    Magnesium 2.5 (H) 1.6 - 2.4 mg/dL   TROPONIN I    Collection Time: 08/20/20  9:10 PM   Result Value Ref Range    Troponin-I, Qt. 0.21 (H) <0.05 ng/mL   GLUCOSE, POC    Collection Time: 08/21/20 12:26 AM   Result Value Ref Range    Glucose (POC) 129 (H) 65 - 100 mg/dL    Performed by Angela eWbber    POC EG7    Collection Time: 08/21/20  3:16 AM   Result Value Ref Range    Calcium, ionized (POC) 1.12 1.12 - 1.32 mmol/L    FIO2 (POC) 40 %    pH (POC) 7.52 (H) 7.35 - 7.45      pCO2 (POC) 38.5 35.0 - 45.0 MMHG    pO2 (POC) 75 (L) 80 - 100 MMHG    HCO3 (POC) 31.5 (H) 22 - 26 MMOL/L    Base excess (POC) 9 mmol/L    sO2 (POC) 96 92 - 97 %    Site LEFT RADIAL      Device: VENT      Mode ASSIST CONTROL      Set Rate 26 bpm    PEEP/CPAP (POC) 15 cmH2O    PIP (POC) 14      Allens test (POC) YES      Inspiratory Time 1.15 sec    Nitric-ppm (POC) 40 ppm    Specimen type (POC) ARTERIAL      Total resp. rate 26     CARBOXY HEMOGLOBIN    Collection Time: 08/21/20  4:00 AM   Result Value Ref Range    Carboxy-Hgb 1.3 1 - 2 %    Methemoglobin 1.3 0 - 1.4 %    tHb 12.5 (L) 14 - 17 g/dL    Oxyhemoglobin 93.8 (L) 94 - 97 %    O2 SATURATION 96 95 - 99 %   C REACTIVE PROTEIN, QT    Collection Time: 08/21/20  5:08 AM   Result Value Ref Range    C-Reactive protein 9.44 (H) 0.00 - 0.60 mg/dL   CBC WITH AUTOMATED DIFF    Collection Time: 08/21/20  5:08 AM   Result Value Ref Range    WBC 14.4 (H) 4.1 - 11.1 K/uL    RBC 3.68 (L) 4.10 - 5.70 M/uL    HGB 11.1 (L) 12.1 - 17.0 g/dL    HCT 33.5 (L) 36.6 - 50.3 %    MCV 91.0 80.0 - 99.0 FL    MCH 30.2 26.0 - 34.0 PG    MCHC 33.1 30.0 - 36.5 g/dL    RDW 12.9 11.5 - 14.5 %    PLATELET 886 592 - 201 K/uL    MPV 10.9 8.9 - 12.9 FL    NRBC 0.0 0  WBC    ABSOLUTE NRBC 0.00 0.00 - 0.01 K/uL    NEUTROPHILS 83 (H) 32 - 75 %    BAND NEUTROPHILS 1 0 - 6 %    LYMPHOCYTES 8 (L) 12 - 49 %    MONOCYTES 7 5 - 13 %    EOSINOPHILS 1 0 - 7 %    BASOPHILS 0 0 - 1 %    IMMATURE GRANULOCYTES 0 %    ABS. NEUTROPHILS 12.1 (H) 1.8 - 8.0 K/UL    ABS.  LYMPHOCYTES 1.2 0.8 - 3.5 K/UL    ABS. MONOCYTES 1.0 0.0 - 1.0 K/UL    ABS. EOSINOPHILS 0.1 0.0 - 0.4 K/UL    ABS. BASOPHILS 0.0 0.0 - 0.1 K/UL    ABS. IMM. GRANS. 0.0 K/UL    DF MANUAL      RBC COMMENTS NORMOCYTIC, NORMOCHROMIC     CK    Collection Time: 08/21/20  5:08 AM   Result Value Ref Range     39 - 308 U/L   D DIMER    Collection Time: 08/21/20  5:08 AM   Result Value Ref Range    D-dimer 5.49 (H) 0.00 - 0.65 mg/L FEU   FERRITIN    Collection Time: 08/21/20  5:08 AM   Result Value Ref Range    Ferritin 1,536 (H) 26 - 388 NG/ML   FIBRINOGEN    Collection Time: 08/21/20  5:08 AM   Result Value Ref Range    Fibrinogen 795 (H) 200 - 475 mg/dL   LD    Collection Time: 08/21/20  5:08 AM   Result Value Ref Range     (H) 85 - 241 U/L   MAGNESIUM    Collection Time: 08/21/20  5:08 AM   Result Value Ref Range    Magnesium 2.4 1.6 - 2.4 mg/dL   METABOLIC PANEL, COMPREHENSIVE    Collection Time: 08/21/20  5:08 AM   Result Value Ref Range    Sodium 132 (L) 136 - 145 mmol/L    Potassium 3.2 (L) 3.5 - 5.1 mmol/L    Chloride 96 (L) 97 - 108 mmol/L    CO2 30 21 - 32 mmol/L    Anion gap 6 5 - 15 mmol/L    Glucose 140 (H) 65 - 100 mg/dL    BUN 32 (H) 6 - 20 MG/DL    Creatinine 0.84 0.70 - 1.30 MG/DL    BUN/Creatinine ratio 38 (H) 12 - 20      GFR est AA >60 >60 ml/min/1.73m2    GFR est non-AA >60 >60 ml/min/1.73m2    Calcium 8.3 (L) 8.5 - 10.1 MG/DL    Bilirubin, total 1.3 (H) 0.2 - 1.0 MG/DL    ALT (SGPT) 94 (H) 12 - 78 U/L    AST (SGOT) 31 15 - 37 U/L    Alk.  phosphatase 110 45 - 117 U/L    Protein, total 6.8 6.4 - 8.2 g/dL    Albumin 2.3 (L) 3.5 - 5.0 g/dL    Globulin 4.5 (H) 2.0 - 4.0 g/dL    A-G Ratio 0.5 (L) 1.1 - 2.2     PHOSPHORUS    Collection Time: 08/21/20  5:08 AM   Result Value Ref Range    Phosphorus 2.4 (L) 2.6 - 4.7 MG/DL   LACTIC ACID    Collection Time: 08/21/20  5:08 AM   Result Value Ref Range    Lactic acid 1.0 0.4 - 2.0 MMOL/L   PROCALCITONIN    Collection Time: 08/21/20  5:08 AM   Result Value Ref Range Procalcitonin 0.15 ng/mL   GLUCOSE, POC    Collection Time: 08/21/20  5:56 AM   Result Value Ref Range    Glucose (POC) 154 (H) 65 - 100 mg/dL    Performed by [address] [place] [address]           Radiology:        Current Facility-Administered Medications   Medication Dose Route Frequency    potassium, sodium phosphates (NEUTRA-PHOS) packet 1 Packet  1 Packet Oral BID    cefepime (MAXIPIME) 2 g in 0.9% sodium chloride (MBP/ADV) 100 mL  2 g IntraVENous Q8H    Vancomycin - Pharmacy to Dose   Other Rx Dosing/Monitoring    famotidine (PF) (PEPCID) 20 mg in 0.9% sodium chloride 10 mL injection  20 mg IntraVENous Q12H    vancomycin (VANCOCIN) 1500 mg in  ml infusion  1,500 mg IntraVENous Q8H    midazolam (VERSED) injection 1-2 mg  1-2 mg IntraVENous Q2H PRN    bumetanide (BUMEX) injection 1 mg  1 mg IntraVENous BID    docusate (COLACE) 50 mg/5 mL oral liquid 100 mg  100 mg Per NG tube BID    enoxaparin (LOVENOX) injection 50 mg  0.5 mg/kg SubCUTAneous Q12H    guaiFENesin (ROBITUSSIN) 100 mg/5 mL oral liquid 400 mg  400 mg Oral Q4H    insulin lispro (HUMALOG) injection   SubCUTAneous Q6H    senna (SENOKOT) tablet 8.6 mg  1 Tab Per NG tube BID    acetaminophen (TYLENOL) tablet 650 mg  650 mg Oral Q6H PRN    Or    acetaminophen (TYLENOL) suppository 650 mg  650 mg Rectal Q6H PRN    alteplase (CATHFLO) 1 mg in sterile water (preservative free) 1 mL injection  1 mg InterCATHeter PRN    polyvinyl alcohol-povidone (NATURAL TEARS) 0.5-0.6 % ophthalmic solution 1 Drop  1 Drop Both Eyes PRN    dextrose 10% infusion 0-250 mL  0-250 mL IntraVENous PRN    glucagon (GLUCAGEN) injection 1 mg  1 mg IntraMUSCular PRN    glucose chewable tablet 16 g  4 Tab Oral PRN    LORazepam (ATIVAN) injection 0.5 mg  0.5 mg IntraVENous Q6H PRN    polyethylene glycol (MIRALAX) packet 17 g  17 g Oral DAILY PRN    promethazine (PHENERGAN) tablet 12.5 mg  12.5 mg Oral Q6H PRN    Or    ondansetron (ZOFRAN) injection 4 mg  4 mg IntraVENous Q6H PRN    sodium chloride (NS) flush 5-40 mL  5-40 mL IntraVENous Q8H    sodium chloride (NS) flush 5-40 mL  5-40 mL IntraVENous PRN    dexmedeTOMidine (PRECEDEX) 400 mcg in 0.9% sodium chloride 100 mL infusion  0.2-0.7 mcg/kg/hr IntraVENous TITRATE    fentaNYL (PF) 1,500 mcg/30 mL (50 mcg/mL) infusion  0-300 mcg/hr IntraVENous TITRATE    propofol (DIPRIVAN) 10 mg/mL infusion  0-50 mcg/kg/min IntraVENous TITRATE    [Held by provider] cisatracurium (NIMBEX) 2 mg/mL IV infusion  0-10 mcg/kg/min IntraVENous TITRATE    aspirin chewable tablet 81 mg  81 mg Per NG tube DAILY    chlorhexidine (ORAL CARE KIT) 0.12 % mouthwash 15 mL  15 mL Oral Q12H    dexamethasone (PF) (DECADRON) 10 mg/mL injection 6 mg  6 mg IntraVENous DAILY    albuterol-ipratropium (DUO-NEB) 2.5 MG-0.5 MG/3 ML  3 mL Nebulization Q6H PRN    0.9% sodium chloride infusion  5 mL/hr IntraVENous CONTINUOUS    NOREPINephrine (LEVOPHED) 8 mg in 5% dextrose 250mL (32 mcg/mL) infusion  0.5-30 mcg/min IntraVENous TITRATE       Reese Taylor.  Vicky Keenan MD     [facility] [address] [place] [address], [address] [place] [address],[address]   Ambreen Gill   [phone]

## 2020-08-21 NOTE — PROGRESS NOTES
ARDS  Acute hypoxoc respiratory failure  COVID positive  Sinus pauses    Remains intubated and sedated    Issues with bradycardia and pauses    Transcutaneous pacer pads placed    WBCs coming down     Hgb and platelets look good    Creatinine normal    Bilirubin and other LFTs coming down     Lactic acid normal    LDH looks good    Pro-calcitonin normal    ABG 7.48 / 44 / 75 / 33 / 10     FiO2 down to 40%    PEEP 15    Remains on inhaled NO at 40 ppm     Continues on Diuretics    Continues on Abx's    Continues on Lovenox     CXR - patchy airspace disease persists      Intake/Output Summary (Last 24 hours) at 8/20/2020 2036  Last data filed at 8/20/2020 1914  Gross per 24 hour   Intake 2727.15 ml   Output 2795 ml   Net -67.85 ml     Visit Vitals  /76   Pulse 75   Temp 100.3 °F (37.9 °C)   Resp 27   Ht 5' 11\" (1.803 m)   Wt 219 lb 2.2 oz (99.4 kg)   SpO2 98%   BMI 30.56 kg/m²       Risk of morbidity and mortality - high  Medical decision making - high complexity    Total critical care time - 30 minutes (CPT 16633)     I personally spent the above critical care time. This is time spent at this critically ill patient's bedside / unit / floor actively involved in patient care as well as the coordination of care and discussions with the patient's family. This does not include any procedural time which has been billed separately.

## 2020-08-21 NOTE — PROGRESS NOTES
1930: Bedside and Verbal shift change report given to Kavya Burton (oncoming nurse) by Claudia Cochran (offgoing nurse). Report included the following information SBAR, Kardex, ED Summary, Procedure Summary, MAR, Recent Results and Cardiac Rhythm NSR.     2000: Shift assessment completed per flowsheet. 2200: Troponin 0.21. Jillian Kendall NP made aware.    0020: Patient has had 3 episodes of rapid decline in HR. HR will drop into the 50s, then gradually rise back into to 80-90s. ADONIS Cloud made aware. Orders in place for transcutaneous pacing of HR at a rate of 50.    0000: Reassessment completed per flowsheet. 0220: Patient BP low, MAP 58. Rn triturating Levophed gtt to maintain MAP greater than 65.    0400: Reassessment completed per flowsheet. 0730: Bedside and Verbal shift change report given to Riley Zimmerman (oncoming nurse) by Kavya Burton (offgoing nurse). Report included the following information SBAR, Kardex, ED Summary, Procedure Summary, MAR, Recent Results and Cardiac Rhythm NSR/ST.

## 2020-08-21 NOTE — PROGRESS NOTES
MIGUELINA  Patient presented to [facility] ED with increased shortness of breath, diaphoresis and fever. COVID Positive. Transferred to [address] [place] [address] for CTS evaluation for possible ECMO. Patient intubated , sedated and paralyzed. RUR: 23%  Disposition: TBD  Patient transferred for possible ECMO. At this time it is not indicated. Patient remains intubated , on Levophed for hypotension, fentanyl and propofol gtts. Left pig tail cath placed on 8/11. Cardiology consult placed for sinus pauses. Patient's mother Bin Landin is [facility] [phone]. Care management will follow for transitions of care.  Lorraine Cyr RN,CRM

## 2020-08-21 NOTE — PROGRESS NOTES
ARDS  Acute hypoxoc respiratory failure  COVID positive  Sinus pauses    Up on pressors (levophed) overnight for hypotension     Still having bradycardia episodes to the 50's    Hgb and platelets look good    D-Dimer elevated    Creatinine normal    Continues on Bumex    Bilirubin mildly elevated    Other LFTs coming down    LDH about the same    Lactic acid normal    Pro-calcitonin normal    7.52 / 39 / 75 / 32 / 9     PEEP 15, FiO2 40%    Inhaled NO at 40 ppm     Continues on steroids    Continues on Abx's    Continues on Lovenox    Fentanyl / propofol for sedation       Intake/Output Summary (Last 24 hours) at 8/21/2020 0854  Last data filed at 8/21/2020 0700  Gross per 24 hour   Intake 4108.57 ml   Output 2800 ml   Net 1308.57 ml     Visit Vitals  /83   Pulse 90   Temp 100 °F (37.8 °C)   Resp 21   Ht 5' 11\" (1.803 m)   Wt 222 lb 10.6 oz (101 kg)   SpO2 94%   BMI 31.06 kg/m²       Risk of morbidity and mortality - high  Medical decision making - high complexity    Total critical care time - 30 minutes (CPT 22924)     I personally spent the above critical care time. This is time spent at this critically ill patient's bedside / unit / floor actively involved in patient care as well as the coordination of care and discussions with the patient's family. This does not include any procedural time which has been billed separately.

## 2020-08-21 NOTE — PROGRESS NOTES
Pharmacist Note - Vancomycin Dosing  Therapy day 2  Indication: Sepsis of unknown etiology - worsening leukocytosis, low-grade fevers; possible PNA - COVID-19+  Current regimen: 1500 mg IV Q 8 hr    A Trough Level resulted at 15.8 mcg/mL which was obtained 9 hrs post-dose. The extrapolated \"true\" trough is approximately 17.7 mcg/mL based on the patient's known kinetics. Goal trough: 15 - 20 mcg/mL     Plan: Continue current regimen. Pharmacy will continue to monitor this patient daily for changes in clinical status and renal function.

## 2020-08-21 NOTE — PROGRESS NOTES
0730: Bedside and Verbal shift change report given to Dianna Moss RN (oncoming nurse) by JONATHAN Kirby RN (offgoing nurse). Report included the following information SBAR, Kardex, Procedure Summary, Intake/Output, MAR, Recent Results, Cardiac Rhythm NSR and Alarm Parameters . Shift Summary: Levophed gtt titrated off, SBP 120s-130s. Precedex gtt titrated off per orders. Fentanyl gtt 300 mcg/hr, Propofol gtt titrated down to 30 mcg/kg/min, Versed gtt started & infusing at 6 mg/hr. Vent settings adjusted throughout shift by RT & Dr. Chun Mauricio. Patient's HR intermittently 130s-150s around 1830, Dr. Chun Mauricio notified, MD states to continue titrating patient on to Versed gtt & monitor. PRN Ativan given x1. PRN Tylenol given x1 for Bladder temp 100.8. ETT, OGT with Tube feed infusing, left PICC, Mcwilliams. Patient's mother updated via phone several times & RN setup Zoom call as well.    1930: Bedside and Verbal shift change report given to JONATHAN Kirby RN (oncoming nurse) by Dianna Moss RN (offgoing nurse). Report included the following information SBAR, Kardex, Procedure Summary, Intake/Output, MAR, Recent Results, Cardiac Rhythm NSR/Sinus Tachycardia and Alarm Parameters .

## 2020-08-21 NOTE — PROGRESS NOTES
1959: paged to room due to pt desat. VT 320s, pt slightly guppy breathing. Suctioned copious bloody secretions, increased Pi to 18. Increased FiO2 to 50%, increased PEEP to +10. Decreased Itime to 1.0.  VT now 450+, no longer guppy breathing

## 2020-08-21 NOTE — PROGRESS NOTES
Problem: Ventilator Management  Goal: *Adequate oxygenation and ventilation  Outcome: Progressing Towards Goal  Goal: *Patient maintains clear airway/free of aspiration  Outcome: Progressing Towards Goal  Goal: *Absence of infection signs and symptoms  Outcome: Progressing Towards Goal  Goal: *Normal spontaneous ventilation  Outcome: Progressing Towards Goal     Problem: Pressure Injury - Risk of  Goal: *Prevention of pressure injury  Description: Document Enrique Scale and appropriate interventions in the flowsheet. Outcome: Progressing Towards Goal  Note: Pressure Injury Interventions:  Sensory Interventions: Assess changes in LOC, Assess need for specialty bed, Avoid rigorous massage over bony prominences, Check visual cues for pain, Float heels, Keep linens dry and wrinkle-free, Minimize linen layers, Monitor skin under medical devices, Pressure redistribution bed/mattress (bed type), Turn and reposition approx. every two hours (pillows and wedges if needed)    Moisture Interventions: Absorbent underpads, Assess need for specialty bed, Check for incontinence Q2 hours and as needed, Contain wound drainage, Internal/External urinary devices, Minimize layers    Activity Interventions: Assess need for specialty bed, Pressure redistribution bed/mattress(bed type)    Mobility Interventions: Assess need for specialty bed, Float heels, HOB 30 degrees or less, Pressure redistribution bed/mattress (bed type), Turn and reposition approx. every two hours(pillow and wedges)    Nutrition Interventions: Document food/fluid/supplement intake    Friction and Shear Interventions: HOB 30 degrees or less, Minimize layers                Problem: Falls - Risk of  Goal: *Absence of Falls  Description: Document Adama Fall Risk and appropriate interventions in the flowsheet.   Outcome: Progressing Towards Goal  Note: Fall Risk Interventions:  Mobility Interventions: Communicate number of staff needed for ambulation/transfer    Mentation Interventions: Adequate sleep, hydration, pain control, Evaluate medications/consider consulting pharmacy, Reorient patient, Room close to nurse's station, Update white board    Medication Interventions: Evaluate medications/consider consulting pharmacy    Elimination Interventions:  Toileting schedule/hourly rounds    History of Falls Interventions: Evaluate medications/consider consulting pharmacy, Room close to nurse's station         Problem: Nutrition Deficit  Goal: *Optimize nutritional status  Outcome: Progressing Towards Goal     Problem: Breathing Pattern - Ineffective  Goal: *Absence of hypoxia  Outcome: Progressing Towards Goal  Goal: *Use of effective breathing techniques  Outcome: Progressing Towards Goal     Problem: Non-Violent Restraints  Goal: *Removal from restraints as soon as assessed to be safe  Outcome: Progressing Towards Goal  Goal: *No harm/injury to patient while restraints in use  Outcome: Progressing Towards Goal  Goal: *Patient's dignity will be maintained  Outcome: Progressing Towards Goal  Goal: Non-violent Restaints:Standard Interventions  Outcome: Progressing Towards Goal

## 2020-08-21 NOTE — PROGRESS NOTES
Nutrition Note      Chart reviewed for brief follow-up. Mr Yola Mullins has tolerated trophic tube feeding well. One episode of vomiting noted when ETT was being suctioned but doing well now. Patient still requiring    Discussed with intensivist. Recommend increasing tube feeding to goal.     Vital AF 1.2 @ 30 ml/hr with 3 packets Prosource bid and 50 ml water flush q 4 hr (720 ml, 1224 calories/2024 including Diprivan), 144 gm protein and 1245 ml free water). Last BM was on 8/7. Current bowel regimen: Colace and Senna bid. May wish to consider adding lactulose. RD to follow.       Electronically signed by Stephy Feliciano RD on 8/21/2020 at 3:41 PM  Contact: [employer]

## 2020-08-21 NOTE — PROGRESS NOTES
[facility]    ICU TEAM Progress Note    Name: Jose Manuel García   : 1990   MRN: [id number]   Date: 2020      Assessment:     ICU Problems: admitted 20 with severe Covid s/p trach     1. Covid-19 - PNA fibroproliferative ARDS   2. Resp failure s/p trach  3. PTX with CT  4. Sinus pauses cards seeing   a. Transcutaneous pacer pads in place   b. Avoid blocking agents  5. Tob use     Overnight Events:   2020  About same 2 pauses on tele - 2 secs and 7.8 secs cards aware     Active Problem List:     Problem List  Date Reviewed: 2020          Codes Class    Acute respiratory failure ([facility] [address] ICD-10-CM: J96.00  ICD-9-CM: 518.81         Pneumothorax on left ICD-10-CM: J93.9  ICD-9-CM: 512.89         Pneumonia due to severe acute respiratory syndrome coronavirus 2 (SARS-CoV-2) ICD-10-CM: U07.1, J12.89  ICD-9-CM: 480.8         Respiratory failure with hypoxia (HCC) ICD-10-CM: J96.91  ICD-9-CM: 518.81               Past Medical History:      has a past medical history of History of vascular access device (08/10/2020). Past Surgical History:      has a past surgical history that includes ir thora/ins chest tube(pneumo) wo image (2020) and ir thora/ins chest tube(pneumo) wo image (2020). Home Medications:     Prior to Admission medications    Medication Sig Start Date End Date Taking? Authorizing Provider   benzonatate (Tessalon Perles) 100 mg capsule Take 100 mg by mouth three (3) times daily as needed for Cough.     Provider, Historical       Allergies/Social/Family History:     No Known Allergies   Social History     Tobacco Use    Smoking status: Current Some Day Smoker    Smokeless tobacco: Never Used   Substance Use Topics    Alcohol use: Not on file        Objective:   Vital Signs:  Visit Vitals  BP 93/48   Pulse 77   Temp (!) 100.8 °F (38.2 °C)   Resp 26   Ht 5' 11\" (1.803 m)   Wt 101 kg (222 lb 10.6 oz)   SpO2 96%   BMI 31.06 kg/m²      O2 Device: Endotracheal tube, Ventilator Temp (24hrs), Av.3 °F (37.9 °C), Min:100 °F (37.8 °C), Max:100.8 °F (38.2 °C)           Intake/Output:     Intake/Output Summary (Last 24 hours) at 2020 1036  Last data filed at 2020 0900  Gross per 24 hour   Intake 4108.57 ml   Output 2460 ml   Net 1648.57 ml       Physical Exam:    General:  On vent    Eyes:  Sclera anicteric. Pupils equally round and reactive to light. Mouth/Throat: Mucous membranes normal, oral pharynx clear   Neck: Supple   Lungs:   Clear to auscultation bilaterally, good effort   CV:  Regular rate and rhythm,no murmur, click, rub or gallop   Abdomen:   Soft, non-tender. bowel sounds normal. non-distended   Extremities: No cyanosis or edema   Skin: Skin color, texture, turgor normal. no acute rash or lesions   Lymph nodes: Cervical and supraclavicular normal   Musculoskeletal: No swelling or deformity   Lines/Devices:  Intact, no erythema, drainage or tenderness           LABS AND  DATA: Personally reviewed  Recent Labs     20  0508 20  0246   WBC 14.4* 16.3*   HGB 11.1* 12.2   HCT 33.5* 36.6    193     Recent Labs     20  0508 20  2110 20  0246   * 134* 134*   K 3.2* 3.8 3.6   CL 96* 96* 94*   CO2 30 33* 34*   BUN 32* 37* 40*   CREA 0.84 0.87 0.84   * 127* 115*   CA 8.3* 8.7 8.8   MG 2.4 2.5* 2.4   PHOS 2.4*  --  2.6     Recent Labs     20  0508 20  0246    119*   TP 6.8 7.0   ALB 2.3* 2.4*   GLOB 4.5* 4.6*     No results for input(s): INR, PTP, APTT, INREXT in the last 72 hours.    Recent Labs     20  0316 20  1539   PHI 7.52* 7.48*   PCO2I 38.5 44.1   PO2I 75* 75*   FIO2I 40 0.40     Recent Labs     20  0508 20  2110 20  0246     --  137   TROIQ  --  0.21*  --        Hemodynamics:   PAP:   CO:     Wedge:   CI:     CVP:    SVR:       PVR:       Ventilator Settings:  Mode Rate Tidal Volume Pressure FiO2 PEEP   Pressure control   0 ml  0 cm H2O 40 % 15 cm H20     Peak airway pressure: 31 cm H2O    Minute ventilation: 12.6 l/min        MEDS: Reviewed    Chest X-Ray:  CXR Results  (Last 48 hours)               08/21/20 0551  XR CHEST PORT Final result    Impression:  IMPRESSION:    1. No significant interval change in the appearance of the chest.   2. The nasogastric tube side port is in the region of the distal esophagus and   should be advanced. Narrative:  EXAM: XR CHEST PORT       INDICATION: Worsening subcutaneous air. need to eval chest tube. COMPARISON: Previous day       FINDINGS: A portable AP radiograph of the chest was obtained at 0450 hours. The   endotracheal tube and central line are stable. There is persistent subcutaneous   emphysema. The nasogastric tube side port is in the region of the distal   esophagus. The patient is on a cardiac monitor. There is persistent bilateral   diffuse airspace opacification. The cardiac and mediastinal contours and   pulmonary vascularity are normal.  The bones and soft tissues are grossly within   normal limits. 08/20/20 0914  XR CHEST PORT Final result    Impression:  IMPRESSION:       Stable bilateral interstitial and patchy airspace opacities. Stable diffuse   chest wall subcutaneous emphysema. Narrative:  EXAM:  XR CHEST PORT       INDICATION: Bilateral lung opacities. COMPARISON: 8/20/2020 at 0440 hours       TECHNIQUE: Portable AP semiupright chest view at 0845 hours       FINDINGS: The support devices are stable. Cardiac monitoring wires overlie the   thorax. The cardiomediastinal contours are stable. There are stable bilateral interstitial and patchy airspace opacities. There is   no pleural effusion or pneumothorax. There is stable diffuse chest wall   subcutaneous emphysema. The bones are stable. 08/20/20 0449  XR CHEST PORT Final result    Impression:  IMPRESSION:       Stable bilateral interstitial and patchy airspace opacities. Narrative:  EXAM:  XR CHEST PORT       INDICATION: Bilateral lung opacities. COMPARISON: 8/19/2020 at 2227 hours       TECHNIQUE: Portable AP semiupright chest view at 0440 hours       FINDINGS: The endotracheal tube and enteric tube are stable. Cardiac monitoring   wires overlie the thorax. The cardiac mediastinal contours are stable. There is stable bilateral interstitial and patchy airspace opacities. There is   no pleural effusion or pneumothorax. The bones and upper abdomen are stable. Diffuse chest wall subcutaneous emphysema is again noted. Multidisciplinary Rounds Completed: Yes    ABCDEF Bundle/Checklist Completed:  Yes      DISPOSITION  Stay in ICU    CRI  I personally spent 45 minutes of critical care time. This is time spent at this critically ill patient's bedside actively involved in patient care as well as the coordination of care and discussions with the patient's family. This does not include any procedural time which has been billed separately.     [address] [place] [address]  8/21/2020

## 2020-08-21 NOTE — PROGRESS NOTES
2250: tank below 200 psi, changed out at this time. Serial number [id number]. New tank 1800 psi    2335: spare tank in room on Bob stand, spare to the spare outside antiroom on sherif.

## 2020-08-22 ENCOUNTER — APPOINTMENT (OUTPATIENT)
Dept: GENERAL RADIOLOGY | Age: 30
DRG: 004 | End: 2020-08-22
Attending: INTERNAL MEDICINE
Payer: COMMERCIAL

## 2020-08-22 ENCOUNTER — APPOINTMENT (OUTPATIENT)
Dept: GENERAL RADIOLOGY | Age: 30
DRG: 004 | End: 2020-08-22
Attending: NURSE PRACTITIONER
Payer: COMMERCIAL

## 2020-08-22 LAB
ALBUMIN SERPL-MCNC: 2.2 G/DL (ref 3.5–5)
ALBUMIN SERPL-MCNC: 2.2 G/DL (ref 3.5–5)
ALBUMIN/GLOB SERPL: 0.5 {RATIO} (ref 1.1–2.2)
ALBUMIN/GLOB SERPL: 0.5 {RATIO} (ref 1.1–2.2)
ALP SERPL-CCNC: 116 U/L (ref 45–117)
ALP SERPL-CCNC: 130 U/L (ref 45–117)
ALT SERPL-CCNC: 78 U/L (ref 12–78)
ALT SERPL-CCNC: 84 U/L (ref 12–78)
ANION GAP SERPL CALC-SCNC: 4 MMOL/L (ref 5–15)
ANION GAP SERPL CALC-SCNC: 4 MMOL/L (ref 5–15)
ARTERIAL PATENCY WRIST A: YES
ARTERIAL PATENCY WRIST A: YES
AST SERPL-CCNC: 26 U/L (ref 15–37)
AST SERPL-CCNC: 28 U/L (ref 15–37)
BASE EXCESS BLD CALC-SCNC: 10 MMOL/L
BASE EXCESS BLD CALC-SCNC: 10 MMOL/L
BASOPHILS # BLD: 0 K/UL (ref 0–0.1)
BASOPHILS NFR BLD: 0 % (ref 0–1)
BDY SITE: ABNORMAL
BDY SITE: ABNORMAL
BILIRUB SERPL-MCNC: 1.2 MG/DL (ref 0.2–1)
BILIRUB SERPL-MCNC: 1.7 MG/DL (ref 0.2–1)
BUN SERPL-MCNC: 20 MG/DL (ref 6–20)
BUN SERPL-MCNC: 24 MG/DL (ref 6–20)
BUN/CREAT SERPL: 38 (ref 12–20)
BUN/CREAT SERPL: 39 (ref 12–20)
CA-I BLD-SCNC: 1.14 MMOL/L (ref 1.12–1.32)
CA-I BLD-SCNC: 1.23 MMOL/L (ref 1.12–1.32)
CALCIUM SERPL-MCNC: 8.4 MG/DL (ref 8.5–10.1)
CALCIUM SERPL-MCNC: 8.4 MG/DL (ref 8.5–10.1)
CHLORIDE SERPL-SCNC: 99 MMOL/L (ref 97–108)
CHLORIDE SERPL-SCNC: 99 MMOL/L (ref 97–108)
CK SERPL-CCNC: 125 U/L (ref 39–308)
CK SERPL-CCNC: 140 U/L (ref 39–308)
CO2 SERPL-SCNC: 33 MMOL/L (ref 21–32)
CO2 SERPL-SCNC: 34 MMOL/L (ref 21–32)
COHGB MFR BLD: 1.5 % (ref 1–2)
CREAT SERPL-MCNC: 0.52 MG/DL (ref 0.7–1.3)
CREAT SERPL-MCNC: 0.62 MG/DL (ref 0.7–1.3)
CRP SERPL-MCNC: 10.3 MG/DL (ref 0–0.6)
D DIMER PPP FEU-MCNC: 14.01 MG/L FEU (ref 0–0.65)
DIFFERENTIAL METHOD BLD: ABNORMAL
EOSINOPHIL # BLD: 0.4 K/UL (ref 0–0.4)
EOSINOPHIL NFR BLD: 4 % (ref 0–7)
ERYTHROCYTE [DISTWIDTH] IN BLOOD BY AUTOMATED COUNT: 13.1 % (ref 11.5–14.5)
FERRITIN SERPL-MCNC: 1739 NG/ML (ref 26–388)
FIBRINOGEN PPP-MCNC: 720 MG/DL (ref 200–475)
GAS FLOW.O2 O2 DELIVERY SYS: ABNORMAL L/MIN
GAS FLOW.O2 O2 DELIVERY SYS: ABNORMAL L/MIN
GAS FLOW.O2 SETTING OXYMISER: 26 BPM
GAS FLOW.O2 SETTING OXYMISER: 26 BPM
GLOBULIN SER CALC-MCNC: 4.3 G/DL (ref 2–4)
GLOBULIN SER CALC-MCNC: 4.6 G/DL (ref 2–4)
GLUCOSE BLD STRIP.AUTO-MCNC: 110 MG/DL (ref 65–100)
GLUCOSE BLD STRIP.AUTO-MCNC: 124 MG/DL (ref 65–100)
GLUCOSE BLD STRIP.AUTO-MCNC: 128 MG/DL (ref 65–100)
GLUCOSE BLD STRIP.AUTO-MCNC: 143 MG/DL (ref 65–100)
GLUCOSE SERPL-MCNC: 106 MG/DL (ref 65–100)
GLUCOSE SERPL-MCNC: 133 MG/DL (ref 65–100)
HCO3 BLD-SCNC: 33.4 MMOL/L (ref 22–26)
HCO3 BLD-SCNC: 35 MMOL/L (ref 22–26)
HCT VFR BLD AUTO: 29.5 % (ref 36.6–50.3)
HGB BLD OXIMETRY-MCNC: 10.6 G/DL (ref 14–17)
HGB BLD-MCNC: 9.6 G/DL (ref 12.1–17)
IMM GRANULOCYTES # BLD AUTO: 0.2 K/UL (ref 0–0.04)
IMM GRANULOCYTES NFR BLD AUTO: 2 % (ref 0–0.5)
INSPIRATION.DURATION SETTING TIME VENT: 1 SEC
INSPIRATION.DURATION SETTING TIME VENT: 1 SEC
LDH SERPL L TO P-CCNC: 263 U/L (ref 85–241)
LYMPHOCYTES # BLD: 0.9 K/UL (ref 0.8–3.5)
LYMPHOCYTES NFR BLD: 9 % (ref 12–49)
MAGNESIUM SERPL-MCNC: 2.4 MG/DL (ref 1.6–2.4)
MCH RBC QN AUTO: 30.1 PG (ref 26–34)
MCHC RBC AUTO-ENTMCNC: 32.5 G/DL (ref 30–36.5)
MCV RBC AUTO: 92.5 FL (ref 80–99)
METHGB MFR BLD: 1.3 % (ref 0–1.4)
MONOCYTES # BLD: 0.9 K/UL (ref 0–1)
MONOCYTES NFR BLD: 9 % (ref 5–13)
NEUTS SEG # BLD: 7.9 K/UL (ref 1.8–8)
NEUTS SEG NFR BLD: 76 % (ref 32–75)
NITRIC:PPM ISTAT,INITR: 0 PPM
NITRIC:PPM ISTAT,INITR: 40 PPM
NRBC # BLD: 0 K/UL (ref 0–0.01)
NRBC BLD-RTO: 0 PER 100 WBC
O2/TOTAL GAS SETTING VFR VENT: 40 %
O2/TOTAL GAS SETTING VFR VENT: 50 %
OXYHGB MFR BLD: 94.2 % (ref 94–97)
PCO2 BLD: 43.8 MMHG (ref 35–45)
PCO2 BLD: 56.8 MMHG (ref 35–45)
PEEP RESPIRATORY: 10 CMH2O
PEEP RESPIRATORY: 10 CMH2O
PH BLD: 7.4 [PH] (ref 7.35–7.45)
PH BLD: 7.49 [PH] (ref 7.35–7.45)
PHOSPHATE SERPL-MCNC: 2.3 MG/DL (ref 2.6–4.7)
PIP ISTAT,IPIP: 18
PIP ISTAT,IPIP: 18
PLATELET # BLD AUTO: 131 K/UL (ref 150–400)
PMV BLD AUTO: 11 FL (ref 8.9–12.9)
PO2 BLD: 56 MMHG (ref 80–100)
PO2 BLD: 70 MMHG (ref 80–100)
POTASSIUM SERPL-SCNC: 3.1 MMOL/L (ref 3.5–5.1)
POTASSIUM SERPL-SCNC: 4.1 MMOL/L (ref 3.5–5.1)
PROCALCITONIN SERPL-MCNC: 0.17 NG/ML
PROT SERPL-MCNC: 6.5 G/DL (ref 6.4–8.2)
PROT SERPL-MCNC: 6.8 G/DL (ref 6.4–8.2)
RBC # BLD AUTO: 3.19 M/UL (ref 4.1–5.7)
RBC MORPH BLD: ABNORMAL
SAO2 % BLD: 88 % (ref 92–97)
SAO2 % BLD: 95 % (ref 92–97)
SAO2 % BLD: 97 % (ref 95–99)
SERVICE CMNT-IMP: ABNORMAL
SODIUM SERPL-SCNC: 136 MMOL/L (ref 136–145)
SODIUM SERPL-SCNC: 137 MMOL/L (ref 136–145)
SPECIMEN TYPE: ABNORMAL
SPECIMEN TYPE: ABNORMAL
TOTAL RESP. RATE, ITRR: 27
TRIGL SERPL-MCNC: 120 MG/DL (ref ?–150)
VENTILATION MODE VENT: ABNORMAL
VENTILATION MODE VENT: ABNORMAL
WBC # BLD AUTO: 10.3 K/UL (ref 4.1–11.1)

## 2020-08-22 PROCEDURE — 84100 ASSAY OF PHOSPHORUS: CPT

## 2020-08-22 PROCEDURE — 74011250637 HC RX REV CODE- 250/637: Performed by: NURSE PRACTITIONER

## 2020-08-22 PROCEDURE — 74011000250 HC RX REV CODE- 250: Performed by: HOSPITALIST

## 2020-08-22 PROCEDURE — 74011250636 HC RX REV CODE- 250/636: Performed by: NURSE PRACTITIONER

## 2020-08-22 PROCEDURE — 85025 COMPLETE CBC W/AUTO DIFF WBC: CPT

## 2020-08-22 PROCEDURE — 85384 FIBRINOGEN ACTIVITY: CPT

## 2020-08-22 PROCEDURE — 74011250636 HC RX REV CODE- 250/636

## 2020-08-22 PROCEDURE — 84478 ASSAY OF TRIGLYCERIDES: CPT

## 2020-08-22 PROCEDURE — 86140 C-REACTIVE PROTEIN: CPT

## 2020-08-22 PROCEDURE — 83615 LACTATE (LD) (LDH) ENZYME: CPT

## 2020-08-22 PROCEDURE — 74011000258 HC RX REV CODE- 258: Performed by: HOSPITALIST

## 2020-08-22 PROCEDURE — 84145 PROCALCITONIN (PCT): CPT

## 2020-08-22 PROCEDURE — 82728 ASSAY OF FERRITIN: CPT

## 2020-08-22 PROCEDURE — 85379 FIBRIN DEGRADATION QUANT: CPT

## 2020-08-22 PROCEDURE — 71045 X-RAY EXAM CHEST 1 VIEW: CPT

## 2020-08-22 PROCEDURE — 74011000258 HC RX REV CODE- 258: Performed by: INTERNAL MEDICINE

## 2020-08-22 PROCEDURE — 74011250636 HC RX REV CODE- 250/636: Performed by: HOSPITALIST

## 2020-08-22 PROCEDURE — 80053 COMPREHEN METABOLIC PANEL: CPT

## 2020-08-22 PROCEDURE — 74011636637 HC RX REV CODE- 636/637: Performed by: NURSE PRACTITIONER

## 2020-08-22 PROCEDURE — 36600 WITHDRAWAL OF ARTERIAL BLOOD: CPT

## 2020-08-22 PROCEDURE — 36415 COLL VENOUS BLD VENIPUNCTURE: CPT

## 2020-08-22 PROCEDURE — 82962 GLUCOSE BLOOD TEST: CPT

## 2020-08-22 PROCEDURE — 82550 ASSAY OF CK (CPK): CPT

## 2020-08-22 PROCEDURE — 74011000250 HC RX REV CODE- 250: Performed by: NURSE PRACTITIONER

## 2020-08-22 PROCEDURE — 94760 N-INVAS EAR/PLS OXIMETRY 1: CPT

## 2020-08-22 PROCEDURE — 74011000250 HC RX REV CODE- 250: Performed by: INTERNAL MEDICINE

## 2020-08-22 PROCEDURE — 65610000006 HC RM INTENSIVE CARE

## 2020-08-22 PROCEDURE — 74011250636 HC RX REV CODE- 250/636: Performed by: INTERNAL MEDICINE

## 2020-08-22 PROCEDURE — 82375 ASSAY CARBOXYHB QUANT: CPT

## 2020-08-22 PROCEDURE — 94003 VENT MGMT INPAT SUBQ DAY: CPT

## 2020-08-22 PROCEDURE — 82803 BLOOD GASES ANY COMBINATION: CPT

## 2020-08-22 PROCEDURE — 73610000026 HC INO THERAPY EACH HOUR

## 2020-08-22 PROCEDURE — 74011250637 HC RX REV CODE- 250/637: Performed by: HOSPITALIST

## 2020-08-22 PROCEDURE — 99232 SBSQ HOSP IP/OBS MODERATE 35: CPT | Performed by: INTERNAL MEDICINE

## 2020-08-22 PROCEDURE — 87040 BLOOD CULTURE FOR BACTERIA: CPT

## 2020-08-22 PROCEDURE — 74011000258 HC RX REV CODE- 258: Performed by: NURSE PRACTITIONER

## 2020-08-22 PROCEDURE — 83735 ASSAY OF MAGNESIUM: CPT

## 2020-08-22 RX ORDER — POTASSIUM CHLORIDE 29.8 MG/ML
INJECTION INTRAVENOUS
Status: COMPLETED
Start: 2020-08-22 | End: 2020-08-22

## 2020-08-22 RX ORDER — HYDROMORPHONE HCL IN 0.9% NACL 15 MG/30ML
0-6 PATIENT CONTROLLED ANALGESIA VIAL INTRAVENOUS
Status: DISCONTINUED | OUTPATIENT
Start: 2020-08-22 | End: 2020-09-15

## 2020-08-22 RX ORDER — FENTANYL CITRATE 50 UG/ML
50-100 INJECTION, SOLUTION INTRAMUSCULAR; INTRAVENOUS
Status: DISCONTINUED | OUTPATIENT
Start: 2020-08-22 | End: 2020-09-22

## 2020-08-22 RX ORDER — POTASSIUM CHLORIDE 29.8 MG/ML
20 INJECTION INTRAVENOUS
Status: COMPLETED | OUTPATIENT
Start: 2020-08-22 | End: 2020-08-22

## 2020-08-22 RX ADMIN — MIDAZOLAM HYDROCHLORIDE 10 MG/HR: 5 INJECTION, SOLUTION INTRAMUSCULAR; INTRAVENOUS at 19:58

## 2020-08-22 RX ADMIN — ENOXAPARIN SODIUM 50 MG: 60 INJECTION SUBCUTANEOUS at 21:28

## 2020-08-22 RX ADMIN — POTASSIUM CHLORIDE 20 MEQ: 400 INJECTION, SOLUTION INTRAVENOUS at 07:14

## 2020-08-22 RX ADMIN — GUAIFENESIN 400 MG: 100 SOLUTION ORAL at 21:27

## 2020-08-22 RX ADMIN — ACETAMINOPHEN 650 MG: 325 TABLET ORAL at 17:55

## 2020-08-22 RX ADMIN — FAMOTIDINE 20 MG: 10 INJECTION, SOLUTION INTRAVENOUS at 08:40

## 2020-08-22 RX ADMIN — CISATRACURIUM BESYLATE 0.5 MCG/KG/MIN: 2 INJECTION INTRAVENOUS at 16:02

## 2020-08-22 RX ADMIN — CEFEPIME 2 G: 2 INJECTION, POWDER, FOR SOLUTION INTRAVENOUS at 04:17

## 2020-08-22 RX ADMIN — PROPOFOL 50 MCG/KG/MIN: 10 INJECTION, EMULSION INTRAVENOUS at 16:00

## 2020-08-22 RX ADMIN — CEFEPIME 2 G: 2 INJECTION, POWDER, FOR SOLUTION INTRAVENOUS at 11:11

## 2020-08-22 RX ADMIN — PROPOFOL 40 MCG/KG/MIN: 10 INJECTION, EMULSION INTRAVENOUS at 01:50

## 2020-08-22 RX ADMIN — DOCUSATE SODIUM 100 MG: 50 LIQUID ORAL at 17:55

## 2020-08-22 RX ADMIN — FAMOTIDINE 20 MG: 10 INJECTION, SOLUTION INTRAVENOUS at 21:28

## 2020-08-22 RX ADMIN — MIDAZOLAM HYDROCHLORIDE 8 MG/HR: 5 INJECTION, SOLUTION INTRAMUSCULAR; INTRAVENOUS at 08:48

## 2020-08-22 RX ADMIN — GUAIFENESIN 400 MG: 100 SOLUTION ORAL at 00:01

## 2020-08-22 RX ADMIN — Medication 300 MCG/HR: at 05:10

## 2020-08-22 RX ADMIN — PROPOFOL 50 MCG/KG/MIN: 10 INJECTION, EMULSION INTRAVENOUS at 13:07

## 2020-08-22 RX ADMIN — CISATRACURIUM BESYLATE 0.5 MCG/KG/MIN: 20 INJECTION INTRAVENOUS at 16:02

## 2020-08-22 RX ADMIN — INSULIN LISPRO 2 UNITS: 100 INJECTION, SOLUTION INTRAVENOUS; SUBCUTANEOUS at 11:45

## 2020-08-22 RX ADMIN — PROPOFOL 45 MCG/KG/MIN: 10 INJECTION, EMULSION INTRAVENOUS at 08:46

## 2020-08-22 RX ADMIN — ASPIRIN 81 MG CHEWABLE TABLET 81 MG: 81 TABLET CHEWABLE at 08:40

## 2020-08-22 RX ADMIN — PROPOFOL 45 MCG/KG/MIN: 10 INJECTION, EMULSION INTRAVENOUS at 05:10

## 2020-08-22 RX ADMIN — GUAIFENESIN 400 MG: 100 SOLUTION ORAL at 15:30

## 2020-08-22 RX ADMIN — DEXTROSE MONOHYDRATE 4 MCG/MIN: 5 INJECTION, SOLUTION INTRAVENOUS at 01:50

## 2020-08-22 RX ADMIN — FENTANYL CITRATE 250 MCG/HR: 0.05 INJECTION, SOLUTION INTRAMUSCULAR; INTRAVENOUS at 20:54

## 2020-08-22 RX ADMIN — ENOXAPARIN SODIUM 50 MG: 60 INJECTION SUBCUTANEOUS at 09:01

## 2020-08-22 RX ADMIN — CHLORHEXIDINE GLUCONATE 15 ML: 0.12 RINSE ORAL at 21:10

## 2020-08-22 RX ADMIN — BUMETANIDE 1 MG: 0.25 INJECTION INTRAMUSCULAR; INTRAVENOUS at 08:40

## 2020-08-22 RX ADMIN — CEFEPIME 2 G: 2 INJECTION, POWDER, FOR SOLUTION INTRAVENOUS at 20:57

## 2020-08-22 RX ADMIN — VANCOMYCIN HYDROCHLORIDE 1500 MG: 10 INJECTION, POWDER, LYOPHILIZED, FOR SOLUTION INTRAVENOUS at 04:17

## 2020-08-22 RX ADMIN — SENNOSIDES 8.6 MG: 8.6 TABLET, FILM COATED ORAL at 08:41

## 2020-08-22 RX ADMIN — Medication 10 ML: at 00:01

## 2020-08-22 RX ADMIN — GUAIFENESIN 400 MG: 100 SOLUTION ORAL at 08:42

## 2020-08-22 RX ADMIN — SENNOSIDES 8.6 MG: 8.6 TABLET, FILM COATED ORAL at 21:28

## 2020-08-22 RX ADMIN — POTASSIUM CHLORIDE 20 MEQ: 400 INJECTION, SOLUTION INTRAVENOUS at 11:10

## 2020-08-22 RX ADMIN — DOCUSATE SODIUM 100 MG: 50 LIQUID ORAL at 08:40

## 2020-08-22 RX ADMIN — Medication 1 MG/HR: at 19:45

## 2020-08-22 RX ADMIN — Medication 10 ML: at 06:16

## 2020-08-22 RX ADMIN — PROPOFOL 40 MCG/KG/MIN: 10 INJECTION, EMULSION INTRAVENOUS at 19:17

## 2020-08-22 RX ADMIN — GUAIFENESIN 400 MG: 100 SOLUTION ORAL at 11:11

## 2020-08-22 RX ADMIN — DEXAMETHASONE SODIUM PHOSPHATE 6 MG: 10 INJECTION INTRAMUSCULAR; INTRAVENOUS at 08:40

## 2020-08-22 RX ADMIN — Medication 10 ML: at 13:08

## 2020-08-22 RX ADMIN — VANCOMYCIN HYDROCHLORIDE 1500 MG: 10 INJECTION, POWDER, LYOPHILIZED, FOR SOLUTION INTRAVENOUS at 20:57

## 2020-08-22 RX ADMIN — GUAIFENESIN 400 MG: 100 SOLUTION ORAL at 04:17

## 2020-08-22 RX ADMIN — POTASSIUM CHLORIDE 20 MEQ: 400 INJECTION, SOLUTION INTRAVENOUS at 08:41

## 2020-08-22 RX ADMIN — Medication 300 MCG/HR: at 15:00

## 2020-08-22 RX ADMIN — Medication 300 MCG/HR: at 10:22

## 2020-08-22 RX ADMIN — VANCOMYCIN HYDROCHLORIDE 1500 MG: 10 INJECTION, POWDER, LYOPHILIZED, FOR SOLUTION INTRAVENOUS at 11:45

## 2020-08-22 RX ADMIN — CHLORHEXIDINE GLUCONATE 15 ML: 0.12 RINSE ORAL at 08:40

## 2020-08-22 RX ADMIN — Medication 10 ML: at 21:28

## 2020-08-22 NOTE — ROUTINE PROCESS
1930: Bedside and Verbal shift change report given to Rashmi Kennedy (oncoming nurse) by Asher Granger (offgoing nurse). Report included the following information SBAR, Kardex, ED Summary, Procedure Summary, MAR, Recent Results and Cardiac Rhythm NSR/ST. 2000: Shift assessment completed per flowsheet. Patient seems to be resting more comfortably now. Withdrawing from pain in upper extremities, PERRLA, sluggish and 4. Propofol gtt at 30 mcg, Versed gtt at 6 mg, Fentanyl gtt at 300 mcg. Pacer pads remain on patient, pacer set to demand for HR of 50.    2030: Levophed gtt restarted. Patient BP 90/38, MAP 53. Will titrate Levophed gtt to maintain MAP greater than 65.    2130: Spoke with patient's mom regarding updates on patient's condition. Mom voicing upset that Zoom call was unsuccessful today. Mom wishes to attempting another Zoom call tomorrow. No further questions asked at this time. 0000: Reassessment completed per flowsheet. Patient's -150s, moving arms in bed, coughing on ventilator. Sedation titrated at this time to make patient more comfortable. 0400: Reassessment completed per flowsheet. 0730: Bedside and Verbal shift change report given to Xochitl Yu (oncoming nurse) by Rashmi Kennedy (offgoing nurse). Report included the following information SBAR, Kardex, ED Summary, Procedure Summary, MAR, Recent Results and Cardiac Rhythm NSR/ST.

## 2020-08-22 NOTE — PROGRESS NOTES
Called bedside pt febrile and hypertensive was needing levo now off   Is paralyzed - ? If needs more sedation vs drug reaction vs variant of propofol infusion syndrome     Plan  1. Culture blood  2. Dilaudid drip and wean off fentanyl - titrate diladid for deep sedation up to max of 4mg/hr   3. Wean off propofol - currently on 50 for days now   4.  Check CK, BMP, trig stat

## 2020-08-22 NOTE — PROGRESS NOTES
Problem: Ventilator Management  Goal: *Adequate oxygenation and ventilation  Outcome: Progressing Towards Goal  Goal: *Patient maintains clear airway/free of aspiration  Outcome: Progressing Towards Goal  Goal: *Absence of infection signs and symptoms  Outcome: Progressing Towards Goal  Goal: *Normal spontaneous ventilation  Outcome: Progressing Towards Goal     Problem: Pressure Injury - Risk of  Goal: *Prevention of pressure injury  Description: Document Enrique Scale and appropriate interventions in the flowsheet. Outcome: Progressing Towards Goal  Note: Pressure Injury Interventions:  Sensory Interventions: Assess changes in LOC, Assess need for specialty bed, Avoid rigorous massage over bony prominences, Check visual cues for pain, Float heels, Keep linens dry and wrinkle-free, Minimize linen layers, Monitor skin under medical devices, Pressure redistribution bed/mattress (bed type), Turn and reposition approx. every two hours (pillows and wedges if needed)    Moisture Interventions: Absorbent underpads, Assess need for specialty bed, Check for incontinence Q2 hours and as needed, Contain wound drainage, Internal/External urinary devices, Minimize layers    Activity Interventions: Assess need for specialty bed, Pressure redistribution bed/mattress(bed type)    Mobility Interventions: Assess need for specialty bed, Float heels, HOB 30 degrees or less, Pressure redistribution bed/mattress (bed type), Turn and reposition approx. every two hours(pillow and wedges)    Nutrition Interventions: Document food/fluid/supplement intake    Friction and Shear Interventions: HOB 30 degrees or less, Minimize layers, Transferring/repositioning devices                Problem: Falls - Risk of  Goal: *Absence of Falls  Description: Document Adama Fall Risk and appropriate interventions in the flowsheet.   Outcome: Progressing Towards Goal  Note: Fall Risk Interventions:  Mobility Interventions: Communicate number of staff needed for ambulation/transfer    Mentation Interventions: Adequate sleep, hydration, pain control, Evaluate medications/consider consulting pharmacy, Reorient patient, Room close to nurse's station, Update white board    Medication Interventions: Evaluate medications/consider consulting pharmacy    Elimination Interventions:  Toileting schedule/hourly rounds    History of Falls Interventions: Evaluate medications/consider consulting pharmacy, Room close to nurse's station         Problem: Breathing Pattern - Ineffective  Goal: *Absence of hypoxia  Outcome: Progressing Towards Goal  Goal: *Use of effective breathing techniques  Outcome: Progressing Towards Goal     Problem: Non-Violent Restraints  Goal: *Removal from restraints as soon as assessed to be safe  Outcome: Progressing Towards Goal  Goal: *No harm/injury to patient while restraints in use  Outcome: Progressing Towards Goal  Goal: *Patient's dignity will be maintained  Outcome: Progressing Towards Goal  Goal: Non-violent Restaints:Standard Interventions  Outcome: Progressing Towards Goal

## 2020-08-22 NOTE — PROGRESS NOTES
Cardiology Progress Note            Admit Date: 8/18/2020  Admit Diagnosis: Acute respiratory failure ([facility] [address] [J96.00]  Date: 8/22/2020     Time: 9:20 AM    Subjective:  COVID +. Remains in isolation. Telemetry reviewed. Sinus tachycardia  intubated     Assessment and Plan     1. Asystole/pause   - telemetry review no more bradycardic event  Was likely vagal  Now sinus tachycardia  2. Acute hypoxemic respiratory failure   - per CC team   - On vent   - ARDS  3. COVID +   - associated PNA  4.  PTx   - B/L   - left sided chest tube    Will sign off for now     Past Medical History:   Diagnosis Date    History of vascular access device 08/10/2020    5 Fr triple PICC, hemodynamically unstable, 45 cm L basilic      Social History     Tobacco Use    Smoking status: Current Some Day Smoker    Smokeless tobacco: Never Used   Substance Use Topics    Alcohol use: Not on file    Drug use: Not on file           Review of Systems:    Deferred 2/2 COVID PUI      Objective:  intubated   Physical Exam: in isolation due to COVID infection                Visit Vitals  /60   Pulse (!) 109   Temp 100.2 °F (37.9 °C)   Resp 9   Ht 5' 11\" (1.803 m)   Wt 217 lb 13 oz (98.8 kg)   SpO2 94%   BMI 30.38 kg/m²      Data Review:    Labs:    Recent Results (from the past 24 hour(s))   GLUCOSE, POC    Collection Time: 08/21/20 12:15 PM   Result Value Ref Range    Glucose (POC) 169 (H) 65 - 100 mg/dL    Performed by Whitman Nageotte, POC    Collection Time: 08/21/20  5:49 PM   Result Value Ref Range    Glucose (POC) 135 (H) 65 - 100 mg/dL    Performed by Donna Durand    GLUCOSE, POC    Collection Time: 08/22/20 12:00 AM   Result Value Ref Range    Glucose (POC) 124 (H) 65 - 100 mg/dL    Performed by Phoebe Duenas    POC EG7    Collection Time: 08/22/20  3:28 AM   Result Value Ref Range    Calcium, ionized (POC) 1.14 1.12 - 1.32 mmol/L    FIO2 (POC) 40 %    pH (POC) 7.49 (H) 7.35 - 7.45      pCO2 (POC) 43.8 35.0 - 45.0 MMHG    pO2 (POC) 70 (L) 80 - 100 MMHG    HCO3 (POC) 33.4 (H) 22 - 26 MMOL/L    Base excess (POC) 10 mmol/L    sO2 (POC) 95 92 - 97 %    Site LEFT RADIAL      Device: VENT      Mode ASSIST CONTROL      Set Rate 26 bpm    PEEP/CPAP (POC) 10 cmH2O    PIP (POC) 18      Allens test (POC) YES      Inspiratory Time 1 sec    Nitric-ppm (POC) 40 ppm    Specimen type (POC) ARTERIAL      Total resp. rate 27     CARBOXY HEMOGLOBIN    Collection Time: 08/22/20  4:00 AM   Result Value Ref Range    Carboxy-Hgb 1.5 1 - 2 %    Methemoglobin 1.3 0 - 1.4 %    tHb 10.6 (L) 14 - 17 g/dL    Oxyhemoglobin 94.2 94 - 97 %    O2 SATURATION 97 95 - 99 %   C REACTIVE PROTEIN, QT    Collection Time: 08/22/20  4:32 AM   Result Value Ref Range    C-Reactive protein 10.30 (H) 0.00 - 0.60 mg/dL   CBC WITH AUTOMATED DIFF    Collection Time: 08/22/20  4:32 AM   Result Value Ref Range    WBC 10.3 4.1 - 11.1 K/uL    RBC 3.19 (L) 4.10 - 5.70 M/uL    HGB 9.6 (L) 12.1 - 17.0 g/dL    HCT 29.5 (L) 36.6 - 50.3 %    MCV 92.5 80.0 - 99.0 FL    MCH 30.1 26.0 - 34.0 PG    MCHC 32.5 30.0 - 36.5 g/dL    RDW 13.1 11.5 - 14.5 %    PLATELET 378 (L) 591 - 400 K/uL    MPV 11.0 8.9 - 12.9 FL    NRBC 0.0 0  WBC    ABSOLUTE NRBC 0.00 0.00 - 0.01 K/uL    NEUTROPHILS 76 (H) 32 - 75 %    LYMPHOCYTES 9 (L) 12 - 49 %    MONOCYTES 9 5 - 13 %    EOSINOPHILS 4 0 - 7 %    BASOPHILS 0 0 - 1 %    IMMATURE GRANULOCYTES 2 (H) 0.0 - 0.5 %    ABS. NEUTROPHILS 7.9 1.8 - 8.0 K/UL    ABS. LYMPHOCYTES 0.9 0.8 - 3.5 K/UL    ABS. MONOCYTES 0.9 0.0 - 1.0 K/UL    ABS. EOSINOPHILS 0.4 0.0 - 0.4 K/UL    ABS. BASOPHILS 0.0 0.0 - 0.1 K/UL    ABS. IMM.  GRANS. 0.2 (H) 0.00 - 0.04 K/UL    DF SMEAR SCANNED      RBC COMMENTS NORMOCYTIC, NORMOCHROMIC     CK    Collection Time: 08/22/20  4:32 AM   Result Value Ref Range     39 - 308 U/L   D DIMER    Collection Time: 08/22/20  4:32 AM   Result Value Ref Range    D-dimer 14.01 (H) 0.00 - 0.65 mg/L FEU   FERRITIN    Collection Time: 08/22/20  4:32 AM   Result Value Ref Range    Ferritin 1,739 (H) 26 - 388 NG/ML   FIBRINOGEN    Collection Time: 08/22/20  4:32 AM   Result Value Ref Range    Fibrinogen 720 (H) 200 - 475 mg/dL   LD    Collection Time: 08/22/20  4:32 AM   Result Value Ref Range     (H) 85 - 241 U/L   MAGNESIUM    Collection Time: 08/22/20  4:32 AM   Result Value Ref Range    Magnesium 2.4 1.6 - 2.4 mg/dL   METABOLIC PANEL, COMPREHENSIVE    Collection Time: 08/22/20  4:32 AM   Result Value Ref Range    Sodium 137 136 - 145 mmol/L    Potassium 3.1 (L) 3.5 - 5.1 mmol/L    Chloride 99 97 - 108 mmol/L    CO2 34 (H) 21 - 32 mmol/L    Anion gap 4 (L) 5 - 15 mmol/L    Glucose 106 (H) 65 - 100 mg/dL    BUN 24 (H) 6 - 20 MG/DL    Creatinine 0.62 (L) 0.70 - 1.30 MG/DL    BUN/Creatinine ratio 39 (H) 12 - 20      GFR est AA >60 >60 ml/min/1.73m2    GFR est non-AA >60 >60 ml/min/1.73m2    Calcium 8.4 (L) 8.5 - 10.1 MG/DL    Bilirubin, total 1.2 (H) 0.2 - 1.0 MG/DL    ALT (SGPT) 84 (H) 12 - 78 U/L    AST (SGOT) 26 15 - 37 U/L    Alk.  phosphatase 116 45 - 117 U/L    Protein, total 6.5 6.4 - 8.2 g/dL    Albumin 2.2 (L) 3.5 - 5.0 g/dL    Globulin 4.3 (H) 2.0 - 4.0 g/dL    A-G Ratio 0.5 (L) 1.1 - 2.2     PHOSPHORUS    Collection Time: 08/22/20  4:32 AM   Result Value Ref Range    Phosphorus 2.3 (L) 2.6 - 4.7 MG/DL   PROCALCITONIN    Collection Time: 08/22/20  4:32 AM   Result Value Ref Range    Procalcitonin 0.17 ng/mL   GLUCOSE, POC    Collection Time: 08/22/20  6:15 AM   Result Value Ref Range    Glucose (POC) 110 (H) 65 - 100 mg/dL    Performed by Neelima Cohn           Radiology:        Current Facility-Administered Medications   Medication Dose Route Frequency    potassium chloride 20 mEq in 50 ml IVPB  20 mEq IntraVENous Q2H    midazolam in normal saline (VERSED) 1 mg/mL infusion  0-10 mg/hr IntraVENous TITRATE    cefepime (MAXIPIME) 2 g in 0.9% sodium chloride (MBP/ADV) 100 mL  2 g IntraVENous Q8H    Vancomycin - Pharmacy to Dose   Other Rx Dosing/Monitoring    famotidine (PF) (PEPCID) 20 mg in 0.9% sodium chloride 10 mL injection  20 mg IntraVENous Q12H    vancomycin (VANCOCIN) 1500 mg in  ml infusion  1,500 mg IntraVENous Q8H    midazolam (VERSED) injection 1-2 mg  1-2 mg IntraVENous Q2H PRN    bumetanide (BUMEX) injection 1 mg  1 mg IntraVENous BID    docusate (COLACE) 50 mg/5 mL oral liquid 100 mg  100 mg Per NG tube BID    enoxaparin (LOVENOX) injection 50 mg  0.5 mg/kg SubCUTAneous Q12H    guaiFENesin (ROBITUSSIN) 100 mg/5 mL oral liquid 400 mg  400 mg Oral Q4H    insulin lispro (HUMALOG) injection   SubCUTAneous Q6H    senna (SENOKOT) tablet 8.6 mg  1 Tab Per NG tube BID    acetaminophen (TYLENOL) tablet 650 mg  650 mg Oral Q6H PRN    Or    acetaminophen (TYLENOL) suppository 650 mg  650 mg Rectal Q6H PRN    alteplase (CATHFLO) 1 mg in sterile water (preservative free) 1 mL injection  1 mg InterCATHeter PRN    polyvinyl alcohol-povidone (NATURAL TEARS) 0.5-0.6 % ophthalmic solution 1 Drop  1 Drop Both Eyes PRN    dextrose 10% infusion 0-250 mL  0-250 mL IntraVENous PRN    glucagon (GLUCAGEN) injection 1 mg  1 mg IntraMUSCular PRN    glucose chewable tablet 16 g  4 Tab Oral PRN    LORazepam (ATIVAN) injection 0.5 mg  0.5 mg IntraVENous Q6H PRN    polyethylene glycol (MIRALAX) packet 17 g  17 g Oral DAILY PRN    promethazine (PHENERGAN) tablet 12.5 mg  12.5 mg Oral Q6H PRN    Or    ondansetron (ZOFRAN) injection 4 mg  4 mg IntraVENous Q6H PRN    sodium chloride (NS) flush 5-40 mL  5-40 mL IntraVENous Q8H    sodium chloride (NS) flush 5-40 mL  5-40 mL IntraVENous PRN    fentaNYL (PF) 1,500 mcg/30 mL (50 mcg/mL) infusion  0-300 mcg/hr IntraVENous TITRATE    propofol (DIPRIVAN) 10 mg/mL infusion  0-50 mcg/kg/min IntraVENous TITRATE    [Held by provider] cisatracurium (NIMBEX) 2 mg/mL IV infusion  0-10 mcg/kg/min IntraVENous TITRATE    aspirin chewable tablet 81 mg  81 mg Per NG tube DAILY    chlorhexidine (ORAL CARE KIT) 0.12 % mouthwash 15 mL  15 mL Oral Q12H    dexamethasone (PF) (DECADRON) 10 mg/mL injection 6 mg  6 mg IntraVENous DAILY    albuterol-ipratropium (DUO-NEB) 2.5 MG-0.5 MG/3 ML  3 mL Nebulization Q6H PRN    0.9% sodium chloride infusion  5 mL/hr IntraVENous CONTINUOUS    NOREPINephrine (LEVOPHED) 8 mg in 5% dextrose 250mL (32 mcg/mL) infusion  0.5-30 mcg/min IntraVENous TITRATE     Kulwinder Bennett M.D.  [facility]  Electrophysiology/Cardiology  [facility]  [address] [place] [address]                              [phone]

## 2020-08-22 NOTE — PROGRESS NOTES
[facility]    ICU TEAM Progress Note    Name: Maximus Escudero   : 1990   MRN: [id number]   Date: 2020      Assessment:     ICU Problems: admitted 20 with severe Covid s/p trach     1. Covid-19 - PNA fibroproliferative ARDS   2. Resp failure s/p trach  3. PTX with CT  4. Sinus pauses cards seeing - no more events overnight   a. Avoid blocking agents  5. Tob use     Overnight Events:   2020  About same 2 pauses on tele - 2 secs and 7.8 secs cards aware     Active Problem List:     Problem List  Date Reviewed: 2020          Codes Class    Acute respiratory failure ([facility] [address] ICD-10-CM: J96.00  ICD-9-CM: 518.81         Pneumothorax on left ICD-10-CM: J93.9  ICD-9-CM: 512.89         Pneumonia due to severe acute respiratory syndrome coronavirus 2 (SARS-CoV-2) ICD-10-CM: U07.1, J12.89  ICD-9-CM: 480.8         Respiratory failure with hypoxia (HCC) ICD-10-CM: J96.91  ICD-9-CM: 518.81               Past Medical History:      has a past medical history of History of vascular access device (08/10/2020). Past Surgical History:      has a past surgical history that includes ir thora/ins chest tube(pneumo) wo image (2020) and ir thora/ins chest tube(pneumo) wo image (2020). Home Medications:     Prior to Admission medications    Medication Sig Start Date End Date Taking? Authorizing Provider   benzonatate (Tessalon Perles) 100 mg capsule Take 100 mg by mouth three (3) times daily as needed for Cough.     Provider, Historical       Allergies/Social/Family History:     No Known Allergies   Social History     Tobacco Use    Smoking status: Current Some Day Smoker    Smokeless tobacco: Never Used   Substance Use Topics    Alcohol use: Not on file        Objective:   Vital Signs:  Visit Vitals  /60   Pulse (!) 109   Temp 100.1 °F (37.8 °C)   Resp 9   Ht 5' 11\" (1.803 m)   Wt 98.8 kg (217 lb 13 oz)   SpO2 94%   BMI 30.38 kg/m²      O2 Device: Endotracheal tube Temp (24hrs), Av.1 °F (37.8 °C), Min:99.9 °F (37.7 °C), Max:100.2 °F (37.9 °C)           Intake/Output:     Intake/Output Summary (Last 24 hours) at 2020 0905  Last data filed at 2020 0848  Gross per 24 hour   Intake 3943.67 ml   Output 4630 ml   Net -686.33 ml       Physical Exam:    General:  On vent    Eyes:  Sclera anicteric. Pupils equally round and reactive to light. Mouth/Throat: Mucous membranes normal, oral pharynx clear   Neck: Supple   Lungs:   Clear to auscultation bilaterally, good effort   CV:  Regular rate and rhythm,no murmur, click, rub or gallop   Abdomen:   Soft, non-tender. bowel sounds normal. non-distended   Extremities: No cyanosis or edema   Skin: Skin color, texture, turgor normal. no acute rash or lesions   Lymph nodes: Cervical and supraclavicular normal   Musculoskeletal: No swelling or deformity   Lines/Devices:  Intact, no erythema, drainage or tenderness           LABS AND  DATA: Personally reviewed  Recent Labs     20  0432 20  0508   WBC 10.3 14.4*   HGB 9.6* 11.1*   HCT 29.5* 33.5*   * 174     Recent Labs     20  0432 20  0508    132*   K 3.1* 3.2*   CL 99 96*   CO2 34* 30   BUN 24* 32*   CREA 0.62* 0.84   * 140*   CA 8.4* 8.3*   MG 2.4 2.4   PHOS 2.3* 2.4*     Recent Labs     20  0432 20  0508    110   TP 6.5 6.8   ALB 2.2* 2.3*   GLOB 4.3* 4.5*     No results for input(s): INR, PTP, APTT, INREXT, INREXT in the last 72 hours.    Recent Labs     20  0328 20  0316   PHI 7.49* 7.52*   PCO2I 43.8 38.5   PO2I 70* 75*   FIO2I 40 40     Recent Labs     20  0432 20  0508 20  2110    108  --    TROIQ  --   --  0.21*       Hemodynamics:   PAP:   CO:     Wedge:   CI:     CVP:    SVR:       PVR:       Ventilator Settings:  Mode Rate Tidal Volume Pressure FiO2 PEEP   Pressure control, Assist control   0 ml  0 cm H2O 50 % 10 cm H20     Peak airway pressure: 30 cm H2O    Minute ventilation: 12.9 l/min        MEDS: Reviewed    Chest X-Ray:  CXR Results  (Last 48 hours)               08/22/20 0442  XR CHEST PORT Final result    Impression:  IMPRESSION: Persistent bilateral diffuse airspace opacification. New   pneumomediastinum. Narrative:  EXAM: XR CHEST PORT       INDICATION: Tube Placement       COMPARISON: Previous day       FINDINGS: A portable AP radiograph of the chest was obtained at 0348 hours. The   patient is on a cardiac monitor. There is diffuse bilateral airspace   opacification as seen previously. There is new pneumomediastinum. The   endotracheal tube and nasogastric tube are stable. The PICC line is stable. The   cardiac and mediastinal contours and pulmonary vascularity are normal.  The   bones and soft tissues are grossly within normal limits. 08/21/20 0551  XR CHEST PORT Final result    Impression:  IMPRESSION:    1. No significant interval change in the appearance of the chest.   2. The nasogastric tube side port is in the region of the distal esophagus and   should be advanced. Narrative:  EXAM: XR CHEST PORT       INDICATION: Worsening subcutaneous air. need to eval chest tube. COMPARISON: Previous day       FINDINGS: A portable AP radiograph of the chest was obtained at 0450 hours. The   endotracheal tube and central line are stable. There is persistent subcutaneous   emphysema. The nasogastric tube side port is in the region of the distal   esophagus. The patient is on a cardiac monitor. There is persistent bilateral   diffuse airspace opacification. The cardiac and mediastinal contours and   pulmonary vascularity are normal.  The bones and soft tissues are grossly within   normal limits. 08/20/20 0914  XR CHEST PORT Final result    Impression:  IMPRESSION:       Stable bilateral interstitial and patchy airspace opacities. Stable diffuse   chest wall subcutaneous emphysema.            Narrative:  EXAM:  XR CHEST PORT INDICATION: Bilateral lung opacities. COMPARISON: 8/20/2020 at 0440 hours       TECHNIQUE: Portable AP semiupright chest view at 0845 hours       FINDINGS: The support devices are stable. Cardiac monitoring wires overlie the   thorax. The cardiomediastinal contours are stable. There are stable bilateral interstitial and patchy airspace opacities. There is   no pleural effusion or pneumothorax. There is stable diffuse chest wall   subcutaneous emphysema. The bones are stable. Multidisciplinary Rounds Completed: Yes    ABCDEF Bundle/Checklist Completed:  Yes      DISPOSITION  Stay in ICU    CRI  I personally spent 35 minutes of critical care time. This is time spent at this critically ill patient's bedside actively involved in patient care as well as the coordination of care and discussions with the patient's family. This does not include any procedural time which has been billed separately.     [address] [place] [address]  8/22/2020

## 2020-08-22 NOTE — PROGRESS NOTES
Bedside and Verbal shift change report given to Louis Ye RN (oncoming nurse) by Valroie Friend RN (offgoing nurse). Report included the following information SBAR, Kardex, Procedure Summary, Intake/Output, Accordion, Recent Results, Cardiac Rhythm Sinus Tach and Alarm Parameters . 0800: Assessment completed per flowsheet; verified drips    Primary Nurse Matthew Alamo and Lilli Matamoros RN performed a dual skin assessment on this patient No impairment noted  Enrique score is 13    0930: Spoke with mom Leobardo Plunkett for update; requested [employer] and gave email again, explained I would attempt to [address] chat with her later today    [phone]: Pt observed to have increased work of breathing and not complying with vent; will titrate sedation in attempt to reach vent compliance    1400: Fentanyl tubing changed due to leaking; pt opens eyes spontaneously when sedation off; does not respond or follow commands    1440: Dr. Adryan Kat brought to bedside as patient is not maintaining his O2 sats; ordered Nitric restarted as well as STAT CXR to check for worsening pneumo; pt currently maxed on all sedation and is exhibiting increased work of breathing    1633:  Increased Nimbex to 1mcg    1648: Nimbex Increased to 1.5mcg    1702: Nimbex increased to 2mcg    1715: 500cc bolus ordered    1718: Nimbex increased to 2.5    1755: Nimbex increased to 3    1800: Pt temp up to 101.9; tylenol given ice packs applied; also washed pt down with cold rag in attempt to relieve temperature    1810: Nimbex increased to 3.5    1834: Nimbex increased to 4mcg    1842: Dr. Adryan Kat brought to bedside regarding patients sudden spike in temp (currently 102.6); HR in the 140s and HTN (/91; per MD pt is presenting with suspected propofol infusion syndrome and is going to order dilaudid PCA instead of propofol; also ordering blood cultures    1920: TOF 2 twitches per order    1945: Dialudid gtt started per order    1958: Versed changed

## 2020-08-23 ENCOUNTER — APPOINTMENT (OUTPATIENT)
Dept: GENERAL RADIOLOGY | Age: 30
DRG: 004 | End: 2020-08-23
Attending: NURSE PRACTITIONER
Payer: COMMERCIAL

## 2020-08-23 LAB
ALBUMIN SERPL-MCNC: 1.9 G/DL (ref 3.5–5)
ALBUMIN/GLOB SERPL: 0.4 {RATIO} (ref 1.1–2.2)
ALP SERPL-CCNC: 122 U/L (ref 45–117)
ALT SERPL-CCNC: 64 U/L (ref 12–78)
ANION GAP SERPL CALC-SCNC: 4 MMOL/L (ref 5–15)
ARTERIAL PATENCY WRIST A: YES
AST SERPL-CCNC: 23 U/L (ref 15–37)
BASE EXCESS BLD CALC-SCNC: 12 MMOL/L
BASOPHILS # BLD: 0 K/UL (ref 0–0.1)
BASOPHILS NFR BLD: 0 % (ref 0–1)
BDY SITE: ABNORMAL
BILIRUB SERPL-MCNC: 1.3 MG/DL (ref 0.2–1)
BUN SERPL-MCNC: 19 MG/DL (ref 6–20)
BUN/CREAT SERPL: 37 (ref 12–20)
CA-I BLD-SCNC: 1.16 MMOL/L (ref 1.12–1.32)
CALCIUM SERPL-MCNC: 8.1 MG/DL (ref 8.5–10.1)
CHLORIDE SERPL-SCNC: 100 MMOL/L (ref 97–108)
CK SERPL-CCNC: 77 U/L (ref 39–308)
CO2 SERPL-SCNC: 32 MMOL/L (ref 21–32)
COHGB MFR BLD: 1.8 % (ref 1–2)
CREAT SERPL-MCNC: 0.52 MG/DL (ref 0.7–1.3)
CRP SERPL-MCNC: 16.2 MG/DL (ref 0–0.6)
D DIMER PPP FEU-MCNC: 21.65 MG/L FEU (ref 0–0.65)
DIFFERENTIAL METHOD BLD: ABNORMAL
EOSINOPHIL # BLD: 0.3 K/UL (ref 0–0.4)
EOSINOPHIL NFR BLD: 3 % (ref 0–7)
ERYTHROCYTE [DISTWIDTH] IN BLOOD BY AUTOMATED COUNT: 13.2 % (ref 11.5–14.5)
FERRITIN SERPL-MCNC: 1675 NG/ML (ref 26–388)
FIBRINOGEN PPP-MCNC: >800 MG/DL (ref 200–475)
GAS FLOW.O2 O2 DELIVERY SYS: ABNORMAL L/MIN
GAS FLOW.O2 SETTING OXYMISER: 26 BPM
GLOBULIN SER CALC-MCNC: 4.4 G/DL (ref 2–4)
GLUCOSE BLD STRIP.AUTO-MCNC: 104 MG/DL (ref 65–100)
GLUCOSE BLD STRIP.AUTO-MCNC: 106 MG/DL (ref 65–100)
GLUCOSE BLD STRIP.AUTO-MCNC: 110 MG/DL (ref 65–100)
GLUCOSE BLD STRIP.AUTO-MCNC: 153 MG/DL (ref 65–100)
GLUCOSE SERPL-MCNC: 107 MG/DL (ref 65–100)
HCO3 BLD-SCNC: 35 MMOL/L (ref 22–26)
HCT VFR BLD AUTO: 28.6 % (ref 36.6–50.3)
HGB BLD OXIMETRY-MCNC: 8.5 G/DL (ref 14–17)
HGB BLD-MCNC: 9.1 G/DL (ref 12.1–17)
IMM GRANULOCYTES # BLD AUTO: 0.1 K/UL (ref 0–0.04)
IMM GRANULOCYTES NFR BLD AUTO: 1 % (ref 0–0.5)
INSPIRATION.DURATION SETTING TIME VENT: 1 SEC
LACTATE SERPL-SCNC: 0.6 MMOL/L (ref 0.4–2)
LDH SERPL L TO P-CCNC: 253 U/L (ref 85–241)
LYMPHOCYTES # BLD: 0.8 K/UL (ref 0.8–3.5)
LYMPHOCYTES NFR BLD: 7 % (ref 12–49)
MAGNESIUM SERPL-MCNC: 2.4 MG/DL (ref 1.6–2.4)
MCH RBC QN AUTO: 30.1 PG (ref 26–34)
MCHC RBC AUTO-ENTMCNC: 31.8 G/DL (ref 30–36.5)
MCV RBC AUTO: 94.7 FL (ref 80–99)
METHGB MFR BLD: 0.5 % (ref 0–1.4)
MONOCYTES # BLD: 0.7 K/UL (ref 0–1)
MONOCYTES NFR BLD: 6 % (ref 5–13)
NEUTS SEG # BLD: 9.1 K/UL (ref 1.8–8)
NEUTS SEG NFR BLD: 83 % (ref 32–75)
NITRIC:PPM ISTAT,INITR: 5 PPM
NRBC # BLD: 0 K/UL (ref 0–0.01)
NRBC BLD-RTO: 0 PER 100 WBC
O2/TOTAL GAS SETTING VFR VENT: 60 %
OXYHGB MFR BLD: 96.7 % (ref 94–97)
PCO2 BLD: 47.1 MMHG (ref 35–45)
PEEP RESPIRATORY: 10 CMH2O
PH BLD: 7.48 [PH] (ref 7.35–7.45)
PHOSPHATE SERPL-MCNC: 1.9 MG/DL (ref 2.6–4.7)
PIP ISTAT,IPIP: 22
PLATELET # BLD AUTO: 109 K/UL (ref 150–400)
PMV BLD AUTO: 10.8 FL (ref 8.9–12.9)
PO2 BLD: 78 MMHG (ref 80–100)
POTASSIUM SERPL-SCNC: 3.7 MMOL/L (ref 3.5–5.1)
PROCALCITONIN SERPL-MCNC: 1.63 NG/ML
PROT SERPL-MCNC: 6.3 G/DL (ref 6.4–8.2)
RBC # BLD AUTO: 3.02 M/UL (ref 4.1–5.7)
RBC MORPH BLD: ABNORMAL
SAO2 % BLD: 96 % (ref 92–97)
SAO2 % BLD: 99 % (ref 95–99)
SERVICE CMNT-IMP: ABNORMAL
SODIUM SERPL-SCNC: 136 MMOL/L (ref 136–145)
SPECIMEN TYPE: ABNORMAL
TOTAL RESP. RATE, ITRR: 26
VENTILATION MODE VENT: ABNORMAL
WBC # BLD AUTO: 11 K/UL (ref 4.1–11.1)

## 2020-08-23 PROCEDURE — 86140 C-REACTIVE PROTEIN: CPT

## 2020-08-23 PROCEDURE — 36415 COLL VENOUS BLD VENIPUNCTURE: CPT

## 2020-08-23 PROCEDURE — 74011250636 HC RX REV CODE- 250/636: Performed by: NURSE PRACTITIONER

## 2020-08-23 PROCEDURE — 85379 FIBRIN DEGRADATION QUANT: CPT

## 2020-08-23 PROCEDURE — 84145 PROCALCITONIN (PCT): CPT

## 2020-08-23 PROCEDURE — 36600 WITHDRAWAL OF ARTERIAL BLOOD: CPT

## 2020-08-23 PROCEDURE — 73610000026 HC INO THERAPY EACH HOUR

## 2020-08-23 PROCEDURE — 80053 COMPREHEN METABOLIC PANEL: CPT

## 2020-08-23 PROCEDURE — 74011636637 HC RX REV CODE- 636/637: Performed by: NURSE PRACTITIONER

## 2020-08-23 PROCEDURE — 74011000258 HC RX REV CODE- 258: Performed by: INTERNAL MEDICINE

## 2020-08-23 PROCEDURE — 82962 GLUCOSE BLOOD TEST: CPT

## 2020-08-23 PROCEDURE — C1788 PORT, INDWELLING, IMP: HCPCS

## 2020-08-23 PROCEDURE — 94003 VENT MGMT INPAT SUBQ DAY: CPT

## 2020-08-23 PROCEDURE — 71045 X-RAY EXAM CHEST 1 VIEW: CPT

## 2020-08-23 PROCEDURE — 83615 LACTATE (LD) (LDH) ENZYME: CPT

## 2020-08-23 PROCEDURE — 74011000258 HC RX REV CODE- 258: Performed by: HOSPITALIST

## 2020-08-23 PROCEDURE — 74011250637 HC RX REV CODE- 250/637: Performed by: HOSPITALIST

## 2020-08-23 PROCEDURE — 82803 BLOOD GASES ANY COMBINATION: CPT

## 2020-08-23 PROCEDURE — 74011000250 HC RX REV CODE- 250: Performed by: INTERNAL MEDICINE

## 2020-08-23 PROCEDURE — 82375 ASSAY CARBOXYHB QUANT: CPT

## 2020-08-23 PROCEDURE — 65610000006 HC RM INTENSIVE CARE

## 2020-08-23 PROCEDURE — 85384 FIBRINOGEN ACTIVITY: CPT

## 2020-08-23 PROCEDURE — 84100 ASSAY OF PHOSPHORUS: CPT

## 2020-08-23 PROCEDURE — 74011000250 HC RX REV CODE- 250: Performed by: NURSE PRACTITIONER

## 2020-08-23 PROCEDURE — 74011000250 HC RX REV CODE- 250: Performed by: HOSPITALIST

## 2020-08-23 PROCEDURE — 82728 ASSAY OF FERRITIN: CPT

## 2020-08-23 PROCEDURE — 82550 ASSAY OF CK (CPK): CPT

## 2020-08-23 PROCEDURE — 85025 COMPLETE CBC W/AUTO DIFF WBC: CPT

## 2020-08-23 PROCEDURE — 83605 ASSAY OF LACTIC ACID: CPT

## 2020-08-23 PROCEDURE — 74011250636 HC RX REV CODE- 250/636: Performed by: HOSPITALIST

## 2020-08-23 PROCEDURE — 74011250637 HC RX REV CODE- 250/637: Performed by: NURSE PRACTITIONER

## 2020-08-23 PROCEDURE — 74011250636 HC RX REV CODE- 250/636: Performed by: INTERNAL MEDICINE

## 2020-08-23 PROCEDURE — 83735 ASSAY OF MAGNESIUM: CPT

## 2020-08-23 RX ORDER — POTASSIUM CHLORIDE 29.8 MG/ML
20 INJECTION INTRAVENOUS ONCE
Status: COMPLETED | OUTPATIENT
Start: 2020-08-23 | End: 2020-08-24

## 2020-08-23 RX ADMIN — CHLORHEXIDINE GLUCONATE 15 ML: 0.12 RINSE ORAL at 08:35

## 2020-08-23 RX ADMIN — VANCOMYCIN HYDROCHLORIDE 1500 MG: 10 INJECTION, POWDER, LYOPHILIZED, FOR SOLUTION INTRAVENOUS at 12:55

## 2020-08-23 RX ADMIN — KETAMINE HYDROCHLORIDE 1 MG/KG/HR: 50 INJECTION, SOLUTION INTRAMUSCULAR; INTRAVENOUS at 23:27

## 2020-08-23 RX ADMIN — ASPIRIN 81 MG CHEWABLE TABLET 81 MG: 81 TABLET CHEWABLE at 08:33

## 2020-08-23 RX ADMIN — GUAIFENESIN 400 MG: 100 SOLUTION ORAL at 04:48

## 2020-08-23 RX ADMIN — DOCUSATE SODIUM 100 MG: 50 LIQUID ORAL at 18:32

## 2020-08-23 RX ADMIN — CEFEPIME 2 G: 2 INJECTION, POWDER, FOR SOLUTION INTRAVENOUS at 20:12

## 2020-08-23 RX ADMIN — Medication 10 ML: at 05:25

## 2020-08-23 RX ADMIN — Medication 4 MG/HR: at 14:26

## 2020-08-23 RX ADMIN — DEXAMETHASONE SODIUM PHOSPHATE 6 MG: 10 INJECTION INTRAMUSCULAR; INTRAVENOUS at 08:34

## 2020-08-23 RX ADMIN — CISATRACURIUM BESYLATE 4 MCG/KG/MIN: 20 INJECTION INTRAVENOUS at 05:43

## 2020-08-23 RX ADMIN — MIDAZOLAM HYDROCHLORIDE 10 MG/HR: 5 INJECTION, SOLUTION INTRAMUSCULAR; INTRAVENOUS at 18:00

## 2020-08-23 RX ADMIN — POTASSIUM PHOSPHATE, MONOBASIC POTASSIUM PHOSPHATE, DIBASIC: 224; 236 INJECTION, SOLUTION, CONCENTRATE INTRAVENOUS at 07:17

## 2020-08-23 RX ADMIN — Medication 10 ML: at 22:06

## 2020-08-23 RX ADMIN — ENOXAPARIN SODIUM 50 MG: 60 INJECTION SUBCUTANEOUS at 11:44

## 2020-08-23 RX ADMIN — POTASSIUM CHLORIDE 20 MEQ: 400 INJECTION, SOLUTION INTRAVENOUS at 07:17

## 2020-08-23 RX ADMIN — DOCUSATE SODIUM 100 MG: 50 LIQUID ORAL at 08:34

## 2020-08-23 RX ADMIN — FENTANYL CITRATE 100 MCG: 50 INJECTION, SOLUTION INTRAMUSCULAR; INTRAVENOUS at 04:48

## 2020-08-23 RX ADMIN — FENTANYL CITRATE 100 MCG: 50 INJECTION, SOLUTION INTRAMUSCULAR; INTRAVENOUS at 23:49

## 2020-08-23 RX ADMIN — SENNOSIDES 8.6 MG: 8.6 TABLET, FILM COATED ORAL at 20:38

## 2020-08-23 RX ADMIN — Medication 4 MG/HR: at 20:27

## 2020-08-23 RX ADMIN — FAMOTIDINE 20 MG: 10 INJECTION, SOLUTION INTRAVENOUS at 20:34

## 2020-08-23 RX ADMIN — KETAMINE HYDROCHLORIDE 0.05 MG/KG/HR: 50 INJECTION, SOLUTION INTRAMUSCULAR; INTRAVENOUS at 11:28

## 2020-08-23 RX ADMIN — CEFEPIME 2 G: 2 INJECTION, POWDER, FOR SOLUTION INTRAVENOUS at 04:47

## 2020-08-23 RX ADMIN — Medication 4 MG/HR: at 03:06

## 2020-08-23 RX ADMIN — CISATRACURIUM BESYLATE 4 MCG/KG/MIN: 20 INJECTION INTRAVENOUS at 00:42

## 2020-08-23 RX ADMIN — ENOXAPARIN SODIUM 50 MG: 60 INJECTION SUBCUTANEOUS at 21:02

## 2020-08-23 RX ADMIN — GUAIFENESIN 400 MG: 100 SOLUTION ORAL at 23:59

## 2020-08-23 RX ADMIN — GUAIFENESIN 400 MG: 100 SOLUTION ORAL at 00:18

## 2020-08-23 RX ADMIN — MIDAZOLAM HYDROCHLORIDE 10 MG/HR: 5 INJECTION, SOLUTION INTRAMUSCULAR; INTRAVENOUS at 05:43

## 2020-08-23 RX ADMIN — Medication 6 MG/HR: at 09:34

## 2020-08-23 RX ADMIN — VANCOMYCIN HYDROCHLORIDE 1500 MG: 10 INJECTION, POWDER, LYOPHILIZED, FOR SOLUTION INTRAVENOUS at 20:12

## 2020-08-23 RX ADMIN — CISATRACURIUM BESYLATE 4 MCG/KG/MIN: 20 INJECTION INTRAVENOUS at 14:00

## 2020-08-23 RX ADMIN — CISATRACURIUM BESYLATE 4 MCG/KG/MIN: 20 INJECTION INTRAVENOUS at 21:57

## 2020-08-23 RX ADMIN — VANCOMYCIN HYDROCHLORIDE 1500 MG: 10 INJECTION, POWDER, LYOPHILIZED, FOR SOLUTION INTRAVENOUS at 04:47

## 2020-08-23 RX ADMIN — FAMOTIDINE 20 MG: 10 INJECTION, SOLUTION INTRAVENOUS at 08:34

## 2020-08-23 RX ADMIN — GUAIFENESIN 400 MG: 100 SOLUTION ORAL at 08:33

## 2020-08-23 RX ADMIN — GUAIFENESIN 400 MG: 100 SOLUTION ORAL at 16:23

## 2020-08-23 RX ADMIN — SENNOSIDES 8.6 MG: 8.6 TABLET, FILM COATED ORAL at 08:34

## 2020-08-23 RX ADMIN — MIDAZOLAM HYDROCHLORIDE 1 MG: 2 INJECTION, SOLUTION INTRAMUSCULAR; INTRAVENOUS at 23:49

## 2020-08-23 RX ADMIN — CHLORHEXIDINE GLUCONATE 15 ML: 0.12 RINSE ORAL at 20:32

## 2020-08-23 RX ADMIN — INSULIN LISPRO 2 UNITS: 100 INJECTION, SOLUTION INTRAVENOUS; SUBCUTANEOUS at 12:53

## 2020-08-23 RX ADMIN — CEFEPIME 2 G: 2 INJECTION, POWDER, FOR SOLUTION INTRAVENOUS at 12:38

## 2020-08-23 RX ADMIN — GUAIFENESIN 400 MG: 100 SOLUTION ORAL at 12:38

## 2020-08-23 RX ADMIN — KETAMINE HYDROCHLORIDE 1 MG/KG/HR: 50 INJECTION, SOLUTION INTRAMUSCULAR; INTRAVENOUS at 18:31

## 2020-08-23 RX ADMIN — GUAIFENESIN 400 MG: 100 SOLUTION ORAL at 20:34

## 2020-08-23 RX ADMIN — Medication 10 ML: at 16:23

## 2020-08-23 NOTE — PROGRESS NOTES
0730: Bedside, Verbal and Written shift change report given to PERI Berg RN (oncoming nurse) by JONATHAN Preston (offgoing nurse). Report included the following information SBAR, Kardex, Intake/Output, MAR and Cardiac Rhythm S. Tach. 0750: Pt HR elevated with any verbal stimulation. BP elevated. 0800: Spoke with Dr. Milvia Rascon, Pt's RASS score -3 while paralyzed. Requested further sedation. Orders received, See MAR.     1100: Pt HR greater than 150 with BP sys > 180. Sedation increased. 1600: Pt's bathed. 1930: Bedside, Verbal and Written shift change report given to  (oncoming nurse) by PERI Berg RN (offgoing nurse). Report included the following information SBAR, Kardex, MAR and Cardiac Rhythm S. Tach.

## 2020-08-23 NOTE — PROGRESS NOTES
Problem: Ventilator Management  Goal: *Adequate oxygenation and ventilation  Outcome: Progressing Towards Goal  Goal: *Patient maintains clear airway/free of aspiration  Outcome: Progressing Towards Goal  Goal: *Absence of infection signs and symptoms  Outcome: Progressing Towards Goal  Goal: *Normal spontaneous ventilation  Outcome: Progressing Towards Goal     Problem: Pressure Injury - Risk of  Goal: *Prevention of pressure injury  Description: Document Enrique Scale and appropriate interventions in the flowsheet. Outcome: Progressing Towards Goal  Note: Pressure Injury Interventions:  Sensory Interventions: Assess changes in LOC, Assess need for specialty bed, Avoid rigorous massage over bony prominences, Check visual cues for pain, Float heels, Keep linens dry and wrinkle-free, Minimize linen layers, Monitor skin under medical devices, Pressure redistribution bed/mattress (bed type), Turn and reposition approx. every two hours (pillows and wedges if needed)    Moisture Interventions: Absorbent underpads, Assess need for specialty bed, Check for incontinence Q2 hours and as needed, Contain wound drainage, Internal/External urinary devices, Minimize layers    Activity Interventions: Assess need for specialty bed, Pressure redistribution bed/mattress(bed type)    Mobility Interventions: Assess need for specialty bed, Float heels, HOB 30 degrees or less, Pressure redistribution bed/mattress (bed type), Turn and reposition approx. every two hours(pillow and wedges)    Nutrition Interventions: Document food/fluid/supplement intake    Friction and Shear Interventions: HOB 30 degrees or less                Problem: Falls - Risk of  Goal: *Absence of Falls  Description: Document Adama Fall Risk and appropriate interventions in the flowsheet.   Outcome: Progressing Towards Goal  Note: Fall Risk Interventions:  Mobility Interventions: Communicate number of staff needed for ambulation/transfer    Mentation Interventions: Adequate sleep, hydration, pain control, Evaluate medications/consider consulting pharmacy, Reorient patient, Room close to nurse's station, Update white board    Medication Interventions: Evaluate medications/consider consulting pharmacy    Elimination Interventions:  Toileting schedule/hourly rounds    History of Falls Interventions: Evaluate medications/consider consulting pharmacy, Room close to nurse's station         Problem: Nutrition Deficit  Goal: *Optimize nutritional status  Outcome: Progressing Towards Goal     Problem: Breathing Pattern - Ineffective  Goal: *Absence of hypoxia  Outcome: Progressing Towards Goal     Problem: Non-Violent Restraints  Goal: *Removal from restraints as soon as assessed to be safe  Outcome: Progressing Towards Goal  Goal: *No harm/injury to patient while restraints in use  Outcome: Progressing Towards Goal  Goal: *Patient's dignity will be maintained  Outcome: Progressing Towards Goal  Goal: Non-violent Restaints:Standard Interventions  Outcome: Progressing Towards Goal

## 2020-08-23 NOTE — PROGRESS NOTES
Day #5 of Vancomycin  Indication:  Sepsis of unknown etiology - worsening leukocytosis, low-grade fevers; possible PNA  - COVID-19+  Current regimen:  1500 mg IV Q 8 hr  Abx regimen:  Cefepime + Vancomycin  ID Following ?: NO  Concomitant nephrotoxic drugs (requires more frequent monitoring): Loop diuretics (d/c'd), Vasopressors  Frequency of BMP?: Daily    Recent Labs     20  0542 20  1938 20  0432 20  0508   WBC 11.0  --  10.3 14.4*   CREA 0.52* 0.52* 0.62* 0.84   BUN  24* 32*     Est CrCl: >100 ml/min; UO: >1 ml/kg/hr  Temp (24hrs), Av °F (37.8 °C), Min:99.3 °F (37.4 °C), Max:101.3 °F (38.5 °C)    Cultures:    Blood: NG, final   C. diff: (-)   Blood: NG, final   Sputum: few yeast (apparent candida albicans) + rare normal greg, final   Sputum: light normal greg + few yeast, final   Blood: NGTD   Blood: NGTD    Goal trough = 15 - 20 mcg/mL    Recent trough history (date/time/level/dose/action taken):   @  = 15.8 mcg/ml (drawn ~9 hrs post-dose, extrapolated to ~17.7 mcg/ml), no change    Plan: Continue current regimen. No significant changes in renal function, still febrile.  Ordered trough level prior to the dose on  @ 1000 Maryam Terrazas, PharmD, BCPP, [facility] Specialist, [facility]

## 2020-08-23 NOTE — PROGRESS NOTES
1930: Bedside and Verbal shift change report given to Adela Cristina (oncoming nurse) by Zuri Farooq (offgoing nurse). Report included the following information SBAR, Kardex, ED Summary, Procedure Summary, MAR, Recent Results and Cardiac Rhythm ST.     2000: Shift assessment completed per flowsheet. Patient sedated and paralyzed. TOF 1 twitch at 9. Nimbex at 4 mcg. Propofol gtt titrated off per MD order. Levophed gtt restarted at 1 mcg for MAP 60-62. Paired blood cultures drawn and sent to lab. Fentanyl gtt being titrated down per MD order. Dilaudid gtt being titrated up per MD order. Mom having zoom meeting with patient. 0000: Reassessment completed per flowsheet. Patient sedated and paralyzed. TOF 1 twitch at 9. Nimbex at 4 mcg. Fentanyl gtt being titrated down per NP order. Versed at 10 mg, Dilaudid at 4 mg, Levophed at 1 mcg.    0400: Reassessment completed per flowsheet. Patient sedated and paralyzed. TOF 1 twitch at 9. Nimbex at 4 mcg. Versed at 10 mg, Dilaudid at 4 mg, Levophed at 1 mcg.    0730: Bedside and Verbal shift change report given to Govind Parker (oncoming nurse) by Adela Cristina (offgoing nurse). Report included the following information SBAR, Kardex, ED Summary, Procedure Summary, MAR, Recent Results and Cardiac Rhythm NSR/ST.

## 2020-08-24 ENCOUNTER — APPOINTMENT (OUTPATIENT)
Dept: GENERAL RADIOLOGY | Age: 30
DRG: 004 | End: 2020-08-24
Attending: NURSE PRACTITIONER
Payer: COMMERCIAL

## 2020-08-24 LAB
ALBUMIN SERPL-MCNC: 2.1 G/DL (ref 3.5–5)
ALBUMIN/GLOB SERPL: 0.5 {RATIO} (ref 1.1–2.2)
ALP SERPL-CCNC: 122 U/L (ref 45–117)
ALT SERPL-CCNC: 72 U/L (ref 12–78)
ANION GAP SERPL CALC-SCNC: 7 MMOL/L (ref 5–15)
ARTERIAL PATENCY WRIST A: ABNORMAL
AST SERPL-CCNC: 32 U/L (ref 15–37)
BACTERIA SPEC CULT: NORMAL
BASE EXCESS BLD CALC-SCNC: 5 MMOL/L
BASOPHILS # BLD: 0 K/UL (ref 0–0.1)
BASOPHILS NFR BLD: 0 % (ref 0–1)
BDY SITE: ABNORMAL
BILIRUB SERPL-MCNC: 0.9 MG/DL (ref 0.2–1)
BUN SERPL-MCNC: 18 MG/DL (ref 6–20)
BUN/CREAT SERPL: 32 (ref 12–20)
CA-I BLD-SCNC: 1.31 MMOL/L (ref 1.12–1.32)
CALCIUM SERPL-MCNC: 8.2 MG/DL (ref 8.5–10.1)
CHLORIDE SERPL-SCNC: 103 MMOL/L (ref 97–108)
CK SERPL-CCNC: 73 U/L (ref 39–308)
CO2 SERPL-SCNC: 30 MMOL/L (ref 21–32)
CREAT SERPL-MCNC: 0.56 MG/DL (ref 0.7–1.3)
CRP SERPL-MCNC: 13.1 MG/DL (ref 0–0.6)
D DIMER PPP FEU-MCNC: 26.34 MG/L FEU (ref 0–0.65)
DATE LAST DOSE: ABNORMAL
DIFFERENTIAL METHOD BLD: ABNORMAL
EOSINOPHIL # BLD: 0.2 K/UL (ref 0–0.4)
EOSINOPHIL NFR BLD: 2 % (ref 0–7)
ERYTHROCYTE [DISTWIDTH] IN BLOOD BY AUTOMATED COUNT: 13.6 % (ref 11.5–14.5)
FERRITIN SERPL-MCNC: 1660 NG/ML (ref 26–388)
FIBRINOGEN PPP-MCNC: >800 MG/DL (ref 200–475)
GAS FLOW.O2 O2 DELIVERY SYS: ABNORMAL L/MIN
GAS FLOW.O2 SETTING OXYMISER: 26 BPM
GLOBULIN SER CALC-MCNC: 4.1 G/DL (ref 2–4)
GLUCOSE BLD STRIP.AUTO-MCNC: 102 MG/DL (ref 65–100)
GLUCOSE BLD STRIP.AUTO-MCNC: 140 MG/DL (ref 65–100)
GLUCOSE BLD STRIP.AUTO-MCNC: 155 MG/DL (ref 65–100)
GLUCOSE BLD STRIP.AUTO-MCNC: 99 MG/DL (ref 65–100)
GLUCOSE BLD STRIP.AUTO-MCNC: 99 MG/DL (ref 65–100)
GLUCOSE SERPL-MCNC: 89 MG/DL (ref 65–100)
HCO3 BLD-SCNC: 31.3 MMOL/L (ref 22–26)
HCT VFR BLD AUTO: 30.2 % (ref 36.6–50.3)
HGB BLD-MCNC: 9.3 G/DL (ref 12.1–17)
IMM GRANULOCYTES # BLD AUTO: 0.2 K/UL (ref 0–0.04)
IMM GRANULOCYTES NFR BLD AUTO: 2 % (ref 0–0.5)
INSPIRATION.DURATION SETTING TIME VENT: 1 SEC
LDH SERPL L TO P-CCNC: 324 U/L (ref 85–241)
LYMPHOCYTES # BLD: 0.9 K/UL (ref 0.8–3.5)
LYMPHOCYTES NFR BLD: 9 % (ref 12–49)
MAGNESIUM SERPL-MCNC: 2.3 MG/DL (ref 1.6–2.4)
MCH RBC QN AUTO: 29.7 PG (ref 26–34)
MCHC RBC AUTO-ENTMCNC: 30.8 G/DL (ref 30–36.5)
MCV RBC AUTO: 96.5 FL (ref 80–99)
MONOCYTES # BLD: 0.9 K/UL (ref 0–1)
MONOCYTES NFR BLD: 9 % (ref 5–13)
NEUTS SEG # BLD: 7.7 K/UL (ref 1.8–8)
NEUTS SEG NFR BLD: 78 % (ref 32–75)
NRBC # BLD: 0 K/UL (ref 0–0.01)
NRBC BLD-RTO: 0 PER 100 WBC
O2/TOTAL GAS SETTING VFR VENT: 60 %
PCO2 BLD: 60.2 MMHG (ref 35–45)
PEEP RESPIRATORY: 10 CMH2O
PH BLD: 7.32 [PH] (ref 7.35–7.45)
PHOSPHATE SERPL-MCNC: 3.3 MG/DL (ref 2.6–4.7)
PIP ISTAT,IPIP: 34
PLATELET # BLD AUTO: 155 K/UL (ref 150–400)
PMV BLD AUTO: 10.7 FL (ref 8.9–12.9)
PO2 BLD: 76 MMHG (ref 80–100)
POTASSIUM SERPL-SCNC: 3.9 MMOL/L (ref 3.5–5.1)
PROCALCITONIN SERPL-MCNC: 0.63 NG/ML
PROT SERPL-MCNC: 6.2 G/DL (ref 6.4–8.2)
RBC # BLD AUTO: 3.13 M/UL (ref 4.1–5.7)
REPORTED DOSE,DOSE: ABNORMAL UNITS
REPORTED DOSE/TIME,TMG: ABNORMAL
SAO2 % BLD: 93 % (ref 92–97)
SERVICE CMNT-IMP: ABNORMAL
SERVICE CMNT-IMP: NORMAL
SODIUM SERPL-SCNC: 140 MMOL/L (ref 136–145)
SPECIMEN TYPE: ABNORMAL
TOTAL RESP. RATE, ITRR: 26
VANCOMYCIN TROUGH SERPL-MCNC: 17.8 UG/ML (ref 5–10)
VENTILATION MODE VENT: ABNORMAL
WBC # BLD AUTO: 10 K/UL (ref 4.1–11.1)

## 2020-08-24 PROCEDURE — 85379 FIBRIN DEGRADATION QUANT: CPT

## 2020-08-24 PROCEDURE — 65610000006 HC RM INTENSIVE CARE

## 2020-08-24 PROCEDURE — 82728 ASSAY OF FERRITIN: CPT

## 2020-08-24 PROCEDURE — 82550 ASSAY OF CK (CPK): CPT

## 2020-08-24 PROCEDURE — 84145 PROCALCITONIN (PCT): CPT

## 2020-08-24 PROCEDURE — 83615 LACTATE (LD) (LDH) ENZYME: CPT

## 2020-08-24 PROCEDURE — 80053 COMPREHEN METABOLIC PANEL: CPT

## 2020-08-24 PROCEDURE — 71045 X-RAY EXAM CHEST 1 VIEW: CPT

## 2020-08-24 PROCEDURE — 82962 GLUCOSE BLOOD TEST: CPT

## 2020-08-24 PROCEDURE — 74011000250 HC RX REV CODE- 250

## 2020-08-24 PROCEDURE — 74011250636 HC RX REV CODE- 250/636: Performed by: INTERNAL MEDICINE

## 2020-08-24 PROCEDURE — 74011250637 HC RX REV CODE- 250/637: Performed by: HOSPITALIST

## 2020-08-24 PROCEDURE — 74011000250 HC RX REV CODE- 250: Performed by: HOSPITALIST

## 2020-08-24 PROCEDURE — 74011250636 HC RX REV CODE- 250/636: Performed by: NURSE PRACTITIONER

## 2020-08-24 PROCEDURE — 99291 CRITICAL CARE FIRST HOUR: CPT | Performed by: THORACIC SURGERY (CARDIOTHORACIC VASCULAR SURGERY)

## 2020-08-24 PROCEDURE — 83735 ASSAY OF MAGNESIUM: CPT

## 2020-08-24 PROCEDURE — 74011636637 HC RX REV CODE- 636/637: Performed by: NURSE PRACTITIONER

## 2020-08-24 PROCEDURE — 36415 COLL VENOUS BLD VENIPUNCTURE: CPT

## 2020-08-24 PROCEDURE — 74011000250 HC RX REV CODE- 250: Performed by: NURSE PRACTITIONER

## 2020-08-24 PROCEDURE — 85384 FIBRINOGEN ACTIVITY: CPT

## 2020-08-24 PROCEDURE — 74011250637 HC RX REV CODE- 250/637: Performed by: NURSE PRACTITIONER

## 2020-08-24 PROCEDURE — 77010033678 HC OXYGEN DAILY

## 2020-08-24 PROCEDURE — 74011000258 HC RX REV CODE- 258: Performed by: INTERNAL MEDICINE

## 2020-08-24 PROCEDURE — 80202 ASSAY OF VANCOMYCIN: CPT

## 2020-08-24 PROCEDURE — 94760 N-INVAS EAR/PLS OXIMETRY 1: CPT

## 2020-08-24 PROCEDURE — 86140 C-REACTIVE PROTEIN: CPT

## 2020-08-24 PROCEDURE — 36600 WITHDRAWAL OF ARTERIAL BLOOD: CPT

## 2020-08-24 PROCEDURE — 77030018798 HC PMP KT ENTRL FED COVD -A

## 2020-08-24 PROCEDURE — 85025 COMPLETE CBC W/AUTO DIFF WBC: CPT

## 2020-08-24 PROCEDURE — 82803 BLOOD GASES ANY COMBINATION: CPT

## 2020-08-24 PROCEDURE — 74011250637 HC RX REV CODE- 250/637: Performed by: INTERNAL MEDICINE

## 2020-08-24 PROCEDURE — 74011000258 HC RX REV CODE- 258: Performed by: HOSPITALIST

## 2020-08-24 PROCEDURE — 84100 ASSAY OF PHOSPHORUS: CPT

## 2020-08-24 PROCEDURE — 74011250636 HC RX REV CODE- 250/636: Performed by: HOSPITALIST

## 2020-08-24 PROCEDURE — 74011000250 HC RX REV CODE- 250: Performed by: INTERNAL MEDICINE

## 2020-08-24 PROCEDURE — 94003 VENT MGMT INPAT SUBQ DAY: CPT

## 2020-08-24 RX ORDER — VANCOMYCIN HYDROCHLORIDE
1250 EVERY 8 HOURS
Status: DISCONTINUED | OUTPATIENT
Start: 2020-08-24 | End: 2020-08-25

## 2020-08-24 RX ORDER — METOPROLOL TARTRATE 5 MG/5ML
5 INJECTION INTRAVENOUS ONCE
Status: COMPLETED | OUTPATIENT
Start: 2020-08-24 | End: 2020-08-24

## 2020-08-24 RX ORDER — BALSAM PERU/CASTOR OIL
OINTMENT (GRAM) TOPICAL 2 TIMES DAILY
Status: DISCONTINUED | OUTPATIENT
Start: 2020-08-24 | End: 2020-10-12 | Stop reason: HOSPADM

## 2020-08-24 RX ORDER — METOPROLOL TARTRATE 5 MG/5ML
5 INJECTION INTRAVENOUS
Status: DISCONTINUED | OUTPATIENT
Start: 2020-08-24 | End: 2020-09-25

## 2020-08-24 RX ORDER — METOPROLOL TARTRATE 5 MG/5ML
INJECTION INTRAVENOUS
Status: COMPLETED
Start: 2020-08-24 | End: 2020-08-24

## 2020-08-24 RX ORDER — POLYETHYLENE GLYCOL 3350 17 G/17G
17 POWDER, FOR SOLUTION ORAL 2 TIMES DAILY
Status: DISCONTINUED | OUTPATIENT
Start: 2020-08-24 | End: 2020-08-27

## 2020-08-24 RX ADMIN — CEFEPIME 2 G: 2 INJECTION, POWDER, FOR SOLUTION INTRAVENOUS at 11:56

## 2020-08-24 RX ADMIN — Medication 10 ML: at 06:49

## 2020-08-24 RX ADMIN — GUAIFENESIN 400 MG: 100 SOLUTION ORAL at 04:39

## 2020-08-24 RX ADMIN — FAMOTIDINE 20 MG: 10 INJECTION, SOLUTION INTRAVENOUS at 20:20

## 2020-08-24 RX ADMIN — MIDAZOLAM HYDROCHLORIDE 10 MG/HR: 5 INJECTION, SOLUTION INTRAMUSCULAR; INTRAVENOUS at 23:35

## 2020-08-24 RX ADMIN — Medication 10 ML: at 22:00

## 2020-08-24 RX ADMIN — SENNOSIDES 8.6 MG: 8.6 TABLET, FILM COATED ORAL at 08:18

## 2020-08-24 RX ADMIN — Medication 4 MG/HR: at 15:40

## 2020-08-24 RX ADMIN — VANCOMYCIN HYDROCHLORIDE 1250 MG: 10 INJECTION, POWDER, LYOPHILIZED, FOR SOLUTION INTRAVENOUS at 13:04

## 2020-08-24 RX ADMIN — KETAMINE HYDROCHLORIDE 1 MG/KG/HR: 50 INJECTION, SOLUTION INTRAMUSCULAR; INTRAVENOUS at 05:23

## 2020-08-24 RX ADMIN — KETAMINE HYDROCHLORIDE 1 MG/KG/HR: 50 INJECTION, SOLUTION INTRAMUSCULAR; INTRAVENOUS at 15:46

## 2020-08-24 RX ADMIN — Medication 4 MG/HR: at 02:57

## 2020-08-24 RX ADMIN — Medication: at 17:02

## 2020-08-24 RX ADMIN — MIDAZOLAM HYDROCHLORIDE 10 MG/HR: 5 INJECTION, SOLUTION INTRAMUSCULAR; INTRAVENOUS at 02:57

## 2020-08-24 RX ADMIN — POLYETHYLENE GLYCOL 3350 17 G: 17 POWDER, FOR SOLUTION ORAL at 17:02

## 2020-08-24 RX ADMIN — METOPROLOL TARTRATE 5 MG: 1 INJECTION, SOLUTION INTRAVENOUS at 22:35

## 2020-08-24 RX ADMIN — SENNOSIDES 8.6 MG: 8.6 TABLET, FILM COATED ORAL at 20:20

## 2020-08-24 RX ADMIN — FENTANYL CITRATE 100 MCG: 50 INJECTION, SOLUTION INTRAMUSCULAR; INTRAVENOUS at 21:58

## 2020-08-24 RX ADMIN — ASPIRIN 81 MG CHEWABLE TABLET 81 MG: 81 TABLET CHEWABLE at 08:17

## 2020-08-24 RX ADMIN — CISATRACURIUM BESYLATE 4.5 MCG/KG/MIN: 20 INJECTION INTRAVENOUS at 21:03

## 2020-08-24 RX ADMIN — VANCOMYCIN HYDROCHLORIDE 1250 MG: 10 INJECTION, POWDER, LYOPHILIZED, FOR SOLUTION INTRAVENOUS at 22:00

## 2020-08-24 RX ADMIN — CISATRACURIUM BESYLATE 3.5 MCG/KG/MIN: 20 INJECTION INTRAVENOUS at 06:51

## 2020-08-24 RX ADMIN — METOPROLOL TARTRATE 5 MG: 1 INJECTION, SOLUTION INTRAVENOUS at 10:27

## 2020-08-24 RX ADMIN — KETAMINE HYDROCHLORIDE 1 MG/KG/HR: 50 INJECTION, SOLUTION INTRAMUSCULAR; INTRAVENOUS at 10:25

## 2020-08-24 RX ADMIN — METOPROLOL TARTRATE 5 MG: 1 INJECTION, SOLUTION INTRAVENOUS at 04:47

## 2020-08-24 RX ADMIN — KETAMINE HYDROCHLORIDE 1 MG/KG/HR: 50 INJECTION, SOLUTION INTRAMUSCULAR; INTRAVENOUS at 21:07

## 2020-08-24 RX ADMIN — INSULIN LISPRO 2 UNITS: 100 INJECTION, SOLUTION INTRAVENOUS; SUBCUTANEOUS at 17:02

## 2020-08-24 RX ADMIN — POTASSIUM BICARBONATE 20 MEQ: 782 TABLET, EFFERVESCENT ORAL at 08:18

## 2020-08-24 RX ADMIN — FAMOTIDINE 20 MG: 10 INJECTION, SOLUTION INTRAVENOUS at 08:17

## 2020-08-24 RX ADMIN — DEXAMETHASONE SODIUM PHOSPHATE 6 MG: 10 INJECTION INTRAMUSCULAR; INTRAVENOUS at 08:17

## 2020-08-24 RX ADMIN — GUAIFENESIN 400 MG: 100 SOLUTION ORAL at 20:20

## 2020-08-24 RX ADMIN — CHLORHEXIDINE GLUCONATE 15 ML: 0.12 RINSE ORAL at 08:18

## 2020-08-24 RX ADMIN — GUAIFENESIN 400 MG: 100 SOLUTION ORAL at 15:41

## 2020-08-24 RX ADMIN — VANCOMYCIN HYDROCHLORIDE 1500 MG: 10 INJECTION, POWDER, LYOPHILIZED, FOR SOLUTION INTRAVENOUS at 04:38

## 2020-08-24 RX ADMIN — CHLORHEXIDINE GLUCONATE 15 ML: 0.12 RINSE ORAL at 21:00

## 2020-08-24 RX ADMIN — METOROPROLOL TARTRATE 5 MG: 5 INJECTION, SOLUTION INTRAVENOUS at 00:09

## 2020-08-24 RX ADMIN — DOCUSATE SODIUM 100 MG: 50 LIQUID ORAL at 08:18

## 2020-08-24 RX ADMIN — CEFEPIME 2 G: 2 INJECTION, POWDER, FOR SOLUTION INTRAVENOUS at 20:20

## 2020-08-24 RX ADMIN — Medication 10 ML: at 13:04

## 2020-08-24 RX ADMIN — INSULIN LISPRO 2 UNITS: 100 INJECTION, SOLUTION INTRAVENOUS; SUBCUTANEOUS at 12:09

## 2020-08-24 RX ADMIN — ENOXAPARIN SODIUM 50 MG: 60 INJECTION SUBCUTANEOUS at 22:00

## 2020-08-24 RX ADMIN — GUAIFENESIN 400 MG: 100 SOLUTION ORAL at 11:56

## 2020-08-24 RX ADMIN — Medication 4 MG/HR: at 22:25

## 2020-08-24 RX ADMIN — ENOXAPARIN SODIUM 50 MG: 60 INJECTION SUBCUTANEOUS at 09:24

## 2020-08-24 RX ADMIN — CISATRACURIUM BESYLATE 4 MCG/KG/MIN: 20 INJECTION INTRAVENOUS at 14:06

## 2020-08-24 RX ADMIN — CEFEPIME 2 G: 2 INJECTION, POWDER, FOR SOLUTION INTRAVENOUS at 04:38

## 2020-08-24 RX ADMIN — Medication 4 MG/HR: at 09:31

## 2020-08-24 RX ADMIN — DOCUSATE SODIUM 100 MG: 50 LIQUID ORAL at 17:02

## 2020-08-24 RX ADMIN — POLYETHYLENE GLYCOL 3350 17 G: 17 POWDER, FOR SOLUTION ORAL at 10:09

## 2020-08-24 RX ADMIN — MIDAZOLAM HYDROCHLORIDE 10 MG/HR: 5 INJECTION, SOLUTION INTRAMUSCULAR; INTRAVENOUS at 13:04

## 2020-08-24 RX ADMIN — GUAIFENESIN 400 MG: 100 SOLUTION ORAL at 08:18

## 2020-08-24 RX ADMIN — METOROPROLOL TARTRATE 5 MG: 5 INJECTION, SOLUTION INTRAVENOUS at 04:47

## 2020-08-24 NOTE — PROGRESS NOTES
MIGUELINA  Patient presented to [facility] ED with increased shortness of breath, diaphoresis and fever. Transferred to [facility] for possible ECMO placement  COVID Positive. RUR: 23%  Disposition: TBD    Patient remains vented on Nimbex and Versed gtts. Care management will continue to follow.    Ani Ross RN,CRM

## 2020-08-24 NOTE — PROGRESS NOTES
[facility]    ICU TEAM Progress Note    Name: Fatemeh Prince   : 1990   MRN: [id number]   Date: 2020      I  Subjective:   Progress Note: 2020      Reason for ICU Admission: ARDS due to COVID-19 bilateral pneumonia    Interval history:  27-year-old male with no history of significant past medical history except smoking half pack per day. He was admitted on 2020 at [facility] with acute hypoxemic respiratory failure from COVID pneumonia. He continued to deteriorate and got intubated on 8/10. Due to worsening hypoxemia, he is transferred to [facility] for ECMO evaluation. He has bee given convalescent plasma twice on  and  and treated with Remdesivir which was completed on 8/10. He is also on dexamethasone. He completed course of empiric antibiotics including azithromycin which was completed on  and cefepime was completed on . During his hospital course he also developed pneumothorax and has left-sided chest tube since . Overnight Events:   No acute event overnight. Remains paralyzed sedated on mechanical ventilation. No changes in setting. No fever, tolerating tube feeding no nausea no vomiting, no bowel movement  Active Problem List:     Problem List  Date Reviewed: 2020          Codes Class    Acute respiratory failure ([facility] [address] ICD-10-CM: J96.00  ICD-9-CM: 518.81         Pneumothorax on left ICD-10-CM: J93.9  ICD-9-CM: 512.89         Pneumonia due to severe acute respiratory syndrome coronavirus 2 (SARS-CoV-2) ICD-10-CM: U07.1, J12.89  ICD-9-CM: 480.8         Respiratory failure with hypoxia (HCC) ICD-10-CM: J96.91  ICD-9-CM: 518.81               Past Medical History:      has a past medical history of History of vascular access device (08/10/2020).     Past Surgical History:      has a past surgical history that includes ir thora/ins chest tube(pneumo) wo image (2020) and ir thora/ins chest tube(pneumo) wo image (2020). Home Medications:     Prior to Admission medications    Medication Sig Start Date End Date Taking? Authorizing Provider   benzonatate (Tessalon Perles) 100 mg capsule Take 100 mg by mouth three (3) times daily as needed for Cough. Provider, Historical       Allergies/Social/Family History:     No Known Allergies   Social History     Tobacco Use    Smoking status: Current Some Day Smoker    Smokeless tobacco: Never Used   Substance Use Topics    Alcohol use: Not on file      History reviewed. No pertinent family history. Review of Systems:     Not able to obtain due to his medical condition    Objective:   Vital Signs:  Visit Vitals  BP (!) 157/108   Pulse (!) 101   Temp 98.5 °F (36.9 °C)   Resp 26   Ht 5' 11\" (1.803 m)   Wt 100.8 kg (222 lb 3.6 oz)   SpO2 100%   BMI 30.99 kg/m²      O2 Device: Endotracheal tube, Ventilator Temp (24hrs), Av.7 °F (37.6 °C), Min:98.5 °F (36.9 °C), Max:100.2 °F (37.9 °C)           Intake/Output:     Intake/Output Summary (Last 24 hours) at 2020 1000  Last data filed at 2020 0800  Gross per 24 hour   Intake 5569.58 ml   Output 4095 ml   Net 1474.58 ml       Physical Exam:    General:   Young male paralyzed sedated intubated on mechanical ventilation   Eyes:  Sclera anicteric. Pupils equally round and reactive to light. Mouth/Throat: Mucous membranes normal, oral pharynx clear,    Neck: Supple   Lungs:    Coarse crepitation bilaterally,    CV:  Regular rate and rhythm,no murmur, click, rub or gallop   Abdomen:   Soft, non-tender.  bowel sounds sluggish, not distended   Extremities: No cyanosis or edema   Skin: Skin color, texture, turgor normal. no acute rash or lesions   Lymph nodes: Cervical and supraclavicular normal   Musculoskeletal: No swelling or deformity   Lines/Devices:  Intact, no erythema, drainage or tenderness         LABS AND  DATA: Personally reviewed  Recent Labs     20  0437 20  0542   WBC 10.0 11.0   HGB 9.3* 9.1* HCT 30.2* 28.6*    109*     Recent Labs     08/24/20  0437 08/23/20  0542    136   K 3.9 3.7    100   CO2 30 32   BUN 18 19   CREA 0.56* 0.52*   GLU 89 107*   CA 8.2* 8.1*   MG 2.3 2.4   PHOS 3.3 1.9*     Recent Labs     08/24/20  0437 08/23/20  0542   * 122*   TP 6.2* 6.3*   ALB 2.1* 1.9*   GLOB 4.1* 4.4*     No results for input(s): INR, PTP, APTT, INREXT in the last 72 hours. Recent Labs     08/24/20  0543 08/23/20  0351   PHI 7.32* 7.48*   PCO2I 60.2* 47.1*   PO2I 76* 78*   FIO2I 60 60     Recent Labs     08/24/20  0437 08/23/20  0542   CPK 73 77       Hemodynamics:   PAP:   CO:     Wedge:   CI:     CVP:    SVR:       PVR:       Ventilator Settings:  Mode Rate Tidal Volume Pressure FiO2 PEEP   Assist control, Pressure control   0 ml  0 cm H2O 60 % 10 cm H20     Peak airway pressure: 34 cm H2O    Minute ventilation: 9.62 l/min        MEDS: Reviewed    Chest X-Ray:  CXR Results  (Last 48 hours)               08/24/20 0510  XR CHEST PORT Final result    Impression:  IMPRESSION:   1. Mild decrease in the fairly severe bilateral pneumonia. Narrative:  EXAM: XR CHEST PORT       INDICATION: Tube Placement       COMPARISON: 8/23/2020       FINDINGS: A portable AP radiograph of the chest was obtained at 0415 hours. The   patient is on a cardiac monitor. The ET tube is in satisfactory position. The   left-sided PICC line overlies the right atrium near the cavoatrial junction. The NG tube courses into the stomach. Left chest tube remains in place. Bilateral airspace disease consistent with pneumonia most pronounced in the left   lower lobe and left perihilar region has minimally decreased. No pneumothorax. There is decreasing subcutaneous emphysema. 08/23/20 0430  XR CHEST PORT Final result    Impression:  IMPRESSION:    1. There is diffuse bilateral airspace disease which is improved in the left   midlung and slightly progressed in the right lower lobe. Pneumomediastinum is   less apparent. Subcutaneous emphysema is again noted. There is no pneumothorax. Narrative:  EXAM:  XR CHEST PORT       INDICATION:  Tube Placement       COMPARISON:  8/22/2020       FINDINGS: A portable AP radiograph of the chest was obtained at 428 hours. ET   tube is in satisfactory position. NG tube and left PICC catheter tip are   unchanged. .  There is diffuse airspace disease which is improved in the left   midlung and slightly progressed at the right lung base. Subcutaneous emphysema   is again noted. Pneumomediastinum is less apparent. Rose Boop Heart size is stable. Pigtail catheter overlying left base is unchanged. .  Bony structures are   unchanged. 08/22/20 1513  XR CHEST PORT Final result    Impression:  IMPRESSION: Stable. Narrative:  EXAM: Portable CXR. 1505 hours. COMPARISON: 0348 hours. INDICATION: Pneumo       FINDINGS:   No significant change. No pneumothorax. Questionable pneumomediastinum. Extensive subcutaneous emphysema. ET tube tip remains at the victor hugo. Left arm PICC line tip remains in the SVC. NG   tube remains in the stomach. Lungs are stable with dense consolidation left greater than right. Lung volumes   are low. Heart size is normal.                 Assessment and Plan:   -ARDS due to bilateral COVID-19 pneumonia:  - Left pneumothorax status post chest tube insertion: Resolved  - Possible superimposed bacterial infection: On broad-spectrum antibiotic  - Episodes of severe bradycardia/pause: Resolved. Cardiology signed off      - Still requiring high ventilatory setting with pressure control and PEEP of 10. No significant changes over the last few days though we had to keep him paralyzed due to his severe dyssynchrony with the vent. Wean as tolerated. -Consult thoracic surgeon for trach. Expecting prolonged course on ventilator  - Today day 6 of broad-spectrum antibiotic.   I believe it is reasonable to complete 7 days course. May need to extend further fever spike or worsening in leukocytosis. - Propofol discontinued, will continue with ketamine narcotics and benzodiazepine to ensure sedation while paralyzed. - Completed treatment with steroids, Remdesivir (completed 8/10/20) and convalescent plasma x 2 doses (8/10/20 and 8/12/20) for COVID-19 infection  -Nutrition support  -Add laxative for constipation  -Maintain normoglycemia    DVT prophylaxis [50 mg Lovenox twice daily], GI prophylaxis and ventilator bundle    DISPOSITION  Stay in ICU    CRITICAL CARE CONSULTANT NOTE  I had a face to face encounter with the patient, reviewed and interpreted patient data including clinical events, labs, images, vital signs, I/O's, and examined patient. I have discussed the case and the plan and management of the patient's care with the consulting services, the bedside nurses and the respiratory therapist.      NOTE OF PERSONAL INVOLVEMENT IN CARE   This patient has a high probability of imminent, clinically significant deterioration, which requires the highest level of preparedness to intervene urgently. I participated in the decision-making and personally managed or directed the management of the following life and organ supporting interventions that required my frequent assessment to treat or prevent imminent deterioration. I personally spent 40 minutes of critical care time. This is time spent at this critically ill patient's bedside actively involved in patient care as well as the coordination of care and discussions with the patient's family. This does not include any procedural time which has been billed separately. David Villar M.D.   Staff Intensivist/Pulmonologist  [facility]  8/24/2020

## 2020-08-24 NOTE — PROGRESS NOTES
ARDS  Acute hypoxic respiratory failure  COVID positive  Sinus pauses    Remains intubated, sedated, and paralyzed    Versed, dilaudid, ketamine, cisatracurium    Anxious at times - PRN versed    Having some HTN issues    Working on sedation regimen    Continues on Vanc / Cefepime    Cultures are NGTD    No more sinus pauses    Continues on Lovenox    Continues on Dexamethasone    WBCs improved    Hgb and platelets look good    Creatinine normal    Bilirubin and other LFTs look good    LDH looks good    Pro-calcitonin normal    Lactic acid normal    Weaned off inhaled NO     ABG - 7.32 / 60 / 76 / 31 / 5     FiO2 60%, PEEP 10, RR 26     CXR - diffuse white out      Intake/Output Summary (Last 24 hours) at 8/24/2020 1150  Last data filed at 8/24/2020 1000  Gross per 24 hour   Intake 5840.38 ml   Output 4295 ml   Net 1545.38 ml     Visit Vitals  BP (!) 156/95   Pulse 95   Temp 98.5 °F (36.9 °C)   Resp 26   Ht 5' 11\" (1.803 m)   Wt 222 lb 3.6 oz (100.8 kg)   SpO2 92%   BMI 30.99 kg/m²       Risk of morbidity and mortality - high  Medical decision making - high complexity    Total critical care time - 30 minutes (CPT 98196)     I personally spent the above critical care time. This is time spent at this critically ill patient's bedside / unit / floor actively involved in patient care as well as the coordination of care and discussions with the patient's family. This does not include any procedural time which has been billed separately.

## 2020-08-24 NOTE — PROGRESS NOTES
Problem: Ventilator Management  Goal: *Adequate oxygenation and ventilation  Outcome: Progressing Towards Goal  Goal: *Patient maintains clear airway/free of aspiration  Outcome: Progressing Towards Goal  Goal: *Absence of infection signs and symptoms  Outcome: Progressing Towards Goal     Problem: Pressure Injury - Risk of  Goal: *Prevention of pressure injury  Description: Document Enrique Scale and appropriate interventions in the flowsheet. Outcome: Progressing Towards Goal  Note: Pressure Injury Interventions:  Sensory Interventions: Assess changes in LOC, Assess need for specialty bed, Avoid rigorous massage over bony prominences, Check visual cues for pain, Float heels, Keep linens dry and wrinkle-free, Minimize linen layers, Monitor skin under medical devices, Pressure redistribution bed/mattress (bed type), Turn and reposition approx. every two hours (pillows and wedges if needed)    Moisture Interventions: Absorbent underpads, Assess need for specialty bed, Check for incontinence Q2 hours and as needed, Contain wound drainage, Internal/External urinary devices, Minimize layers    Activity Interventions: Assess need for specialty bed, Pressure redistribution bed/mattress(bed type)    Mobility Interventions: Assess need for specialty bed, Float heels, HOB 30 degrees or less, Pressure redistribution bed/mattress (bed type), Turn and reposition approx. every two hours(pillow and wedges)    Nutrition Interventions: Document food/fluid/supplement intake    Friction and Shear Interventions: Foam dressings/transparent film/skin sealants, HOB 30 degrees or less, Minimize layers                Problem: Falls - Risk of  Goal: *Absence of Falls  Description: Document Adama Fall Risk and appropriate interventions in the flowsheet.   Outcome: Progressing Towards Goal  Note: Fall Risk Interventions:  Mobility Interventions: Communicate number of staff needed for ambulation/transfer    Mentation Interventions: Adequate sleep, hydration, pain control, Evaluate medications/consider consulting pharmacy, Reorient patient, Room close to nurse's station, Update white board    Medication Interventions: Evaluate medications/consider consulting pharmacy    Elimination Interventions:  Toileting schedule/hourly rounds    History of Falls Interventions: Evaluate medications/consider consulting pharmacy, Room close to nurse's station         Problem: Nutrition Deficit  Goal: *Optimize nutritional status  Outcome: Progressing Towards Goal     Problem: Breathing Pattern - Ineffective  Goal: *Absence of hypoxia  Outcome: Progressing Towards Goal

## 2020-08-24 NOTE — CONSULTS
Thoracic Surgery Consultation    Admit Date: 8/18/2020  Reason for Consultation: Tracheostomy placement    HPI:  Charley Vasques is a 27 y.o. male without significant PMH who we are asked to see in Thoracic Surgery consultation for Tracheostomy plaement  He is COVID POSITIVE. Patient is intubated, sedated and paralyzed, and history obatined through chart review. Pt transferred to [facility] for escalation of care. Pt has no prior PMH,       He initially presented to [facility] ED on 8/5/20 due to worsening SOB, associated with diaphoresis, cough and fever. On 7/27/2020 (9 days prior to admission) he tested positive for COVID-19, initially had fatigue, fever, headaches and cough. He was treated with Zithromax, prednisone and tessalon pearls for his cough without any improvement. Pt took tylenol at home for fever/HA and was trying to increase his fluid intake. Nothing relieved his symptoms. Symptoms were exacerbated by activity. Current smoker, about 1/2 ppd. He was admitted for COVID-19 pneumonia and sepsis. Treatment has included IV azithromycin, VIt C, steroids, Remdesivir (completed 8/10/20) and convalescent plasma x 2 doses (8/10/20 and 8/12/20). Ultimately, his symptoms did not improve and required intubation. His oxygenation has continued to deteriorate. On 8/17/2020 transferred to [facility] for possible ECMO.        Patient Active Problem List    Diagnosis Date Noted    Acute respiratory failure ([facility] [address] 08/18/2020    Pneumothorax on left 08/14/2020    Pneumonia due to severe acute respiratory syndrome coronavirus 2 (SARS-CoV-2) 08/08/2020    Respiratory failure with hypoxia ([address] 08/05/2020     Past Medical History:   Diagnosis Date    History of vascular access device 08/10/2020    5 Fr triple PICC, hemodynamically unstable, 45 cm L basilic      Past Surgical History:   Procedure Laterality Date    IR THORA/INS CHEST TUBE(PNEUMO) WO IMAGE  8/11/2020    IR THORA/INS CHEST TUBE(PNEUMO) WO IMAGE  8/11/2020 Social History     Tobacco Use    Smoking status: Current Some Day Smoker    Smokeless tobacco: Never Used   Substance Use Topics    Alcohol use: Not on file      History reviewed. No pertinent family history. Prior to Admission medications    Medication Sig Start Date End Date Taking? Authorizing Provider   benzonatate (Tessalon Perles) 100 mg capsule Take 100 mg by mouth three (3) times daily as needed for Cough. Provider, Historical     No Known Allergies       Subjective:     Review of Systems:    Review of systems not obtained due to patient factors. Objective:     CXR (8/25/2020) shows:  IMPRESSION:  1. Bilateral airspace disease overall has slightly decreased especially in the  right lung. Blood pressure 113/65, pulse (!) 107, temperature 99.3 °F (37.4 °C), resp. rate 26, height 5' 11\" (1.803 m), weight 220 lb 14.4 oz (100.2 kg), SpO2 95 %.   Recent Results (from the past 24 hour(s))   GLUCOSE, POC    Collection Time: 08/24/20 11:54 AM   Result Value Ref Range    Glucose (POC) 155 (H) 65 - 100 mg/dL    Performed by [address] [place] [address], POC    Collection Time: 08/24/20  4:57 PM   Result Value Ref Range    Glucose (POC) 140 (H) 65 - 100 mg/dL    Performed by [facility]    GLUCOSE, POC    Collection Time: 08/24/20 11:34 PM   Result Value Ref Range    Glucose (POC) 99 65 - 100 mg/dL    Performed by [facility]    C REACTIVE PROTEIN, QT    Collection Time: 08/25/20  2:17 AM   Result Value Ref Range    C-Reactive protein 6.83 (H) 0.00 - 0.60 mg/dL   CBC WITH AUTOMATED DIFF    Collection Time: 08/25/20  2:17 AM   Result Value Ref Range    WBC 6.3 4.1 - 11.1 K/uL    RBC 2.78 (L) 4.10 - 5.70 M/uL    HGB 8.3 (L) 12.1 - 17.0 g/dL    HCT 26.5 (L) 36.6 - 50.3 %    MCV 95.3 80.0 - 99.0 FL    MCH 29.9 26.0 - 34.0 PG    MCHC 31.3 30.0 - 36.5 g/dL    RDW 13.7 11.5 - 14.5 %    PLATELET 203 894 - 800 K/uL    MPV 10.2 8.9 - 12.9 FL    NRBC 0.0 0  WBC    ABSOLUTE NRBC 0.00 0.00 - 0.01 K/uL NEUTROPHILS 90 (H) 32 - 75 %    LYMPHOCYTES 4 (L) 12 - 49 %    MONOCYTES 2 (L) 5 - 13 %    EOSINOPHILS 3 0 - 7 %    BASOPHILS 0 0 - 1 %    METAMYELOCYTES 1 (H) 0 %    IMMATURE GRANULOCYTES 0 %    ABS. NEUTROPHILS 5.7 1.8 - 8.0 K/UL    ABS. LYMPHOCYTES 0.3 (L) 0.8 - 3.5 K/UL    ABS. MONOCYTES 0.1 0.0 - 1.0 K/UL    ABS. EOSINOPHILS 0.2 0.0 - 0.4 K/UL    ABS. BASOPHILS 0.0 0.0 - 0.1 K/UL    ABS. IMM. GRANS. 0.0 K/UL    DF MANUAL      RBC COMMENTS MACROCYTOSIS  1+       CK    Collection Time: 08/25/20  2:17 AM   Result Value Ref Range    CK 32 (L) 39 - 308 U/L   D DIMER    Collection Time: 08/25/20  2:17 AM   Result Value Ref Range    D-dimer 21.36 (H) 0.00 - 0.65 mg/L FEU   FERRITIN    Collection Time: 08/25/20  2:17 AM   Result Value Ref Range    Ferritin 1,507 (H) 26 - 388 NG/ML   FIBRINOGEN    Collection Time: 08/25/20  2:17 AM   Result Value Ref Range    Fibrinogen >800 (H) 200 - 475 mg/dL   LD    Collection Time: 08/25/20  2:17 AM   Result Value Ref Range     (H) 85 - 241 U/L   MAGNESIUM    Collection Time: 08/25/20  2:17 AM   Result Value Ref Range    Magnesium 2.1 1.6 - 2.4 mg/dL   METABOLIC PANEL, COMPREHENSIVE    Collection Time: 08/25/20  2:17 AM   Result Value Ref Range    Sodium 140 136 - 145 mmol/L    Potassium 3.8 3.5 - 5.1 mmol/L    Chloride 103 97 - 108 mmol/L    CO2 32 21 - 32 mmol/L    Anion gap 5 5 - 15 mmol/L    Glucose 72 65 - 100 mg/dL    BUN 19 6 - 20 MG/DL    Creatinine 0.49 (L) 0.70 - 1.30 MG/DL    BUN/Creatinine ratio 39 (H) 12 - 20      GFR est AA >60 >60 ml/min/1.73m2    GFR est non-AA >60 >60 ml/min/1.73m2    Calcium 8.2 (L) 8.5 - 10.1 MG/DL    Bilirubin, total 0.7 0.2 - 1.0 MG/DL    ALT (SGPT) 82 (H) 12 - 78 U/L    AST (SGOT) 35 15 - 37 U/L    Alk.  phosphatase 100 45 - 117 U/L    Protein, total 5.6 (L) 6.4 - 8.2 g/dL    Albumin 1.9 (L) 3.5 - 5.0 g/dL    Globulin 3.7 2.0 - 4.0 g/dL    A-G Ratio 0.5 (L) 1.1 - 2.2     PHOSPHORUS    Collection Time: 08/25/20  2:17 AM   Result Value Ref Range    Phosphorus 3.4 2.6 - 4.7 MG/DL   PROCALCITONIN    Collection Time: 08/25/20  2:17 AM   Result Value Ref Range    Procalcitonin 0.50 ng/mL   POC EG7    Collection Time: 08/25/20  4:53 AM   Result Value Ref Range    Calcium, ionized (POC) 1.25 1.12 - 1.32 mmol/L    FIO2 (POC) 60 %    pH (POC) 7.40 7.35 - 7.45      pCO2 (POC) 50.6 (H) 35.0 - 45.0 MMHG    pO2 (POC) 96 80 - 100 MMHG    HCO3 (POC) 31.4 (H) 22 - 26 MMOL/L    Base excess (POC) 7 mmol/L    sO2 (POC) 97 92 - 97 %    Site RIGHT RADIAL      Device: VENT      Mode ASSIST CONTROL      Set Rate 26 bpm    PEEP/CPAP (POC) 10 cmH2O    PIP (POC) 22      Allens test (POC) YES      Inspiratory Time 0.77 sec    Specimen type (POC) ARTERIAL      Total resp. rate 26     GLUCOSE, POC    Collection Time: 08/25/20  5:33 AM   Result Value Ref Range    Glucose (POC) 104 (H) 65 - 100 mg/dL    Performed by Dora Jean Baptiste      _____________________  Physical Exam:     General:  Intubated & sedated, on Vent   Eyes:   Sclera clear. Throat: Lips, mucosa, and tongue normal. +ET tube   Neck: Supple, symmetrical, trachea midline. Lungs:   Dec BS bilaterally. +L CT in place with minimal output. Heart:  Regular rate and rhythm. Abdomen:   Soft, non-tender. Extremities: Extremities normal, atraumatic, no cyanosis or edema. Skin: Skin w/d/i           Assessment:   Active Problems:    Acute respiratory failure ([address] (8/18/2020)            Plan: Will plan for Percutaneous Tracheostomy placement today/Wed or later this week by Dr. Madhuri Leigh. Will need to hole AM Lovenox & tube feeds  Care per ICU team    Thank you for including us in the care of your patient.     Signed By: JUAN Lopez     August 25, 2020

## 2020-08-24 NOTE — PROGRESS NOTES
1930: Bedside and Verbal shift change report given to Michelle Chino (oncoming nurse) by Pablo Chandler (offgoing nurse). Report included the following information SBAR, Kardex, ED Summary, Procedure Summary, MAR, Recent Results and Cardiac Rhythm NSR/ST. 2000: Shift assessment completed per flowsheet. Patient sedated and paralyzed. TOF 1/4 twitch at 40. HR elevating with stimuli, MD aware. PERRLA, sluggish, 3. Drips:  -Nimbex @ 4 mcg  -Hydromorphone @ 4mg  -Ketamine @ 1 mg  -Versed @ 10 mg    2200: Spoke with patient's mom, Tereza Solitario. Updated on patient condition. No further questions asked at this time. 2341: Patient's -140s. /109. Charli Maldonado NP made aware. Received order to give 100 mcg Fentanyl and 1 mg Versed . 2349: 100 mcg Fentanyl and 1 mg Versed given. 0000: HR remains evaluated 110-130 and /102, after Fentanyl and Versed. Charli Maldonado made aware. Received order to give 5 mg Metoprolol. Reassessment completed per flowsheet. Patient sedated and paralyzed. TOF 0/4 twitch at 40. Nimbex gtt titrated to 3.5 mcg. PERRLA, sluggish, 3.     0009: 5 mg Metoprolol given. 0015: Patient's HR and BP stabilizing.     0200: TOF 1/4 twitch at 40.    0330: Patient's BP and HR beginning to elevate. -130 and SBP 150s. Charli Maldonado NP made aware. Orders received for PRN 5 mg Metoprolol Q6.    0400: Reassessment completed per flowsheet. Patient sedated and paralyzed. TOF 1/4 twitch at 40. PERRLA, sluggish, 3.     0447: Patient's  and -104. PRN 5 mg Metoprolol given. 0730: Bedside and Verbal shift change report given to Renee Herr  (oncoming nurse) by Michelle Chino (offgoing nurse). Report included the following information SBAR, Kardex, ED Summary, Procedure Summary, MAR, Recent Results and Cardiac Rhythm NSR/ST.   Drips:  -Nimbex @ 3.5 mcg  -Hydromorphone @ 4mg  -Ketamine @ 1 mg  -Versed @ 10 mg

## 2020-08-24 NOTE — PROGRESS NOTES
0730: Bedside and Verbal shift change report given to Kandi Rojo RN (oncoming nurse) by Chester Hodge RN (offgoing nurse). Report included the following information SBAR, Kardex, Intake/Output, MAR, Recent Results, Med Rec Status, Cardiac Rhythm Sinus Tachycardia and Alarm Parameters . Drips verified as followed:   -Ketamine @ 1 mg/kg/hr  -Nimbex @ 3.5 mcg/kg/min  -Dilaudid @ 4 mg/hr  -Versed @ 10 mg/hr    0920: Spoke with patient's mom, Danelle Esquivel. Updated her on the overnight events and answered questions to the best of my ability. 1000: MD Ortiz Wang at bedside. Updated on overnight events. Informed that patient's heart rate and BP increases with stimulation despite large amount of sedatives. No new orders received at this time. 1230: Per Ortiz Wang, plan to consult thoracic surgery for trach. This RN called consult. 1400: This RN noted patient's TOF 3/4 twitches. Increased nimbex (see MAR). 1430: TOF 1/4 twitches. 1600: Noted that patient's TOF is again 3/4 twitches. Increased nimbex (see MAR). 1630: TOF 1/4 twitches. 1930: Bedside and Verbal shift change report given to Sveta Smith RN (oncoming nurse) by Timo Cardona RN (offgoing nurse). Report included the following information SBAR, Kardex, Intake/Output, MAR, Recent Results, Med Rec Status, Cardiac Rhythm NSR to Sinus Tachycardia and Alarm Parameters .

## 2020-08-25 ENCOUNTER — APPOINTMENT (OUTPATIENT)
Dept: GENERAL RADIOLOGY | Age: 30
DRG: 004 | End: 2020-08-25
Attending: NURSE PRACTITIONER
Payer: COMMERCIAL

## 2020-08-25 LAB
ALBUMIN SERPL-MCNC: 1.9 G/DL (ref 3.5–5)
ALBUMIN/GLOB SERPL: 0.5 {RATIO} (ref 1.1–2.2)
ALP SERPL-CCNC: 100 U/L (ref 45–117)
ALT SERPL-CCNC: 82 U/L (ref 12–78)
ANION GAP SERPL CALC-SCNC: 5 MMOL/L (ref 5–15)
ARTERIAL PATENCY WRIST A: YES
AST SERPL-CCNC: 35 U/L (ref 15–37)
BASE EXCESS BLD CALC-SCNC: 7 MMOL/L
BASOPHILS # BLD: 0 K/UL (ref 0–0.1)
BASOPHILS NFR BLD: 0 % (ref 0–1)
BDY SITE: ABNORMAL
BILIRUB SERPL-MCNC: 0.7 MG/DL (ref 0.2–1)
BUN SERPL-MCNC: 19 MG/DL (ref 6–20)
BUN/CREAT SERPL: 39 (ref 12–20)
CA-I BLD-SCNC: 1.25 MMOL/L (ref 1.12–1.32)
CALCIUM SERPL-MCNC: 8.2 MG/DL (ref 8.5–10.1)
CHLORIDE SERPL-SCNC: 103 MMOL/L (ref 97–108)
CK SERPL-CCNC: 32 U/L (ref 39–308)
CO2 SERPL-SCNC: 32 MMOL/L (ref 21–32)
CREAT SERPL-MCNC: 0.49 MG/DL (ref 0.7–1.3)
CRP SERPL-MCNC: 6.83 MG/DL (ref 0–0.6)
D DIMER PPP FEU-MCNC: 21.36 MG/L FEU (ref 0–0.65)
DIFFERENTIAL METHOD BLD: ABNORMAL
EOSINOPHIL # BLD: 0.2 K/UL (ref 0–0.4)
EOSINOPHIL NFR BLD: 3 % (ref 0–7)
ERYTHROCYTE [DISTWIDTH] IN BLOOD BY AUTOMATED COUNT: 13.7 % (ref 11.5–14.5)
FERRITIN SERPL-MCNC: 1507 NG/ML (ref 26–388)
FIBRINOGEN PPP-MCNC: >800 MG/DL (ref 200–475)
GAS FLOW.O2 O2 DELIVERY SYS: ABNORMAL L/MIN
GAS FLOW.O2 SETTING OXYMISER: 26 BPM
GLOBULIN SER CALC-MCNC: 3.7 G/DL (ref 2–4)
GLUCOSE BLD STRIP.AUTO-MCNC: 104 MG/DL (ref 65–100)
GLUCOSE BLD STRIP.AUTO-MCNC: 152 MG/DL (ref 65–100)
GLUCOSE BLD STRIP.AUTO-MCNC: 86 MG/DL (ref 65–100)
GLUCOSE SERPL-MCNC: 72 MG/DL (ref 65–100)
HCO3 BLD-SCNC: 31.4 MMOL/L (ref 22–26)
HCT VFR BLD AUTO: 26.5 % (ref 36.6–50.3)
HGB BLD-MCNC: 8.3 G/DL (ref 12.1–17)
IMM GRANULOCYTES # BLD AUTO: 0 K/UL
IMM GRANULOCYTES NFR BLD AUTO: 0 %
INSPIRATION.DURATION SETTING TIME VENT: 0.77 SEC
LDH SERPL L TO P-CCNC: 258 U/L (ref 85–241)
LYMPHOCYTES # BLD: 0.3 K/UL (ref 0.8–3.5)
LYMPHOCYTES NFR BLD: 4 % (ref 12–49)
MAGNESIUM SERPL-MCNC: 2.1 MG/DL (ref 1.6–2.4)
MCH RBC QN AUTO: 29.9 PG (ref 26–34)
MCHC RBC AUTO-ENTMCNC: 31.3 G/DL (ref 30–36.5)
MCV RBC AUTO: 95.3 FL (ref 80–99)
METAMYELOCYTES NFR BLD MANUAL: 1 %
MONOCYTES # BLD: 0.1 K/UL (ref 0–1)
MONOCYTES NFR BLD: 2 % (ref 5–13)
NEUTS SEG # BLD: 5.7 K/UL (ref 1.8–8)
NEUTS SEG NFR BLD: 90 % (ref 32–75)
NRBC # BLD: 0 K/UL (ref 0–0.01)
NRBC BLD-RTO: 0 PER 100 WBC
O2/TOTAL GAS SETTING VFR VENT: 60 %
PCO2 BLD: 50.6 MMHG (ref 35–45)
PEEP RESPIRATORY: 10 CMH2O
PH BLD: 7.4 [PH] (ref 7.35–7.45)
PHOSPHATE SERPL-MCNC: 3.4 MG/DL (ref 2.6–4.7)
PIP ISTAT,IPIP: 22
PLATELET # BLD AUTO: 152 K/UL (ref 150–400)
PMV BLD AUTO: 10.2 FL (ref 8.9–12.9)
PO2 BLD: 96 MMHG (ref 80–100)
POTASSIUM SERPL-SCNC: 3.8 MMOL/L (ref 3.5–5.1)
PROCALCITONIN SERPL-MCNC: 0.5 NG/ML
PROT SERPL-MCNC: 5.6 G/DL (ref 6.4–8.2)
RBC # BLD AUTO: 2.78 M/UL (ref 4.1–5.7)
RBC MORPH BLD: ABNORMAL
SAO2 % BLD: 97 % (ref 92–97)
SERVICE CMNT-IMP: ABNORMAL
SERVICE CMNT-IMP: ABNORMAL
SERVICE CMNT-IMP: NORMAL
SODIUM SERPL-SCNC: 140 MMOL/L (ref 136–145)
SPECIMEN TYPE: ABNORMAL
TOTAL RESP. RATE, ITRR: 26
VENTILATION MODE VENT: ABNORMAL
WBC # BLD AUTO: 6.3 K/UL (ref 4.1–11.1)

## 2020-08-25 PROCEDURE — 36592 COLLECT BLOOD FROM PICC: CPT

## 2020-08-25 PROCEDURE — 94760 N-INVAS EAR/PLS OXIMETRY 1: CPT

## 2020-08-25 PROCEDURE — 36415 COLL VENOUS BLD VENIPUNCTURE: CPT

## 2020-08-25 PROCEDURE — 94003 VENT MGMT INPAT SUBQ DAY: CPT

## 2020-08-25 PROCEDURE — 77030018798 HC PMP KT ENTRL FED COVD -A

## 2020-08-25 PROCEDURE — 83735 ASSAY OF MAGNESIUM: CPT

## 2020-08-25 PROCEDURE — 36600 WITHDRAWAL OF ARTERIAL BLOOD: CPT

## 2020-08-25 PROCEDURE — 84145 PROCALCITONIN (PCT): CPT

## 2020-08-25 PROCEDURE — 74011250636 HC RX REV CODE- 250/636: Performed by: NURSE PRACTITIONER

## 2020-08-25 PROCEDURE — 74011250637 HC RX REV CODE- 250/637: Performed by: INTERNAL MEDICINE

## 2020-08-25 PROCEDURE — 77010033678 HC OXYGEN DAILY

## 2020-08-25 PROCEDURE — 83615 LACTATE (LD) (LDH) ENZYME: CPT

## 2020-08-25 PROCEDURE — 74011636637 HC RX REV CODE- 636/637: Performed by: NURSE PRACTITIONER

## 2020-08-25 PROCEDURE — 82550 ASSAY OF CK (CPK): CPT

## 2020-08-25 PROCEDURE — 82728 ASSAY OF FERRITIN: CPT

## 2020-08-25 PROCEDURE — 84100 ASSAY OF PHOSPHORUS: CPT

## 2020-08-25 PROCEDURE — 80053 COMPREHEN METABOLIC PANEL: CPT

## 2020-08-25 PROCEDURE — 99291 CRITICAL CARE FIRST HOUR: CPT | Performed by: THORACIC SURGERY (CARDIOTHORACIC VASCULAR SURGERY)

## 2020-08-25 PROCEDURE — 82803 BLOOD GASES ANY COMBINATION: CPT

## 2020-08-25 PROCEDURE — 74011000258 HC RX REV CODE- 258: Performed by: INTERNAL MEDICINE

## 2020-08-25 PROCEDURE — 74011000250 HC RX REV CODE- 250: Performed by: HOSPITALIST

## 2020-08-25 PROCEDURE — 74011000250 HC RX REV CODE- 250: Performed by: INTERNAL MEDICINE

## 2020-08-25 PROCEDURE — 71045 X-RAY EXAM CHEST 1 VIEW: CPT

## 2020-08-25 PROCEDURE — 74011250636 HC RX REV CODE- 250/636: Performed by: INTERNAL MEDICINE

## 2020-08-25 PROCEDURE — 82962 GLUCOSE BLOOD TEST: CPT

## 2020-08-25 PROCEDURE — 65610000006 HC RM INTENSIVE CARE

## 2020-08-25 PROCEDURE — 74011250636 HC RX REV CODE- 250/636: Performed by: HOSPITALIST

## 2020-08-25 PROCEDURE — 74011250637 HC RX REV CODE- 250/637: Performed by: NURSE PRACTITIONER

## 2020-08-25 PROCEDURE — 85379 FIBRIN DEGRADATION QUANT: CPT

## 2020-08-25 PROCEDURE — 74011000258 HC RX REV CODE- 258: Performed by: HOSPITALIST

## 2020-08-25 PROCEDURE — 85025 COMPLETE CBC W/AUTO DIFF WBC: CPT

## 2020-08-25 PROCEDURE — 74011250637 HC RX REV CODE- 250/637: Performed by: HOSPITALIST

## 2020-08-25 PROCEDURE — 99221 1ST HOSP IP/OBS SF/LOW 40: CPT | Performed by: PHYSICIAN ASSISTANT

## 2020-08-25 PROCEDURE — 86140 C-REACTIVE PROTEIN: CPT

## 2020-08-25 PROCEDURE — 85384 FIBRINOGEN ACTIVITY: CPT

## 2020-08-25 RX ORDER — FENTANYL CITRATE 50 UG/ML
100 INJECTION, SOLUTION INTRAMUSCULAR; INTRAVENOUS ONCE
Status: DISPENSED | OUTPATIENT
Start: 2020-08-26 | End: 2020-08-27

## 2020-08-25 RX ORDER — ROCURONIUM BROMIDE 10 MG/ML
50 INJECTION, SOLUTION INTRAVENOUS
Status: COMPLETED | OUTPATIENT
Start: 2020-08-26 | End: 2020-08-26

## 2020-08-25 RX ORDER — POTASSIUM CHLORIDE 14.9 MG/ML
20 INJECTION INTRAVENOUS ONCE
Status: COMPLETED | OUTPATIENT
Start: 2020-08-25 | End: 2020-08-25

## 2020-08-25 RX ORDER — SENNOSIDES 8.6 MG/1
1 TABLET ORAL ONCE
Status: COMPLETED | OUTPATIENT
Start: 2020-08-25 | End: 2020-08-25

## 2020-08-25 RX ORDER — ETOMIDATE 2 MG/ML
20 INJECTION INTRAVENOUS ONCE
Status: DISPENSED | OUTPATIENT
Start: 2020-08-26 | End: 2020-08-27

## 2020-08-25 RX ORDER — SENNOSIDES 8.6 MG/1
2 TABLET ORAL 2 TIMES DAILY
Status: DISCONTINUED | OUTPATIENT
Start: 2020-08-25 | End: 2020-08-27

## 2020-08-25 RX ADMIN — Medication 10 ML: at 23:41

## 2020-08-25 RX ADMIN — Medication 6 MG/HR: at 09:42

## 2020-08-25 RX ADMIN — PROPOFOL 50 MCG/KG/MIN: 10 INJECTION, EMULSION INTRAVENOUS at 11:06

## 2020-08-25 RX ADMIN — DEXAMETHASONE SODIUM PHOSPHATE 6 MG: 10 INJECTION INTRAMUSCULAR; INTRAVENOUS at 08:00

## 2020-08-25 RX ADMIN — PROPOFOL 40 MCG/KG/MIN: 10 INJECTION, EMULSION INTRAVENOUS at 19:00

## 2020-08-25 RX ADMIN — POTASSIUM CHLORIDE 20 MEQ: 14.9 INJECTION, SOLUTION INTRAVENOUS at 09:42

## 2020-08-25 RX ADMIN — MIDAZOLAM HYDROCHLORIDE 10 MG/HR: 5 INJECTION, SOLUTION INTRAMUSCULAR; INTRAVENOUS at 09:52

## 2020-08-25 RX ADMIN — PROPOFOL 40 MCG/KG/MIN: 10 INJECTION, EMULSION INTRAVENOUS at 18:44

## 2020-08-25 RX ADMIN — Medication 6 MG/HR: at 18:06

## 2020-08-25 RX ADMIN — INSULIN LISPRO 2 UNITS: 100 INJECTION, SOLUTION INTRAVENOUS; SUBCUTANEOUS at 11:29

## 2020-08-25 RX ADMIN — CHLORHEXIDINE GLUCONATE 15 ML: 0.12 RINSE ORAL at 21:48

## 2020-08-25 RX ADMIN — SENNOSIDES 8.6 MG: 8.6 TABLET, FILM COATED ORAL at 08:00

## 2020-08-25 RX ADMIN — Medication: at 18:10

## 2020-08-25 RX ADMIN — ASPIRIN 81 MG CHEWABLE TABLET 81 MG: 81 TABLET CHEWABLE at 08:00

## 2020-08-25 RX ADMIN — CISATRACURIUM BESYLATE 2 MCG/KG/MIN: 20 INJECTION INTRAVENOUS at 11:23

## 2020-08-25 RX ADMIN — Medication 6 MG/HR: at 22:29

## 2020-08-25 RX ADMIN — DOCUSATE SODIUM 100 MG: 50 LIQUID ORAL at 18:09

## 2020-08-25 RX ADMIN — VANCOMYCIN HYDROCHLORIDE 1250 MG: 10 INJECTION, POWDER, LYOPHILIZED, FOR SOLUTION INTRAVENOUS at 05:30

## 2020-08-25 RX ADMIN — GUAIFENESIN 400 MG: 100 SOLUTION ORAL at 15:17

## 2020-08-25 RX ADMIN — MIDAZOLAM HYDROCHLORIDE 2 MG: 2 INJECTION, SOLUTION INTRAMUSCULAR; INTRAVENOUS at 04:09

## 2020-08-25 RX ADMIN — Medication 10 ML: at 13:59

## 2020-08-25 RX ADMIN — POLYETHYLENE GLYCOL 3350 17 G: 17 POWDER, FOR SOLUTION ORAL at 08:00

## 2020-08-25 RX ADMIN — GUAIFENESIN 400 MG: 100 SOLUTION ORAL at 12:34

## 2020-08-25 RX ADMIN — FAMOTIDINE 20 MG: 10 INJECTION, SOLUTION INTRAVENOUS at 08:00

## 2020-08-25 RX ADMIN — ENOXAPARIN SODIUM 50 MG: 60 INJECTION SUBCUTANEOUS at 21:45

## 2020-08-25 RX ADMIN — CEFEPIME 2 G: 2 INJECTION, POWDER, FOR SOLUTION INTRAVENOUS at 11:29

## 2020-08-25 RX ADMIN — PROPOFOL 25 MCG/KG/MIN: 10 INJECTION, EMULSION INTRAVENOUS at 05:20

## 2020-08-25 RX ADMIN — GUAIFENESIN 400 MG: 100 SOLUTION ORAL at 08:23

## 2020-08-25 RX ADMIN — MIDAZOLAM HYDROCHLORIDE 10 MG/HR: 5 INJECTION, SOLUTION INTRAMUSCULAR; INTRAVENOUS at 21:40

## 2020-08-25 RX ADMIN — Medication 10 ML: at 05:34

## 2020-08-25 RX ADMIN — PROPOFOL 50 MCG/KG/MIN: 10 INJECTION, EMULSION INTRAVENOUS at 14:11

## 2020-08-25 RX ADMIN — Medication: at 08:00

## 2020-08-25 RX ADMIN — KETAMINE HYDROCHLORIDE 1 MG/KG/HR: 50 INJECTION, SOLUTION INTRAMUSCULAR; INTRAVENOUS at 13:55

## 2020-08-25 RX ADMIN — FAMOTIDINE 20 MG: 10 INJECTION, SOLUTION INTRAVENOUS at 21:46

## 2020-08-25 RX ADMIN — KETAMINE HYDROCHLORIDE 1 MG/KG/HR: 50 INJECTION, SOLUTION INTRAMUSCULAR; INTRAVENOUS at 08:24

## 2020-08-25 RX ADMIN — CISATRACURIUM BESYLATE 4 MCG/KG/MIN: 20 INJECTION INTRAVENOUS at 04:22

## 2020-08-25 RX ADMIN — ENOXAPARIN SODIUM 50 MG: 60 INJECTION SUBCUTANEOUS at 09:42

## 2020-08-25 RX ADMIN — GUAIFENESIN 400 MG: 100 SOLUTION ORAL at 23:51

## 2020-08-25 RX ADMIN — GUAIFENESIN 400 MG: 100 SOLUTION ORAL at 00:00

## 2020-08-25 RX ADMIN — POLYETHYLENE GLYCOL 3350 17 G: 17 POWDER, FOR SOLUTION ORAL at 18:09

## 2020-08-25 RX ADMIN — Medication 6 MG/HR: at 14:22

## 2020-08-25 RX ADMIN — DOCUSATE SODIUM 100 MG: 50 LIQUID ORAL at 08:00

## 2020-08-25 RX ADMIN — CEFEPIME 2 G: 2 INJECTION, POWDER, FOR SOLUTION INTRAVENOUS at 04:09

## 2020-08-25 RX ADMIN — SENNOSIDES 8.6 MG: 8.6 TABLET, FILM COATED ORAL at 13:55

## 2020-08-25 RX ADMIN — KETAMINE HYDROCHLORIDE 1 MG/KG/HR: 50 INJECTION, SOLUTION INTRAMUSCULAR; INTRAVENOUS at 02:42

## 2020-08-25 RX ADMIN — KETAMINE HYDROCHLORIDE 1 MG/KG/HR: 50 INJECTION, SOLUTION INTRAMUSCULAR; INTRAVENOUS at 19:00

## 2020-08-25 RX ADMIN — CHLORHEXIDINE GLUCONATE 15 ML: 0.12 RINSE ORAL at 08:00

## 2020-08-25 RX ADMIN — PROPOFOL 50 MCG/KG/MIN: 10 INJECTION, EMULSION INTRAVENOUS at 08:23

## 2020-08-25 RX ADMIN — GUAIFENESIN 400 MG: 100 SOLUTION ORAL at 04:14

## 2020-08-25 RX ADMIN — GUAIFENESIN 400 MG: 100 SOLUTION ORAL at 21:45

## 2020-08-25 RX ADMIN — Medication 6 MG/HR: at 05:03

## 2020-08-25 NOTE — PROGRESS NOTES
[facility] Adult  Hospitalist Group                                                                                          Critical Care Progress Note  Shirlene Homans, MD  Answering service: [phone] from in house phone        Date of Service:  2020  NAME:  Charisse Levy  :  1990  MRN:  [id number]      Interval history / Subjective:     Gets sinus tachy to the 150s with minimal stimulation. Paralyzed and heavily sedated. CT in placed no air leak. No PTX on chest x ray. Assessment & Plan:         ICU Problems: admitted 20 with severe Covid s/p trach      1. Covid-19 - PNA ARDS   2. Acute Resp failure   3. PTX with CT  4. Sinus/martine tarchycardia  5. Tobacco use   6. Possible Bacterial superinfection   7 Left pneumothorax with indwelling L Pig tail    Plan:    Making some headway last few days: PEEP down to 10 and Fio2 60%  Gradually wean to off the paralytic. We do NOT have BIS monitoring available  And it is unclear if he is well sedated despite all the heavy sedation he is on. Complete 7 days of antibiotics and stop  Completed treatment with steroids, Remdesivir (completed 8/10/20) and convalescent plasma x 2 doses (8/10/20 and 20) for COVID-19 infection  Nutritional support  Prn laxative    DVT prophylaxis:  Lovenox BID 0.5mg/kg  GI prophylaxis: pepcid    Thoracic surgery will assess for Trach placement next 2020     I personally spent   50   minutes of critical care time. This is time spent at this critically ill patient's bedside actively involved in patient care as well as the coordination of care and discussions with the patient's family. This does not include any procedural time which has been billed separately.        Hospital Problems  Date Reviewed: 2020          Codes Class Noted POA    Acute respiratory failure [facility]) ICD-10-CM: J96.00  ICD-9-CM: 518.81  2020 Unknown                Review of Systems:   Review of systems not obtained due to patient factors. Vital Signs:    Last 24hrs VS reviewed since prior progress note. Most recent are:  Visit Vitals  /69   Pulse (!) 106   Temp 99.3 °F (37.4 °C)   Resp 26   Ht 5' 11\" (1.803 m)   Wt 100.2 kg (220 lb 14.4 oz)   SpO2 94%   BMI 30.81 kg/m²         Intake/Output Summary (Last 24 hours) at 8/25/2020 1024  Last data filed at 8/25/2020 0800  Gross per 24 hour   Intake 5049.72 ml   Output 4345 ml   Net 704.72 ml        Physical Examination:             Constitutional:  Paralyzed, on heavy sedationg   ENT:  Oral mucous moist, oropharynx benign. Resp:  Coarse bilaterally. No wheezing/rhonchi/rales. No accessory muscle use   CV:  Regular rhythm, normal rate, no murmurs, gallops, rubs    GI:  Soft, non distended, non tender. normoactive bowel sounds, no hepatosplenomegaly     Musculoskeletal:  No edema, warm, 2+ pulses throughout    Neurologic:  Paralyzed, on heavy sedationg     Psych:  Paralyzed, on heavy sedationg  Skin:  Good turgor, no rashes or ulcers  Hematologic/Lymphatic/Immunlogic:  No jaundice nor lymph node swelling  :  Normal genitalia. Eyes:  PERRL, Paralyzed, on heavy sedationg       Data Review:    Review and/or order of clinical lab test      Labs:     Recent Labs     08/25/20 0217 08/24/20 0437   WBC 6.3 10.0   HGB 8.3* 9.3*   HCT 26.5* 30.2*    155     Recent Labs     08/25/20 0217 08/24/20 0437 08/23/20  0542    140 136   K 3.8 3.9 3.7    103 100   CO2 32 30 32   BUN 19 18 19   CREA 0.49* 0.56* 0.52*   GLU 72 89 107*   CA 8.2* 8.2* 8.1*   MG 2.1 2.3 2.4   PHOS 3.4 3.3 1.9*     Recent Labs     08/25/20 0217 08/24/20 0437 08/23/20  0542   ALT 82* 72 64    122* 122*   TBILI 0.7 0.9 1.3*   TP 5.6* 6.2* 6.3*   ALB 1.9* 2.1* 1.9*   GLOB 3.7 4.1* 4.4*     No results for input(s): INR, PTP, APTT, INREXT in the last 72 hours.    Recent Labs     08/25/20 0217 08/24/20  0437   FERR 1,507* 1,660*      No results found for: FOL, RBCF   No results for input(s): PH, PCO2, PO2 in the last 72 hours.   Recent Labs     08/25/20  0217 08/24/20  0437 08/23/20  0542   CPK 32* 73 77     Lab Results   Component Value Date/Time    Triglyceride 120 08/22/2020 07:38 PM     Lab Results   Component Value Date/Time    Glucose (POC) 104 (H) 08/25/2020 05:33 AM    Glucose (POC) 99 08/24/2020 11:34 PM    Glucose (POC) 140 (H) 08/24/2020 04:57 PM    Glucose (POC) 155 (H) 08/24/2020 11:54 AM    Glucose (POC) 102 (H) 08/24/2020 06:48 AM     No results found for: COLOR, APPRN, SPGRU, REFSG, RAE, PROTU, GLUCU, KETU, BILU, UROU, ROBLES, LEUKU, GLUKE, EPSU, BACTU, WBCU, RBCU, CASTS, UCRY      Medications Reviewed:     Current Facility-Administered Medications   Medication Dose Route Frequency    potassium chloride 10 mEq in 50 ml IVPB  20 mEq IntraVENous ONCE    metoprolol (LOPRESSOR) injection 5 mg  5 mg IntraVENous Q6H PRN    vancomycin (VANCOCIN) 1250 mg in  ml infusion  1,250 mg IntraVENous Q8H    polyethylene glycol (MIRALAX) packet 17 g  17 g Per NG tube BID    balsam peru-castor oiL (VENELEX) ointment   Topical BID    ketamine (KETALAR) 500 mg in 0.9% sodium chloride 500 mL infusion  0.05-1 mg/kg/hr IntraVENous TITRATE    HYDROmorphone (PF) 25 mg/50 mL (0.5 mg/mL) infusion  0-6 mg/hr IntraVENous TITRATE    fentaNYL citrate (PF) injection  mcg   mcg IntraVENous Q2H PRN    midazolam in normal saline (VERSED) 1 mg/mL infusion  0-10 mg/hr IntraVENous TITRATE    cefepime (MAXIPIME) 2 g in 0.9% sodium chloride (MBP/ADV) 100 mL  2 g IntraVENous Q8H    Vancomycin - Pharmacy to Dose   Other Rx Dosing/Monitoring    famotidine (PF) (PEPCID) 20 mg in 0.9% sodium chloride 10 mL injection  20 mg IntraVENous Q12H    midazolam (VERSED) injection 1-2 mg  1-2 mg IntraVENous Q2H PRN    docusate (COLACE) 50 mg/5 mL oral liquid 100 mg  100 mg Per NG tube BID    enoxaparin (LOVENOX) injection 50 mg  0.5 mg/kg SubCUTAneous Q12H    guaiFENesin (ROBITUSSIN) 100 mg/5 mL oral liquid 400 mg  400 mg Oral Q4H    insulin lispro (HUMALOG) injection   SubCUTAneous Q6H    senna (SENOKOT) tablet 8.6 mg  1 Tab Per NG tube BID    acetaminophen (TYLENOL) tablet 650 mg  650 mg Oral Q6H PRN    Or    acetaminophen (TYLENOL) suppository 650 mg  650 mg Rectal Q6H PRN    alteplase (CATHFLO) 1 mg in sterile water (preservative free) 1 mL injection  1 mg InterCATHeter PRN    polyvinyl alcohol-povidone (NATURAL TEARS) 0.5-0.6 % ophthalmic solution 1 Drop  1 Drop Both Eyes PRN    dextrose 10% infusion 0-250 mL  0-250 mL IntraVENous PRN    glucagon (GLUCAGEN) injection 1 mg  1 mg IntraMUSCular PRN    glucose chewable tablet 16 g  4 Tab Oral PRN    promethazine (PHENERGAN) tablet 12.5 mg  12.5 mg Oral Q6H PRN    Or    ondansetron (ZOFRAN) injection 4 mg  4 mg IntraVENous Q6H PRN    sodium chloride (NS) flush 5-40 mL  5-40 mL IntraVENous Q8H    sodium chloride (NS) flush 5-40 mL  5-40 mL IntraVENous PRN    propofol (DIPRIVAN) 10 mg/mL infusion  0-50 mcg/kg/min IntraVENous TITRATE    cisatracurium (NIMBEX) 2 mg/mL IV infusion  0-10 mcg/kg/min IntraVENous TITRATE    aspirin chewable tablet 81 mg  81 mg Per NG tube DAILY    chlorhexidine (ORAL CARE KIT) 0.12 % mouthwash 15 mL  15 mL Oral Q12H    dexamethasone (PF) (DECADRON) 10 mg/mL injection 6 mg  6 mg IntraVENous DAILY    albuterol-ipratropium (DUO-NEB) 2.5 MG-0.5 MG/3 ML  3 mL Nebulization Q6H PRN    0.9% sodium chloride infusion  5 mL/hr IntraVENous CONTINUOUS    NOREPINephrine (LEVOPHED) 8 mg in 5% dextrose 250mL (32 mcg/mL) infusion  0.5-30 mcg/min IntraVENous TITRATE     ______________________________________________________________________  EXPECTED LENGTH OF STAY: 12d 0h  ACTUAL LENGTH OF STAY:          7                 Stuart Trammell MD

## 2020-08-25 NOTE — PROGRESS NOTES
1930: Bedside and Verbal shift change report given to [address] (oncoming nurse) by Mela De Luna (offgoing nurse). Report included the following information SBAR, Kardex, ED Summary, Intake/Output, MAR, Recent Results, Cardiac Rhythm SR/ST and Alarm Parameters . 2000: TOF 1/4 twitches. 2200: CHELE Nails NP notified of pt HR and BP continuing to increase with stimulation. HR up to 140s, SBP up to 170s. PRN fentanyl given, HR and BP continuing to be elevated. 2230: PRN metoprolol given for  and . Ketamine @ 1mg/kg/hr  Nimbex @ 4.5 mcg/kg/hr  Dilaudid @ 4 mg/hr  Versed @ 10 mg/hr    0000: Bedside and Verbal shift change report given to [address] (oncoming nurse) by Lynda Pederson RN (offgoing nurse). Report included the following information SBAR, Kardex, ED Summary, Intake/Output, MAR, Recent Results, Cardiac Rhythm SR/ST and Alarm Parameters .

## 2020-08-25 NOTE — PROGRESS NOTES
0730: Bedside and Verbal shift change report given to Rosales Her RN (oncoming nurse) by Kecia Keith RN (offgoing nurse). Report included the following information SBAR, Kardex, Intake/Output, MAR, Recent Results, Med Rec Status, Cardiac Rhythm NSR to ST and Alarm Parameters . Drips verified as followed at the beginning of the shift:   -Propofol @ 35 mcg/kg/min  -Nimbex @ 4 mcg/kg/min  -Versed @ 10 mg/hr  -Ketamine @ 1 mg/kg/hr  -Dilaudid @ 6 mg/hr    0800: Patient alarming ventilator and becoming increasingly tachycardic, despite large amounts of sedation. Propofol increased (see MAR). All other sedation at maximum rate at this time. 0820: Thoracic Surgery PA at bedside to evaluate for tracheostomy. 0930: Spoke with MD Rudolph Jurado. Patient's heart rate and BP still increases with stimulation. Informed that patient is currently maxed on all sedation and PRNs do not affect patient. Patient also occasionally alarming the ventilator. Per MD, plan to slowly wean off nimbex to see if patient is truly awake/not sedated. See MAR for titrations. MD also informed about patient's copious old, bloody secretions from both mouth and ETT. Per MD, ok to give lovenox. See MAR. 1015: Patient's mother, Ottoniel Arenas, updated. Questions answered to the best of my ability. 1200: Per MD Rudolph Jurado, pause nimbex to see patient's response. 1500: MD Rudolph Jurado informed that patient's blood pressure has been steadily decreasing throughout the day. Currently 90s/50s with MAPs between 65-68. Plan to begin decreasing propofol as able and if MAPs drop below 65, start ordered levophed gtt.    1600: Thoracic surgery at bedside. Updated on ventilator settings, tube feeds, and blood thinners. PA will call mom for consent for tracheostomy this afternoon. Possibly to complete trach tomorrow, depending on physician schedule. If not tomorrow, will do tracheostomy on Friday.     Primary Nurse Anderson Patel and Shravan Craig RN performed a dual skin assessment on this patient No impairment noted. Enrique score is 10    1630: Consent obtained from patient's mom. Placed into chart. 1800: This RN walked into the room to give 1800 meds. Patient eyes opened. Does not follow commands, but opens eyes to voice and withdraws in all four extremities. 1930: Bedside and Verbal shift change report given to [facility] RNs (oncoming nurse) by Breana Hernandez RN (offgoing nurse). Report included the following information SBAR, Kardex, Intake/Output, MAR, Recent Results, Med Rec Status, Cardiac Rhythm ST to NSR and Alarm Parameters .

## 2020-08-25 NOTE — PROGRESS NOTES
ARDS  Acute hypoxic respiratory failure  COVID positive  Sinus pauses    Remains intubated, sedated, and paralyzed    Cisatracurium, ketamine, Versed, Propofol    WBCs normal    Hgb and platelets look good    Creatinine normal    Bilirubin looks good    LFTs up a bit    LDH looks good    Pro-calcitonin normal    PEEP down to 10    FiO2 down to 60%     ABG 7.40 / 51 / 96 / 31 / 7      Looking at trach placement early next week     Continues on Decadron    Continues on Lovenox    CXR - bilateral air-space disease      Intake/Output Summary (Last 24 hours) at 8/25/2020 1258  Last data filed at 8/25/2020 1200  Gross per 24 hour   Intake 5517.03 ml   Output 4820 ml   Net 697.03 ml     Visit Vitals  BP 99/62 (BP 1 Location: Right arm, BP Patient Position: At rest)   Pulse 98   Temp 99.6 °F (37.6 °C)   Resp 26   Ht 5' 11\" (1.803 m)   Wt 220 lb 14.4 oz (100.2 kg)   SpO2 96%   BMI 30.81 kg/m²       Risk of morbidity and mortality - high  Medical decision making - high complexity    Total critical care time - 30 minutes (CPT 30734)     I personally spent the above critical care time. This is time spent at this critically ill patient's bedside / unit / floor actively involved in patient care as well as the coordination of care and discussions with the patient's family. This does not include any procedural time which has been billed separately.

## 2020-08-25 NOTE — CARDIO/PULMONARY
Cardiac Rehab: Per EMR, patient is a current smoker.  Smoking Cessation Program information placed on the AVS.    Liliana Granados RN

## 2020-08-25 NOTE — PROGRESS NOTES
0000: Bedside shift change report given to [address] [place] [address] (oncoming nurse) by Blaire Donahue (offgoing nurse). Report included the following information SBAR, Kardex, Intake/Output, MAR, Recent Results, Cardiac Rhythm NSR/sinus tach and Alarm Parameters . 0530: HR in the 160s during bath, systolic BP 203A, alarming vent despite significant amnt of sedation as well as nimbex. Started propofol. 0730: Bedside shift change report given to Shaun Carlos RN (oncoming nurse) by [address] [place] [address] (offgoing nurse). Report included the following information SBAR, Kardex, Intake/Output, MAR, Recent Results, Cardiac Rhythm sinus tach and Alarm Parameters .

## 2020-08-26 ENCOUNTER — APPOINTMENT (OUTPATIENT)
Dept: GENERAL RADIOLOGY | Age: 30
DRG: 004 | End: 2020-08-26
Attending: INTERNAL MEDICINE
Payer: COMMERCIAL

## 2020-08-26 ENCOUNTER — APPOINTMENT (OUTPATIENT)
Dept: GENERAL RADIOLOGY | Age: 30
DRG: 004 | End: 2020-08-26
Attending: NURSE PRACTITIONER
Payer: COMMERCIAL

## 2020-08-26 LAB
ALBUMIN SERPL-MCNC: 2.1 G/DL (ref 3.5–5)
ALBUMIN/GLOB SERPL: 0.4 {RATIO} (ref 1.1–2.2)
ALP SERPL-CCNC: 148 U/L (ref 45–117)
ALT SERPL-CCNC: 128 U/L (ref 12–78)
ANION GAP SERPL CALC-SCNC: 8 MMOL/L (ref 5–15)
ARTERIAL PATENCY WRIST A: YES
AST SERPL-CCNC: 68 U/L (ref 15–37)
ATRIAL RATE: 128 BPM
BASE EXCESS BLD CALC-SCNC: 6 MMOL/L
BASOPHILS # BLD: 0.1 K/UL (ref 0–0.1)
BASOPHILS NFR BLD: 1 % (ref 0–1)
BDY SITE: ABNORMAL
BILIRUB SERPL-MCNC: 0.9 MG/DL (ref 0.2–1)
BODY TEMPERATURE: 100.5
BUN SERPL-MCNC: 18 MG/DL (ref 6–20)
BUN/CREAT SERPL: 33 (ref 12–20)
CA-I BLD-SCNC: 1.25 MMOL/L (ref 1.12–1.32)
CALCIUM SERPL-MCNC: 9 MG/DL (ref 8.5–10.1)
CALCULATED P AXIS, ECG09: 59 DEGREES
CALCULATED R AXIS, ECG10: -14 DEGREES
CALCULATED T AXIS, ECG11: 46 DEGREES
CHLORIDE SERPL-SCNC: 106 MMOL/L (ref 97–108)
CO2 SERPL-SCNC: 29 MMOL/L (ref 21–32)
CREAT SERPL-MCNC: 0.54 MG/DL (ref 0.7–1.3)
D DIMER PPP FEU-MCNC: 23.77 MG/L FEU (ref 0–0.65)
DIAGNOSIS, 93000: NORMAL
DIFFERENTIAL METHOD BLD: ABNORMAL
EOSINOPHIL # BLD: 0.3 K/UL (ref 0–0.4)
EOSINOPHIL NFR BLD: 3 % (ref 0–7)
ERYTHROCYTE [DISTWIDTH] IN BLOOD BY AUTOMATED COUNT: 14 % (ref 11.5–14.5)
FIBRINOGEN PPP-MCNC: >800 MG/DL (ref 200–475)
GAS FLOW.O2 O2 DELIVERY SYS: ABNORMAL L/MIN
GAS FLOW.O2 SETTING OXYMISER: 26 BPM
GLOBULIN SER CALC-MCNC: 5.2 G/DL (ref 2–4)
GLUCOSE BLD STRIP.AUTO-MCNC: 108 MG/DL (ref 65–100)
GLUCOSE BLD STRIP.AUTO-MCNC: 110 MG/DL (ref 65–100)
GLUCOSE BLD STRIP.AUTO-MCNC: 81 MG/DL (ref 65–100)
GLUCOSE BLD STRIP.AUTO-MCNC: 99 MG/DL (ref 65–100)
GLUCOSE SERPL-MCNC: 126 MG/DL (ref 65–100)
HCO3 BLD-SCNC: 31.8 MMOL/L (ref 22–26)
HCT VFR BLD AUTO: 30.5 % (ref 36.6–50.3)
HGB BLD-MCNC: 9.5 G/DL (ref 12.1–17)
IMM GRANULOCYTES # BLD AUTO: 0 K/UL
IMM GRANULOCYTES NFR BLD AUTO: 0 %
INSPIRATION.DURATION SETTING TIME VENT: 0.77 SEC
LYMPHOCYTES # BLD: 1.4 K/UL (ref 0.8–3.5)
LYMPHOCYTES NFR BLD: 17 % (ref 12–49)
MAGNESIUM SERPL-MCNC: 1.9 MG/DL (ref 1.6–2.4)
MCH RBC QN AUTO: 29.9 PG (ref 26–34)
MCHC RBC AUTO-ENTMCNC: 31.1 G/DL (ref 30–36.5)
MCV RBC AUTO: 95.9 FL (ref 80–99)
MONOCYTES # BLD: 1 K/UL (ref 0–1)
MONOCYTES NFR BLD: 12 % (ref 5–13)
MYELOCYTES NFR BLD MANUAL: 1 %
NEUTS BAND NFR BLD MANUAL: 5 % (ref 0–6)
NEUTS SEG # BLD: 5.5 K/UL (ref 1.8–8)
NEUTS SEG NFR BLD: 61 % (ref 32–75)
NRBC # BLD: 0 K/UL (ref 0–0.01)
NRBC BLD-RTO: 0 PER 100 WBC
O2/TOTAL GAS SETTING VFR VENT: 50 %
P-R INTERVAL, ECG05: 162 MS
PCO2 BLD: 58.6 MMHG (ref 35–45)
PEEP RESPIRATORY: 10 CMH2O
PH BLD: 7.35 [PH] (ref 7.35–7.45)
PHOSPHATE SERPL-MCNC: 3.4 MG/DL (ref 2.6–4.7)
PIP ISTAT,IPIP: 22
PLATELET # BLD AUTO: 207 K/UL (ref 150–400)
PMV BLD AUTO: 10.1 FL (ref 8.9–12.9)
PO2 BLD: 92 MMHG (ref 80–100)
POTASSIUM SERPL-SCNC: 3.2 MMOL/L (ref 3.5–5.1)
PROCALCITONIN SERPL-MCNC: 0.4 NG/ML
PROT SERPL-MCNC: 7.3 G/DL (ref 6.4–8.2)
Q-T INTERVAL, ECG07: 284 MS
QRS DURATION, ECG06: 94 MS
QTC CALCULATION (BEZET), ECG08: 414 MS
RBC # BLD AUTO: 3.18 M/UL (ref 4.1–5.7)
RBC MORPH BLD: ABNORMAL
RBC MORPH BLD: ABNORMAL
SAO2 % BLD: 96 % (ref 92–97)
SERVICE CMNT-IMP: ABNORMAL
SERVICE CMNT-IMP: ABNORMAL
SERVICE CMNT-IMP: NORMAL
SERVICE CMNT-IMP: NORMAL
SODIUM SERPL-SCNC: 143 MMOL/L (ref 136–145)
SPECIMEN TYPE: ABNORMAL
TOTAL RESP. RATE, ITRR: 26
VENTILATION MODE VENT: ABNORMAL
VENTRICULAR RATE, ECG03: 128 BPM
WBC # BLD AUTO: 8.3 K/UL (ref 4.1–11.1)

## 2020-08-26 PROCEDURE — 77030036958 HC BRONCHSCOPE ASCOPE3 FLX DISP AMBU -D

## 2020-08-26 PROCEDURE — 74011000258 HC RX REV CODE- 258: Performed by: NURSE PRACTITIONER

## 2020-08-26 PROCEDURE — 71045 X-RAY EXAM CHEST 1 VIEW: CPT

## 2020-08-26 PROCEDURE — 82803 BLOOD GASES ANY COMBINATION: CPT

## 2020-08-26 PROCEDURE — 82962 GLUCOSE BLOOD TEST: CPT

## 2020-08-26 PROCEDURE — 77030018861

## 2020-08-26 PROCEDURE — 74011250636 HC RX REV CODE- 250/636: Performed by: HOSPITALIST

## 2020-08-26 PROCEDURE — 0B113F4 BYPASS TRACHEA TO CUTANEOUS WITH TRACHEOSTOMY DEVICE, PERCUTANEOUS APPROACH: ICD-10-PCS | Performed by: THORACIC SURGERY (CARDIOTHORACIC VASCULAR SURGERY)

## 2020-08-26 PROCEDURE — 77030008793 HC TU TRACH CUF COVD -B

## 2020-08-26 PROCEDURE — 74011250637 HC RX REV CODE- 250/637: Performed by: HOSPITALIST

## 2020-08-26 PROCEDURE — 77030018798 HC PMP KT ENTRL FED COVD -A

## 2020-08-26 PROCEDURE — 74011250636 HC RX REV CODE- 250/636: Performed by: NURSE PRACTITIONER

## 2020-08-26 PROCEDURE — C1894 INTRO/SHEATH, NON-LASER: HCPCS

## 2020-08-26 PROCEDURE — 74011000250 HC RX REV CODE- 250: Performed by: INTERNAL MEDICINE

## 2020-08-26 PROCEDURE — 80053 COMPREHEN METABOLIC PANEL: CPT

## 2020-08-26 PROCEDURE — 36415 COLL VENOUS BLD VENIPUNCTURE: CPT

## 2020-08-26 PROCEDURE — 77030004950 HC CATH ENTRL NG COVD -A

## 2020-08-26 PROCEDURE — 31625 BRONCHOSCOPY W/BIOPSY(S): CPT

## 2020-08-26 PROCEDURE — 84145 PROCALCITONIN (PCT): CPT

## 2020-08-26 PROCEDURE — 74011000250 HC RX REV CODE- 250: Performed by: HOSPITALIST

## 2020-08-26 PROCEDURE — 94003 VENT MGMT INPAT SUBQ DAY: CPT

## 2020-08-26 PROCEDURE — 74018 RADEX ABDOMEN 1 VIEW: CPT

## 2020-08-26 PROCEDURE — 36600 WITHDRAWAL OF ARTERIAL BLOOD: CPT

## 2020-08-26 PROCEDURE — 74011000250 HC RX REV CODE- 250: Performed by: PHYSICIAN ASSISTANT

## 2020-08-26 PROCEDURE — 74011250636 HC RX REV CODE- 250/636: Performed by: INTERNAL MEDICINE

## 2020-08-26 PROCEDURE — 85379 FIBRIN DEGRADATION QUANT: CPT

## 2020-08-26 PROCEDURE — 85384 FIBRINOGEN ACTIVITY: CPT

## 2020-08-26 PROCEDURE — 84100 ASSAY OF PHOSPHORUS: CPT

## 2020-08-26 PROCEDURE — 77030040831 HC BAG URINE DRNG MDII -A

## 2020-08-26 PROCEDURE — 85025 COMPLETE CBC W/AUTO DIFF WBC: CPT

## 2020-08-26 PROCEDURE — 74011250637 HC RX REV CODE- 250/637: Performed by: NURSE PRACTITIONER

## 2020-08-26 PROCEDURE — 93005 ELECTROCARDIOGRAM TRACING: CPT

## 2020-08-26 PROCEDURE — 65610000006 HC RM INTENSIVE CARE

## 2020-08-26 PROCEDURE — 83735 ASSAY OF MAGNESIUM: CPT

## 2020-08-26 PROCEDURE — P9045 ALBUMIN (HUMAN), 5%, 250 ML: HCPCS | Performed by: NURSE PRACTITIONER

## 2020-08-26 PROCEDURE — 99291 CRITICAL CARE FIRST HOUR: CPT | Performed by: THORACIC SURGERY (CARDIOTHORACIC VASCULAR SURGERY)

## 2020-08-26 PROCEDURE — 94760 N-INVAS EAR/PLS OXIMETRY 1: CPT

## 2020-08-26 RX ORDER — HYDRALAZINE HYDROCHLORIDE 20 MG/ML
20 INJECTION INTRAMUSCULAR; INTRAVENOUS ONCE
Status: COMPLETED | OUTPATIENT
Start: 2020-08-26 | End: 2020-08-26

## 2020-08-26 RX ORDER — POTASSIUM CHLORIDE 750 MG/1
20 TABLET, FILM COATED, EXTENDED RELEASE ORAL
Status: DISCONTINUED | OUTPATIENT
Start: 2020-08-26 | End: 2020-08-26

## 2020-08-26 RX ORDER — HYDROMORPHONE HYDROCHLORIDE 1 MG/ML
1 INJECTION, SOLUTION INTRAMUSCULAR; INTRAVENOUS; SUBCUTANEOUS ONCE
Status: COMPLETED | OUTPATIENT
Start: 2020-08-26 | End: 2020-08-26

## 2020-08-26 RX ORDER — MAGNESIUM SULFATE HEPTAHYDRATE 40 MG/ML
2 INJECTION, SOLUTION INTRAVENOUS ONCE
Status: COMPLETED | OUTPATIENT
Start: 2020-08-26 | End: 2020-08-26

## 2020-08-26 RX ORDER — ALBUMIN HUMAN 50 G/1000ML
25 SOLUTION INTRAVENOUS ONCE
Status: COMPLETED | OUTPATIENT
Start: 2020-08-26 | End: 2020-08-26

## 2020-08-26 RX ORDER — HYDROMORPHONE HYDROCHLORIDE 1 MG/ML
1 INJECTION, SOLUTION INTRAMUSCULAR; INTRAVENOUS; SUBCUTANEOUS ONCE
Status: DISPENSED | OUTPATIENT
Start: 2020-08-26 | End: 2020-08-26

## 2020-08-26 RX ORDER — DOPAMINE HYDROCHLORIDE 320 MG/100ML
INJECTION, SOLUTION INTRAVENOUS
Status: DISPENSED
Start: 2020-08-26 | End: 2020-08-26

## 2020-08-26 RX ORDER — POTASSIUM CHLORIDE 29.8 MG/ML
20 INJECTION INTRAVENOUS
Status: COMPLETED | OUTPATIENT
Start: 2020-08-26 | End: 2020-08-26

## 2020-08-26 RX ORDER — DOPAMINE HYDROCHLORIDE 320 MG/100ML
0-20 INJECTION, SOLUTION INTRAVENOUS
Status: DISCONTINUED | OUTPATIENT
Start: 2020-08-26 | End: 2020-08-27

## 2020-08-26 RX ORDER — MIDAZOLAM HYDROCHLORIDE 1 MG/ML
5 INJECTION, SOLUTION INTRAMUSCULAR; INTRAVENOUS ONCE
Status: COMPLETED | OUTPATIENT
Start: 2020-08-26 | End: 2020-08-26

## 2020-08-26 RX ORDER — FENTANYL CITRATE 50 UG/ML
100 INJECTION, SOLUTION INTRAMUSCULAR; INTRAVENOUS ONCE
Status: COMPLETED | OUTPATIENT
Start: 2020-08-26 | End: 2020-08-26

## 2020-08-26 RX ADMIN — ALBUMIN (HUMAN) 25 G: 12.5 INJECTION, SOLUTION INTRAVENOUS at 03:28

## 2020-08-26 RX ADMIN — MIDAZOLAM HYDROCHLORIDE 28 MG/HR: 5 INJECTION, SOLUTION INTRAMUSCULAR; INTRAVENOUS at 19:10

## 2020-08-26 RX ADMIN — Medication: at 17:10

## 2020-08-26 RX ADMIN — GUAIFENESIN 400 MG: 100 SOLUTION ORAL at 08:38

## 2020-08-26 RX ADMIN — Medication 6 MG/HR: at 16:40

## 2020-08-26 RX ADMIN — Medication 6 MG/HR: at 12:16

## 2020-08-26 RX ADMIN — Medication: at 08:44

## 2020-08-26 RX ADMIN — GUAIFENESIN 400 MG: 100 SOLUTION ORAL at 16:07

## 2020-08-26 RX ADMIN — POTASSIUM CHLORIDE 20 MEQ: 400 INJECTION, SOLUTION INTRAVENOUS at 05:53

## 2020-08-26 RX ADMIN — MIDAZOLAM 5 MG: 1 INJECTION INTRAMUSCULAR; INTRAVENOUS at 01:26

## 2020-08-26 RX ADMIN — MIDAZOLAM HYDROCHLORIDE 28 MG/HR: 5 INJECTION, SOLUTION INTRAMUSCULAR; INTRAVENOUS at 03:56

## 2020-08-26 RX ADMIN — ASPIRIN 81 MG CHEWABLE TABLET 81 MG: 81 TABLET CHEWABLE at 08:38

## 2020-08-26 RX ADMIN — CHLORHEXIDINE GLUCONATE 15 ML: 0.12 RINSE ORAL at 08:44

## 2020-08-26 RX ADMIN — KETAMINE HYDROCHLORIDE 1 MG/KG/HR: 50 INJECTION, SOLUTION INTRAMUSCULAR; INTRAVENOUS at 12:52

## 2020-08-26 RX ADMIN — GUAIFENESIN 400 MG: 100 SOLUTION ORAL at 03:20

## 2020-08-26 RX ADMIN — FAMOTIDINE 20 MG: 10 INJECTION, SOLUTION INTRAVENOUS at 21:01

## 2020-08-26 RX ADMIN — DEXAMETHASONE SODIUM PHOSPHATE 6 MG: 10 INJECTION INTRAMUSCULAR; INTRAVENOUS at 08:39

## 2020-08-26 RX ADMIN — FAMOTIDINE 20 MG: 10 INJECTION, SOLUTION INTRAVENOUS at 08:45

## 2020-08-26 RX ADMIN — KETAMINE HYDROCHLORIDE 1 MG/KG/HR: 50 INJECTION, SOLUTION INTRAMUSCULAR; INTRAVENOUS at 19:09

## 2020-08-26 RX ADMIN — ACETAMINOPHEN 650 MG: 325 TABLET ORAL at 01:15

## 2020-08-26 RX ADMIN — ROCURONIUM BROMIDE 50 MG: 10 INJECTION INTRAVENOUS at 17:34

## 2020-08-26 RX ADMIN — Medication 6 MG/HR: at 02:51

## 2020-08-26 RX ADMIN — FENTANYL CITRATE 100 MCG: 50 INJECTION INTRAMUSCULAR; INTRAVENOUS at 01:12

## 2020-08-26 RX ADMIN — Medication 6 MG/HR: at 07:26

## 2020-08-26 RX ADMIN — POTASSIUM CHLORIDE 20 MEQ: 400 INJECTION, SOLUTION INTRAVENOUS at 08:45

## 2020-08-26 RX ADMIN — KETAMINE HYDROCHLORIDE 1 MG/KG/HR: 50 INJECTION, SOLUTION INTRAMUSCULAR; INTRAVENOUS at 00:48

## 2020-08-26 RX ADMIN — MIDAZOLAM HYDROCHLORIDE 28 MG/HR: 5 INJECTION, SOLUTION INTRAMUSCULAR; INTRAVENOUS at 11:25

## 2020-08-26 RX ADMIN — ENOXAPARIN SODIUM 50 MG: 60 INJECTION SUBCUTANEOUS at 21:02

## 2020-08-26 RX ADMIN — GUAIFENESIN 400 MG: 100 SOLUTION ORAL at 23:32

## 2020-08-26 RX ADMIN — POTASSIUM CHLORIDE 20 MEQ: 400 INJECTION, SOLUTION INTRAVENOUS at 04:02

## 2020-08-26 RX ADMIN — Medication 10 ML: at 21:02

## 2020-08-26 RX ADMIN — KETAMINE HYDROCHLORIDE 1 MG/KG/HR: 50 INJECTION, SOLUTION INTRAMUSCULAR; INTRAVENOUS at 07:21

## 2020-08-26 RX ADMIN — MIDAZOLAM HYDROCHLORIDE 28 MG/HR: 5 INJECTION, SOLUTION INTRAMUSCULAR; INTRAVENOUS at 23:32

## 2020-08-26 RX ADMIN — MAGNESIUM SULFATE IN WATER 2 G: 40 INJECTION, SOLUTION INTRAVENOUS at 04:05

## 2020-08-26 RX ADMIN — HYDRALAZINE HYDROCHLORIDE 20 MG: 20 INJECTION INTRAMUSCULAR; INTRAVENOUS at 00:52

## 2020-08-26 RX ADMIN — GUAIFENESIN 400 MG: 100 SOLUTION ORAL at 21:01

## 2020-08-26 RX ADMIN — CHLORHEXIDINE GLUCONATE 15 ML: 0.12 RINSE ORAL at 21:02

## 2020-08-26 RX ADMIN — HYDROMORPHONE HYDROCHLORIDE 1 MG: 1 INJECTION, SOLUTION INTRAMUSCULAR; INTRAVENOUS; SUBCUTANEOUS at 00:43

## 2020-08-26 RX ADMIN — MIDAZOLAM HYDROCHLORIDE 28 MG/HR: 5 INJECTION, SOLUTION INTRAMUSCULAR; INTRAVENOUS at 07:20

## 2020-08-26 RX ADMIN — FENTANYL CITRATE 100 MCG: 50 INJECTION, SOLUTION INTRAMUSCULAR; INTRAVENOUS at 17:31

## 2020-08-26 RX ADMIN — Medication 40 ML: at 06:00

## 2020-08-26 RX ADMIN — Medication 10 ML: at 13:11

## 2020-08-26 RX ADMIN — POTASSIUM BICARBONATE 20 MEQ: 782 TABLET, EFFERVESCENT ORAL at 03:30

## 2020-08-26 RX ADMIN — MIDAZOLAM HYDROCHLORIDE 28 MG/HR: 5 INJECTION, SOLUTION INTRAMUSCULAR; INTRAVENOUS at 15:14

## 2020-08-26 RX ADMIN — Medication 6 MG/HR: at 21:06

## 2020-08-26 RX ADMIN — GUAIFENESIN 400 MG: 100 SOLUTION ORAL at 12:19

## 2020-08-26 NOTE — PROGRESS NOTES
0730-Bedside and Verbal shift change report given to [address] (oncoming nurse) by Gamal Hugo RN (offgoing nurse). Report included the following information SBAR, Kardex, ED Summary, Procedure Summary, Intake/Output, MAR, Recent Results and Cardiac Rhythm SR/ST.

## 2020-08-26 NOTE — PROGRESS NOTES
ARDS  Acute hypoxic respiratory failure  COVID positive  Sinus pauses    Remains intubated and sedated    Remains off paralytics    Still sedated on Dilaudid, Versed, and Ketamine     Still not really waking up    Continues on steroids    Continues on Lovenox    Off abx's    Hgb and platelets look good    Creatinine normal    Bilirubin and other LFTs up a bit    Pro-calcitonin normal    Little more pulmonary edema on CXR today    PEEP down to 8, FiO2 50%    Net negative about 1 L over last 24 hours       Intake/Output Summary (Last 24 hours) at 8/26/2020 1623  Last data filed at 8/26/2020 1600  Gross per 24 hour   Intake 3854.37 ml   Output 4975 ml   Net -1120.63 ml     Visit Vitals  /74   Pulse (!) 103   Temp 99.3 °F (37.4 °C)   Resp 23   Ht 5' 11\" (1.803 m)   Wt 221 lb 5.5 oz (100.4 kg)   SpO2 94%   BMI 30.87 kg/m²       Risk of morbidity and mortality - high  Medical decision making - high complexity    Total critical care time - 30 minutes (CPT 05300)     I personally spent the above critical care time. This is time spent at this critically ill patient's bedside / unit / floor actively involved in patient care as well as the coordination of care and discussions with the patient's family. This does not include any procedural time which has been billed separately.

## 2020-08-26 NOTE — PROGRESS NOTES
0930: Bedside and Verbal shift change report given to ROSALBA Saeed, CATINA and Lelia Case RN (oncoming nurse) by [facility] Anni Chery RN (offgoing nurse). Report included the following information SBAR, Kardex, Intake/Output, MAR, Accordion, Recent Results, Cardiac Rhythm NSR, Alarm Parameters  and Quality Measures. 0000: HR drop 30s-50s. Notified Estefanía Silva NP. Dopamine ordered, call Cardiology. 0005: HR returned to SR/ST. Dopamine held. Increased Versed gtt per NP orders. 0100: Additional sedation added, SBP 180s, RR labored, abdominal breathing. Hydralazine given. Tylenol given for live temp 101.5. AM labs sent. 0200: -130s, SBP 150s, pt appears sedated. Ethan Fenton NP, orders received for EKG. Shift Summary: Pt able to follow commands when sedation is decreased, withdraws x3- RUE does not withdraw. PERRL 2-3s with sluggish reaction. Tmax 101.5. PRN Tylenol given x1. Ketamine @1mg, Dilaudid @6mg, Versed @28mg. Live output adequate. Pt had 2 large BM, watery and light brown. 0730: Bedside and Verbal shift change report given to [address] [place] [address] [place], RN (oncoming nurse) by ROSALBA Saeed RN and Lelia Case RN (offgoing nurse). Report included the following information SBAR, Kardex, ED Summary, Intake/Output, MAR, Accordion, Recent Results, Cardiac Rhythm SR/ST and Alarm Parameters .

## 2020-08-26 NOTE — PROGRESS NOTES
0000: HR drop 30s-50s, vagal? Diprivan held. NP updated. Dopamine ordered, call Cardiology. 0005: HR returned to NSR/STAC. Dopamine held. Increased Versed gtt. 0100: Additional sedation added, SBP 180s, RR labored, abdominal breathing. Hydralazine given. Tylenol given live temp 101.5. Morning labs sent. 0200: -130s, SBP 150s, pt appears sedated. NP update, EKG order.

## 2020-08-26 NOTE — PROGRESS NOTES
for follow up support.  contacted pt's mother Kevin Wright. She was appreciative of  support. She shared that pt grew up at [facility] [place]. Mercedes shared that the calls have been really helpful and she is looking forward to facetime/zoom again when pt gets a trach.  provided pastoral listening, support and assurance of prayer. Let her know of  availability and continued  support. Please contact [phone] [facility] for further support.  follow up as needed.      [address] Chauncey Pino, MACE   [phone])

## 2020-08-26 NOTE — PROGRESS NOTES
1930-Bedside and Verbal shift change report given to [facility] (oncoming nurse) by Da Solorzano (offgoing nurse). Report included the following information SBAR, Kardex, ED Summary, Procedure Summary, Intake/Output, MAR, Recent Results and Cardiac Rhythm ST. Shift Summary-Patient vented, opens eyes to voice/stimulation. Decreased command following, wiggles toes to command at times. Pupils 2-3, sluggish. Withdraws weakly to pain. No movement on R arm. Continues on Ketamine at 1mg/hr, versed at 28mg/hr, dilaudid at 6mg/hr. Mcwilliams patent with large amount of urine output. Had bedside perc trach this evening. OGT out, Dobhoff placed. 1 small loose BM today.

## 2020-08-26 NOTE — PROGRESS NOTES
1930: Bedside shift change report given to [facility] (oncoming nurse) by Chana Landry (offgoing nurse). Report included the following information SBAR, Kardex, Procedure Summary, Intake/Output, MAR, Accordion, Recent Results, Med Rec Status and Cardiac Rhythm SR/ST. Drips: Ketamine 1mg/hr, Versed 28mg/hr, Dilaudid 6mg/hr  Vent settings: AC 26, PC 22, Peep 8, FiO2 50%  2000: Assumed care of patient. 2130: Mother called, updates given ~10 mins  0730: Bedside shift change report given to Josef (oncoming nurse) by [facility] (offgoing nurse). Report included the following information SBAR, Kardex, Procedure Summary, Intake/Output, MAR, Accordion, Recent Results, Med Rec Status and Cardiac Rhythm ST.    Drips: Ketamine 0.95, Versed 28, Dilaudid 6

## 2020-08-26 NOTE — PROGRESS NOTES
[address] [facility] Adult  Hospitalist Group                                                                                          Critical Care Progress Note  Cyndi Joseph MD  Answering service: [phone] from in house phone        Date of Service:  2020  NAME:  Kota Mixon  :  1990  MRN:  [id number]      Interval history / Subjective:    on peep 8 and 50% fio2, versed, ketamine and dilaudid drips. Making some headway. No air leak or output to speak of from L pig tail CT     Assessment & Plan:     ICU Problems: admitted 20 with severe Covid s/p trach      1. Covid-19 - PNA ARDS   2. Acute Resp failure   3. PTX with CT  4. Sinus/martine tarchycardia  5.   Tobacco use   6. Possible Bacterial superinfection   7 Left pneumothorax with indwelling L Pig tail     Plan:     Making some headway last few days: PEEP down to 8 and Fio2 50%  Remains off paralytics  Complete 7 days of antibiotics and stop  Completed treatment with steroids, Remdesivir (completed 8/10/20) and convalescent plasma x 2 doses (8/10/20 and 20) for COVID-19 infection  Nutritional support  Prn laxative  For Bedside Perc Trach tonight. DVT prophylaxis:  Lovenox BID 0.5mg/kg  GI prophylaxis: pepcid      I personally spent   40   minutes of critical care time.  This is time spent at this critically ill patient's bedside actively involved in patient care as well as the coordination of care and discussions with the patient's family.  This does not include any procedural time which has been billed separately. Hospital Problems  Date Reviewed: 2020          Codes Class Noted POA    Acute respiratory failure [facility]) ICD-10-CM: J96.00  ICD-9-CM: 518.81  2020 Unknown                Review of Systems:   Review of systems not obtained due to patient factors. Vital Signs:    Last 24hrs VS reviewed since prior progress note.  Most recent are:  Visit Vitals  /83   Pulse (!) 111   Temp 99.3 °F (37.4 °C)   Resp 26   Ht 5' 11\" (1.803 m)   Wt 100.4 kg (221 lb 5.5 oz)   SpO2 98%   BMI 30.87 kg/m²         Intake/Output Summary (Last 24 hours) at 8/26/2020 1459  Last data filed at 8/26/2020 1400  Gross per 24 hour   Intake 4559.16 ml   Output 4495 ml   Net 64.16 ml        Physical Examination:             Constitutional:  sedated   ENT:  Oral mucous moist, oropharynx benign. Resp:  Intubated, crackles bilaterally. No wheezing/rhonchi/rales. No accessory muscle use   CV:  Regular rhythm, normal rate, no murmurs, gallops, rubs    GI:  Soft, non distended, non tender. normoactive bowel sounds, no hepatosplenomegaly     Musculoskeletal:  No edema, warm, 2+ pulses throughout    Neurologic:  sedated     Psych:  sedated  Skin:  Good turgor, no rashes or ulcers  Hematologic/Lymphatic/Immunlogic:  No jaundice nor lymph node swelling  :  Normal genitalia. Eyes:  PERRL, Sedated       Data Review:    Review and/or order of clinical lab test      Labs:     Recent Labs     08/26/20 0125 08/25/20 0217   WBC 8.3 6.3   HGB 9.5* 8.3*   HCT 30.5* 26.5*    152     Recent Labs     08/26/20 0125 08/25/20 0217 08/24/20  0437    140 140   K 3.2* 3.8 3.9    103 103   CO2 29 32 30   BUN 18 19 18   CREA 0.54* 0.49* 0.56*   * 72 89   CA 9.0 8.2* 8.2*   MG 1.9 2.1 2.3   PHOS 3.4 3.4 3.3     Recent Labs     08/26/20 0125 08/25/20 0217 08/24/20  0437   * 82* 72   * 100 122*   TBILI 0.9 0.7 0.9   TP 7.3 5.6* 6.2*   ALB 2.1* 1.9* 2.1*   GLOB 5.2* 3.7 4.1*     No results for input(s): INR, PTP, APTT, INREXT in the last 72 hours. Recent Labs     08/25/20 0217 08/24/20  0437   FERR 1,507* 1,660*      No results found for: FOL, RBCF   No results for input(s): PH, PCO2, PO2 in the last 72 hours.   Recent Labs     08/25/20 0217 08/24/20 0437   CPK 32* 73     Lab Results   Component Value Date/Time    Triglyceride 120 08/22/2020 07:38 PM     Lab Results   Component Value Date/Time Glucose (POC) 110 (H) 08/26/2020 11:28 AM    Glucose (POC) 108 (H) 08/26/2020 05:52 AM    Glucose (POC) 86 08/25/2020 11:39 PM    Glucose (POC) 152 (H) 08/25/2020 11:05 AM    Glucose (POC) 104 (H) 08/25/2020 05:33 AM     No results found for: COLOR, APPRN, SPGRU, REFSG, RAE, PROTU, GLUCU, KETU, BILU, UROU, ROBLES, LEUKU, GLUKE, EPSU, BACTU, WBCU, RBCU, CASTS, UCRY      Medications Reviewed:     Current Facility-Administered Medications   Medication Dose Route Frequency    DOPamine (INTROPIN) 800 mg in dextrose 5% 250 mL infusion  0-20 mcg/kg/min IntraVENous TITRATE    senna (SENOKOT) tablet 17.2 mg  2 Tab Per NG tube BID    etomidate (AMIDATE) 2 mg/mL injection 20 mg  20 mg IntraVENous ONCE    rocuronium injection 50 mg  50 mg IntraVENous NOW    fentaNYL citrate (PF) injection 100 mcg  100 mcg IntraVENous ONCE    metoprolol (LOPRESSOR) injection 5 mg  5 mg IntraVENous Q6H PRN    polyethylene glycol (MIRALAX) packet 17 g  17 g Per NG tube BID    balsam peru-castor oiL (VENELEX) ointment   Topical BID    ketamine (KETALAR) 500 mg in 0.9% sodium chloride 500 mL infusion  0.05-1 mg/kg/hr IntraVENous TITRATE    HYDROmorphone (PF) 25 mg/50 mL (0.5 mg/mL) infusion  0-6 mg/hr IntraVENous TITRATE    fentaNYL citrate (PF) injection  mcg   mcg IntraVENous Q2H PRN    midazolam in normal saline (VERSED) 1 mg/mL infusion  0-30 mg/hr IntraVENous TITRATE    famotidine (PF) (PEPCID) 20 mg in 0.9% sodium chloride 10 mL injection  20 mg IntraVENous Q12H    midazolam (VERSED) injection 1-2 mg  1-2 mg IntraVENous Q2H PRN    docusate (COLACE) 50 mg/5 mL oral liquid 100 mg  100 mg Per NG tube BID    enoxaparin (LOVENOX) injection 50 mg  0.5 mg/kg SubCUTAneous Q12H    guaiFENesin (ROBITUSSIN) 100 mg/5 mL oral liquid 400 mg  400 mg Oral Q4H    insulin lispro (HUMALOG) injection   SubCUTAneous Q6H    acetaminophen (TYLENOL) tablet 650 mg  650 mg Oral Q6H PRN    Or    acetaminophen (TYLENOL) suppository 650 mg 650 mg Rectal Q6H PRN    alteplase (CATHFLO) 1 mg in sterile water (preservative free) 1 mL injection  1 mg InterCATHeter PRN    polyvinyl alcohol-povidone (NATURAL TEARS) 0.5-0.6 % ophthalmic solution 1 Drop  1 Drop Both Eyes PRN    dextrose 10% infusion 0-250 mL  0-250 mL IntraVENous PRN    glucagon (GLUCAGEN) injection 1 mg  1 mg IntraMUSCular PRN    glucose chewable tablet 16 g  4 Tab Oral PRN    promethazine (PHENERGAN) tablet 12.5 mg  12.5 mg Oral Q6H PRN    Or    ondansetron (ZOFRAN) injection 4 mg  4 mg IntraVENous Q6H PRN    sodium chloride (NS) flush 5-40 mL  5-40 mL IntraVENous Q8H    sodium chloride (NS) flush 5-40 mL  5-40 mL IntraVENous PRN    cisatracurium (NIMBEX) 2 mg/mL IV infusion  0-10 mcg/kg/min IntraVENous TITRATE    aspirin chewable tablet 81 mg  81 mg Per NG tube DAILY    chlorhexidine (ORAL CARE KIT) 0.12 % mouthwash 15 mL  15 mL Oral Q12H    dexamethasone (PF) (DECADRON) 10 mg/mL injection 6 mg  6 mg IntraVENous DAILY    albuterol-ipratropium (DUO-NEB) 2.5 MG-0.5 MG/3 ML  3 mL Nebulization Q6H PRN    0.9% sodium chloride infusion  5 mL/hr IntraVENous CONTINUOUS    NOREPINephrine (LEVOPHED) 8 mg in 5% dextrose 250mL (32 mcg/mL) infusion  0.5-30 mcg/min IntraVENous TITRATE     ______________________________________________________________________  EXPECTED LENGTH OF STAY: 12d 0h  ACTUAL LENGTH OF STAY:          8                 Adan Diallo MD

## 2020-08-26 NOTE — PROCEDURES
Dx- Respiratory Failure, Covid  Done by- Clement Situ  At bedside    Standard Prep  6 Shiley  No bleeding  Seldinger with direct view via Bronch    No immediate complication  Sutures in, will remove in 7 days

## 2020-08-27 ENCOUNTER — APPOINTMENT (OUTPATIENT)
Dept: GENERAL RADIOLOGY | Age: 30
DRG: 004 | End: 2020-08-27
Attending: NURSE PRACTITIONER
Payer: COMMERCIAL

## 2020-08-27 LAB
ALBUMIN SERPL-MCNC: 2.1 G/DL (ref 3.5–5)
ALBUMIN/GLOB SERPL: 0.5 {RATIO} (ref 1.1–2.2)
ALP SERPL-CCNC: 113 U/L (ref 45–117)
ALT SERPL-CCNC: 120 U/L (ref 12–78)
ANION GAP SERPL CALC-SCNC: 7 MMOL/L (ref 5–15)
ARTERIAL PATENCY WRIST A: ABNORMAL
AST SERPL-CCNC: 62 U/L (ref 15–37)
BACTERIA SPEC CULT: NORMAL
BASE EXCESS BLD CALC-SCNC: 6 MMOL/L
BASOPHILS # BLD: 0.1 K/UL (ref 0–0.1)
BASOPHILS NFR BLD: 3 % (ref 0–1)
BDY SITE: ABNORMAL
BILIRUB SERPL-MCNC: 0.6 MG/DL (ref 0.2–1)
BUN SERPL-MCNC: 13 MG/DL (ref 6–20)
BUN/CREAT SERPL: 25 (ref 12–20)
CA-I BLD-SCNC: 1.23 MMOL/L (ref 1.12–1.32)
CALCIUM SERPL-MCNC: 8.6 MG/DL (ref 8.5–10.1)
CHLORIDE SERPL-SCNC: 103 MMOL/L (ref 97–108)
CO2 SERPL-SCNC: 30 MMOL/L (ref 21–32)
CREAT SERPL-MCNC: 0.51 MG/DL (ref 0.7–1.3)
D DIMER PPP FEU-MCNC: 8.28 MG/L FEU (ref 0–0.65)
DIFFERENTIAL METHOD BLD: ABNORMAL
EOSINOPHIL # BLD: 0.4 K/UL (ref 0–0.4)
EOSINOPHIL NFR BLD: 8 % (ref 0–7)
ERYTHROCYTE [DISTWIDTH] IN BLOOD BY AUTOMATED COUNT: 14 % (ref 11.5–14.5)
FIBRINOGEN PPP-MCNC: 729 MG/DL (ref 200–475)
GAS FLOW.O2 O2 DELIVERY SYS: ABNORMAL L/MIN
GAS FLOW.O2 SETTING OXYMISER: 26 BPM
GLOBULIN SER CALC-MCNC: 3.9 G/DL (ref 2–4)
GLUCOSE BLD STRIP.AUTO-MCNC: 101 MG/DL (ref 65–100)
GLUCOSE BLD STRIP.AUTO-MCNC: 107 MG/DL (ref 65–100)
GLUCOSE BLD STRIP.AUTO-MCNC: 122 MG/DL (ref 65–100)
GLUCOSE SERPL-MCNC: 99 MG/DL (ref 65–100)
HCO3 BLD-SCNC: 28.7 MMOL/L (ref 22–26)
HCT VFR BLD AUTO: 26.3 % (ref 36.6–50.3)
HEALTH STATUS, XMCV2T: ABNORMAL
HGB BLD-MCNC: 8.5 G/DL (ref 12.1–17)
IMM GRANULOCYTES # BLD AUTO: 0 K/UL
IMM GRANULOCYTES NFR BLD AUTO: 0 %
INSPIRATION.DURATION SETTING TIME VENT: 0.77 SEC
LYMPHOCYTES # BLD: 1 K/UL (ref 0.8–3.5)
LYMPHOCYTES NFR BLD: 20 % (ref 12–49)
MAGNESIUM SERPL-MCNC: 1.7 MG/DL (ref 1.6–2.4)
MCH RBC QN AUTO: 30.2 PG (ref 26–34)
MCHC RBC AUTO-ENTMCNC: 32.3 G/DL (ref 30–36.5)
MCV RBC AUTO: 93.6 FL (ref 80–99)
MONOCYTES # BLD: 0.4 K/UL (ref 0–1)
MONOCYTES NFR BLD: 9 % (ref 5–13)
NEUTS BAND NFR BLD MANUAL: 2 % (ref 0–6)
NEUTS SEG # BLD: 3 K/UL (ref 1.8–8)
NEUTS SEG NFR BLD: 58 % (ref 32–75)
NRBC # BLD: 0 K/UL (ref 0–0.01)
NRBC BLD-RTO: 0 PER 100 WBC
O2/TOTAL GAS SETTING VFR VENT: 50 %
PCO2 BLD: 34.8 MMHG (ref 35–45)
PEEP RESPIRATORY: 10 CMH2O
PH BLD: 7.52 [PH] (ref 7.35–7.45)
PHOSPHATE SERPL-MCNC: 2.4 MG/DL (ref 2.6–4.7)
PIP ISTAT,IPIP: 24
PLATELET # BLD AUTO: 162 K/UL (ref 150–400)
PMV BLD AUTO: 9.9 FL (ref 8.9–12.9)
PO2 BLD: 72 MMHG (ref 80–100)
POTASSIUM SERPL-SCNC: 3.1 MMOL/L (ref 3.5–5.1)
PROCALCITONIN SERPL-MCNC: 0.32 NG/ML
PROT SERPL-MCNC: 6 G/DL (ref 6.4–8.2)
RBC # BLD AUTO: 2.81 M/UL (ref 4.1–5.7)
RBC MORPH BLD: ABNORMAL
SAO2 % BLD: 96 % (ref 92–97)
SARS-COV-2, COV2: DETECTED
SERVICE CMNT-IMP: ABNORMAL
SERVICE CMNT-IMP: NORMAL
SODIUM SERPL-SCNC: 140 MMOL/L (ref 136–145)
SOURCE, COVRS: ABNORMAL
SPECIMEN SOURCE, FCOV2M: ABNORMAL
SPECIMEN TYPE, XMCV1T: ABNORMAL
SPECIMEN TYPE: ABNORMAL
VENTILATION MODE VENT: ABNORMAL
WBC # BLD AUTO: 4.9 K/UL (ref 4.1–11.1)

## 2020-08-27 PROCEDURE — 85384 FIBRINOGEN ACTIVITY: CPT

## 2020-08-27 PROCEDURE — 84145 PROCALCITONIN (PCT): CPT

## 2020-08-27 PROCEDURE — 74011250637 HC RX REV CODE- 250/637: Performed by: INTERNAL MEDICINE

## 2020-08-27 PROCEDURE — 82803 BLOOD GASES ANY COMBINATION: CPT

## 2020-08-27 PROCEDURE — 74011000258 HC RX REV CODE- 258: Performed by: INTERNAL MEDICINE

## 2020-08-27 PROCEDURE — 74011250636 HC RX REV CODE- 250/636: Performed by: NURSE PRACTITIONER

## 2020-08-27 PROCEDURE — 74011000258 HC RX REV CODE- 258: Performed by: NURSE PRACTITIONER

## 2020-08-27 PROCEDURE — 74011250637 HC RX REV CODE- 250/637: Performed by: NURSE PRACTITIONER

## 2020-08-27 PROCEDURE — 74011000250 HC RX REV CODE- 250: Performed by: HOSPITALIST

## 2020-08-27 PROCEDURE — 99231 SBSQ HOSP IP/OBS SF/LOW 25: CPT | Performed by: NURSE PRACTITIONER

## 2020-08-27 PROCEDURE — 87635 SARS-COV-2 COVID-19 AMP PRB: CPT

## 2020-08-27 PROCEDURE — 74011250637 HC RX REV CODE- 250/637: Performed by: HOSPITALIST

## 2020-08-27 PROCEDURE — 74011000250 HC RX REV CODE- 250: Performed by: INTERNAL MEDICINE

## 2020-08-27 PROCEDURE — 74011250636 HC RX REV CODE- 250/636: Performed by: INTERNAL MEDICINE

## 2020-08-27 PROCEDURE — 83735 ASSAY OF MAGNESIUM: CPT

## 2020-08-27 PROCEDURE — 99291 CRITICAL CARE FIRST HOUR: CPT | Performed by: THORACIC SURGERY (CARDIOTHORACIC VASCULAR SURGERY)

## 2020-08-27 PROCEDURE — 74011250636 HC RX REV CODE- 250/636: Performed by: HOSPITALIST

## 2020-08-27 PROCEDURE — 82962 GLUCOSE BLOOD TEST: CPT

## 2020-08-27 PROCEDURE — 36415 COLL VENOUS BLD VENIPUNCTURE: CPT

## 2020-08-27 PROCEDURE — 85379 FIBRIN DEGRADATION QUANT: CPT

## 2020-08-27 PROCEDURE — 94003 VENT MGMT INPAT SUBQ DAY: CPT

## 2020-08-27 PROCEDURE — 80053 COMPREHEN METABOLIC PANEL: CPT

## 2020-08-27 PROCEDURE — 85025 COMPLETE CBC W/AUTO DIFF WBC: CPT

## 2020-08-27 PROCEDURE — 65610000006 HC RM INTENSIVE CARE

## 2020-08-27 PROCEDURE — 71045 X-RAY EXAM CHEST 1 VIEW: CPT

## 2020-08-27 PROCEDURE — 74011000250 HC RX REV CODE- 250: Performed by: NURSE PRACTITIONER

## 2020-08-27 PROCEDURE — 84100 ASSAY OF PHOSPHORUS: CPT

## 2020-08-27 PROCEDURE — 36600 WITHDRAWAL OF ARTERIAL BLOOD: CPT

## 2020-08-27 RX ORDER — CHLORDIAZEPOXIDE HYDROCHLORIDE 25 MG/1
25 CAPSULE, GELATIN COATED ORAL 4 TIMES DAILY
Status: DISCONTINUED | OUTPATIENT
Start: 2020-08-27 | End: 2020-08-28

## 2020-08-27 RX ORDER — POLYETHYLENE GLYCOL 3350 17 G/17G
17 POWDER, FOR SOLUTION ORAL DAILY
Status: DISCONTINUED | OUTPATIENT
Start: 2020-08-28 | End: 2020-09-10

## 2020-08-27 RX ORDER — QUETIAPINE FUMARATE 25 MG/1
25 TABLET, FILM COATED ORAL
Status: DISCONTINUED | OUTPATIENT
Start: 2020-08-27 | End: 2020-09-01

## 2020-08-27 RX ORDER — DEXAMETHASONE SODIUM PHOSPHATE 10 MG/ML
4 INJECTION INTRAMUSCULAR; INTRAVENOUS DAILY
Status: DISCONTINUED | OUTPATIENT
Start: 2020-08-28 | End: 2020-08-31

## 2020-08-27 RX ORDER — MIDAZOLAM IN 0.9 % SOD.CHLORID 1 MG/ML
0-12 PLASTIC BAG, INJECTION (ML) INTRAVENOUS
Status: DISCONTINUED | OUTPATIENT
Start: 2020-08-27 | End: 2020-09-09

## 2020-08-27 RX ORDER — FAMOTIDINE 40 MG/5ML
20 POWDER, FOR SUSPENSION ORAL 2 TIMES DAILY
Status: DISCONTINUED | OUTPATIENT
Start: 2020-08-27 | End: 2020-09-15

## 2020-08-27 RX ADMIN — FAMOTIDINE 20 MG: 10 INJECTION, SOLUTION INTRAVENOUS at 09:23

## 2020-08-27 RX ADMIN — MIDAZOLAM HYDROCHLORIDE 22 MG/HR: 5 INJECTION, SOLUTION INTRAMUSCULAR; INTRAVENOUS at 21:13

## 2020-08-27 RX ADMIN — CHLORHEXIDINE GLUCONATE 15 ML: 0.12 RINSE ORAL at 20:23

## 2020-08-27 RX ADMIN — Medication 10 ML: at 21:14

## 2020-08-27 RX ADMIN — Medication: at 08:42

## 2020-08-27 RX ADMIN — Medication 6 MG/HR: at 16:00

## 2020-08-27 RX ADMIN — CHLORHEXIDINE GLUCONATE 15 ML: 0.12 RINSE ORAL at 08:42

## 2020-08-27 RX ADMIN — ASPIRIN 81 MG CHEWABLE TABLET 81 MG: 81 TABLET CHEWABLE at 08:42

## 2020-08-27 RX ADMIN — ALTEPLASE 1 MG: 2.2 INJECTION, POWDER, LYOPHILIZED, FOR SOLUTION INTRAVENOUS at 12:35

## 2020-08-27 RX ADMIN — KETAMINE HYDROCHLORIDE 0.85 MG/KG/HR: 50 INJECTION, SOLUTION INTRAMUSCULAR; INTRAVENOUS at 12:00

## 2020-08-27 RX ADMIN — GUAIFENESIN 400 MG: 100 SOLUTION ORAL at 15:53

## 2020-08-27 RX ADMIN — DEXAMETHASONE SODIUM PHOSPHATE 6 MG: 10 INJECTION INTRAMUSCULAR; INTRAVENOUS at 09:22

## 2020-08-27 RX ADMIN — KETAMINE HYDROCHLORIDE 1 MG/KG/HR: 50 INJECTION, SOLUTION INTRAMUSCULAR; INTRAVENOUS at 00:19

## 2020-08-27 RX ADMIN — GUAIFENESIN 400 MG: 100 SOLUTION ORAL at 04:00

## 2020-08-27 RX ADMIN — Medication 6 MG/HR: at 11:30

## 2020-08-27 RX ADMIN — MIDAZOLAM HYDROCHLORIDE 25 MG/HR: 5 INJECTION, SOLUTION INTRAMUSCULAR; INTRAVENOUS at 12:00

## 2020-08-27 RX ADMIN — Medication: at 17:49

## 2020-08-27 RX ADMIN — ENOXAPARIN SODIUM 50 MG: 60 INJECTION SUBCUTANEOUS at 09:23

## 2020-08-27 RX ADMIN — FAMOTIDINE 20 MG: 40 POWDER, FOR SUSPENSION ORAL at 18:20

## 2020-08-27 RX ADMIN — CHLORDIAZEPOXIDE HYDROCHLORIDE 25 MG: 25 CAPSULE ORAL at 21:14

## 2020-08-27 RX ADMIN — Medication 6 MG/HR: at 06:34

## 2020-08-27 RX ADMIN — Medication 6 MG/HR: at 02:04

## 2020-08-27 RX ADMIN — QUETIAPINE FUMARATE 25 MG: 25 TABLET, FILM COATED ORAL at 21:14

## 2020-08-27 RX ADMIN — MIDAZOLAM HYDROCHLORIDE 28 MG/HR: 5 INJECTION, SOLUTION INTRAMUSCULAR; INTRAVENOUS at 04:00

## 2020-08-27 RX ADMIN — Medication 6 MG/HR: at 20:24

## 2020-08-27 RX ADMIN — MIDAZOLAM HYDROCHLORIDE 22 MG/HR: 5 INJECTION, SOLUTION INTRAMUSCULAR; INTRAVENOUS at 17:00

## 2020-08-27 RX ADMIN — GUAIFENESIN 400 MG: 100 SOLUTION ORAL at 18:40

## 2020-08-27 RX ADMIN — GUAIFENESIN 400 MG: 100 SOLUTION ORAL at 12:25

## 2020-08-27 RX ADMIN — MIDAZOLAM HYDROCHLORIDE 27 MG/HR: 5 INJECTION, SOLUTION INTRAMUSCULAR; INTRAVENOUS at 08:30

## 2020-08-27 RX ADMIN — Medication 40 ML: at 14:10

## 2020-08-27 RX ADMIN — KETAMINE HYDROCHLORIDE 0.8 MG/KG/HR: 50 INJECTION, SOLUTION INTRAMUSCULAR; INTRAVENOUS at 18:37

## 2020-08-27 RX ADMIN — GUAIFENESIN 400 MG: 100 SOLUTION ORAL at 09:24

## 2020-08-27 RX ADMIN — ENOXAPARIN SODIUM 50 MG: 60 INJECTION SUBCUTANEOUS at 21:14

## 2020-08-27 NOTE — ADVANCED PRACTICE NURSE
Boo Enrike is clean and dry with intact sutures  Small amount of bloody secretions in vent tubing.  No active bleeding  Plan for suture removal on 9/2/20    Trach site CDI  As above  Tanesha Foreman MD

## 2020-08-27 NOTE — PROGRESS NOTES
[address] [facility] Adult  Hospitalist Group                                                                                          Critical Care Progress Note  Jose Cardona MD  Answering service: [phone] from in house phone        Date of Service:  2020  NAME:  Stefani Caldera  :  1990  MRN:  [id number]      Interval history / Subjective:        Assessment & Plan:     ICU Problems: admitted 20 with severe Covid s/p trach      1. Covid-19 - PNA ARDS   2. Acute Resp failure sp Perc Trach by thoracics   3. PTX with CT-no air leak  4. Sinus/martine tarchycardia  5.   Tobacco use   6.  Possible Bacterial superinfection   7  Left pneumothorax with indwelling L Pig tail     Plan:     Making headway last few days: PEEP down to 8 and Fio2 50%  Will recheck covid test.  Last tested at outside facility 3 weeks ago plus  Completed treatment with sterPrn laxativeoids, Remdesivir (completed 8/10/20) and convalescent plasma x 2 doses (8/10/20 and 20) for COVID-19 infection  Nutritional support  Keep CT in place while on positive pressure ventilation    DVT prophylaxis:  Lovenox BID 0.5mg/kg  GI prophylaxis: pepcid      I personally spent   35   minutes of critical care time.  This is time spent at this critically ill patient's bedside actively involved in patient care as well as the coordination of care and discussions with the patient's family.  This does not include any procedural time which has been billed separately. Hospital Problems  Date Reviewed: 2020          Codes Class Noted POA    Acute respiratory failure [facility]) ICD-10-CM: J96.00  ICD-9-CM: 518.81  2020 Unknown                Review of Systems:   Review of systems not obtained due to patient factors. Vital Signs:    Last 24hrs VS reviewed since prior progress note.  Most recent are:  Visit Vitals  /64   Pulse (!) 111   Temp (!) 101 °F (38.3 °C)   Resp 26   Ht 5' 11\" (1.803 m)   Wt 100.4 kg (221 lb 5.5 oz)   SpO2 97%   BMI 30.87 kg/m²         Intake/Output Summary (Last 24 hours) at 8/27/2020 1151  Last data filed at 8/27/2020 1100  Gross per 24 hour   Intake 3360.6 ml   Output 5215 ml   Net -1854.4 ml        Physical Examination:             Constitutional:  sedated   ENT:  Oral mucous moist, oropharynx benign. Resp:  Crackles bilaterally. No wheezing/rhonchi/rales. No accessory muscle use   CV:  Regular rhythm, normal rate, no murmurs, gallops, rubs    GI:  Soft, non distended, non tender. normoactive bowel sounds, no hepatosplenomegaly     Musculoskeletal:  No edema, warm, 2+ pulses throughout    Neurologic:  sedated     Psych:  sedated  Skin:  Good turgor, no rashes or ulcers  Hematologic/Lymphatic/Immunlogic:  No jaundice nor lymph node swelling  :  Normal genitalia. Eyes:  PERRL, Sedated       Data Review:    Review and/or order of clinical lab test      Labs:     Recent Labs     08/27/20 0630 08/26/20 0125   WBC 4.9 8.3   HGB 8.5* 9.5*   HCT 26.3* 30.5*    207     Recent Labs     08/27/20 0630 08/26/20 0125 08/25/20 0217    143 140   K 3.1* 3.2* 3.8    106 103   CO2 30 29 32   BUN 13 18 19   CREA 0.51* 0.54* 0.49*   GLU 99 126* 72   CA 8.6 9.0 8.2*   MG 1.7 1.9 2.1   PHOS 2.4* 3.4 3.4     Recent Labs     08/27/20 0630 08/26/20 0125 08/25/20 0217   * 128* 82*    148* 100   TBILI 0.6 0.9 0.7   TP 6.0* 7.3 5.6*   ALB 2.1* 2.1* 1.9*   GLOB 3.9 5.2* 3.7     No results for input(s): INR, PTP, APTT, INREXT in the last 72 hours. Recent Labs     08/25/20 0217   FERR 1,507*      No results found for: FOL, RBCF   No results for input(s): PH, PCO2, PO2 in the last 72 hours.   Recent Labs     08/25/20  0217   CPK 32*     Lab Results   Component Value Date/Time    Triglyceride 120 08/22/2020 07:38 PM     Lab Results   Component Value Date/Time    Glucose (POC) 122 (H) 08/27/2020 11:28 AM    Glucose (POC) 107 (H) 08/27/2020 06:33 AM    Glucose (POC) 81 08/26/2020 11:43 PM    Glucose (POC) 99 08/26/2020 05:09 PM    Glucose (POC) 110 (H) 08/26/2020 11:28 AM     No results found for: COLOR, APPRN, SPGRU, REFSG, RAE, PROTU, GLUCU, KETU, BILU, UROU, ROBLES, LEUKU, GLUKE, EPSU, BACTU, WBCU, RBCU, CASTS, UCRY      Medications Reviewed:     Current Facility-Administered Medications   Medication Dose Route Frequency    midazolam in normal saline (VERSED) 1 mg/mL infusion  0-30 mg/hr IntraVENous TITRATE    [START ON 8/28/2020] dexamethasone (PF) (DECADRON) 10 mg/mL injection 4 mg  4 mg IntraVENous DAILY    [START ON 8/28/2020] polyethylene glycol (MIRALAX) packet 17 g  17 g Per NG tube DAILY    famotidine (PEPCID) 40 mg/5 mL (8 mg/mL) oral suspension 20 mg  20 mg Per NG tube BID    metoprolol (LOPRESSOR) injection 5 mg  5 mg IntraVENous Q6H PRN    balsam peru-castor oiL (VENELEX) ointment   Topical BID    ketamine (KETALAR) 500 mg in 0.9% sodium chloride 500 mL infusion  0.05-1 mg/kg/hr IntraVENous TITRATE    HYDROmorphone (PF) 25 mg/50 mL (0.5 mg/mL) infusion  0-6 mg/hr IntraVENous TITRATE    fentaNYL citrate (PF) injection  mcg   mcg IntraVENous Q2H PRN    midazolam (VERSED) injection 1-2 mg  1-2 mg IntraVENous Q2H PRN    docusate (COLACE) 50 mg/5 mL oral liquid 100 mg  100 mg Per NG tube BID    enoxaparin (LOVENOX) injection 50 mg  0.5 mg/kg SubCUTAneous Q12H    guaiFENesin (ROBITUSSIN) 100 mg/5 mL oral liquid 400 mg  400 mg Oral Q4H    acetaminophen (TYLENOL) tablet 650 mg  650 mg Oral Q6H PRN    Or    acetaminophen (TYLENOL) suppository 650 mg  650 mg Rectal Q6H PRN    alteplase (CATHFLO) 1 mg in sterile water (preservative free) 1 mL injection  1 mg InterCATHeter PRN    polyvinyl alcohol-povidone (NATURAL TEARS) 0.5-0.6 % ophthalmic solution 1 Drop  1 Drop Both Eyes PRN    dextrose 10% infusion 0-250 mL  0-250 mL IntraVENous PRN    glucagon (GLUCAGEN) injection 1 mg  1 mg IntraMUSCular PRN    glucose chewable tablet 16 g  4 Tab Oral PRN  promethazine (PHENERGAN) tablet 12.5 mg  12.5 mg Oral Q6H PRN    Or    ondansetron (ZOFRAN) injection 4 mg  4 mg IntraVENous Q6H PRN    sodium chloride (NS) flush 5-40 mL  5-40 mL IntraVENous Q8H    sodium chloride (NS) flush 5-40 mL  5-40 mL IntraVENous PRN    aspirin chewable tablet 81 mg  81 mg Per NG tube DAILY    chlorhexidine (ORAL CARE KIT) 0.12 % mouthwash 15 mL  15 mL Oral Q12H    albuterol-ipratropium (DUO-NEB) 2.5 MG-0.5 MG/3 ML  3 mL Nebulization Q6H PRN    0.9% sodium chloride infusion  5 mL/hr IntraVENous CONTINUOUS     ______________________________________________________________________  EXPECTED LENGTH OF STAY: 12d 0h  ACTUAL LENGTH OF STAY:          9                 Cristin Watson MD

## 2020-08-27 NOTE — PROGRESS NOTES
Problem: Ventilator Management  Goal: *Adequate oxygenation and ventilation  Outcome: Progressing Towards Goal  Goal: *Patient maintains clear airway/free of aspiration  Outcome: Progressing Towards Goal  Goal: *Absence of infection signs and symptoms  Outcome: Progressing Towards Goal  Goal: *Normal spontaneous ventilation  Outcome: Progressing Towards Goal     Problem: Patient Education: Go to Patient Education Activity  Goal: Patient/Family Education  Outcome: Progressing Towards Goal     Problem: Pressure Injury - Risk of  Goal: *Prevention of pressure injury  Description: Document Enrique Scale and appropriate interventions in the flowsheet. Outcome: Progressing Towards Goal  Note: Pressure Injury Interventions:  Sensory Interventions: Assess changes in LOC, Assess need for specialty bed, Check visual cues for pain, Keep linens dry and wrinkle-free, Minimize linen layers, Monitor skin under medical devices, Turn and reposition approx. every two hours (pillows and wedges if needed), Pressure redistribution bed/mattress (bed type)    Moisture Interventions: Absorbent underpads, Apply protective barrier, creams and emollients, Check for incontinence Q2 hours and as needed, Internal/External urinary devices, Maintain skin hydration (lotion/cream), Minimize layers    Activity Interventions: Pressure redistribution bed/mattress(bed type), Assess need for specialty bed    Mobility Interventions: Pressure redistribution bed/mattress (bed type), Assess need for specialty bed, Turn and reposition approx.  every two hours(pillow and wedges)    Nutrition Interventions: Document food/fluid/supplement intake    Friction and Shear Interventions: Apply protective barrier, creams and emollients, Foam dressings/transparent film/skin sealants, Lift sheet, Lift team/patient mobility team, Minimize layers                Problem: Falls - Risk of  Goal: *Absence of Falls  Description: Document Adama Fall Risk and appropriate interventions in the flowsheet. Outcome: Progressing Towards Goal  Note: Fall Risk Interventions:  Mobility Interventions: Communicate number of staff needed for ambulation/transfer    Mentation Interventions: Door open when patient unattended, Evaluate medications/consider consulting pharmacy, More frequent rounding, Reorient patient, Room close to nurse's station, Toileting rounds, Update white board    Medication Interventions: Evaluate medications/consider consulting pharmacy    Elimination Interventions:  Toileting schedule/hourly rounds    History of Falls Interventions: Door open when patient unattended, Evaluate medications/consider consulting pharmacy         Problem: Nutrition Deficit  Goal: *Optimize nutritional status  Outcome: Progressing Towards Goal     Problem: Breathing Pattern - Ineffective  Goal: *Absence of hypoxia  Outcome: Progressing Towards Goal  Goal: *Use of effective breathing techniques  Outcome: Progressing Towards Goal

## 2020-08-27 NOTE — PROGRESS NOTES
ARDS  Acute hypoxic respiratory failure  COVID positive  Sinus pauses    Remains intubated and sedated    PEEP down to 8    FiO2 down to 50%    ABG - 7.52 / 35 / 72 / 29 / 6    Hgb and platelets look good    Creatinine normal    Bilirubin and other LFTs up a bit    Pro-calcitonin normal    Remains on a lot of versed    Repeating COVID test    Hopefully trach collar trials soon     CXR - less pulmonary edema      Intake/Output Summary (Last 24 hours) at 8/27/2020 1310  Last data filed at 8/27/2020 1200  Gross per 24 hour   Intake 3065 ml   Output 4820 ml   Net -1755 ml     Visit Vitals  /68   Pulse (!) 110   Temp (!) 100.5 °F (38.1 °C)   Resp 26   Ht 5' 11\" (1.803 m)   Wt 221 lb 5.5 oz (100.4 kg)   SpO2 95%   BMI 30.87 kg/m²       Risk of morbidity and mortality - high  Medical decision making - high complexity    Total critical care time - 30 minutes (CPT 04387)     I personally spent the above critical care time. This is time spent at this critically ill patient's bedside / unit / floor actively involved in patient care as well as the coordination of care and discussions with the patient's family. This does not include any procedural time which has been billed separately.

## 2020-08-27 NOTE — PROGRESS NOTES
Nutrition Note    Chart reviewed for follow-up; discussed during MDR. Mr Jovani Kam continues to tolerate enteral feeding. Diprivan has been weaned off therefore will adjust tube feeding to meet estimated energy needs. Modified bowel regimen today d/t loose stools (regimen increased earlier this week d/t lack of BM for ~1 month). New tube feeding: Vital AF 1.2 @ 65 ml/hr with 2 packets Prosource daily and 80 ml water flush q 4 hr. This will provide 1560 ml, 1990 calories, 147 gm protein and 1865 ml free water (tube feeding/flush) per day to meet estimated needs. RD to follow.     Estimated Nutrition Needs:   Energy: 8925-1178 (18-20 kcal/kg)  Wt used: Current(101 kg)  Protein: 156 (2g/kg IBW)  Wt used: Ideal   Fluid: 1 ml/kcal     Electronically signed by Neris Cooper RD on 8/27/2020 at 3:00 PM  Contact: [employer]

## 2020-08-28 ENCOUNTER — APPOINTMENT (OUTPATIENT)
Dept: GENERAL RADIOLOGY | Age: 30
DRG: 004 | End: 2020-08-28
Attending: NURSE PRACTITIONER
Payer: COMMERCIAL

## 2020-08-28 LAB
ALBUMIN SERPL-MCNC: 2.1 G/DL (ref 3.5–5)
ALBUMIN/GLOB SERPL: 0.5 {RATIO} (ref 1.1–2.2)
ALP SERPL-CCNC: 127 U/L (ref 45–117)
ALT SERPL-CCNC: 179 U/L (ref 12–78)
ANION GAP SERPL CALC-SCNC: 5 MMOL/L (ref 5–15)
AST SERPL-CCNC: 82 U/L (ref 15–37)
BASOPHILS # BLD: 0 K/UL (ref 0–0.1)
BASOPHILS NFR BLD: 0 % (ref 0–1)
BILIRUB SERPL-MCNC: 0.6 MG/DL (ref 0.2–1)
BUN SERPL-MCNC: 18 MG/DL (ref 6–20)
BUN/CREAT SERPL: 42 (ref 12–20)
CALCIUM SERPL-MCNC: 7.9 MG/DL (ref 8.5–10.1)
CHLORIDE SERPL-SCNC: 106 MMOL/L (ref 97–108)
CO2 SERPL-SCNC: 28 MMOL/L (ref 21–32)
CREAT SERPL-MCNC: 0.43 MG/DL (ref 0.7–1.3)
D DIMER PPP FEU-MCNC: 3.14 MG/L FEU (ref 0–0.65)
DIFFERENTIAL METHOD BLD: ABNORMAL
EOSINOPHIL # BLD: 0 K/UL (ref 0–0.4)
EOSINOPHIL NFR BLD: 0 % (ref 0–7)
ERYTHROCYTE [DISTWIDTH] IN BLOOD BY AUTOMATED COUNT: 14.3 % (ref 11.5–14.5)
FIBRINOGEN PPP-MCNC: 700 MG/DL (ref 200–475)
GLOBULIN SER CALC-MCNC: 4.2 G/DL (ref 2–4)
GLUCOSE SERPL-MCNC: 97 MG/DL (ref 65–100)
HCT VFR BLD AUTO: 27.7 % (ref 36.6–50.3)
HGB BLD-MCNC: 8.8 G/DL (ref 12.1–17)
IMM GRANULOCYTES # BLD AUTO: 0 K/UL
IMM GRANULOCYTES NFR BLD AUTO: 0 %
LYMPHOCYTES # BLD: 1.1 K/UL (ref 0.8–3.5)
LYMPHOCYTES NFR BLD: 20 % (ref 12–49)
MAGNESIUM SERPL-MCNC: 1.9 MG/DL (ref 1.6–2.4)
MCH RBC QN AUTO: 29.6 PG (ref 26–34)
MCHC RBC AUTO-ENTMCNC: 31.8 G/DL (ref 30–36.5)
MCV RBC AUTO: 93.3 FL (ref 80–99)
METAMYELOCYTES NFR BLD MANUAL: 2 %
MONOCYTES # BLD: 0.3 K/UL (ref 0–1)
MONOCYTES NFR BLD: 6 % (ref 5–13)
MYELOCYTES NFR BLD MANUAL: 2 %
NEUTS BAND NFR BLD MANUAL: 4 % (ref 0–6)
NEUTS SEG # BLD: 3.8 K/UL (ref 1.8–8)
NEUTS SEG NFR BLD: 66 % (ref 32–75)
NRBC # BLD: 0 K/UL (ref 0–0.01)
NRBC BLD-RTO: 0 PER 100 WBC
PHOSPHATE SERPL-MCNC: 3.3 MG/DL (ref 2.6–4.7)
PLATELET # BLD AUTO: 172 K/UL (ref 150–400)
PMV BLD AUTO: 10 FL (ref 8.9–12.9)
POTASSIUM SERPL-SCNC: 3 MMOL/L (ref 3.5–5.1)
PROCALCITONIN SERPL-MCNC: 0.23 NG/ML
PROT SERPL-MCNC: 6.3 G/DL (ref 6.4–8.2)
RBC # BLD AUTO: 2.97 M/UL (ref 4.1–5.7)
RBC MORPH BLD: ABNORMAL
SODIUM SERPL-SCNC: 139 MMOL/L (ref 136–145)
WBC # BLD AUTO: 5.4 K/UL (ref 4.1–11.1)

## 2020-08-28 PROCEDURE — 74011250636 HC RX REV CODE- 250/636: Performed by: NURSE PRACTITIONER

## 2020-08-28 PROCEDURE — 74011250637 HC RX REV CODE- 250/637: Performed by: NURSE PRACTITIONER

## 2020-08-28 PROCEDURE — 36415 COLL VENOUS BLD VENIPUNCTURE: CPT

## 2020-08-28 PROCEDURE — 85025 COMPLETE CBC W/AUTO DIFF WBC: CPT

## 2020-08-28 PROCEDURE — 71045 X-RAY EXAM CHEST 1 VIEW: CPT

## 2020-08-28 PROCEDURE — 74011250637 HC RX REV CODE- 250/637: Performed by: INTERNAL MEDICINE

## 2020-08-28 PROCEDURE — 74011000250 HC RX REV CODE- 250: Performed by: INTERNAL MEDICINE

## 2020-08-28 PROCEDURE — 94003 VENT MGMT INPAT SUBQ DAY: CPT

## 2020-08-28 PROCEDURE — 65610000006 HC RM INTENSIVE CARE

## 2020-08-28 PROCEDURE — 85384 FIBRINOGEN ACTIVITY: CPT

## 2020-08-28 PROCEDURE — 84145 PROCALCITONIN (PCT): CPT

## 2020-08-28 PROCEDURE — 85379 FIBRIN DEGRADATION QUANT: CPT

## 2020-08-28 PROCEDURE — 80053 COMPREHEN METABOLIC PANEL: CPT

## 2020-08-28 PROCEDURE — 74011000258 HC RX REV CODE- 258: Performed by: INTERNAL MEDICINE

## 2020-08-28 PROCEDURE — 74011250636 HC RX REV CODE- 250/636: Performed by: INTERNAL MEDICINE

## 2020-08-28 PROCEDURE — 99291 CRITICAL CARE FIRST HOUR: CPT | Performed by: THORACIC SURGERY (CARDIOTHORACIC VASCULAR SURGERY)

## 2020-08-28 PROCEDURE — 84100 ASSAY OF PHOSPHORUS: CPT

## 2020-08-28 PROCEDURE — 74011250637 HC RX REV CODE- 250/637: Performed by: HOSPITALIST

## 2020-08-28 PROCEDURE — 83735 ASSAY OF MAGNESIUM: CPT

## 2020-08-28 RX ORDER — CHLORDIAZEPOXIDE HYDROCHLORIDE 25 MG/1
75 CAPSULE, GELATIN COATED ORAL ONCE
Status: COMPLETED | OUTPATIENT
Start: 2020-08-28 | End: 2020-08-28

## 2020-08-28 RX ORDER — POTASSIUM CHLORIDE 29.8 MG/ML
20 INJECTION INTRAVENOUS
Status: COMPLETED | OUTPATIENT
Start: 2020-08-28 | End: 2020-08-28

## 2020-08-28 RX ORDER — GUAIFENESIN 100 MG/5ML
400 SOLUTION ORAL
Status: DISCONTINUED | OUTPATIENT
Start: 2020-08-28 | End: 2020-10-12 | Stop reason: HOSPADM

## 2020-08-28 RX ORDER — CHLORDIAZEPOXIDE HYDROCHLORIDE 25 MG/1
100 CAPSULE, GELATIN COATED ORAL EVERY 6 HOURS
Status: DISCONTINUED | OUTPATIENT
Start: 2020-08-28 | End: 2020-09-08

## 2020-08-28 RX ADMIN — MIDAZOLAM HYDROCHLORIDE 22 MG/HR: 5 INJECTION, SOLUTION INTRAMUSCULAR; INTRAVENOUS at 00:55

## 2020-08-28 RX ADMIN — MIDAZOLAM HYDROCHLORIDE 20 MG/HR: 5 INJECTION, SOLUTION INTRAMUSCULAR; INTRAVENOUS at 19:26

## 2020-08-28 RX ADMIN — GUAIFENESIN 400 MG: 100 SOLUTION ORAL at 05:49

## 2020-08-28 RX ADMIN — Medication: at 18:16

## 2020-08-28 RX ADMIN — ENOXAPARIN SODIUM 50 MG: 60 INJECTION SUBCUTANEOUS at 10:27

## 2020-08-28 RX ADMIN — Medication 5 MG/HR: at 14:00

## 2020-08-28 RX ADMIN — MIDAZOLAM HYDROCHLORIDE 22 MG/HR: 5 INJECTION, SOLUTION INTRAMUSCULAR; INTRAVENOUS at 15:00

## 2020-08-28 RX ADMIN — CHLORDIAZEPOXIDE HYDROCHLORIDE 25 MG: 25 CAPSULE ORAL at 08:49

## 2020-08-28 RX ADMIN — QUETIAPINE FUMARATE 25 MG: 25 TABLET, FILM COATED ORAL at 22:00

## 2020-08-28 RX ADMIN — FENTANYL CITRATE 100 MCG: 50 INJECTION, SOLUTION INTRAMUSCULAR; INTRAVENOUS at 06:51

## 2020-08-28 RX ADMIN — Medication: at 08:51

## 2020-08-28 RX ADMIN — GUAIFENESIN 400 MG: 100 SOLUTION ORAL at 08:50

## 2020-08-28 RX ADMIN — CHLORDIAZEPOXIDE HYDROCHLORIDE 75 MG: 25 CAPSULE ORAL at 14:10

## 2020-08-28 RX ADMIN — KETAMINE HYDROCHLORIDE 1 MG/KG/HR: 50 INJECTION, SOLUTION INTRAMUSCULAR; INTRAVENOUS at 12:37

## 2020-08-28 RX ADMIN — Medication 40 ML: at 16:02

## 2020-08-28 RX ADMIN — MIDAZOLAM HYDROCHLORIDE 24 MG/HR: 5 INJECTION, SOLUTION INTRAMUSCULAR; INTRAVENOUS at 06:00

## 2020-08-28 RX ADMIN — POTASSIUM CHLORIDE 20 MEQ: 29.8 INJECTION, SOLUTION INTRAVENOUS at 12:11

## 2020-08-28 RX ADMIN — CHLORDIAZEPOXIDE HYDROCHLORIDE 25 MG: 25 CAPSULE ORAL at 11:00

## 2020-08-28 RX ADMIN — Medication 6 MG/HR: at 00:38

## 2020-08-28 RX ADMIN — Medication 10 ML: at 23:27

## 2020-08-28 RX ADMIN — POTASSIUM CHLORIDE 20 MEQ: 29.8 INJECTION, SOLUTION INTRAVENOUS at 14:11

## 2020-08-28 RX ADMIN — Medication 5 MG/HR: at 19:00

## 2020-08-28 RX ADMIN — FAMOTIDINE 20 MG: 40 POWDER, FOR SUSPENSION ORAL at 08:50

## 2020-08-28 RX ADMIN — ACETAMINOPHEN ORAL SOLUTION 650 MG: 650 SOLUTION ORAL at 18:15

## 2020-08-28 RX ADMIN — KETAMINE HYDROCHLORIDE 1 MG/KG/HR: 50 INJECTION, SOLUTION INTRAMUSCULAR; INTRAVENOUS at 07:00

## 2020-08-28 RX ADMIN — DOCUSATE SODIUM 100 MG: 50 LIQUID ORAL at 18:16

## 2020-08-28 RX ADMIN — CHLORHEXIDINE GLUCONATE 15 ML: 0.12 RINSE ORAL at 08:49

## 2020-08-28 RX ADMIN — FENTANYL CITRATE 100 MCG: 50 INJECTION, SOLUTION INTRAMUSCULAR; INTRAVENOUS at 13:30

## 2020-08-28 RX ADMIN — MIDAZOLAM HYDROCHLORIDE 22 MG/HR: 5 INJECTION, SOLUTION INTRAMUSCULAR; INTRAVENOUS at 10:30

## 2020-08-28 RX ADMIN — GUAIFENESIN 400 MG: 100 SOLUTION ORAL at 11:25

## 2020-08-28 RX ADMIN — GUAIFENESIN 400 MG: 100 SOLUTION ORAL at 00:38

## 2020-08-28 RX ADMIN — POLYETHYLENE GLYCOL 3350 17 G: 17 POWDER, FOR SOLUTION ORAL at 08:50

## 2020-08-28 RX ADMIN — FENTANYL CITRATE 100 MCG: 50 INJECTION, SOLUTION INTRAMUSCULAR; INTRAVENOUS at 11:25

## 2020-08-28 RX ADMIN — FAMOTIDINE 20 MG: 40 POWDER, FOR SUSPENSION ORAL at 18:16

## 2020-08-28 RX ADMIN — POTASSIUM CHLORIDE 20 MEQ: 29.8 INJECTION, SOLUTION INTRAVENOUS at 15:03

## 2020-08-28 RX ADMIN — ENOXAPARIN SODIUM 50 MG: 60 INJECTION SUBCUTANEOUS at 22:00

## 2020-08-28 RX ADMIN — KETAMINE HYDROCHLORIDE 1 MG/KG/HR: 50 INJECTION, SOLUTION INTRAMUSCULAR; INTRAVENOUS at 18:24

## 2020-08-28 RX ADMIN — ASPIRIN 81 MG CHEWABLE TABLET 81 MG: 81 TABLET CHEWABLE at 08:49

## 2020-08-28 RX ADMIN — FENTANYL CITRATE 100 MCG: 50 INJECTION, SOLUTION INTRAMUSCULAR; INTRAVENOUS at 21:46

## 2020-08-28 RX ADMIN — POTASSIUM CHLORIDE 20 MEQ: 29.8 INJECTION, SOLUTION INTRAVENOUS at 13:15

## 2020-08-28 RX ADMIN — CHLORDIAZEPOXIDE HYDROCHLORIDE 100 MG: 25 CAPSULE ORAL at 18:15

## 2020-08-28 RX ADMIN — CHLORHEXIDINE GLUCONATE 15 ML: 0.12 RINSE ORAL at 20:45

## 2020-08-28 RX ADMIN — DOCUSATE SODIUM 100 MG: 50 LIQUID ORAL at 08:50

## 2020-08-28 RX ADMIN — Medication 6 MG/HR: at 09:30

## 2020-08-28 RX ADMIN — Medication 6 MG/HR: at 05:08

## 2020-08-28 RX ADMIN — KETAMINE HYDROCHLORIDE 0.9 MG/KG/HR: 50 INJECTION, SOLUTION INTRAMUSCULAR; INTRAVENOUS at 01:23

## 2020-08-28 RX ADMIN — DEXAMETHASONE SODIUM PHOSPHATE 4 MG: 10 INJECTION INTRAMUSCULAR; INTRAVENOUS at 08:50

## 2020-08-28 RX ADMIN — ACETAMINOPHEN ORAL SOLUTION 650 MG: 650 SOLUTION ORAL at 14:10

## 2020-08-28 NOTE — PROGRESS NOTES
[address] [facility]  Hospitalist Group                                                                                          Critical Care Progress Note  Cruz Rose MD  Answering service: [phone] from in house phone        Date of Service:  2020  NAME:  Donita Vásquez  :  1990  MRN:  [id number]      Interval history / Subjective:     Vent: 40% and PEEP 10, sedated, no presors, Pig tail no output no air leak. COVID positive once more unfortunately. Assessment & Plan:     ICU Problems: admitted 20 with severe Covid s/p trach      1. Covid-19 - PNA ARDS   2. Acute Resp failure sp Perc Trach by thoracics   3. PTX with CT-no air leak  4. Sinus/martine tarchycardia  5. Tobacco use   6.  Possible Bacterial superinfection   7  Left pneumothorax with indwelling L Pig tail     Plan:     Making headway last few days: PEEP down to 10 and Fio2 40%. Completed treatment. Prn laxative. steroids, Remdesivir (completed 8/10/20) and convalescent plasma x 2 doses (8/10/20 and 20) for COVID-19 infection  Nutritional support   Keep CT in place while on positive pressure ventilation    DVT prophylaxis:  Lovenox BID 0.5mg/kg  GI prophylaxis: pepcid      I personally spent   40   minutes of critical care time.  This is time spent at this critically ill patient's bedside actively involved in patient care as well as the coordination of care and discussions with the patient's family.  This does not include any procedural time which has been billed separately. Hospital Problems  Date Reviewed: 2020          Codes Class Noted POA    Acute respiratory failure [facility]) ICD-10-CM: J96.00  ICD-9-CM: 518.81  2020 Unknown                Review of Systems:   Review of systems not obtained due to patient factors. Vital Signs:    Last 24hrs VS reviewed since prior progress note.  Most recent are:  Visit Vitals  BP (!) 137/95   Pulse (!) 157   Temp (!) 101.6 °F (38.7 °C)   Resp 25   Ht 5' 11\" (1.803 m)   Wt 100.4 kg (221 lb 5.5 oz)   SpO2 97%   BMI 30.87 kg/m²         Intake/Output Summary (Last 24 hours) at 8/28/2020 1542  Last data filed at 8/28/2020 1500  Gross per 24 hour   Intake 3246.19 ml   Output 3460 ml   Net -213.81 ml        Physical Examination:             Constitutional:  No acute distress, cooperative, pleasant    ENT:  Oral mucous moist, oropharynx benign. Resp:  CTA bilaterally. No wheezing/rhonchi/rales. No accessory muscle use   CV:  Regular rhythm, normal rate, no murmurs, gallops, rubs    GI:  Soft, non distended, non tender. normoactive bowel sounds, no hepatosplenomegaly     Musculoskeletal:  No edema, warm, 2+ pulses throughout    Neurologic:  Moves all extremities. AAOx3, CN II-XII reviewed     Psych:  sedated  Skin:  Good turgor, no rashes or ulcers  Hematologic/Lymphatic/Immunlogic:  No jaundice nor lymph node swelling  :  Normal genitalia. Eyes:  sedated       Data Review:    Review and/or order of clinical lab test      Labs:     Recent Labs     08/28/20 0523 08/27/20  0630   WBC 5.4 4.9   HGB 8.8* 8.5*   HCT 27.7* 26.3*    162     Recent Labs     08/28/20 0523 08/27/20 0630 08/26/20  0125    140 143   K 3.0* 3.1* 3.2*    103 106   CO2 28 30 29   BUN 18 13 18   CREA 0.43* 0.51* 0.54*   GLU 97 99 126*   CA 7.9* 8.6 9.0   MG 1.9 1.7 1.9   PHOS 3.3 2.4* 3.4     Recent Labs     08/28/20 0523 08/27/20  0630 08/26/20  0125   * 120* 128*   * 113 148*   TBILI 0.6 0.6 0.9   TP 6.3* 6.0* 7.3   ALB 2.1* 2.1* 2.1*   GLOB 4.2* 3.9 5.2*     No results for input(s): INR, PTP, APTT, INREXT in the last 72 hours. No results for input(s): FE, TIBC, PSAT, FERR in the last 72 hours. No results found for: FOL, RBCF   No results for input(s): PH, PCO2, PO2 in the last 72 hours. No results for input(s): CPK, CKNDX, TROIQ in the last 72 hours.     No lab exists for component: CPKMB  Lab Results   Component Value Date/Time Triglyceride 120 08/22/2020 07:38 PM     Lab Results   Component Value Date/Time    Glucose (POC) 122 (H) 08/27/2020 11:28 AM    Glucose (POC) 107 (H) 08/27/2020 06:33 AM    Glucose (POC) 81 08/26/2020 11:43 PM    Glucose (POC) 99 08/26/2020 05:09 PM    Glucose (POC) 110 (H) 08/26/2020 11:28 AM     No results found for: COLOR, APPRN, SPGRU, REFSG, RAE, PROTU, GLUCU, KETU, BILU, UROU, ROBLES, LEUKU, GLUKE, EPSU, BACTU, WBCU, RBCU, CASTS, UCRY      Medications Reviewed:     Current Facility-Administered Medications   Medication Dose Route Frequency    chlordiazePOXIDE (LIBRIUM) capsule 100 mg  100 mg Per NG tube Q6H    guaiFENesin (ROBITUSSIN) 100 mg/5 mL oral liquid 400 mg  400 mg Per NG tube Q4H PRN    acetaminophen (TYLENOL) solution 650 mg  650 mg Per NG tube Q4H PRN    potassium chloride 20 mEq in 50 ml IVPB  20 mEq IntraVENous Q1H    midazolam in normal saline (VERSED) 1 mg/mL infusion  0-30 mg/hr IntraVENous TITRATE    dexamethasone (PF) (DECADRON) 10 mg/mL injection 4 mg  4 mg IntraVENous DAILY    polyethylene glycol (MIRALAX) packet 17 g  17 g Per NG tube DAILY    famotidine (PEPCID) 40 mg/5 mL (8 mg/mL) oral suspension 20 mg  20 mg Per NG tube BID    QUEtiapine (SEROquel) tablet 25 mg  25 mg Oral QHS    metoprolol (LOPRESSOR) injection 5 mg  5 mg IntraVENous Q6H PRN    balsam peru-castor oiL (VENELEX) ointment   Topical BID    ketamine (KETALAR) 500 mg in 0.9% sodium chloride 500 mL infusion  0.05-1 mg/kg/hr IntraVENous TITRATE    HYDROmorphone (PF) 25 mg/50 mL (0.5 mg/mL) infusion  0-6 mg/hr IntraVENous TITRATE    fentaNYL citrate (PF) injection  mcg   mcg IntraVENous Q2H PRN    midazolam (VERSED) injection 1-2 mg  1-2 mg IntraVENous Q2H PRN    docusate (COLACE) 50 mg/5 mL oral liquid 100 mg  100 mg Per NG tube BID    enoxaparin (LOVENOX) injection 50 mg  0.5 mg/kg SubCUTAneous Q12H    acetaminophen (TYLENOL) suppository 650 mg  650 mg Rectal Q6H PRN    alteplase (CATHFLO) 1 mg in sterile water (preservative free) 1 mL injection  1 mg InterCATHeter PRN    polyvinyl alcohol-povidone (NATURAL TEARS) 0.5-0.6 % ophthalmic solution 1 Drop  1 Drop Both Eyes PRN    dextrose 10% infusion 0-250 mL  0-250 mL IntraVENous PRN    glucagon (GLUCAGEN) injection 1 mg  1 mg IntraMUSCular PRN    glucose chewable tablet 16 g  4 Tab Oral PRN    promethazine (PHENERGAN) tablet 12.5 mg  12.5 mg Oral Q6H PRN    Or    ondansetron (ZOFRAN) injection 4 mg  4 mg IntraVENous Q6H PRN    sodium chloride (NS) flush 5-40 mL  5-40 mL IntraVENous Q8H    sodium chloride (NS) flush 5-40 mL  5-40 mL IntraVENous PRN    aspirin chewable tablet 81 mg  81 mg Per NG tube DAILY    chlorhexidine (ORAL CARE KIT) 0.12 % mouthwash 15 mL  15 mL Oral Q12H    albuterol-ipratropium (DUO-NEB) 2.5 MG-0.5 MG/3 ML  3 mL Nebulization Q6H PRN    0.9% sodium chloride infusion  5 mL/hr IntraVENous CONTINUOUS     ______________________________________________________________________  EXPECTED LENGTH OF STAY: 12d 0h  ACTUAL LENGTH OF STAY:          10                 Colon MD Jin

## 2020-08-28 NOTE — PROGRESS NOTES
1930: Bedside shift change report given to Leslie (oncoming nurse) by Lyubov Bustamante (offgoing nurse). Report included the following information SBAR, Kardex, Intake/Output, MAR, Accordion, Recent Results, Med Rec Status and Cardiac Rhythm SR/ST   2000: Assumed care of patient. 2130: Patient HR up to 150s, awake in the room. Versed to 24.  2140: HR in upper 140s. Ketamine to 0.85  2150: Patient HR in 140s, Ketamine to 0.9  0730: Bedside shift change report given to Josef (oncoming nurse) by Leslie (offgoing nurse). Report included the following information SBAR, Kardex, Intake/Output, MAR, Accordion, Recent Results, Med Rec Status and Cardiac Rhythm ST. Drips: Dilaudid 6, Ketamine 1, Versed 26       Shift summary: Patient wide awake on shift, following commands, ROY spontaneously. Patient HR to the 140s when awake, sedation titrated accordingly. Patients daily labs resulted with a K of 3.0, day shift RN made aware.

## 2020-08-28 NOTE — PROGRESS NOTES
ARDS  Acute hypoxic respiratory failure  COVID positive  Sinus pauses    Remains intubated and sedated    Having some anxiety and chest pressure issues    Given ativan    WBCs look good    Hgb and platelets look good    Creatinine normal    Bilirubin and other LFTs up a bit    Pro-calcitonin normal    Still trying to wean versed -     Unfortunately still positive for COVID     ABG 7.52 / 35 / 72 / 29 / 6     Discussed with ICU attending     CXR - patchy air space disease about the same       Intake/Output Summary (Last 24 hours) at 8/28/2020 1546  Last data filed at 8/28/2020 1500  Gross per 24 hour   Intake 3246.19 ml   Output 3460 ml   Net -213.81 ml     Visit Vitals  BP (!) 137/95   Pulse (!) 157   Temp (!) 101.6 °F (38.7 °C)   Resp 25   Ht 5' 11\" (1.803 m)   Wt 221 lb 5.5 oz (100.4 kg)   SpO2 97%   BMI 30.87 kg/m²       Risk of morbidity and mortality - high  Medical decision making - high complexity     Total critical care time - 30 minutes (CPT 34149)     I personally spent the above critical care time. This is time spent at this critically ill patient's bedside / unit / floor actively involved in patient care as well as the coordination of care and discussions with the patient's family. This does not include any procedural time which has been billed separately.

## 2020-08-28 NOTE — PROGRESS NOTES
Problem: Ventilator Management  Goal: *Adequate oxygenation and ventilation  Outcome: Progressing Towards Goal  Goal: *Patient maintains clear airway/free of aspiration  Outcome: Progressing Towards Goal  Goal: *Absence of infection signs and symptoms  Outcome: Progressing Towards Goal  Goal: *Normal spontaneous ventilation  Outcome: Progressing Towards Goal     Problem: Patient Education: Go to Patient Education Activity  Goal: Patient/Family Education  Outcome: Progressing Towards Goal     Problem: Pressure Injury - Risk of  Goal: *Prevention of pressure injury  Description: Document Enrique Scale and appropriate interventions in the flowsheet. Outcome: Progressing Towards Goal  Note: Pressure Injury Interventions:  Sensory Interventions: Assess changes in LOC, Assess need for specialty bed, Avoid rigorous massage over bony prominences, Check visual cues for pain, Float heels, Keep linens dry and wrinkle-free, Minimize linen layers, Pressure redistribution bed/mattress (bed type), Turn and reposition approx. every two hours (pillows and wedges if needed)    Moisture Interventions: Absorbent underpads, Apply protective barrier, creams and emollients, Check for incontinence Q2 hours and as needed, Internal/External urinary devices, Offer toileting Q_hr, Minimize layers    Activity Interventions: Pressure redistribution bed/mattress(bed type)    Mobility Interventions: Pressure redistribution bed/mattress (bed type), Turn and reposition approx. every two hours(pillow and wedges)    Nutrition Interventions: Document food/fluid/supplement intake    Friction and Shear Interventions: Lift sheet, Minimize layers                Problem: Falls - Risk of  Goal: *Absence of Falls  Description: Document Adama Fall Risk and appropriate interventions in the flowsheet.   Outcome: Progressing Towards Goal  Note: Fall Risk Interventions:  Mobility Interventions: Communicate number of staff needed for ambulation/transfer    Mentation Interventions: Evaluate medications/consider consulting pharmacy, Door open when patient unattended, More frequent rounding, Reorient patient, Room close to nurse's station, Toileting rounds, Update white board    Medication Interventions: Evaluate medications/consider consulting pharmacy    Elimination Interventions:  Toileting schedule/hourly rounds    History of Falls Interventions: Evaluate medications/consider consulting pharmacy         Problem: Nutrition Deficit  Goal: *Optimize nutritional status  Outcome: Progressing Towards Goal     Problem: Breathing Pattern - Ineffective  Goal: *Absence of hypoxia  Outcome: Progressing Towards Goal  Goal: *Use of effective breathing techniques  Outcome: Progressing Towards Goal

## 2020-08-28 NOTE — PROGRESS NOTES
Primary Nurse Tanner Rivas RN and shiva lópez RN performed a dual skin assessment on this patient No impairment noted  Enrique score is 13

## 2020-08-28 NOTE — PROGRESS NOTES
MIGUELINA  Patient presented to [facility] ED with increased shortness of breath, diaphoresis and fever. Transferred to [facility] for possible ECMO placement  COVID Positive. RUR: 23%  Disposition: TBD    Patient remains in the ICU vented, sedated receiving fentanyl, Ketamine and versed IV. Jorge Alberto Pike Chest tube intact. Care management continuing to follow for transitions of care.  Antonella Aiken RN,CRM

## 2020-08-29 ENCOUNTER — APPOINTMENT (OUTPATIENT)
Dept: GENERAL RADIOLOGY | Age: 30
DRG: 004 | End: 2020-08-29
Attending: NURSE PRACTITIONER
Payer: COMMERCIAL

## 2020-08-29 ENCOUNTER — APPOINTMENT (OUTPATIENT)
Dept: GENERAL RADIOLOGY | Age: 30
DRG: 004 | End: 2020-08-29
Attending: INTERNAL MEDICINE
Payer: COMMERCIAL

## 2020-08-29 LAB
ALBUMIN SERPL-MCNC: 1.8 G/DL (ref 3.5–5)
ALBUMIN/GLOB SERPL: 0.5 {RATIO} (ref 1.1–2.2)
ALP SERPL-CCNC: 136 U/L (ref 45–117)
ALT SERPL-CCNC: 193 U/L (ref 12–78)
ANION GAP SERPL CALC-SCNC: 8 MMOL/L (ref 5–15)
ARTERIAL PATENCY WRIST A: ABNORMAL
AST SERPL-CCNC: 58 U/L (ref 15–37)
BASE EXCESS BLD CALC-SCNC: 0 MMOL/L
BASOPHILS # BLD: 0 K/UL (ref 0–0.1)
BASOPHILS NFR BLD: 0 % (ref 0–1)
BDY SITE: ABNORMAL
BILIRUB SERPL-MCNC: 0.5 MG/DL (ref 0.2–1)
BUN SERPL-MCNC: 13 MG/DL (ref 6–20)
BUN/CREAT SERPL: 31 (ref 12–20)
CA-I BLD-SCNC: 1.2 MMOL/L (ref 1.12–1.32)
CALCIUM SERPL-MCNC: 7.2 MG/DL (ref 8.5–10.1)
CHLORIDE SERPL-SCNC: 114 MMOL/L (ref 97–108)
CO2 SERPL-SCNC: 22 MMOL/L (ref 21–32)
CREAT SERPL-MCNC: 0.42 MG/DL (ref 0.7–1.3)
D DIMER PPP FEU-MCNC: 3.83 MG/L FEU (ref 0–0.65)
DIFFERENTIAL METHOD BLD: ABNORMAL
EOSINOPHIL # BLD: 0.1 K/UL (ref 0–0.4)
EOSINOPHIL NFR BLD: 2 % (ref 0–7)
ERYTHROCYTE [DISTWIDTH] IN BLOOD BY AUTOMATED COUNT: 14.6 % (ref 11.5–14.5)
FIBRINOGEN PPP-MCNC: 720 MG/DL (ref 200–475)
GAS FLOW.O2 O2 DELIVERY SYS: ABNORMAL L/MIN
GAS FLOW.O2 SETTING OXYMISER: 26 BPM
GLOBULIN SER CALC-MCNC: 3.9 G/DL (ref 2–4)
GLUCOSE SERPL-MCNC: 112 MG/DL (ref 65–100)
HCO3 BLD-SCNC: 24.3 MMOL/L (ref 22–26)
HCT VFR BLD AUTO: 26.5 % (ref 36.6–50.3)
HGB BLD-MCNC: 8.3 G/DL (ref 12.1–17)
IMM GRANULOCYTES # BLD AUTO: 0 K/UL
IMM GRANULOCYTES NFR BLD AUTO: 0 %
INSPIRATION.DURATION SETTING TIME VENT: 0.77 SEC
LYMPHOCYTES # BLD: 0.6 K/UL (ref 0.8–3.5)
LYMPHOCYTES NFR BLD: 11 % (ref 12–49)
MAGNESIUM SERPL-MCNC: 1.4 MG/DL (ref 1.6–2.4)
MCH RBC QN AUTO: 29.7 PG (ref 26–34)
MCHC RBC AUTO-ENTMCNC: 31.3 G/DL (ref 30–36.5)
MCV RBC AUTO: 95 FL (ref 80–99)
METAMYELOCYTES NFR BLD MANUAL: 1 %
MONOCYTES # BLD: 0.4 K/UL (ref 0–1)
MONOCYTES NFR BLD: 8 % (ref 5–13)
MYELOCYTES NFR BLD MANUAL: 1 %
NEUTS BAND NFR BLD MANUAL: 1 % (ref 0–6)
NEUTS SEG # BLD: 3.9 K/UL (ref 1.8–8)
NEUTS SEG NFR BLD: 76 % (ref 32–75)
NRBC # BLD: 0 K/UL (ref 0–0.01)
NRBC BLD-RTO: 0 PER 100 WBC
O2/TOTAL GAS SETTING VFR VENT: 40 %
PCO2 BLD: 38 MMHG (ref 35–45)
PEEP RESPIRATORY: 10 CMH2O
PH BLD: 7.42 [PH] (ref 7.35–7.45)
PHOSPHATE SERPL-MCNC: 2.4 MG/DL (ref 2.6–4.7)
PIP ISTAT,IPIP: 35
PLATELET # BLD AUTO: 169 K/UL (ref 150–400)
PMV BLD AUTO: 10.1 FL (ref 8.9–12.9)
PO2 BLD: 96 MMHG (ref 80–100)
POTASSIUM SERPL-SCNC: 2.8 MMOL/L (ref 3.5–5.1)
PROCALCITONIN SERPL-MCNC: 0.21 NG/ML
PROT SERPL-MCNC: 5.7 G/DL (ref 6.4–8.2)
RBC # BLD AUTO: 2.79 M/UL (ref 4.1–5.7)
RBC MORPH BLD: ABNORMAL
SAO2 % BLD: 98 % (ref 92–97)
SODIUM SERPL-SCNC: 144 MMOL/L (ref 136–145)
SPECIMEN TYPE: ABNORMAL
TOTAL RESP. RATE, ITRR: 26
VENTILATION MODE VENT: ABNORMAL
WBC # BLD AUTO: 5.1 K/UL (ref 4.1–11.1)

## 2020-08-29 PROCEDURE — 74011000258 HC RX REV CODE- 258: Performed by: INTERNAL MEDICINE

## 2020-08-29 PROCEDURE — 74011250636 HC RX REV CODE- 250/636: Performed by: NURSE PRACTITIONER

## 2020-08-29 PROCEDURE — 84145 PROCALCITONIN (PCT): CPT

## 2020-08-29 PROCEDURE — 94003 VENT MGMT INPAT SUBQ DAY: CPT

## 2020-08-29 PROCEDURE — 74011250636 HC RX REV CODE- 250/636: Performed by: INTERNAL MEDICINE

## 2020-08-29 PROCEDURE — 36415 COLL VENOUS BLD VENIPUNCTURE: CPT

## 2020-08-29 PROCEDURE — 99291 CRITICAL CARE FIRST HOUR: CPT | Performed by: THORACIC SURGERY (CARDIOTHORACIC VASCULAR SURGERY)

## 2020-08-29 PROCEDURE — 71045 X-RAY EXAM CHEST 1 VIEW: CPT

## 2020-08-29 PROCEDURE — 85025 COMPLETE CBC W/AUTO DIFF WBC: CPT

## 2020-08-29 PROCEDURE — 77030006998

## 2020-08-29 PROCEDURE — 85384 FIBRINOGEN ACTIVITY: CPT

## 2020-08-29 PROCEDURE — 74011000250 HC RX REV CODE- 250: Performed by: INTERNAL MEDICINE

## 2020-08-29 PROCEDURE — 85379 FIBRIN DEGRADATION QUANT: CPT

## 2020-08-29 PROCEDURE — 74011250637 HC RX REV CODE- 250/637: Performed by: NURSE PRACTITIONER

## 2020-08-29 PROCEDURE — 36600 WITHDRAWAL OF ARTERIAL BLOOD: CPT

## 2020-08-29 PROCEDURE — 65610000006 HC RM INTENSIVE CARE

## 2020-08-29 PROCEDURE — 74011250637 HC RX REV CODE- 250/637: Performed by: INTERNAL MEDICINE

## 2020-08-29 PROCEDURE — 83735 ASSAY OF MAGNESIUM: CPT

## 2020-08-29 PROCEDURE — 84100 ASSAY OF PHOSPHORUS: CPT

## 2020-08-29 PROCEDURE — 82803 BLOOD GASES ANY COMBINATION: CPT

## 2020-08-29 PROCEDURE — 74018 RADEX ABDOMEN 1 VIEW: CPT

## 2020-08-29 PROCEDURE — 74011250637 HC RX REV CODE- 250/637: Performed by: HOSPITALIST

## 2020-08-29 PROCEDURE — 74011000250 HC RX REV CODE- 250: Performed by: NURSE PRACTITIONER

## 2020-08-29 PROCEDURE — 80053 COMPREHEN METABOLIC PANEL: CPT

## 2020-08-29 RX ADMIN — FENTANYL CITRATE 100 MCG: 50 INJECTION, SOLUTION INTRAMUSCULAR; INTRAVENOUS at 10:15

## 2020-08-29 RX ADMIN — DOCUSATE SODIUM 100 MG: 50 LIQUID ORAL at 10:38

## 2020-08-29 RX ADMIN — Medication 5 MG/HR: at 05:52

## 2020-08-29 RX ADMIN — POLYETHYLENE GLYCOL 3350 17 G: 17 POWDER, FOR SOLUTION ORAL at 10:38

## 2020-08-29 RX ADMIN — QUETIAPINE FUMARATE 25 MG: 25 TABLET, FILM COATED ORAL at 21:13

## 2020-08-29 RX ADMIN — CHLORHEXIDINE GLUCONATE 15 ML: 0.12 RINSE ORAL at 10:57

## 2020-08-29 RX ADMIN — FAMOTIDINE 20 MG: 40 POWDER, FOR SUSPENSION ORAL at 17:25

## 2020-08-29 RX ADMIN — Medication: at 10:57

## 2020-08-29 RX ADMIN — ONDANSETRON 4 MG: 2 INJECTION INTRAMUSCULAR; INTRAVENOUS at 07:00

## 2020-08-29 RX ADMIN — Medication 5 MG/HR: at 16:43

## 2020-08-29 RX ADMIN — KETAMINE HYDROCHLORIDE 1 MG/KG/HR: 50 INJECTION, SOLUTION INTRAMUSCULAR; INTRAVENOUS at 12:31

## 2020-08-29 RX ADMIN — Medication 10 ML: at 15:22

## 2020-08-29 RX ADMIN — CHLORDIAZEPOXIDE HYDROCHLORIDE 100 MG: 25 CAPSULE ORAL at 23:22

## 2020-08-29 RX ADMIN — KETAMINE HYDROCHLORIDE 1 MG/KG/HR: 50 INJECTION, SOLUTION INTRAMUSCULAR; INTRAVENOUS at 00:23

## 2020-08-29 RX ADMIN — Medication 6 MG/HR: at 21:47

## 2020-08-29 RX ADMIN — KETAMINE HYDROCHLORIDE 1 MG/KG/HR: 50 INJECTION, SOLUTION INTRAMUSCULAR; INTRAVENOUS at 18:19

## 2020-08-29 RX ADMIN — MIDAZOLAM HYDROCHLORIDE 18 MG/HR: 5 INJECTION, SOLUTION INTRAMUSCULAR; INTRAVENOUS at 18:30

## 2020-08-29 RX ADMIN — CHLORDIAZEPOXIDE HYDROCHLORIDE 100 MG: 25 CAPSULE ORAL at 17:25

## 2020-08-29 RX ADMIN — MIDAZOLAM HYDROCHLORIDE 18 MG/HR: 5 INJECTION, SOLUTION INTRAMUSCULAR; INTRAVENOUS at 01:02

## 2020-08-29 RX ADMIN — FAMOTIDINE 20 MG: 40 POWDER, FOR SUSPENSION ORAL at 10:38

## 2020-08-29 RX ADMIN — METOPROLOL TARTRATE 5 MG: 1 INJECTION, SOLUTION INTRAVENOUS at 19:03

## 2020-08-29 RX ADMIN — CHLORHEXIDINE GLUCONATE 15 ML: 0.12 RINSE ORAL at 21:12

## 2020-08-29 RX ADMIN — Medication 10 ML: at 21:14

## 2020-08-29 RX ADMIN — Medication 5 MG/HR: at 10:50

## 2020-08-29 RX ADMIN — KETAMINE HYDROCHLORIDE 1 MG/KG/HR: 50 INJECTION, SOLUTION INTRAMUSCULAR; INTRAVENOUS at 06:12

## 2020-08-29 RX ADMIN — Medication 10 ML: at 05:26

## 2020-08-29 RX ADMIN — DEXAMETHASONE SODIUM PHOSPHATE 4 MG: 10 INJECTION INTRAMUSCULAR; INTRAVENOUS at 10:38

## 2020-08-29 RX ADMIN — FENTANYL CITRATE 100 MCG: 50 INJECTION, SOLUTION INTRAMUSCULAR; INTRAVENOUS at 07:00

## 2020-08-29 RX ADMIN — FENTANYL CITRATE 50 MCG: 50 INJECTION, SOLUTION INTRAMUSCULAR; INTRAVENOUS at 17:25

## 2020-08-29 RX ADMIN — ENOXAPARIN SODIUM 50 MG: 60 INJECTION SUBCUTANEOUS at 10:38

## 2020-08-29 RX ADMIN — FENTANYL CITRATE 100 MCG: 50 INJECTION, SOLUTION INTRAMUSCULAR; INTRAVENOUS at 05:25

## 2020-08-29 RX ADMIN — ASPIRIN 81 MG CHEWABLE TABLET 81 MG: 81 TABLET CHEWABLE at 10:38

## 2020-08-29 RX ADMIN — MIDAZOLAM HYDROCHLORIDE 18 MG/HR: 5 INJECTION, SOLUTION INTRAMUSCULAR; INTRAVENOUS at 06:46

## 2020-08-29 RX ADMIN — Medication: at 17:34

## 2020-08-29 RX ADMIN — CHLORDIAZEPOXIDE HYDROCHLORIDE 100 MG: 25 CAPSULE ORAL at 05:25

## 2020-08-29 RX ADMIN — MIDAZOLAM HYDROCHLORIDE 18 MG/HR: 5 INJECTION, SOLUTION INTRAMUSCULAR; INTRAVENOUS at 12:30

## 2020-08-29 RX ADMIN — CHLORDIAZEPOXIDE HYDROCHLORIDE 100 MG: 25 CAPSULE ORAL at 00:26

## 2020-08-29 RX ADMIN — FENTANYL CITRATE 50 MCG: 50 INJECTION, SOLUTION INTRAMUSCULAR; INTRAVENOUS at 16:10

## 2020-08-29 RX ADMIN — Medication 5 MG/HR: at 00:27

## 2020-08-29 RX ADMIN — FENTANYL CITRATE 100 MCG: 50 INJECTION, SOLUTION INTRAMUSCULAR; INTRAVENOUS at 00:26

## 2020-08-29 RX ADMIN — DOCUSATE SODIUM 100 MG: 50 LIQUID ORAL at 17:25

## 2020-08-29 RX ADMIN — ACETAMINOPHEN ORAL SOLUTION 650 MG: 650 SOLUTION ORAL at 16:08

## 2020-08-29 RX ADMIN — ENOXAPARIN SODIUM 50 MG: 60 INJECTION SUBCUTANEOUS at 21:13

## 2020-08-29 NOTE — PROGRESS NOTES
ARDS  Acute hypoxic respiratory failure  COVID positive  Sinus pauses  S/P tracheostomy     Remains on vent    Remains off paralytics although still requiring a lot of versed    Off pressors    Not fevers    Getting TF's    Hgb and platelets look good    Creatinine normal    Pro-calcitonin normal    Bilirubin normal    Other LFTs up a bit    7.42 / 38 / 96 / 24 / 0     Vent PEEP 10, FiO2 40%    CXR - mild diffuse lung infiltrates       Intake/Output Summary (Last 24 hours) at 8/29/2020 1931  Last data filed at 8/29/2020 1900  Gross per 24 hour   Intake 3005.67 ml   Output 2080 ml   Net 925.67 ml     Visit Vitals  /90   Pulse (!) 109   Temp 100.1 °F (37.8 °C)   Resp 26   Ht 5' 11\" (1.803 m)   Wt 230 lb 9.6 oz (104.6 kg)   SpO2 96%   BMI 32.16 kg/m²       Risk of morbidity and mortality - high  Medical decision making - high complexity    Total critical care time - 30 minutes (CPT 53869)     I personally spent the above critical care time. This is time spent at this critically ill patient's bedside / unit / floor actively involved in patient care as well as the coordination of care and discussions with the patient's family. This does not include any procedural time which has been billed separately.

## 2020-08-29 NOTE — PROGRESS NOTES
[facility]    ICU TEAM Progress Note    Name: Fatemeh Prince   : 1990   MRN: [id number]   Date: 2020      I  Subjective:   Progress Note: 2020      Reason for ICU Admission: Respiratory failure due to COVID-19 infection    Interval history:  77-year-old male with no history of significant past medical history except smoking half pack per day. Rosa Tolbert was admitted on 2020 at [facility] with acute hypoxemic respiratory failure from COVID pneumonia. Rosa Tolbert continued to deteriorate and got intubated on 8/10.  Due to worsening hypoxemia, he is transferred to [facility] for ECMO evaluation. Rosa Tolbert has bee given convalescent plasma twice on  and  and treated with Remdesivir which was completed on 8/10. Rosa Tolbert is also on dexamethasone. Rosa Tolbert completed course of empiric antibiotics including azithromycin which was completed on  and cefepime was completed on .  During his hospital course he also developed pneumothorax and has left-sided chest tube since . Has a tracheostomy tube placed on     Overnight Events:   No acute event overnight, no fever no hemodynamic instability other than occasional tachycardia, tolerating tube feeding, remained off paralytics but heavily sedated. Active Problem List:     Problem List  Date Reviewed: 2020          Codes Class    Acute respiratory failure ([facility] [address] ICD-10-CM: J96.00  ICD-9-CM: 518.81         Pneumothorax on left ICD-10-CM: J93.9  ICD-9-CM: 512.89         Pneumonia due to severe acute respiratory syndrome coronavirus 2 (SARS-CoV-2) ICD-10-CM: U07.1, J12.89  ICD-9-CM: 480.8         Respiratory failure with hypoxia (HCC) ICD-10-CM: J96.91  ICD-9-CM: 518.81               Past Medical History:      has a past medical history of History of vascular access device (08/10/2020).     Past Surgical History:      has a past surgical history that includes ir thora/ins chest tube(pneumo) wo image (2020); ir thora/ins chest tube(pneumo) wo image (2020); and pr tracheostomy, planned (2020). Home Medications:     Prior to Admission medications    Medication Sig Start Date End Date Taking? Authorizing Provider   benzonatate (Tessalon Perles) 100 mg capsule Take 100 mg by mouth three (3) times daily as needed for Cough. Provider, Historical       Allergies/Social/Family History:     No Known Allergies   Social History     Tobacco Use    Smoking status: Current Some Day Smoker    Smokeless tobacco: Never Used   Substance Use Topics    Alcohol use: Not on file      History reviewed. No pertinent family history. Review of Systems:     Not able to obtain due to his medical condition    Objective:   Vital Signs:  Visit Vitals  /65   Pulse (!) 118   Temp 99.8 °F (37.7 °C)   Resp 26   Ht 5' 11\" (1.803 m)   Wt 104.6 kg (230 lb 9.6 oz)   SpO2 96%   BMI 32.16 kg/m²      O2 Device: Tracheostomy Temp (24hrs), Av.6 °F (37.6 °C), Min:98.9 °F (37.2 °C), Max:101.2 °F (38.4 °C)           Intake/Output:     Intake/Output Summary (Last 24 hours) at 2020 1419  Last data filed at 2020 1251  Gross per 24 hour   Intake 3756.67 ml   Output 3555 ml   Net 201.67 ml       Physical Exam:    General:   Sedated intubated on mechanical ventilation via tracheostomy   Eyes:  Sclera anicteric. Pupils equally round and reactive to light. Mouth/Throat: Mucous membranes normal, oral pharynx clear   Neck: Supple   Lungs:    Good air entry bilaterally, fine crepitation   CV:  Regular rate and rhythm,no murmur, click, rub or gallop   Abdomen:   Soft, non-tender.  bowel sounds normal. non-distended   Extremities: No cyanosis or edema   Skin: Skin color, texture, turgor normal. no acute rash or lesions   Lymph nodes: Cervical and supraclavicular normal   Musculoskeletal: No swelling or deformity   Lines/Devices:  Intact, no erythema, drainage or tenderness   Psych:  Sedated, withdraws to painful stimuli       LABS AND DATA: Personally reviewed  Recent Labs     08/29/20  0601 08/28/20  0523   WBC 5.1 5.4   HGB 8.3* 8.8*   HCT 26.5* 27.7*    172     Recent Labs     08/29/20  0601 08/28/20  0523    139   K 2.8* 3.0*   * 106   CO2 22 28   BUN 13 18   CREA 0.42* 0.43*   * 97   CA 7.2* 7.9*   MG 1.4* 1.9   PHOS 2.4* 3.3     Recent Labs     08/29/20  0601 08/28/20  0523   * 127*   TP 5.7* 6.3*   ALB 1.8* 2.1*   GLOB 3.9 4.2*     No results for input(s): INR, PTP, APTT, INREXT in the last 72 hours. Recent Labs     08/29/20  0631 08/27/20  0512   PHI 7.42 7.52*   PCO2I 38.0 34.8*   PO2I 96 72*   FIO2I 40 50     No results for input(s): CPK, CKMB, TROIQ, BNPP in the last 72 hours. Hemodynamics:   PAP:   CO:     Wedge:   CI:     CVP:    SVR:       PVR:       Ventilator Settings:  Mode Rate Tidal Volume Pressure FiO2 PEEP   Assist control, Pressure control   0 ml  0 cm H2O 40 % 10 cm H20     Peak airway pressure: 36 cm H2O    Minute ventilation: 11.7 l/min        MEDS: Reviewed    Chest X-Ray:  CXR Results  (Last 48 hours)               08/29/20 0945  XR CHEST PORT Final result    Impression:  IMPRESSION:   Slight increase in lung volumes/inspiratory effort but stable diffuse lung   infiltrates left greater than right moderately severe. Lines and tubes stable. .    . Narrative:  INDICATION:  possible aspiration        EXAM: Chest single view. COMPARISON: Earlier same day. FINDINGS: A single frontal view of the chest at 0949 hours shows slight increase   in lung volumes/inspiratory effort but with stable diffuse lung infiltrates left   greater than right. Tracheostomy device, feeding tube and left PICC line all   stable. No pneumothorax. .  The heart, mediastinum and pulmonary vasculature are   stable . The bony thorax is unremarkable for age. Roya Ontiveros 08/29/20 0524  XR CHEST PORT Final result    Impression:  IMPRESSION:   Stable diffuse lung infiltrates and poor inspiration. .  . Narrative:  INDICATION:  Tube Placement        EXAM: Chest single view. COMPARISON: 8/28/2020. FINDINGS: A single frontal view of the chest at 03 50 hours shows poor   inspiratory effort with diffuse lung infiltrates similar to the prior study. .    The heart, mediastinum and pulmonary vasculature are stable . The bony thorax   is unremarkable for age. . Tracheostomy device and feeding tube are stable. 08/28/20 0522  XR CHEST PORT Final result    Impression:  IMPRESSION:       Stable bilateral interstitial and patchy airspace opacities. Narrative:  EXAM:  XR CHEST PORT       INDICATION: Bilateral lung opacities. COMPARISON: 8/27/2020 at 0449 hours       TECHNIQUE: Portable AP semiupright chest view at 0446 hours       FINDINGS: The tracheostomy tube, left PICC, and enteric tube are stable. Cardiac   monitoring wires overlie the thorax. The cardiomediastinal contours are stable. There are stable bilateral interstitial and patchy airspace opacities. There is   no pleural effusion or pneumothorax. The bones and upper abdomen are stable. A   left upper abdomen catheter is noted. Assessment and Plan:   -Bilateral pneumonia due to COVID-19 infection  - Acute hypoxic respiratory failure due to COVID-19 infection status post tracheostomy on August 26  - Pneumothorax status post left chest tube insertion on August 11 without air leak  - Possible bacterial super imposed infection: Completed antibiotic course  -History of tobacco use    - Continue ventilator management, currently at PEEP of 10 and FiO2 40% with driving pressure of 24, will wean as tolerated.   -Sedation management, R AAS is goal -1- 0 to stay synchronous with the ventilator  - Completed COVID-19 treatment [antiviral, steroid, convalescent plasma]  -Nutritional support  - DVT and GI prophylaxis [, with hypercoagulable status dose] and ventilator bundle    DISPOSITION  Stay in ICU    CRITICAL CARE CONSULTANT NOTE  I had a face to face encounter with the patient, reviewed and interpreted patient data including clinical events, labs, images, vital signs, I/O's, and examined patient. I have discussed the case and the plan and management of the patient's care with the consulting services, the bedside nurses and the respiratory therapist.      NOTE OF PERSONAL INVOLVEMENT IN CARE   This patient has a high probability of imminent, clinically significant deterioration, which requires the highest level of preparedness to intervene urgently. I participated in the decision-making and personally managed or directed the management of the following life and organ supporting interventions that required my frequent assessment to treat or prevent imminent deterioration. I personally spent 40 minutes of critical care time. This is time spent at this critically ill patient's bedside actively involved in patient care as well as the coordination of care and discussions with the patient's family. This does not include any procedural time which has been billed separately. Mony Fish M.D.   Staff Intensivist/Pulmonologist  [facility]  8/29/2020

## 2020-08-30 ENCOUNTER — APPOINTMENT (OUTPATIENT)
Dept: GENERAL RADIOLOGY | Age: 30
DRG: 004 | End: 2020-08-30
Attending: NURSE PRACTITIONER
Payer: COMMERCIAL

## 2020-08-30 LAB
ALBUMIN SERPL-MCNC: 2 G/DL (ref 3.5–5)
ALBUMIN/GLOB SERPL: 0.4 {RATIO} (ref 1.1–2.2)
ALP SERPL-CCNC: 128 U/L (ref 45–117)
ALT SERPL-CCNC: 185 U/L (ref 12–78)
ANION GAP SERPL CALC-SCNC: 5 MMOL/L (ref 5–15)
ARTERIAL PATENCY WRIST A: ABNORMAL
AST SERPL-CCNC: 52 U/L (ref 15–37)
BASE EXCESS BLD CALC-SCNC: 0 MMOL/L
BASOPHILS # BLD: 0 K/UL (ref 0–0.1)
BASOPHILS NFR BLD: 0 % (ref 0–1)
BDY SITE: ABNORMAL
BILIRUB SERPL-MCNC: 0.6 MG/DL (ref 0.2–1)
BUN SERPL-MCNC: 14 MG/DL (ref 6–20)
BUN/CREAT SERPL: 39 (ref 12–20)
CA-I BLD-SCNC: 1.22 MMOL/L (ref 1.12–1.32)
CALCIUM SERPL-MCNC: 8.5 MG/DL (ref 8.5–10.1)
CHLORIDE SERPL-SCNC: 109 MMOL/L (ref 97–108)
CO2 SERPL-SCNC: 27 MMOL/L (ref 21–32)
CREAT SERPL-MCNC: 0.36 MG/DL (ref 0.7–1.3)
D DIMER PPP FEU-MCNC: 3.41 MG/L FEU (ref 0–0.65)
DIFFERENTIAL METHOD BLD: ABNORMAL
EOSINOPHIL # BLD: 0.3 K/UL (ref 0–0.4)
EOSINOPHIL NFR BLD: 4 % (ref 0–7)
ERYTHROCYTE [DISTWIDTH] IN BLOOD BY AUTOMATED COUNT: 14.7 % (ref 11.5–14.5)
FIBRINOGEN PPP-MCNC: 761 MG/DL (ref 200–475)
GAS FLOW.O2 O2 DELIVERY SYS: ABNORMAL L/MIN
GAS FLOW.O2 SETTING OXYMISER: 26 BPM
GLOBULIN SER CALC-MCNC: 4.5 G/DL (ref 2–4)
GLUCOSE SERPL-MCNC: 125 MG/DL (ref 65–100)
HCO3 BLD-SCNC: 24.3 MMOL/L (ref 22–26)
HCT VFR BLD AUTO: 28.2 % (ref 36.6–50.3)
HGB BLD-MCNC: 8.8 G/DL (ref 12.1–17)
IMM GRANULOCYTES # BLD AUTO: 0 K/UL
IMM GRANULOCYTES NFR BLD AUTO: 0 %
INSPIRATION.DURATION SETTING TIME VENT: 0.77 SEC
LYMPHOCYTES # BLD: 1 K/UL (ref 0.8–3.5)
LYMPHOCYTES NFR BLD: 16 % (ref 12–49)
MAGNESIUM SERPL-MCNC: 1.8 MG/DL (ref 1.6–2.4)
MCH RBC QN AUTO: 29.3 PG (ref 26–34)
MCHC RBC AUTO-ENTMCNC: 31.2 G/DL (ref 30–36.5)
MCV RBC AUTO: 94 FL (ref 80–99)
METAMYELOCYTES NFR BLD MANUAL: 1 %
MONOCYTES # BLD: 0.3 K/UL (ref 0–1)
MONOCYTES NFR BLD: 5 % (ref 5–13)
MYELOCYTES NFR BLD MANUAL: 1 %
NEUTS BAND NFR BLD MANUAL: 2 % (ref 0–6)
NEUTS SEG # BLD: 4.6 K/UL (ref 1.8–8)
NEUTS SEG NFR BLD: 71 % (ref 32–75)
NRBC # BLD: 0 K/UL (ref 0–0.01)
NRBC BLD-RTO: 0 PER 100 WBC
O2/TOTAL GAS SETTING VFR VENT: 40 %
PCO2 BLD: 36.1 MMHG (ref 35–45)
PEEP RESPIRATORY: 10 CMH2O
PH BLD: 7.44 [PH] (ref 7.35–7.45)
PHOSPHATE SERPL-MCNC: 3 MG/DL (ref 2.6–4.7)
PIP ISTAT,IPIP: 24
PLATELET # BLD AUTO: 199 K/UL (ref 150–400)
PMV BLD AUTO: 10 FL (ref 8.9–12.9)
PO2 BLD: 77 MMHG (ref 80–100)
POTASSIUM SERPL-SCNC: 3.1 MMOL/L (ref 3.5–5.1)
PROCALCITONIN SERPL-MCNC: 0.22 NG/ML
PROT SERPL-MCNC: 6.5 G/DL (ref 6.4–8.2)
RBC # BLD AUTO: 3 M/UL (ref 4.1–5.7)
RBC MORPH BLD: ABNORMAL
RBC MORPH BLD: ABNORMAL
SAO2 % BLD: 96 % (ref 92–97)
SODIUM SERPL-SCNC: 141 MMOL/L (ref 136–145)
SPECIMEN TYPE: ABNORMAL
TOTAL RESP. RATE, ITRR: 26
VENTILATION MODE VENT: ABNORMAL
WBC # BLD AUTO: 6.3 K/UL (ref 4.1–11.1)

## 2020-08-30 PROCEDURE — 36592 COLLECT BLOOD FROM PICC: CPT

## 2020-08-30 PROCEDURE — 77030018798 HC PMP KT ENTRL FED COVD -A

## 2020-08-30 PROCEDURE — 82803 BLOOD GASES ANY COMBINATION: CPT

## 2020-08-30 PROCEDURE — 74011250636 HC RX REV CODE- 250/636: Performed by: INTERNAL MEDICINE

## 2020-08-30 PROCEDURE — 71045 X-RAY EXAM CHEST 1 VIEW: CPT

## 2020-08-30 PROCEDURE — 74011250637 HC RX REV CODE- 250/637: Performed by: INTERNAL MEDICINE

## 2020-08-30 PROCEDURE — 36415 COLL VENOUS BLD VENIPUNCTURE: CPT

## 2020-08-30 PROCEDURE — 85379 FIBRIN DEGRADATION QUANT: CPT

## 2020-08-30 PROCEDURE — 74011250637 HC RX REV CODE- 250/637: Performed by: NURSE PRACTITIONER

## 2020-08-30 PROCEDURE — 85384 FIBRINOGEN ACTIVITY: CPT

## 2020-08-30 PROCEDURE — 36600 WITHDRAWAL OF ARTERIAL BLOOD: CPT

## 2020-08-30 PROCEDURE — 74011250637 HC RX REV CODE- 250/637: Performed by: HOSPITALIST

## 2020-08-30 PROCEDURE — 74011250636 HC RX REV CODE- 250/636: Performed by: NURSE PRACTITIONER

## 2020-08-30 PROCEDURE — 65610000006 HC RM INTENSIVE CARE

## 2020-08-30 PROCEDURE — 85025 COMPLETE CBC W/AUTO DIFF WBC: CPT

## 2020-08-30 PROCEDURE — 84145 PROCALCITONIN (PCT): CPT

## 2020-08-30 PROCEDURE — 74011000250 HC RX REV CODE- 250: Performed by: INTERNAL MEDICINE

## 2020-08-30 PROCEDURE — 84100 ASSAY OF PHOSPHORUS: CPT

## 2020-08-30 PROCEDURE — 80053 COMPREHEN METABOLIC PANEL: CPT

## 2020-08-30 PROCEDURE — 83735 ASSAY OF MAGNESIUM: CPT

## 2020-08-30 PROCEDURE — 74011000258 HC RX REV CODE- 258: Performed by: INTERNAL MEDICINE

## 2020-08-30 PROCEDURE — 99291 CRITICAL CARE FIRST HOUR: CPT | Performed by: THORACIC SURGERY (CARDIOTHORACIC VASCULAR SURGERY)

## 2020-08-30 PROCEDURE — 94003 VENT MGMT INPAT SUBQ DAY: CPT

## 2020-08-30 RX ORDER — POTASSIUM CHLORIDE 29.8 MG/ML
20 INJECTION INTRAVENOUS
Status: COMPLETED | OUTPATIENT
Start: 2020-08-30 | End: 2020-08-30

## 2020-08-30 RX ADMIN — ACETAMINOPHEN ORAL SOLUTION 650 MG: 650 SOLUTION ORAL at 06:21

## 2020-08-30 RX ADMIN — QUETIAPINE FUMARATE 25 MG: 25 TABLET, FILM COATED ORAL at 21:00

## 2020-08-30 RX ADMIN — Medication 10 ML: at 15:09

## 2020-08-30 RX ADMIN — Medication 5 MG/HR: at 11:31

## 2020-08-30 RX ADMIN — ONDANSETRON 4 MG: 2 INJECTION INTRAMUSCULAR; INTRAVENOUS at 06:39

## 2020-08-30 RX ADMIN — CHLORHEXIDINE GLUCONATE 15 ML: 0.12 RINSE ORAL at 09:32

## 2020-08-30 RX ADMIN — CHLORHEXIDINE GLUCONATE 15 ML: 0.12 RINSE ORAL at 20:55

## 2020-08-30 RX ADMIN — KETAMINE HYDROCHLORIDE 1 MG/KG/HR: 50 INJECTION, SOLUTION INTRAMUSCULAR; INTRAVENOUS at 07:18

## 2020-08-30 RX ADMIN — DOCUSATE SODIUM 100 MG: 50 LIQUID ORAL at 17:06

## 2020-08-30 RX ADMIN — MIDAZOLAM HYDROCHLORIDE 18 MG/HR: 5 INJECTION, SOLUTION INTRAMUSCULAR; INTRAVENOUS at 07:02

## 2020-08-30 RX ADMIN — Medication 5 MG/HR: at 06:21

## 2020-08-30 RX ADMIN — POTASSIUM CHLORIDE 20 MEQ: 29.8 INJECTION, SOLUTION INTRAVENOUS at 09:52

## 2020-08-30 RX ADMIN — DEXAMETHASONE SODIUM PHOSPHATE 4 MG: 10 INJECTION INTRAMUSCULAR; INTRAVENOUS at 09:24

## 2020-08-30 RX ADMIN — Medication 4 MG/HR: at 21:31

## 2020-08-30 RX ADMIN — KETAMINE HYDROCHLORIDE 1 MG/KG/HR: 50 INJECTION, SOLUTION INTRAMUSCULAR; INTRAVENOUS at 01:08

## 2020-08-30 RX ADMIN — CHLORDIAZEPOXIDE HYDROCHLORIDE 100 MG: 25 CAPSULE ORAL at 17:06

## 2020-08-30 RX ADMIN — ACETAMINOPHEN ORAL SOLUTION 650 MG: 650 SOLUTION ORAL at 02:14

## 2020-08-30 RX ADMIN — POTASSIUM CHLORIDE 20 MEQ: 29.8 INJECTION, SOLUTION INTRAVENOUS at 09:51

## 2020-08-30 RX ADMIN — CHLORDIAZEPOXIDE HYDROCHLORIDE 100 MG: 25 CAPSULE ORAL at 11:13

## 2020-08-30 RX ADMIN — Medication 5 MG/HR: at 17:18

## 2020-08-30 RX ADMIN — MIDAZOLAM HYDROCHLORIDE 15 MG/HR: 5 INJECTION, SOLUTION INTRAMUSCULAR; INTRAVENOUS at 17:20

## 2020-08-30 RX ADMIN — FAMOTIDINE 20 MG: 40 POWDER, FOR SUSPENSION ORAL at 17:06

## 2020-08-30 RX ADMIN — MIDAZOLAM HYDROCHLORIDE 18 MG/HR: 5 INJECTION, SOLUTION INTRAMUSCULAR; INTRAVENOUS at 00:38

## 2020-08-30 RX ADMIN — KETAMINE HYDROCHLORIDE 1 MG/KG/HR: 50 INJECTION, SOLUTION INTRAMUSCULAR; INTRAVENOUS at 19:04

## 2020-08-30 RX ADMIN — ACETAMINOPHEN ORAL SOLUTION 650 MG: 650 SOLUTION ORAL at 21:29

## 2020-08-30 RX ADMIN — Medication 10 ML: at 21:01

## 2020-08-30 RX ADMIN — POLYETHYLENE GLYCOL 3350 17 G: 17 POWDER, FOR SOLUTION ORAL at 09:23

## 2020-08-30 RX ADMIN — KETAMINE HYDROCHLORIDE 1 MG/KG/HR: 50 INJECTION, SOLUTION INTRAMUSCULAR; INTRAVENOUS at 13:08

## 2020-08-30 RX ADMIN — ENOXAPARIN SODIUM 50 MG: 60 INJECTION SUBCUTANEOUS at 21:01

## 2020-08-30 RX ADMIN — ASPIRIN 81 MG CHEWABLE TABLET 81 MG: 81 TABLET CHEWABLE at 09:23

## 2020-08-30 RX ADMIN — Medication: at 09:53

## 2020-08-30 RX ADMIN — ENOXAPARIN SODIUM 50 MG: 60 INJECTION SUBCUTANEOUS at 09:24

## 2020-08-30 RX ADMIN — Medication: at 17:11

## 2020-08-30 RX ADMIN — FAMOTIDINE 20 MG: 40 POWDER, FOR SUSPENSION ORAL at 09:23

## 2020-08-30 RX ADMIN — ACETAMINOPHEN ORAL SOLUTION 650 MG: 650 SOLUTION ORAL at 17:33

## 2020-08-30 RX ADMIN — Medication 10 ML: at 05:13

## 2020-08-30 RX ADMIN — Medication 6 MG/HR: at 02:05

## 2020-08-30 RX ADMIN — CHLORDIAZEPOXIDE HYDROCHLORIDE 100 MG: 25 CAPSULE ORAL at 05:13

## 2020-08-30 RX ADMIN — DOCUSATE SODIUM 100 MG: 50 LIQUID ORAL at 09:23

## 2020-08-30 RX ADMIN — CHLORDIAZEPOXIDE HYDROCHLORIDE 100 MG: 25 CAPSULE ORAL at 23:54

## 2020-08-30 NOTE — PROGRESS NOTES
Bedside and Verbal shift change report given to [address], RN (oncoming nurse) by Nicolas Welch RN (offgoing nurse). Report included the following information SBAR, Kardex, Intake/Output, MAR, Recent Results, Cardiac Rhythm ST and Alarm Parameters .

## 2020-08-30 NOTE — PROGRESS NOTES
1930: Bedside shift change report given to [address] [place] [address] (oncoming nurse) by Brijesh Sutton RN (offgoing nurse). Report included the following information SBAR, Kardex, Intake/Output, MAR, Recent Results, Cardiac Rhythm NSR/sinus tach and Alarm Parameters . 2210: Updated mother over the phone, response appropriate and appreciative. 0200: Bladder temp 100.5. Ice packs placed on groin and armpits and given prn tylenol. 0124: After performing mouth care pt vomited large amnt of yellow/tan emesis. Tube feeds held and pt given prn zofran. 0730: Bedside shift change report given to Antonio Vallejo (oncoming nurse) by [address] [place] [address] (offgoing nurse). Report included the following information SBAR, Kardex, Intake/Output, MAR, Recent Results, Cardiac Rhythm sinus tach and Alarm Parameters .

## 2020-08-30 NOTE — PROGRESS NOTES
ARDS  Acute hypoxic respiratory failure  COVID positive  Sinus pauses  S/P tracheostomy     Remains on vent    Off paralytics    Still trying to wean off Versed    Remains on Ketamine / Versed     Remains off pressors    No fevers    Getting TF's    WBCs normal    Hgb and platelets look good    Creatinine normal    Bilirubin and other LFTs up a bit    Pro-calcitonin normal    7.44 / 36 / 77 / 24 / 0     PEEP 10, FiO2 40%    Continues on steroids    Continues on Lovenox    Continues on Librium / Seroquel    CXR - mild improvement in diffuse interstitial disease    Discussed with ICU attending       Intake/Output Summary (Last 24 hours) at 8/30/2020 1232  Last data filed at 8/30/2020 1230  Gross per 24 hour   Intake 4968.4 ml   Output 1715 ml   Net 3253.4 ml     Visit Vitals  /57   Pulse (!) 109   Temp 98.5 °F (36.9 °C)   Resp 26   Ht 5' 11\" (1.803 m)   Wt 228 lb 6.3 oz (103.6 kg)   SpO2 91%   BMI 31.85 kg/m²       Risk of morbidity and mortality - high  Medical decision making - high complexity    Total critical care time - 30 minutes (CPT 84380)     I personally spent the above critical care time. This is time spent at this critically ill patient's bedside / unit / floor actively involved in patient care as well as the coordination of care and discussions with the patient's family. This does not include any procedural time which has been billed separately.

## 2020-08-30 NOTE — PROGRESS NOTES
[facility]    ICU TEAM Progress Note    Name: Julio Mojica   : 1990   MRN: [id number]   Date: 2020      I  Subjective:   Progress Note: 2020      Reason for ICU Admission: Respiratory failure due to COVID-19 infection    Interval history:  27-year-old male with no history of significant past medical history except smoking half pack per day. Pauline Garcia was admitted on 2020 at [facility] with acute hypoxemic respiratory failure from COVID pneumonia. Pauline Garcia continued to deteriorate and got intubated on 8/10.  Due to worsening hypoxemia, he is transferred to [facility] for ECMO evaluation. Pauline Garcia has bee given convalescent plasma twice on  and  and treated with Remdesivir which was completed on 8/10. Pauline Garcia is also on dexamethasone. Pauline Garcia completed course of empiric antibiotics including azithromycin which was completed on  and cefepime was completed on .  During his hospital course he also developed pneumothorax and has left-sided chest tube since . Has a tracheostomy tube placed on     Overnight Events:   One episode of vomiting. Otherwise no acute event, no increasing oxygen requirement on the ventilator, remained on ketamine Versed and Dilaudid for sedation reported by nurses to be following simple command at times despite sedation    Active Problem List:     Problem List  Date Reviewed: 2020          Codes Class    Acute respiratory failure ([facility] [address] ICD-10-CM: J96.00  ICD-9-CM: 518.81         Pneumothorax on left ICD-10-CM: J93.9  ICD-9-CM: 512.89         Pneumonia due to severe acute respiratory syndrome coronavirus 2 (SARS-CoV-2) ICD-10-CM: U07.1, J12.89  ICD-9-CM: 480.8         Respiratory failure with hypoxia (HCC) ICD-10-CM: J96.91  ICD-9-CM: 518.81               Past Medical History:      has a past medical history of History of vascular access device (08/10/2020).     Past Surgical History:      has a past surgical history that includes ir thora/ins chest tube(pneumo) wo image (2020); ir thora/ins chest tube(pneumo) wo image (2020); and pr tracheostomy, planned (2020). Home Medications:     Prior to Admission medications    Medication Sig Start Date End Date Taking? Authorizing Provider   benzonatate (Tessalon Perles) 100 mg capsule Take 100 mg by mouth three (3) times daily as needed for Cough. Provider, Historical       Allergies/Social/Family History:     No Known Allergies   Social History     Tobacco Use    Smoking status: Current Some Day Smoker    Smokeless tobacco: Never Used   Substance Use Topics    Alcohol use: Not on file      History reviewed. No pertinent family history. Review of Systems:     Not able to obtain due to his medical condition    Objective:   Vital Signs:  Visit Vitals  /52   Pulse (!) 105   Temp (!) 100.6 °F (38.1 °C)   Resp 26   Ht 5' 11\" (1.803 m)   Wt 103.6 kg (228 lb 6.3 oz)   SpO2 95%   BMI 31.85 kg/m²      O2 Device: Tracheostomy, Ventilator Temp (24hrs), Av °F (37.8 °C), Min:98.3 °F (36.8 °C), Max:100.7 °F (38.2 °C)           Intake/Output:     Intake/Output Summary (Last 24 hours) at 2020 0843  Last data filed at 2020 0700  Gross per 24 hour   Intake 4737.4 ml   Output 1890 ml   Net 2847.4 ml       Physical Exam:  General:   Sedated intubated on mechanical ventilation via tracheostomy   Eyes:  Sclera anicteric. Pupils equally round and reactive to light. Mouth/Throat: Mucous membranes normal, oral pharynx clear   Neck: Supple   Lungs:    Good air entry bilaterally, fine crepitation   CV:  Regular rate and rhythm,no murmur, click, rub or gallop   Abdomen:   Soft, non-tender.  bowel sounds normal. non-distended   Extremities: No cyanosis or edema   Skin: Skin color, texture, turgor normal. no acute rash or lesions   Lymph nodes: Cervical and supraclavicular normal   Musculoskeletal: No swelling or deformity   Lines/Devices:  Intact, no erythema, drainage or tenderness   Psych:  Sedated, withdraws to painful stimuli, opens eyes spontaneously at times         LABS AND  DATA: Personally reviewed  Recent Labs     08/30/20  0224 08/29/20  0601   WBC 6.3 5.1   HGB 8.8* 8.3*   HCT 28.2* 26.5*    169     Recent Labs     08/30/20  0224 08/29/20  0601    144   K 3.1* 2.8*   * 114*   CO2 27 22   BUN 14 13   CREA 0.36* 0.42*   * 112*   CA 8.5 7.2*   MG 1.8 1.4*   PHOS 3.0 2.4*     Recent Labs     08/30/20  0224 08/29/20  0601   * 136*   TP 6.5 5.7*   ALB 2.0* 1.8*   GLOB 4.5* 3.9     No results for input(s): INR, PTP, APTT, INREXT in the last 72 hours. Recent Labs     08/30/20  0349 08/29/20  0631   PHI 7.44 7.42   PCO2I 36.1 38.0   PO2I 77* 96   FIO2I 40 40     No results for input(s): CPK, CKMB, TROIQ, BNPP in the last 72 hours. Hemodynamics:   PAP:   CO:     Wedge:   CI:     CVP:    SVR:       PVR:       Ventilator Settings:  Mode Rate Tidal Volume Pressure FiO2 PEEP   Assist control, Pressure control   2 ml  0 cm H2O 40 % 10 cm H20     Peak airway pressure: 35 cm H2O    Minute ventilation: 11 l/min        MEDS: Reviewed    Chest X-Ray:  CXR Results  (Last 48 hours)               08/30/20 0446  XR CHEST PORT Final result    Impression:  IMPRESSION: Stable diffuse bilateral airspace disease. Narrative:  EXAM: XR CHEST PORT       INDICATION: Tube Placement       COMPARISON: Prior day       FINDINGS: A portable AP radiograph of the chest was obtained at 0403 hours. The   patient is on a cardiac monitor. A tracheostomy tube is in place. The Dobbhoff   tip is in the distal stomach. Diffuse patchy bilateral airspace disease is   unchanged. A PICC line terminates at the caval atrial junction. 08/29/20 0945  XR CHEST PORT Final result    Impression:  IMPRESSION:   Slight increase in lung volumes/inspiratory effort but stable diffuse lung   infiltrates left greater than right moderately severe. Lines and tubes stable. .    . Narrative:  INDICATION:  possible aspiration        EXAM: Chest single view. COMPARISON: Earlier same day. FINDINGS: A single frontal view of the chest at 0949 hours shows slight increase   in lung volumes/inspiratory effort but with stable diffuse lung infiltrates left   greater than right. Tracheostomy device, feeding tube and left PICC line all   stable. No pneumothorax. .  The heart, mediastinum and pulmonary vasculature are   stable . The bony thorax is unremarkable for age. Demetra Fountain 08/29/20 0524  XR CHEST PORT Final result    Impression:  IMPRESSION:   Stable diffuse lung infiltrates and poor inspiration. .  . Narrative:  INDICATION:  Tube Placement        EXAM: Chest single view. COMPARISON: 8/28/2020. FINDINGS: A single frontal view of the chest at 03 50 hours shows poor   inspiratory effort with diffuse lung infiltrates similar to the prior study. .    The heart, mediastinum and pulmonary vasculature are stable . The bony thorax   is unremarkable for age. . Tracheostomy device and feeding tube are stable. Chest x-ray reviewed and agree with report as above        Assessment and Plan:   -Bilateral pneumonia due to COVID-19 infection  - Acute hypoxic respiratory failure due to COVID-19 infection status post tracheostomy on August 26  - Pneumothorax status post left chest tube insertion on August 11 without air leak  - Possible bacterial super imposed infection: Completed antibiotic course  -History of tobacco use     - Continue ventilator management, currently at PEEP of 10 and FiO2 40% with driving pressure of 24, will wean as tolerated. Latest PO2 on current setting was 77  -Sedation management, R AAS is goal -1- 0 to stay synchronous with the ventilator.   We will start by weaning midazolam as tolerated  - Completed COVID-19 treatment [antiviral, steroid, convalescent plasma]  -Nutritional support  - DVT and GI prophylaxis [, with hypercoagulable status dose] and ventilator bundle     Overall patient seems to be improving slowly. Still requiring high dose of sedation and high PEEP. DISPOSITION  Stay in ICU    CRITICAL CARE CONSULTANT NOTE  I had a face to face encounter with the patient, reviewed and interpreted patient data including clinical events, labs, images, vital signs, I/O's, and examined patient. I have discussed the case and the plan and management of the patient's care with the consulting services, the bedside nurses and the respiratory therapist.      NOTE OF PERSONAL INVOLVEMENT IN CARE   This patient has a high probability of imminent, clinically significant deterioration, which requires the highest level of preparedness to intervene urgently. I participated in the decision-making and personally managed or directed the management of the following life and organ supporting interventions that required my frequent assessment to treat or prevent imminent deterioration. I personally spent 40 minutes of critical care time. This is time spent at this critically ill patient's bedside actively involved in patient care as well as the coordination of care and discussions with the patient's family. This does not include any procedural time which has been billed separately. Irma Davalos M.D.   Staff Intensivist/Pulmonologist  [facility]  8/30/2020

## 2020-08-30 NOTE — PROGRESS NOTES
1930: Bedside shift change report given to [address] [place] [address] (oncoming nurse) by [address] [place] [address] [address] RN (offgoing nurse). Report included the following information SBAR, Kardex, Intake/Output, MAR, Recent Results, Cardiac Rhythm sinus tach and Alarm Parameters . 0730: Bedside shift change report given to Castro Kinsey RN (oncoming nurse) by [address] [place] [address] (offgoing nurse). Report included the following information SBAR, Kardex, Intake/Output, MAR, Recent Results, Cardiac Rhythm sinus tach and Alarm Parameters .

## 2020-08-30 NOTE — PROGRESS NOTES
Bedside and Verbal shift change report given to Faith Lesches, RN (oncoming nurse) by Nelly Bower RN (offgoing nurse). Report included the following information SBAR, Kardex, Intake/Output, MAR, Recent Results, Cardiac Rhythm ST  and Alarm Parameters . Primary Nurse Conor Barbosa RN and Candance Harold, RN performed a dual skin assessment on this patient No impairment noted  Enrique score is 13     Bedside and Verbal shift change report given to CATINA King (oncoming nurse) by Faith Lesches, RN (offgoing nurse). Report included the following information SBAR, Kardex, Intake/Output, MAR, Recent Results, Cardiac Rhythm ST and Alarm Parameters .

## 2020-08-31 ENCOUNTER — APPOINTMENT (OUTPATIENT)
Dept: GENERAL RADIOLOGY | Age: 30
DRG: 004 | End: 2020-08-31
Attending: NURSE PRACTITIONER
Payer: COMMERCIAL

## 2020-08-31 LAB
ALBUMIN SERPL-MCNC: 2 G/DL (ref 3.5–5)
ALBUMIN/GLOB SERPL: 0.4 {RATIO} (ref 1.1–2.2)
ALP SERPL-CCNC: 188 U/L (ref 45–117)
ALT SERPL-CCNC: 326 U/L (ref 12–78)
ANION GAP SERPL CALC-SCNC: 7 MMOL/L (ref 5–15)
ARTERIAL PATENCY WRIST A: YES
AST SERPL-CCNC: 148 U/L (ref 15–37)
BASE EXCESS BLD CALC-SCNC: 2 MMOL/L
BASOPHILS # BLD: 0 K/UL (ref 0–0.1)
BASOPHILS NFR BLD: 0 % (ref 0–1)
BDY SITE: ABNORMAL
BILIRUB SERPL-MCNC: 1.4 MG/DL (ref 0.2–1)
BODY TEMPERATURE: 100.1
BUN SERPL-MCNC: 15 MG/DL (ref 6–20)
BUN/CREAT SERPL: 37 (ref 12–20)
CA-I BLD-SCNC: 1.22 MMOL/L (ref 1.12–1.32)
CALCIUM SERPL-MCNC: 8.4 MG/DL (ref 8.5–10.1)
CHLORIDE SERPL-SCNC: 105 MMOL/L (ref 97–108)
CO2 SERPL-SCNC: 27 MMOL/L (ref 21–32)
CREAT SERPL-MCNC: 0.41 MG/DL (ref 0.7–1.3)
DIFFERENTIAL METHOD BLD: ABNORMAL
EOSINOPHIL # BLD: 0.1 K/UL (ref 0–0.4)
EOSINOPHIL NFR BLD: 1 % (ref 0–7)
ERYTHROCYTE [DISTWIDTH] IN BLOOD BY AUTOMATED COUNT: 14.6 % (ref 11.5–14.5)
GAS FLOW.O2 O2 DELIVERY SYS: ABNORMAL L/MIN
GAS FLOW.O2 SETTING OXYMISER: 26 BPM
GLOBULIN SER CALC-MCNC: 4.7 G/DL (ref 2–4)
GLUCOSE SERPL-MCNC: 112 MG/DL (ref 65–100)
HCO3 BLD-SCNC: 26.6 MMOL/L (ref 22–26)
HCT VFR BLD AUTO: 28.5 % (ref 36.6–50.3)
HGB BLD-MCNC: 8.9 G/DL (ref 12.1–17)
IMM GRANULOCYTES # BLD AUTO: 0 K/UL
IMM GRANULOCYTES NFR BLD AUTO: 0 %
INSPIRATION.DURATION SETTING TIME VENT: 0.77 SEC
LYMPHOCYTES # BLD: 1.5 K/UL (ref 0.8–3.5)
LYMPHOCYTES NFR BLD: 20 % (ref 12–49)
MAGNESIUM SERPL-MCNC: 1.8 MG/DL (ref 1.6–2.4)
MCH RBC QN AUTO: 29.4 PG (ref 26–34)
MCHC RBC AUTO-ENTMCNC: 31.2 G/DL (ref 30–36.5)
MCV RBC AUTO: 94.1 FL (ref 80–99)
MONOCYTES # BLD: 0.4 K/UL (ref 0–1)
MONOCYTES NFR BLD: 5 % (ref 5–13)
NEUTS BAND NFR BLD MANUAL: 2 % (ref 0–6)
NEUTS SEG # BLD: 5.3 K/UL (ref 1.8–8)
NEUTS SEG NFR BLD: 72 % (ref 32–75)
NRBC # BLD: 0 K/UL (ref 0–0.01)
NRBC BLD-RTO: 0 PER 100 WBC
O2/TOTAL GAS SETTING VFR VENT: 50 %
PCO2 BLD: 43.7 MMHG (ref 35–45)
PEEP RESPIRATORY: 8 CMH2O
PH BLD: 7.4 [PH] (ref 7.35–7.45)
PHOSPHATE SERPL-MCNC: 3.6 MG/DL (ref 2.6–4.7)
PIP ISTAT,IPIP: 24
PLATELET # BLD AUTO: 237 K/UL (ref 150–400)
PMV BLD AUTO: 10.5 FL (ref 8.9–12.9)
PO2 BLD: 95 MMHG (ref 80–100)
POTASSIUM SERPL-SCNC: 3.3 MMOL/L (ref 3.5–5.1)
PRESSURE CONTROL, IPC: YES
PROT SERPL-MCNC: 6.7 G/DL (ref 6.4–8.2)
RBC # BLD AUTO: 3.03 M/UL (ref 4.1–5.7)
RBC MORPH BLD: ABNORMAL
SAO2 % BLD: 97 % (ref 92–97)
SODIUM SERPL-SCNC: 139 MMOL/L (ref 136–145)
SPECIMEN TYPE: ABNORMAL
TOTAL RESP. RATE, ITRR: 26
VENTILATION MODE VENT: ABNORMAL
WBC # BLD AUTO: 7.3 K/UL (ref 4.1–11.1)

## 2020-08-31 PROCEDURE — 65610000006 HC RM INTENSIVE CARE

## 2020-08-31 PROCEDURE — 85025 COMPLETE CBC W/AUTO DIFF WBC: CPT

## 2020-08-31 PROCEDURE — 82803 BLOOD GASES ANY COMBINATION: CPT

## 2020-08-31 PROCEDURE — 74011250636 HC RX REV CODE- 250/636: Performed by: INTERNAL MEDICINE

## 2020-08-31 PROCEDURE — 74011250636 HC RX REV CODE- 250/636: Performed by: NURSE PRACTITIONER

## 2020-08-31 PROCEDURE — 74011250637 HC RX REV CODE- 250/637: Performed by: NURSE PRACTITIONER

## 2020-08-31 PROCEDURE — 87040 BLOOD CULTURE FOR BACTERIA: CPT

## 2020-08-31 PROCEDURE — 74011250637 HC RX REV CODE- 250/637: Performed by: HOSPITALIST

## 2020-08-31 PROCEDURE — 94762 N-INVAS EAR/PLS OXIMTRY CONT: CPT

## 2020-08-31 PROCEDURE — 99291 CRITICAL CARE FIRST HOUR: CPT | Performed by: THORACIC SURGERY (CARDIOTHORACIC VASCULAR SURGERY)

## 2020-08-31 PROCEDURE — 80053 COMPREHEN METABOLIC PANEL: CPT

## 2020-08-31 PROCEDURE — 74011000250 HC RX REV CODE- 250: Performed by: INTERNAL MEDICINE

## 2020-08-31 PROCEDURE — 71045 X-RAY EXAM CHEST 1 VIEW: CPT

## 2020-08-31 PROCEDURE — 83735 ASSAY OF MAGNESIUM: CPT

## 2020-08-31 PROCEDURE — 74011250637 HC RX REV CODE- 250/637: Performed by: INTERNAL MEDICINE

## 2020-08-31 PROCEDURE — 36415 COLL VENOUS BLD VENIPUNCTURE: CPT

## 2020-08-31 PROCEDURE — 84100 ASSAY OF PHOSPHORUS: CPT

## 2020-08-31 PROCEDURE — 36592 COLLECT BLOOD FROM PICC: CPT

## 2020-08-31 PROCEDURE — 74011000250 HC RX REV CODE- 250: Performed by: NURSE PRACTITIONER

## 2020-08-31 PROCEDURE — 36600 WITHDRAWAL OF ARTERIAL BLOOD: CPT

## 2020-08-31 PROCEDURE — 74011000258 HC RX REV CODE- 258: Performed by: INTERNAL MEDICINE

## 2020-08-31 RX ORDER — FUROSEMIDE 10 MG/ML
40 INJECTION INTRAMUSCULAR; INTRAVENOUS ONCE
Status: COMPLETED | OUTPATIENT
Start: 2020-08-31 | End: 2020-08-31

## 2020-08-31 RX ORDER — DEXAMETHASONE SODIUM PHOSPHATE 10 MG/ML
2 INJECTION INTRAMUSCULAR; INTRAVENOUS DAILY
Status: COMPLETED | OUTPATIENT
Start: 2020-09-01 | End: 2020-09-03

## 2020-08-31 RX ADMIN — MIDAZOLAM HYDROCHLORIDE 2 MG: 2 INJECTION, SOLUTION INTRAMUSCULAR; INTRAVENOUS at 04:23

## 2020-08-31 RX ADMIN — FUROSEMIDE 40 MG: 10 INJECTION, SOLUTION INTRAMUSCULAR; INTRAVENOUS at 11:17

## 2020-08-31 RX ADMIN — Medication 10 ML: at 21:02

## 2020-08-31 RX ADMIN — Medication: at 08:48

## 2020-08-31 RX ADMIN — Medication 10 ML: at 05:44

## 2020-08-31 RX ADMIN — KETAMINE HYDROCHLORIDE 1 MG/KG/HR: 50 INJECTION, SOLUTION INTRAMUSCULAR; INTRAVENOUS at 18:35

## 2020-08-31 RX ADMIN — POTASSIUM BICARBONATE 40 MEQ: 782 TABLET, EFFERVESCENT ORAL at 14:03

## 2020-08-31 RX ADMIN — Medication 4 MG/HR: at 08:30

## 2020-08-31 RX ADMIN — CHLORHEXIDINE GLUCONATE 15 ML: 0.12 RINSE ORAL at 20:46

## 2020-08-31 RX ADMIN — FAMOTIDINE 20 MG: 40 POWDER, FOR SUSPENSION ORAL at 08:48

## 2020-08-31 RX ADMIN — MIDAZOLAM HYDROCHLORIDE 10 MG/HR: 5 INJECTION, SOLUTION INTRAMUSCULAR; INTRAVENOUS at 18:30

## 2020-08-31 RX ADMIN — ASPIRIN 81 MG CHEWABLE TABLET 81 MG: 81 TABLET CHEWABLE at 08:48

## 2020-08-31 RX ADMIN — CHLORDIAZEPOXIDE HYDROCHLORIDE 100 MG: 25 CAPSULE ORAL at 12:32

## 2020-08-31 RX ADMIN — MIDAZOLAM HYDROCHLORIDE 13 MG/HR: 5 INJECTION, SOLUTION INTRAMUSCULAR; INTRAVENOUS at 10:00

## 2020-08-31 RX ADMIN — CHLORDIAZEPOXIDE HYDROCHLORIDE 100 MG: 25 CAPSULE ORAL at 17:10

## 2020-08-31 RX ADMIN — CHLORDIAZEPOXIDE HYDROCHLORIDE 100 MG: 25 CAPSULE ORAL at 23:00

## 2020-08-31 RX ADMIN — ACETAMINOPHEN ORAL SOLUTION 650 MG: 650 SOLUTION ORAL at 22:45

## 2020-08-31 RX ADMIN — Medication 40 ML: at 14:03

## 2020-08-31 RX ADMIN — Medication: at 17:10

## 2020-08-31 RX ADMIN — ENOXAPARIN SODIUM 50 MG: 60 INJECTION SUBCUTANEOUS at 09:24

## 2020-08-31 RX ADMIN — METOPROLOL TARTRATE 5 MG: 1 INJECTION, SOLUTION INTRAVENOUS at 04:26

## 2020-08-31 RX ADMIN — CHLORHEXIDINE GLUCONATE 15 ML: 0.12 RINSE ORAL at 08:49

## 2020-08-31 RX ADMIN — ACETAMINOPHEN ORAL SOLUTION 650 MG: 650 SOLUTION ORAL at 14:03

## 2020-08-31 RX ADMIN — ENOXAPARIN SODIUM 50 MG: 60 INJECTION SUBCUTANEOUS at 21:01

## 2020-08-31 RX ADMIN — CHLORDIAZEPOXIDE HYDROCHLORIDE 100 MG: 25 CAPSULE ORAL at 05:23

## 2020-08-31 RX ADMIN — FAMOTIDINE 20 MG: 40 POWDER, FOR SUSPENSION ORAL at 17:10

## 2020-08-31 RX ADMIN — DEXAMETHASONE SODIUM PHOSPHATE 4 MG: 10 INJECTION INTRAMUSCULAR; INTRAVENOUS at 08:48

## 2020-08-31 RX ADMIN — ACETAMINOPHEN ORAL SOLUTION 650 MG: 650 SOLUTION ORAL at 03:30

## 2020-08-31 RX ADMIN — KETAMINE HYDROCHLORIDE 1 MG/KG/HR: 50 INJECTION, SOLUTION INTRAMUSCULAR; INTRAVENOUS at 12:37

## 2020-08-31 RX ADMIN — DOCUSATE SODIUM 100 MG: 50 LIQUID ORAL at 08:48

## 2020-08-31 RX ADMIN — Medication 3 MG/HR: at 23:13

## 2020-08-31 RX ADMIN — ACETAMINOPHEN ORAL SOLUTION 650 MG: 650 SOLUTION ORAL at 08:48

## 2020-08-31 RX ADMIN — QUETIAPINE FUMARATE 25 MG: 25 TABLET, FILM COATED ORAL at 21:01

## 2020-08-31 RX ADMIN — Medication 3 MG/HR: at 15:17

## 2020-08-31 RX ADMIN — KETAMINE HYDROCHLORIDE 1 MG/KG/HR: 50 INJECTION, SOLUTION INTRAMUSCULAR; INTRAVENOUS at 01:15

## 2020-08-31 RX ADMIN — KETAMINE HYDROCHLORIDE 1 MG/KG/HR: 50 INJECTION, SOLUTION INTRAMUSCULAR; INTRAVENOUS at 06:58

## 2020-08-31 RX ADMIN — MIDAZOLAM HYDROCHLORIDE 15 MG/HR: 5 INJECTION, SOLUTION INTRAMUSCULAR; INTRAVENOUS at 03:23

## 2020-08-31 RX ADMIN — Medication 5 MG/HR: at 03:37

## 2020-08-31 NOTE — PROGRESS NOTES
ARDS  Acute hypoxic respiratory failure  COVID positive  Sinus pauses  S/P tracheostomy     Continues on vent    Remains off paralytics    Some vomiting this am     WBCs normal    Hgb and platelets look good    Creatinine normal    Bilirubin up a bit as well as other LFTs    Pro-calcitonin normal    ABG - 7.40 / 44 / 95 / 27 / 2    Continues on steroids    Continues on Lovenox    Continues on Librium    Still trying to come down on Versed    Discussed with ICU attending     CXR - persistent diffuse interstitial disease      Intake/Output Summary (Last 24 hours) at 8/31/2020 1414  Last data filed at 8/31/2020 1400  Gross per 24 hour   Intake 4284.6 ml   Output 3580 ml   Net 704.6 ml     Visit Vitals  BP 97/66   Pulse 89   Temp 99.4 °F (37.4 °C)   Resp 19   Ht 5' 11\" (1.803 m)   Wt 225 lb 12 oz (102.4 kg)   SpO2 99%   BMI 31.49 kg/m²       Risk of morbidity and mortality - high  Medical decision making - high complexity    Total critical care time - 30 minutes (CPT 61142)     I personally spent the above critical care time. This is time spent at this critically ill patient's bedside / unit / floor actively involved in patient care as well as the coordination of care and discussions with the patient's family. This does not include any procedural time which has been billed separately.

## 2020-08-31 NOTE — PROGRESS NOTES
[facility]    ICU TEAM Progress Note    Name: Adonis Rosenthal   : 1990   MRN: [id number]   Date: 2020      I  Subjective:   Progress Note: 2020      Reason for ICU Admission: Respiratory failure due to COVID-19 infection    Interval history:  72-year-old male with no history of significant past medical history except smoking half pack per day. Bertha Esqueda was admitted on 2020 at [facility] with acute hypoxemic respiratory failure from COVID pneumonia. Bertha Esqueda continued to deteriorate and got intubated on 8/10.  Due to worsening hypoxemia, he is transferred to [facility] for ECMO evaluation. Bertha Esqueda has bee given convalescent plasma twice on  and  and treated with Remdesivir which was completed on 8/10. Bertha Esqueda is also on dexamethasone. Bertha Esqueda completed course of empiric antibiotics including azithromycin which was completed on  and cefepime was completed on .  During his hospital course he also developed pneumothorax and has left-sided chest tube since .  Has a tracheostomy tube placed on     Overnight Events:   No acute events, Versed weaned from 18-15 mg an hour, that was associated with few episode of agitation that required PRN medication. No increasing oxygen requirement no nausea no vomiting, spiked fever to 101. Active Problem List:     Problem List  Date Reviewed: 2020          Codes Class    Acute respiratory failure ([facility] [address] ICD-10-CM: J96.00  ICD-9-CM: 518.81         Pneumothorax on left ICD-10-CM: J93.9  ICD-9-CM: 512.89         Pneumonia due to severe acute respiratory syndrome coronavirus 2 (SARS-CoV-2) ICD-10-CM: U07.1, J12.89  ICD-9-CM: 480.8         Respiratory failure with hypoxia (HCC) ICD-10-CM: J96.91  ICD-9-CM: 518.81               Past Medical History:      has a past medical history of History of vascular access device (08/10/2020).     Past Surgical History:      has a past surgical history that includes ir thora/ins chest tube(pneumo) wo image (2020); ir thora/ins chest tube(pneumo) wo image (2020); and pr tracheostomy, planned (2020). Home Medications:     Prior to Admission medications    Medication Sig Start Date End Date Taking? Authorizing Provider   benzonatate (Tessalon Perles) 100 mg capsule Take 100 mg by mouth three (3) times daily as needed for Cough. Provider, Historical       Allergies/Social/Family History:     No Known Allergies   Social History     Tobacco Use    Smoking status: Current Some Day Smoker    Smokeless tobacco: Never Used   Substance Use Topics    Alcohol use: Not on file      History reviewed. No pertinent family history. Review of Systems:     Not able to obtain due to his medical condition    Objective:   Vital Signs:  Visit Vitals  /68   Pulse (!) 103   Temp 100 °F (37.8 °C)   Resp 26   Ht 5' 11\" (1.803 m)   Wt 102.4 kg (225 lb 12 oz)   SpO2 98%   BMI 31.49 kg/m²      O2 Device: Tracheostomy, Ventilator Temp (24hrs), Av.4 °F (38 °C), Min:98.5 °F (36.9 °C), Max:101 °F (38.3 °C)           Intake/Output:     Intake/Output Summary (Last 24 hours) at 2020 0915  Last data filed at 2020 0800  Gross per 24 hour   Intake 3859.35 ml   Output 1555 ml   Net 2304.35 ml       Physical Exam:  General:   Sedated on mechanical ventilation via tracheostomy   Eyes:  Sclera anicteric. Pupils equally round and reactive to light. Mouth/Throat: Mucous membranes normal, oral pharynx clear   Neck: Supple   Lungs:    Good air entry bilaterally, fine crepitation   CV:  Regular rate and rhythm,no murmur, click, rub or gallop   Abdomen:   Soft, non-tender.  bowel sounds normal. non-distended   Extremities: No cyanosis , evolving edema   Skin: Skin color, texture, turgor normal. no acute rash or lesions   Lymph nodes: Cervical and supraclavicular normal   Musculoskeletal: No swelling or deformity   Lines/Devices:  Intact, no erythema, drainage or tenderness   Psych:  Sedated, withdraws to painful stimuli, opens eyes spontaneously at times           LABS AND  DATA: Personally reviewed  Recent Labs     08/31/20 0347 08/30/20 0224   WBC 7.3 6.3   HGB 8.9* 8.8*   HCT 28.5* 28.2*    199     Recent Labs     08/31/20 0347 08/30/20 0224    141   K 3.3* 3.1*    109*   CO2 27 27   BUN 15 14   CREA 0.41* 0.36*   * 125*   CA 8.4* 8.5   MG 1.8 1.8   PHOS 3.6 3.0     Recent Labs     08/31/20 0347 08/30/20 0224   * 128*   TP 6.7 6.5   ALB 2.0* 2.0*   GLOB 4.7* 4.5*     No results for input(s): INR, PTP, APTT, INREXT in the last 72 hours. Recent Labs     08/30/20 0349 08/29/20  0631   PHI 7.44 7.42   PCO2I 36.1 38.0   PO2I 77* 96   FIO2I 40 40     No results for input(s): CPK, CKMB, TROIQ, BNPP in the last 72 hours. Hemodynamics:   PAP:   CO:     Wedge:   CI:     CVP:    SVR:       PVR:       Ventilator Settings:  Mode Rate Tidal Volume Pressure FiO2 PEEP   Assist control, Pressure control   2 ml  0 cm H2O 50 % 10 cm H20     Peak airway pressure: 36 cm H2O    Minute ventilation: 10.2 l/min        MEDS: Reviewed    Chest X-Ray:  Chest x-ray from today reviewed, no changes. CXR Results  (Last 48 hours)               08/31/20 0454  XR CHEST PORT Final result    Impression:  IMPRESSION:        No change in tube and line positions. No change in diffuse bilateral pneumonia       Narrative:  EXAM: XR CHEST PORT       INDICATION: Tube Placement       COMPARISON: Prior day       FINDINGS: A portable AP radiograph of the chest was obtained at 0352 hours. The   patient is on a cardiac monitor. A tracheostomy tube is in place. The Dobbhoff   extends below the hemidiaphragm. The left PICC line terminates at the cavoatrial   junction. There is diffuse patchy bilateral airspace disease, accentuated by low   lung volumes. 08/30/20 0446  XR CHEST PORT Final result    Impression:  IMPRESSION: Stable diffuse bilateral airspace disease.        Narrative: EXAM: XR CHEST PORT       INDICATION: Tube Placement       COMPARISON: Prior day       FINDINGS: A portable AP radiograph of the chest was obtained at 0403 hours. The   patient is on a cardiac monitor. A tracheostomy tube is in place. The Dobbhoff   tip is in the distal stomach. Diffuse patchy bilateral airspace disease is   unchanged. A PICC line terminates at the caval atrial junction. 08/29/20 0945  XR CHEST PORT Final result    Impression:  IMPRESSION:   Slight increase in lung volumes/inspiratory effort but stable diffuse lung   infiltrates left greater than right moderately severe. Lines and tubes stable. .    . Narrative:  INDICATION:  possible aspiration        EXAM: Chest single view. COMPARISON: Earlier same day. FINDINGS: A single frontal view of the chest at 0949 hours shows slight increase   in lung volumes/inspiratory effort but with stable diffuse lung infiltrates left   greater than right. Tracheostomy device, feeding tube and left PICC line all   stable. No pneumothorax. .  The heart, mediastinum and pulmonary vasculature are   stable . The bony thorax is unremarkable for age. .                 Assessment and Plan:   -Bilateral pneumonia due to COVID-19 infection  - Acute hypoxic respiratory failure due to COVID-19 infection status post tracheostomy on August 26  - Pneumothorax status post left chest tube insertion on August 11 without air leak  - Possible bacterial super imposed infection: Completed antibiotic course  -History of tobacco use     - Continue to wean sedation as tolerated, goal is to initially wean Versed as tolerated. - Ventilator management, currently on pressure control driving pressure of 24 rate of 26 PEEP of 10, will attempt to wean PEEP down to 8 today and will continue to reassess with daily ABG.   - Patient continued to spike fever, he finished complete course of antibiotic, repeat a set of cultures today, if fever persisted without leukocytosis we may consider thromboembolic event, initially he had lower limb duplex and he continued to be on high-dose Lovenox. However with his severe COVID infection he remains at risk for thromboembolic event.  -Continue to wean down steroid  - Completed COVID-19 treatment  - DVT and GI prophylaxis [with hypercoagulable status dose for COVID-19] and ventilator bundle, trach care. DISPOSITION  Stay in ICU    CRITICAL CARE CONSULTANT NOTE  I had a face to face encounter with the patient, reviewed and interpreted patient data including clinical events, labs, images, vital signs, I/O's, and examined patient. I have discussed the case and the plan and management of the patient's care with the consulting services, the bedside nurses and the respiratory therapist.      NOTE OF PERSONAL INVOLVEMENT IN CARE   This patient has a high probability of imminent, clinically significant deterioration, which requires the highest level of preparedness to intervene urgently. I participated in the decision-making and personally managed or directed the management of the following life and organ supporting interventions that required my frequent assessment to treat or prevent imminent deterioration. I personally spent 40 minutes of critical care time. This is time spent at this critically ill patient's bedside actively involved in patient care as well as the coordination of care and discussions with the patient's family. This does not include any procedural time which has been billed separately. Jarek Wagner M.D.   Staff Intensivist/Pulmonologist  [facility]  8/31/2020

## 2020-09-01 ENCOUNTER — APPOINTMENT (OUTPATIENT)
Dept: GENERAL RADIOLOGY | Age: 30
DRG: 004 | End: 2020-09-01
Attending: INTERNAL MEDICINE
Payer: COMMERCIAL

## 2020-09-01 ENCOUNTER — APPOINTMENT (OUTPATIENT)
Dept: GENERAL RADIOLOGY | Age: 30
DRG: 004 | End: 2020-09-01
Attending: NURSE PRACTITIONER
Payer: COMMERCIAL

## 2020-09-01 LAB
ALBUMIN SERPL-MCNC: 2 G/DL (ref 3.5–5)
ALBUMIN/GLOB SERPL: 0.6 {RATIO} (ref 1.1–2.2)
ALP SERPL-CCNC: 167 U/L (ref 45–117)
ALT SERPL-CCNC: 249 U/L (ref 12–78)
AMMONIA PLAS-SCNC: 51 UMOL/L
ANION GAP SERPL CALC-SCNC: 5 MMOL/L (ref 5–15)
ANION GAP SERPL CALC-SCNC: 6 MMOL/L (ref 5–15)
AST SERPL-CCNC: 63 U/L (ref 15–37)
BASOPHILS # BLD: 0.1 K/UL (ref 0–0.1)
BASOPHILS NFR BLD: 1 % (ref 0–1)
BILIRUB SERPL-MCNC: 0.5 MG/DL (ref 0.2–1)
BUN SERPL-MCNC: 11 MG/DL (ref 6–20)
BUN SERPL-MCNC: 8 MG/DL (ref 6–20)
BUN/CREAT SERPL: 25 (ref 12–20)
BUN/CREAT SERPL: 35 (ref 12–20)
CALCIUM SERPL-MCNC: 7.8 MG/DL (ref 8.5–10.1)
CALCIUM SERPL-MCNC: 8.4 MG/DL (ref 8.5–10.1)
CHLORIDE SERPL-SCNC: 102 MMOL/L (ref 97–108)
CHLORIDE SERPL-SCNC: 103 MMOL/L (ref 97–108)
CO2 SERPL-SCNC: 30 MMOL/L (ref 21–32)
CO2 SERPL-SCNC: 33 MMOL/L (ref 21–32)
CREAT SERPL-MCNC: 0.31 MG/DL (ref 0.7–1.3)
CREAT SERPL-MCNC: 0.32 MG/DL (ref 0.7–1.3)
DIFFERENTIAL METHOD BLD: ABNORMAL
EOSINOPHIL # BLD: 0.1 K/UL (ref 0–0.4)
EOSINOPHIL NFR BLD: 2 % (ref 0–7)
ERYTHROCYTE [DISTWIDTH] IN BLOOD BY AUTOMATED COUNT: 14.5 % (ref 11.5–14.5)
GLOBULIN SER CALC-MCNC: 3.6 G/DL (ref 2–4)
GLUCOSE SERPL-MCNC: 100 MG/DL (ref 65–100)
GLUCOSE SERPL-MCNC: 82 MG/DL (ref 65–100)
HCT VFR BLD AUTO: 26.4 % (ref 36.6–50.3)
HGB BLD-MCNC: 8.3 G/DL (ref 12.1–17)
IMM GRANULOCYTES # BLD AUTO: 0 K/UL
IMM GRANULOCYTES NFR BLD AUTO: 0 %
LYMPHOCYTES # BLD: 0.9 K/UL (ref 0.8–3.5)
LYMPHOCYTES NFR BLD: 17 % (ref 12–49)
MAGNESIUM SERPL-MCNC: 1.5 MG/DL (ref 1.6–2.4)
MAGNESIUM SERPL-MCNC: 1.8 MG/DL (ref 1.6–2.4)
MCH RBC QN AUTO: 29.7 PG (ref 26–34)
MCHC RBC AUTO-ENTMCNC: 31.4 G/DL (ref 30–36.5)
MCV RBC AUTO: 94.6 FL (ref 80–99)
METAMYELOCYTES NFR BLD MANUAL: 1 %
MONOCYTES # BLD: 0.3 K/UL (ref 0–1)
MONOCYTES NFR BLD: 6 % (ref 5–13)
MYELOCYTES NFR BLD MANUAL: 1 %
NEUTS BAND NFR BLD MANUAL: 2 % (ref 0–6)
NEUTS SEG # BLD: 3.7 K/UL (ref 1.8–8)
NEUTS SEG NFR BLD: 70 % (ref 32–75)
NRBC # BLD: 0 K/UL (ref 0–0.01)
NRBC BLD-RTO: 0 PER 100 WBC
PHOSPHATE SERPL-MCNC: 2.9 MG/DL (ref 2.6–4.7)
PLATELET # BLD AUTO: 227 K/UL (ref 150–400)
PMV BLD AUTO: 10 FL (ref 8.9–12.9)
POTASSIUM SERPL-SCNC: 2.9 MMOL/L (ref 3.5–5.1)
POTASSIUM SERPL-SCNC: 3.7 MMOL/L (ref 3.5–5.1)
PROT SERPL-MCNC: 5.6 G/DL (ref 6.4–8.2)
RBC # BLD AUTO: 2.79 M/UL (ref 4.1–5.7)
RBC MORPH BLD: ABNORMAL
RBC MORPH BLD: ABNORMAL
SODIUM SERPL-SCNC: 139 MMOL/L (ref 136–145)
SODIUM SERPL-SCNC: 140 MMOL/L (ref 136–145)
WBC # BLD AUTO: 5.2 K/UL (ref 4.1–11.1)

## 2020-09-01 PROCEDURE — 77010033678 HC OXYGEN DAILY

## 2020-09-01 PROCEDURE — 36592 COLLECT BLOOD FROM PICC: CPT

## 2020-09-01 PROCEDURE — 84100 ASSAY OF PHOSPHORUS: CPT

## 2020-09-01 PROCEDURE — 94760 N-INVAS EAR/PLS OXIMETRY 1: CPT

## 2020-09-01 PROCEDURE — 74011000258 HC RX REV CODE- 258: Performed by: INTERNAL MEDICINE

## 2020-09-01 PROCEDURE — 82140 ASSAY OF AMMONIA: CPT

## 2020-09-01 PROCEDURE — 74011250636 HC RX REV CODE- 250/636: Performed by: NURSE PRACTITIONER

## 2020-09-01 PROCEDURE — 74011250636 HC RX REV CODE- 250/636: Performed by: INTERNAL MEDICINE

## 2020-09-01 PROCEDURE — 74011250637 HC RX REV CODE- 250/637: Performed by: HOSPITALIST

## 2020-09-01 PROCEDURE — 74011250637 HC RX REV CODE- 250/637: Performed by: INTERNAL MEDICINE

## 2020-09-01 PROCEDURE — 83735 ASSAY OF MAGNESIUM: CPT

## 2020-09-01 PROCEDURE — 71045 X-RAY EXAM CHEST 1 VIEW: CPT

## 2020-09-01 PROCEDURE — 36415 COLL VENOUS BLD VENIPUNCTURE: CPT

## 2020-09-01 PROCEDURE — 65610000006 HC RM INTENSIVE CARE

## 2020-09-01 PROCEDURE — 99291 CRITICAL CARE FIRST HOUR: CPT | Performed by: THORACIC SURGERY (CARDIOTHORACIC VASCULAR SURGERY)

## 2020-09-01 PROCEDURE — 74018 RADEX ABDOMEN 1 VIEW: CPT

## 2020-09-01 PROCEDURE — 74011000250 HC RX REV CODE- 250: Performed by: NURSE PRACTITIONER

## 2020-09-01 PROCEDURE — 85025 COMPLETE CBC W/AUTO DIFF WBC: CPT

## 2020-09-01 PROCEDURE — 94003 VENT MGMT INPAT SUBQ DAY: CPT

## 2020-09-01 PROCEDURE — 74011000250 HC RX REV CODE- 250: Performed by: INTERNAL MEDICINE

## 2020-09-01 PROCEDURE — 80053 COMPREHEN METABOLIC PANEL: CPT

## 2020-09-01 RX ORDER — MAGNESIUM SULFATE 1 G/100ML
1 INJECTION INTRAVENOUS ONCE
Status: COMPLETED | OUTPATIENT
Start: 2020-09-01 | End: 2020-09-01

## 2020-09-01 RX ORDER — FUROSEMIDE 10 MG/ML
40 INJECTION INTRAMUSCULAR; INTRAVENOUS ONCE
Status: COMPLETED | OUTPATIENT
Start: 2020-09-01 | End: 2020-09-01

## 2020-09-01 RX ORDER — POTASSIUM CHLORIDE 29.8 MG/ML
20 INJECTION INTRAVENOUS
Status: COMPLETED | OUTPATIENT
Start: 2020-09-01 | End: 2020-09-01

## 2020-09-01 RX ORDER — QUETIAPINE FUMARATE 25 MG/1
50 TABLET, FILM COATED ORAL
Status: DISCONTINUED | OUTPATIENT
Start: 2020-09-01 | End: 2020-09-03

## 2020-09-01 RX ORDER — POTASSIUM CHLORIDE 29.8 MG/ML
20 INJECTION INTRAVENOUS
Status: COMPLETED | OUTPATIENT
Start: 2020-09-01 | End: 2020-09-02

## 2020-09-01 RX ORDER — MAGNESIUM SULFATE 1 G/100ML
1 INJECTION INTRAVENOUS ONCE
Status: COMPLETED | OUTPATIENT
Start: 2020-09-01 | End: 2020-09-02

## 2020-09-01 RX ADMIN — ALTEPLASE 1 MG: 2.2 INJECTION, POWDER, LYOPHILIZED, FOR SOLUTION INTRAVENOUS at 20:24

## 2020-09-01 RX ADMIN — MAGNESIUM SULFATE HEPTAHYDRATE 1 G: 1 INJECTION, SOLUTION INTRAVENOUS at 22:40

## 2020-09-01 RX ADMIN — Medication 10 ML: at 05:15

## 2020-09-01 RX ADMIN — CHLORDIAZEPOXIDE HYDROCHLORIDE 100 MG: 25 CAPSULE ORAL at 12:36

## 2020-09-01 RX ADMIN — ACETAMINOPHEN ORAL SOLUTION 650 MG: 650 SOLUTION ORAL at 22:33

## 2020-09-01 RX ADMIN — Medication 40 ML: at 14:23

## 2020-09-01 RX ADMIN — FAMOTIDINE 20 MG: 40 POWDER, FOR SUSPENSION ORAL at 08:54

## 2020-09-01 RX ADMIN — FAMOTIDINE 20 MG: 40 POWDER, FOR SUSPENSION ORAL at 17:06

## 2020-09-01 RX ADMIN — Medication: at 17:05

## 2020-09-01 RX ADMIN — ALTEPLASE 1 MG: 2.2 INJECTION, POWDER, LYOPHILIZED, FOR SOLUTION INTRAVENOUS at 22:27

## 2020-09-01 RX ADMIN — MIDAZOLAM HYDROCHLORIDE 2 MG: 2 INJECTION, SOLUTION INTRAMUSCULAR; INTRAVENOUS at 03:26

## 2020-09-01 RX ADMIN — POTASSIUM CHLORIDE 20 MEQ: 29.8 INJECTION, SOLUTION INTRAVENOUS at 12:59

## 2020-09-01 RX ADMIN — FENTANYL CITRATE 100 MCG: 50 INJECTION, SOLUTION INTRAMUSCULAR; INTRAVENOUS at 03:17

## 2020-09-01 RX ADMIN — Medication 2 MG/HR: at 15:00

## 2020-09-01 RX ADMIN — Medication 10 ML: at 21:04

## 2020-09-01 RX ADMIN — Medication: at 08:54

## 2020-09-01 RX ADMIN — CHLORHEXIDINE GLUCONATE 15 ML: 0.12 RINSE ORAL at 21:04

## 2020-09-01 RX ADMIN — CHLORHEXIDINE GLUCONATE 15 ML: 0.12 RINSE ORAL at 08:55

## 2020-09-01 RX ADMIN — ENOXAPARIN SODIUM 50 MG: 60 INJECTION SUBCUTANEOUS at 10:22

## 2020-09-01 RX ADMIN — KETAMINE HYDROCHLORIDE 0.9 MG/KG/HR: 50 INJECTION, SOLUTION INTRAMUSCULAR; INTRAVENOUS at 12:33

## 2020-09-01 RX ADMIN — KETAMINE HYDROCHLORIDE 0.8 MG/KG/HR: 50 INJECTION, SOLUTION INTRAMUSCULAR; INTRAVENOUS at 18:12

## 2020-09-01 RX ADMIN — POTASSIUM CHLORIDE 20 MEQ: 29.8 INJECTION, SOLUTION INTRAVENOUS at 08:52

## 2020-09-01 RX ADMIN — ASPIRIN 81 MG CHEWABLE TABLET 81 MG: 81 TABLET CHEWABLE at 08:54

## 2020-09-01 RX ADMIN — QUETIAPINE FUMARATE 50 MG: 25 TABLET ORAL at 21:04

## 2020-09-01 RX ADMIN — MIDAZOLAM HYDROCHLORIDE 10 MG/HR: 5 INJECTION, SOLUTION INTRAMUSCULAR; INTRAVENOUS at 04:22

## 2020-09-01 RX ADMIN — KETAMINE HYDROCHLORIDE 1 MG/KG/HR: 50 INJECTION, SOLUTION INTRAMUSCULAR; INTRAVENOUS at 06:52

## 2020-09-01 RX ADMIN — FUROSEMIDE 40 MG: 10 INJECTION, SOLUTION INTRAMUSCULAR; INTRAVENOUS at 10:23

## 2020-09-01 RX ADMIN — ACETAMINOPHEN ORAL SOLUTION 650 MG: 650 SOLUTION ORAL at 08:54

## 2020-09-01 RX ADMIN — ACETAMINOPHEN ORAL SOLUTION 650 MG: 650 SOLUTION ORAL at 04:26

## 2020-09-01 RX ADMIN — DEXAMETHASONE SODIUM PHOSPHATE 2 MG: 10 INJECTION, SOLUTION INTRAMUSCULAR; INTRAVENOUS at 08:55

## 2020-09-01 RX ADMIN — FENTANYL CITRATE 100 MCG: 50 INJECTION, SOLUTION INTRAMUSCULAR; INTRAVENOUS at 16:17

## 2020-09-01 RX ADMIN — POTASSIUM CHLORIDE 20 MEQ: 29.8 INJECTION, SOLUTION INTRAVENOUS at 12:26

## 2020-09-01 RX ADMIN — ACETAMINOPHEN ORAL SOLUTION 650 MG: 650 SOLUTION ORAL at 12:58

## 2020-09-01 RX ADMIN — MIDAZOLAM HYDROCHLORIDE 8 MG/HR: 5 INJECTION, SOLUTION INTRAMUSCULAR; INTRAVENOUS at 15:00

## 2020-09-01 RX ADMIN — ENOXAPARIN SODIUM 50 MG: 60 INJECTION SUBCUTANEOUS at 21:04

## 2020-09-01 RX ADMIN — POTASSIUM CHLORIDE 20 MEQ: 29.8 INJECTION, SOLUTION INTRAVENOUS at 22:33

## 2020-09-01 RX ADMIN — Medication 3 MG/HR: at 06:54

## 2020-09-01 RX ADMIN — MIDAZOLAM HYDROCHLORIDE 2 MG: 2 INJECTION, SOLUTION INTRAMUSCULAR; INTRAVENOUS at 00:06

## 2020-09-01 RX ADMIN — CHLORDIAZEPOXIDE HYDROCHLORIDE 100 MG: 25 CAPSULE ORAL at 17:05

## 2020-09-01 RX ADMIN — METOPROLOL TARTRATE 5 MG: 1 INJECTION, SOLUTION INTRAVENOUS at 00:06

## 2020-09-01 RX ADMIN — POTASSIUM CHLORIDE 20 MEQ: 29.8 INJECTION, SOLUTION INTRAVENOUS at 10:23

## 2020-09-01 RX ADMIN — MAGNESIUM SULFATE HEPTAHYDRATE 1 G: 1 INJECTION, SOLUTION INTRAVENOUS at 08:43

## 2020-09-01 RX ADMIN — KETAMINE HYDROCHLORIDE 1 MG/KG/HR: 50 INJECTION, SOLUTION INTRAMUSCULAR; INTRAVENOUS at 01:25

## 2020-09-01 RX ADMIN — CHLORDIAZEPOXIDE HYDROCHLORIDE 100 MG: 25 CAPSULE ORAL at 05:15

## 2020-09-01 NOTE — PROGRESS NOTES
1930: Bedside shift change report given to Marie Varela (oncoming nurse) by Letty Hopper (offgoing nurse). Report included the following information SBAR, Kardex, Intake/Output, MAR, Recent Results, Cardiac Rhythm NSR/sinus tach and Alarm Parameters . 0000: Pt is restless, alarming vent, HR in the 150s. Given prn metoprolol and versed. 0300: Pt restless, alarming vent requiring frequent suctioning. Thin, pink-tinged secretions noted. Lungs sound wet. Glenda Cotto NP made aware, no new orders received. Pt given prn fentanyl and versed. O2 sats remain >94% on 50% fiO2.     0730: Bedside shift change report given to La Melvin RN (oncoming nurse) by Marie Varela (offgoing nurse). Report included the following information SBAR, Kardex, Intake/Output, MAR, Recent Results, Cardiac Rhythm sinus tach and Alarm Parameters .

## 2020-09-01 NOTE — PROGRESS NOTES
Primary Nurse Jerry Ballard RN and CATINA neff performed a dual skin assessment on this patient No impairment noted  Enrique score is 13

## 2020-09-01 NOTE — PROGRESS NOTES
ARDS  Acute hypoxic respiratory failure  COVID positive  Sinus pauses  S/P tracheostomy     Remain on vent    Still off paralytics    Remains on Dilaudid / Versed / Ketamine    Continues on TF's at goal    Having some low grade fevers    Mild agitation now and again     Trying to come down on Versed and Ketamine    Good urine output on diuretics    PEEP at 8, FiO2 at 50%     7.40 / 44 / 95 / 27 / 2    Hgb looks good    Platelets a little low     Creatinine normal    Bilirubin and other LFTs improving     Pro-calcitonin normal    Continues on steroids    Continues on Lovenox    Continues on Librium and Seroquel    Discussed with ICU attending    CXR - diffuse interstitial disease persists       Intake/Output Summary (Last 24 hours) at 9/1/2020 1553  Last data filed at 9/1/2020 1500  Gross per 24 hour   Intake 4803.6 ml   Output 6605 ml   Net -1801.4 ml     Visit Vitals  /85   Pulse (!) 108   Temp 99.4 °F (37.4 °C)   Resp 26   Ht 5' 11\" (1.803 m)   Wt 237 lb 3.4 oz (107.6 kg)   SpO2 99%   BMI 33.08 kg/m²       Risk of morbidity and mortality - high  Medical decision making - high complexity    Total critical care time - 30 minutes (CPT 85576)     I personally spent the above critical care time. This is time spent at this critically ill patient's bedside / unit / floor actively involved in patient care as well as the coordination of care and discussions with the patient's family. This does not include any procedural time which has been billed separately.

## 2020-09-01 NOTE — PROGRESS NOTES
[facility]    ICU TEAM Progress Note    Name: Jaida Coverashley   : 1990   MRN: [id number]   Date: 2020      I  Subjective:   Progress Note: 2020      Reason for ICU Admission: Respiratory failure due to COVID-19 infection    Interval history:  43-year-old male with no history of significant past medical history except smoking half pack per day. [facility] was admitted on 2020 at [facility] with acute hypoxemic respiratory failure from COVID pneumonia. [facility] continued to deteriorate and got intubated on 8/10.  Due to worsening hypoxemia, he is transferred to [facility] for ECMO evaluation. [facility] has bee given convalescent plasma twice on  and  and treated with Remdesivir which was completed on 8/10. [facility] is also on dexamethasone. [facility] completed course of empiric antibiotics including azithromycin which was completed on  and cefepime was completed on .  During his hospital course he also developed pneumothorax and has left-sided chest tube since .  Has a tracheostomy tube placed on     Overnight Events:   No acute event. Still spiking low-grade fever. Some episode of agitation at night but there was no need to increase the rate on the Versed that has been weaned down to 10. Good urine output and about -300 cc after 40 mg IV Lasix on . Oxygenation remained adequate after weaning the PEEP to 8 on .     Active Problem List:     Problem List  Date Reviewed: 2020          Codes Class    Acute respiratory failure ([facility] [address] ICD-10-CM: J96.00  ICD-9-CM: 518.81         Pneumothorax on left ICD-10-CM: J93.9  ICD-9-CM: 512.89         Pneumonia due to severe acute respiratory syndrome coronavirus 2 (SARS-CoV-2) ICD-10-CM: U07.1, J12.89  ICD-9-CM: 480.8         Respiratory failure with hypoxia (HCC) ICD-10-CM: J96.91  ICD-9-CM: 518.81               Past Medical History:      has a past medical history of History of vascular access device (08/10/2020). Past Surgical History:      has a past surgical history that includes ir thora/ins chest tube(pneumo) wo image (2020); ir thora/ins chest tube(pneumo) wo image (2020); and pr tracheostomy, planned (2020). Home Medications:     Prior to Admission medications    Medication Sig Start Date End Date Taking? Authorizing Provider   benzonatate (Tessalon Perles) 100 mg capsule Take 100 mg by mouth three (3) times daily as needed for Cough. Provider, Historical       Allergies/Social/Family History:     No Known Allergies   Social History     Tobacco Use    Smoking status: Current Some Day Smoker    Smokeless tobacco: Never Used   Substance Use Topics    Alcohol use: Not on file      History reviewed. No pertinent family history. Review of Systems:     Not able to obtain due to his medical condition    Objective:   Vital Signs:  Visit Vitals  /62   Pulse (!) 104   Temp 99.9 °F (37.7 °C)   Resp 26   Ht 5' 11\" (1.803 m)   Wt 107.6 kg (237 lb 3.4 oz)   SpO2 98%   BMI 33.08 kg/m²      O2 Device: Tracheostomy, Ventilator Temp (24hrs), Av.8 °F (37.7 °C), Min:98.2 °F (36.8 °C), Max:100.7 °F (38.2 °C)           Intake/Output:     Intake/Output Summary (Last 24 hours) at 2020 0853  Last data filed at 2020 0800  Gross per 24 hour   Intake 4288.6 ml   Output 5235 ml   Net -946.4 ml       Physical Exam:  General:   Sedated on mechanical ventilation via tracheostomy   Eyes:  Sclera anicteric. Pupils equally round and reactive to light. Mouth/Throat: Mucous membranes normal, oral pharynx clear   Neck: Supple   Lungs:    Good air entry bilaterally, fine crepitation   CV:  Regular rate and rhythm,no murmur, click, rub or gallop   Abdomen:   Soft, non-tender.  bowel sounds normal. non-distended   Extremities: No cyanosis , evolving edema   Skin: Skin color, texture, turgor normal. no acute rash or lesions   Lymph nodes: Cervical and supraclavicular normal Musculoskeletal: No swelling or deformity   Lines/Devices:  Intact, no erythema, drainage or tenderness   Psych:  Sedated, withdraws to painful stimuli, opens eyes spontaneously at times            LABS AND  DATA: Personally reviewed  Recent Labs     09/01/20  0331 08/31/20 0347   WBC 5.2 7.3   HGB 8.3* 8.9*   HCT 26.4* 28.5*    237     Recent Labs     09/01/20  0331 08/31/20  0347    139   K 2.9* 3.3*    105   CO2 30 27   BUN 11 15   CREA 0.31* 0.41*    112*   CA 7.8* 8.4*   MG 1.5* 1.8   PHOS 2.9 3.6     Recent Labs     09/01/20 0331 08/31/20 0347   * 188*   TP 5.6* 6.7   ALB 2.0* 2.0*   GLOB 3.6 4.7*     No results for input(s): INR, PTP, APTT, INREXT in the last 72 hours. Recent Labs     08/31/20  1039 08/30/20  0349   PHI 7.40 7.44   PCO2I 43.7 36.1   PO2I 95 77*   FIO2I 50 40     No results for input(s): CPK, CKMB, TROIQ, BNPP in the last 72 hours. Hemodynamics:   PAP:   CO:     Wedge:   CI:     CVP:    SVR:       PVR:       Ventilator Settings:  Mode Rate Tidal Volume Pressure FiO2 PEEP   Pressure control   2 ml  0 cm H2O 50 % 8 cm H20     Peak airway pressure: 34 cm H2O    Minute ventilation: 11.3 l/min        MEDS: Reviewed    Chest X-Ray:  Reviewed chest x-ray done here today and agree with report as below  CXR Results  (Last 48 hours)               08/31/20 0454  XR CHEST PORT Final result    Impression:  IMPRESSION:        No change in tube and line positions. No change in diffuse bilateral pneumonia       Narrative:  EXAM: XR CHEST PORT       INDICATION: Tube Placement       COMPARISON: Prior day       FINDINGS: A portable AP radiograph of the chest was obtained at 0352 hours. The   patient is on a cardiac monitor. A tracheostomy tube is in place. The Dobbhoff   extends below the hemidiaphragm. The left PICC line terminates at the cavoatrial   junction. There is diffuse patchy bilateral airspace disease, accentuated by low   lung volumes. Assessment and Plan:   -Bilateral pneumonia due to COVID-19 infection  - Acute hypoxic respiratory failure due to COVID-19 infection status post tracheostomy on August 26  - Pneumothorax status post left chest tube insertion on August 11 without air leak  - Possible bacterial super imposed infection: Completed antibiotic course  -History of tobacco use     - Continue to wean sedation as tolerated, Versed now down to 10, will start weaning ketamine and maintain hydromorphone  - Ventilator management, currently on pressure control driving pressure of 24 rate of 26 PEEP of 8, will continue weaning as tolerated  - Patient continued to spike fever, he finished complete course of antibiotic, no leukocytosis and his oxygenation is improving. Blood culture repeated on August 31 and still negative   -Continue to wean down steroid  -I will give him another 40 mg IV Lasix today. Replace electrolyte  - Completed COVID-19 treatment  - DVT and GI prophylaxis [with hypercoagulable status dose for COVID-19] and ventilator bundle, trach care.       DISPOSITION  Stay in ICU    CRITICAL CARE CONSULTANT NOTE  I had a face to face encounter with the patient, reviewed and interpreted patient data including clinical events, labs, images, vital signs, I/O's, and examined patient. I have discussed the case and the plan and management of the patient's care with the consulting services, the bedside nurses and the respiratory therapist.      NOTE OF PERSONAL INVOLVEMENT IN CARE   This patient has a high probability of imminent, clinically significant deterioration, which requires the highest level of preparedness to intervene urgently. I participated in the decision-making and personally managed or directed the management of the following life and organ supporting interventions that required my frequent assessment to treat or prevent imminent deterioration. I personally spent 40 minutes of critical care time.   This is time spent at this critically ill patient's bedside actively involved in patient care as well as the coordination of care and discussions with the patient's family. This does not include any procedural time which has been billed separately. Nayana Palm M.D.   Staff Intensivist/Pulmonologist  [facility]  9/1/2020

## 2020-09-01 NOTE — PROGRESS NOTES
Comprehensive Nutrition Assessment    Type and Reason for Visit: Reassess    Nutrition Recommendations/Plan:    Continue current tube feeding. Nutrition Assessment:    Pt admitted to [facility] [place] [place] d/t Respiratory failure with hypoxia. No PMHx except smoking. Noted: Acuter hypoxic respiratory failure d/t COVID 19 PNA,  intubated 8/10. ARDS. Transferred to [facility] for possible ECMO support-not indicated. S/P convalescent plasma x 2, Remdisivir. B/L PTX-s/p left CT; SQ emphysema @ CT site. 8/26 Perc trach placed. Sedation and vent support being slowly weaned. Remains COVID+. Mr Jeremías Boyle is tolerating resumption of enteral feeding well. Noted episode of vomiting 8/30 after nursing performed mouth care. Tube feeding held for several house then resumed at a lower rate. Tube feeding to be increased back to goal today (see below). At goal tube feeding provides 1560 ml, 1990 calories, 147 gm protein and 1865 ml free water per day to meet % estimated protein and energy needs, respectively. Potassium and magnesium replaced today. Phosphorus WNL. Malnutrition Assessment:  Malnutrition Status:  Insufficient data      Nutritionally Significant Medications:   Decadron, Colace, Miralax, KCL, magnesium sulfate, Versed, Ketamine    Estimated Daily Nutrient Needs:  Energy (kcal):  4135-9848 (18-20 kcal/kg)  Protein (g):  156 (2g/kg IBW)       Fluid (ml/day):  1 ml/kcal    Nutrition Related Findings:  Last BM 9/1. Edema 1+ NP BUE, facial, NP BLE,    Wounds:  None       Current Nutrition Therapies:   Diet: NPO  Tube Feeding: Vital AF 1.2 @ 50 ml/hr-goal 65 ml/hr with 1 packet Prosource bid and 80 ml water flush q 4 hr    Anthropometric Measures:  · Height:  5' 11\" (180.3 cm)  · Current Body Wt:  107.6 kg (237 lb 3.4 oz)   · Admission Body Wt:  242 lb 8.1 oz      · Ideal Body Wt:   :  128.4 %    · BMI Categories:  Obese class 1 (BMI 30.0-34. 9)     Wt Readings from Last 10 Encounters:   09/01/20 107.6 kg (237 lb 3.4 oz)   08/18/20 100.7 kg (222 lb 0.1 oz)       Nutrition Diagnosis:   · Inadequate oral intake related to impaired respiratory function as evidenced by NPO or clear liquid status due to medical condition, nutrition support-enteral nutrition      Nutrition Interventions:   Food and/or Nutrient Delivery: Continue tube feeding  Nutrition Education and Counseling: No recommendations at this time  Coordination of Nutrition Care: Continued inpatient monitoring, Interdisciplinary rounds    Goals:  Tube feeding to meet at least 85% estimated needs x 5-7 days. Nutrition Monitoring and Evaluation:   Food/Nutrient Intake Outcomes: Enteral nutrition intake/tolerance  Physical Signs/Symptoms Outcomes: Biochemical data, Hemodynamic status, GI status, Fluid status or edema, Weight    Discharge Planning:     Too soon to determine     Bety Clemons RD CNSC  Contact: Felipe Gonsalez

## 2020-09-01 NOTE — PROGRESS NOTES
MIGUELINA  Patient presented to [facility] ED with increased shortness of breath, diaphoresis and fever. Transferred to [address] [place] [address] for possible ECMO placement  COVID Positive. RUR: 27%  Disposition: TBD     Patient remains in the ICU vented, sedated receiving fentanyl, Ketamine and versed IV. Osie Ra Chest tube intact. Care management continuing to follow for transitions of care.  El Tamez RN,CRM

## 2020-09-02 ENCOUNTER — APPOINTMENT (OUTPATIENT)
Dept: GENERAL RADIOLOGY | Age: 30
DRG: 004 | End: 2020-09-02
Attending: NURSE PRACTITIONER
Payer: COMMERCIAL

## 2020-09-02 LAB
ALBUMIN SERPL-MCNC: 2.3 G/DL (ref 3.5–5)
ALBUMIN/GLOB SERPL: 0.5 {RATIO} (ref 1.1–2.2)
ALP SERPL-CCNC: 176 U/L (ref 45–117)
ALT SERPL-CCNC: 208 U/L (ref 12–78)
ANION GAP SERPL CALC-SCNC: 7 MMOL/L (ref 5–15)
ARTERIAL PATENCY WRIST A: YES
AST SERPL-CCNC: 64 U/L (ref 15–37)
BASE EXCESS BLD CALC-SCNC: 9 MMOL/L
BASOPHILS # BLD: 0 K/UL (ref 0–0.1)
BASOPHILS NFR BLD: 0 % (ref 0–1)
BDY SITE: ABNORMAL
BILIRUB SERPL-MCNC: 0.8 MG/DL (ref 0.2–1)
BUN SERPL-MCNC: 10 MG/DL (ref 6–20)
BUN/CREAT SERPL: 24 (ref 12–20)
CA-I BLD-SCNC: 1.22 MMOL/L (ref 1.12–1.32)
CALCIUM SERPL-MCNC: 8.8 MG/DL (ref 8.5–10.1)
CHLORIDE SERPL-SCNC: 103 MMOL/L (ref 97–108)
CO2 SERPL-SCNC: 30 MMOL/L (ref 21–32)
CREAT SERPL-MCNC: 0.41 MG/DL (ref 0.7–1.3)
DIFFERENTIAL METHOD BLD: ABNORMAL
EOSINOPHIL # BLD: 0.1 K/UL (ref 0–0.4)
EOSINOPHIL NFR BLD: 3 % (ref 0–7)
ERYTHROCYTE [DISTWIDTH] IN BLOOD BY AUTOMATED COUNT: 14.7 % (ref 11.5–14.5)
GAS FLOW.O2 O2 DELIVERY SYS: ABNORMAL L/MIN
GAS FLOW.O2 SETTING OXYMISER: 26 BPM
GLOBULIN SER CALC-MCNC: 4.3 G/DL (ref 2–4)
GLUCOSE SERPL-MCNC: 84 MG/DL (ref 65–100)
HCO3 BLD-SCNC: 32.6 MMOL/L (ref 22–26)
HCT VFR BLD AUTO: 26.8 % (ref 36.6–50.3)
HGB BLD-MCNC: 8.2 G/DL (ref 12.1–17)
IMM GRANULOCYTES # BLD AUTO: 0 K/UL
IMM GRANULOCYTES NFR BLD AUTO: 0 %
INSPIRATION.DURATION SETTING TIME VENT: 0.77 SEC
LYMPHOCYTES # BLD: 1.1 K/UL (ref 0.8–3.5)
LYMPHOCYTES NFR BLD: 23 % (ref 12–49)
MAGNESIUM SERPL-MCNC: 2.1 MG/DL (ref 1.6–2.4)
MCH RBC QN AUTO: 29 PG (ref 26–34)
MCHC RBC AUTO-ENTMCNC: 30.6 G/DL (ref 30–36.5)
MCV RBC AUTO: 94.7 FL (ref 80–99)
METAMYELOCYTES NFR BLD MANUAL: 1 %
MONOCYTES # BLD: 0.4 K/UL (ref 0–1)
MONOCYTES NFR BLD: 9 % (ref 5–13)
MYELOCYTES NFR BLD MANUAL: 1 %
NEUTS BAND NFR BLD MANUAL: 1 % (ref 0–6)
NEUTS SEG # BLD: 3 K/UL (ref 1.8–8)
NEUTS SEG NFR BLD: 62 % (ref 32–75)
NRBC # BLD: 0 K/UL (ref 0–0.01)
NRBC BLD-RTO: 0 PER 100 WBC
O2/TOTAL GAS SETTING VFR VENT: 50 %
PCO2 BLD: 45.2 MMHG (ref 35–45)
PEEP RESPIRATORY: 8 CMH2O
PH BLD: 7.47 [PH] (ref 7.35–7.45)
PHOSPHATE SERPL-MCNC: 3.7 MG/DL (ref 2.6–4.7)
PIP ISTAT,IPIP: 24
PLATELET # BLD AUTO: 261 K/UL (ref 150–400)
PLATELET COMMENTS,PCOM: ABNORMAL
PMV BLD AUTO: 9.9 FL (ref 8.9–12.9)
PO2 BLD: 83 MMHG (ref 80–100)
POTASSIUM SERPL-SCNC: 3.8 MMOL/L (ref 3.5–5.1)
PROT SERPL-MCNC: 6.6 G/DL (ref 6.4–8.2)
RBC # BLD AUTO: 2.83 M/UL (ref 4.1–5.7)
RBC MORPH BLD: ABNORMAL
SAO2 % BLD: 97 % (ref 92–97)
SODIUM SERPL-SCNC: 140 MMOL/L (ref 136–145)
SPECIMEN TYPE: ABNORMAL
TOTAL RESP. RATE, ITRR: 26
VENTILATION MODE VENT: ABNORMAL
WBC # BLD AUTO: 4.8 K/UL (ref 4.1–11.1)

## 2020-09-02 PROCEDURE — 74011000258 HC RX REV CODE- 258: Performed by: INTERNAL MEDICINE

## 2020-09-02 PROCEDURE — 74011000250 HC RX REV CODE- 250: Performed by: INTERNAL MEDICINE

## 2020-09-02 PROCEDURE — 74011250636 HC RX REV CODE- 250/636: Performed by: INTERNAL MEDICINE

## 2020-09-02 PROCEDURE — 71045 X-RAY EXAM CHEST 1 VIEW: CPT

## 2020-09-02 PROCEDURE — 94760 N-INVAS EAR/PLS OXIMETRY 1: CPT

## 2020-09-02 PROCEDURE — 74011250636 HC RX REV CODE- 250/636: Performed by: NURSE PRACTITIONER

## 2020-09-02 PROCEDURE — 74011250637 HC RX REV CODE- 250/637: Performed by: INTERNAL MEDICINE

## 2020-09-02 PROCEDURE — 74011250637 HC RX REV CODE- 250/637: Performed by: NURSE PRACTITIONER

## 2020-09-02 PROCEDURE — 65610000006 HC RM INTENSIVE CARE

## 2020-09-02 PROCEDURE — 77030018861

## 2020-09-02 PROCEDURE — 83735 ASSAY OF MAGNESIUM: CPT

## 2020-09-02 PROCEDURE — 82803 BLOOD GASES ANY COMBINATION: CPT

## 2020-09-02 PROCEDURE — 84100 ASSAY OF PHOSPHORUS: CPT

## 2020-09-02 PROCEDURE — 85025 COMPLETE CBC W/AUTO DIFF WBC: CPT

## 2020-09-02 PROCEDURE — 36600 WITHDRAWAL OF ARTERIAL BLOOD: CPT

## 2020-09-02 PROCEDURE — 94003 VENT MGMT INPAT SUBQ DAY: CPT

## 2020-09-02 PROCEDURE — 77010033678 HC OXYGEN DAILY

## 2020-09-02 PROCEDURE — 99291 CRITICAL CARE FIRST HOUR: CPT | Performed by: THORACIC SURGERY (CARDIOTHORACIC VASCULAR SURGERY)

## 2020-09-02 PROCEDURE — 74011250637 HC RX REV CODE- 250/637: Performed by: HOSPITALIST

## 2020-09-02 PROCEDURE — 36415 COLL VENOUS BLD VENIPUNCTURE: CPT

## 2020-09-02 PROCEDURE — 36573 INSJ PICC RS&I 5 YR+: CPT | Performed by: INTERNAL MEDICINE

## 2020-09-02 PROCEDURE — 99231 SBSQ HOSP IP/OBS SF/LOW 25: CPT | Performed by: PHYSICIAN ASSISTANT

## 2020-09-02 PROCEDURE — 80053 COMPREHEN METABOLIC PANEL: CPT

## 2020-09-02 RX ORDER — POTASSIUM CHLORIDE 29.8 MG/ML
20 INJECTION INTRAVENOUS ONCE
Status: COMPLETED | OUTPATIENT
Start: 2020-09-02 | End: 2020-09-02

## 2020-09-02 RX ADMIN — CHLORDIAZEPOXIDE HYDROCHLORIDE 100 MG: 25 CAPSULE ORAL at 00:28

## 2020-09-02 RX ADMIN — Medication 2 MG/HR: at 17:00

## 2020-09-02 RX ADMIN — CHLORHEXIDINE GLUCONATE 15 ML: 0.12 RINSE ORAL at 20:18

## 2020-09-02 RX ADMIN — CHLORHEXIDINE GLUCONATE 15 ML: 0.12 RINSE ORAL at 08:32

## 2020-09-02 RX ADMIN — POTASSIUM CHLORIDE 20 MEQ: 29.8 INJECTION, SOLUTION INTRAVENOUS at 00:29

## 2020-09-02 RX ADMIN — ENOXAPARIN SODIUM 50 MG: 60 INJECTION SUBCUTANEOUS at 21:44

## 2020-09-02 RX ADMIN — Medication 10 ML: at 21:44

## 2020-09-02 RX ADMIN — QUETIAPINE FUMARATE 50 MG: 25 TABLET ORAL at 21:44

## 2020-09-02 RX ADMIN — FAMOTIDINE 20 MG: 40 POWDER, FOR SUSPENSION ORAL at 17:03

## 2020-09-02 RX ADMIN — Medication: at 17:03

## 2020-09-02 RX ADMIN — Medication: at 08:44

## 2020-09-02 RX ADMIN — ASPIRIN 81 MG CHEWABLE TABLET 81 MG: 81 TABLET CHEWABLE at 08:31

## 2020-09-02 RX ADMIN — ACETAMINOPHEN ORAL SOLUTION 650 MG: 650 SOLUTION ORAL at 04:06

## 2020-09-02 RX ADMIN — Medication 10 ML: at 05:28

## 2020-09-02 RX ADMIN — DOCUSATE SODIUM 100 MG: 50 LIQUID ORAL at 08:31

## 2020-09-02 RX ADMIN — MIDAZOLAM HYDROCHLORIDE 8 MG/HR: 5 INJECTION, SOLUTION INTRAMUSCULAR; INTRAVENOUS at 04:15

## 2020-09-02 RX ADMIN — FAMOTIDINE 20 MG: 40 POWDER, FOR SUSPENSION ORAL at 08:29

## 2020-09-02 RX ADMIN — KETAMINE HYDROCHLORIDE 0.6 MG/KG/HR: 50 INJECTION, SOLUTION INTRAMUSCULAR; INTRAVENOUS at 02:14

## 2020-09-02 RX ADMIN — POLYETHYLENE GLYCOL 3350 17 G: 17 POWDER, FOR SOLUTION ORAL at 08:32

## 2020-09-02 RX ADMIN — CHLORDIAZEPOXIDE HYDROCHLORIDE 100 MG: 25 CAPSULE ORAL at 11:00

## 2020-09-02 RX ADMIN — DEXAMETHASONE SODIUM PHOSPHATE 2 MG: 10 INJECTION, SOLUTION INTRAMUSCULAR; INTRAVENOUS at 08:29

## 2020-09-02 RX ADMIN — KETAMINE HYDROCHLORIDE 0.4 MG/KG/HR: 50 INJECTION, SOLUTION INTRAMUSCULAR; INTRAVENOUS at 13:00

## 2020-09-02 RX ADMIN — ENOXAPARIN SODIUM 50 MG: 60 INJECTION SUBCUTANEOUS at 09:23

## 2020-09-02 RX ADMIN — CHLORDIAZEPOXIDE HYDROCHLORIDE 100 MG: 25 CAPSULE ORAL at 17:03

## 2020-09-02 RX ADMIN — Medication 2 MG/HR: at 04:12

## 2020-09-02 RX ADMIN — MIDAZOLAM HYDROCHLORIDE 8 MG/HR: 5 INJECTION, SOLUTION INTRAMUSCULAR; INTRAVENOUS at 17:54

## 2020-09-02 RX ADMIN — POTASSIUM CHLORIDE 20 MEQ: 29.8 INJECTION, SOLUTION INTRAVENOUS at 05:47

## 2020-09-02 RX ADMIN — CHLORDIAZEPOXIDE HYDROCHLORIDE 100 MG: 25 CAPSULE ORAL at 05:28

## 2020-09-02 RX ADMIN — Medication 10 ML: at 13:04

## 2020-09-02 NOTE — PROGRESS NOTES
Shift Summary-Patient vented, sedated on versed 8mg/hr, dilaudid 2mg/hr. Ketamine gtt weaned from . 45mg to .35mg. Patient opens eyes spont and to voice. Follows simple commands. Does not move R arm. Mcwilliams patent. Trickle TF via dobhoff, tolerating well. Had 2 BMs today. L CT in place, no output. 1930-Bedside and Verbal shift change report given to Garland Gloria (oncoming nurse) by Saravanan Aguirre (offgoing nurse). Report included the following information SBAR, Kardex, ED Summary, Procedure Summary, Intake/Output, MAR, Recent Results and Cardiac Rhythm SR/ST.

## 2020-09-02 NOTE — PROGRESS NOTES
ARDS  Acute hypoxic respiratory failure  COVID positive  Sinus pauses  S/P tracheostomy     Remains on Vent    Remains off paralytics    Remains on Dilaudid / Versed / Ketamine    Trying to wean Versed and Ketamine    Continues on Librium and Seroquel    Hgb and platelets look good    Creatinine normal    Bilirubin and other LFTs improving    Continues on steroids    Continues on Lovenox    Some low grade fevers    Agitation at times    TFs at goal     PEEP 8, FiO2 50%, RR 26     ABG - 7.47 / 45 / 83 / 33 / 9     CXR - diffuse interstitial disease persists      Intake/Output Summary (Last 24 hours) at 9/2/2020 1252  Last data filed at 9/2/2020 1200  Gross per 24 hour   Intake 3220.85 ml   Output 2185 ml   Net 1035.85 ml     Visit Vitals  BP (!) 128/91   Pulse (!) 118   Temp 99.5 °F (37.5 °C)   Resp 26   Ht 5' 11\" (1.803 m)   Wt 228 lb 9.9 oz (103.7 kg)   SpO2 99%   BMI 31.89 kg/m²       Risk of morbidity and mortality - high  Medical decision making - high complexity    Total critical care time - 30 minutes (CPT 49180)     I personally spent the above critical care time. This is time spent at this critically ill patient's bedside / unit / floor actively involved in patient care as well as the coordination of care and discussions with the patient's family. This does not include any procedural time which has been billed separately.

## 2020-09-02 NOTE — PROGRESS NOTES
Problem: Ventilator Management  Goal: *Adequate oxygenation and ventilation  Outcome: Progressing Towards Goal  Goal: *Patient maintains clear airway/free of aspiration  Outcome: Progressing Towards Goal  Goal: *Absence of infection signs and symptoms  Outcome: Progressing Towards Goal     Problem: Patient Education: Go to Patient Education Activity  Goal: Patient/Family Education  Outcome: Progressing Towards Goal     Problem: Breathing Pattern - Ineffective  Goal: *Absence of hypoxia  Outcome: Progressing Towards Goal  Goal: *Use of effective breathing techniques  Outcome: Progressing Towards Goal

## 2020-09-02 NOTE — PROGRESS NOTES
[facility]    ICU TEAM Progress Note    Name: Elsie Khoury   : 1990   MRN: [id number]   Date: 2020      I  Subjective:   Progress Note: 2020      Reason for ICU Admission: Respiratory failure due to COVID-19 infection    Interval history:  54-year-old male with no history of significant past medical history except smoking half pack per day. Joseph Dominguez was admitted on 2020 at [facility] with acute hypoxemic respiratory failure from COVID pneumonia. Joseph Dominguez continued to deteriorate and got intubated on 8/10.  Due to worsening hypoxemia, he is transferred to [facility] for ECMO evaluation. Joseph Dominguez has bee given convalescent plasma twice on  and  and treated with Remdesivir which was completed on 8/10. Joseph Dominguez is also on dexamethasone. Joseph Dominguez completed course of empiric antibiotics including azithromycin which was completed on  and cefepime was completed on .  During his hospital course he also developed pneumothorax and has left-sided chest tube since .  Has a tracheostomy tube placed on     Overnight Events:   No acute event overnight, had an episode of vomiting again yesterday and tube feeding was placed on hold overnight. Tube feeding resumed this morning, continued to have bowel movement. Fever seems to be improving. Continue to wean down sedation and this morning ketamine is down to 0.5 and Versed remained in the 8 and Dilaudid down to 3.     Active Problem List:     Problem List  Date Reviewed: 2020          Codes Class    Acute respiratory failure ([facility] [address] ICD-10-CM: J96.00  ICD-9-CM: 518.81         Pneumothorax on left ICD-10-CM: J93.9  ICD-9-CM: 512.89         Pneumonia due to severe acute respiratory syndrome coronavirus 2 (SARS-CoV-2) ICD-10-CM: U07.1, J12.89  ICD-9-CM: 480.8         Respiratory failure with hypoxia (HCC) ICD-10-CM: J96.91  ICD-9-CM: 518.81               Past Medical History:      has a past medical history of History of vascular access device (08/10/2020). Past Surgical History:      has a past surgical history that includes ir thora/ins chest tube(pneumo) wo image (2020); ir thora/ins chest tube(pneumo) wo image (2020); and pr tracheostomy, planned (2020). Home Medications:     Prior to Admission medications    Medication Sig Start Date End Date Taking? Authorizing Provider   benzonatate (Tessalon Perles) 100 mg capsule Take 100 mg by mouth three (3) times daily as needed for Cough. Provider, Historical       Allergies/Social/Family History:     No Known Allergies   Social History     Tobacco Use    Smoking status: Current Some Day Smoker    Smokeless tobacco: Never Used   Substance Use Topics    Alcohol use: Not on file      History reviewed. No pertinent family history. Review of Systems:     Not able to obtain due to patient medical condition    Objective:   Vital Signs:  Visit Vitals  /73   Pulse 94   Temp 99 °F (37.2 °C)   Resp 26   Ht 5' 11\" (1.803 m)   Wt 103.7 kg (228 lb 9.9 oz)   SpO2 100%   BMI 31.89 kg/m²      O2 Device: Ventilator, Tracheostomy Temp (24hrs), Av.7 °F (37.6 °C), Min:99 °F (37.2 °C), Max:100.3 °F (37.9 °C)           Intake/Output:     Intake/Output Summary (Last 24 hours) at 2020 0920  Last data filed at 2020 0800  Gross per 24 hour   Intake 3327.35 ml   Output 5380 ml   Net -2052.65 ml       Physical Exam:  General:   Sedated on mechanical ventilation via tracheostomy   Eyes:  Sclera anicteric. Pupils equally round and reactive to light. Mouth/Throat: Mucous membranes normal, oral pharynx clear   Neck: Supple   Lungs:    Good air entry bilaterally, fine crepitation   CV:  Regular rate and rhythm,no murmur, click, rub or gallop   Abdomen:   Soft, non-tender.  bowel sounds normal. non-distended   Extremities: No cyanosis , evolving edema   Skin: Skin color, texture, turgor normal. no acute rash or lesions   Lymph nodes: Cervical and supraclavicular normal Musculoskeletal: No swelling or deformity   Lines/Devices:  Intact, no erythema, drainage or tenderness   Psych:  Sedated, withdraws to painful stimuli, opens eyes spontaneously at times         LABS AND  DATA: Personally reviewed  Recent Labs     09/02/20  0548 09/01/20  0331   WBC 4.8 5.2   HGB 8.2* 8.3*   HCT 26.8* 26.4*    227     Recent Labs     09/02/20  0259 09/01/20  1902 09/01/20  0331    140 139   K 3.8 3.7 2.9*    102 103   CO2 30 33* 30   BUN 10 8 11   CREA 0.41* 0.32* 0.31*   GLU 84 82 100   CA 8.8 8.4* 7.8*   MG 2.1 1.8 1.5*   PHOS 3.7  --  2.9     Recent Labs     09/02/20  0259 09/01/20  0331   * 167*   TP 6.6 5.6*   ALB 2.3* 2.0*   GLOB 4.3* 3.6     No results for input(s): INR, PTP, APTT, INREXT in the last 72 hours. Recent Labs     09/02/20  0402 08/31/20  1039   PHI 7.47* 7.40   PCO2I 45.2* 43.7   PO2I 83 95   FIO2I 50 50     No results for input(s): CPK, CKMB, TROIQ, BNPP in the last 72 hours. Hemodynamics:   PAP:   CO:     Wedge:   CI:     CVP:    SVR:       PVR:       Ventilator Settings:  Mode Rate Tidal Volume Pressure FiO2 PEEP   Pressure control   2 ml  0 cm H2O 50 % 8 cm H20     Peak airway pressure: 37 cm H2O    Minute ventilation: 8.68 l/min        MEDS: Reviewed    Chest X-Ray:  I reviewed the chest x-ray done here today myself, persistent bilateral infiltrates though I believe there is some improvement mainly on the left upper lobe may be related to the diuresis  CXR Results  (Last 48 hours)               09/02/20 0515  XR CHEST PORT Final result    Impression:  IMPRESSION:   No significant change in diffuse interstitial and alveolar lung disease. Narrative:  INDICATION:  Tube Placement       COMPARISON:  9/1/20       FINDINGS:    A portable AP radiograph of the chest was obtained at 0414 hours. The patient is on a cardiac monitor. Tracheostomy tube is adequately positioned   and Dobbhoff type feeding tube is again noted traversing the thorax. Continued bilateral interstitial and alveolar infiltrates. No pneumothorax. 09/01/20 0558  XR CHEST PORT Final result    Impression:  IMPRESSION: Stable diffuse airspace disease. Low lung volumes. Narrative:  EXAM: XR CHEST PORT       INDICATION: Evaluate tube positions       COMPARISON: Prior day       FINDINGS: A portable AP radiograph of the chest was obtained at 0458 hours. The   patient is on a cardiac monitor. The Dobbhoff tube extends below the   hemidiaphragm. A tracheostomy tube is in place. The PICC line extends to the   caval atrial junction. There is diffuse bilateral airspace disease, unchanged   allowing for diminution in lung volumes. Assessment and Plan:   -Bilateral pneumonia due to COVID-19 infection  - Acute hypoxic respiratory failure due to COVID-19 infection status post tracheostomy on August 26  - Pneumothorax status post left chest tube insertion on August 11 without air leak  - Possible bacterial super imposed infection: Completed antibiotic course  -History of tobacco use     - Continue to wean sedation as tolerated, Versed now down to 8,  ketamine down to 4.5, goal is to wean ketamine as possible today. Continue low-dose hydromorphone  - Ventilator management, currently on pressure control driving pressure of 24 rate of 26 PEEP of 8,  will wean the PEEP down to 5, will decrease the rate down to 22 specially with evolving metabolic alkalosis with diuresis. We will continue to monitor ABG  - Fever seems to be subsiding, he finished complete course of antibiotic, no leukocytosis and his oxygenation is improving. Blood culture repeated on August 31 and still negative   -Continue to wean down steroid  -Hold on diuresis today, replace electrolyte as needed  - Completed COVID-19 treatment  - DVT and GI prophylaxis [with hypercoagulable status dose for COVID-19] and ventilator bundle, trach care.     DISPOSITION  Stay in ICU    CRITICAL CARE CONSULTANT NOTE  I had a face to face encounter with the patient, reviewed and interpreted patient data including clinical events, labs, images, vital signs, I/O's, and examined patient. I have discussed the case and the plan and management of the patient's care with the consulting services, the bedside nurses and the respiratory therapist.      NOTE OF PERSONAL INVOLVEMENT IN CARE   This patient has a high probability of imminent, clinically significant deterioration, which requires the highest level of preparedness to intervene urgently. I participated in the decision-making and personally managed or directed the management of the following life and organ supporting interventions that required my frequent assessment to treat or prevent imminent deterioration. I personally spent 40 minutes of critical care time. This is time spent at this critically ill patient's bedside actively involved in patient care as well as the coordination of care and discussions with the patient's family. This does not include any procedural time which has been billed separately. Anastacia Rosario M.D.   Staff Intensivist/Pulmonologist  [facility]  9/2/2020

## 2020-09-02 NOTE — PROGRESS NOTES
0730-Bedside and Verbal shift change report given to [address] (oncoming nurse) by Ann Wright (offgoing nurse).  Report included the following information SBAR, Kardex, ED Summary, Procedure Summary, Intake/Output, MAR, Recent Results and Cardiac Rhythm SR.

## 2020-09-02 NOTE — ADVANCED PRACTICE NURSE
[address] [facility]  [address], [address] [facility], [address]  [phone]  ____________________________________________________________    Patient identified. Patient had percutaneous tracheostomy placed by Dr. Leticia Sexton 1 week ago. Trach site clean & dry. Using sterile instruments, 4 blue sutures removed with ease  Velcro collar snug. No immediate complications. Patient tolerated the procedure well.     Signed By: JUAN Frey     September 2, 2020

## 2020-09-02 NOTE — PROGRESS NOTES
1930: Bedside shift change report given to [address] [place] [address] (oncoming nurse) by Kandi Zhang (offgoing nurse). Report included the following information SBAR, Kardex, Intake/Output, MAR, Recent Results, Cardiac Rhythm sinus tach/NSR and Alarm Parameters . 0030: No blood return from grey port on L PICC after 1mg cathflo w/ 120 min long dwelling x2. Aditya Chávez NP made aware. Port is capped and labeled not to flush. Red and white ports flush very sluggishly but also have no blood return. Central dressing change performed on PICC to make sure it wasn't kinked. 0730: Bedside shift change report given to RN (oncoming nurse) by [address] [place] [address] (offgoing nurse). Report included the following information SBAR, Kardex, Intake/Output, MAR, Recent Results, Cardiac Rhythm Sinus tach/NSR and Alarm Parameters .

## 2020-09-03 ENCOUNTER — APPOINTMENT (OUTPATIENT)
Dept: GENERAL RADIOLOGY | Age: 30
DRG: 004 | End: 2020-09-03
Attending: NURSE PRACTITIONER
Payer: COMMERCIAL

## 2020-09-03 ENCOUNTER — APPOINTMENT (OUTPATIENT)
Dept: CT IMAGING | Age: 30
DRG: 004 | End: 2020-09-03
Attending: INTERNAL MEDICINE
Payer: COMMERCIAL

## 2020-09-03 LAB
ALBUMIN SERPL-MCNC: 2 G/DL (ref 3.5–5)
ALBUMIN/GLOB SERPL: 0.4 {RATIO} (ref 1.1–2.2)
ALP SERPL-CCNC: 170 U/L (ref 45–117)
ALT SERPL-CCNC: 256 U/L (ref 12–78)
ANION GAP SERPL CALC-SCNC: 6 MMOL/L (ref 5–15)
ARTERIAL PATENCY WRIST A: YES
AST SERPL-CCNC: 154 U/L (ref 15–37)
BASE EXCESS BLD CALC-SCNC: 7 MMOL/L
BASOPHILS # BLD: 0 K/UL (ref 0–0.1)
BASOPHILS NFR BLD: 0 % (ref 0–1)
BDY SITE: ABNORMAL
BILIRUB SERPL-MCNC: 0.5 MG/DL (ref 0.2–1)
BUN SERPL-MCNC: 6 MG/DL (ref 6–20)
BUN/CREAT SERPL: 11 (ref 12–20)
CA-I BLD-SCNC: 1.24 MMOL/L (ref 1.12–1.32)
CALCIUM SERPL-MCNC: 8.9 MG/DL (ref 8.5–10.1)
CHLORIDE SERPL-SCNC: 99 MMOL/L (ref 97–108)
CO2 SERPL-SCNC: 31 MMOL/L (ref 21–32)
CREAT SERPL-MCNC: 0.54 MG/DL (ref 0.7–1.3)
D DIMER PPP FEU-MCNC: 1.97 MG/L FEU (ref 0–0.65)
DIFFERENTIAL METHOD BLD: ABNORMAL
EOSINOPHIL # BLD: 0.3 K/UL (ref 0–0.4)
EOSINOPHIL NFR BLD: 4 % (ref 0–7)
ERYTHROCYTE [DISTWIDTH] IN BLOOD BY AUTOMATED COUNT: 14.8 % (ref 11.5–14.5)
GAS FLOW.O2 O2 DELIVERY SYS: ABNORMAL L/MIN
GAS FLOW.O2 SETTING OXYMISER: 22 BPM
GLOBULIN SER CALC-MCNC: 5.1 G/DL (ref 2–4)
GLUCOSE SERPL-MCNC: 90 MG/DL (ref 65–100)
HCO3 BLD-SCNC: 30.5 MMOL/L (ref 22–26)
HCT VFR BLD AUTO: 28.3 % (ref 36.6–50.3)
HGB BLD-MCNC: 8.8 G/DL (ref 12.1–17)
IMM GRANULOCYTES # BLD AUTO: 0 K/UL
IMM GRANULOCYTES NFR BLD AUTO: 0 %
INSPIRATION.DURATION SETTING TIME VENT: 0.68 SEC
LYMPHOCYTES # BLD: 1.6 K/UL (ref 0.8–3.5)
LYMPHOCYTES NFR BLD: 26 % (ref 12–49)
MAGNESIUM SERPL-MCNC: 1.8 MG/DL (ref 1.6–2.4)
MCH RBC QN AUTO: 29 PG (ref 26–34)
MCHC RBC AUTO-ENTMCNC: 31.1 G/DL (ref 30–36.5)
MCV RBC AUTO: 93.4 FL (ref 80–99)
METAMYELOCYTES NFR BLD MANUAL: 1 %
MONOCYTES # BLD: 0.6 K/UL (ref 0–1)
MONOCYTES NFR BLD: 10 % (ref 5–13)
MYELOCYTES NFR BLD MANUAL: 1 %
NEUTS BAND NFR BLD MANUAL: 3 % (ref 0–6)
NEUTS SEG # BLD: 3.7 K/UL (ref 1.8–8)
NEUTS SEG NFR BLD: 55 % (ref 32–75)
NRBC # BLD: 0 K/UL (ref 0–0.01)
NRBC BLD-RTO: 0 PER 100 WBC
O2/TOTAL GAS SETTING VFR VENT: 40 %
PCO2 BLD: 40.2 MMHG (ref 35–45)
PEEP RESPIRATORY: 5 CMH2O
PH BLD: 7.49 [PH] (ref 7.35–7.45)
PHOSPHATE SERPL-MCNC: 4.2 MG/DL (ref 2.6–4.7)
PIP ISTAT,IPIP: 24
PLATELET # BLD AUTO: 284 K/UL (ref 150–400)
PMV BLD AUTO: 9.9 FL (ref 8.9–12.9)
PO2 BLD: 67 MMHG (ref 80–100)
POTASSIUM SERPL-SCNC: 3.3 MMOL/L (ref 3.5–5.1)
PROT SERPL-MCNC: 7.1 G/DL (ref 6.4–8.2)
RBC # BLD AUTO: 3.03 M/UL (ref 4.1–5.7)
RBC MORPH BLD: ABNORMAL
RBC MORPH BLD: ABNORMAL
SAO2 % BLD: 94 % (ref 92–97)
SODIUM SERPL-SCNC: 136 MMOL/L (ref 136–145)
SPECIMEN TYPE: ABNORMAL
TOTAL RESP. RATE, ITRR: 25
VENTILATION MODE VENT: ABNORMAL
WBC # BLD AUTO: 6.3 K/UL (ref 4.1–11.1)

## 2020-09-03 PROCEDURE — 77030020365 HC SOL INJ SOD CL 0.9% 50ML

## 2020-09-03 PROCEDURE — 85379 FIBRIN DEGRADATION QUANT: CPT

## 2020-09-03 PROCEDURE — 36592 COLLECT BLOOD FROM PICC: CPT

## 2020-09-03 PROCEDURE — 77010033678 HC OXYGEN DAILY

## 2020-09-03 PROCEDURE — C1751 CATH, INF, PER/CENT/MIDLINE: HCPCS

## 2020-09-03 PROCEDURE — 74011250637 HC RX REV CODE- 250/637: Performed by: HOSPITALIST

## 2020-09-03 PROCEDURE — 84100 ASSAY OF PHOSPHORUS: CPT

## 2020-09-03 PROCEDURE — 74011250637 HC RX REV CODE- 250/637: Performed by: NURSE PRACTITIONER

## 2020-09-03 PROCEDURE — 02HV33Z INSERTION OF INFUSION DEVICE INTO SUPERIOR VENA CAVA, PERCUTANEOUS APPROACH: ICD-10-PCS | Performed by: INTERNAL MEDICINE

## 2020-09-03 PROCEDURE — 74011250636 HC RX REV CODE- 250/636: Performed by: INTERNAL MEDICINE

## 2020-09-03 PROCEDURE — 76937 US GUIDE VASCULAR ACCESS: CPT

## 2020-09-03 PROCEDURE — 36573 INSJ PICC RS&I 5 YR+: CPT | Performed by: NURSE PRACTITIONER

## 2020-09-03 PROCEDURE — 83735 ASSAY OF MAGNESIUM: CPT

## 2020-09-03 PROCEDURE — 99291 CRITICAL CARE FIRST HOUR: CPT | Performed by: THORACIC SURGERY (CARDIOTHORACIC VASCULAR SURGERY)

## 2020-09-03 PROCEDURE — 74011250636 HC RX REV CODE- 250/636: Performed by: NURSE PRACTITIONER

## 2020-09-03 PROCEDURE — 74011000250 HC RX REV CODE- 250: Performed by: NURSE PRACTITIONER

## 2020-09-03 PROCEDURE — 82803 BLOOD GASES ANY COMBINATION: CPT

## 2020-09-03 PROCEDURE — 94760 N-INVAS EAR/PLS OXIMETRY 1: CPT

## 2020-09-03 PROCEDURE — 70450 CT HEAD/BRAIN W/O DYE: CPT

## 2020-09-03 PROCEDURE — 74011250637 HC RX REV CODE- 250/637: Performed by: INTERNAL MEDICINE

## 2020-09-03 PROCEDURE — 74011000258 HC RX REV CODE- 258: Performed by: INTERNAL MEDICINE

## 2020-09-03 PROCEDURE — 36600 WITHDRAWAL OF ARTERIAL BLOOD: CPT

## 2020-09-03 PROCEDURE — 80053 COMPREHEN METABOLIC PANEL: CPT

## 2020-09-03 PROCEDURE — 74011000250 HC RX REV CODE- 250: Performed by: INTERNAL MEDICINE

## 2020-09-03 PROCEDURE — 85025 COMPLETE CBC W/AUTO DIFF WBC: CPT

## 2020-09-03 PROCEDURE — 65610000006 HC RM INTENSIVE CARE

## 2020-09-03 PROCEDURE — 71045 X-RAY EXAM CHEST 1 VIEW: CPT

## 2020-09-03 PROCEDURE — 94003 VENT MGMT INPAT SUBQ DAY: CPT

## 2020-09-03 PROCEDURE — 36415 COLL VENOUS BLD VENIPUNCTURE: CPT

## 2020-09-03 RX ORDER — MAGNESIUM SULFATE 1 G/100ML
1 INJECTION INTRAVENOUS ONCE
Status: COMPLETED | OUTPATIENT
Start: 2020-09-03 | End: 2020-09-03

## 2020-09-03 RX ORDER — SODIUM CHLORIDE 0.9 % (FLUSH) 0.9 %
10 SYRINGE (ML) INJECTION
Status: DISCONTINUED | OUTPATIENT
Start: 2020-09-03 | End: 2020-09-04

## 2020-09-03 RX ORDER — QUETIAPINE FUMARATE 100 MG/1
100 TABLET, FILM COATED ORAL
Status: DISCONTINUED | OUTPATIENT
Start: 2020-09-03 | End: 2020-09-17

## 2020-09-03 RX ORDER — POTASSIUM CHLORIDE 29.8 MG/ML
20 INJECTION INTRAVENOUS
Status: COMPLETED | OUTPATIENT
Start: 2020-09-03 | End: 2020-09-03

## 2020-09-03 RX ORDER — OXYCODONE HCL 5 MG/5 ML
5 SOLUTION, ORAL ORAL
Status: DISCONTINUED | OUTPATIENT
Start: 2020-09-03 | End: 2020-10-09

## 2020-09-03 RX ADMIN — QUETIAPINE FUMARATE 100 MG: 100 TABLET ORAL at 21:36

## 2020-09-03 RX ADMIN — FENTANYL CITRATE 50 MCG: 50 INJECTION, SOLUTION INTRAMUSCULAR; INTRAVENOUS at 05:41

## 2020-09-03 RX ADMIN — POTASSIUM CHLORIDE 20 MEQ: 29.8 INJECTION, SOLUTION INTRAVENOUS at 11:45

## 2020-09-03 RX ADMIN — CHLORDIAZEPOXIDE HYDROCHLORIDE 100 MG: 25 CAPSULE ORAL at 05:15

## 2020-09-03 RX ADMIN — ACETAMINOPHEN ORAL SOLUTION 650 MG: 650 SOLUTION ORAL at 20:36

## 2020-09-03 RX ADMIN — POTASSIUM CHLORIDE 20 MEQ: 29.8 INJECTION, SOLUTION INTRAVENOUS at 08:53

## 2020-09-03 RX ADMIN — ENOXAPARIN SODIUM 50 MG: 60 INJECTION SUBCUTANEOUS at 09:02

## 2020-09-03 RX ADMIN — MAGNESIUM SULFATE HEPTAHYDRATE 1 G: 1 INJECTION, SOLUTION INTRAVENOUS at 08:53

## 2020-09-03 RX ADMIN — ACETAMINOPHEN ORAL SOLUTION 650 MG: 650 SOLUTION ORAL at 04:53

## 2020-09-03 RX ADMIN — Medication 10 ML: at 14:29

## 2020-09-03 RX ADMIN — POTASSIUM CHLORIDE 20 MEQ: 29.8 INJECTION, SOLUTION INTRAVENOUS at 10:16

## 2020-09-03 RX ADMIN — Medication 2 MG/HR: at 05:59

## 2020-09-03 RX ADMIN — ENOXAPARIN SODIUM 50 MG: 60 INJECTION SUBCUTANEOUS at 21:37

## 2020-09-03 RX ADMIN — MIDAZOLAM HYDROCHLORIDE 8 MG/HR: 5 INJECTION, SOLUTION INTRAMUSCULAR; INTRAVENOUS at 06:03

## 2020-09-03 RX ADMIN — Medication: at 17:43

## 2020-09-03 RX ADMIN — Medication: at 08:54

## 2020-09-03 RX ADMIN — ASPIRIN 81 MG CHEWABLE TABLET 81 MG: 81 TABLET CHEWABLE at 08:54

## 2020-09-03 RX ADMIN — FENTANYL CITRATE 100 MCG: 50 INJECTION, SOLUTION INTRAMUSCULAR; INTRAVENOUS at 13:32

## 2020-09-03 RX ADMIN — ACETAMINOPHEN ORAL SOLUTION 650 MG: 650 SOLUTION ORAL at 10:15

## 2020-09-03 RX ADMIN — CHLORHEXIDINE GLUCONATE 15 ML: 0.12 RINSE ORAL at 20:36

## 2020-09-03 RX ADMIN — FENTANYL CITRATE 100 MCG: 50 INJECTION, SOLUTION INTRAMUSCULAR; INTRAVENOUS at 08:55

## 2020-09-03 RX ADMIN — KETAMINE HYDROCHLORIDE 0.3 MG/KG/HR: 50 INJECTION, SOLUTION INTRAMUSCULAR; INTRAVENOUS at 04:53

## 2020-09-03 RX ADMIN — CHLORDIAZEPOXIDE HYDROCHLORIDE 100 MG: 25 CAPSULE ORAL at 00:35

## 2020-09-03 RX ADMIN — GUAIFENESIN 400 MG: 200 SOLUTION ORAL at 05:44

## 2020-09-03 RX ADMIN — MIDAZOLAM HYDROCHLORIDE 8 MG/HR: 5 INJECTION, SOLUTION INTRAMUSCULAR; INTRAVENOUS at 19:01

## 2020-09-03 RX ADMIN — FAMOTIDINE 20 MG: 40 POWDER, FOR SUSPENSION ORAL at 17:42

## 2020-09-03 RX ADMIN — GUAIFENESIN 400 MG: 200 SOLUTION ORAL at 10:16

## 2020-09-03 RX ADMIN — CHLORHEXIDINE GLUCONATE 15 ML: 0.12 RINSE ORAL at 08:54

## 2020-09-03 RX ADMIN — CHLORDIAZEPOXIDE HYDROCHLORIDE 100 MG: 25 CAPSULE ORAL at 17:42

## 2020-09-03 RX ADMIN — Medication 10 ML: at 21:47

## 2020-09-03 RX ADMIN — CHLORDIAZEPOXIDE HYDROCHLORIDE 100 MG: 25 CAPSULE ORAL at 11:45

## 2020-09-03 RX ADMIN — METOPROLOL TARTRATE 5 MG: 1 INJECTION, SOLUTION INTRAVENOUS at 22:42

## 2020-09-03 RX ADMIN — DEXAMETHASONE SODIUM PHOSPHATE 2 MG: 10 INJECTION, SOLUTION INTRAMUSCULAR; INTRAVENOUS at 08:53

## 2020-09-03 RX ADMIN — MIDAZOLAM HYDROCHLORIDE 2 MG: 2 INJECTION, SOLUTION INTRAMUSCULAR; INTRAVENOUS at 10:15

## 2020-09-03 RX ADMIN — Medication 1 MG/HR: at 17:55

## 2020-09-03 RX ADMIN — Medication 10 ML: at 05:15

## 2020-09-03 RX ADMIN — FAMOTIDINE 20 MG: 40 POWDER, FOR SUSPENSION ORAL at 08:55

## 2020-09-03 NOTE — PROGRESS NOTES
1930: Bedside and Verbal shift change report given to [facility] (oncoming nurse) by [facility] (offgoing nurse). Report included the following information SBAR, Kardex, ED Summary, Intake/Output, MAR, Recent Results, Cardiac Rhythm ST and Alarm Parameters . 0300: Notified CHELE Reyes NP of swelling and drainage around trach site. Primary Nurse Malini Herrera and Andrena Councilman, RN performed a dual skin assessment on this patient Impairment noted- see wound doc flow sheet  Enrique score is 14    0730: Bedside and Verbal shift change report given to [address] (oncoming nurse) by Quiana Lu (offgoing nurse). Report included the following information SBAR, Kardex, ED Summary, Intake/Output, MAR, Recent Results, Cardiac Rhythm ST and Alarm Parameters .

## 2020-09-03 NOTE — PROGRESS NOTES
[facility]    ICU TEAM Progress Note    Name: Tawana Wallace   : 1990   MRN: [id number]   Date: 9/3/2020      I  Subjective:   Progress Note: 9/3/2020      Reason for ICU Admission: Respiratory failure due to COVID-19 infection    Interval history:  35-year-old male with no history of significant past medical history except smoking half pack per day. Dara Copeland was admitted on 2020 at [facility] with acute hypoxemic respiratory failure from COVID pneumonia. Dara Copeland continued to deteriorate and got intubated on 8/10.  Due to worsening hypoxemia, he is transferred to [facility] for ECMO evaluation. Dara Copeland has bee given convalescent plasma twice on  and  and treated with Remdesivir which was completed on 8/10. Dara Copeland is also on dexamethasone. Dara Copeland completed course of empiric antibiotics including azithromycin which was completed on  and cefepime was completed on .  During his hospital course he also developed pneumothorax and has left-sided chest tube since .  Has a tracheostomy tube placed on     Overnight Events:   No acute event overnight. Continues to be able to wean down ketamine and this morning is down to 0.3. Tracheostomy sutures removed on  by thoracic surgeon. Continue to be on PEEP of 5. Oxygenation is adequate. Did not receive diuretics but good urine output.   Low-grade fever persisted    Active Problem List:     Problem List  Date Reviewed: 2020          Codes Class    Acute respiratory failure ([facility] ICD-10-CM: J96.00  ICD-9-CM: 518.81         Pneumothorax on left ICD-10-CM: J93.9  ICD-9-CM: 512.89         Pneumonia due to severe acute respiratory syndrome coronavirus 2 (SARS-CoV-2) ICD-10-CM: U07.1, J12.89  ICD-9-CM: 480.8         Respiratory failure with hypoxia (HCC) ICD-10-CM: J96.91  ICD-9-CM: 518.81               Past Medical History:      has a past medical history of History of vascular access device (08/10/2020). Past Surgical History:      has a past surgical history that includes ir thora/ins chest tube(pneumo) wo image (2020); ir thora/ins chest tube(pneumo) wo image (2020); and pr tracheostomy, planned (2020). Home Medications:     Prior to Admission medications    Medication Sig Start Date End Date Taking? Authorizing Provider   benzonatate (Tessalon Perles) 100 mg capsule Take 100 mg by mouth three (3) times daily as needed for Cough. Provider, Historical       Allergies/Social/Family History:     No Known Allergies   Social History     Tobacco Use    Smoking status: Current Some Day Smoker    Smokeless tobacco: Never Used   Substance Use Topics    Alcohol use: Not on file      History reviewed. No pertinent family history. Review of Systems:     Not able to obtain due to his medical condition    Objective:   Vital Signs:  Visit Vitals  BP (!) 134/105   Pulse (!) 137   Temp (!) 100.6 °F (38.1 °C)   Resp (!) 32   Ht 5' 11\" (1.803 m)   Wt 99.8 kg (220 lb 0.3 oz)   SpO2 92%   BMI 30.69 kg/m²      O2 Device: Ventilator, Tracheostomy, Heated, Humidifier Temp (24hrs), Av.8 °F (37.7 °C), Min:99.3 °F (37.4 °C), Max:100.6 °F (38.1 °C)           Intake/Output:     Intake/Output Summary (Last 24 hours) at 9/3/2020 0906  Last data filed at 9/3/2020 0700  Gross per 24 hour   Intake 2094.97 ml   Output 3010 ml   Net -915.03 ml       Physical Exam:    General:   Sedated on mechanical ventilation via tracheostomy   Eyes:  Sclera anicteric. Pupils equally round and reactive to light. Mouth/Throat: Mucous membranes normal, oral pharynx clear   Neck: Supple   Lungs:    Good air entry bilaterally, fine crepitation   CV:  Regular rate and rhythm,no murmur, click, rub or gallop   Abdomen:   Soft, non-tender.  bowel sounds normal. non-distended   Extremities: No cyanosis , evolving edema   Skin: Skin color, texture, turgor normal. no acute rash or lesions   Lymph nodes: Cervical and supraclavicular normal   Musculoskeletal: No swelling or deformity   Lines/Devices:  Intact, no erythema, drainage or tenderness   Psych:  Sedated, opens eyes spontaneously, sometimes seems to be tracking and follow simple command but this is not persistent. Today he is not moving right arm, no deformity noticed, good pulses good color good capillary refill no shoulder or neck tenderness or swelling. He is able to wiggle right toes. Face is symmetrical.          LABS AND  DATA: Personally reviewed  Recent Labs     09/03/20 0452 09/02/20  0548   WBC 6.3 4.8   HGB 8.8* 8.2*   HCT 28.3* 26.8*    261     Recent Labs     09/03/20 0452 09/02/20  0259    140   K 3.3* 3.8   CL 99 103   CO2 31 30   BUN 6 10   CREA 0.54* 0.41*   GLU 90 84   CA 8.9 8.8   MG 1.8 2.1   PHOS 4.2 3.7     Recent Labs     09/03/20 0452 09/02/20  0259   * 176*   TP 7.1 6.6   ALB 2.0* 2.3*   GLOB 5.1* 4.3*     No results for input(s): INR, PTP, APTT, INREXT in the last 72 hours. Recent Labs     09/03/20 0459 09/02/20  0402   PHI 7.49* 7.47*   PCO2I 40.2 45.2*   PO2I 67* 83   FIO2I 40 50     No results for input(s): CPK, CKMB, TROIQ, BNPP in the last 72 hours. Hemodynamics:   PAP:   CO:     Wedge:   CI:     CVP:    SVR:       PVR:       Ventilator Settings:  Mode Rate Tidal Volume Pressure FiO2 PEEP   Assist control, Pressure control   2 ml  0 cm H2O 40 % 8 cm H20     Peak airway pressure: 34 cm H2O    Minute ventilation: 13.1 l/min        MEDS: Reviewed    Chest X-Ray:  CXR Results  (Last 48 hours)               09/03/20 0602  XR CHEST PORT Final result    Impression:  IMPRESSION: No significant change including left arm PICC line tip in the right   subclavian vein. Narrative:  INDICATION:  Respiratory failure       EXAM: CXR Portable. FINDINGS: Portable chest shows support lines/devices show no significant change   since yesterday including left arm PICC line tip in the right subclavian vein.    There is no apparent pneumothorax. Lungs show no volumes with diffuse airspace   disease/edema. Heart size is obscured. There is no midline shift. 09/02/20 0515  XR CHEST PORT Final result    Impression:  IMPRESSION:   No significant change in diffuse interstitial and alveolar lung disease. Narrative:  INDICATION:  Tube Placement       COMPARISON:  9/1/20       FINDINGS:    A portable AP radiograph of the chest was obtained at 0414 hours. The patient is on a cardiac monitor. Tracheostomy tube is adequately positioned   and Dobbhoff type feeding tube is again noted traversing the thorax. Continued bilateral interstitial and alveolar infiltrates. No pneumothorax. Assessment and Plan:   -Bilateral pneumonia due to COVID-19 infection  -Acute right arm weakness: Difficult to determine the time of onset as he is sedated on mechanical ventilation in isolation room, as stated above on physical examination there is no vascular compromise no skeletal or joint deformity. Face is symmetrical.  I will obtain CTA head and neck. - Acute hypoxic respiratory failure due to COVID-19 infection status post tracheostomy on August 26  - Pneumothorax status post left chest tube insertion on August 11 without air leak  - Possible bacterial super imposed infection: Completed antibiotic course  -History of tobacco use    -Continue to wean sedation as tolerated. Goal is to wean ketamine before Versed and narcotics. - At this time on PEEP of 5 and FiO2 of 50%, PO2 is marginal.  We will continue to wean as tolerated  - Good urine output. No need for diuretics today. Will use as needed. Replace electrolytes as indicated  - Still low-grade fever, no leukocytosis completed course of antibiotic. Continue to monitor closely especially with improvement in other clinical aspect  - Completed COVID-19 treatment  - DVT prophylaxis with high-dose Lovenox. GI prophylaxis.   Nutritional support  -Steroid wean down and discontinued. - Not clear what caused the acute weakness and right arm. As mentioned above no skeletal damage no vascular damage, I will obtain a CTA head and neck and continue to assess. DISPOSITION  Stay in ICU    CRITICAL CARE CONSULTANT NOTE  I had a face to face encounter with the patient, reviewed and interpreted patient data including clinical events, labs, images, vital signs, I/O's, and examined patient. I have discussed the case and the plan and management of the patient's care with the consulting services, the bedside nurses and the respiratory therapist.      NOTE OF PERSONAL INVOLVEMENT IN CARE   This patient has a high probability of imminent, clinically significant deterioration, which requires the highest level of preparedness to intervene urgently. I participated in the decision-making and personally managed or directed the management of the following life and organ supporting interventions that required my frequent assessment to treat or prevent imminent deterioration. I personally spent 40 minutes of critical care time. This is time spent at this critically ill patient's bedside actively involved in patient care as well as the coordination of care and discussions with the patient's family. This does not include any procedural time which has been billed separately. Meagan Wheeler M.D.   Staff Intensivist/Pulmonologist  [facility]  9/3/2020

## 2020-09-03 NOTE — PROGRESS NOTES
ARDS  Acute hypoxic respiratory failure  COVID positive  Sinus pauses  S/P tracheostomy      Remains on vent    Still off paralytics    ABG 7.49 / 40 / 67 / 31 / 7     Vent PEEP 5, FiO2 40%, RR 22    Creatinine normal    Bilirubin and other LFTs up a bit    Continues on Dilaudid / Versed / Ketamine     Still trying to wean Versed and Ketamine    Continues on steroids     Continues on Lovenox    Slow wean off vent    CXR - diffuse airspace disease persists    Discussed with ICU attending       Intake/Output Summary (Last 24 hours) at 9/3/2020 0916  Last data filed at 9/3/2020 0700  Gross per 24 hour   Intake 2094.97 ml   Output 3010 ml   Net -915.03 ml     Visit Vitals  BP (!) 134/105   Pulse (!) 137   Temp (!) 100.6 °F (38.1 °C)   Resp (!) 32   Ht 5' 11\" (1.803 m)   Wt 220 lb 0.3 oz (99.8 kg)   SpO2 92%   BMI 30.69 kg/m²       Risk of morbidity and mortality - high  Medical decision making - high complexity    Total critical care time - 30 minutes (CPT 62157)     I personally spent the above critical care time. This is time spent at this critically ill patient's bedside / unit / floor actively involved in patient care as well as the coordination of care and discussions with the patient's family. This does not include any procedural time which has been billed separately.

## 2020-09-03 NOTE — PROCEDURES
PICC Placement Note    PRE-PROCEDURE VERIFICATION  Correct Procedure: yes  Correct Site:  yes  Temperature: Temp: (!) 100.6 °F (38.1 °C), Temperature Source: Temp Source: Bladder  Recent Labs     09/03/20  0452   BUN 6   CREA 0.54*      WBC 6.3     Allergies: Patient has no known allergies. Education materials, including PICC Booklet, for PICC Care given to patient: yes. See Patient Education activity for further details. PROCEDURE DETAIL  A triple lumen PICC line was started for vascular access. The following documentation is in addition to the PICC properties in the lines/airways flowsheet :  Lot #: [id number]  Was xylocaine 1% used intradermally:  yes  Catheter Length: 40(cm)  Vein Selection for PICC:left cephalic  Central Line Bundle followed yes  Complication Related to Insertion: none    The placement was verified by ECG/Sapiens technology: The  tip location is in the superior vena cava. See ECG results for PICC tip placement. Report given to nurse, Urbano Miramontes is okay to use.     Jake Noriega RN

## 2020-09-03 NOTE — WOUND CARE
Wound Consult:  New Patient Visit. Chart reviewed. Consulted for trach site. Spoke with patients nurse,  [facility] at bedside; patient COVID +; trach in place. Patient is resting on a [place] in Touch bed. Assessment:  Left lower edge of trach collar - small crusted/scabbed area ~ 1 x 0.5 cm, trach site has halo of erythema surrounding above and below within 2 cm area; he has rather copious purulent secretions. Sutures removed from trach per PA with thoracic surgery yesterday - sutures may have   Has Heel [employer] boots on both feet, no redness or injury to feet or heels. [facility] recently turned and assessed skin and reports no injury or redness to sacrum or buttocks. Treatment:  Cleansed area - used piece of hydrocolloid to cover and rest under edge of trach collar. Wound Recommendations:  Trach site: hydrocolloid to cover small scabbed area on left lower side and rest under edge of trach bottom of collar. Skin Care Recommendations:  1. Minimize friction/shear: minimize layers of linen/pads under patient. 2. Off load pressure/reposition: continue to turn and reposition approximately every 2 hours; heel off loading in place with boots. 3. Manage Moisture -  Staff providing incontinence skin care. 4. Continue to monitor nutrition, pain, and skin risk scale, and skin assessment. Plan:  Spoke with Dr. Ortiz Wang regarding findings and obtained orders for treatment. We will continue to reassess routinely and as needed.   Jean-Paul Golden RN,[facility]  Wound Healing Office [phone]  Pager [phone]

## 2020-09-03 NOTE — PROGRESS NOTES
Verbal shift change report given to Eloisa Olson RN (oncoming nurse) by Kameron Rios RN (offgoing nurse). Report included the following information SBAR, Kardex, Intake/Output, MAR, Recent Results, Med Rec Status, Cardiac Rhythm Sinus Tach and Alarm Parameters . 0800: Assumed care of patient; assessment documented per flowsheet; drips verified; pt is alert and calm on vent; follows commands in LUE and BLE; RUE does not move for me to any stimulus; Sinus Tach-MD aware; will give PRN Fent 100mcg; redness noted at trach site; CT to con't suction with 0 output    1045: Spoke to Magali Cota for update; requested Zoom meeting; will attempt to set up later in the day    1100: ICU multidisciplinary rounds lead by Dr. Matthew Macdonald (Intensivist): The following were reviewed and discussed: current labs,  recent imaging, lines/drains, review of body systems, nutrition, cultures, mobility, length of ICU stay. The plan of care for the day is as follows  Wean ketamine as tolerated; MD made aware of no movement in RUE; increase TF to 40ml/hr; new PICC line placed (written note by RN)     [address]: Spoke to CT regarding Stat CT; unable to travel at this time will call back when able to travel    65: Attempted to call CT to come down but no answer    1550: Attempted to call CT again but no answer    1556: Third attempt to contact CT regarding bringing patient down but no answer    1800:  While transferring patient to CT bed PICC line was dislodged and came out; unable to perform CTA due to inability to establish IV access    1845: Notified Dr. Chel Chicas about PICC removal; per MD contact PICC team and if they are not available he made need a new line overnight or tomorrow; PICC team is not here currently

## 2020-09-04 ENCOUNTER — APPOINTMENT (OUTPATIENT)
Dept: CT IMAGING | Age: 30
DRG: 004 | End: 2020-09-04
Attending: INTERNAL MEDICINE
Payer: COMMERCIAL

## 2020-09-04 ENCOUNTER — APPOINTMENT (OUTPATIENT)
Dept: NON INVASIVE DIAGNOSTICS | Age: 30
DRG: 004 | End: 2020-09-04
Attending: INTERNAL MEDICINE
Payer: COMMERCIAL

## 2020-09-04 LAB
ALBUMIN SERPL-MCNC: 2.4 G/DL (ref 3.5–5)
ALBUMIN/GLOB SERPL: 0.5 {RATIO} (ref 1.1–2.2)
ALP SERPL-CCNC: 189 U/L (ref 45–117)
ALT SERPL-CCNC: 326 U/L (ref 12–78)
ANION GAP SERPL CALC-SCNC: 10 MMOL/L (ref 5–15)
AST SERPL-CCNC: 135 U/L (ref 15–37)
BASOPHILS # BLD: 0 K/UL (ref 0–0.1)
BASOPHILS NFR BLD: 0 % (ref 0–1)
BILIRUB SERPL-MCNC: 0.7 MG/DL (ref 0.2–1)
BUN SERPL-MCNC: 4 MG/DL (ref 6–20)
BUN/CREAT SERPL: 9 (ref 12–20)
CALCIUM SERPL-MCNC: 9.5 MG/DL (ref 8.5–10.1)
CHLORIDE SERPL-SCNC: 99 MMOL/L (ref 97–108)
CO2 SERPL-SCNC: 27 MMOL/L (ref 21–32)
CREAT SERPL-MCNC: 0.47 MG/DL (ref 0.7–1.3)
DIFFERENTIAL METHOD BLD: ABNORMAL
ECHO AO ROOT DIAM: 3.45 CM
ECHO AV AREA PEAK VELOCITY: 3.81 CM2
ECHO AV AREA/BSA PEAK VELOCITY: 1.7 CM2/M2
ECHO AV PEAK GRADIENT: 3.54 MMHG
ECHO AV PEAK VELOCITY: 94.14 CM/S
ECHO LA AREA 4C: 18.66 CM2
ECHO LA MAJOR AXIS: 3.1 CM
ECHO LA MINOR AXIS: 1.41 CM
ECHO LA VOL 4C: 55.01 ML (ref 18–58)
ECHO LA VOLUME INDEX A4C: 25.05 ML/M2 (ref 16–28)
ECHO LV INTERNAL DIMENSION DIASTOLIC: 5.39 CM (ref 4.2–5.9)
ECHO LV INTERNAL DIMENSION SYSTOLIC: 3.83 CM
ECHO LV IVSD: 0.82 CM (ref 0.6–1)
ECHO LV MASS 2D: 153.3 G (ref 88–224)
ECHO LV MASS INDEX 2D: 69.8 G/M2 (ref 49–115)
ECHO LV POSTERIOR WALL DIASTOLIC: 0.77 CM (ref 0.6–1)
ECHO LVOT DIAM: 2.6 CM
ECHO LVOT PEAK GRADIENT: 1.82 MMHG
ECHO LVOT PEAK VELOCITY: 67.44 CM/S
ECHO PV PEAK INSTANTANEOUS GRADIENT SYSTOLIC: 3.11 MMHG
ECHO RV INTERNAL DIMENSION: 3.49 CM
ECHO RV TAPSE: 2.69 CM (ref 1.5–2)
ECHO TV REGURGITANT MAX VELOCITY: 220.63 CM/S
ECHO TV REGURGITANT PEAK GRADIENT: 19.47 MMHG
EOSINOPHIL # BLD: 0.2 K/UL (ref 0–0.4)
EOSINOPHIL NFR BLD: 3 % (ref 0–7)
ERYTHROCYTE [DISTWIDTH] IN BLOOD BY AUTOMATED COUNT: 14.6 % (ref 11.5–14.5)
GLOBULIN SER CALC-MCNC: 5.3 G/DL (ref 2–4)
GLUCOSE BLD STRIP.AUTO-MCNC: 146 MG/DL (ref 65–100)
GLUCOSE SERPL-MCNC: 138 MG/DL (ref 65–100)
HCT VFR BLD AUTO: 32.8 % (ref 36.6–50.3)
HGB BLD-MCNC: 10.5 G/DL (ref 12.1–17)
IMM GRANULOCYTES # BLD AUTO: 0.3 K/UL (ref 0–0.04)
IMM GRANULOCYTES NFR BLD AUTO: 4 % (ref 0–0.5)
LYMPHOCYTES # BLD: 1.2 K/UL (ref 0.8–3.5)
LYMPHOCYTES NFR BLD: 16 % (ref 12–49)
MAGNESIUM SERPL-MCNC: 1.9 MG/DL (ref 1.6–2.4)
MCH RBC QN AUTO: 28.9 PG (ref 26–34)
MCHC RBC AUTO-ENTMCNC: 32 G/DL (ref 30–36.5)
MCV RBC AUTO: 90.4 FL (ref 80–99)
MONOCYTES # BLD: 0.7 K/UL (ref 0–1)
MONOCYTES NFR BLD: 10 % (ref 5–13)
NEUTS SEG # BLD: 4.9 K/UL (ref 1.8–8)
NEUTS SEG NFR BLD: 67 % (ref 32–75)
NRBC # BLD: 0 K/UL (ref 0–0.01)
NRBC BLD-RTO: 0 PER 100 WBC
PHOSPHATE SERPL-MCNC: 4.5 MG/DL (ref 2.6–4.7)
PLATELET # BLD AUTO: 320 K/UL (ref 150–400)
PMV BLD AUTO: 9.8 FL (ref 8.9–12.9)
POTASSIUM SERPL-SCNC: 3.5 MMOL/L (ref 3.5–5.1)
PROT SERPL-MCNC: 7.7 G/DL (ref 6.4–8.2)
RBC # BLD AUTO: 3.63 M/UL (ref 4.1–5.7)
RBC MORPH BLD: ABNORMAL
SERVICE CMNT-IMP: ABNORMAL
SODIUM SERPL-SCNC: 136 MMOL/L (ref 136–145)
WBC # BLD AUTO: 7.3 K/UL (ref 4.1–11.1)

## 2020-09-04 PROCEDURE — C1751 CATH, INF, PER/CENT/MIDLINE: HCPCS

## 2020-09-04 PROCEDURE — 94760 N-INVAS EAR/PLS OXIMETRY 1: CPT

## 2020-09-04 PROCEDURE — 74011000250 HC RX REV CODE- 250: Performed by: NURSE PRACTITIONER

## 2020-09-04 PROCEDURE — 74011250636 HC RX REV CODE- 250/636: Performed by: INTERNAL MEDICINE

## 2020-09-04 PROCEDURE — 74011250637 HC RX REV CODE- 250/637: Performed by: INTERNAL MEDICINE

## 2020-09-04 PROCEDURE — 87040 BLOOD CULTURE FOR BACTERIA: CPT

## 2020-09-04 PROCEDURE — 87205 SMEAR GRAM STAIN: CPT

## 2020-09-04 PROCEDURE — 83735 ASSAY OF MAGNESIUM: CPT

## 2020-09-04 PROCEDURE — 74011000258 HC RX REV CODE- 258: Performed by: NURSE PRACTITIONER

## 2020-09-04 PROCEDURE — 74011250637 HC RX REV CODE- 250/637: Performed by: NURSE PRACTITIONER

## 2020-09-04 PROCEDURE — 82962 GLUCOSE BLOOD TEST: CPT

## 2020-09-04 PROCEDURE — 80053 COMPREHEN METABOLIC PANEL: CPT

## 2020-09-04 PROCEDURE — 0042T CT CODE NEURO PERF W CBF: CPT

## 2020-09-04 PROCEDURE — 74011250637 HC RX REV CODE- 250/637: Performed by: HOSPITALIST

## 2020-09-04 PROCEDURE — 94003 VENT MGMT INPAT SUBQ DAY: CPT

## 2020-09-04 PROCEDURE — 74011250636 HC RX REV CODE- 250/636: Performed by: NURSE PRACTITIONER

## 2020-09-04 PROCEDURE — 99255 IP/OBS CONSLTJ NEW/EST HI 80: CPT | Performed by: PSYCHIATRY & NEUROLOGY

## 2020-09-04 PROCEDURE — 85025 COMPLETE CBC W/AUTO DIFF WBC: CPT

## 2020-09-04 PROCEDURE — 76937 US GUIDE VASCULAR ACCESS: CPT

## 2020-09-04 PROCEDURE — 70450 CT HEAD/BRAIN W/O DYE: CPT

## 2020-09-04 PROCEDURE — 74011000258 HC RX REV CODE- 258: Performed by: RADIOLOGY

## 2020-09-04 PROCEDURE — 93306 TTE W/DOPPLER COMPLETE: CPT | Performed by: SPECIALIST

## 2020-09-04 PROCEDURE — 74011000636 HC RX REV CODE- 636: Performed by: RADIOLOGY

## 2020-09-04 PROCEDURE — 93306 TTE W/DOPPLER COMPLETE: CPT

## 2020-09-04 PROCEDURE — 70496 CT ANGIOGRAPHY HEAD: CPT

## 2020-09-04 PROCEDURE — 74011000258 HC RX REV CODE- 258: Performed by: INTERNAL MEDICINE

## 2020-09-04 PROCEDURE — 77030020365 HC SOL INJ SOD CL 0.9% 50ML

## 2020-09-04 PROCEDURE — 36415 COLL VENOUS BLD VENIPUNCTURE: CPT

## 2020-09-04 PROCEDURE — 02HV33Z INSERTION OF INFUSION DEVICE INTO SUPERIOR VENA CAVA, PERCUTANEOUS APPROACH: ICD-10-PCS | Performed by: INTERNAL MEDICINE

## 2020-09-04 PROCEDURE — 77030040704 HC NSL TU RETAIN SYS BRDL PRO AMR -B

## 2020-09-04 PROCEDURE — 84100 ASSAY OF PHOSPHORUS: CPT

## 2020-09-04 PROCEDURE — 74011000250 HC RX REV CODE- 250: Performed by: INTERNAL MEDICINE

## 2020-09-04 PROCEDURE — 65610000006 HC RM INTENSIVE CARE

## 2020-09-04 RX ORDER — HYDROMORPHONE HYDROCHLORIDE 1 MG/ML
1 INJECTION, SOLUTION INTRAMUSCULAR; INTRAVENOUS; SUBCUTANEOUS ONCE
Status: COMPLETED | OUTPATIENT
Start: 2020-09-04 | End: 2020-09-04

## 2020-09-04 RX ORDER — VANCOMYCIN 2 GRAM/500 ML IN 0.9 % SODIUM CHLORIDE INTRAVENOUS
2000 ONCE
Status: COMPLETED | OUTPATIENT
Start: 2020-09-04 | End: 2020-09-04

## 2020-09-04 RX ORDER — SODIUM CHLORIDE 0.9 % (FLUSH) 0.9 %
10 SYRINGE (ML) INJECTION
Status: COMPLETED | OUTPATIENT
Start: 2020-09-04 | End: 2020-09-04

## 2020-09-04 RX ORDER — POTASSIUM CHLORIDE 29.8 MG/ML
20 INJECTION INTRAVENOUS
Status: COMPLETED | OUTPATIENT
Start: 2020-09-04 | End: 2020-09-04

## 2020-09-04 RX ORDER — VANCOMYCIN HYDROCHLORIDE
1250 EVERY 8 HOURS
Status: DISCONTINUED | OUTPATIENT
Start: 2020-09-04 | End: 2020-09-06

## 2020-09-04 RX ADMIN — VANCOMYCIN HYDROCHLORIDE 1250 MG: 10 INJECTION, POWDER, LYOPHILIZED, FOR SOLUTION INTRAVENOUS at 18:41

## 2020-09-04 RX ADMIN — ASPIRIN 81 MG CHEWABLE TABLET 81 MG: 81 TABLET CHEWABLE at 09:28

## 2020-09-04 RX ADMIN — Medication 10 ML: at 14:05

## 2020-09-04 RX ADMIN — CHLORDIAZEPOXIDE HYDROCHLORIDE 100 MG: 25 CAPSULE ORAL at 00:06

## 2020-09-04 RX ADMIN — IOPAMIDOL 40 ML: 755 INJECTION, SOLUTION INTRAVENOUS at 12:00

## 2020-09-04 RX ADMIN — ENOXAPARIN SODIUM 50 MG: 60 INJECTION SUBCUTANEOUS at 22:33

## 2020-09-04 RX ADMIN — Medication 10 ML: at 22:35

## 2020-09-04 RX ADMIN — Medication: at 09:28

## 2020-09-04 RX ADMIN — SODIUM CHLORIDE 100 ML: 900 INJECTION, SOLUTION INTRAVENOUS at 12:15

## 2020-09-04 RX ADMIN — QUETIAPINE FUMARATE 100 MG: 100 TABLET ORAL at 22:33

## 2020-09-04 RX ADMIN — POTASSIUM CHLORIDE 20 MEQ: 29.8 INJECTION, SOLUTION INTRAVENOUS at 16:51

## 2020-09-04 RX ADMIN — MIDAZOLAM HYDROCHLORIDE 8 MG/HR: 5 INJECTION, SOLUTION INTRAMUSCULAR; INTRAVENOUS at 09:02

## 2020-09-04 RX ADMIN — POTASSIUM CHLORIDE 20 MEQ: 29.8 INJECTION, SOLUTION INTRAVENOUS at 18:40

## 2020-09-04 RX ADMIN — CHLORDIAZEPOXIDE HYDROCHLORIDE 100 MG: 25 CAPSULE ORAL at 11:18

## 2020-09-04 RX ADMIN — Medication 10 ML: at 03:43

## 2020-09-04 RX ADMIN — FAMOTIDINE 20 MG: 40 POWDER, FOR SUSPENSION ORAL at 17:10

## 2020-09-04 RX ADMIN — ENOXAPARIN SODIUM 50 MG: 60 INJECTION SUBCUTANEOUS at 09:29

## 2020-09-04 RX ADMIN — FENTANYL CITRATE 100 MCG: 50 INJECTION, SOLUTION INTRAMUSCULAR; INTRAVENOUS at 02:03

## 2020-09-04 RX ADMIN — Medication 10 ML: at 07:28

## 2020-09-04 RX ADMIN — CEFEPIME 2 G: 2 INJECTION, POWDER, FOR SOLUTION INTRAVENOUS at 10:09

## 2020-09-04 RX ADMIN — MIDAZOLAM HYDROCHLORIDE 8 MG/HR: 5 INJECTION, SOLUTION INTRAMUSCULAR; INTRAVENOUS at 22:35

## 2020-09-04 RX ADMIN — MIDAZOLAM HYDROCHLORIDE 2 MG: 2 INJECTION, SOLUTION INTRAMUSCULAR; INTRAVENOUS at 20:23

## 2020-09-04 RX ADMIN — ACETAMINOPHEN ORAL SOLUTION 650 MG: 650 SOLUTION ORAL at 01:05

## 2020-09-04 RX ADMIN — CHLORHEXIDINE GLUCONATE 15 ML: 0.12 RINSE ORAL at 09:28

## 2020-09-04 RX ADMIN — HYDROMORPHONE HYDROCHLORIDE 1 MG: 1 INJECTION, SOLUTION INTRAMUSCULAR; INTRAVENOUS; SUBCUTANEOUS at 06:54

## 2020-09-04 RX ADMIN — Medication 1 MG/HR: at 17:21

## 2020-09-04 RX ADMIN — ACETAMINOPHEN ORAL SOLUTION 650 MG: 650 SOLUTION ORAL at 09:29

## 2020-09-04 RX ADMIN — CHLORDIAZEPOXIDE HYDROCHLORIDE 100 MG: 25 CAPSULE ORAL at 07:28

## 2020-09-04 RX ADMIN — VANCOMYCIN HYDROCHLORIDE 2000 MG: 10 INJECTION, POWDER, LYOPHILIZED, FOR SOLUTION INTRAVENOUS at 11:06

## 2020-09-04 RX ADMIN — DOCUSATE SODIUM 100 MG: 50 LIQUID ORAL at 09:28

## 2020-09-04 RX ADMIN — ACETAMINOPHEN ORAL SOLUTION 650 MG: 650 SOLUTION ORAL at 05:53

## 2020-09-04 RX ADMIN — IOPAMIDOL 80 ML: 755 INJECTION, SOLUTION INTRAVENOUS at 03:43

## 2020-09-04 RX ADMIN — FENTANYL CITRATE 100 MCG: 50 INJECTION, SOLUTION INTRAMUSCULAR; INTRAVENOUS at 00:06

## 2020-09-04 RX ADMIN — Medication 10 ML: at 12:15

## 2020-09-04 RX ADMIN — CHLORHEXIDINE GLUCONATE 15 ML: 0.12 RINSE ORAL at 20:23

## 2020-09-04 RX ADMIN — METOPROLOL TARTRATE 5 MG: 1 INJECTION, SOLUTION INTRAVENOUS at 17:27

## 2020-09-04 RX ADMIN — OXYCODONE HYDROCHLORIDE 5 MG: 5 SOLUTION ORAL at 05:53

## 2020-09-04 RX ADMIN — FENTANYL CITRATE 100 MCG: 50 INJECTION, SOLUTION INTRAMUSCULAR; INTRAVENOUS at 11:18

## 2020-09-04 RX ADMIN — FAMOTIDINE 20 MG: 40 POWDER, FOR SUSPENSION ORAL at 09:28

## 2020-09-04 RX ADMIN — SODIUM CHLORIDE 100 ML: 900 INJECTION, SOLUTION INTRAVENOUS at 03:43

## 2020-09-04 RX ADMIN — CHLORDIAZEPOXIDE HYDROCHLORIDE 100 MG: 25 CAPSULE ORAL at 17:09

## 2020-09-04 RX ADMIN — Medication: at 17:09

## 2020-09-04 RX ADMIN — CEFEPIME 2 G: 2 INJECTION, POWDER, FOR SOLUTION INTRAVENOUS at 17:09

## 2020-09-04 RX ADMIN — METOPROLOL TARTRATE 5 MG: 1 INJECTION, SOLUTION INTRAVENOUS at 06:37

## 2020-09-04 NOTE — PROGRESS NOTES
Midline Procedure Note With Ultrasound Guidance    Indication: Inadequate venous access    Midline Bundle:  Full sterile barrier precautions used. Pt placed in supine position. 7-Step Sterility Protocol followed. (cap, mask sterile gown, sterile gloves, sterile towels, hand hygiene, 2% chlorhexidine for cutaneous antisepsis)    Torniquet applied to arm. Using ultrasound guidance,   Right  Basilic vein cannulated x 1 attempt(s) utilizing the modified Seldinger technique. Torniquet released. Guidewire advanced with forward motion into and past the needle hub into target vein. Catheter slid over guidewire into target vein. 20 g/8 cm Power Glide Pro placed with no immediate complications encountered. Good blood return aspirated on single port. Catheter secured & Biopatch applied. Sterile Tegaderm placed.       Thomas Samayoa AGAP-BC     [address] [facility]

## 2020-09-04 NOTE — PROGRESS NOTES
CODE S    [facility] RN responded to Code S in room ICU 12 at 1130. Patient LOC was altered and all extremities were found to be weak per primary RN. BS was WDL. Because patient was COVID-positive, ICU RN completed NIHSS- 29. Many elements of NIH were difficult to assess given patients LOC. Attending, Dr Hogan Nurse, at bedside during assessment. [facility] called tele neurology and gave MD contact information. Patient to be transported down to CT for imaging.

## 2020-09-04 NOTE — PROGRESS NOTES
[facility]    ICU TEAM Progress Note    Name: Adonis Rosenthal   : 1990   MRN: [id number]   Date: 2020      I  Subjective:   Progress Note: 2020      Reason for ICU Admission: Respiratory failure due to COVID-19 infection    Interval history:  20-year-old male with no history of significant past medical history except smoking half pack per day. Bertha Esqueda was admitted on 2020 at [facility] with acute hypoxemic respiratory failure from COVID pneumonia. Bertha Esqueda continued to deteriorate and got intubated on 8/10.  Due to worsening hypoxemia, he is transferred to [facility] for ECMO evaluation. Bertha Esqueda has bee given convalescent plasma twice on  and  and treated with Remdesivir which was completed on 8/10. Bertha Esqueda is also on dexamethasone. Bertha Esqueda completed course of empiric antibiotics including azithromycin which was completed on  and cefepime was completed on .  During his hospital course he also developed pneumothorax and has left-sided chest tube since .  Has a tracheostomy tube placed on      Overnight Events:   Patient had multiple issue yesterday, as mentioned noticed that his right arm is not moving, CT head did not show bleed, CTA head and neck was not been completed though he went down twice and both due to IV access complication. He also spiked fever and noticed some thick secretion coming around the tracheostomy site. Episode of tachycardia and agitation but we will continue to wean sedation.     Active Problem List:     Problem List  Date Reviewed: 2020          Codes Class    Acute respiratory failure ([facility] [address] ICD-10-CM: J96.00  ICD-9-CM: 518.81         Pneumothorax on left ICD-10-CM: J93.9  ICD-9-CM: 512.89         Pneumonia due to severe acute respiratory syndrome coronavirus 2 (SARS-CoV-2) ICD-10-CM: U07.1, J12.89  ICD-9-CM: 480.8         Respiratory failure with hypoxia (HCC) ICD-10-CM: J96.91  ICD-9-CM: 518.81               Past Medical History:      has a past medical history of History of vascular access device (08/10/2020). Past Surgical History:      has a past surgical history that includes ir thora/ins chest tube(pneumo) wo image (2020); ir thora/ins chest tube(pneumo) wo image (2020); and pr tracheostomy, planned (2020). Home Medications:     Prior to Admission medications    Medication Sig Start Date End Date Taking? Authorizing Provider   benzonatate (Tessalon Perles) 100 mg capsule Take 100 mg by mouth three (3) times daily as needed for Cough. Provider, Historical       Allergies/Social/Family History:     No Known Allergies   Social History     Tobacco Use    Smoking status: Current Some Day Smoker    Smokeless tobacco: Never Used   Substance Use Topics    Alcohol use: Not on file      History reviewed. No pertinent family history. Review of Systems:     Not able to obtain due to his medical condition    Objective:   Vital Signs:  Visit Vitals  BP (!) 139/101   Pulse (!) 140   Temp (!) 101.3 °F (38.5 °C)   Resp 27   Ht 5' 11\" (1.803 m)   Wt 99.8 kg (220 lb 0.3 oz)   SpO2 94%   BMI 30.69 kg/m²      O2 Device: Tracheostomy, Ventilator Temp (24hrs), Av.6 °F (38.1 °C), Min:99.4 °F (37.4 °C), Max:101.4 °F (38.6 °C)           Intake/Output:     Intake/Output Summary (Last 24 hours) at 2020 0834  Last data filed at 2020 0500  Gross per 24 hour   Intake 1000.88 ml   Output 4985 ml   Net -3984.12 ml       Physical Exam:  General:   Sedated on mechanical ventilation via tracheostomy   Eyes:  Sclera anicteric. Pupils equally round and reactive to light. Mouth/Throat: Mucous membranes normal, oral pharynx clear   Neck: Supple, some thick creamy secretion around the tracheostomy site, somewhat more indurated and erythematous today   Lungs:    Good air entry bilaterally, fine crepitation   CV:  Regular rate and rhythm,no murmur, click, rub or gallop   Abdomen:   Soft, non-tender.  bowel sounds normal. non-distended   Extremities: No cyanosis , evolving edema   Skin: Skin color, texture, turgor normal. no acute rash or lesions   Lymph nodes: Cervical and supraclavicular normal   Musculoskeletal: No swelling or deformity   Lines/Devices:  Intact, no erythema, drainage or tenderness   Psych:  Sedated, opens eyes spontaneously, sometimes seems to be tracking and follow simple command but this is not persistent. Still not moving right arm, no deformity noticed, good pulses good color good capillary refill no shoulder or neck tenderness or swelling. He is able to wiggle right toes. Face is symmetrical.         LABS AND  DATA: Personally reviewed  Recent Labs     09/04/20  0710 09/03/20  0452   WBC 7.3 6.3   HGB 10.5* 8.8*   HCT 32.8* 28.3*    284     Recent Labs     09/04/20  0710 09/03/20  0452    136   K 3.5 3.3*   CL 99 99   CO2 27 31   BUN 4* 6   CREA 0.47* 0.54*   * 90   CA 9.5 8.9   MG 1.9 1.8   PHOS 4.5 4.2     Recent Labs     09/04/20  0710 09/03/20  0452   * 170*   TP 7.7 7.1   ALB 2.4* 2.0*   GLOB 5.3* 5.1*     No results for input(s): INR, PTP, APTT, INREXT in the last 72 hours. Recent Labs     09/03/20  0459 09/02/20  0402   PHI 7.49* 7.47*   PCO2I 40.2 45.2*   PO2I 67* 83   FIO2I 40 50     No results for input(s): CPK, CKMB, TROIQ, BNPP in the last 72 hours. Hemodynamics:   PAP:   CO:     Wedge:   CI:     CVP:    SVR:       PVR:       Ventilator Settings:  Mode Rate Tidal Volume Pressure FiO2 PEEP   Assist control, Pressure control   2 ml  0 cm H2O 40 % 5 cm H20     Peak airway pressure: 31 cm H2O    Minute ventilation: 7.3 l/min        MEDS: Reviewed    Chest X-Ray:  CXR Results  (Last 48 hours)               09/03/20 0602  XR CHEST PORT Final result    Impression:  IMPRESSION: No significant change including left arm PICC line tip in the right   subclavian vein. Narrative:  INDICATION:  Respiratory failure       EXAM: CXR Portable.        FINDINGS: Portable chest shows support lines/devices show no significant change   since yesterday including left arm PICC line tip in the right subclavian vein. There is no apparent pneumothorax. Lungs show no volumes with diffuse airspace   disease/edema. Heart size is obscured. There is no midline shift. Assessment and Plan:   -Bilateral pneumonia due to COVID-19 infection  -Acute right arm weakness:  Patient already on aspirin and moderate dose anticoagulation. Still pending CTA head and neck, CT head showed no bleed. -Sepsis: WBC still low but trending up, fever persisted, what seems to be  - Acute hypoxic respiratory failure due to COVID-19 infection status post tracheostomy on August 26  - Pneumothorax status post left chest tube insertion on August 11 without air leak  - Possible bacterial super imposed infection: Completed antibiotic course  -History of tobacco use     -Continue to wean sedation as tolerated. Goal is to wean ketamine before Versed and narcotics. - At this time on PEEP of 5 and FiO2 of 50%, PO2 is marginal.  We will continue to wean as tolerated  - Good urine output. No need for diuretics today. Will use as needed. Replace electrolytes as indicated  - Still low-grade fever, leukocyte still within normal but increasing. Reported increased secretion around the tracheostomy site, repeat blood culture, send culture from the discharge around the tracheostomy site, start broad-spectrum antibiotic  - Completed COVID-19 treatment  - DVT prophylaxis with high-dose Lovenox. GI prophylaxis. Nutritional support  -Steroid wean down and discontinued. Addendum:  Later during the day there was acute loss of power on his left arm, he had normal power at least 5-minute prior to this event. Code stroke initiated by myself, discussed the case with the neuro telemetry, with the radiologist and with our neurologist after the images finding.   No acute intervention indicated, no clear underlying etiology for his acute finding, CTA is not normal showing abnormal enhancement in the bilateral superior frontoparietal lobes. Differential diagnosis includes subacute ischemia from a hypoxic ischemic event or encephalitis. Discussed with neurologist, no clear underlying etiology. Unfortunately there is no definitive intervention at this time, continue with current dose Lovenox, aspirin antibiotic started today, maintain adequate oxygenation ventilation and blood pressure. This is unfortunate situation, hopefully patient condition will improve we will continue to monitor closely. I discussed this with his mother over the phone. DISPOSITION  Stay in ICU    CRITICAL CARE CONSULTANT NOTE  I had a face to face encounter with the patient, reviewed and interpreted patient data including clinical events, labs, images, vital signs, I/O's, and examined patient. I have discussed the case and the plan and management of the patient's care with the consulting services, the bedside nurses and the respiratory therapist.      NOTE OF PERSONAL INVOLVEMENT IN CARE   This patient has a high probability of imminent, clinically significant deterioration, which requires the highest level of preparedness to intervene urgently. I participated in the decision-making and personally managed or directed the management of the following life and organ supporting interventions that required my frequent assessment to treat or prevent imminent deterioration. I personally spent 40 minutes of critical care time. This is time spent at this critically ill patient's bedside actively involved in patient care as well as the coordination of care and discussions with the patient's family. This does not include any procedural time which has been billed separately. John Ahn M.D.   Staff Intensivist/Pulmonologist  [facility]  9/4/2020

## 2020-09-04 NOTE — PROGRESS NOTES
Critical Care      Ultrasound Guided 18g and 20g in the left upper arm and inner forearm for CTA. Good blood flow back and flush.  No complications.     [facility]                                 Critical Care Medicine  [facility]

## 2020-09-04 NOTE — PROGRESS NOTES
Day #1 of cefepime  Indication:  Sepsis  Current regimen:  1 gram IV Q 8hrs   Abx regimen: cefepime  Recent Labs     20  0710 20  0452 20  0548 20  0259   WBC 7.3 6.3 4.8  --    CREA 0.47* 0.54*  --  0.41*   BUN 4* 6  --  10   Est CrCl: >100 ml/min; UO: >1 ml/kg/hr  Temp (24hrs), Av.6 °F (38.1 °C), Min:99.4 °F (37.4 °C), Max:101.4 °F (38.6 °C)    Cultures: Reviewed    Plan: Change to 2 grams IV Q 8hrs with respect to indication and renal status (CrCl >60 mL/min) per [facility]/[employer]-approved Renal Dosing Adjustment protocol. Pharmacy will continue to monitor this patient daily for changes in clinical status and renal function.     Dickson Holm, PharmD  Clinical Pharmacist  [facility]  [phone]

## 2020-09-04 NOTE — PROGRESS NOTES
1930: Bedside and Verbal shift change report given to [address] (oncoming nurse) by Anastacia Cheung RN (offgoing nurse). Report included the following information SBAR, Kardex, ED Summary, Intake/Output, MAR, Recent Results, Cardiac Rhythm ST and Alarm Parameters . 2230: CHELE Wallace NP at bedside to attempt placement of IV w/ ultrasound. Pt currently has one 22g. 2 new IVs placed, 18g and 20g.    0000: Temp 101.4. Tylenol given, CHELE Wallace NP notified. 0230: Pt transported to CT. While in CT, when contrast was injected for CTA, IV infiltrated. Unable to use other IV d/t proximity to infiltrated site. Unable to get CTA. LUE swollen d/t infiltration, ice packs applied, NP CHELE Wallace notified. 0600: CHELE Wallace NP at bedside to place R midline catheter. Shift summary: Pt continuing to have HR sustained in 130s-140s. Intensivist aware, metoprolol PRN given twice. PRN fentanyl given for pain/agitation. Pt opens eyes spontaneously, following commands, but not nodding appropriately. No movement in RUE. Tmax 101.4. NP aware of elevated HR and temps and, trach site oozing yellow/tan drainage. 0730: Bedside and Verbal shift change report given to [address] (oncoming nurse) by [address] (offgoing nurse). Report included the following information SBAR, Kardex, ED Summary, Intake/Output, MAR, Recent Results, Cardiac Rhythm ST and Alarm Parameters .

## 2020-09-04 NOTE — PROGRESS NOTES
Bedside and Verbal shift change report given to Cliff Ramirez RN (oncoming nurse) by Sveta Smith RN (offgoing nurse). Report included the following information SBAR, Kardex, Procedure Summary, Intake/Output, MAR, Recent Results, Med Rec Status, Cardiac Rhythm Sinus Tach and Alarm Parameters . 0800: Assumed care of patient; neuro status follows commands in LUE, and BLE; PERRLA; Sinus Tach; tube feeds infusing; UOP adequate; drips verified; PICC placement today then will take pt for CTA of head and neck    1015: Blood Cultures drawn and sent; wound culture of trach sent; PICC team at bedside for placement    1130: Went in to give meds after PICC placement noticed pt was exhibiting increased work of breathing; HR in 140s; appearing more lethargic; I performed a neuro assessment and found pt was no longer following commands in the LUE and would not move the extremity or withdraw to stimulus; he was still opening his eyes to voice and following commands in the lower extremities, however.   Dr. Reina Eugene brought to bedside and Code \"S\" was called; in preparing pt for transport he did begin to spontaneously move the LUE weakly; pt was transported to CT for CTA of the head and neck; tele-neurology also consulted and assessed pt once back from CT;

## 2020-09-04 NOTE — PROGRESS NOTES
Pharmacist Note - Vancomycin Dosing    Consult provided for this 27 y.o. male for indication of sepsis, fevers  Antibiotic regimen: Vanc, Cefepime    Recent Labs     20  0710 20  0452 20  0548 20  0259   WBC 7.3 6.3 4.8  --    CREA 0.47* 0.54*  --  0.41*   BUN 4* 6  --  10     Frequency of BMP: daily  Height: 180.3 cm  Weight: 99.8 kg  Est CrCl: >100 ml/min; UO: >1 ml/kg/hr  Temp (24hrs), Av.7 °F (38.2 °C), Min:99.4 °F (37.4 °C), Max:101.4 °F (38.6 °C)    Cultures:   blood - NGTD   blood - NGTD   wound (neck) - prelim    Goal trough = 15 - 20 mcg/mL    Trough history:   @ 2110 = 15.8 mcg/ml (drawn ~9 hrs post-dose, extrapolated to ~17.7 mcg/ml), no change   @ 0437 = 17.8 mcg/ml (drawn ~8.15 hrs post-dose), dose adjusted from 1500 mg to 1250 mg IV Q 8 hr    Therapy will be initiated with a loading dose of 2000 mg IV x 1 (received 11:06) to be followed by a maintenance dose of 1250 mg IV every 8 hours. Of note, this is the dose that this patient was on in late August (Scr 0.56)  Pharmacy to follow patient daily and order levels / make dose adjustments as appropriate.

## 2020-09-04 NOTE — PROGRESS NOTES
MIGUELINA  Patient presented to [facility] ED from home with increased shortness of breath, diaphoresis and fever. Transferred to [facility] for possible ECMO placement  COVID Positive. RUR: 24%  Disposition: TBD  Patient remains intubated. Ketamine continues to be weaned,off paralytics. Temp overnight 101.4. Neuro: opens eyes, following commands. Care management will continue to follow for transitions of care.  Seng Hernandez RN,CRM

## 2020-09-04 NOTE — ADVANCED PRACTICE NURSE
Neurocritical Care Code Stroke Documentation      Patient is COVID positive and began having focal deficits yesterday - unable to receive vascular imaging yesterday due to multiple IV failures. Symptoms:   Left upper extremity paralysis    Last Known Well: 1110 AM    Medical hx: Active Problems:    Acute respiratory failure ([facility] [address] (8/18/2020)       Anticoagulation: Lovenox    VAN:   Positive   NIHSS:   1a-LOC: 2    1b-Month/Age:2    1c-Open/Close Hand:2    2-Best Gaze:0    3-Visual Fields:0    4-Facial Palsy:0    5a-Left Arm:4    5b-Right Arm:3    6a-Left Leg:3    6b-Right Leg:3    7-Limb Ataxia:UN    8-Sensory:UN    9-Best Language:2    10-Dysarthria:UN- trach    11-Extinction/Inattention: 0  TOTAL SCORE:19   Imaging:   CT - IMPRESSION:   Chronic sinus disease. No acute process or change compared to the prior exam.    CTA    CTP   Plan:   TPA Candidate: NO    Mechanical thrombectomy Candidate: NO     Discussed with: Dr. Radha Yanez, bedside RN    Time spent: 25 minutes.      Thomas Lopez NP  Neurocritical Care Nurse Practitioner  [phone]

## 2020-09-04 NOTE — PROGRESS NOTES
Responded to Code Stroke called for Mr Elie Britt in [place]. It was noted that patient was COVID+;  did not enter patient's room. Multiple staff members were attending to him. Please notify  if support desired. : Rev. David Anderson.  Mahin Alcantara; [facility], to contact [phone] call: [phone]

## 2020-09-05 LAB
ALBUMIN SERPL-MCNC: 2.2 G/DL (ref 3.5–5)
ALBUMIN/GLOB SERPL: 0.5 {RATIO} (ref 1.1–2.2)
ALP SERPL-CCNC: 150 U/L (ref 45–117)
ALT SERPL-CCNC: 213 U/L (ref 12–78)
ANION GAP SERPL CALC-SCNC: 8 MMOL/L (ref 5–15)
ARTERIAL PATENCY WRIST A: YES
AST SERPL-CCNC: 57 U/L (ref 15–37)
BACTERIA SPEC CULT: NORMAL
BASE EXCESS BLD CALC-SCNC: 0 MMOL/L
BASOPHILS # BLD: 0.1 K/UL (ref 0–0.1)
BASOPHILS NFR BLD: 1 % (ref 0–1)
BDY SITE: ABNORMAL
BILIRUB SERPL-MCNC: 0.4 MG/DL (ref 0.2–1)
BUN SERPL-MCNC: 9 MG/DL (ref 6–20)
BUN/CREAT SERPL: 24 (ref 12–20)
CA-I BLD-SCNC: 1.23 MMOL/L (ref 1.12–1.32)
CALCIUM SERPL-MCNC: 8.7 MG/DL (ref 8.5–10.1)
CHLORIDE SERPL-SCNC: 103 MMOL/L (ref 97–108)
CHOLEST SERPL-MCNC: 156 MG/DL
CO2 SERPL-SCNC: 28 MMOL/L (ref 21–32)
CREAT SERPL-MCNC: 0.38 MG/DL (ref 0.7–1.3)
DIFFERENTIAL METHOD BLD: ABNORMAL
EOSINOPHIL # BLD: 0.4 K/UL (ref 0–0.4)
EOSINOPHIL NFR BLD: 7 % (ref 0–7)
ERYTHROCYTE [DISTWIDTH] IN BLOOD BY AUTOMATED COUNT: 14.7 % (ref 11.5–14.5)
EST. AVERAGE GLUCOSE BLD GHB EST-MCNC: 108 MG/DL
GAS FLOW.O2 O2 DELIVERY SYS: ABNORMAL L/MIN
GAS FLOW.O2 SETTING OXYMISER: 22 BPM
GLOBULIN SER CALC-MCNC: 4.7 G/DL (ref 2–4)
GLUCOSE BLD STRIP.AUTO-MCNC: 113 MG/DL (ref 65–100)
GLUCOSE SERPL-MCNC: 102 MG/DL (ref 65–100)
HBA1C MFR BLD: 5.4 % (ref 4–5.6)
HCO3 BLD-SCNC: 24.5 MMOL/L (ref 22–26)
HCT VFR BLD AUTO: 28.4 % (ref 36.6–50.3)
HDLC SERPL-MCNC: 26 MG/DL
HDLC SERPL: 6 {RATIO} (ref 0–5)
HGB BLD-MCNC: 9 G/DL (ref 12.1–17)
IMM GRANULOCYTES # BLD AUTO: 0.3 K/UL (ref 0–0.04)
IMM GRANULOCYTES NFR BLD AUTO: 5 % (ref 0–0.5)
INSPIRATION.DURATION SETTING TIME VENT: 0.68 SEC
LDLC SERPL CALC-MCNC: 92.4 MG/DL (ref 0–100)
LIPID PROFILE,FLP: ABNORMAL
LYMPHOCYTES # BLD: 1.1 K/UL (ref 0.8–3.5)
LYMPHOCYTES NFR BLD: 21 % (ref 12–49)
MAGNESIUM SERPL-MCNC: 2 MG/DL (ref 1.6–2.4)
MCH RBC QN AUTO: 29.4 PG (ref 26–34)
MCHC RBC AUTO-ENTMCNC: 31.7 G/DL (ref 30–36.5)
MCV RBC AUTO: 92.8 FL (ref 80–99)
MONOCYTES # BLD: 0.5 K/UL (ref 0–1)
MONOCYTES NFR BLD: 10 % (ref 5–13)
NEUTS SEG # BLD: 2.7 K/UL (ref 1.8–8)
NEUTS SEG NFR BLD: 56 % (ref 32–75)
NRBC # BLD: 0 K/UL (ref 0–0.01)
NRBC BLD-RTO: 0 PER 100 WBC
O2/TOTAL GAS SETTING VFR VENT: 40 %
PCO2 BLD: 39.5 MMHG (ref 35–45)
PEEP RESPIRATORY: 5 CMH2O
PH BLD: 7.4 [PH] (ref 7.35–7.45)
PHOSPHATE SERPL-MCNC: 4.2 MG/DL (ref 2.6–4.7)
PIP ISTAT,IPIP: 24
PLATELET # BLD AUTO: 304 K/UL (ref 150–400)
PMV BLD AUTO: 9.8 FL (ref 8.9–12.9)
PO2 BLD: 79 MMHG (ref 80–100)
POTASSIUM SERPL-SCNC: 3.5 MMOL/L (ref 3.5–5.1)
PROT SERPL-MCNC: 6.9 G/DL (ref 6.4–8.2)
RBC # BLD AUTO: 3.06 M/UL (ref 4.1–5.7)
RBC MORPH BLD: ABNORMAL
SAO2 % BLD: 96 % (ref 92–97)
SERVICE CMNT-IMP: ABNORMAL
SERVICE CMNT-IMP: NORMAL
SODIUM SERPL-SCNC: 139 MMOL/L (ref 136–145)
SPECIMEN TYPE: ABNORMAL
TOTAL RESP. RATE, ITRR: 23
TRIGL SERPL-MCNC: 188 MG/DL (ref ?–150)
VENTILATION MODE VENT: ABNORMAL
VLDLC SERPL CALC-MCNC: 37.6 MG/DL
WBC # BLD AUTO: 5.1 K/UL (ref 4.1–11.1)

## 2020-09-05 PROCEDURE — 74011250636 HC RX REV CODE- 250/636: Performed by: NURSE PRACTITIONER

## 2020-09-05 PROCEDURE — 80061 LIPID PANEL: CPT

## 2020-09-05 PROCEDURE — 80053 COMPREHEN METABOLIC PANEL: CPT

## 2020-09-05 PROCEDURE — 74011250636 HC RX REV CODE- 250/636: Performed by: INTERNAL MEDICINE

## 2020-09-05 PROCEDURE — 36600 WITHDRAWAL OF ARTERIAL BLOOD: CPT

## 2020-09-05 PROCEDURE — 74011000250 HC RX REV CODE- 250: Performed by: NURSE PRACTITIONER

## 2020-09-05 PROCEDURE — 94003 VENT MGMT INPAT SUBQ DAY: CPT

## 2020-09-05 PROCEDURE — 82962 GLUCOSE BLOOD TEST: CPT

## 2020-09-05 PROCEDURE — 83036 HEMOGLOBIN GLYCOSYLATED A1C: CPT

## 2020-09-05 PROCEDURE — 84100 ASSAY OF PHOSPHORUS: CPT

## 2020-09-05 PROCEDURE — 77010033678 HC OXYGEN DAILY

## 2020-09-05 PROCEDURE — 74011000250 HC RX REV CODE- 250: Performed by: INTERNAL MEDICINE

## 2020-09-05 PROCEDURE — 74011250637 HC RX REV CODE- 250/637: Performed by: HOSPITALIST

## 2020-09-05 PROCEDURE — 74011000258 HC RX REV CODE- 258: Performed by: NURSE PRACTITIONER

## 2020-09-05 PROCEDURE — 82803 BLOOD GASES ANY COMBINATION: CPT

## 2020-09-05 PROCEDURE — 74011250637 HC RX REV CODE- 250/637: Performed by: NURSE PRACTITIONER

## 2020-09-05 PROCEDURE — 99233 SBSQ HOSP IP/OBS HIGH 50: CPT | Performed by: PSYCHIATRY & NEUROLOGY

## 2020-09-05 PROCEDURE — 83735 ASSAY OF MAGNESIUM: CPT

## 2020-09-05 PROCEDURE — 36415 COLL VENOUS BLD VENIPUNCTURE: CPT

## 2020-09-05 PROCEDURE — 94761 N-INVAS EAR/PLS OXIMETRY MLT: CPT

## 2020-09-05 PROCEDURE — 74011250637 HC RX REV CODE- 250/637: Performed by: INTERNAL MEDICINE

## 2020-09-05 PROCEDURE — 85025 COMPLETE CBC W/AUTO DIFF WBC: CPT

## 2020-09-05 PROCEDURE — 65610000006 HC RM INTENSIVE CARE

## 2020-09-05 PROCEDURE — 74011000258 HC RX REV CODE- 258: Performed by: INTERNAL MEDICINE

## 2020-09-05 RX ADMIN — CHLORHEXIDINE GLUCONATE 15 ML: 0.12 RINSE ORAL at 23:59

## 2020-09-05 RX ADMIN — VANCOMYCIN HYDROCHLORIDE 1250 MG: 10 INJECTION, POWDER, LYOPHILIZED, FOR SOLUTION INTRAVENOUS at 02:42

## 2020-09-05 RX ADMIN — METOPROLOL TARTRATE 5 MG: 1 INJECTION, SOLUTION INTRAVENOUS at 11:41

## 2020-09-05 RX ADMIN — Medication: at 09:09

## 2020-09-05 RX ADMIN — CHLORDIAZEPOXIDE HYDROCHLORIDE 100 MG: 25 CAPSULE ORAL at 06:01

## 2020-09-05 RX ADMIN — CHLORHEXIDINE GLUCONATE 15 ML: 0.12 RINSE ORAL at 09:09

## 2020-09-05 RX ADMIN — ENOXAPARIN SODIUM 50 MG: 60 INJECTION SUBCUTANEOUS at 09:07

## 2020-09-05 RX ADMIN — ACETAMINOPHEN ORAL SOLUTION 650 MG: 650 SOLUTION ORAL at 00:27

## 2020-09-05 RX ADMIN — CHLORDIAZEPOXIDE HYDROCHLORIDE 100 MG: 25 CAPSULE ORAL at 00:27

## 2020-09-05 RX ADMIN — VANCOMYCIN HYDROCHLORIDE 1250 MG: 10 INJECTION, POWDER, LYOPHILIZED, FOR SOLUTION INTRAVENOUS at 18:50

## 2020-09-05 RX ADMIN — MIDAZOLAM HYDROCHLORIDE 8 MG/HR: 5 INJECTION, SOLUTION INTRAMUSCULAR; INTRAVENOUS at 21:54

## 2020-09-05 RX ADMIN — Medication: at 17:57

## 2020-09-05 RX ADMIN — FENTANYL CITRATE 50 MCG: 50 INJECTION, SOLUTION INTRAMUSCULAR; INTRAVENOUS at 13:19

## 2020-09-05 RX ADMIN — Medication 10 ML: at 06:01

## 2020-09-05 RX ADMIN — Medication 10 ML: at 15:35

## 2020-09-05 RX ADMIN — CHLORDIAZEPOXIDE HYDROCHLORIDE 100 MG: 25 CAPSULE ORAL at 11:41

## 2020-09-05 RX ADMIN — QUETIAPINE FUMARATE 100 MG: 100 TABLET ORAL at 21:52

## 2020-09-05 RX ADMIN — CHLORDIAZEPOXIDE HYDROCHLORIDE 100 MG: 25 CAPSULE ORAL at 17:56

## 2020-09-05 RX ADMIN — CEFEPIME 2 G: 2 INJECTION, POWDER, FOR SOLUTION INTRAVENOUS at 00:37

## 2020-09-05 RX ADMIN — ENOXAPARIN SODIUM 50 MG: 60 INJECTION SUBCUTANEOUS at 21:52

## 2020-09-05 RX ADMIN — MIDAZOLAM HYDROCHLORIDE 1 MG: 2 INJECTION, SOLUTION INTRAMUSCULAR; INTRAVENOUS at 02:40

## 2020-09-05 RX ADMIN — Medication 1 MG/HR: at 18:52

## 2020-09-05 RX ADMIN — CEFEPIME 2 G: 2 INJECTION, POWDER, FOR SOLUTION INTRAVENOUS at 09:06

## 2020-09-05 RX ADMIN — VANCOMYCIN HYDROCHLORIDE 1250 MG: 10 INJECTION, POWDER, LYOPHILIZED, FOR SOLUTION INTRAVENOUS at 11:40

## 2020-09-05 RX ADMIN — MIDAZOLAM HYDROCHLORIDE 8 MG/HR: 5 INJECTION, SOLUTION INTRAMUSCULAR; INTRAVENOUS at 09:21

## 2020-09-05 RX ADMIN — CEFEPIME 2 G: 2 INJECTION, POWDER, FOR SOLUTION INTRAVENOUS at 17:55

## 2020-09-05 RX ADMIN — Medication 10 ML: at 21:53

## 2020-09-05 RX ADMIN — FAMOTIDINE 20 MG: 40 POWDER, FOR SUSPENSION ORAL at 09:07

## 2020-09-05 RX ADMIN — ASPIRIN 81 MG CHEWABLE TABLET 81 MG: 81 TABLET CHEWABLE at 09:06

## 2020-09-05 RX ADMIN — FAMOTIDINE 20 MG: 40 POWDER, FOR SUSPENSION ORAL at 17:56

## 2020-09-05 NOTE — CONSULTS
[address]    Name:  Leigha Finney  MR#:  [id number]  :  1990  ACCOUNT #:  [de-identified]  DATE OF SERVICE:  2020    NEUROLOGY CONSULTATION    HISTORY OF PRESENT ILLNESS:  Pt has a complex recent medical history, admitted on 2020 to [facility] with pneumonia due to severe acute respiratory syndrome from coronavirus-2 and he required intubation, continued to decline despite receiving remdesivir, convalescent plasma x2, antibiotics with IV cefepime, vancomycin, and on dexamethasone. He was transferred to [facility] for ECMO on . The patient was still on ventilator after tracheostomy and is on sedation, ketamine, Versed, and Dilaudid. The intensivist started to wean his sedation yesterday, and noted that his right upper extremity was not moving, today they noted that he is not moving his left arm. Nursing staff describes his exam as being completely flaccid while before and he was able to lift it somewhat and squeeze hand. A code stroke was called. He underwent imaging with CT of the head at noon today which does not show any acute changes similar to prior head CT yesterday. CTA of the head and neck showed no significant stenosis. No large vessel occlusion. He was noted to have abnormal gyriform and cortical enhancement in the bilateral superior frontoparietal lobes. He was still noted to have extensive consolidated ground-glass opacifications of bilateral upper lungs consistent with multifocal pneumonia and/or ARDS. His CT perfusion study did not show any obvious abnormalities. The patient is on aspirin 81 mg a day and on therapeutic dosing of Lovenox. He continued to spike fevers and is still on COVID precautions. The patient is not able to provide any of his history obviously, all of this is from the chart notes and discussion with the attending physician and nursing staff. PAST MEDICAL HISTORY:  None.     REVIEW OF SYSTEMS:  Cannot be obtained secondary to the patient's factors. HOME MEDICATIONS:  He is on Tylenol, Z-Terry, prednisone taper. ALLERGIES:  NO KNOWN DRUG ALLERGIES. SOCIAL HISTORY:  He works building granite countertops, smokes one half to one pack of cigarettes every few days. No alcohol or drug use known. Leena Michael FAMILY HISTORY:  No significant family history per his chart notes. PHYSICAL EXAMINATION:  VITAL SIGNS:  Blood pressure 150/124, pulse 128, respiratory rate is 33, satting 92% on his trach, and temperature is 99.9. T-max today was 101.3 at 4:00 a.m., BMI of 30. GENERAL:  He is well-nourished, well-developed, chronically critically ill-appearing male, lying in bed, in no distress, on ventilator to [facility]. HEART:  Tachycardic. EXTREMITIES:  Diffuse edema. 2+ radial pulses. NEUROLOGIC:  Mental status:  He is alert. He follows a few simple commands. He does not try to mouth any words. He does not nod his head to questions. Cranial nerve exam:  He has no obvious facial asymmetry. His extraocular eye movements appear to be full. Pupils are round and reactive. His tongue is midline. He did protrude his tongue. Motor exam:  He is able to squeeze my hand on the left very well, probably 4/5. No movement on the right. He wiggles his toes very well bilaterally to command. His reflexes are symmetric. His toes are downgoing. Coordination and gait could not be assessed due to the patient's factors. LABORATORY DATA:  Studies and reports other than that mentioned above is metabolic panel on 80/48 had BUN of 8 and creatinine of 0.3, ammonia level was 51. CBC today normal white count, hemoglobin 10.5, platelets 399. ASSESSMENT AND PLAN:  This is a 27-year-old male admitted on 8/05/2020 with COVID-19 pneumonia with respiratory failure who is still on a ventilator to [facility] and on sedation, just being weaned yesterday when it was noted that his right arm was not moving.   A head CT was unremarkable then today staff noted that his left arm was not moving. CT of the head again was unremarkable. CTA on the head and neck showed gyriform enhancement bilaterally in superior frontoparietal lobes. On exam, the patient is alert. He is following commands. He does move his left arm. He has 4/5 strength on the left hand. No movement seen on the right, moving his toes equally. Stroke is certainly in the differential; however, management would not change as he is already on treatment dose of anticoagulation and on aspirin. Differential would include hypoxic ischemic event, certainly a possibility given the complexity of his case and prolonged stay, and inflammatory or infectious etiology cannot be excluded such as a direct infection with COVID-19. I feel that a lumbar puncture, again would not  at this point. The patient could also have difficulty moving his extremities due to ICU or steroid myopathy/neuropathy. Neurology will continue to follow along peripherally and intermittently. Please call if any acute changes that need urgent reevaluation.       Joey Lubin MD      [id number]  D:  09/04/2020 20:13  T:  09/05/2020 1:21  09/05/2020 112  JOB #:  [id number]

## 2020-09-05 NOTE — PROGRESS NOTES
1930: Bedside and Verbal shift change report given to Maite Olmos (oncoming nurse) by Jose Vences (offgoing nurse). Report included the following information SBAR, Kardex, ED Summary, Procedure Summary, MAR, Recent Results and Cardiac Rhythm ST. Drips:  -Versed @ 8 mg/hr  -Dilaudid @ 1 mg/hr    2000: Shift assessment completed per flowsheet. Patient's eyes open spontaneously and focuses with eyes, follows commands with L upper extremity and bilateral lower extremities. Does not more R upper extremity to any stimuli. PERRLA, sluggish and 3. Patient seems very uncomfortable, RR 40 and HR 130s. PRN 2 mg Versed given at this time. 2150: Spoke with patient's mom, Steven Houston on patient's condition. No further questions asked at this time. 0000: Reassessment completed per flowsheet. No changes from previous assessment. 0400: Reassessment completed per flowsheet. No changes from previous assessment. 0730: Bedside and Verbal shift change report given to 2000 Brijesh Luque (oncoming nurse) by Maite Olmos (offgoing nurse).  Report included the following information SBAR, Kardex, ED Summary, Procedure Summary, MAR, Recent Results and Cardiac Rhythm ST.

## 2020-09-05 NOTE — PROGRESS NOTES
Bedside and Verbal shift change report given to Will Price (oncoming nurse) by Mark Garcia (offgoing nurse). Report included the following information SBAR, Intake/Output and Cardiac Rhythm ST. Primary Nurse Geraldine Chavez RN and Mark Garcia RN performed a dual skin assessment on this patient No impairment noted  Enrique score is 14    Bedside and Verbal shift change report given to Lauren Baer (oncoming nurse) by Will Price (offgoing nurse).  Report included the following information SBAR, Intake/Output and Cardiac Rhythm ST.

## 2020-09-05 NOTE — PROGRESS NOTES
Neurology Progress Note  Darline Nguyen AGACNP-BC  Neurocritical care NP    Admit Date: 8/18/2020   LOS: 18 days        Daily Progress Note: 9/5/2020    S/P: Procedure(s):  VV EXTRACORPOREAL MEMBRANE OXYGENATION (ECMO)    HPI: Shari Liang is a 27year-old male who was admitted on 8/5/2020 at [place] [address] with acute hypoxemic respiratory failure due to pneumonia from COVID-19. He continued to deteriorate and was intubated on 8/10. Due to worsening hypoxemia, he was transferred to [facility] for ECMO on 8/17. He has underwent treatment for COVID to include convalescent plasma x 2 and has also been treated with Remdesivir which completed on 8/10. He was also treated with dexamethasone and completed a course of empiric antibiotics. He developed a pneumothorax during his hospital course and had a left-sided chest tube placed. He had tracheostomy placement on 8/26. On 9/3/2020, the patient reportedly had significant weakness in his right arm. He has been on sedation with versed, ketamine, and dilaudid. CT of the Head was completed which showed no acute process. He is currently on a baby aspirin 81 mg daily and high-dose Lovenox due to COVID. On 9/4/2020, he had acute weakness on his left arm and a code stroke was called. Reportedly he had LUE paralysis. His Ketamine drip was weaned off on 9/4. CT of Head showed no acute process. Chronic sinus disease. CTA of the head and neck showed no evidence of significant stenosis or aneurysm. Abnormal gyriform cortical enhancement in the bilateral superior frontoparietal lobes. Differential considerations include subacute ischemia from a hypoxic-ischemic event, or an infectious or inflammatory cerebritis/encephalitis. Extensive right-sided paranasal sinus disease. Extensive consolidative and groundglass opacities in the bilateral upper lungs, consistent with multifocal pneumonia and/or ARDS.  No definite acute abnormality on CT Perfusion, however, motion degraded perfusion images and makes color maps unreliable. Neurology was consulted on 9/4 for further evaluation of his stroke-like symptoms. Subjective:   He is alert and following some commands. Tracheostomy in place with ventilator support. He is on Dilaudid and Versed drip for sedation support. He will intermittently receive IV Fentanyl PRN. He reportedly was on Ketamine for days and it was weaned off and stopped yesterday. He continues to spike fevers and is tachypneic on the ventilator. He appears to have some difficulty looking to the right. He is now moving his left arm, however, I am unable to get any movement in the RUE. He is able to wiggle his toes on command. He will not nod to questions. Unable to obtain a ROS due to tracheostomy and sedation.     Current Facility-Administered Medications   Medication Dose Route Frequency Provider Last Rate Last Dose    cefepime (MAXIPIME) 2 g in 0.9% sodium chloride (MBP/ADV) 100 mL  2 g IntraVENous Q8H Matt Goldman  mL/hr at 09/05/20 1755 2 g at 09/05/20 1755    vancomycin dosing by pharmacy   Other Rx Dosing/Monitoring Tin Aragon MD        vancomycin (VANCOCIN) 1250 mg in  ml infusion  1,250 mg IntraVENous Q8H Matt Goldman  mL/hr at 09/05/20 1140 1,250 mg at 09/05/20 1140    QUEtiapine (SEROquel) tablet 100 mg  100 mg Per NG tube QHS Jeffery RODRIGUEZ, NP-C   100 mg at 09/04/20 2233    oxyCODONE (ROXICODONE) 5 mg/5 mL oral solution 5 mg  5 mg Oral Q4H PRN Jeffery RODRIGUEZ, NP-C   5 mg at 09/04/20 0553    ELECTROLYTE REPLACEMENT PROTOCOL - Potassium and Magnesium  1 Each Other PRN Tin Aragon MD        ELECTROLYTE REPLACEMENT PROTOCOL - Phosphorus  1 Each Other PRN Matt Goldman MD        chlordiazePOXIDE (LIBRIUM) capsule 100 mg  100 mg Per NG tube Silvia Newell MD   100 mg at 09/05/20 1756    guaiFENesin (ROBITUSSIN) 100 mg/5 mL oral liquid 400 mg  400 mg Per NG tube Q4H PRN Michael Chew MD   400 mg at 09/03/20 1016    acetaminophen (TYLENOL) solution 650 mg  650 mg Per NG tube Q4H PRN Celso Ellison MD   650 mg at 09/05/20 0027    midazolam in normal saline (VERSED) 1 mg/mL infusion  0-12 mg/hr IntraVENous TITRATE Tellis Schwab R, NP-C 8 mL/hr at 09/05/20 0921 8 mg/hr at 09/05/20 9780    polyethylene glycol (MIRALAX) packet 17 g  17 g Per NG tube DAILY Celso Ellison MD   Stopped at 09/03/20 0900    famotidine (PEPCID) 40 mg/5 mL (8 mg/mL) oral suspension 20 mg  20 mg Per NG tube BID Celso Ellison MD   20 mg at 09/05/20 1756    metoprolol (LOPRESSOR) injection 5 mg  5 mg IntraVENous Q6H PRN Tellis Schwab R, NP-C   5 mg at 09/05/20 1141    balsam peru-castor oiL (VENELEX) ointment   Topical BID Al Amador Paiz MD        HYDROmorphone (PF) 25 mg/50 mL (0.5 mg/mL) infusion  0-6 mg/hr IntraVENous TITRATE Ana Seymour MD 2 mL/hr at 09/04/20 1721 1 mg/hr at 09/04/20 1721    fentaNYL citrate (PF) injection  mcg   mcg IntraVENous Q2H PRN Tellis Schwab R, NP-C   50 mcg at 09/05/20 1319    midazolam (VERSED) injection 1-2 mg  1-2 mg IntraVENous Q2H PRN Tellis Schwab R, NP-C   1 mg at 09/05/20 0240    docusate (COLACE) 50 mg/5 mL oral liquid 100 mg  100 mg Per NG tube BID Kandi Sandoval NP   Stopped at 09/04/20 1800    enoxaparin (LOVENOX) injection 50 mg  0.5 mg/kg SubCUTAneous Q12H Kandi Sandoval NP   50 mg at 09/05/20 8242    acetaminophen (TYLENOL) suppository 650 mg  650 mg Rectal Q6H PRN Kandi Sandoval NP   650 mg at 08/19/20 2310    alteplase (CATHFLO) 1 mg in sterile water (preservative free) 1 mL injection  1 mg InterCATHeter PRN Kandi Sandoval NP   1 mg at 09/01/20 2227    polyvinyl alcohol-povidone (NATURAL TEARS) 0.5-0.6 % ophthalmic solution 1 Drop  1 Drop Both Eyes PRN Kandi Sandoval NP        dextrose 10% infusion 0-250 mL  0-250 mL IntraVENous PRN Traci Ivory NP        glucagon (GLUCAGEN) injection 1 mg  1 mg IntraMUSCular PRN Mckenna Corey, NP        glucose chewable tablet 16 g  4 Tab Oral PRN Jarek Pacer Paige Stringer NP        promethazine (PHENERGAN) tablet 12.5 mg  12.5 mg Oral Q6H PRN Mckenna Corey NP        Or    ondansetron [facility]) injection 4 mg  4 mg IntraVENous Q6H PRN Elluis felipea Leora, NP   4 mg at 20 4333    sodium chloride (NS) flush 5-40 mL  5-40 mL IntraVENous Q8H Elonda Leora, NP   10 mL at 20 1535    sodium chloride (NS) flush 5-40 mL  5-40 mL IntraVENous PRN Elluis felipea Leora, NP   40 mL at 20 1602    aspirin chewable tablet 81 mg  81 mg Per NG tube DAILY Mien Schultz MD   81 mg at 20 0906    chlorhexidine (ORAL CARE KIT) 0.12 % mouthwash 15 mL  15 mL Oral Q12H Mine Schultz MD   15 mL at 20 0909    albuterol-ipratropium (DUO-NEB) 2.5 MG-0.5 MG/3 ML  3 mL Nebulization Q6H PRN Mine Schultz MD        0.9% sodium chloride infusion  5 mL/hr IntraVENous CONTINUOUS Mine Schultz MD 5 mL/hr at 20 1120 5 mL/hr at 20 1120        No Known Allergies    Review of Systems:  Review of systems not obtained due to patient factors.     Objective:     Vital signs  Temp (24hrs), Av.5 °F (38.1 °C), Min:100.1 °F (37.8 °C), Max:101.3 °F (38.5 °C)   701 - 1900  In: 790 [I.V.:430]  Out: 950 [Urine:950]  1901 -  07  In: 4722.7 [I.V.:1981.7]  Out: 4201 [Urine:4200]    Visit Vitals  /77   Pulse (!) 117   Temp 100.1 °F (37.8 °C)   Resp 22   Ht 5' 11\" (1.803 m)   Wt 211 lb 13.8 oz (96.1 kg)   SpO2 96%   BMI 29.55 kg/m²      O2 Device: Tracheostomy     Pain control  Pain Assessment  Pain Scale 1: Adult Nonverbal Pain Scale  Pain Intensity 1: 0  Pain Intervention(s) 1: Medication (see MAR)                Vitals:    20 1500 20 1524 20 1600 20 1700   BP: (!) 130/114  (!) 141/94 119/77   Pulse: (!) 118 (!) 135 (!) 120 (!) 117   Resp:  30 27 22   Temp:       SpO2: 96% 95% 94% 96%   Weight:       Height: Physical Exam:  GENERAL: alert, tracheostomy in place, on ventilator, sedated   LUNG: labored breathing  HEART: regular rate and rhythm, sinus tach on the monitor  SKIN: warm to touch     Neurologic Exam:  Mental Status:  Alert. Tracheostomy in place. Unable to answer orientation questions. Language:    Unable to assess. Cranial Nerves:        Pupils equal, round and reactive to light. Blinks to visual threat. Had some difficulty looking to the right. Tracks with eyes to the left, however, it was difficult to assess EOMs due to patient not able to follow task completely     No facial asymmetry noted. Motor:    RUE flaccid. Moves left arm and bilateral legs spontaneously. Unable to lift legs off of bed. Unable to lift left arm completely off of bed. Strong left hand . Wiggles toes bilaterally on command. No involuntary movements. Sensation:    Difficult to assess sensation. Reflexes: Toes mute bilaterally     Coordination & Gait: Unable to assess due to patient condition. 24 hour results:    Recent Results (from the past 24 hour(s))   POC EG7    Collection Time: 09/05/20  4:00 AM   Result Value Ref Range    Calcium, ionized (POC) 1.23 1.12 - 1.32 mmol/L    FIO2 (POC) 40 %    pH (POC) 7.40 7.35 - 7.45      pCO2 (POC) 39.5 35.0 - 45.0 MMHG    pO2 (POC) 79 (L) 80 - 100 MMHG    HCO3 (POC) 24.5 22 - 26 MMOL/L    Base excess (POC) 0 mmol/L    sO2 (POC) 96 92 - 97 %    Site RIGHT RADIAL      Device: VENT      Mode ASSIST CONTROL      Set Rate 22 bpm    PEEP/CPAP (POC) 5 cmH2O    PIP (POC) 24      Allens test (POC) YES      Inspiratory Time 0.68 sec    Specimen type (POC) ARTERIAL      Total resp.  rate 23     CBC WITH AUTOMATED DIFF    Collection Time: 09/05/20  4:33 AM   Result Value Ref Range    WBC 5.1 4.1 - 11.1 K/uL    RBC 3.06 (L) 4.10 - 5.70 M/uL    HGB 9.0 (L) 12.1 - 17.0 g/dL    HCT 28.4 (L) 36.6 - 50.3 %    MCV 92.8 80.0 - 99.0 FL    MCH 29.4 26.0 - 34.0 PG MCHC 31.7 30.0 - 36.5 g/dL    RDW 14.7 (H) 11.5 - 14.5 %    PLATELET 935 118 - 062 K/uL    MPV 9.8 8.9 - 12.9 FL    NRBC 0.0 0  WBC    ABSOLUTE NRBC 0.00 0.00 - 0.01 K/uL    NEUTROPHILS 56 32 - 75 %    LYMPHOCYTES 21 12 - 49 %    MONOCYTES 10 5 - 13 %    EOSINOPHILS 7 0 - 7 %    BASOPHILS 1 0 - 1 %    IMMATURE GRANULOCYTES 5 (H) 0.0 - 0.5 %    ABS. NEUTROPHILS 2.7 1.8 - 8.0 K/UL    ABS. LYMPHOCYTES 1.1 0.8 - 3.5 K/UL    ABS. MONOCYTES 0.5 0.0 - 1.0 K/UL    ABS. EOSINOPHILS 0.4 0.0 - 0.4 K/UL    ABS. BASOPHILS 0.1 0.0 - 0.1 K/UL    ABS. IMM. GRANS. 0.3 (H) 0.00 - 0.04 K/UL    DF SMEAR SCANNED      RBC COMMENTS ANISOCYTOSIS  1+       MAGNESIUM    Collection Time: 09/05/20  4:33 AM   Result Value Ref Range    Magnesium 2.0 1.6 - 2.4 mg/dL   METABOLIC PANEL, COMPREHENSIVE    Collection Time: 09/05/20  4:33 AM   Result Value Ref Range    Sodium 139 136 - 145 mmol/L    Potassium 3.5 3.5 - 5.1 mmol/L    Chloride 103 97 - 108 mmol/L    CO2 28 21 - 32 mmol/L    Anion gap 8 5 - 15 mmol/L    Glucose 102 (H) 65 - 100 mg/dL    BUN 9 6 - 20 MG/DL    Creatinine 0.38 (L) 0.70 - 1.30 MG/DL    BUN/Creatinine ratio 24 (H) 12 - 20      GFR est AA >60 >60 ml/min/1.73m2    GFR est non-AA >60 >60 ml/min/1.73m2    Calcium 8.7 8.5 - 10.1 MG/DL    Bilirubin, total 0.4 0.2 - 1.0 MG/DL    ALT (SGPT) 213 (H) 12 - 78 U/L    AST (SGOT) 57 (H) 15 - 37 U/L    Alk. phosphatase 150 (H) 45 - 117 U/L    Protein, total 6.9 6.4 - 8.2 g/dL    Albumin 2.2 (L) 3.5 - 5.0 g/dL    Globulin 4.7 (H) 2.0 - 4.0 g/dL    A-G Ratio 0.5 (L) 1.1 - 2.2     PHOSPHORUS    Collection Time: 09/05/20  4:33 AM   Result Value Ref Range    Phosphorus 4.2 2.6 - 4.7 MG/DL          Imaging:    CT of Head on 9/3/2020 showed  IMPRESSION:   1. No acute intracranial hemorrhage, mass or infarct. 2. Bilateral mastoid effusions. 3. Paranasal sinus disease, as described above.     CT Results  (Last 48 hours)               09/04/20 1221  CTA CODE NEURO HEAD AND NECK W CONT Final result    Impression:  IMPRESSION:    CTA Head:   1. No evidence of significant stenosis or aneurysm. 2. Abnormal gyriform cortical enhancement in the bilateral superior   frontoparietal lobes. Differential considerations include subacute ischemia from   a hypoxic-ischemic event, or an infectious or inflammatory   cerebritis/encephalitis. 3. Extensive right-sided paranasal sinus disease. CTA Neck:   1. No evidence of significant stenosis. 2. Extensive consolidative and groundglass opacities in the bilateral upper   lungs, consistent with multifocal pneumonia and/or ARDS. CT Brain Perfusion:   1. No definite acute abnormality. Motion degraded perfusion images, which make   color maps unreliable. The findings were called to ICU doctor on 9/4/2020 at 1:42 PM by Dr. Morgan Hadley. [phone]           Narrative:  EXAM:  CTA CODE NEURO HEAD AND NECK W CONT, CT CODE NEURO PERF W CBF       INDICATION:   loss of power over right arm       COMPARISON:  CT head 9/4/2020. CONTRAST:  120 mL of Isovue-370. TECHNIQUE:  Unenhanced  images were obtained to localize the volume for   acquisition. Multislice helical axial CT angiography was performed from the   aortic arch to the top of the head during uneventful rapid bolus intravenous   contrast administration. Coronal and sagittal reformations and 3D post   processing was performed. CT dose reduction was achieved through use of a   standardized protocol tailored for this examination and automatic exposure   control for dose modulation. CT brain perfusion was performed with generation of hemodynamic maps of multiple   parameters, including cerebral blood flow, cerebral blood volume, and MTT (mean   transit time). CT dose reduction was achieved through use of a standardized   protocol tailored for this examination and automatic exposure control for dose   modulation.        FINDINGS:       CTA Head:   There is no evidence of large vessel occlusion or flow-limiting stenosis of the   intracranial internal carotid, anterior cerebral, and middle cerebral arteries. The anterior communicating artery is patent. There is no evidence of large vessel occlusion or flow-limiting stenosis of the   intracranial vertebral arteries, basilar artery, or posterior cerebral arteries. The posterior communicating arteries are patent. There is no evidence of aneurysm or vascular malformation. The dural venous   sinuses and deep cerebral venous system are patent. On delayed phase images, there is abnormal gyriform cortical enhancement seen in   the bilateral superior frontoparietal lobes. A coarse calcification is again   noted within the right cerebellum. CTA NECK:   NASCET method was utilized for calculating stenosis. The aortic arch is unremarkable. The common carotid arteries demonstrate no   significant stenosis. There is no evidence of significant stenosis in the   cervical right internal carotid artery. There is no evidence of significant   stenosis in the cervical left internal carotid artery. There is a codominant vertebrobasilar arterial system. The cervical vertebral   arteries are normal in course, size and contour without significant stenosis. Extensive right-sided paranasal sinus mucosal thickening is again noted with   complete opacification of the right frontal, maxillary, and sphenoid sinuses. Status post tracheostomy, with NG tube also in place. Extensive groundglass and   consolidative opacities in the bilateral upper lungs. No acute fracture or   aggressive osseous lesion. CT Brain Perfusion:   Perfusion images are degraded by motion, which make color maps unreliable. There   are no regional areas of elevated Tmax, decreased cerebral blood flow or blood   volume. A small area of indicated elevated Tmax in the left temporal lobe is   favored to be artifactual.   rCBF < 30% = 0 cc.     Tmax > 6 seconds = 3 cc.           09/04/20 1213  CT CODE NEURO PERF W CBF Final result    Impression:  IMPRESSION:    CTA Head:   1. No evidence of significant stenosis or aneurysm. 2. Abnormal gyriform cortical enhancement in the bilateral superior   frontoparietal lobes. Differential considerations include subacute ischemia from   a hypoxic-ischemic event, or an infectious or inflammatory   cerebritis/encephalitis. 3. Extensive right-sided paranasal sinus disease. CTA Neck:   1. No evidence of significant stenosis. 2. Extensive consolidative and groundglass opacities in the bilateral upper   lungs, consistent with multifocal pneumonia and/or ARDS. CT Brain Perfusion:   1. No definite acute abnormality. Motion degraded perfusion images, which make   color maps unreliable. The findings were called to ICU doctor on 9/4/2020 at 1:42 PM by Dr. [place] Agudelo. [phone]           Narrative:  EXAM:  CTA CODE NEURO HEAD AND NECK W CONT, CT CODE NEURO PERF W CBF       INDICATION:   loss of power over right arm       COMPARISON:  CT head 9/4/2020. CONTRAST:  120 mL of Isovue-370. TECHNIQUE:  Unenhanced  images were obtained to localize the volume for   acquisition. Multislice helical axial CT angiography was performed from the   aortic arch to the top of the head during uneventful rapid bolus intravenous   contrast administration. Coronal and sagittal reformations and 3D post   processing was performed. CT dose reduction was achieved through use of a   standardized protocol tailored for this examination and automatic exposure   control for dose modulation. CT brain perfusion was performed with generation of hemodynamic maps of multiple   parameters, including cerebral blood flow, cerebral blood volume, and MTT (mean   transit time). CT dose reduction was achieved through use of a standardized   protocol tailored for this examination and automatic exposure control for dose   modulation. FINDINGS:       CTA Head:   There is no evidence of large vessel occlusion or flow-limiting stenosis of the   intracranial internal carotid, anterior cerebral, and middle cerebral arteries. The anterior communicating artery is patent. There is no evidence of large vessel occlusion or flow-limiting stenosis of the   intracranial vertebral arteries, basilar artery, or posterior cerebral arteries. The posterior communicating arteries are patent. There is no evidence of aneurysm or vascular malformation. The dural venous   sinuses and deep cerebral venous system are patent. On delayed phase images, there is abnormal gyriform cortical enhancement seen in   the bilateral superior frontoparietal lobes. A coarse calcification is again   noted within the right cerebellum. CTA NECK:   NASCET method was utilized for calculating stenosis. The aortic arch is unremarkable. The common carotid arteries demonstrate no   significant stenosis. There is no evidence of significant stenosis in the   cervical right internal carotid artery. There is no evidence of significant   stenosis in the cervical left internal carotid artery. There is a codominant vertebrobasilar arterial system. The cervical vertebral   arteries are normal in course, size and contour without significant stenosis. Extensive right-sided paranasal sinus mucosal thickening is again noted with   complete opacification of the right frontal, maxillary, and sphenoid sinuses. Status post tracheostomy, with NG tube also in place. Extensive groundglass and   consolidative opacities in the bilateral upper lungs. No acute fracture or   aggressive osseous lesion. CT Brain Perfusion:   Perfusion images are degraded by motion, which make color maps unreliable. There   are no regional areas of elevated Tmax, decreased cerebral blood flow or blood   volume.  A small area of indicated elevated Tmax in the left temporal lobe is   favored to be artifactual.   rCBF < 30% = 0 cc. Tmax > 6 seconds = 3 cc.           09/04/20 1212  CT CODE NEURO HEAD WO CONTRAST Final result    Impression:  IMPRESSION:    Chronic sinus disease. No acute process or change compared to the prior exam.               Narrative:  EXAM: CT CODE NEURO HEAD WO CONTRAST       INDICATION: Acute neuro changes       COMPARISON: 9/3/2020. CONTRAST: None. TECHNIQUE: Unenhanced CT of the head was performed using 5 mm images. Brain and   bone windows were generated. Coronal and sagittal reformats. CT dose reduction   was achieved through use of a standardized protocol tailored for this   examination and automatic exposure control for dose modulation. FINDINGS:   The ventricles and sulci are normal in size, shape and configuration. . There is   no significant white matter disease. There is no intracranial hemorrhage,   extra-axial collection, or mass effect. The basilar cisterns are open. No CT   evidence of acute infarct. The bone windows demonstrate no abnormalities. There is complete opacification   of the right maxillary and sphenoid sinus along with opacification of the right   ethmoidal air cells. Assessment:     Active Problems:    Acute respiratory failure ([address] (8/18/2020)        Plan:   Possible acute/subacute CVA in the setting of COVID-19  - CTA showed abnormal gyriform cortical enhancement in the bilateral superior  frontoparietal lobes. Differential considerations include subacute ischemia from  a hypoxic-ischemic event, or an infectious or inflammatory  cerebritis/encephalitis. - noted right arm flaccid on exam, patient able to follow commands with LUE, RLE/LLE.    - patient at increased risk of stroke in the setting of COVID-19 infection  - ideally an MRI of Brain WWO would assist in clarifying cortical enhancement seen on CTA, however due to his COVID-19 status, he is not able to go for an MRI and it would likely not change the patient's current management since he is being treated with anticoagulation and aspirin  - he is on high dose Lovenox and aspirin 81 mg daily for COVID-19 which will help with stroke prevention  - He has been on sedation for days which can also cause some muscle deconditioning. We can possibly consider obtaining a CT of the Head in a few days to see if there is an evolving stroke, however we should continue with aspirin for stroke prevention  - Neurology will follow along as needed. Please call with any questions. COVID-19 Infection  - management per [address] [place] [address] discussed with Dr. Yasmin Hernandez, Dr. Varsha De La Cruz, and RN.       Michele Naidu, ADONIS

## 2020-09-05 NOTE — PROGRESS NOTES
[facility]    ICU TEAM Progress Note    Name: Sherry Whiting   : 1990   MRN: [id number]   Date: 2020      I  Subjective:   Progress Note: 2020      Reason for ICU Admission: Respiratory failure due to COVID-19 infection    Interval history: 80-year-old male with no history of significant past medical history except smoking half pack per day. Meghna Ruth was admitted on 2020 at [facility] with acute hypoxemic respiratory failure from COVID pneumonia. Meghna Ruth continued to deteriorate and got intubated on 8/10.  Due to worsening hypoxemia, he is transferred to [facility] for ECMO evaluation. Meghna Ruth has bee given convalescent plasma twice on  and  and treated with Remdesivir which was completed on 8/10. Meghna Ruth is also on dexamethasone. Meghna Ruth completed course of empiric antibiotics including azithromycin which was completed on  and cefepime was completed on .  During his hospital course he also developed pneumothorax and has left-sided chest tube since .  Has a tracheostomy tube placed on . On September 3 patient had significant worsening on his right arm, CT head was unrevealing. He is already on high-dose Lovenox and aspirin. On  he had acute weakness on the left arm code stroke was called and again CTA was unrevealing. There is some improvement in the movement on the left arm but no improvement on right arm. Overnight Events:   Patient had acute left arm weakness yesterday as detailed in the note. Code stroke was called and images showed enhancement abnormality in the bilateral frontoparietal area. Improvement in left arm weakness but no improvement and right arm weakness. Had episode of vomiting but this is improving and he is tolerating tube feeding better.   Continue to spike fever and antibiotic restarted on  and culture still negative ketamine is completely off    Active Problem List:     Problem List  Date Reviewed: 2020          Codes Class    Acute respiratory failure (HCC) ICD-10-CM: J96.00  ICD-9-CM: 518.81         Pneumothorax on left ICD-10-CM: J93.9  ICD-9-CM: 512.89         Pneumonia due to severe acute respiratory syndrome coronavirus 2 (SARS-CoV-2) ICD-10-CM: U07.1, J12.89  ICD-9-CM: 480.8         Respiratory failure with hypoxia (HCC) ICD-10-CM: J96.91  ICD-9-CM: 518.81               Past Medical History:      has a past medical history of History of vascular access device (08/10/2020). Past Surgical History:      has a past surgical history that includes ir thora/ins chest tube(pneumo) wo image (2020); ir thora/ins chest tube(pneumo) wo image (2020); and pr tracheostomy, planned (2020). Home Medications:     Prior to Admission medications    Medication Sig Start Date End Date Taking? Authorizing Provider   benzonatate (Tessalon Perles) 100 mg capsule Take 100 mg by mouth three (3) times daily as needed for Cough. Provider, Historical       Allergies/Social/Family History:     No Known Allergies   Social History     Tobacco Use    Smoking status: Current Some Day Smoker    Smokeless tobacco: Never Used   Substance Use Topics    Alcohol use: Not on file      History reviewed. No pertinent family history.     Review of Systems:     Not able to obtain due to his medical condition    Objective:   Vital Signs:  Visit Vitals  /79   Pulse (!) 110   Temp 100.3 °F (37.9 °C)   Resp 23   Ht 5' 11\" (1.803 m)   Wt 96.1 kg (211 lb 13.8 oz)   SpO2 94%   BMI 29.55 kg/m²      O2 Device: Tracheostomy, Ventilator Temp (24hrs), Av.5 °F (38.1 °C), Min:99.9 °F (37.7 °C), Max:101.3 °F (38.5 °C)           Intake/Output:     Intake/Output Summary (Last 24 hours) at 2020 0715  Last data filed at 2020 0700  Gross per 24 hour   Intake 3553.75 ml   Output 2216 ml   Net 1337.75 ml       Physical Exam:  General:   Sedated on mechanical ventilation via tracheostomy   Eyes:  Sclera anicteric. Pupils equally round and reactive to light. Mouth/Throat: Mucous membranes normal, oral pharynx clear   Neck: Supple, some thick creamy secretion around the tracheostomy site, somewhat more indurated and erythematous today   Lungs:    Good air entry bilaterally, fine crepitation   CV:  Regular rate and rhythm,no murmur, click, rub or gallop   Abdomen:   Soft, non-tender. bowel sounds normal. non-distended   Extremities: No cyanosis , evolving edema   Skin: Skin color, texture, turgor normal. no acute rash or lesions   Lymph nodes: Cervical and supraclavicular normal   Musculoskeletal: No swelling or deformity   Lines/Devices:  Intact, no erythema, drainage or tenderness   Psych:  Sedated, opens eyes spontaneously, sometimes seems to be tracking and follow simple command but this is not persistent.    Still not moving right arm, right arm is flaccid . no deformity noticed, good pulses good color good capillary refill no shoulder or neck tenderness or swelling.  .Face is symmetrical.  Moves left arm spontaneously, wiggle left toes. Profound weakness           LABS AND  DATA: Personally reviewed  Recent Labs     09/05/20  0433 09/04/20  0710   WBC 5.1 7.3   HGB 9.0* 10.5*   HCT 28.4* 32.8*    320     Recent Labs     09/05/20  0433 09/04/20  0710    136   K 3.5 3.5    99   CO2 28 27   BUN 9 4*   CREA 0.38* 0.47*   * 138*   CA 8.7 9.5   MG 2.0 1.9   PHOS 4.2 4.5     Recent Labs     09/05/20  0433 09/04/20  0710   * 189*   TP 6.9 7.7   ALB 2.2* 2.4*   GLOB 4.7* 5.3*     No results for input(s): INR, PTP, APTT, INREXT in the last 72 hours. Recent Labs     09/05/20  0400 09/03/20  0459   PHI 7.40 7.49*   PCO2I 39.5 40.2   PO2I 79* 67*   FIO2I 40 40     No results for input(s): CPK, CKMB, TROIQ, BNPP in the last 72 hours.     Hemodynamics:   PAP:   CO:     Wedge:   CI:     CVP:    SVR:       PVR:       Ventilator Settings:  Mode Rate Tidal Volume Pressure FiO2 PEEP   Assist control, Pressure control   2 ml  0 cm H2O 40 % 5 cm H20     Peak airway pressure: 32 cm H2O    Minute ventilation: 11.8 l/min        MEDS: Reviewed    Chest X-Ray:  CXR Results  (Last 48 hours)    None          ECHO:  Left Ventricle  Normal cavity size, wall thickness and systolic function (ejection fraction normal). Wall motion: normal. The estimated EF is 55 - 60%. Left Atrium  Normal cavity size. No atrial septal defect present. Right Ventricle  Normal cavity size, wall thickness and global systolic function. Right Atrium  Normal cavity size. Aortic Valve  Normal valve structure, no stenosis and no regurgitation. Mitral Valve  Normal valve structure, no stenosis and no regurgitation. Tricuspid Valve  Normal valve structure, no stenosis and no regurgitation. Pulmonic Valve  Pulmonic valve not well visualized, but normal doppler findings. No stenosis. Aorta  Normal aortic root. IVC/Hepatic Veins  Normal structure. Pericardium  No evidence of pericardial effusion. Assessment and Plan:   -Bilateral pneumonia due to COVID-19 infection  -Acute right arm weakness:  Patient already on aspirin and moderate dose anticoagulation. Still pending CTA head and neck, CT head showed no bleed. -Right arm weakness  -Sepsis:  fever persisted:   - Acute hypoxic respiratory failure due to COVID-19 infection status post tracheostomy on August 26  - Pneumothorax status post left chest tube insertion on August 11 without air leak  - Possible bacterial super imposed infection: Completed antibiotic course  -History of tobacco use     -Continue to wean sedation as tolerated.  Ketamine is discontinued.   Now on Versed of 8 and low-dose hydromorphone.  - At this time on PEEP of 5 and FiO2 of 40%, PO2 is adequate.  We will continue to wean as tolerated  - Good urine output.  No need for diuretics today.  Will use as needed.  Replace electrolytes as indicated  -Right arm paralysis, no clear underlying etiology, CT showed abnormal bilateral frontoparietal enhancement. Perceptual diagnosis is wide. At this time no acute intervention is possible or indicated. Continue on the current dose of Lovenox and aspirin. I appreciate neurology input. Seems to have some improvement on left arm as he able to move spontaneously now. No improvement with right arm  - Still low-grade fever,  culture sent and antibiotic restarted on September 4. WBC trending down, continue to monitor.  - Completed COVID-19 treatment  - DVT prophylaxis with high-dose Lovenox.  GI prophylaxis.  Nutritional support  -Steroid wean down and discontinued. DISPOSITION  Stay in ICU    CRITICAL CARE CONSULTANT NOTE  I had a face to face encounter with the patient, reviewed and interpreted patient data including clinical events, labs, images, vital signs, I/O's, and examined patient. I have discussed the case and the plan and management of the patient's care with the consulting services, the bedside nurses and the respiratory therapist.      NOTE OF PERSONAL INVOLVEMENT IN CARE   This patient has a high probability of imminent, clinically significant deterioration, which requires the highest level of preparedness to intervene urgently. I participated in the decision-making and personally managed or directed the management of the following life and organ supporting interventions that required my frequent assessment to treat or prevent imminent deterioration. I personally spent 40 minutes of critical care time. This is time spent at this critically ill patient's bedside actively involved in patient care as well as the coordination of care and discussions with the patient's family. This does not include any procedural time which has been billed separately. Jordon Mead M.D.   Staff Intensivist/Pulmonologist  [facility]  9/5/2020

## 2020-09-05 NOTE — PROGRESS NOTES
Problem: Ventilator Management  Goal: *Adequate oxygenation and ventilation  Outcome: Progressing Towards Goal  Goal: *Patient maintains clear airway/free of aspiration  Outcome: Progressing Towards Goal  Goal: *Absence of infection signs and symptoms  Outcome: Progressing Towards Goal  Goal: *Normal spontaneous ventilation  Outcome: Progressing Towards Goal     Problem: Pressure Injury - Risk of  Goal: *Prevention of pressure injury  Description: Document Enrique Scale and appropriate interventions in the flowsheet. Outcome: Progressing Towards Goal  Note: Pressure Injury Interventions:  Sensory Interventions: Assess changes in LOC, Assess need for specialty bed, Avoid rigorous massage over bony prominences, Check visual cues for pain, Float heels, Keep linens dry and wrinkle-free, Minimize linen layers, Monitor skin under medical devices, Pressure redistribution bed/mattress (bed type), Turn and reposition approx. every two hours (pillows and wedges if needed)    Moisture Interventions: Absorbent underpads, Assess need for specialty bed, Check for incontinence Q2 hours and as needed, Contain wound drainage, Internal/External urinary devices, Minimize layers    Activity Interventions: Assess need for specialty bed, Pressure redistribution bed/mattress(bed type)    Mobility Interventions: Assess need for specialty bed, Float heels, HOB 30 degrees or less, Pressure redistribution bed/mattress (bed type), Turn and reposition approx. every two hours(pillow and wedges)    Nutrition Interventions: Document food/fluid/supplement intake    Friction and Shear Interventions: HOB 30 degrees or less, Minimize layers                Problem: Falls - Risk of  Goal: *Absence of Falls  Description: Document Adama Fall Risk and appropriate interventions in the flowsheet.   Outcome: Progressing Towards Goal  Note: Fall Risk Interventions:  Mobility Interventions: Communicate number of staff needed for ambulation/transfer    Mentation Interventions: Adequate sleep, hydration, pain control, Evaluate medications/consider consulting pharmacy, Reorient patient, Room close to nurse's station, Update white board    Medication Interventions: Evaluate medications/consider consulting pharmacy    Elimination Interventions:  Toileting schedule/hourly rounds    History of Falls Interventions: Evaluate medications/consider consulting pharmacy, Room close to nurse's station         Problem: Nutrition Deficit  Goal: *Optimize nutritional status  Outcome: Progressing Towards Goal     Problem: Breathing Pattern - Ineffective  Goal: *Absence of hypoxia  Outcome: Progressing Towards Goal  Goal: *Use of effective breathing techniques  Outcome: Progressing Towards Goal

## 2020-09-06 ENCOUNTER — APPOINTMENT (OUTPATIENT)
Dept: GENERAL RADIOLOGY | Age: 30
DRG: 004 | End: 2020-09-06
Attending: INTERNAL MEDICINE
Payer: COMMERCIAL

## 2020-09-06 ENCOUNTER — APPOINTMENT (OUTPATIENT)
Dept: VASCULAR SURGERY | Age: 30
DRG: 004 | End: 2020-09-06
Attending: INTERNAL MEDICINE
Payer: COMMERCIAL

## 2020-09-06 LAB
ALBUMIN SERPL-MCNC: 2.2 G/DL (ref 3.5–5)
ALBUMIN/GLOB SERPL: 0.5 {RATIO} (ref 1.1–2.2)
ALP SERPL-CCNC: 140 U/L (ref 45–117)
ALT SERPL-CCNC: 156 U/L (ref 12–78)
ANION GAP SERPL CALC-SCNC: 6 MMOL/L (ref 5–15)
ARTERIAL PATENCY WRIST A: YES
AST SERPL-CCNC: 35 U/L (ref 15–37)
BACTERIA SPEC CULT: ABNORMAL
BACTERIA SPEC CULT: ABNORMAL
BASE EXCESS BLD CALC-SCNC: 4 MMOL/L
BASOPHILS # BLD: 0 K/UL (ref 0–0.1)
BASOPHILS NFR BLD: 0 % (ref 0–1)
BDY SITE: ABNORMAL
BILIRUB SERPL-MCNC: 0.4 MG/DL (ref 0.2–1)
BUN SERPL-MCNC: 11 MG/DL (ref 6–20)
BUN/CREAT SERPL: 25 (ref 12–20)
CA-I BLD-SCNC: 1.23 MMOL/L (ref 1.12–1.32)
CALCIUM SERPL-MCNC: 8.8 MG/DL (ref 8.5–10.1)
CHLORIDE SERPL-SCNC: 102 MMOL/L (ref 97–108)
CO2 SERPL-SCNC: 29 MMOL/L (ref 21–32)
CREAT SERPL-MCNC: 0.44 MG/DL (ref 0.7–1.3)
DATE LAST DOSE: ABNORMAL
DIFFERENTIAL METHOD BLD: ABNORMAL
EOSINOPHIL # BLD: 0.2 K/UL (ref 0–0.4)
EOSINOPHIL NFR BLD: 4 % (ref 0–7)
ERYTHROCYTE [DISTWIDTH] IN BLOOD BY AUTOMATED COUNT: 14.8 % (ref 11.5–14.5)
GAS FLOW.O2 O2 DELIVERY SYS: ABNORMAL L/MIN
GAS FLOW.O2 SETTING OXYMISER: 22 BPM
GLOBULIN SER CALC-MCNC: 4.7 G/DL (ref 2–4)
GLUCOSE BLD STRIP.AUTO-MCNC: 119 MG/DL (ref 65–100)
GLUCOSE SERPL-MCNC: 99 MG/DL (ref 65–100)
GRAM STN SPEC: ABNORMAL
HCO3 BLD-SCNC: 28.2 MMOL/L (ref 22–26)
HCT VFR BLD AUTO: 28.6 % (ref 36.6–50.3)
HGB BLD-MCNC: 9 G/DL (ref 12.1–17)
IMM GRANULOCYTES # BLD AUTO: 0 K/UL
IMM GRANULOCYTES NFR BLD AUTO: 0 %
INSPIRATION.DURATION SETTING TIME VENT: 0.86 SEC
LYMPHOCYTES # BLD: 1.5 K/UL (ref 0.8–3.5)
LYMPHOCYTES NFR BLD: 30 % (ref 12–49)
MAGNESIUM SERPL-MCNC: 1.8 MG/DL (ref 1.6–2.4)
MAGNESIUM SERPL-MCNC: 1.8 MG/DL (ref 1.6–2.4)
MCH RBC QN AUTO: 29.2 PG (ref 26–34)
MCHC RBC AUTO-ENTMCNC: 31.5 G/DL (ref 30–36.5)
MCV RBC AUTO: 92.9 FL (ref 80–99)
MONOCYTES # BLD: 0.5 K/UL (ref 0–1)
MONOCYTES NFR BLD: 10 % (ref 5–13)
NEUTS BAND NFR BLD MANUAL: 1 % (ref 0–6)
NEUTS SEG # BLD: 2.8 K/UL (ref 1.8–8)
NEUTS SEG NFR BLD: 55 % (ref 32–75)
NRBC # BLD: 0 K/UL (ref 0–0.01)
NRBC BLD-RTO: 0 PER 100 WBC
O2/TOTAL GAS SETTING VFR VENT: 40 %
PCO2 BLD: 39 MMHG (ref 35–45)
PEEP RESPIRATORY: 5 CMH2O
PH BLD: 7.47 [PH] (ref 7.35–7.45)
PHOSPHATE SERPL-MCNC: 4.2 MG/DL (ref 2.6–4.7)
PIP ISTAT,IPIP: 24
PLATELET # BLD AUTO: 286 K/UL (ref 150–400)
PMV BLD AUTO: 9.6 FL (ref 8.9–12.9)
PO2 BLD: 69 MMHG (ref 80–100)
POTASSIUM SERPL-SCNC: 3.2 MMOL/L (ref 3.5–5.1)
POTASSIUM SERPL-SCNC: 3.6 MMOL/L (ref 3.5–5.1)
PROT SERPL-MCNC: 6.9 G/DL (ref 6.4–8.2)
RBC # BLD AUTO: 3.08 M/UL (ref 4.1–5.7)
RBC MORPH BLD: ABNORMAL
REPORTED DOSE,DOSE: ABNORMAL UNITS
REPORTED DOSE/TIME,TMG: ABNORMAL
SAO2 % BLD: 95 % (ref 92–97)
SERVICE CMNT-IMP: ABNORMAL
SERVICE CMNT-IMP: ABNORMAL
SODIUM SERPL-SCNC: 137 MMOL/L (ref 136–145)
SPECIMEN TYPE: ABNORMAL
TOTAL RESP. RATE, ITRR: 22
VANCOMYCIN TROUGH SERPL-MCNC: 10.9 UG/ML (ref 5–10)
VENTILATION MODE VENT: ABNORMAL
WBC # BLD AUTO: 5 K/UL (ref 4.1–11.1)

## 2020-09-06 PROCEDURE — 74011000250 HC RX REV CODE- 250: Performed by: NURSE PRACTITIONER

## 2020-09-06 PROCEDURE — 94003 VENT MGMT INPAT SUBQ DAY: CPT

## 2020-09-06 PROCEDURE — 74011250637 HC RX REV CODE- 250/637: Performed by: INTERNAL MEDICINE

## 2020-09-06 PROCEDURE — 80202 ASSAY OF VANCOMYCIN: CPT

## 2020-09-06 PROCEDURE — 74011250637 HC RX REV CODE- 250/637: Performed by: NURSE PRACTITIONER

## 2020-09-06 PROCEDURE — 82803 BLOOD GASES ANY COMBINATION: CPT

## 2020-09-06 PROCEDURE — 84100 ASSAY OF PHOSPHORUS: CPT

## 2020-09-06 PROCEDURE — 65610000006 HC RM INTENSIVE CARE

## 2020-09-06 PROCEDURE — 36415 COLL VENOUS BLD VENIPUNCTURE: CPT

## 2020-09-06 PROCEDURE — 82962 GLUCOSE BLOOD TEST: CPT

## 2020-09-06 PROCEDURE — 94761 N-INVAS EAR/PLS OXIMETRY MLT: CPT

## 2020-09-06 PROCEDURE — 74011250636 HC RX REV CODE- 250/636: Performed by: INTERNAL MEDICINE

## 2020-09-06 PROCEDURE — 74011250636 HC RX REV CODE- 250/636: Performed by: NURSE PRACTITIONER

## 2020-09-06 PROCEDURE — 74011000258 HC RX REV CODE- 258: Performed by: NURSE PRACTITIONER

## 2020-09-06 PROCEDURE — 93970 EXTREMITY STUDY: CPT

## 2020-09-06 PROCEDURE — 80053 COMPREHEN METABOLIC PANEL: CPT

## 2020-09-06 PROCEDURE — 77010033678 HC OXYGEN DAILY

## 2020-09-06 PROCEDURE — 74011250637 HC RX REV CODE- 250/637: Performed by: HOSPITALIST

## 2020-09-06 PROCEDURE — 74011000258 HC RX REV CODE- 258: Performed by: INTERNAL MEDICINE

## 2020-09-06 PROCEDURE — 85025 COMPLETE CBC W/AUTO DIFF WBC: CPT

## 2020-09-06 PROCEDURE — 71045 X-RAY EXAM CHEST 1 VIEW: CPT

## 2020-09-06 PROCEDURE — 83735 ASSAY OF MAGNESIUM: CPT

## 2020-09-06 PROCEDURE — 84132 ASSAY OF SERUM POTASSIUM: CPT

## 2020-09-06 PROCEDURE — 74011000250 HC RX REV CODE- 250: Performed by: INTERNAL MEDICINE

## 2020-09-06 RX ORDER — VANCOMYCIN/0.9 % SOD CHLORIDE 1.5G/250ML
1500 PLASTIC BAG, INJECTION (ML) INTRAVENOUS EVERY 8 HOURS
Status: DISCONTINUED | OUTPATIENT
Start: 2020-09-06 | End: 2020-09-08

## 2020-09-06 RX ORDER — POTASSIUM CHLORIDE 29.8 MG/ML
20 INJECTION INTRAVENOUS ONCE
Status: COMPLETED | OUTPATIENT
Start: 2020-09-06 | End: 2020-09-06

## 2020-09-06 RX ORDER — MAGNESIUM SULFATE 1 G/100ML
1 INJECTION INTRAVENOUS ONCE
Status: COMPLETED | OUTPATIENT
Start: 2020-09-06 | End: 2020-09-06

## 2020-09-06 RX ADMIN — ENOXAPARIN SODIUM 50 MG: 60 INJECTION SUBCUTANEOUS at 22:25

## 2020-09-06 RX ADMIN — MIDAZOLAM HYDROCHLORIDE 8 MG/HR: 5 INJECTION, SOLUTION INTRAMUSCULAR; INTRAVENOUS at 10:36

## 2020-09-06 RX ADMIN — CHLORDIAZEPOXIDE HYDROCHLORIDE 100 MG: 25 CAPSULE ORAL at 00:00

## 2020-09-06 RX ADMIN — FENTANYL CITRATE 50 MCG: 50 INJECTION, SOLUTION INTRAMUSCULAR; INTRAVENOUS at 10:10

## 2020-09-06 RX ADMIN — MAGNESIUM SULFATE IN DEXTROSE 1 G: 10 INJECTION, SOLUTION INTRAVENOUS at 10:17

## 2020-09-06 RX ADMIN — VANCOMYCIN HYDROCHLORIDE 1250 MG: 10 INJECTION, POWDER, LYOPHILIZED, FOR SOLUTION INTRAVENOUS at 13:01

## 2020-09-06 RX ADMIN — ASPIRIN 81 MG CHEWABLE TABLET 81 MG: 81 TABLET CHEWABLE at 10:10

## 2020-09-06 RX ADMIN — FENTANYL CITRATE 100 MCG: 50 INJECTION, SOLUTION INTRAMUSCULAR; INTRAVENOUS at 23:26

## 2020-09-06 RX ADMIN — CEFEPIME 2 G: 2 INJECTION, POWDER, FOR SOLUTION INTRAVENOUS at 09:29

## 2020-09-06 RX ADMIN — CEFEPIME 2 G: 2 INJECTION, POWDER, FOR SOLUTION INTRAVENOUS at 00:01

## 2020-09-06 RX ADMIN — FAMOTIDINE 20 MG: 40 POWDER, FOR SUSPENSION ORAL at 09:30

## 2020-09-06 RX ADMIN — Medication: at 18:28

## 2020-09-06 RX ADMIN — CHLORDIAZEPOXIDE HYDROCHLORIDE 100 MG: 25 CAPSULE ORAL at 12:52

## 2020-09-06 RX ADMIN — FENTANYL CITRATE 100 MCG: 50 INJECTION, SOLUTION INTRAMUSCULAR; INTRAVENOUS at 04:11

## 2020-09-06 RX ADMIN — Medication 10 ML: at 06:00

## 2020-09-06 RX ADMIN — CHLORHEXIDINE GLUCONATE 15 ML: 0.12 RINSE ORAL at 22:24

## 2020-09-06 RX ADMIN — Medication 1 MG/HR: at 22:30

## 2020-09-06 RX ADMIN — CHLORDIAZEPOXIDE HYDROCHLORIDE 100 MG: 25 CAPSULE ORAL at 23:06

## 2020-09-06 RX ADMIN — ACETAMINOPHEN ORAL SOLUTION 650 MG: 650 SOLUTION ORAL at 23:26

## 2020-09-06 RX ADMIN — MIDAZOLAM HYDROCHLORIDE 8 MG/HR: 5 INJECTION, SOLUTION INTRAMUSCULAR; INTRAVENOUS at 22:36

## 2020-09-06 RX ADMIN — POTASSIUM CHLORIDE 20 MEQ: 29.8 INJECTION, SOLUTION INTRAVENOUS at 10:49

## 2020-09-06 RX ADMIN — METOPROLOL TARTRATE 5 MG: 1 INJECTION, SOLUTION INTRAVENOUS at 23:26

## 2020-09-06 RX ADMIN — VANCOMYCIN HYDROCHLORIDE 1500 MG: 10 INJECTION, POWDER, LYOPHILIZED, FOR SOLUTION INTRAVENOUS at 22:26

## 2020-09-06 RX ADMIN — CHLORDIAZEPOXIDE HYDROCHLORIDE 100 MG: 25 CAPSULE ORAL at 18:27

## 2020-09-06 RX ADMIN — QUETIAPINE FUMARATE 100 MG: 100 TABLET ORAL at 22:25

## 2020-09-06 RX ADMIN — METOPROLOL TARTRATE 5 MG: 1 INJECTION, SOLUTION INTRAVENOUS at 11:11

## 2020-09-06 RX ADMIN — Medication: at 09:43

## 2020-09-06 RX ADMIN — POTASSIUM CHLORIDE 20 MEQ: 29.8 INJECTION, SOLUTION INTRAVENOUS at 12:52

## 2020-09-06 RX ADMIN — FAMOTIDINE 20 MG: 40 POWDER, FOR SUSPENSION ORAL at 18:28

## 2020-09-06 RX ADMIN — VANCOMYCIN HYDROCHLORIDE 1250 MG: 10 INJECTION, POWDER, LYOPHILIZED, FOR SOLUTION INTRAVENOUS at 04:09

## 2020-09-06 RX ADMIN — CHLORDIAZEPOXIDE HYDROCHLORIDE 100 MG: 25 CAPSULE ORAL at 06:57

## 2020-09-06 RX ADMIN — CEFEPIME 2 G: 2 INJECTION, POWDER, FOR SOLUTION INTRAVENOUS at 18:27

## 2020-09-06 RX ADMIN — Medication 10 ML: at 22:25

## 2020-09-06 RX ADMIN — Medication 10 ML: at 18:27

## 2020-09-06 RX ADMIN — ENOXAPARIN SODIUM 50 MG: 60 INJECTION SUBCUTANEOUS at 09:32

## 2020-09-06 RX ADMIN — POTASSIUM CHLORIDE 20 MEQ: 29.8 INJECTION, SOLUTION INTRAVENOUS at 09:42

## 2020-09-06 RX ADMIN — CHLORHEXIDINE GLUCONATE 15 ML: 0.12 RINSE ORAL at 09:42

## 2020-09-06 NOTE — ROUTINE PROCESS
Shift summary: found chest tube out of skin, skin scabbed over. Suture cut. No dressing needed. Pt with no significant happenings throughout night. Pt more awake making eye contact. No movement from right arm.

## 2020-09-06 NOTE — PROGRESS NOTES
[facility]    ICU TEAM Progress Note    Name: Ann Marie Kelly   : 1990   MRN: [id number]   Date: 2020      I  Subjective:   Progress Note: 2020      Reason for ICU Admission: Respiratory failure due to COVID-19 infection    Interval history:  69-year-old male with no history of significant past medical history except smoking half pack per day. Jeanne Brown was admitted on 2020 at [facility] with acute hypoxemic respiratory failure from COVID pneumonia. Jeanne Brown continued to deteriorate and got intubated on 8/10.  Due to worsening hypoxemia, he is transferred to [facility] for ECMO evaluation. Jeanne Brown has bee given convalescent plasma twice on  and  and treated with Remdesivir which was completed on 8/10. Jeanne Brown is also on dexamethasone. Jeanne Brown completed course of empiric antibiotics including azithromycin which was completed on  and cefepime was completed on .  During his hospital course he also developed pneumothorax and has left-sided chest tube since .  Has a tracheostomy tube placed on . On September 3 patient had significant worsening on his right arm, CT head was unrevealing. He is already on high-dose Lovenox and aspirin. On  he had acute weakness on the left arm code stroke was called and again CTA was unrevealing. There is some improvement in the movement on the left arm but no improvement on right arm. Overnight Events:   No acute event overnight. No further neurological issues, still not able to move the right arm. Ketamine remained off. T-max 100.8. No seizure tolerating tube feeding.   Chest tube accidentally removed, no evidence of pneumothorax on x-ray this morning    Active Problem List:     Problem List  Date Reviewed: 2020          Codes Class    Acute respiratory failure (HCC) ICD-10-CM: J96.00  ICD-9-CM: 518.81         Pneumothorax on left ICD-10-CM: J93.9  ICD-9-CM: 512.89         Pneumonia due to severe acute respiratory syndrome coronavirus 2 (SARS-CoV-2) ICD-10-CM: U07.1, J12.89  ICD-9-CM: 480.8         Respiratory failure with hypoxia (HCC) ICD-10-CM: J96.91  ICD-9-CM: 518.81               Past Medical History:      has a past medical history of History of vascular access device (08/10/2020). Past Surgical History:      has a past surgical history that includes ir thora/ins chest tube(pneumo) wo image (2020); ir thora/ins chest tube(pneumo) wo image (2020); and pr tracheostomy, planned (2020). Home Medications:     Prior to Admission medications    Medication Sig Start Date End Date Taking? Authorizing Provider   benzonatate (Tessalon Perles) 100 mg capsule Take 100 mg by mouth three (3) times daily as needed for Cough. Provider, Historical       Allergies/Social/Family History:     No Known Allergies   Social History     Tobacco Use    Smoking status: Current Some Day Smoker    Smokeless tobacco: Never Used   Substance Use Topics    Alcohol use: Not on file      History reviewed. No pertinent family history. Review of Systems:     Not able to obtain due to his medical condition    Objective:   Vital Signs:  Visit Vitals  /85   Pulse (!) 109   Temp 98.1 °F (36.7 °C)   Resp 22   Ht 5' 11\" (1.803 m)   Wt 96.1 kg (211 lb 13.8 oz)   SpO2 95%   BMI 29.55 kg/m²      O2 Device: Ventilator, Tracheostomy, Heated, Humidifier Temp (24hrs), Av.8 °F (37.7 °C), Min:98.1 °F (36.7 °C), Max:100.8 °F (38.2 °C)           Intake/Output:     Intake/Output Summary (Last 24 hours) at 2020 0932  Last data filed at 2020 0700  Gross per 24 hour   Intake 2126 ml   Output 1500 ml   Net 626 ml       Physical Exam:    General:   Sedated on mechanical ventilation via tracheostomy   Eyes:  Sclera anicteric. Pupils equally round and reactive to light.    Mouth/Throat: Mucous membranes normal, oral pharynx clear   Neck: Supple, some thick creamy secretion around the tracheostomy site, minimal secretion   Lungs:    Good air entry bilaterally, fine crepitation   CV:  Regular rate and rhythm,no murmur, click, rub or gallop   Abdomen:   Soft, non-tender. bowel sounds normal. non-distended   Extremities: No cyanosis , evolving edema   Skin: Skin color, texture, turgor normal. no acute rash or lesions   Lymph nodes: Cervical and supraclavicular normal   Musculoskeletal: No swelling or deformity   Lines/Devices:  Intact, no erythema, drainage or tenderness   Psych:  Sedated, opens eyes spontaneously, sometimes seems to be tracking and follow simple command but this is not persistent.    Still not moving right arm, right arm is flaccid . no deformity noticed, good pulses good color good capillary refill no shoulder or neck tenderness or swelling.  .Face is symmetrical.  Moves left arm spontaneously, wiggle left toes. Profound weakness         LABS AND  DATA: Personally reviewed  Recent Labs     09/06/20 0604 09/05/20  0433   WBC 5.0 5.1   HGB 9.0* 9.0*   HCT 28.6* 28.4*    304     Recent Labs     09/06/20  0604 09/05/20  0433    139   K 3.2* 3.5    103   CO2 29 28   BUN 11 9   CREA 0.44* 0.38*   GLU 99 102*   CA 8.8 8.7   MG 1.8 2.0   PHOS 4.2 4.2     Recent Labs     09/06/20  0604 09/05/20  0433   * 150*   TP 6.9 6.9   ALB 2.2* 2.2*   GLOB 4.7* 4.7*     No results for input(s): INR, PTP, APTT, INREXT in the last 72 hours. Recent Labs     09/06/20  0435 09/05/20  0400   PHI 7.47* 7.40   PCO2I 39.0 39.5   PO2I 69* 79*   FIO2I 40 40     No results for input(s): CPK, CKMB, TROIQ, BNPP in the last 72 hours.     Hemodynamics:   PAP:   CO:     Wedge:   CI:     CVP:    SVR:       PVR:       Ventilator Settings:  Mode Rate Tidal Volume Pressure FiO2 PEEP   Assist control, Pressure control   2 ml  0 cm H2O 40 % 5 cm H20     Peak airway pressure: 30 cm H2O    Minute ventilation: 11 l/min        MEDS: Reviewed    Chest X-Ray:  CXR Results  (Last 48 hours)               09/06/20 2413  XR CHEST PORT Final result    Impression:  Impression:   Unchanged diffuse airspace disease       Narrative:  Chest portable AP       History: Respiratory failure       Comparison: 9/3/2020       Findings:  A tracheostomy tube is in place. An enteric tube is seen within   stomach. Cardiac monitoring leads overlie the chest. The heart is normal size,   unchanged. There is diffuse patchy opacification lungs again seen. ECHO: No LV thrombus  Left Ventricle  Normal cavity size, wall thickness and systolic function (ejection fraction normal). Wall motion: normal. The estimated EF is 55 - 60%. Left Atrium  Normal cavity size. No atrial septal defect present. Right Ventricle  Normal cavity size, wall thickness and global systolic function. Right Atrium  Normal cavity size. Aortic Valve  Normal valve structure, no stenosis and no regurgitation. Mitral Valve  Normal valve structure, no stenosis and no regurgitation. Tricuspid Valve  Normal valve structure, no stenosis and no regurgitation. Pulmonic Valve  Pulmonic valve not well visualized, but normal doppler findings. No stenosis. Aorta  Normal aortic root. IVC/Hepatic Veins  Normal structure. Pericardium  No evidence of pericardial effusion       Assessment and Plan:   -Bilateral pneumonia due to COVID-19 infection  -Acute right arm weakness:  Patient already on aspirin and moderate dose anticoagulation.  Still pending CTA head and neck, CT head showed no bleed. -Right arm weakness  -Sepsis:  fever persisted:   - Acute hypoxic respiratory failure due to COVID-19 infection status post tracheostomy on August 26  - Pneumothorax status post left chest tube insertion on August 11 without air leak  - Possible bacterial super imposed infection: Completed antibiotic course  -History of tobacco use     -Continue to wean sedation as tolerated.  Ketamine is discontinued.   Now on Versed of 8 and low-dose hydromorphone.  - At this time on PEEP of 5 and FiO2 of 40%, PO2 is adequate.  We will continue to wean as tolerated  - Good urine output.  No need for diuretics today.  Will use as needed.  Replace electrolytes as indicated  -Right arm paralysis, no clear underlying etiology, CT showed abnormal bilateral frontoparietal enhancement. Perceptual diagnosis is wide. At this time no acute intervention is possible or indicated. Continue on the current dose of Lovenox and aspirin. I appreciate neurology input. Seems to have some improvement on left arm as he able to move spontaneously now. No improvement with right arm  - Still low-grade fever,  culture sent and antibiotic restarted on September 4. WBC trending down, continue to monitor. Hopefully will be able to de-escalate soon  - Completed COVID-19 treatment  - DVT prophylaxis with high-dose Lovenox.  GI prophylaxis.  Nutritional support  -Steroid wean down and discontinued    DISPOSITION  Stay in ICU    CRITICAL CARE CONSULTANT NOTE  I had a face to face encounter with the patient, reviewed and interpreted patient data including clinical events, labs, images, vital signs, I/O's, and examined patient. I have discussed the case and the plan and management of the patient's care with the consulting services, the bedside nurses and the respiratory therapist.      NOTE OF PERSONAL INVOLVEMENT IN CARE   This patient has a high probability of imminent, clinically significant deterioration, which requires the highest level of preparedness to intervene urgently. I participated in the decision-making and personally managed or directed the management of the following life and organ supporting interventions that required my frequent assessment to treat or prevent imminent deterioration. I personally spent 40 minutes of critical care time. This is time spent at this critically ill patient's bedside actively involved in patient care as well as the coordination of care and discussions with the patient's family. This does not include any procedural time which has been billed separately. Irma Davalos M.D.   Staff Intensivist/Pulmonologist  [facility]  9/6/2020

## 2020-09-06 NOTE — PROGRESS NOTES
Pharmacist Note - Vancomycin Dosing  Therapy day 3  Indication:Sepsis, fevers  Current regimen:  1250 mg IV Q 8 hr    A Trough Level resulted at 10.9 mcg/mL which was obtained 9 hrs post-dose. The extrapolated \"true\" trough is approximately 12.4 mcg/mL based on the patient's known kinetics. Goal trough: 15 - 20 mcg/mL     Plan: Change to 1500 mg Q  8hr . Pharmacy will continue to monitor this patient daily for changes in clinical status and renal function.

## 2020-09-06 NOTE — PROGRESS NOTES
Bedside and Verbal shift change report given to Zak Strong (oncoming nurse) by Mayra Arevalo (offgoing nurse). Report included the following information SBAR, Intake/Output and Cardiac Rhythm ST       Shift Summary    Patient is resting calmly at present. Eyes open spontaneously follows commands. Still unable to move right arm.

## 2020-09-07 ENCOUNTER — APPOINTMENT (OUTPATIENT)
Dept: GENERAL RADIOLOGY | Age: 30
DRG: 004 | End: 2020-09-07
Attending: INTERNAL MEDICINE
Payer: COMMERCIAL

## 2020-09-07 LAB
ALBUMIN SERPL-MCNC: 2.1 G/DL (ref 3.5–5)
ALBUMIN/GLOB SERPL: 0.5 {RATIO} (ref 1.1–2.2)
ALP SERPL-CCNC: 122 U/L (ref 45–117)
ALT SERPL-CCNC: 120 U/L (ref 12–78)
ANION GAP SERPL CALC-SCNC: 6 MMOL/L (ref 5–15)
ARTERIAL PATENCY WRIST A: YES
AST SERPL-CCNC: 36 U/L (ref 15–37)
BASE EXCESS BLD CALC-SCNC: 3 MMOL/L
BASOPHILS # BLD: 0 K/UL (ref 0–0.1)
BASOPHILS NFR BLD: 0 % (ref 0–1)
BDY SITE: ABNORMAL
BILIRUB SERPL-MCNC: 0.3 MG/DL (ref 0.2–1)
BUN SERPL-MCNC: 10 MG/DL (ref 6–20)
BUN/CREAT SERPL: 30 (ref 12–20)
CA-I BLD-SCNC: 1.23 MMOL/L (ref 1.12–1.32)
CALCIUM SERPL-MCNC: 8.9 MG/DL (ref 8.5–10.1)
CHLORIDE SERPL-SCNC: 103 MMOL/L (ref 97–108)
CO2 SERPL-SCNC: 27 MMOL/L (ref 21–32)
CREAT SERPL-MCNC: 0.33 MG/DL (ref 0.7–1.3)
DIFFERENTIAL METHOD BLD: ABNORMAL
EOSINOPHIL # BLD: 0.2 K/UL (ref 0–0.4)
EOSINOPHIL NFR BLD: 5 % (ref 0–7)
ERYTHROCYTE [DISTWIDTH] IN BLOOD BY AUTOMATED COUNT: 14.7 % (ref 11.5–14.5)
GAS FLOW.O2 O2 DELIVERY SYS: ABNORMAL L/MIN
GAS FLOW.O2 SETTING OXYMISER: 22 BPM
GLOBULIN SER CALC-MCNC: 4.4 G/DL (ref 2–4)
GLUCOSE SERPL-MCNC: 95 MG/DL (ref 65–100)
HCO3 BLD-SCNC: 27.4 MMOL/L (ref 22–26)
HCT VFR BLD AUTO: 25.9 % (ref 36.6–50.3)
HGB BLD-MCNC: 8.1 G/DL (ref 12.1–17)
IMM GRANULOCYTES # BLD AUTO: 0 K/UL
IMM GRANULOCYTES NFR BLD AUTO: 0 %
INSPIRATION.DURATION SETTING TIME VENT: 0.91 SEC
LYMPHOCYTES # BLD: 1.2 K/UL (ref 0.8–3.5)
LYMPHOCYTES NFR BLD: 29 % (ref 12–49)
MAGNESIUM SERPL-MCNC: 1.9 MG/DL (ref 1.6–2.4)
MAGNESIUM SERPL-MCNC: 2.1 MG/DL (ref 1.6–2.4)
MCH RBC QN AUTO: 28.8 PG (ref 26–34)
MCHC RBC AUTO-ENTMCNC: 31.3 G/DL (ref 30–36.5)
MCV RBC AUTO: 92.2 FL (ref 80–99)
MONOCYTES # BLD: 0.4 K/UL (ref 0–1)
MONOCYTES NFR BLD: 9 % (ref 5–13)
MYELOCYTES NFR BLD MANUAL: 1 %
NEUTS BAND NFR BLD MANUAL: 1 % (ref 0–6)
NEUTS SEG # BLD: 2.4 K/UL (ref 1.8–8)
NEUTS SEG NFR BLD: 55 % (ref 32–75)
NRBC # BLD: 0 K/UL (ref 0–0.01)
NRBC BLD-RTO: 0 PER 100 WBC
O2/TOTAL GAS SETTING VFR VENT: 40 %
PCO2 BLD: 41.4 MMHG (ref 35–45)
PEEP RESPIRATORY: 5 CMH2O
PH BLD: 7.43 [PH] (ref 7.35–7.45)
PHOSPHATE SERPL-MCNC: 4.4 MG/DL (ref 2.6–4.7)
PIP ISTAT,IPIP: 24
PLATELET # BLD AUTO: 241 K/UL (ref 150–400)
PMV BLD AUTO: 9.3 FL (ref 8.9–12.9)
PO2 BLD: 78 MMHG (ref 80–100)
POTASSIUM SERPL-SCNC: 3.5 MMOL/L (ref 3.5–5.1)
POTASSIUM SERPL-SCNC: 4 MMOL/L (ref 3.5–5.1)
PROT SERPL-MCNC: 6.5 G/DL (ref 6.4–8.2)
RBC # BLD AUTO: 2.81 M/UL (ref 4.1–5.7)
RBC MORPH BLD: ABNORMAL
SAO2 % BLD: 96 % (ref 92–97)
SODIUM SERPL-SCNC: 136 MMOL/L (ref 136–145)
SPECIMEN TYPE: ABNORMAL
TOTAL RESP. RATE, ITRR: 22
VENTILATION MODE VENT: ABNORMAL
WBC # BLD AUTO: 4.3 K/UL (ref 4.1–11.1)

## 2020-09-07 PROCEDURE — 85025 COMPLETE CBC W/AUTO DIFF WBC: CPT

## 2020-09-07 PROCEDURE — 74011000258 HC RX REV CODE- 258: Performed by: NURSE PRACTITIONER

## 2020-09-07 PROCEDURE — 83735 ASSAY OF MAGNESIUM: CPT

## 2020-09-07 PROCEDURE — 65610000006 HC RM INTENSIVE CARE

## 2020-09-07 PROCEDURE — 74011250637 HC RX REV CODE- 250/637: Performed by: HOSPITALIST

## 2020-09-07 PROCEDURE — 77010033678 HC OXYGEN DAILY

## 2020-09-07 PROCEDURE — 74011250637 HC RX REV CODE- 250/637: Performed by: NURSE PRACTITIONER

## 2020-09-07 PROCEDURE — 36415 COLL VENOUS BLD VENIPUNCTURE: CPT

## 2020-09-07 PROCEDURE — 94762 N-INVAS EAR/PLS OXIMTRY CONT: CPT

## 2020-09-07 PROCEDURE — 74011000258 HC RX REV CODE- 258: Performed by: INTERNAL MEDICINE

## 2020-09-07 PROCEDURE — 74011250636 HC RX REV CODE- 250/636: Performed by: INTERNAL MEDICINE

## 2020-09-07 PROCEDURE — 74011000250 HC RX REV CODE- 250: Performed by: INTERNAL MEDICINE

## 2020-09-07 PROCEDURE — 82803 BLOOD GASES ANY COMBINATION: CPT

## 2020-09-07 PROCEDURE — 36600 WITHDRAWAL OF ARTERIAL BLOOD: CPT

## 2020-09-07 PROCEDURE — 84100 ASSAY OF PHOSPHORUS: CPT

## 2020-09-07 PROCEDURE — 74011250636 HC RX REV CODE- 250/636: Performed by: NURSE PRACTITIONER

## 2020-09-07 PROCEDURE — 80053 COMPREHEN METABOLIC PANEL: CPT

## 2020-09-07 PROCEDURE — 94003 VENT MGMT INPAT SUBQ DAY: CPT

## 2020-09-07 PROCEDURE — 74018 RADEX ABDOMEN 1 VIEW: CPT

## 2020-09-07 PROCEDURE — 74011000250 HC RX REV CODE- 250: Performed by: NURSE PRACTITIONER

## 2020-09-07 PROCEDURE — 74011250637 HC RX REV CODE- 250/637: Performed by: INTERNAL MEDICINE

## 2020-09-07 PROCEDURE — 84132 ASSAY OF SERUM POTASSIUM: CPT

## 2020-09-07 RX ORDER — POTASSIUM CHLORIDE 29.8 MG/ML
20 INJECTION INTRAVENOUS
Status: COMPLETED | OUTPATIENT
Start: 2020-09-07 | End: 2020-09-07

## 2020-09-07 RX ORDER — MAGNESIUM SULFATE 1 G/100ML
1 INJECTION INTRAVENOUS ONCE
Status: COMPLETED | OUTPATIENT
Start: 2020-09-07 | End: 2020-09-07

## 2020-09-07 RX ADMIN — POTASSIUM CHLORIDE 20 MEQ: 29.8 INJECTION, SOLUTION INTRAVENOUS at 08:15

## 2020-09-07 RX ADMIN — POTASSIUM CHLORIDE 20 MEQ: 29.8 INJECTION, SOLUTION INTRAVENOUS at 09:59

## 2020-09-07 RX ADMIN — CHLORHEXIDINE GLUCONATE 15 ML: 0.12 RINSE ORAL at 08:15

## 2020-09-07 RX ADMIN — ASPIRIN 81 MG CHEWABLE TABLET 81 MG: 81 TABLET CHEWABLE at 08:15

## 2020-09-07 RX ADMIN — MIDAZOLAM HYDROCHLORIDE 6 MG/HR: 5 INJECTION, SOLUTION INTRAMUSCULAR; INTRAVENOUS at 10:02

## 2020-09-07 RX ADMIN — FENTANYL CITRATE 50 MCG: 50 INJECTION, SOLUTION INTRAMUSCULAR; INTRAVENOUS at 12:20

## 2020-09-07 RX ADMIN — ACETAMINOPHEN ORAL SOLUTION 650 MG: 650 SOLUTION ORAL at 21:48

## 2020-09-07 RX ADMIN — ENOXAPARIN SODIUM 50 MG: 60 INJECTION SUBCUTANEOUS at 21:48

## 2020-09-07 RX ADMIN — ENOXAPARIN SODIUM 50 MG: 60 INJECTION SUBCUTANEOUS at 10:00

## 2020-09-07 RX ADMIN — Medication 10 ML: at 17:26

## 2020-09-07 RX ADMIN — VANCOMYCIN HYDROCHLORIDE 1500 MG: 10 INJECTION, POWDER, LYOPHILIZED, FOR SOLUTION INTRAVENOUS at 15:02

## 2020-09-07 RX ADMIN — Medication 1 MG/HR: at 22:46

## 2020-09-07 RX ADMIN — CEFEPIME 2 G: 2 INJECTION, POWDER, FOR SOLUTION INTRAVENOUS at 01:16

## 2020-09-07 RX ADMIN — MAGNESIUM SULFATE IN DEXTROSE 1 G: 10 INJECTION, SOLUTION INTRAVENOUS at 08:41

## 2020-09-07 RX ADMIN — FAMOTIDINE 20 MG: 40 POWDER, FOR SUSPENSION ORAL at 17:26

## 2020-09-07 RX ADMIN — METOPROLOL TARTRATE 5 MG: 1 INJECTION, SOLUTION INTRAVENOUS at 21:48

## 2020-09-07 RX ADMIN — CEFEPIME 2 G: 2 INJECTION, POWDER, FOR SOLUTION INTRAVENOUS at 17:26

## 2020-09-07 RX ADMIN — CHLORDIAZEPOXIDE HYDROCHLORIDE 100 MG: 25 CAPSULE ORAL at 17:25

## 2020-09-07 RX ADMIN — Medication: at 08:16

## 2020-09-07 RX ADMIN — FENTANYL CITRATE 100 MCG: 50 INJECTION, SOLUTION INTRAMUSCULAR; INTRAVENOUS at 21:48

## 2020-09-07 RX ADMIN — METOPROLOL TARTRATE 5 MG: 1 INJECTION, SOLUTION INTRAVENOUS at 09:52

## 2020-09-07 RX ADMIN — VANCOMYCIN HYDROCHLORIDE 1500 MG: 10 INJECTION, POWDER, LYOPHILIZED, FOR SOLUTION INTRAVENOUS at 06:09

## 2020-09-07 RX ADMIN — CHLORDIAZEPOXIDE HYDROCHLORIDE 100 MG: 25 CAPSULE ORAL at 06:09

## 2020-09-07 RX ADMIN — Medication: at 19:15

## 2020-09-07 RX ADMIN — Medication 10 ML: at 21:49

## 2020-09-07 RX ADMIN — QUETIAPINE FUMARATE 100 MG: 100 TABLET ORAL at 21:48

## 2020-09-07 RX ADMIN — FAMOTIDINE 20 MG: 40 POWDER, FOR SUSPENSION ORAL at 08:14

## 2020-09-07 RX ADMIN — Medication 10 ML: at 06:10

## 2020-09-07 RX ADMIN — CEFEPIME 2 G: 2 INJECTION, POWDER, FOR SOLUTION INTRAVENOUS at 08:14

## 2020-09-07 RX ADMIN — CHLORHEXIDINE GLUCONATE 15 ML: 0.12 RINSE ORAL at 21:00

## 2020-09-07 NOTE — ROUTINE PROCESS
Trained pt on Spontaneous with PS of 12 and 5 of PEEP, pt did not tolerated, HR went up to 124, sats drop to 93, and RR 36 to 45.  Place back on a previous setings

## 2020-09-07 NOTE — PROGRESS NOTES
[facility]    ICU TEAM Progress Note    Name: Bella Wang   : 1990   MRN: [id number]   Date: 2020      I  Subjective:   Progress Note: 2020      Reason for ICU Admission: Respiratory failure due to COVID-19 infection    Interval history:  80-year-old male with no history of significant past medical history except smoking half pack per day. Vladimir Ahn was admitted on 2020 at [facility] with acute hypoxemic respiratory failure from COVID pneumonia. Vladimir Ahn continued to deteriorate and got intubated on 8/10.  Due to worsening hypoxemia, he is transferred to [facility] for ECMO evaluation. Vladimir Ahn has bee given convalescent plasma twice on  and  and treated with Remdesivir which was completed on 8/10. Vladimir Ahn is also on dexamethasone. Vladimir Ahn completed course of empiric antibiotics including azithromycin which was completed on  and cefepime was completed on .  During his hospital course he also developed pneumothorax and has left-sided chest tube since .  Has a tracheostomy tube placed on .  On September 3 patient had significant worsening on his right arm, CT head was unrevealing.  He is already on high-dose Lovenox and aspirin.  On  he had acute weakness on the left arm code stroke was called and again CTA was unrevealing. There is some improvement in the movement on the left arm but no improvement on right arm.    Overnight Events:   No acute event overnight, no fever, remained of ketamine, few episode of agitation but self-limiting.   Tolerating tube feeding no nausea no vomiting    Active Problem List:     Problem List  Date Reviewed: 2020          Codes Class    [facility] ICD-10-CM: J96.00  ICD-9-CM: 518.81         Pneumothorax on left ICD-10-CM: J93.9  ICD-9-CM: 512.89         Pneumonia due to severe acute respiratory syndrome coronavirus 2 (SARS-CoV-2) ICD-10-CM: U07.1, J12.89  ICD-9-CM: 480.8 Respiratory failure with hypoxia [facility]) ICD-10-CM: J96.91  ICD-9-CM: 518.81               Past Medical History:      has a past medical history of History of vascular access device (08/10/2020). Past Surgical History:      has a past surgical history that includes ir thora/ins chest tube(pneumo) wo image (2020); ir thora/ins chest tube(pneumo) wo image (2020); and pr tracheostomy, planned (2020). Home Medications:     Prior to Admission medications    Medication Sig Start Date End Date Taking? Authorizing Provider   benzonatate (Tessalon Perles) 100 mg capsule Take 100 mg by mouth three (3) times daily as needed for Cough. Provider, Historical       Allergies/Social/Family History:     No Known Allergies   Social History     Tobacco Use    Smoking status: Current Some Day Smoker    Smokeless tobacco: Never Used   Substance Use Topics    Alcohol use: Not on file      History reviewed. No pertinent family history. Review of Systems:     Not able to obtain due to medical condition    Objective:   Vital Signs:  Visit Vitals  /74   Pulse 96   Temp 98.5 °F (36.9 °C)   Resp 22   Ht 5' 11\" (1.803 m)   Wt 98 kg (216 lb 0.8 oz)   SpO2 97%   BMI 30.13 kg/m²      O2 Device: Ventilator, Tracheostomy, Heated, Humidifier Temp (24hrs), Av.9 °F (37.2 °C), Min:98 °F (36.7 °C), Max:99.7 °F (37.6 °C)           Intake/Output:     Intake/Output Summary (Last 24 hours) at 2020 0841  Last data filed at 2020 0800  Gross per 24 hour   Intake 3411 ml   Output 1775 ml   Net 1636 ml       Physical Exam:  General:   Sedated on mechanical ventilation via tracheostomy   Eyes:  Sclera anicteric. Pupils equally round and reactive to light.    Mouth/Throat: Mucous membranes normal, oral pharynx clear   Neck: Supple, some thick creamy secretion around the tracheostomy site, minimal secretion   Lungs:    Good air entry bilaterally, fine crepitation   CV:  Regular rate and rhythm,no murmur, click, rub or gallop   Abdomen:   Soft, non-tender. bowel sounds normal. non-distended   Extremities: No cyanosis , evolving edema   Skin: Skin color, texture, turgor normal. no acute rash or lesions   Lymph nodes: Cervical and supraclavicular normal   Musculoskeletal: No swelling or deformity   Lines/Devices:  Intact, no erythema, drainage or tenderness   Psych:  Sedated, opens eyes spontaneously, sometimes seems to be tracking and follow simple command but this is not persistent.    Still not moving right arm, right arm is flaccid .no deformity noticed, good pulses good color good capillary refill no shoulder or neck tenderness or swelling.  .Face is symmetrical.  Moves left arm spontaneously, wiggle left toes.  Profound weakness      LABS AND  DATA: Personally reviewed  Recent Labs     09/07/20  0614 09/06/20  0604   WBC 4.3 5.0   HGB 8.1* 9.0*   HCT 25.9* 28.6*    286     Recent Labs     09/07/20  0614 09/06/20  1842 09/06/20  0604     --  137   K 3.5 3.6 3.2*     --  102   CO2 27  --  29   BUN 10  --  11   CREA 0.33*  --  0.44*   GLU 95  --  99   CA 8.9  --  8.8   MG 1.9 1.8 1.8   PHOS 4.4  --  4.2     Recent Labs     09/07/20  0614 09/06/20  0604   * 140*   TP 6.5 6.9   ALB 2.1* 2.2*   GLOB 4.4* 4.7*     No results for input(s): INR, PTP, APTT, INREXT in the last 72 hours. Recent Labs     09/07/20  0505 09/06/20  0435   PHI 7.43 7.47*   PCO2I 41.4 39.0   PO2I 78* 69*   FIO2I 40 40     No results for input(s): CPK, CKMB, TROIQ, BNPP in the last 72 hours.     Hemodynamics:   PAP:   CO:     Wedge:   CI:     CVP:    SVR:       PVR:       Ventilator Settings:  Mode Rate Tidal Volume Pressure FiO2 PEEP   Assist control, Pressure control   2 ml  0 cm H2O 40 % 5 cm H20     Peak airway pressure: 31 cm H2O    Minute ventilation: 10 l/min        MEDS: Reviewed    Chest X-Ray:  CXR Results  (Last 48 hours)               09/06/20 0438  XR CHEST PORT Final result    Impression:  Impression:   Unchanged diffuse airspace disease       Narrative:  Chest portable AP       History: Respiratory failure       Comparison: 9/3/2020       Findings:  A tracheostomy tube is in place. An enteric tube is seen within   stomach. Cardiac monitoring leads overlie the chest. The heart is normal size,   unchanged. There is diffuse patchy opacification lungs again seen. Assessment and Plan:   -Bilateral pneumonia due to COVID-19 infection  -Acute right arm weakness:  Patient already on aspirin and moderate dose anticoagulation.  Still pending CTA head and neck, CT head showed no bleed. -Right arm weakness  -Sepsis:  fever persisted:   - Acute hypoxic respiratory failure due to COVID-19 infection status post tracheostomy on August 26  - Pneumothorax status post left chest tube insertion on August 11 without air leak  - Possible bacterial super imposed infection: Completed antibiotic course  -History of tobacco use     -Continue to wean sedation as tolerated.  Ketamine is discontinued.  Now on Versed of 8 and low-dose hydromorphone. We will start weaning down midazolam as tolerated  - At this time on PEEP of 5 and FiO2 of 40%, PO2 is adequate.  Start pressure support trial as tolerated  - Good urine output.  No need for diuretics today.  Will use as needed.  Replace electrolytes as indicated  -Right arm paralysis, no clear underlying etiology, CT showed abnormal bilateral frontoparietal enhancement.  Perceptual diagnosis is wide.  At this time no acute intervention is possible or indicated.  Continue on the current dose of Lovenox and aspirin.  I appreciate neurology input.  Seems to have some improvement on left arm as he able to move spontaneously now.  No improvement with right arm  - Fever subsided after antibiotic was restarted on September 4.  WBC trending down, continue to monitor. Hopefully will be able to de-escalate soon. - Completed COVID-19 treatment  - DVT prophylaxis with high-dose Lovenox.  GI prophylaxis.  Nutritional support  -Steroid wean down and discontinued    DISPOSITION  Stay in ICU    CRITICAL CARE CONSULTANT NOTE  I had a face to face encounter with the patient, reviewed and interpreted patient data including clinical events, labs, images, vital signs, I/O's, and examined patient. I have discussed the case and the plan and management of the patient's care with the consulting services, the bedside nurses and the respiratory therapist.      NOTE OF PERSONAL INVOLVEMENT IN CARE   This patient has a high probability of imminent, clinically significant deterioration, which requires the highest level of preparedness to intervene urgently. I participated in the decision-making and personally managed or directed the management of the following life and organ supporting interventions that required my frequent assessment to treat or prevent imminent deterioration. I personally spent 40 minutes of critical care time. This is time spent at this critically ill patient's bedside actively involved in patient care as well as the coordination of care and discussions with the patient's family. This does not include any procedural time which has been billed separately. Karina Lovett M.D.   Staff Intensivist/Pulmonologist  [facility]  9/7/2020

## 2020-09-07 NOTE — PROGRESS NOTES
Resp tried to wean patient to P/S without success. Maintained on current setting     Patient pulled out dobhoff. Tube replace and KUB done Placement confirmed Feeding continue at same rate. Turned and reposition  Mom updated.   Patient continued on Dilaudid at [address] and [facility]ed [facility] [address] [facility] at present    Shift report given to [facility]

## 2020-09-08 LAB
ALBUMIN SERPL-MCNC: 2.3 G/DL (ref 3.5–5)
ALBUMIN/GLOB SERPL: 0.5 {RATIO} (ref 1.1–2.2)
ALP SERPL-CCNC: 130 U/L (ref 45–117)
ALT SERPL-CCNC: 113 U/L (ref 12–78)
ANION GAP SERPL CALC-SCNC: 4 MMOL/L (ref 5–15)
ARTERIAL PATENCY WRIST A: YES
AST SERPL-CCNC: 36 U/L (ref 15–37)
BASE EXCESS BLD CALC-SCNC: 4 MMOL/L
BASOPHILS # BLD: 0 K/UL (ref 0–0.1)
BASOPHILS NFR BLD: 0 % (ref 0–1)
BDY SITE: ABNORMAL
BILIRUB SERPL-MCNC: 0.3 MG/DL (ref 0.2–1)
BUN SERPL-MCNC: 10 MG/DL (ref 6–20)
BUN/CREAT SERPL: 24 (ref 12–20)
CA-I BLD-SCNC: 1.23 MMOL/L (ref 1.12–1.32)
CALCIUM SERPL-MCNC: 9.2 MG/DL (ref 8.5–10.1)
CHLORIDE SERPL-SCNC: 104 MMOL/L (ref 97–108)
CO2 SERPL-SCNC: 29 MMOL/L (ref 21–32)
CREAT SERPL-MCNC: 0.41 MG/DL (ref 0.7–1.3)
DATE LAST DOSE: NORMAL
DIFFERENTIAL METHOD BLD: ABNORMAL
EOSINOPHIL # BLD: 0.2 K/UL (ref 0–0.4)
EOSINOPHIL NFR BLD: 5 % (ref 0–7)
ERYTHROCYTE [DISTWIDTH] IN BLOOD BY AUTOMATED COUNT: 14.9 % (ref 11.5–14.5)
GAS FLOW.O2 O2 DELIVERY SYS: ABNORMAL L/MIN
GAS FLOW.O2 SETTING OXYMISER: 22 BPM
GLOBULIN SER CALC-MCNC: 4.7 G/DL (ref 2–4)
GLUCOSE SERPL-MCNC: 103 MG/DL (ref 65–100)
HCO3 BLD-SCNC: 28.7 MMOL/L (ref 22–26)
HCT VFR BLD AUTO: 27.6 % (ref 36.6–50.3)
HGB BLD-MCNC: 8.7 G/DL (ref 12.1–17)
IMM GRANULOCYTES # BLD AUTO: 0 K/UL
IMM GRANULOCYTES NFR BLD AUTO: 0 %
INSPIRATION.DURATION SETTING TIME VENT: 0.91 SEC
LYMPHOCYTES # BLD: 1 K/UL (ref 0.8–3.5)
LYMPHOCYTES NFR BLD: 22 % (ref 12–49)
MAGNESIUM SERPL-MCNC: 1.9 MG/DL (ref 1.6–2.4)
MCH RBC QN AUTO: 29.4 PG (ref 26–34)
MCHC RBC AUTO-ENTMCNC: 31.5 G/DL (ref 30–36.5)
MCV RBC AUTO: 93.2 FL (ref 80–99)
MONOCYTES # BLD: 0.5 K/UL (ref 0–1)
MONOCYTES NFR BLD: 10 % (ref 5–13)
MYELOCYTES NFR BLD MANUAL: 1 %
NEUTS SEG # BLD: 2.9 K/UL (ref 1.8–8)
NEUTS SEG NFR BLD: 62 % (ref 32–75)
NRBC # BLD: 0 K/UL (ref 0–0.01)
NRBC BLD-RTO: 0 PER 100 WBC
O2/TOTAL GAS SETTING VFR VENT: 40 %
PCO2 BLD: 45.9 MMHG (ref 35–45)
PEEP RESPIRATORY: 5 CMH2O
PH BLD: 7.4 [PH] (ref 7.35–7.45)
PHOSPHATE SERPL-MCNC: 4.8 MG/DL (ref 2.6–4.7)
PIP ISTAT,IPIP: 24
PLATELET # BLD AUTO: 262 K/UL (ref 150–400)
PMV BLD AUTO: 9.4 FL (ref 8.9–12.9)
PO2 BLD: 359 MMHG (ref 80–100)
POTASSIUM SERPL-SCNC: 3.6 MMOL/L (ref 3.5–5.1)
PROT SERPL-MCNC: 7 G/DL (ref 6.4–8.2)
RBC # BLD AUTO: 2.96 M/UL (ref 4.1–5.7)
RBC MORPH BLD: ABNORMAL
REPORTED DOSE,DOSE: NORMAL UNITS
REPORTED DOSE/TIME,TMG: NORMAL
SAO2 % BLD: 100 % (ref 92–97)
SODIUM SERPL-SCNC: 137 MMOL/L (ref 136–145)
SPECIMEN TYPE: ABNORMAL
VANCOMYCIN TROUGH SERPL-MCNC: 9.6 UG/ML (ref 5–10)
VENTILATION MODE VENT: ABNORMAL
WBC # BLD AUTO: 4.7 K/UL (ref 4.1–11.1)

## 2020-09-08 PROCEDURE — 83735 ASSAY OF MAGNESIUM: CPT

## 2020-09-08 PROCEDURE — 74011250636 HC RX REV CODE- 250/636: Performed by: NURSE PRACTITIONER

## 2020-09-08 PROCEDURE — 74011000250 HC RX REV CODE- 250: Performed by: NURSE PRACTITIONER

## 2020-09-08 PROCEDURE — 85025 COMPLETE CBC W/AUTO DIFF WBC: CPT

## 2020-09-08 PROCEDURE — 74011250636 HC RX REV CODE- 250/636: Performed by: INTERNAL MEDICINE

## 2020-09-08 PROCEDURE — 74011250636 HC RX REV CODE- 250/636: Performed by: HOSPITALIST

## 2020-09-08 PROCEDURE — 80053 COMPREHEN METABOLIC PANEL: CPT

## 2020-09-08 PROCEDURE — 36415 COLL VENOUS BLD VENIPUNCTURE: CPT

## 2020-09-08 PROCEDURE — 74011000250 HC RX REV CODE- 250: Performed by: INTERNAL MEDICINE

## 2020-09-08 PROCEDURE — 74011250637 HC RX REV CODE- 250/637: Performed by: NURSE PRACTITIONER

## 2020-09-08 PROCEDURE — P9045 ALBUMIN (HUMAN), 5%, 250 ML: HCPCS | Performed by: INTERNAL MEDICINE

## 2020-09-08 PROCEDURE — 74011250637 HC RX REV CODE- 250/637: Performed by: INTERNAL MEDICINE

## 2020-09-08 PROCEDURE — 36592 COLLECT BLOOD FROM PICC: CPT

## 2020-09-08 PROCEDURE — 36600 WITHDRAWAL OF ARTERIAL BLOOD: CPT

## 2020-09-08 PROCEDURE — 84100 ASSAY OF PHOSPHORUS: CPT

## 2020-09-08 PROCEDURE — 74011000258 HC RX REV CODE- 258: Performed by: INTERNAL MEDICINE

## 2020-09-08 PROCEDURE — 74011000258 HC RX REV CODE- 258: Performed by: NURSE PRACTITIONER

## 2020-09-08 PROCEDURE — 82803 BLOOD GASES ANY COMBINATION: CPT

## 2020-09-08 PROCEDURE — 74011250637 HC RX REV CODE- 250/637: Performed by: HOSPITALIST

## 2020-09-08 PROCEDURE — 80202 ASSAY OF VANCOMYCIN: CPT

## 2020-09-08 PROCEDURE — 94003 VENT MGMT INPAT SUBQ DAY: CPT

## 2020-09-08 PROCEDURE — 65610000006 HC RM INTENSIVE CARE

## 2020-09-08 PROCEDURE — 99291 CRITICAL CARE FIRST HOUR: CPT | Performed by: THORACIC SURGERY (CARDIOTHORACIC VASCULAR SURGERY)

## 2020-09-08 RX ORDER — POTASSIUM CHLORIDE 29.8 MG/ML
20 INJECTION INTRAVENOUS
Status: COMPLETED | OUTPATIENT
Start: 2020-09-08 | End: 2020-09-08

## 2020-09-08 RX ORDER — ALBUMIN HUMAN 50 G/1000ML
25 SOLUTION INTRAVENOUS ONCE
Status: COMPLETED | OUTPATIENT
Start: 2020-09-08 | End: 2020-09-08

## 2020-09-08 RX ORDER — ALPRAZOLAM 0.5 MG/1
0.5 TABLET ORAL 2 TIMES DAILY
Status: DISCONTINUED | OUTPATIENT
Start: 2020-09-08 | End: 2020-09-11

## 2020-09-08 RX ORDER — VANCOMYCIN 2 GRAM/500 ML IN 0.9 % SODIUM CHLORIDE INTRAVENOUS
2000 EVERY 8 HOURS
Status: DISCONTINUED | OUTPATIENT
Start: 2020-09-08 | End: 2020-09-09

## 2020-09-08 RX ADMIN — FAMOTIDINE 20 MG: 40 POWDER, FOR SUSPENSION ORAL at 08:32

## 2020-09-08 RX ADMIN — CHLORDIAZEPOXIDE HYDROCHLORIDE 100 MG: 25 CAPSULE ORAL at 05:18

## 2020-09-08 RX ADMIN — DEXMEDETOMIDINE 0.2 MCG/KG/HR: 100 INJECTION, SOLUTION, CONCENTRATE INTRAVENOUS at 17:33

## 2020-09-08 RX ADMIN — QUETIAPINE FUMARATE 100 MG: 100 TABLET ORAL at 21:29

## 2020-09-08 RX ADMIN — POTASSIUM CHLORIDE 20 MEQ: 29.8 INJECTION, SOLUTION INTRAVENOUS at 12:00

## 2020-09-08 RX ADMIN — FENTANYL CITRATE 50 MCG: 50 INJECTION, SOLUTION INTRAMUSCULAR; INTRAVENOUS at 21:29

## 2020-09-08 RX ADMIN — ENOXAPARIN SODIUM 50 MG: 60 INJECTION SUBCUTANEOUS at 21:30

## 2020-09-08 RX ADMIN — CEFEPIME 2 G: 2 INJECTION, POWDER, FOR SOLUTION INTRAVENOUS at 00:18

## 2020-09-08 RX ADMIN — MIDAZOLAM HYDROCHLORIDE 6 MG/HR: 5 INJECTION, SOLUTION INTRAMUSCULAR; INTRAVENOUS at 05:18

## 2020-09-08 RX ADMIN — SODIUM CHLORIDE 5 ML/HR: 900 INJECTION, SOLUTION INTRAVENOUS at 18:34

## 2020-09-08 RX ADMIN — Medication: at 08:33

## 2020-09-08 RX ADMIN — DEXMEDETOMIDINE 1 MCG/KG/HR: 100 INJECTION, SOLUTION, CONCENTRATE INTRAVENOUS at 12:01

## 2020-09-08 RX ADMIN — ASPIRIN 81 MG CHEWABLE TABLET 81 MG: 81 TABLET CHEWABLE at 08:32

## 2020-09-08 RX ADMIN — ENOXAPARIN SODIUM 50 MG: 60 INJECTION SUBCUTANEOUS at 09:04

## 2020-09-08 RX ADMIN — FENTANYL CITRATE 100 MCG: 50 INJECTION, SOLUTION INTRAMUSCULAR; INTRAVENOUS at 09:05

## 2020-09-08 RX ADMIN — CHLORDIAZEPOXIDE HYDROCHLORIDE 100 MG: 25 CAPSULE ORAL at 00:18

## 2020-09-08 RX ADMIN — CHLORHEXIDINE GLUCONATE 15 ML: 0.12 RINSE ORAL at 08:33

## 2020-09-08 RX ADMIN — Medication 10 ML: at 13:51

## 2020-09-08 RX ADMIN — ALBUMIN (HUMAN) 25 G: 12.5 INJECTION, SOLUTION INTRAVENOUS at 17:25

## 2020-09-08 RX ADMIN — METOPROLOL TARTRATE 5 MG: 1 INJECTION, SOLUTION INTRAVENOUS at 09:05

## 2020-09-08 RX ADMIN — VANCOMYCIN HYDROCHLORIDE 2000 MG: 10 INJECTION, POWDER, LYOPHILIZED, FOR SOLUTION INTRAVENOUS at 15:00

## 2020-09-08 RX ADMIN — VANCOMYCIN HYDROCHLORIDE 1500 MG: 10 INJECTION, POWDER, LYOPHILIZED, FOR SOLUTION INTRAVENOUS at 00:18

## 2020-09-08 RX ADMIN — Medication 10 ML: at 05:18

## 2020-09-08 RX ADMIN — ALPRAZOLAM 0.5 MG: 0.5 TABLET ORAL at 17:26

## 2020-09-08 RX ADMIN — CEFEPIME 2 G: 2 INJECTION, POWDER, FOR SOLUTION INTRAVENOUS at 08:32

## 2020-09-08 RX ADMIN — VANCOMYCIN HYDROCHLORIDE 2000 MG: 10 INJECTION, POWDER, LYOPHILIZED, FOR SOLUTION INTRAVENOUS at 23:24

## 2020-09-08 RX ADMIN — VANCOMYCIN HYDROCHLORIDE 1500 MG: 10 INJECTION, POWDER, LYOPHILIZED, FOR SOLUTION INTRAVENOUS at 07:08

## 2020-09-08 RX ADMIN — Medication: at 17:24

## 2020-09-08 RX ADMIN — CEFEPIME 2 G: 2 INJECTION, POWDER, FOR SOLUTION INTRAVENOUS at 17:23

## 2020-09-08 RX ADMIN — FAMOTIDINE 20 MG: 40 POWDER, FOR SUSPENSION ORAL at 17:24

## 2020-09-08 RX ADMIN — CHLORHEXIDINE GLUCONATE 15 ML: 0.12 RINSE ORAL at 20:24

## 2020-09-08 RX ADMIN — POTASSIUM CHLORIDE 20 MEQ: 29.8 INJECTION, SOLUTION INTRAVENOUS at 10:14

## 2020-09-08 RX ADMIN — DOCUSATE SODIUM 100 MG: 50 LIQUID ORAL at 17:24

## 2020-09-08 RX ADMIN — Medication 10 ML: at 21:51

## 2020-09-08 RX ADMIN — CHLORDIAZEPOXIDE HYDROCHLORIDE 100 MG: 25 CAPSULE ORAL at 12:00

## 2020-09-08 NOTE — ADT AUTH CERT NOTES
Respiratory Failure [facility]G - Care Day 22 (9/8/2020) by Baldemar Rios RN         Review Status  Review Entered    Completed  9/8/2020 12:02        Criteria Review       Care Day: 22 Care Date: 9/8/2020 Level of Care: ICU    Guideline Day 3    Level Of Care    ( ) * Activity level acceptable    (X) Floor to discharge    Clinical Status    ( ) * Ventilation status appropriate    ( ) * Airway status acceptable    ( ) * Respiratory status acceptable    ( ) * Stable chest findings    ( ) * Neurologic status acceptable    (X) * Temperature status acceptable    9/8/2020 12:02:00 EDT by Patrick Ohara      Temp98.1    ( ) * No infection, or status acceptable    ( ) * General Discharge Criteria met    Interventions    ( ) * No chest tube, or status acceptable    ( ) * Intake acceptable    (X) * No inpatient interventions needed    9/8/2020 12:02:00 EDT by Patrick Ohara      pt on vent w/ ICU care    * Milestone    Additional Notes    9/8/20    No change remains on vent       RBC: 2.96 (L)    HGB: 8.7 (L)    HCT: 27.6 (L)    Anion gap: 4 (L)    Glucose: 103 (H)    BUN: 10    Creatinine: 0.41 (L)    BUN/Creatinine ratio: 24 (H)    Calcium: 9.2    Phosphorus: 4.8 (H)    Albumin: 2.3 (L)    Globulin: 4.7 (H)    A-G Ratio: 0.5 (L)    ALT: 113 (H)    AST: 36    Alk. phosphatase: 130 (H)       Arterial BG on vent    pH (POC): 7.40    pCO2 (POC): 45.9 (H)    pO2 (POC): 359 (H)    HCO3 (POC): 28.7 (H)    sO2 (POC): 100 (H)    Base excess (POC): 4    FIO2 (POC): 40       /81    Pulse 135    Temp 98.1 °    Resp 23    SpO2 98% on vent       Meds-Tylenol prn per ng x1, asa 81mg per ng qd, maxipime 2g iv q8, Librium 100mg q6hrs, Lovenox 50mg sc q12, Precedex iv titrate, Pepcid 20mg per ng bid, fentanyl 100mcg prn x3, Dilaudid iv titrate, Lopressor 5mg iv prn x1, versed iv titrate, potassium 20meq iv x2, Vanc 2g iv q8hrs       A/P-    ICU Problems: admitted 8/5/20 with severe Covid s/p trach         1.  Covid-19 - PNA fibroproliferative ARDS    a. lovenox    b. Cefepime and vanco ? superinfection    2. Resp failure s/p trach 8/26    3. PTX with CT    a. Sinus pauses resolved    4.   Tob use    5. Sedation                A. Ketamine off, wean dilaudid, versed    6. B UE weakness - ? CIPN/Myopathy vs CVA vs? Appreciate neuro help                A. Follow closely           Respiratory Failure [facility] - Care Day 21 (9/7/2020) by Teena Cantu RN         Review Status  Review Entered    Completed  9/8/2020 11:53        Criteria Review       Care Day: 21 Care Date: 9/7/2020 Level of Care: ICU    Guideline Day 3    Level Of Care    ( ) * Activity level acceptable    (X) Floor to discharge    Clinical Status    ( ) * Ventilation status appropriate    ( ) * Airway status acceptable    ( ) * Respiratory status acceptable    ( ) * Stable chest findings    ( ) * Neurologic status acceptable    (X) * Temperature status acceptable    9/8/2020 11:53:53 EDT by Jen Dietz      /74  Pulse96  Temp98.5 °  Resp22  [id number]% on vent    ( ) * No infection, or status acceptable    ( ) * General Discharge Criteria met    Interventions    ( ) * No chest tube, or status acceptable    ( ) * Intake acceptable    9/8/2020 11:53:53 EDT by Jen Dietz      .  Tolerating tube feeding no nausea no vomiting    ( ) * No inpatient interventions needed    * Milestone    Additional Notes    9/7/20     ICU    Respiratory failure due to COVID-19 infection    No acute event overnight, no fever, remained of ketamine, few episode of agitation but self-limiting.  Tolerating tube feeding no nausea no vomiting       Physical Exam:    General: Sedated on mechanical ventilation via tracheostomy    Eyes: Sclera anicteric. Pupils equally round and reactive to light.     Mouth/Throat: Mucous membranes normal, oral pharynx clear    Neck: Supple, some thick creamy secretion around the tracheostomy site, minimal secretion    Lungs:   Good air entry bilaterally, fine crepitation    CV: Regular rate and rhythm,no murmur, click, rub or gallop    Abdomen:   Soft, non-tender. bowel sounds normal. non-distended    Extremities: No cyanosis , evolving edema    Skin: Skin color, texture, turgor normal. no acute rash or lesions    Lymph nodes: Cervical and supraclavicular normal    Musculoskeletal: No swelling or deformity    Lines/Devices: Intact, no erythema, drainage or tenderness    Psych: Sedated, opens eyes spontaneously, sometimes seems to be tracking and follow simple command but this is not persistent.      Still not moving right arm, right arm is flaccid . no deformity noticed, good pulses good color good capillary refill no shoulder or neck tenderness or swelling.  .Face is symmetrical.  Moves left arm spontaneously, wiggle left toes.  Profound weakness       Trained pt on Spontaneous with PS of 12 and 5 of PEEP, pt did not tolerated, HR went up to 124, sats drop to 93, and RR 36 to 45. Place back on a previous setings       RBC: 2.81 (L)    HGB: 8.1 (L)    HCT: 25.9 (L)    Creatinine: 0.33 (L)    BUN/Creatinine ratio: 30 (H)    Albumin: 2.1 (L)    Globulin: 4.4 (H)    A-G Ratio: 0.5 (L)    ALT: 120 (H)    Alk. phosphatase: 122 (H)       /74    Pulse 96    Temp 98.5 °    Resp 22    SpO2 97%       O2 Device: Ventilator, Tracheostomy, Heated, Humidifier Temp       Arterial BG on vent -       pH (POC): 7.43    pCO2 (POC): 41.4    pO2 (POC): 78 (L)    HCO3 (POC): 27.4 (H)    sO2 (POC): 96    Base excess (POC): 3    FIO2 (POC): 40       KUB- Interval placement of a Dobbhoff tube       Meds-Tylenol prn per ng x1, asa 81mg per ng qd, maxipime 2g iv q8, Librium 100mg q6hrs, Lovenox 50mg sc q12, Pepcid 20mg per ng bid, fentanyl 100mcg prn x3, Dilaudid iv titrate, Lopressor 5mg iv prn, versed iv titrate, potassium 20meq iv x3       A/P-    -Bilateral pneumonia due to COVID-19 infection    -Acute right arm weakness:  Patient already on aspirin and moderate dose anticoagulation.  Still pending CTA head and neck, CT head showed no bleed. -Right arm weakness    -Sepsis:  fever persisted:    - Acute hypoxic respiratory failure due to COVID-19 infection status post tracheostomy on August 26    - Pneumothorax status post left chest tube insertion on August 11 without air leak    - Possible bacterial super imposed infection: Completed antibiotic course    -History of tobacco use         -Continue to wean sedation as tolerated.  Ketamine is discontinued.  Now on Versed of 8 and low-dose hydromorphone.  We will start weaning down midazolam as tolerated    - At this time on PEEP of 5 and FiO2 of 40%, PO2 is adequate.  Start pressure support trial as tolerated    - Good urine output.  No need for diuretics today.  Will use as needed.  Replace electrolytes as indicated    -Right arm paralysis, no clear underlying etiology, CT showed abnormal bilateral frontoparietal enhancement.  Perceptual diagnosis is wide.  At this time no acute intervention is possible or indicated.  Continue on the current dose of Lovenox and aspirin.  I appreciate neurology input.  Seems to have some improvement on left arm as he able to move spontaneously now.  No improvement with right arm    - Fever subsided after antibiotic was restarted on September 4.  WBC trending down, continue to monitor.  Hopefully will be able to de-escalate soon.     - Completed COVID-19 treatment    - DVT prophylaxis with high-dose Lovenox.  GI prophylaxis.  Nutritional support    -Steroid wean down and discontinued

## 2020-09-08 NOTE — PROGRESS NOTES
ARDS  Acute hypoxic respiratory failure  COVID positive  Sinus pauses  S/P tracheostomy     Remains on vent    Tried SBT however became fairly tachycardic and oxygen saturations dropped    RR went in the 30-40's    Remains on Dilaudid / Versed / Precedex    Pulled out Dobhoff tube - replaced    Hgb and platelets look good    Creatinine normal    Bilirubin looks good, other LFTs mildly elevated    ABG 7.40 / 46 / 359 / 29 / 4    PEEP 5, FiO2 40%    Continues on Lovenox    Continues on Vanc / Cefepime    Continues on Librium and Seroquel    Overall is improving on Vent       Intake/Output Summary (Last 24 hours) at 9/8/2020 1455  Last data filed at 9/8/2020 1400  Gross per 24 hour   Intake 3646 ml   Output 2695 ml   Net 951 ml     Visit Vitals  BP 94/52   Pulse 89   Temp 99 °F (37.2 °C)   Resp 22   Ht 5' 11\" (1.803 m)   Wt 215 lb 13.3 oz (97.9 kg)   SpO2 94%   BMI 30.10 kg/m²       Risk of morbidity and mortality - high  Medical decision making - high complexity    Total critical care time - 30 minutes (CPT 26059)     I personally spent the above critical care time. This is time spent at this critically ill patient's bedside / unit / floor actively involved in patient care as well as the coordination of care and discussions with the patient's family. This does not include any procedural time which has been billed separately.

## 2020-09-08 NOTE — PROGRESS NOTES
Bedside and Verbal shift change report given to Trang Vasquez RN (oncoming nurse) by Bennie Mccarty RN (offgoing nurse). Report included the following information SBAR, Kardex, Intake/Output, MAR, Recent Results, Med Rec Status, Cardiac Rhythm Sinus Tach and Dual Neuro Assessment. 0800: Assumed care of patient; assessment documented per flowsheet; resting in bed tolerating vent; occasional coughing episodes and becomes anxious with HR in 130s'140s.     1100: ICU multidisciplinary rounds lead by Dr. Debbi Lujan (Intensivist): The following were reviewed and discussed: current labs,  recent imaging, lines/drains, review of body systems, nutrition, cultures, mobility, length of ICU stay.  The plan of care for the day is as follows  Wean sedation as tolerated; start Precedex a 1mcg/kg and wean/titrate as tolerated; pt will require PO narcs to wean drips; continue to monitor(written note by RN)     [phone]: Spoke with Dr. Debbi Lujan regarding sedation for patient now that versed has been weaned off; ordered Xanax 0.5 BID and d/c Librium    1600: Primary Nurse Priscilla Bassett and Juan Alanis RN performed a dual skin assessment on this patient No impairment noted  Enrique score is 14    1645: Spoke with Dr. Debib Lujan regarding pts BP after weaning Precedex to 0.2; MD ordered 5% Albumin 500cc once now

## 2020-09-08 NOTE — PROGRESS NOTES
[facility]    ICU TEAM Progress Note    Name: Michelle Mcgill   : 1990   MRN: [id number]   Date: 2020      Assessment:     ICU Problems: admitted 20 with severe Covid s/p trach     1. Covid-19 - PNA fibroproliferative ARDS   a. lovenox   b. Cefepime and vanco ? superinfection   2. Resp failure s/p trach   3. PTX with CT  a. Sinus pauses resolved   4. Tob use   5. Sedation    A. Ketamine off, wean dilaudid, versed   6. B UE weakness - ? CIPN/Myopathy vs CVA vs? Appreciate neuro help    A. Follow closely     Overnight Events:   2020   About the same       Active Problem List:     Problem List  Date Reviewed: 2020          Codes Class    Acute respiratory failure [facility]) ICD-10-CM: J96.00  ICD-9-CM: 518.81         Pneumothorax on left ICD-10-CM: J93.9  ICD-9-CM: 512.89         Pneumonia due to severe acute respiratory syndrome coronavirus 2 (SARS-CoV-2) ICD-10-CM: U07.1, J12.89  ICD-9-CM: 480.8         Respiratory failure with hypoxia ([facility]) ICD-10-CM: J96.91  ICD-9-CM: 518.81               Past Medical History:      has a past medical history of History of vascular access device (08/10/2020). Past Surgical History:      has a past surgical history that includes ir thora/ins chest tube(pneumo) wo image (2020); ir thora/ins chest tube(pneumo) wo image (2020); and pr tracheostomy, planned (2020). Home Medications:     Prior to Admission medications    Medication Sig Start Date End Date Taking? Authorizing Provider   benzonatate (Tessalon Perles) 100 mg capsule Take 100 mg by mouth three (3) times daily as needed for Cough.     Provider, Historical       Allergies/Social/Family History:     No Known Allergies   Social History     Tobacco Use    Smoking status: Current Some Day Smoker    Smokeless tobacco: Never Used   Substance Use Topics    Alcohol use: Not on file        Objective:   Vital Signs:  Visit Vitals  /81   Pulse (!) 135   Temp 98.1 °F (36.7 °C)   Resp 23   Ht 5' 11\" (1.803 m)   Wt 97.9 kg (215 lb 13.3 oz)   SpO2 98%   BMI 30.10 kg/m²      O2 Device: Tracheostomy Temp (24hrs), Av.8 °F (37.1 °C), Min:98 °F (36.7 °C), Max:99.5 °F (37.5 °C)           Intake/Output:     Intake/Output Summary (Last 24 hours) at 2020  Last data filed at 2020 8659  Gross per 24 hour   Intake 2822.83 ml   Output 2900 ml   Net -77.17 ml       Physical Exam:    General:  On vent but awake    Eyes:  Sclera anicteric. Pupils equally round and reactive to light. Mouth/Throat: Mucous membranes normal, oral pharynx clear   Neck: Supple   Lungs:   Clear to auscultation bilaterally, good effort   CV:  Regular rate and rhythm,no murmur, click, rub or gallop   Abdomen:   Soft, non-tender. bowel sounds normal. non-distended   Extremities: No cyanosis or edema   Skin: Skin color, texture, turgor normal. no acute rash or lesions   Lymph nodes: Cervical and supraclavicular normal   Musculoskeletal: No swelling or deformity   Lines/Devices:  Intact, no erythema, drainage or tenderness           LABS AND  DATA: Personally reviewed  Recent Labs     20   WBC 4.7 4.3   HGB 8.7* 8.1*   HCT 27.6* 25.9*    241     Recent Labs     20  0710 20  1718 2014     --  136   K 3.6 4.0 3.5     --  103   CO2 29  --  27   BUN 10  --  10   CREA 0.41*  --  0.33*   *  --  95   CA 9.2  --  8.9   MG 1.9 2.1 1.9   PHOS 4.8*  --  4.4     Recent Labs     20   * 122*   TP 7.0 6.5   ALB 2.3* 2.1*   GLOB 4.7* 4.4*     No results for input(s): INR, PTP, APTT, INREXT, INREXT in the last 72 hours. Recent Labs     20  0430 20  0505   PHI 7.40 7.43   PCO2I 45.9* 41.4   PO2I 359* 78*   FIO2I 40 40     No results for input(s): CPK, CKMB, TROIQ, BNPP in the last 72 hours.     Hemodynamics:   PAP:   CO:     Wedge:   CI:     CVP:    SVR:       PVR:       Ventilator Settings:  Mode Rate Tidal Volume Pressure FiO2 PEEP   Pressure control   2 ml  12 cm H2O(tried 10 low vt) 40 % 5 cm H20     Peak airway pressure: 30 cm H2O    Minute ventilation: 10.5 l/min        MEDS: Reviewed    Chest X-Ray:  CXR Results  (Last 48 hours)    None          Multidisciplinary Rounds Completed: Yes    ABCDEF Bundle/Checklist Completed:  Yes      DISPOSITION  Stay in ICU    CRI  I personally spent 41 minutes of critical care time. This is time spent at this critically ill patient's bedside actively involved in patient care as well as the coordination of care and discussions with the patient's family. This does not include any procedural time which has been billed separately.     [address] [place] [address]  9/8/2020

## 2020-09-09 ENCOUNTER — APPOINTMENT (OUTPATIENT)
Dept: GENERAL RADIOLOGY | Age: 30
DRG: 004 | End: 2020-09-09
Attending: INTERNAL MEDICINE
Payer: COMMERCIAL

## 2020-09-09 LAB
ALBUMIN SERPL-MCNC: 2.4 G/DL (ref 3.5–5)
ALBUMIN/GLOB SERPL: 0.6 {RATIO} (ref 1.1–2.2)
ALP SERPL-CCNC: 114 U/L (ref 45–117)
ALT SERPL-CCNC: 91 U/L (ref 12–78)
ANION GAP SERPL CALC-SCNC: 7 MMOL/L (ref 5–15)
ARTERIAL PATENCY WRIST A: YES
AST SERPL-CCNC: 31 U/L (ref 15–37)
BACTERIA SPEC CULT: NORMAL
BASE EXCESS BLD CALC-SCNC: 3 MMOL/L
BASOPHILS # BLD: 0 K/UL (ref 0–0.1)
BASOPHILS NFR BLD: 0 % (ref 0–1)
BDY SITE: ABNORMAL
BILIRUB SERPL-MCNC: 0.4 MG/DL (ref 0.2–1)
BUN SERPL-MCNC: 10 MG/DL (ref 6–20)
BUN/CREAT SERPL: 18 (ref 12–20)
CA-I BLD-SCNC: 1.29 MMOL/L (ref 1.12–1.32)
CALCIUM SERPL-MCNC: 9 MG/DL (ref 8.5–10.1)
CHLORIDE SERPL-SCNC: 107 MMOL/L (ref 97–108)
CO2 SERPL-SCNC: 27 MMOL/L (ref 21–32)
CREAT SERPL-MCNC: 0.56 MG/DL (ref 0.7–1.3)
DIFFERENTIAL METHOD BLD: ABNORMAL
EOSINOPHIL # BLD: 0.1 K/UL (ref 0–0.4)
EOSINOPHIL NFR BLD: 2 % (ref 0–7)
ERYTHROCYTE [DISTWIDTH] IN BLOOD BY AUTOMATED COUNT: 14.9 % (ref 11.5–14.5)
GAS FLOW.O2 O2 DELIVERY SYS: ABNORMAL L/MIN
GAS FLOW.O2 SETTING OXYMISER: 22 BPM
GLOBULIN SER CALC-MCNC: 4.3 G/DL (ref 2–4)
GLUCOSE SERPL-MCNC: 103 MG/DL (ref 65–100)
HCO3 BLD-SCNC: 27.3 MMOL/L (ref 22–26)
HCT VFR BLD AUTO: 24.4 % (ref 36.6–50.3)
HGB BLD-MCNC: 7.5 G/DL (ref 12.1–17)
IMM GRANULOCYTES # BLD AUTO: 0 K/UL
IMM GRANULOCYTES NFR BLD AUTO: 0 %
INSPIRATION.DURATION SETTING TIME VENT: 0.91 SEC
LYMPHOCYTES # BLD: 1.1 K/UL (ref 0.8–3.5)
LYMPHOCYTES NFR BLD: 22 % (ref 12–49)
MAGNESIUM SERPL-MCNC: 1.8 MG/DL (ref 1.6–2.4)
MCH RBC QN AUTO: 28.6 PG (ref 26–34)
MCHC RBC AUTO-ENTMCNC: 30.7 G/DL (ref 30–36.5)
MCV RBC AUTO: 93.1 FL (ref 80–99)
MONOCYTES # BLD: 0.4 K/UL (ref 0–1)
MONOCYTES NFR BLD: 9 % (ref 5–13)
NEUTS BAND NFR BLD MANUAL: 1 % (ref 0–6)
NEUTS SEG # BLD: 3.3 K/UL (ref 1.8–8)
NEUTS SEG NFR BLD: 66 % (ref 32–75)
NRBC # BLD: 0 K/UL (ref 0–0.01)
NRBC BLD-RTO: 0 PER 100 WBC
O2/TOTAL GAS SETTING VFR VENT: 40 %
PCO2 BLD: 44 MMHG (ref 35–45)
PEEP RESPIRATORY: 5 CMH2O
PH BLD: 7.4 [PH] (ref 7.35–7.45)
PHOSPHATE SERPL-MCNC: 4.6 MG/DL (ref 2.6–4.7)
PIP ISTAT,IPIP: 24
PLATELET # BLD AUTO: 252 K/UL (ref 150–400)
PMV BLD AUTO: 9.7 FL (ref 8.9–12.9)
PO2 BLD: 76 MMHG (ref 80–100)
POTASSIUM SERPL-SCNC: 3.7 MMOL/L (ref 3.5–5.1)
PROCALCITONIN SERPL-MCNC: 0.09 NG/ML
PROT SERPL-MCNC: 6.7 G/DL (ref 6.4–8.2)
RBC # BLD AUTO: 2.62 M/UL (ref 4.1–5.7)
RBC MORPH BLD: ABNORMAL
SAO2 % BLD: 95 % (ref 92–97)
SERVICE CMNT-IMP: NORMAL
SODIUM SERPL-SCNC: 141 MMOL/L (ref 136–145)
SPECIMEN TYPE: ABNORMAL
TOTAL RESP. RATE, ITRR: 22
VENTILATION MODE VENT: ABNORMAL
WBC # BLD AUTO: 4.9 K/UL (ref 4.1–11.1)

## 2020-09-09 PROCEDURE — 74011000250 HC RX REV CODE- 250: Performed by: NURSE PRACTITIONER

## 2020-09-09 PROCEDURE — 99291 CRITICAL CARE FIRST HOUR: CPT | Performed by: THORACIC SURGERY (CARDIOTHORACIC VASCULAR SURGERY)

## 2020-09-09 PROCEDURE — 94760 N-INVAS EAR/PLS OXIMETRY 1: CPT

## 2020-09-09 PROCEDURE — 83735 ASSAY OF MAGNESIUM: CPT

## 2020-09-09 PROCEDURE — 74011000258 HC RX REV CODE- 258: Performed by: INTERNAL MEDICINE

## 2020-09-09 PROCEDURE — 80053 COMPREHEN METABOLIC PANEL: CPT

## 2020-09-09 PROCEDURE — 36600 WITHDRAWAL OF ARTERIAL BLOOD: CPT

## 2020-09-09 PROCEDURE — 84145 PROCALCITONIN (PCT): CPT

## 2020-09-09 PROCEDURE — 36415 COLL VENOUS BLD VENIPUNCTURE: CPT

## 2020-09-09 PROCEDURE — 74011250637 HC RX REV CODE- 250/637: Performed by: NURSE PRACTITIONER

## 2020-09-09 PROCEDURE — 65610000006 HC RM INTENSIVE CARE

## 2020-09-09 PROCEDURE — 94003 VENT MGMT INPAT SUBQ DAY: CPT

## 2020-09-09 PROCEDURE — 74011250636 HC RX REV CODE- 250/636: Performed by: INTERNAL MEDICINE

## 2020-09-09 PROCEDURE — 74011250637 HC RX REV CODE- 250/637: Performed by: HOSPITALIST

## 2020-09-09 PROCEDURE — 84100 ASSAY OF PHOSPHORUS: CPT

## 2020-09-09 PROCEDURE — 74011250637 HC RX REV CODE- 250/637: Performed by: INTERNAL MEDICINE

## 2020-09-09 PROCEDURE — 82803 BLOOD GASES ANY COMBINATION: CPT

## 2020-09-09 PROCEDURE — 85025 COMPLETE CBC W/AUTO DIFF WBC: CPT

## 2020-09-09 PROCEDURE — 74011000250 HC RX REV CODE- 250: Performed by: INTERNAL MEDICINE

## 2020-09-09 PROCEDURE — 71045 X-RAY EXAM CHEST 1 VIEW: CPT

## 2020-09-09 PROCEDURE — 74011250636 HC RX REV CODE- 250/636: Performed by: NURSE PRACTITIONER

## 2020-09-09 RX ORDER — INSULIN LISPRO 100 [IU]/ML
INJECTION, SOLUTION INTRAVENOUS; SUBCUTANEOUS
Status: DISCONTINUED
Start: 2020-09-09 | End: 2020-09-09 | Stop reason: WASHOUT

## 2020-09-09 RX ORDER — MAGNESIUM SULFATE 1 G/100ML
1 INJECTION INTRAVENOUS ONCE
Status: COMPLETED | OUTPATIENT
Start: 2020-09-09 | End: 2020-09-09

## 2020-09-09 RX ORDER — POTASSIUM CHLORIDE 29.8 MG/ML
20 INJECTION INTRAVENOUS
Status: COMPLETED | OUTPATIENT
Start: 2020-09-09 | End: 2020-09-09

## 2020-09-09 RX ADMIN — POLYETHYLENE GLYCOL 3350 17 G: 17 POWDER, FOR SOLUTION ORAL at 09:00

## 2020-09-09 RX ADMIN — Medication 10 ML: at 21:53

## 2020-09-09 RX ADMIN — Medication: at 17:27

## 2020-09-09 RX ADMIN — FAMOTIDINE 20 MG: 40 POWDER, FOR SUSPENSION ORAL at 17:27

## 2020-09-09 RX ADMIN — ENOXAPARIN SODIUM 50 MG: 60 INJECTION SUBCUTANEOUS at 09:10

## 2020-09-09 RX ADMIN — ASPIRIN 81 MG CHEWABLE TABLET 81 MG: 81 TABLET CHEWABLE at 08:20

## 2020-09-09 RX ADMIN — QUETIAPINE FUMARATE 100 MG: 100 TABLET ORAL at 21:44

## 2020-09-09 RX ADMIN — CEFEPIME 2 G: 2 INJECTION, POWDER, FOR SOLUTION INTRAVENOUS at 08:20

## 2020-09-09 RX ADMIN — VANCOMYCIN HYDROCHLORIDE 2000 MG: 10 INJECTION, POWDER, LYOPHILIZED, FOR SOLUTION INTRAVENOUS at 06:34

## 2020-09-09 RX ADMIN — DEXMEDETOMIDINE 0.3 MCG/KG/HR: 100 INJECTION, SOLUTION, CONCENTRATE INTRAVENOUS at 10:10

## 2020-09-09 RX ADMIN — ALPRAZOLAM 0.5 MG: 0.5 TABLET ORAL at 08:20

## 2020-09-09 RX ADMIN — DOCUSATE SODIUM 100 MG: 50 LIQUID ORAL at 08:20

## 2020-09-09 RX ADMIN — CEFEPIME 2 G: 2 INJECTION, POWDER, FOR SOLUTION INTRAVENOUS at 01:21

## 2020-09-09 RX ADMIN — ALPRAZOLAM 0.5 MG: 0.5 TABLET ORAL at 17:26

## 2020-09-09 RX ADMIN — CHLORHEXIDINE GLUCONATE 15 ML: 0.12 RINSE ORAL at 08:21

## 2020-09-09 RX ADMIN — Medication 10 ML: at 05:38

## 2020-09-09 RX ADMIN — ENOXAPARIN SODIUM 50 MG: 60 INJECTION SUBCUTANEOUS at 21:45

## 2020-09-09 RX ADMIN — FAMOTIDINE 20 MG: 40 POWDER, FOR SUSPENSION ORAL at 08:20

## 2020-09-09 RX ADMIN — DOCUSATE SODIUM 100 MG: 50 LIQUID ORAL at 17:26

## 2020-09-09 RX ADMIN — POTASSIUM CHLORIDE 20 MEQ: 29.8 INJECTION, SOLUTION INTRAVENOUS at 06:34

## 2020-09-09 RX ADMIN — Medication 1 MG/HR: at 00:16

## 2020-09-09 RX ADMIN — METOPROLOL TARTRATE 5 MG: 1 INJECTION, SOLUTION INTRAVENOUS at 09:54

## 2020-09-09 RX ADMIN — CEFEPIME 2 G: 2 INJECTION, POWDER, FOR SOLUTION INTRAVENOUS at 17:26

## 2020-09-09 RX ADMIN — Medication 10 ML: at 14:04

## 2020-09-09 RX ADMIN — Medication: at 08:21

## 2020-09-09 RX ADMIN — POTASSIUM CHLORIDE 20 MEQ: 29.8 INJECTION, SOLUTION INTRAVENOUS at 05:38

## 2020-09-09 RX ADMIN — CHLORHEXIDINE GLUCONATE 15 ML: 0.12 RINSE ORAL at 20:28

## 2020-09-09 RX ADMIN — MAGNESIUM SULFATE IN DEXTROSE 1 G: 10 INJECTION, SOLUTION INTRAVENOUS at 05:37

## 2020-09-09 NOTE — PROGRESS NOTES
[facility]    ICU TEAM Progress Note    Name: Yael Woods   : 1990   MRN: [id number]   Date: 2020      Assessment:     ICU Problems: admitted 20 with severe Covid s/p trach     1. Covid-19 - PNA fibroproliferative ARDS   a. lovenox   b. Cefepime and vanco ? superinfection   2. Resp failure s/p trach   3. PTX with CT  a. Sinus pauses resolved   4. Tob use   5. Sedation    A. Ketamine off, wean versed have started precedex     6. B UE weakness - ? CIPN/Myopathy vs CVA vs? Appreciate neuro help    A. Follow closely     Overnight Events:   2020   About the same       Active Problem List:     Problem List  Date Reviewed: 2020          Codes Class    Acute respiratory failure [facility]) ICD-10-CM: J96.00  ICD-9-CM: 518.81         Pneumothorax on left ICD-10-CM: J93.9  ICD-9-CM: 512.89         Pneumonia due to severe acute respiratory syndrome coronavirus 2 (SARS-CoV-2) ICD-10-CM: U07.1, J12.89  ICD-9-CM: 480.8         Respiratory failure with hypoxia (HCC) ICD-10-CM: J96.91  ICD-9-CM: 518.81               Past Medical History:      has a past medical history of History of vascular access device (08/10/2020). Past Surgical History:      has a past surgical history that includes ir thora/ins chest tube(pneumo) wo image (2020); ir thora/ins chest tube(pneumo) wo image (2020); and pr tracheostomy, planned (2020). Home Medications:     Prior to Admission medications    Medication Sig Start Date End Date Taking? Authorizing Provider   benzonatate (Tessalon Perles) 100 mg capsule Take 100 mg by mouth three (3) times daily as needed for Cough.     Provider, Historical       Allergies/Social/Family History:     No Known Allergies   Social History     Tobacco Use    Smoking status: Current Some Day Smoker    Smokeless tobacco: Never Used   Substance Use Topics    Alcohol use: Not on file        Objective:   Vital Signs:  Visit Vitals  /65   Pulse 97 Temp 99.7 °F (37.6 °C)   Resp 22   Ht 5' 11\" (1.803 m)   Wt 98.2 kg (216 lb 7.9 oz)   SpO2 98%   BMI 30.19 kg/m²      O2 Device: Tracheostomy, Ventilator Temp (24hrs), Av.1 °F (37.3 °C), Min:98.1 °F (36.7 °C), Max:99.7 °F (37.6 °C)           Intake/Output:     Intake/Output Summary (Last 24 hours) at 2020 0750  Last data filed at 2020 0700  Gross per 24 hour   Intake 4702.98 ml   Output 2590 ml   Net 2112.98 ml       Physical Exam:    General:  On vent but awake    Eyes:  Sclera anicteric. Pupils equally round and reactive to light. Mouth/Throat: Mucous membranes normal, oral pharynx clear   Neck: Supple   Lungs:   Clear to auscultation bilaterally, good effort   CV:  Regular rate and rhythm,no murmur, click, rub or gallop   Abdomen:   Soft, non-tender. bowel sounds normal. non-distended   Extremities: No cyanosis or edema   Skin: Skin color, texture, turgor normal. no acute rash or lesions   Lymph nodes: Cervical and supraclavicular normal   Musculoskeletal: No swelling or deformity   Lines/Devices:  Intact, no erythema, drainage or tenderness           LABS AND  DATA: Personally reviewed  Recent Labs     20  03520  0710   WBC 4.9 4.7   HGB 7.5* 8.7*   HCT 24.4* 27.6*    262     Recent Labs     20  0350 20  0710    137   K 3.7 3.6    104   CO2 27 29   BUN 10 10   CREA 0.56* 0.41*   * 103*   CA 9.0 9.2   MG 1.8 1.9   PHOS 4.6 4.8*     Recent Labs     20  0350 20  0710    130*   TP 6.7 7.0   ALB 2.4* 2.3*   GLOB 4.3* 4.7*     No results for input(s): INR, PTP, APTT, INREXT, INREXT in the last 72 hours. Recent Labs     20  0335 20  0430   PHI 7.40 7.40   PCO2I 44.0 45.9*   PO2I 76* 359*   FIO2I 40 40     No results for input(s): CPK, CKMB, TROIQ, BNPP in the last 72 hours.     Hemodynamics:   PAP:   CO:     Wedge:   CI:     CVP:    SVR:       PVR:       Ventilator Settings:  Mode Rate Tidal Volume Pressure FiO2 PEEP Pressure control   2 ml  12 cm H2O(tried 10 low vt) 50 % 5 cm H20     Peak airway pressure: 30 cm H2O    Minute ventilation: 10.9 l/min        MEDS: Reviewed    Chest X-Ray:  CXR Results  (Last 48 hours)    None          Multidisciplinary Rounds Completed: Yes    ABCDEF Bundle/Checklist Completed:  Yes      DISPOSITION  Stay in ICU    CRI  I personally spent 39 minutes of critical care time. This is time spent at this critically ill patient's bedside actively involved in patient care as well as the coordination of care and discussions with the patient's family. This does not include any procedural time which has been billed separately.     [address] [place] [address]  9/9/2020

## 2020-09-09 NOTE — PROGRESS NOTES
1930: Bedside and Verbal shift change report given to [address] (oncoming nurse) by Trang Vasquez RN (offgoing nurse). Report included the following information SBAR, Kardex, ED Summary, Intake/Output, MAR, Recent Results, Cardiac Rhythm SR/ST and Alarm Parameters . 0730: Bedside and Verbal shift change report given to [address] (oncoming nurse) by [address] (offgoing nurse). Report included the following information SBAR, Kardex, ED Summary, Intake/Output, MAR, Recent Results, Cardiac Rhythm SR/ST and Alarm Parameters .

## 2020-09-09 NOTE — PROGRESS NOTES
0730: Bedside and Verbal shift change report given to Misha Harp RN (oncoming nurse) by Dedrick Gibbs RN (offgoing nurse). Report included the following information SBAR, Kardex, Intake/Output, MAR, Recent Results, Med Rec Status, Cardiac Rhythm NSR and Alarm Parameters .      6016: Dr. Milvia Rascon weaned FiO2 to 45% and began SBT    1030: PT tolerated SBT for 2 hours but then became tachypneic and showing increased work of breathing so RT placed him back on previous rate settings; pt is observed now resting comfortably in bed

## 2020-09-09 NOTE — PROGRESS NOTES
Comprehensive Nutrition Assessment    Type and Reason for Visit: Reassess    Nutrition Recommendations/Plan:    Increase tube feeding to goal 65 ml/hr    Nutrition Assessment:    Pt admitted to [facility] d/t Respiratory failure with hypoxia. No PMHx except smoking. Noted: Acuter hypoxic respiratory failure d/t COVID 19 PNA,  intubated 8/10. ARDS. Transferred to [facility] for possible ECMO support-not indicated. S/P convalescent plasma x 2, Remdisivir. B/L PTX-s/p left CT-removed. 8/26 Perc trach placed; SBT x 2 hr today. Neurology consulted-?acute/subacute CVA. Patient had a couple of episodes of vomiting last week but now tolerating tube feeding @ 40 ml/hr. Recommend increasing rate back to goal of 65 ml/hr. Current tube feeding meets only 65-70% estimated protein and energy needs, respectively. Tube feeding @ goal of 65 ml/hr with 2 packets Prosource daily and 80 ml water flush q 4 hr will provide 1560 ml, 1990 calories, 147 gm protein and 1865 ml free water per day (meeting % estimated protein and energy needs, respectively). Magnesium replaced today since lab <2.0. Malnutrition Assessment:  Malnutrition Status:  Insufficient data      Nutritionally Significant Medications:   Colace, Miralax, magnesium sulfate, KCL    Estimated Daily Nutrient Needs:  Energy (kcal):  6699-9794 (18-20 kcal/kg)  Protein (g):  156 (2g/kg IBW)       Fluid (ml/day):  1 ml/kcal    Nutrition Related Findings:  Last BM 9/6. Edema 1+ generalized. Wounds:  None       Current Nutrition Therapies:   Diet: NPO  Tube Feeding: Vital AF 1.2 @ 40 ml/hr with 1 packet Prosource bid and 80 ml water flush q 4 hr. Anthropometric Measures:  · Height:  5' 11\" (180.3 cm)  · Current Body Wt:  98.2 kg (216 lb 7.9 oz)   · Admission Body Wt:  242 lb 8.1 oz      · Ideal Body Wt:  172#  :  128.4 %   · BMI Categories:  Obese class 1 (BMI 30.0-34. 9)     Wt Readings from Last 10 Encounters:   09/09/20 98.2 kg (216 lb 7.9 oz)   08/18/20 100.7 kg (222 lb 0.1 oz)     Nutrition Diagnosis:   · Inadequate protein-energy intake related to inadequate enteral nutrition infusion as evidenced by (tube feeding meeting 65-70% estimated protein and energy needs, respectively.)    Nutrition Interventions:   Food and/or Nutrient Delivery: Modify tube feeding  Nutrition Education and Counseling: No recommendations at this time  Coordination of Nutrition Care: Continued inpatient monitoring, Interdisciplinary rounds    Goals:  Tube feeding to meet at least 85% estimated needs x 5-7 days. Nutrition Monitoring and Evaluation:   Food/Nutrient Intake Outcomes: Enteral nutrition intake/tolerance  Physical Signs/Symptoms Outcomes: Biochemical data, Hemodynamic status, GI status, Fluid status or edema, Weight    Discharge Planning:     Too soon to determine     Francisco Diaz RD CNSC  Contact: Felipe Plata

## 2020-09-09 NOTE — PROGRESS NOTES
ARDS  Acute hypoxic respiratory failure  COVID positive  Sinus pauses  S/P tracheostomy     Remains intubated and sedated    Tried SBT again today - made it about 2 hours    Became progressively more tachypneic    Back on vent    Hgb and platelets look good     Creatinine normal    LFTs continue to improve    ABG 7.40 / 44 / 76 / 27 / 3    FiO2 40%, PEEP 5    Continues on Lovenox    Continues on Cefepime    Continues on Precedex and dilaudid    Continues on Xanax and seroquel    CXR - diffuse infiltrates improved      Intake/Output Summary (Last 24 hours) at 9/9/2020 1333  Last data filed at 9/9/2020 1200  Gross per 24 hour   Intake 4142.79 ml   Output 3340 ml   Net 802.79 ml     Visit Vitals  /52 (BP 1 Location: Right arm, BP Patient Position: At rest)   Pulse 90   Temp 97.8 °F (36.6 °C)   Resp 22   Ht 5' 11\" (1.803 m)   Wt 216 lb 7.9 oz (98.2 kg)   SpO2 97%   BMI 30.19 kg/m²       Risk of morbidity and mortality - high  Medical decision making - high complexity    Total critical care time - 30 minutes (CPT 30813)     I personally spent the above critical care time. This is time spent at this critically ill patient's bedside / unit / floor actively involved in patient care as well as the coordination of care and discussions with the patient's family. This does not include any procedural time which has been billed separately.

## 2020-09-10 LAB
ALBUMIN SERPL-MCNC: 2.3 G/DL (ref 3.5–5)
ALBUMIN/GLOB SERPL: 0.5 {RATIO} (ref 1.1–2.2)
ALP SERPL-CCNC: 116 U/L (ref 45–117)
ALT SERPL-CCNC: 80 U/L (ref 12–78)
ANION GAP SERPL CALC-SCNC: 8 MMOL/L (ref 5–15)
ARTERIAL PATENCY WRIST A: YES
AST SERPL-CCNC: 27 U/L (ref 15–37)
BASE EXCESS BLD CALC-SCNC: 5 MMOL/L
BASOPHILS # BLD: 0 K/UL (ref 0–0.1)
BASOPHILS NFR BLD: 0 % (ref 0–1)
BDY SITE: ABNORMAL
BILIRUB SERPL-MCNC: 0.4 MG/DL (ref 0.2–1)
BUN SERPL-MCNC: 11 MG/DL (ref 6–20)
BUN/CREAT SERPL: 18 (ref 12–20)
CA-I BLD-SCNC: 1.26 MMOL/L (ref 1.12–1.32)
CALCIUM SERPL-MCNC: 9.1 MG/DL (ref 8.5–10.1)
CHLORIDE SERPL-SCNC: 104 MMOL/L (ref 97–108)
CO2 SERPL-SCNC: 29 MMOL/L (ref 21–32)
COVID-19, XGCOVT: DETECTED
CREAT SERPL-MCNC: 0.6 MG/DL (ref 0.7–1.3)
DIFFERENTIAL METHOD BLD: ABNORMAL
EOSINOPHIL # BLD: 0.3 K/UL (ref 0–0.4)
EOSINOPHIL NFR BLD: 6 % (ref 0–7)
ERYTHROCYTE [DISTWIDTH] IN BLOOD BY AUTOMATED COUNT: 14.9 % (ref 11.5–14.5)
GAS FLOW.O2 O2 DELIVERY SYS: ABNORMAL L/MIN
GAS FLOW.O2 SETTING OXYMISER: 22 BPM
GLOBULIN SER CALC-MCNC: 4.6 G/DL (ref 2–4)
GLUCOSE SERPL-MCNC: 100 MG/DL (ref 65–100)
HCO3 BLD-SCNC: 29.7 MMOL/L (ref 22–26)
HCT VFR BLD AUTO: 26 % (ref 36.6–50.3)
HEALTH STATUS, XMCV2T: ABNORMAL
HGB BLD-MCNC: 8 G/DL (ref 12.1–17)
IMM GRANULOCYTES # BLD AUTO: 0 K/UL
IMM GRANULOCYTES NFR BLD AUTO: 0 %
INSPIRATION.DURATION SETTING TIME VENT: 0.91 SEC
LYMPHOCYTES # BLD: 1.3 K/UL (ref 0.8–3.5)
LYMPHOCYTES NFR BLD: 27 % (ref 12–49)
MAGNESIUM SERPL-MCNC: 1.9 MG/DL (ref 1.6–2.4)
MCH RBC QN AUTO: 28.8 PG (ref 26–34)
MCHC RBC AUTO-ENTMCNC: 30.8 G/DL (ref 30–36.5)
MCV RBC AUTO: 93.5 FL (ref 80–99)
METAMYELOCYTES NFR BLD MANUAL: 1 %
MONOCYTES # BLD: 0.1 K/UL (ref 0–1)
MONOCYTES NFR BLD: 3 % (ref 5–13)
NEUTS BAND NFR BLD MANUAL: 2 % (ref 0–6)
NEUTS SEG # BLD: 3.1 K/UL (ref 1.8–8)
NEUTS SEG NFR BLD: 61 % (ref 32–75)
NRBC # BLD: 0 K/UL (ref 0–0.01)
NRBC BLD-RTO: 0 PER 100 WBC
O2/TOTAL GAS SETTING VFR VENT: 45 %
PCO2 BLD: 46.2 MMHG (ref 35–45)
PEEP RESPIRATORY: 5 CMH2O
PH BLD: 7.42 [PH] (ref 7.35–7.45)
PHOSPHATE SERPL-MCNC: 4.7 MG/DL (ref 2.6–4.7)
PIP ISTAT,IPIP: 30
PLATELET # BLD AUTO: 254 K/UL (ref 150–400)
PMV BLD AUTO: 10.2 FL (ref 8.9–12.9)
PO2 BLD: 113 MMHG (ref 80–100)
POTASSIUM SERPL-SCNC: 3.5 MMOL/L (ref 3.5–5.1)
PROT SERPL-MCNC: 6.9 G/DL (ref 6.4–8.2)
RBC # BLD AUTO: 2.78 M/UL (ref 4.1–5.7)
RBC MORPH BLD: ABNORMAL
SAO2 % BLD: 98 % (ref 92–97)
SODIUM SERPL-SCNC: 141 MMOL/L (ref 136–145)
SOURCE, COVRS: ABNORMAL
SPECIMEN SOURCE, FCOV2M: ABNORMAL
SPECIMEN TYPE, XMCV1T: ABNORMAL
SPECIMEN TYPE: ABNORMAL
TOTAL RESP. RATE, ITRR: 22
VENTILATION MODE VENT: ABNORMAL
WBC # BLD AUTO: 4.9 K/UL (ref 4.1–11.1)

## 2020-09-10 PROCEDURE — 36600 WITHDRAWAL OF ARTERIAL BLOOD: CPT

## 2020-09-10 PROCEDURE — 74011250637 HC RX REV CODE- 250/637: Performed by: NURSE PRACTITIONER

## 2020-09-10 PROCEDURE — 83735 ASSAY OF MAGNESIUM: CPT

## 2020-09-10 PROCEDURE — 74011250637 HC RX REV CODE- 250/637: Performed by: HOSPITALIST

## 2020-09-10 PROCEDURE — 74011250636 HC RX REV CODE- 250/636: Performed by: NURSE PRACTITIONER

## 2020-09-10 PROCEDURE — 84100 ASSAY OF PHOSPHORUS: CPT

## 2020-09-10 PROCEDURE — 74011250637 HC RX REV CODE- 250/637: Performed by: INTERNAL MEDICINE

## 2020-09-10 PROCEDURE — 36592 COLLECT BLOOD FROM PICC: CPT

## 2020-09-10 PROCEDURE — 74011000258 HC RX REV CODE- 258: Performed by: INTERNAL MEDICINE

## 2020-09-10 PROCEDURE — 94003 VENT MGMT INPAT SUBQ DAY: CPT

## 2020-09-10 PROCEDURE — 87635 SARS-COV-2 COVID-19 AMP PRB: CPT

## 2020-09-10 PROCEDURE — 85025 COMPLETE CBC W/AUTO DIFF WBC: CPT

## 2020-09-10 PROCEDURE — 74011250636 HC RX REV CODE- 250/636: Performed by: INTERNAL MEDICINE

## 2020-09-10 PROCEDURE — 74011000250 HC RX REV CODE- 250: Performed by: INTERNAL MEDICINE

## 2020-09-10 PROCEDURE — 36415 COLL VENOUS BLD VENIPUNCTURE: CPT

## 2020-09-10 PROCEDURE — 82803 BLOOD GASES ANY COMBINATION: CPT

## 2020-09-10 PROCEDURE — 65610000006 HC RM INTENSIVE CARE

## 2020-09-10 PROCEDURE — 80053 COMPREHEN METABOLIC PANEL: CPT

## 2020-09-10 PROCEDURE — 99291 CRITICAL CARE FIRST HOUR: CPT | Performed by: THORACIC SURGERY (CARDIOTHORACIC VASCULAR SURGERY)

## 2020-09-10 RX ORDER — MAGNESIUM SULFATE 1 G/100ML
1 INJECTION INTRAVENOUS ONCE
Status: COMPLETED | OUTPATIENT
Start: 2020-09-10 | End: 2020-09-10

## 2020-09-10 RX ORDER — POTASSIUM CHLORIDE 29.8 MG/ML
20 INJECTION INTRAVENOUS
Status: COMPLETED | OUTPATIENT
Start: 2020-09-10 | End: 2020-09-10

## 2020-09-10 RX ORDER — POLYETHYLENE GLYCOL 3350 17 G/17G
17 POWDER, FOR SOLUTION ORAL 2 TIMES DAILY
Status: DISCONTINUED | OUTPATIENT
Start: 2020-09-10 | End: 2020-09-25

## 2020-09-10 RX ORDER — POTASSIUM CHLORIDE 29.8 MG/ML
20 INJECTION INTRAVENOUS
Status: DISCONTINUED | OUTPATIENT
Start: 2020-09-10 | End: 2020-09-10

## 2020-09-10 RX ADMIN — FAMOTIDINE 20 MG: 40 POWDER, FOR SUSPENSION ORAL at 17:59

## 2020-09-10 RX ADMIN — DEXMEDETOMIDINE 0.3 MCG/KG/HR: 100 INJECTION, SOLUTION, CONCENTRATE INTRAVENOUS at 15:27

## 2020-09-10 RX ADMIN — ALPRAZOLAM 0.5 MG: 0.5 TABLET ORAL at 17:59

## 2020-09-10 RX ADMIN — ALPRAZOLAM 0.5 MG: 0.5 TABLET ORAL at 08:33

## 2020-09-10 RX ADMIN — CEFEPIME 2 G: 2 INJECTION, POWDER, FOR SOLUTION INTRAVENOUS at 03:54

## 2020-09-10 RX ADMIN — Medication 10 ML: at 05:16

## 2020-09-10 RX ADMIN — Medication 1 MG/HR: at 02:27

## 2020-09-10 RX ADMIN — DOCUSATE SODIUM 100 MG: 50 LIQUID ORAL at 08:33

## 2020-09-10 RX ADMIN — LACTULOSE 15 ML: 20 SOLUTION ORAL at 11:59

## 2020-09-10 RX ADMIN — DOCUSATE SODIUM 100 MG: 50 LIQUID ORAL at 17:59

## 2020-09-10 RX ADMIN — Medication 10 ML: at 22:09

## 2020-09-10 RX ADMIN — ENOXAPARIN SODIUM 50 MG: 60 INJECTION SUBCUTANEOUS at 22:08

## 2020-09-10 RX ADMIN — QUETIAPINE FUMARATE 100 MG: 100 TABLET ORAL at 22:08

## 2020-09-10 RX ADMIN — Medication: at 08:40

## 2020-09-10 RX ADMIN — LACTULOSE 15 ML: 20 SOLUTION ORAL at 17:58

## 2020-09-10 RX ADMIN — Medication 10 ML: at 15:11

## 2020-09-10 RX ADMIN — POLYETHYLENE GLYCOL 3350 17 G: 17 POWDER, FOR SOLUTION ORAL at 17:59

## 2020-09-10 RX ADMIN — FAMOTIDINE 20 MG: 40 POWDER, FOR SUSPENSION ORAL at 08:33

## 2020-09-10 RX ADMIN — CEFEPIME 2 G: 2 INJECTION, POWDER, FOR SOLUTION INTRAVENOUS at 11:58

## 2020-09-10 RX ADMIN — DEXMEDETOMIDINE 0.5 MCG/KG/HR: 100 INJECTION, SOLUTION, CONCENTRATE INTRAVENOUS at 08:54

## 2020-09-10 RX ADMIN — MAGNESIUM SULFATE IN DEXTROSE 1 G: 10 INJECTION, SOLUTION INTRAVENOUS at 08:23

## 2020-09-10 RX ADMIN — CHLORHEXIDINE GLUCONATE 15 ML: 0.12 RINSE ORAL at 20:00

## 2020-09-10 RX ADMIN — ENOXAPARIN SODIUM 50 MG: 60 INJECTION SUBCUTANEOUS at 09:54

## 2020-09-10 RX ADMIN — POTASSIUM CHLORIDE 20 MEQ: 29.8 INJECTION, SOLUTION INTRAVENOUS at 11:02

## 2020-09-10 RX ADMIN — POTASSIUM CHLORIDE 20 MEQ: 29.8 INJECTION, SOLUTION INTRAVENOUS at 09:53

## 2020-09-10 RX ADMIN — Medication: at 18:05

## 2020-09-10 RX ADMIN — POLYETHYLENE GLYCOL 3350 17 G: 17 POWDER, FOR SOLUTION ORAL at 08:33

## 2020-09-10 RX ADMIN — CHLORHEXIDINE GLUCONATE 15 ML: 0.12 RINSE ORAL at 08:41

## 2020-09-10 RX ADMIN — ASPIRIN 81 MG CHEWABLE TABLET 81 MG: 81 TABLET CHEWABLE at 08:33

## 2020-09-10 NOTE — PROGRESS NOTES
Notified by RN that Dr. Glenroy Amador had placed patient on SBT( RT not aware). Patient seems very anxious, RR 38 on assessment. Placed back on previous vent settings at this time. RN at the bedside.

## 2020-09-10 NOTE — WOUND CARE
Wound Visit: spoke to patient's nurse Sergio Oropeza who changed hydrocolloid dressing to trach site this a.m prior to my rounding. She reports area pink, intact; improved from last week. Hands off that sacrum / buttocks are without injury/concern. He remains in ICU, trach/vent. We will follow as needed.   Estelle Tan RN,CWCN

## 2020-09-10 NOTE — PROGRESS NOTES
1930: Bedside and Verbal shift change report given to [address] (oncoming nurse) by Yuri Barrios RN (offgoing nurse). Report included the following information SBAR, Kardex, ED Summary, Intake/Output, MAR, Recent Results, Cardiac Rhythm SR/ST and Alarm Parameters . 0730: Bedside and Verbal shift change report given to Pablito Pope (oncoming nurse) by [address] (offgoing nurse). Report included the following information SBAR, Kardex, ED Summary, Intake/Output, MAR, Recent Results, Cardiac Rhythm SR/ST and Alarm Parameters .

## 2020-09-10 NOTE — PROGRESS NOTES
0730: Bedside shift change report given to Kash Gonzalez RN (oncoming nurse) by Servando Choi (offgoing nurse). Report included the following information SBAR, Kardex, ED Summary, Procedure Summary, Intake/Output, MAR, Accordion and Recent Results. 1010: Spoke with patients mom and updated. Primary Nurse Birgit Melvin and Richie Sharma RN performed a dual skin assessment on this patient No impairment noted  Enrique score is 14    1150: ICU multidisciplinary rounds lead by Dr Archana Nuñez ([place] Phalen): The following were reviewed and discussed: current labs,  recent imaging, lines/drains, review of body systems, nutrition, cultures, mobility, length of ICU stay. The plan of care for the day is as follows  Attempt pressure support again today if patient tolerates.  Increase bowel regimine to help with BM.(written note by RN)

## 2020-09-10 NOTE — PROGRESS NOTES
ARDS  Acute hypoxic respiratory failure  COVID positive  Sinus pauses  S/P tracheostomy     Remains intubated and sedated    Tried SBT again this am     Started to et anxious with RR in the high 30's    Vent FiO2 45%, PEEP 5    Remains on Precedex and Dilaudid    Remains on Xanax and Seroquel    Remains on Lovenox    Remains on Cefepime     WBCs normal    Hgb and platelets look good    Creatinine and LFTs normal    Pro-calcitonin normal    TFs at goal    ABG 7.42 / 46 / 113 / 30 / 5    CXR - interstitial disease slowly improving       Intake/Output Summary (Last 24 hours) at 9/10/2020 1021  Last data filed at 9/10/2020 0953  Gross per 24 hour   Intake 2251.55 ml   Output 4075 ml   Net -1823.45 ml     Visit Vitals  /76   Pulse (!) 115   Temp 99.4 °F (37.4 °C)   Resp 22   Ht 5' 11\" (1.803 m)   Wt 214 lb 8.1 oz (97.3 kg)   SpO2 95%   BMI 29.92 kg/m²       Risk of morbidity and mortality - high  Medical decision making - high complexity    Total critical care time - 30 minutes (CPT 72596)     I personally spent the above critical care time. This is time spent at this critically ill patient's bedside / unit / floor actively involved in patient care as well as the coordination of care and discussions with the patient's family. This does not include any procedural time which has been billed separately.

## 2020-09-10 NOTE — PROGRESS NOTES
MIGUELINA  Patient presented to [facility] ED from home with increased shortness of breath, diaphoresis and fever. Transferred to [address] [place] [address] for possible ECMO placement  COVID Positive. RUR: 25%  Disposition: TBD  Patient remains intubated on Precedex gtt. Neuro: opens eyes, following commands. Tolerated SBT x 2 hours yesterday. Care management will continue to follow for transitions of care.  Ani Ross RN,CRM

## 2020-09-10 NOTE — PROGRESS NOTES
[facility]    ICU TEAM Progress Note    Name: Saritha Carver   : 1990   MRN: [id number]   Date: 9/10/2020      Assessment:     ICU Problems: admitted 20 with severe Covid s/p trach     1. Covid-19 - PNA fibroproliferative ARDS   a. lovenox   2. Resp failure s/p trach   a. PS trials   3. PTX with CT  4. Tob use   5. Sedation    A. Wean off as able     6. B UE weakness - ? CIPN/Myopathy vs CVA vs? Appreciate neuro help    A. Follow closely     Overnight Events:   9/10/2020   About the same       Active Problem List:     Problem List  Date Reviewed: 2020          Codes Class    Acute respiratory failure [facility]) ICD-10-CM: J96.00  ICD-9-CM: 518.81         Pneumothorax on left ICD-10-CM: J93.9  ICD-9-CM: 512.89         Pneumonia due to severe acute respiratory syndrome coronavirus 2 (SARS-CoV-2) ICD-10-CM: U07.1, J12.89  ICD-9-CM: 480.8         Respiratory failure with hypoxia (HCC) ICD-10-CM: J96.91  ICD-9-CM: 518.81               Past Medical History:      has a past medical history of History of vascular access device (08/10/2020). Past Surgical History:      has a past surgical history that includes ir thora/ins chest tube(pneumo) wo image (2020); ir thora/ins chest tube(pneumo) wo image (2020); and pr tracheostomy, planned (2020). Home Medications:     Prior to Admission medications    Medication Sig Start Date End Date Taking? Authorizing Provider   benzonatate (Tessalon Perles) 100 mg capsule Take 100 mg by mouth three (3) times daily as needed for Cough.     Provider, Historical       Allergies/Social/Family History:     No Known Allergies   Social History     Tobacco Use    Smoking status: Current Some Day Smoker    Smokeless tobacco: Never Used   Substance Use Topics    Alcohol use: Not on file        Objective:   Vital Signs:  Visit Vitals  /60   Pulse 95   Temp 100.3 °F (37.9 °C)   Resp 22   Ht 5' 11\" (1.803 m)   Wt 97.3 kg (214 lb 8.1 oz)   SpO2 97%   BMI 29.92 kg/m²      O2 Device: Tracheostomy, Ventilator Temp (24hrs), Av.1 °F (37.3 °C), Min:97.8 °F (36.6 °C), Max:100.3 °F (37.9 °C)           Intake/Output:     Intake/Output Summary (Last 24 hours) at 9/10/2020 0721  Last data filed at 9/10/2020 0700  Gross per 24 hour   Intake 2075.54 ml   Output 4045 ml   Net -1969.46 ml       Physical Exam:    General:  On vent but awake    Eyes:  Sclera anicteric. Pupils equally round and reactive to light. Mouth/Throat: Mucous membranes normal, oral pharynx clear   Neck: Supple   Lungs:   Clear to auscultation bilaterally, good effort   CV:  Regular rate and rhythm,no murmur, click, rub or gallop   Abdomen:   Soft, non-tender. bowel sounds normal. non-distended   Extremities: No cyanosis or edema   Skin: Skin color, texture, turgor normal. no acute rash or lesions   Lymph nodes: Cervical and supraclavicular normal   Musculoskeletal: No swelling or deformity   Lines/Devices:  Intact, no erythema, drainage or tenderness           LABS AND  DATA: Personally reviewed  Recent Labs     09/10/20  0402 20  0350   WBC 4.9 4.9   HGB 8.0* 7.5*   HCT 26.0* 24.4*    252     Recent Labs     09/10/20  0402 20  0350    141   K 3.5 3.7    107   CO2 29 27   BUN 11 10   CREA 0.60* 0.56*    103*   CA 9.1 9.0   MG 1.9 1.8   PHOS 4.7 4.6     Recent Labs     09/10/20  0402 20  0350    114   TP 6.9 6.7   ALB 2.3* 2.4*   GLOB 4.6* 4.3*     No results for input(s): INR, PTP, APTT, INREXT, INREXT in the last 72 hours. Recent Labs     20  0335 20  0430   PHI 7.40 7.40   PCO2I 44.0 45.9*   PO2I 76* 359*   FIO2I 40 40     No results for input(s): CPK, CKMB, TROIQ, BNPP in the last 72 hours.     Hemodynamics:   PAP:   CO:     Wedge:   CI:     CVP:    SVR:       PVR:       Ventilator Settings:  Mode Rate Tidal Volume Pressure FiO2 PEEP   Assist control, Pressure control   2 ml  12 cm H2O(tried 10 low vt) 45 % 5 cm H20 Peak airway pressure: 30 cm H2O    Minute ventilation: 9.71 l/min        MEDS: Reviewed    Chest X-Ray:  CXR Results  (Last 48 hours)               09/09/20 1241  XR CHEST PORT Final result    Impression:  IMPRESSION: Unchanged multifocal airspace disease compatible with pneumonia       Narrative:  EXAM: XR CHEST PORT       INDICATION: Multifocal airspace disease       COMPARISON: 9/6/2020       FINDINGS: A portable AP radiograph of the chest was obtained at 1227 hours. The   patient is on a cardiac monitor. There is multifocal airspace disease with air   bronchograms, asymmetric to the left and unchanged. The tracheostomy tube is in   place. The Dobbhoff extends below the hemidiaphragm. Multidisciplinary Rounds Completed: Yes    ABCDEF Bundle/Checklist Completed:  Yes      DISPOSITION  Stay in ICU    CRI  I personally spent 37 minutes of critical care time. This is time spent at this critically ill patient's bedside actively involved in patient care as well as the coordination of care and discussions with the patient's family. This does not include any procedural time which has been billed separately.     [address] [place] [address]  9/10/2020

## 2020-09-11 LAB
ALBUMIN SERPL-MCNC: 2.5 G/DL (ref 3.5–5)
ALBUMIN/GLOB SERPL: 0.5 {RATIO} (ref 1.1–2.2)
ALP SERPL-CCNC: 124 U/L (ref 45–117)
ALT SERPL-CCNC: 77 U/L (ref 12–78)
ANION GAP SERPL CALC-SCNC: 6 MMOL/L (ref 5–15)
ARTERIAL PATENCY WRIST A: NO
AST SERPL-CCNC: 31 U/L (ref 15–37)
BASE EXCESS BLD CALC-SCNC: 5 MMOL/L
BASOPHILS # BLD: 0.1 K/UL (ref 0–0.1)
BASOPHILS NFR BLD: 1 % (ref 0–1)
BDY SITE: ABNORMAL
BILIRUB SERPL-MCNC: 0.4 MG/DL (ref 0.2–1)
BUN SERPL-MCNC: 16 MG/DL (ref 6–20)
BUN/CREAT SERPL: 25 (ref 12–20)
CA-I BLD-SCNC: 1.32 MMOL/L (ref 1.12–1.32)
CALCIUM SERPL-MCNC: 9.3 MG/DL (ref 8.5–10.1)
CHLORIDE SERPL-SCNC: 104 MMOL/L (ref 97–108)
CO2 SERPL-SCNC: 29 MMOL/L (ref 21–32)
CREAT SERPL-MCNC: 0.65 MG/DL (ref 0.7–1.3)
DIFFERENTIAL METHOD BLD: ABNORMAL
EOSINOPHIL # BLD: 0.2 K/UL (ref 0–0.4)
EOSINOPHIL NFR BLD: 3 % (ref 0–7)
ERYTHROCYTE [DISTWIDTH] IN BLOOD BY AUTOMATED COUNT: 15.1 % (ref 11.5–14.5)
GAS FLOW.O2 O2 DELIVERY SYS: ABNORMAL L/MIN
GAS FLOW.O2 SETTING OXYMISER: 22 BPM
GLOBULIN SER CALC-MCNC: 4.9 G/DL (ref 2–4)
GLUCOSE SERPL-MCNC: 110 MG/DL (ref 65–100)
HCO3 BLD-SCNC: 29.7 MMOL/L (ref 22–26)
HCT VFR BLD AUTO: 27.1 % (ref 36.6–50.3)
HGB BLD-MCNC: 8.3 G/DL (ref 12.1–17)
IMM GRANULOCYTES # BLD AUTO: 0 K/UL
IMM GRANULOCYTES NFR BLD AUTO: 0 %
LYMPHOCYTES # BLD: 1.9 K/UL (ref 0.8–3.5)
LYMPHOCYTES NFR BLD: 29 % (ref 12–49)
MAGNESIUM SERPL-MCNC: 2 MG/DL (ref 1.6–2.4)
MCH RBC QN AUTO: 28.9 PG (ref 26–34)
MCHC RBC AUTO-ENTMCNC: 30.6 G/DL (ref 30–36.5)
MCV RBC AUTO: 94.4 FL (ref 80–99)
METAMYELOCYTES NFR BLD MANUAL: 1 %
MONOCYTES # BLD: 0.7 K/UL (ref 0–1)
MONOCYTES NFR BLD: 10 % (ref 5–13)
MYELOCYTES NFR BLD MANUAL: 1 %
NEUTS BAND NFR BLD MANUAL: 4 % (ref 0–6)
NEUTS SEG # BLD: 3.6 K/UL (ref 1.8–8)
NEUTS SEG NFR BLD: 51 % (ref 32–75)
NRBC # BLD: 0 K/UL (ref 0–0.01)
NRBC BLD-RTO: 0 PER 100 WBC
O2/TOTAL GAS SETTING VFR VENT: 45 %
PCO2 BLD: 46.5 MMHG (ref 35–45)
PEEP RESPIRATORY: 5 CMH2O
PH BLD: 7.41 [PH] (ref 7.35–7.45)
PHOSPHATE SERPL-MCNC: 5.2 MG/DL (ref 2.6–4.7)
PIP ISTAT,IPIP: 24
PLATELET # BLD AUTO: 289 K/UL (ref 150–400)
PLATELET COMMENTS,PCOM: ABNORMAL
PMV BLD AUTO: 10 FL (ref 8.9–12.9)
PO2 BLD: 47 MMHG (ref 80–100)
POTASSIUM SERPL-SCNC: 3.9 MMOL/L (ref 3.5–5.1)
PROT SERPL-MCNC: 7.4 G/DL (ref 6.4–8.2)
RBC # BLD AUTO: 2.87 M/UL (ref 4.1–5.7)
RBC MORPH BLD: ABNORMAL
RBC MORPH BLD: ABNORMAL
SAO2 % BLD: 83 % (ref 92–97)
SODIUM SERPL-SCNC: 139 MMOL/L (ref 136–145)
SPECIMEN TYPE: ABNORMAL
VENTILATION MODE VENT: ABNORMAL
WBC # BLD AUTO: 6.5 K/UL (ref 4.1–11.1)

## 2020-09-11 PROCEDURE — 36415 COLL VENOUS BLD VENIPUNCTURE: CPT

## 2020-09-11 PROCEDURE — 74011250637 HC RX REV CODE- 250/637: Performed by: NURSE PRACTITIONER

## 2020-09-11 PROCEDURE — 82803 BLOOD GASES ANY COMBINATION: CPT

## 2020-09-11 PROCEDURE — 94003 VENT MGMT INPAT SUBQ DAY: CPT

## 2020-09-11 PROCEDURE — 99291 CRITICAL CARE FIRST HOUR: CPT | Performed by: THORACIC SURGERY (CARDIOTHORACIC VASCULAR SURGERY)

## 2020-09-11 PROCEDURE — 84100 ASSAY OF PHOSPHORUS: CPT

## 2020-09-11 PROCEDURE — 97530 THERAPEUTIC ACTIVITIES: CPT

## 2020-09-11 PROCEDURE — 65610000006 HC RM INTENSIVE CARE

## 2020-09-11 PROCEDURE — 80053 COMPREHEN METABOLIC PANEL: CPT

## 2020-09-11 PROCEDURE — 74011000250 HC RX REV CODE- 250: Performed by: INTERNAL MEDICINE

## 2020-09-11 PROCEDURE — 74011000258 HC RX REV CODE- 258: Performed by: INTERNAL MEDICINE

## 2020-09-11 PROCEDURE — 74011250637 HC RX REV CODE- 250/637: Performed by: INTERNAL MEDICINE

## 2020-09-11 PROCEDURE — 36600 WITHDRAWAL OF ARTERIAL BLOOD: CPT

## 2020-09-11 PROCEDURE — 74011250637 HC RX REV CODE- 250/637: Performed by: HOSPITALIST

## 2020-09-11 PROCEDURE — 77030041247 HC PROTECTOR HEEL HEELMEDIX MDII -B

## 2020-09-11 PROCEDURE — 74011250636 HC RX REV CODE- 250/636: Performed by: NURSE PRACTITIONER

## 2020-09-11 PROCEDURE — 97110 THERAPEUTIC EXERCISES: CPT

## 2020-09-11 PROCEDURE — 85025 COMPLETE CBC W/AUTO DIFF WBC: CPT

## 2020-09-11 PROCEDURE — 83735 ASSAY OF MAGNESIUM: CPT

## 2020-09-11 PROCEDURE — 97163 PT EVAL HIGH COMPLEX 45 MIN: CPT

## 2020-09-11 PROCEDURE — 74011250636 HC RX REV CODE- 250/636: Performed by: INTERNAL MEDICINE

## 2020-09-11 RX ORDER — MAGNESIUM SULFATE 1 G/100ML
1 INJECTION INTRAVENOUS ONCE
Status: COMPLETED | OUTPATIENT
Start: 2020-09-11 | End: 2020-09-11

## 2020-09-11 RX ORDER — OXYCODONE HYDROCHLORIDE 5 MG/1
5 TABLET ORAL EVERY 6 HOURS
Status: DISCONTINUED | OUTPATIENT
Start: 2020-09-11 | End: 2020-09-18

## 2020-09-11 RX ORDER — POTASSIUM CHLORIDE 29.8 MG/ML
20 INJECTION INTRAVENOUS ONCE
Status: COMPLETED | OUTPATIENT
Start: 2020-09-11 | End: 2020-09-11

## 2020-09-11 RX ORDER — ALPRAZOLAM 0.5 MG/1
0.5 TABLET ORAL 3 TIMES DAILY
Status: DISCONTINUED | OUTPATIENT
Start: 2020-09-11 | End: 2020-09-17

## 2020-09-11 RX ADMIN — OXYCODONE HYDROCHLORIDE 5 MG: 5 TABLET ORAL at 23:19

## 2020-09-11 RX ADMIN — POLYETHYLENE GLYCOL 3350 17 G: 17 POWDER, FOR SOLUTION ORAL at 17:11

## 2020-09-11 RX ADMIN — CHLORHEXIDINE GLUCONATE 15 ML: 0.12 RINSE ORAL at 08:21

## 2020-09-11 RX ADMIN — POLYETHYLENE GLYCOL 3350 17 G: 17 POWDER, FOR SOLUTION ORAL at 08:15

## 2020-09-11 RX ADMIN — Medication 10 ML: at 21:07

## 2020-09-11 RX ADMIN — Medication 1 MG/HR: at 04:27

## 2020-09-11 RX ADMIN — ENOXAPARIN SODIUM 50 MG: 60 INJECTION SUBCUTANEOUS at 11:37

## 2020-09-11 RX ADMIN — DEXMEDETOMIDINE 0.2 MCG/KG/HR: 100 INJECTION, SOLUTION, CONCENTRATE INTRAVENOUS at 17:27

## 2020-09-11 RX ADMIN — ALPRAZOLAM 0.5 MG: 0.5 TABLET ORAL at 21:06

## 2020-09-11 RX ADMIN — CHLORHEXIDINE GLUCONATE 15 ML: 0.12 RINSE ORAL at 21:07

## 2020-09-11 RX ADMIN — LACTULOSE 15 ML: 20 SOLUTION ORAL at 08:14

## 2020-09-11 RX ADMIN — QUETIAPINE FUMARATE 100 MG: 100 TABLET ORAL at 21:06

## 2020-09-11 RX ADMIN — ALPRAZOLAM 0.5 MG: 0.5 TABLET ORAL at 08:14

## 2020-09-11 RX ADMIN — ASPIRIN 81 MG CHEWABLE TABLET 81 MG: 81 TABLET CHEWABLE at 08:14

## 2020-09-11 RX ADMIN — DOCUSATE SODIUM 100 MG: 50 LIQUID ORAL at 08:14

## 2020-09-11 RX ADMIN — Medication: at 17:10

## 2020-09-11 RX ADMIN — ALPRAZOLAM 0.5 MG: 0.5 TABLET ORAL at 16:52

## 2020-09-11 RX ADMIN — LACTULOSE 15 ML: 20 SOLUTION ORAL at 17:11

## 2020-09-11 RX ADMIN — DEXMEDETOMIDINE 0.3 MCG/KG/HR: 100 INJECTION, SOLUTION, CONCENTRATE INTRAVENOUS at 08:21

## 2020-09-11 RX ADMIN — FAMOTIDINE 20 MG: 40 POWDER, FOR SUSPENSION ORAL at 08:15

## 2020-09-11 RX ADMIN — ENOXAPARIN SODIUM 50 MG: 60 INJECTION SUBCUTANEOUS at 21:06

## 2020-09-11 RX ADMIN — DOCUSATE SODIUM 100 MG: 50 LIQUID ORAL at 17:11

## 2020-09-11 RX ADMIN — ONDANSETRON 4 MG: 2 INJECTION INTRAMUSCULAR; INTRAVENOUS at 03:28

## 2020-09-11 RX ADMIN — ACETAMINOPHEN ORAL SOLUTION 650 MG: 650 SOLUTION ORAL at 08:16

## 2020-09-11 RX ADMIN — Medication 10 ML: at 05:54

## 2020-09-11 RX ADMIN — OXYCODONE HYDROCHLORIDE 5 MG: 5 TABLET ORAL at 18:00

## 2020-09-11 RX ADMIN — FAMOTIDINE 20 MG: 40 POWDER, FOR SUSPENSION ORAL at 17:11

## 2020-09-11 RX ADMIN — POTASSIUM CHLORIDE 20 MEQ: 29.8 INJECTION, SOLUTION INTRAVENOUS at 05:49

## 2020-09-11 RX ADMIN — MAGNESIUM SULFATE IN DEXTROSE 1 G: 10 INJECTION, SOLUTION INTRAVENOUS at 06:52

## 2020-09-11 RX ADMIN — Medication: at 08:20

## 2020-09-11 RX ADMIN — OXYCODONE HYDROCHLORIDE 5 MG: 5 TABLET ORAL at 12:46

## 2020-09-11 RX ADMIN — ACETAMINOPHEN ORAL SOLUTION 650 MG: 650 SOLUTION ORAL at 12:46

## 2020-09-11 RX ADMIN — Medication 10 ML: at 13:31

## 2020-09-11 NOTE — PROGRESS NOTES
[facility]    ICU TEAM Progress Note    Name: Ace Phalen   : 1990   MRN: [id number]   Date: 2020      Assessment:     ICU Problems: admitted 20 with severe Covid s/p trach     1. Covid-19 - PNA fibroproliferative ARDS   a. lovenox   2. Resp failure s/p trach   a. PS trials   3. PTX with CT  4. Tob use   5. Sedation    A. Wean off as able     6. B UE weakness - ? CIPN/Myopathy vs CVA vs? Appreciate neuro help    A. Follow closely     7. Severe encephalopathy - slowly improving     Overnight Events:   2020   About the same       Active Problem List:     Problem List  Date Reviewed: 2020          Codes Class    Acute respiratory failure [facility]) ICD-10-CM: J96.00  ICD-9-CM: 518.81         Pneumothorax on left ICD-10-CM: J93.9  ICD-9-CM: 512.89         Pneumonia due to severe acute respiratory syndrome coronavirus 2 (SARS-CoV-2) ICD-10-CM: U07.1, J12.89  ICD-9-CM: 480.8         Respiratory failure with hypoxia (HCC) ICD-10-CM: J96.91  ICD-9-CM: 518.81               Past Medical History:      has a past medical history of History of vascular access device (08/10/2020). Past Surgical History:      has a past surgical history that includes ir thora/ins chest tube(pneumo) wo image (2020); ir thora/ins chest tube(pneumo) wo image (2020); and pr tracheostomy, planned (2020). Home Medications:     Prior to Admission medications    Medication Sig Start Date End Date Taking? Authorizing Provider   benzonatate (Tessalon Perles) 100 mg capsule Take 100 mg by mouth three (3) times daily as needed for Cough.     Provider, Historical       Allergies/Social/Family History:     No Known Allergies   Social History     Tobacco Use    Smoking status: Current Some Day Smoker    Smokeless tobacco: Never Used   Substance Use Topics    Alcohol use: Not on file        Objective:   Vital Signs:  Visit Vitals  /52   Pulse (!) 104   Temp 99.5 °F (37.5 °C)   Resp 23    5' 11\" (1.803 m)   Wt 96.8 kg (213 lb 6.5 oz)   SpO2 97%   BMI 29.76 kg/m²      O2 Device: Tracheostomy, Ventilator Temp (24hrs), Av.6 °F (37.6 °C), Min:99.4 °F (37.4 °C), Max:100 °F (37.8 °C)           Intake/Output:     Intake/Output Summary (Last 24 hours) at 2020 0731  Last data filed at 2020 0700  Gross per 24 hour   Intake 2570.36 ml   Output 1720 ml   Net 850.36 ml       Physical Exam:    General:  On vent but awake    Eyes:  Sclera anicteric. Pupils equally round and reactive to light. Mouth/Throat: Mucous membranes normal, oral pharynx clear   Neck: Supple   Lungs:   Clear to auscultation bilaterally, good effort   CV:  Regular rate and rhythm,no murmur, click, rub or gallop   Abdomen:   Soft, non-tender. bowel sounds normal. non-distended   Extremities: No cyanosis or edema   Skin: Skin color, texture, turgor normal. no acute rash or lesions   Lymph nodes: Cervical and supraclavicular normal   Musculoskeletal: No swelling or deformity   Lines/Devices:  Intact, no erythema, drainage or tenderness           LABS AND  DATA: Personally reviewed  Recent Labs     09/11/20  0310 09/10/20  0402   WBC 6.5 4.9   HGB 8.3* 8.0*   HCT 27.1* 26.0*    254     Recent Labs     09/11/20  0310 09/10/20  0402    141   K 3.9 3.5    104   CO2 29 29   BUN 16 11   CREA 0.65* 0.60*   * 100   CA 9.3 9.1   MG 2.0 1.9   PHOS 5.2* 4.7     Recent Labs     09/11/20  0310 09/10/20  0402   * 116   TP 7.4 6.9   ALB 2.5* 2.3*   GLOB 4.9* 4.6*     No results for input(s): INR, PTP, APTT, INREXT, INREXT in the last 72 hours. Recent Labs     20  0618 09/10/20  0622   PHI 7.41 7.42   PCO2I 46.5* 46.2*   PO2I 47* 113*   FIO2I 45 45     No results for input(s): CPK, CKMB, TROIQ, BNPP in the last 72 hours.     Hemodynamics:   PAP:   CO:     Wedge:   CI:     CVP:    SVR:       PVR:       Ventilator Settings:  Mode Rate Tidal Volume Pressure FiO2 PEEP   Assist control   2 ml  12 cm H2O(tried 10 low vt) 45 % 5 cm H20     Peak airway pressure: 30 cm H2O    Minute ventilation: 8.2 l/min        MEDS: Reviewed    Chest X-Ray:  CXR Results  (Last 48 hours)               09/09/20 1241  XR CHEST PORT Final result    Impression:  IMPRESSION: Unchanged multifocal airspace disease compatible with pneumonia       Narrative:  EXAM: XR CHEST PORT       INDICATION: Multifocal airspace disease       COMPARISON: 9/6/2020       FINDINGS: A portable AP radiograph of the chest was obtained at 1227 hours. The   patient is on a cardiac monitor. There is multifocal airspace disease with air   bronchograms, asymmetric to the left and unchanged. The tracheostomy tube is in   place. The Dobbhoff extends below the hemidiaphragm. Multidisciplinary Rounds Completed: Yes    ABCDEF Bundle/Checklist Completed:  Yes      DISPOSITION  Stay in ICU    CRI  I personally spent 36 minutes of critical care time. This is time spent at this critically ill patient's bedside actively involved in patient care as well as the coordination of care and discussions with the patient's family. This does not include any procedural time which has been billed separately.     [address] [place] [address]  9/11/2020

## 2020-09-11 NOTE — PROGRESS NOTES
0730: Bedside shift change report given to Konstantin Zuniga RN (oncoming nurse) by Jing Padron (offgoing nurse). Report included the following information SBAR, Kardex, ED Summary, Procedure Summary, Intake/Output, MAR, Accordion and Recent Results. 0800: second COVID test positive. 1330: PT at bedside    1700: Went in to assess patient, patient placed on different vent settings prior to RN entering, Patient HR in 130s RR 40s, pt agitated and upset. When RN went into room, noticed patient in restraints pulled at bottom of gown so much that gown came off patients shoulder and are and pulled PICC line out of left arm. Patient also had hand on suction tubing that was left on patients abdomen within reach of left arm. Notified NP, attempted to place PIV, unsuccessful. 1745: PIV inserted in Left AC. Drips restarted.

## 2020-09-11 NOTE — PROGRESS NOTES
1930: Bedside and Verbal shift change report given to [address] (oncoming nurse) by [facility] (offgoing nurse). Report included the following information SBAR, Kardex, ED Summary, Intake/Output, MAR, Recent Results, Cardiac Rhythm SR/ST and Alarm Parameters . 0315: Pt having vomiting episode. TF held. Ramna Kong NP notified. 0730: Bedside and Verbal shift change report given to [facility] (oncoming nurse) by [address] (offgoing nurse). Report included the following information SBAR, Kardex, ED Summary, Intake/Output, MAR, Recent Results, Cardiac Rhythm SR/ST and Alarm Parameters .

## 2020-09-11 NOTE — PROGRESS NOTES
Problem: Mobility Impaired (Adult and Pediatric)  Goal: *Acute Goals and Plan of Care (Insert Text)  Description: FUNCTIONAL STATUS PRIOR TO ADMISSION: Patient was independent and active without use of DME. 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 Physical Therapy Goals  Initiated 9/11/2020  1. Patient will move from supine to sit and sit to supine , scoot up and down, and roll side to side in bed with maximal assistance within 7 day(s). 2.  Patient will transfer from bed to chair and chair to bed with maximal assistance using the least restrictive device within 7 day(s). 3.  Patient will perform sit to stand with maximal assistance within 7 day(s). 4.  Patient will tolerate sitting EOB with maximal assistance for 5 minutes within 7 day(s). Outcome: Progressing Towards Goal     PHYSICAL THERAPY EVALUATION  Patient: Conor Salazar (21 y.o. male)  Date: 9/11/2020  Primary Diagnosis: Acute respiratory failure ([facility] [address] [J96.00]  Procedure(s) (LRB):  VV EXTRACORPOREAL MEMBRANE OXYGENATION (ECMO) (N/A)     Precautions: falls         ASSESSMENT  Based on the objective data described below, the patient presents with independent baseline, progressively got more critically ill after COVID infection. 80-year-old male with no history of significant past medical history except smoking half pack per day. He was admitted on 8/5/2020 at [facility] with acute hypoxemic respiratory failure from COVID pneumonia. He continued to deteriorate and got intubated on 8/10. Due to worsening hypoxemia, he is transferred to [facility] for ECMO evaluation. During his hospital course he also developed pneumothorax and had left-sided chest tube on 8/11. Has a tracheostomy tube placed on August 26.   On 9/3 patient had significant worsening on his right arm, no cause found, 9/4 he had acute weakness on the left arm code stroke was called and again unremarkable imaging, with some returned use of LUE but flaccid RUE with no pain aversion, no tone or ms firing noted. Pt able to participate well with BLE to perform flex/ext, abd/add and has preserved ER of BLE with no contractures noted in BLE. Encouraged pt to move legs and feet as much as possible and patient is able to make small fluttering movements frequently, cross legs. Pt demos very poor UE use. Performed ROM to RUE and AAROM to LUE but unable to get patient to use LUE for communication thumb up/down. Pt total a for rolling and unable to initiate rolling despite being positioned. Pt tolerates sitting upright with HOB and leaning forward to expose back as rash and yeast buildup noted, total A for balance while RN cleans back. Pt unable to understand head shaking for communication, though he has full ROM in head against pillows. Positioned with L chest wall support and educated RN on the importance of legs being out of bunny boots occasionally as he cannot mobilize in them    current Level of Function Impacting Discharge (mobility/balance): total A    Functional Outcome Measure: The patient scored Total: 0/100 on the Barthel Index which is indicative of severe impaired ability to care for basic self needs/dependency on others. Other factors to consider for discharge:      Patient will benefit from skilled therapy intervention to address the above noted impairments. PLAN :  Recommendations and Planned Interventions: bed mobility training, transfer training, gait training, therapeutic exercises, neuromuscular re-education, patient and family training/education, and therapeutic activities      Frequency/Duration: Patient will be followed by physical therapy:  3 times a week to address goals.     Recommendation for discharge: (in order for the patient to meet his/her long term goals)  To be determined: evaluate for rehab placement LTC vs SNF    This discharge recommendation:  Has been made in collaboration with the attending provider and/or case management    IF patient discharges home will need the following DME: to be determined (TBD)         SUBJECTIVE:   Patient nonverbal at this time. Attempted to perform head shaking training, difficult to get patient to perform    OBJECTIVE DATA SUMMARY:   HISTORY:    Past Medical History:   Diagnosis Date    History of vascular access device 08/10/2020    5 Fr triple PICC, hemodynamically unstable, 45 cm L basilic     Past Surgical History:   Procedure Laterality Date    IR THORA/INS CHEST TUBE(PNEUMO) WO IMAGE  8/11/2020    IR THORA/INS CHEST TUBE(PNEUMO) WO IMAGE  8/11/2020    ID TRACHEOSTOMY, PLANNED  8/26/2020            Personal factors and/or comorbidities impacting plan of care:     Home Situation  Home Environment: Private residence  # Steps to Enter: 0  One/Two Story Residence: One story  Living Alone: Yes  Support Systems: Family member(s)  Patient Expects to be Discharged to[de-identified] Rehabilitation facility  Current DME Used/Available at Home: None    EXAMINATION/PRESENTATION/DECISION MAKING:   Critical Behavior:  Neurologic State: Alert, Restless  Orientation Level: Unable to verbalize  Cognition: Follows commands     Hearing: Auditory  Auditory Impairment: None  Skin:  intact  Edema: none  Range Of Motion:  AROM: Grossly decreased, non-functional           PROM: Within functional limits           Strength:    Strength: Grossly decreased, non-functional                    Tone & Sensation:   Tone: Abnormal(L calf clonus strong, - babinski, RUE flaccid no pain w/d)              Sensation: Impaired               Coordination:  Coordination: Grossly decreased, non-functional  Vision:      Functional Mobility:  Bed Mobility:  Rolling: Total assistance  Supine to Sit: Total assistance  Sit to Supine: Total assistance  Scooting:  Total assistance  Transfers:                             Balance:   Sitting: Impaired  Sitting - Static: Poor (constant support)  Sitting - Dynamic: Poor (constant support)  Ambulation/Gait Training:       Therapeutic Exercises:   Flex/ext, abd/add, ER/IR    Functional Measure:  Barthel Index:    Bathin  Bladder: 0  Bowels: 0  Groomin  Dressin  Feedin  Mobility: 0  Stairs: 0  Toilet Use: 0  Transfer (Bed to Chair and Back): 0  Total: 0/100       The Barthel ADL Index: Guidelines  1. The index should be used as a record of what a patient does, not as a record of what a patient could do. 2. The main aim is to establish degree of independence from any help, physical or verbal, however minor and for whatever reason. 3. The need for supervision renders the patient not independent. 4. A patient's performance should be established using the best available evidence. Asking the patient, friends/relatives and nurses are the usual sources, but direct observation and common sense are also important. However direct testing is not needed. 5. Usually the patient's performance over the preceding 24-48 hours is important, but occasionally longer periods will be relevant. 6. Middle categories imply that the patient supplies over 50 per cent of the effort. 7. Use of aids to be independent is allowed. Maddison Jacksno., Barthel, D.W. (4049). Functional evaluation: the Barthel Index. [address] [facility] (14)2. SAHIL Rebollar, Magdalena Mccloud.Alyssia., [place], [address] [place] [address] (). Measuring the change indisability after inpatient rehabilitation; comparison of the responsiveness of the Barthel Index and Functional [place] Measure. Journal of Neurology, Neurosurgery, and Psychiatry, 66(4), 613-416. Aldo Lugo, [email or web address].[email or web address].A, NICHOLE Contreras, & Brian Duncan M.A. (2004.) Assessment of post-stroke quality of life in cost-effectiveness studies: The usefulness of the Barthel Index and the EuroQoL-5D.  Quality of Life Research, 15, 045-82        Physical Therapy Evaluation Charge Determination   History Examination Presentation Decision-Making   HIGH Complexity :3+ comorbidities / personal factors will impact the outcome/ POC  HIGH Complexity : 4+ Standardized tests and measures addressing body structure, function, activity limitation and / or participation in recreation  HIGH Complexity : Unstable and unpredictable characteristics  Other outcome measures barthel  HIGH       Based on the above components, the patient evaluation is determined to be of the following complexity level: HIGH     Pain Rating:  None observed    Activity Tolerance:   Poor and requires rest breaks  Please refer to the flowsheet for vital signs taken during this treatment. After treatment patient left in no apparent distress:   Supine in bed and Call bell within reach    COMMUNICATION/EDUCATION:   The patients plan of care was discussed with: Registered nurse and Case management. Fall prevention education was provided and the patient/caregiver indicated understanding. and Patient/family have participated as able in goal setting and plan of care.     Thank you for this referral.  Dayan Dawkins, PT   Time Calculation: 50 mins

## 2020-09-11 NOTE — PROGRESS NOTES
1930: Bedside shift change report given to [address] [place] [address] (oncoming nurse) by Susanne Carpenter (offgoing nurse). Report included the following information SBAR, Kardex, Intake/Output, MAR, Recent Results, Cardiac Rhythm NSR/sinus tach and Alarm Parameters . 2000: Shift assessment. Pt is drowsy, resting quietly. Follows commands, tracks and focuses, pupils equal and reactive 3mm. RUE weaker than LUE, purposeful movement x4. LUE is restrained. Sinus tach 100s, normal heart sounds. Generalized edema. Bowel is distended, semi soft, active bowel sounds x4. Mcwilliams is intact, draining renzo, hazy urine. Dobhoff at 75cm tube feeds infusing. Skin is warm and mildly diaphoretic.  20g PIC RAC infusing and flushed. 2100: Pt is agitated and restless, titrating precedex gtt accordingly for RASS goal 0 to -1.     2220: Updated mother, Quiana, over the phone. 0000: Primary Nurse Judie Valenzuela RN and Pedro Coy RN performed a dual skin assessment on this patient Impairment noted- see wound doc flow sheet  Enrique score is 14    [place] trach wound  Mid sacrum - red, non-blanching. Zinc paste applied. Wound care consult ordered. L upper arm - round, silver dollar sized area of redness, non blanching. 0100: Large, loose BM.     0404: Provider Micheal Ventura NP made aware of low BP with MAP 63 as well as decreased urine output and morning chemistry results. Orders received for albumin bolus. 0500: Trach care completed, pt tolerated well. 0730: Bedside shift change report given to Neftaly Loenardo (oncoming nurse) by [address] [place] [address] (offgoing nurse). Report included the following information SBAR, Kardex, Intake/Output, MAR, Recent Results, Cardiac Rhythm NSR/sinus tach and Alarm Parameters .

## 2020-09-11 NOTE — PROGRESS NOTES
ARDS  Acute hypoxic respiratory failure  COVID positive  Sinus pauses  S/P tracheostomy     Remains intubated and sedated    Tested positive for COVID again    Continuing on pressure support trials    Some vomiting earlier this am     TFs held    Hgb and platelets look good    Creatinine normal    Bilirubin and other LFTs look good    Pro-calcitonin normal    ABG 7.42 / 46 / 113 / 30 / 5    PEEP 5, FiO2 45%    Remains on Lovenox    Remains on Xanax / Seroquel    Continues on Dilaudid and Precedex    CXR - diffuse interstitial disease persists; left lung looks a better      Intake/Output Summary (Last 24 hours) at 9/11/2020 1221  Last data filed at 9/11/2020 1200  Gross per 24 hour   Intake 2337.45 ml   Output 1595 ml   Net 742.45 ml     Visit Vitals  BP (!) 90/52 (BP 1 Location: Right arm, BP Patient Position: At rest)   Pulse 93   Temp (!) 101 °F (38.3 °C)   Resp 22   Ht 5' 11\" (1.803 m)   Wt 213 lb 6.5 oz (96.8 kg)   SpO2 99%   BMI 29.76 kg/m²       Risk of morbidity and mortality - high  Medical decision making - high complexity    Total critical care time - 30 minutes (CPT 64339)     I personally spent the above critical care time. This is time spent at this critically ill patient's bedside / unit / floor actively involved in patient care as well as the coordination of care and discussions with the patient's family. This does not include any procedural time which has been billed separately.

## 2020-09-12 LAB
ALBUMIN SERPL-MCNC: 2.5 G/DL (ref 3.5–5)
ALBUMIN/GLOB SERPL: 0.6 {RATIO} (ref 1.1–2.2)
ALP SERPL-CCNC: 130 U/L (ref 45–117)
ALT SERPL-CCNC: 79 U/L (ref 12–78)
ANION GAP SERPL CALC-SCNC: 6 MMOL/L (ref 5–15)
ARTERIAL PATENCY WRIST A: YES
AST SERPL-CCNC: 56 U/L (ref 15–37)
BASE EXCESS BLD CALC-SCNC: 5 MMOL/L
BASOPHILS # BLD: 0.1 K/UL (ref 0–0.1)
BASOPHILS NFR BLD: 1 % (ref 0–1)
BDY SITE: ABNORMAL
BILIRUB SERPL-MCNC: 0.4 MG/DL (ref 0.2–1)
BUN SERPL-MCNC: 22 MG/DL (ref 6–20)
BUN/CREAT SERPL: 39 (ref 12–20)
CA-I BLD-SCNC: 1.23 MMOL/L (ref 1.12–1.32)
CALCIUM SERPL-MCNC: 8.9 MG/DL (ref 8.5–10.1)
CHLORIDE SERPL-SCNC: 102 MMOL/L (ref 97–108)
CO2 SERPL-SCNC: 28 MMOL/L (ref 21–32)
CREAT SERPL-MCNC: 0.56 MG/DL (ref 0.7–1.3)
DIFFERENTIAL METHOD BLD: ABNORMAL
EOSINOPHIL # BLD: 0.1 K/UL (ref 0–0.4)
EOSINOPHIL NFR BLD: 2 % (ref 0–7)
ERYTHROCYTE [DISTWIDTH] IN BLOOD BY AUTOMATED COUNT: 15 % (ref 11.5–14.5)
GAS FLOW.O2 O2 DELIVERY SYS: ABNORMAL L/MIN
GAS FLOW.O2 SETTING OXYMISER: 22 BPM
GLOBULIN SER CALC-MCNC: 4.5 G/DL (ref 2–4)
GLUCOSE SERPL-MCNC: 117 MG/DL (ref 65–100)
HCO3 BLD-SCNC: 29.1 MMOL/L (ref 22–26)
HCT VFR BLD AUTO: 28.2 % (ref 36.6–50.3)
HGB BLD-MCNC: 8.7 G/DL (ref 12.1–17)
IMM GRANULOCYTES # BLD AUTO: 0.2 K/UL (ref 0–0.04)
IMM GRANULOCYTES NFR BLD AUTO: 3 % (ref 0–0.5)
INSPIRATION.DURATION SETTING TIME VENT: 0.91 SEC
LYMPHOCYTES # BLD: 1.8 K/UL (ref 0.8–3.5)
LYMPHOCYTES NFR BLD: 25 % (ref 12–49)
MAGNESIUM SERPL-MCNC: 2.1 MG/DL (ref 1.6–2.4)
MCH RBC QN AUTO: 28.7 PG (ref 26–34)
MCHC RBC AUTO-ENTMCNC: 30.9 G/DL (ref 30–36.5)
MCV RBC AUTO: 93.1 FL (ref 80–99)
MONOCYTES # BLD: 0.6 K/UL (ref 0–1)
MONOCYTES NFR BLD: 8 % (ref 5–13)
NEUTS SEG # BLD: 4.5 K/UL (ref 1.8–8)
NEUTS SEG NFR BLD: 61 % (ref 32–75)
NRBC # BLD: 0 K/UL (ref 0–0.01)
NRBC BLD-RTO: 0 PER 100 WBC
O2/TOTAL GAS SETTING VFR VENT: 45 %
PCO2 BLD: 44.9 MMHG (ref 35–45)
PEEP RESPIRATORY: 5 CMH2O
PH BLD: 7.42 [PH] (ref 7.35–7.45)
PHOSPHATE SERPL-MCNC: 5.3 MG/DL (ref 2.6–4.7)
PIP ISTAT,IPIP: 24
PLATELET # BLD AUTO: 321 K/UL (ref 150–400)
PMV BLD AUTO: 10.4 FL (ref 8.9–12.9)
PO2 BLD: 73 MMHG (ref 80–100)
POTASSIUM SERPL-SCNC: 5 MMOL/L (ref 3.5–5.1)
PROT SERPL-MCNC: 7 G/DL (ref 6.4–8.2)
RBC # BLD AUTO: 3.03 M/UL (ref 4.1–5.7)
RBC MORPH BLD: ABNORMAL
SAO2 % BLD: 95 % (ref 92–97)
SODIUM SERPL-SCNC: 136 MMOL/L (ref 136–145)
SPECIMEN TYPE: ABNORMAL
TOTAL RESP. RATE, ITRR: 22
VENTILATION MODE VENT: ABNORMAL
WBC # BLD AUTO: 7.3 K/UL (ref 4.1–11.1)

## 2020-09-12 PROCEDURE — 74011250637 HC RX REV CODE- 250/637: Performed by: NURSE PRACTITIONER

## 2020-09-12 PROCEDURE — 74011250637 HC RX REV CODE- 250/637: Performed by: INTERNAL MEDICINE

## 2020-09-12 PROCEDURE — 74011250636 HC RX REV CODE- 250/636: Performed by: NURSE PRACTITIONER

## 2020-09-12 PROCEDURE — 80053 COMPREHEN METABOLIC PANEL: CPT

## 2020-09-12 PROCEDURE — 74011250637 HC RX REV CODE- 250/637: Performed by: HOSPITALIST

## 2020-09-12 PROCEDURE — 74011000258 HC RX REV CODE- 258: Performed by: INTERNAL MEDICINE

## 2020-09-12 PROCEDURE — 94003 VENT MGMT INPAT SUBQ DAY: CPT

## 2020-09-12 PROCEDURE — 82803 BLOOD GASES ANY COMBINATION: CPT

## 2020-09-12 PROCEDURE — 65610000006 HC RM INTENSIVE CARE

## 2020-09-12 PROCEDURE — 36600 WITHDRAWAL OF ARTERIAL BLOOD: CPT

## 2020-09-12 PROCEDURE — 36415 COLL VENOUS BLD VENIPUNCTURE: CPT

## 2020-09-12 PROCEDURE — P9045 ALBUMIN (HUMAN), 5%, 250 ML: HCPCS | Performed by: NURSE PRACTITIONER

## 2020-09-12 PROCEDURE — 85025 COMPLETE CBC W/AUTO DIFF WBC: CPT

## 2020-09-12 PROCEDURE — 74011000250 HC RX REV CODE- 250: Performed by: INTERNAL MEDICINE

## 2020-09-12 PROCEDURE — 74011000250 HC RX REV CODE- 250: Performed by: NURSE PRACTITIONER

## 2020-09-12 PROCEDURE — 84100 ASSAY OF PHOSPHORUS: CPT

## 2020-09-12 PROCEDURE — 83735 ASSAY OF MAGNESIUM: CPT

## 2020-09-12 RX ORDER — ALBUMIN HUMAN 50 G/1000ML
25 SOLUTION INTRAVENOUS ONCE
Status: COMPLETED | OUTPATIENT
Start: 2020-09-12 | End: 2020-09-12

## 2020-09-12 RX ORDER — MICONAZOLE NITRATE 2 %
POWDER (GRAM) TOPICAL 2 TIMES DAILY
Status: DISCONTINUED | OUTPATIENT
Start: 2020-09-12 | End: 2020-10-12 | Stop reason: HOSPADM

## 2020-09-12 RX ADMIN — ALBUMIN (HUMAN) 25 G: 12.5 INJECTION, SOLUTION INTRAVENOUS at 04:42

## 2020-09-12 RX ADMIN — MIDAZOLAM HYDROCHLORIDE 2 MG: 2 INJECTION, SOLUTION INTRAMUSCULAR; INTRAVENOUS at 18:48

## 2020-09-12 RX ADMIN — GUAIFENESIN 400 MG: 200 SOLUTION ORAL at 21:10

## 2020-09-12 RX ADMIN — Medication: at 18:05

## 2020-09-12 RX ADMIN — MICONAZOLE NITRATE 2 % TOPICAL POWDER: at 21:11

## 2020-09-12 RX ADMIN — FAMOTIDINE 20 MG: 40 POWDER, FOR SUSPENSION ORAL at 08:07

## 2020-09-12 RX ADMIN — CHLORHEXIDINE GLUCONATE 15 ML: 0.12 RINSE ORAL at 08:09

## 2020-09-12 RX ADMIN — MIDAZOLAM HYDROCHLORIDE 2 MG: 2 INJECTION, SOLUTION INTRAMUSCULAR; INTRAVENOUS at 21:15

## 2020-09-12 RX ADMIN — MIDAZOLAM HYDROCHLORIDE 2 MG: 2 INJECTION, SOLUTION INTRAMUSCULAR; INTRAVENOUS at 12:44

## 2020-09-12 RX ADMIN — QUETIAPINE FUMARATE 100 MG: 100 TABLET ORAL at 21:10

## 2020-09-12 RX ADMIN — MIDAZOLAM HYDROCHLORIDE 2 MG: 2 INJECTION, SOLUTION INTRAMUSCULAR; INTRAVENOUS at 16:24

## 2020-09-12 RX ADMIN — Medication 10 ML: at 15:58

## 2020-09-12 RX ADMIN — Medication 20 ML: at 21:11

## 2020-09-12 RX ADMIN — FAMOTIDINE 20 MG: 40 POWDER, FOR SUSPENSION ORAL at 18:05

## 2020-09-12 RX ADMIN — POLYETHYLENE GLYCOL 3350 17 G: 17 POWDER, FOR SOLUTION ORAL at 08:07

## 2020-09-12 RX ADMIN — METOPROLOL TARTRATE 5 MG: 1 INJECTION, SOLUTION INTRAVENOUS at 09:07

## 2020-09-12 RX ADMIN — ENOXAPARIN SODIUM 50 MG: 60 INJECTION SUBCUTANEOUS at 09:02

## 2020-09-12 RX ADMIN — Medication 10 ML: at 05:06

## 2020-09-12 RX ADMIN — ALPRAZOLAM 0.5 MG: 0.5 TABLET ORAL at 08:07

## 2020-09-12 RX ADMIN — ALPRAZOLAM 0.5 MG: 0.5 TABLET ORAL at 21:10

## 2020-09-12 RX ADMIN — METOPROLOL TARTRATE 5 MG: 1 INJECTION, SOLUTION INTRAVENOUS at 22:06

## 2020-09-12 RX ADMIN — ACETAMINOPHEN ORAL SOLUTION 650 MG: 650 SOLUTION ORAL at 21:10

## 2020-09-12 RX ADMIN — ALPRAZOLAM 0.5 MG: 0.5 TABLET ORAL at 15:57

## 2020-09-12 RX ADMIN — OXYCODONE HYDROCHLORIDE 5 MG: 5 SOLUTION ORAL at 22:06

## 2020-09-12 RX ADMIN — FENTANYL CITRATE 100 MCG: 50 INJECTION, SOLUTION INTRAMUSCULAR; INTRAVENOUS at 09:40

## 2020-09-12 RX ADMIN — OXYCODONE HYDROCHLORIDE 5 MG: 5 TABLET ORAL at 18:05

## 2020-09-12 RX ADMIN — DEXMEDETOMIDINE 0.3 MCG/KG/HR: 100 INJECTION, SOLUTION, CONCENTRATE INTRAVENOUS at 06:28

## 2020-09-12 RX ADMIN — CHLORHEXIDINE GLUCONATE 15 ML: 0.12 RINSE ORAL at 20:48

## 2020-09-12 RX ADMIN — ONDANSETRON 4 MG: 2 INJECTION INTRAMUSCULAR; INTRAVENOUS at 08:40

## 2020-09-12 RX ADMIN — Medication: at 08:07

## 2020-09-12 RX ADMIN — ASPIRIN 81 MG CHEWABLE TABLET 81 MG: 81 TABLET CHEWABLE at 08:07

## 2020-09-12 RX ADMIN — OXYCODONE HYDROCHLORIDE 5 MG: 5 TABLET ORAL at 05:06

## 2020-09-12 RX ADMIN — Medication 0.5 MG/HR: at 06:29

## 2020-09-12 RX ADMIN — ENOXAPARIN SODIUM 50 MG: 60 INJECTION SUBCUTANEOUS at 21:11

## 2020-09-12 RX ADMIN — DOCUSATE SODIUM 100 MG: 50 LIQUID ORAL at 08:07

## 2020-09-12 NOTE — PROGRESS NOTES
[facility]    ICU TEAM Progress Note    Name: Charley Vasques   : 1990   MRN: [id number]   Date: 2020      I  Subjective:   Progress Note: 2020      Reason for ICU Admission: Respiratory failure due to COVID-19 infection    Interval history:  80-year-old male with no history of significant past medical history except smoking half pack per day. Eileen Hernández was admitted on 2020 at [facility] with acute hypoxemic respiratory failure from COVID pneumonia. Eileen Hernández continued to deteriorate and got intubated on 8/10.  Due to worsening hypoxemia, he is transferred to [facility] for ECMO evaluation. Eileen Hernández has bee given convalescent plasma twice on  and  and treated with Remdesivir which was completed on 8/10. Eileen Hernández is also on dexamethasone. Eileen Hernández completed course of empiric antibiotics including azithromycin which was completed on  and cefepime was completed on .  During his hospital course he also developed pneumothorax and has left-sided chest tube since .  Has a tracheostomy tube placed on .  On September 3 patient had significant worsening on his right arm, CT head was unrevealing.  He is already on high-dose Lovenox and aspirin.  On  he had acute weakness on the left arm code stroke was called and again CTA was unrevealing. Since then left arm is back to baseline, showed some improvement on right arm but still weak compared to left with minimal ability to move against gravity. vernight Events:   No acute event overnight.     Active Problem List:     Problem List  Date Reviewed: 2020          Codes Class    [facility] ICD-10-CM: J96.00  ICD-9-CM: 518.81         Pneumothorax on left ICD-10-CM: J93.9  ICD-9-CM: 512.89         Pneumonia due to severe acute respiratory syndrome coronavirus 2 (SARS-CoV-2) ICD-10-CM: U07.1, J12.89  ICD-9-CM: 480.8         Respiratory failure with hypoxia ([facility] [address] ICD-10-CM: J96.91  ICD-9-CM: 518.81               Past Medical History:      has a past medical history of History of vascular access device (08/10/2020). Past Surgical History:      has a past surgical history that includes ir thora/ins chest tube(pneumo) wo image (2020); ir thora/ins chest tube(pneumo) wo image (2020); and pr tracheostomy, planned (2020). Home Medications:     Prior to Admission medications    Medication Sig Start Date End Date Taking? Authorizing Provider   benzonatate (Tessalon Perles) 100 mg capsule Take 100 mg by mouth three (3) times daily as needed for Cough. Provider, Historical       Allergies/Social/Family History:     No Known Allergies   Social History     Tobacco Use    Smoking status: Current Some Day Smoker    Smokeless tobacco: Never Used   Substance Use Topics    Alcohol use: Not on file      History reviewed. No pertinent family history. Review of Systems:      not able to obtain due to his medical condition  Objective:   Vital Signs:  Visit Vitals  /81   Pulse 98   Temp 98.7 °F (37.1 °C)   Resp 22   Ht 5' 11\" (1.803 m)   Wt 95 kg (209 lb 7 oz)   SpO2 98%   BMI 29.21 kg/m²      O2 Device: Tracheostomy, Ventilator Temp (24hrs), Av °F (37.8 °C), Min:98.7 °F (37.1 °C), Max:101 °F (38.3 °C)           Intake/Output:     Intake/Output Summary (Last 24 hours) at 2020 0714  Last data filed at 2020 0700  Gross per 24 hour   Intake 3087.26 ml   Output 1265 ml   Net 1822.26 ml       Physical Exam:    General:   Sedated on mechanical ventilation via tracheostomy   Eyes:  Sclera anicteric. Pupils equally round and reactive to light. Mouth/Throat: Mucous membranes normal, oral pharynx clear   Neck: Supple, some thick creamy secretion around the tracheostomy site, minimal secretion   Lungs:    Good air entry bilaterally, fine crepitation   CV:  Regular rate and rhythm,no murmur, click, rub or gallop   Abdomen:   Soft, non-tender.  bowel sounds normal. non-distended   Extremities: No cyanosis , evolving edema   Skin: Skin color, texture, turgor normal. no acute rash or lesions   Lymph nodes: Cervical and supraclavicular normal   Musculoskeletal: No swelling or deformity   Lines/Devices:  Intact, no erythema, drainage or tenderness   Psych:  Sedated, opens eyes spontaneously, sometimes seems to be tracking and follow simple command     Moving right arm but not against gravityno deformity noticed, good pulses good color good capillary refill no shoulder or neck tenderness or swelling.  .Face is symmetrical.  Moves left arm spontaneously, wiggle left toes.  Profound weakness         LABS AND  DATA: Personally reviewed  Recent Labs     09/12/20 0246 09/11/20 0310   WBC 7.3 6.5   HGB 8.7* 8.3*   HCT 28.2* 27.1*    289     Recent Labs     09/12/20 0246 09/11/20  0310    139   K 5.0 3.9    104   CO2 28 29   BUN 22* 16   CREA 0.56* 0.65*   * 110*   CA 8.9 9.3   MG 2.1 2.0   PHOS 5.3* 5.2*     Recent Labs     09/12/20 0246 09/11/20  0310   * 124*   TP 7.0 7.4   ALB 2.5* 2.5*   GLOB 4.5* 4.9*     No results for input(s): INR, PTP, APTT, INREXT in the last 72 hours. Recent Labs     09/12/20 0318 09/11/20  0618   PHI 7.42 7.41   PCO2I 44.9 46.5*   PO2I 73* 47*   FIO2I 45 45     No results for input(s): CPK, CKMB, TROIQ, BNPP in the last 72 hours. Hemodynamics:   PAP:   CO:     Wedge:   CI:     CVP:    SVR:       PVR:       Ventilator Settings:  Mode Rate Tidal Volume Pressure FiO2 PEEP   Pressure control   2 ml  12 cm H2O(tried 10 low vt) 45 % 5 cm H20     Peak airway pressure: 30 cm H2O    Minute ventilation: 10.1 l/min        MEDS: Reviewed    Chest X-Ray:  CXR Results  (Last 48 hours)    None          Assessment and Plan:   -Bilateral pneumonia due to COVID-19 infection  -Acute right arm weakness:  Patient already on aspirin and moderate dose anticoagulation.  Still pending CTA head and neck, CT head showed no bleed.   -Right arm weakness  -Sepsis:  fever persisted:   - Acute hypoxic respiratory failure due to COVID-19 infection status post tracheostomy on August 26  - Pneumothorax status post left chest tube insertion on August 11 without air leak  - Possible bacterial super imposed infection: Completed antibiotic course  -History of tobacco use     -Continue to wean sedation as tolerated.    - At this time on PEEP of 5 and FiO2 of 40%, PO2 is adequate.  pressure support trial as tolerated  - Good urine output.  No need for diuretics today.  Will use as needed.  Replace electrolytes as indicated  -Right arm paralysis, no clear underlying etiology, CT showed abnormal bilateral frontoparietal enhancement.  Perceptual diagnosis is wide.  Power is improving on right arm but still significantly weaker than left  - Fever subsided after antibiotic was restarted on September 4. Completed empirical course and now off antibiotic  - Completed COVID-19 treatment  - DVT prophylaxis with high-dose Lovenox.  GI prophylaxis.  Nutritional support      DISPOSITION  Stay in ICU    CRITICAL CARE CONSULTANT NOTE  I had a face to face encounter with the patient, reviewed and interpreted patient data including clinical events, labs, images, vital signs, I/O's, and examined patient. I have discussed the case and the plan and management of the patient's care with the consulting services, the bedside nurses and the respiratory therapist.      NOTE OF PERSONAL INVOLVEMENT IN CARE   This patient has a high probability of imminent, clinically significant deterioration, which requires the highest level of preparedness to intervene urgently. I participated in the decision-making and personally managed or directed the management of the following life and organ supporting interventions that required my frequent assessment to treat or prevent imminent deterioration. I personally spent 50 minutes of critical care time.   This is time spent at this critically ill patient's bedside actively involved in patient care as well as the coordination of care and discussions with the patient's family. This does not include any procedural time which has been billed separately. Jordon Mead M.D.   Staff Intensivist/Pulmonologist  [facility]  9/12/2020

## 2020-09-12 NOTE — PROGRESS NOTES
0730: Bedside and Verbal shift change report given to Jennie Birmingham RN (oncoming nurse) by Jazmyn Diaz RN (offgoing nurse). Report included the following information SBAR, Kardex, Intake/Output, MAR, Recent Results, Med Rec Status, Cardiac Rhythm Sinus Tach and Alarm Parameters . 0800: Assumed care of patient; assessment documented per flowsheet    0830: While cleaning pt after BM pt began vomiting; tube feed was paused at this time;zofran administered; pt having large diarrhea BMs as well; Dr. Cristal Parrish notified    [id number]: Notified MD of low BP; will continue monitoring at this time; probably sedation related    1130: Resumed tube feed at 35ml/hr to see how pt will tolerated    1240: Increased dilaudid PCA to 1mg    1615:  Increased tube feed to 65ml/hr after no further vomiting episodes

## 2020-09-12 NOTE — PROGRESS NOTES
Chart review, lab review, and care management including critical care decision making time spent was 40 minutes.       Yung Lynch [facility]     Critical Care Medicine  [facility]

## 2020-09-12 NOTE — PROGRESS NOTES
09/12/20 1203   Weaning Parameters   Spontaneous Breathing Trial Complete Yes   Resp Rate Observed 30   Ve 12      RSBI 112     1230: Patient placed back on previous vent settings    Very diaphoretic, anxious, O2 sats dropped to the low 80's,  on assessment. RN at the bedside and MD aware.

## 2020-09-13 ENCOUNTER — APPOINTMENT (OUTPATIENT)
Dept: GENERAL RADIOLOGY | Age: 30
DRG: 004 | End: 2020-09-13
Attending: INTERNAL MEDICINE
Payer: COMMERCIAL

## 2020-09-13 LAB
ALBUMIN SERPL-MCNC: 2.8 G/DL (ref 3.5–5)
ALBUMIN/GLOB SERPL: 0.7 {RATIO} (ref 1.1–2.2)
ALP SERPL-CCNC: 112 U/L (ref 45–117)
ALT SERPL-CCNC: 63 U/L (ref 12–78)
ANION GAP SERPL CALC-SCNC: 6 MMOL/L (ref 5–15)
ARTERIAL PATENCY WRIST A: YES
AST SERPL-CCNC: 29 U/L (ref 15–37)
BASE EXCESS BLD CALC-SCNC: 7 MMOL/L
BASOPHILS # BLD: 0 K/UL (ref 0–0.1)
BASOPHILS NFR BLD: 0 % (ref 0–1)
BDY SITE: ABNORMAL
BILIRUB SERPL-MCNC: 0.4 MG/DL (ref 0.2–1)
BUN SERPL-MCNC: 18 MG/DL (ref 6–20)
BUN/CREAT SERPL: 36 (ref 12–20)
CA-I BLD-SCNC: 1.26 MMOL/L (ref 1.12–1.32)
CALCIUM SERPL-MCNC: 9.1 MG/DL (ref 8.5–10.1)
CHLORIDE SERPL-SCNC: 100 MMOL/L (ref 97–108)
CO2 SERPL-SCNC: 31 MMOL/L (ref 21–32)
CREAT SERPL-MCNC: 0.5 MG/DL (ref 0.7–1.3)
DIFFERENTIAL METHOD BLD: ABNORMAL
EOSINOPHIL # BLD: 0.1 K/UL (ref 0–0.4)
EOSINOPHIL NFR BLD: 2 % (ref 0–7)
ERYTHROCYTE [DISTWIDTH] IN BLOOD BY AUTOMATED COUNT: 14.5 % (ref 11.5–14.5)
GAS FLOW.O2 O2 DELIVERY SYS: ABNORMAL L/MIN
GAS FLOW.O2 SETTING OXYMISER: 22 BPM
GLOBULIN SER CALC-MCNC: 3.9 G/DL (ref 2–4)
GLUCOSE SERPL-MCNC: 101 MG/DL (ref 65–100)
HCO3 BLD-SCNC: 31.6 MMOL/L (ref 22–26)
HCT VFR BLD AUTO: 28.2 % (ref 36.6–50.3)
HGB BLD-MCNC: 8.7 G/DL (ref 12.1–17)
IMM GRANULOCYTES # BLD AUTO: 0 K/UL
IMM GRANULOCYTES NFR BLD AUTO: 0 %
INSPIRATION.DURATION SETTING TIME VENT: 0.91 SEC
LYMPHOCYTES # BLD: 0.9 K/UL (ref 0.8–3.5)
LYMPHOCYTES NFR BLD: 18 % (ref 12–49)
MAGNESIUM SERPL-MCNC: 1.8 MG/DL (ref 1.6–2.4)
MCH RBC QN AUTO: 28.3 PG (ref 26–34)
MCHC RBC AUTO-ENTMCNC: 30.9 G/DL (ref 30–36.5)
MCV RBC AUTO: 91.9 FL (ref 80–99)
METAMYELOCYTES NFR BLD MANUAL: 3 %
MONOCYTES # BLD: 0.3 K/UL (ref 0–1)
MONOCYTES NFR BLD: 7 % (ref 5–13)
MYELOCYTES NFR BLD MANUAL: 1 %
NEUTS BAND NFR BLD MANUAL: 3 % (ref 0–6)
NEUTS SEG # BLD: 3.4 K/UL (ref 1.8–8)
NEUTS SEG NFR BLD: 66 % (ref 32–75)
NRBC # BLD: 0 K/UL (ref 0–0.01)
NRBC BLD-RTO: 0 PER 100 WBC
O2/TOTAL GAS SETTING VFR VENT: 45 %
PCO2 BLD: 47.2 MMHG (ref 35–45)
PEEP RESPIRATORY: 5 CMH2O
PH BLD: 7.43 [PH] (ref 7.35–7.45)
PHOSPHATE SERPL-MCNC: 4 MG/DL (ref 2.6–4.7)
PIP ISTAT,IPIP: 24
PLATELET # BLD AUTO: 298 K/UL (ref 150–400)
PMV BLD AUTO: 10.3 FL (ref 8.9–12.9)
PO2 BLD: 113 MMHG (ref 80–100)
POTASSIUM SERPL-SCNC: 3.5 MMOL/L (ref 3.5–5.1)
PROT SERPL-MCNC: 6.7 G/DL (ref 6.4–8.2)
RBC # BLD AUTO: 3.07 M/UL (ref 4.1–5.7)
RBC MORPH BLD: ABNORMAL
SAO2 % BLD: 99 % (ref 92–97)
SODIUM SERPL-SCNC: 137 MMOL/L (ref 136–145)
SPECIMEN TYPE: ABNORMAL
TOTAL RESP. RATE, ITRR: 22
VENTILATION MODE VENT: ABNORMAL
WBC # BLD AUTO: 4.9 K/UL (ref 4.1–11.1)

## 2020-09-13 PROCEDURE — 83735 ASSAY OF MAGNESIUM: CPT

## 2020-09-13 PROCEDURE — 85025 COMPLETE CBC W/AUTO DIFF WBC: CPT

## 2020-09-13 PROCEDURE — 80053 COMPREHEN METABOLIC PANEL: CPT

## 2020-09-13 PROCEDURE — 36415 COLL VENOUS BLD VENIPUNCTURE: CPT

## 2020-09-13 PROCEDURE — 36600 WITHDRAWAL OF ARTERIAL BLOOD: CPT

## 2020-09-13 PROCEDURE — 74011250636 HC RX REV CODE- 250/636: Performed by: NURSE PRACTITIONER

## 2020-09-13 PROCEDURE — 84100 ASSAY OF PHOSPHORUS: CPT

## 2020-09-13 PROCEDURE — 74011250637 HC RX REV CODE- 250/637: Performed by: NURSE PRACTITIONER

## 2020-09-13 PROCEDURE — 74018 RADEX ABDOMEN 1 VIEW: CPT

## 2020-09-13 PROCEDURE — 82803 BLOOD GASES ANY COMBINATION: CPT

## 2020-09-13 PROCEDURE — 74011250637 HC RX REV CODE- 250/637: Performed by: HOSPITALIST

## 2020-09-13 PROCEDURE — 74011000250 HC RX REV CODE- 250: Performed by: INTERNAL MEDICINE

## 2020-09-13 PROCEDURE — 94003 VENT MGMT INPAT SUBQ DAY: CPT

## 2020-09-13 PROCEDURE — 65610000006 HC RM INTENSIVE CARE

## 2020-09-13 PROCEDURE — 74011250637 HC RX REV CODE- 250/637: Performed by: INTERNAL MEDICINE

## 2020-09-13 PROCEDURE — 74011000258 HC RX REV CODE- 258: Performed by: INTERNAL MEDICINE

## 2020-09-13 PROCEDURE — 71045 X-RAY EXAM CHEST 1 VIEW: CPT

## 2020-09-13 RX ADMIN — OXYCODONE HYDROCHLORIDE 5 MG: 5 TABLET ORAL at 01:27

## 2020-09-13 RX ADMIN — FAMOTIDINE 20 MG: 40 POWDER, FOR SUSPENSION ORAL at 17:31

## 2020-09-13 RX ADMIN — ACETAMINOPHEN ORAL SOLUTION 650 MG: 650 SOLUTION ORAL at 16:31

## 2020-09-13 RX ADMIN — DEXMEDETOMIDINE 0.2 MCG/KG/HR: 100 INJECTION, SOLUTION, CONCENTRATE INTRAVENOUS at 01:52

## 2020-09-13 RX ADMIN — ALPRAZOLAM 0.5 MG: 0.5 TABLET ORAL at 08:32

## 2020-09-13 RX ADMIN — Medication 20 ML: at 05:54

## 2020-09-13 RX ADMIN — Medication 10 ML: at 21:00

## 2020-09-13 RX ADMIN — MIDAZOLAM HYDROCHLORIDE 2 MG: 2 INJECTION, SOLUTION INTRAMUSCULAR; INTRAVENOUS at 13:12

## 2020-09-13 RX ADMIN — ENOXAPARIN SODIUM 50 MG: 60 INJECTION SUBCUTANEOUS at 09:10

## 2020-09-13 RX ADMIN — ALPRAZOLAM 0.5 MG: 0.5 TABLET ORAL at 21:00

## 2020-09-13 RX ADMIN — CHLORHEXIDINE GLUCONATE 15 ML: 0.12 RINSE ORAL at 21:00

## 2020-09-13 RX ADMIN — ALPRAZOLAM 0.5 MG: 0.5 TABLET ORAL at 16:31

## 2020-09-13 RX ADMIN — CHLORHEXIDINE GLUCONATE 15 ML: 0.12 RINSE ORAL at 08:33

## 2020-09-13 RX ADMIN — Medication 1 DROP: at 12:15

## 2020-09-13 RX ADMIN — Medication 1 MG/HR: at 09:04

## 2020-09-13 RX ADMIN — OXYCODONE HYDROCHLORIDE 5 MG: 5 SOLUTION ORAL at 05:24

## 2020-09-13 RX ADMIN — Medication: at 08:33

## 2020-09-13 RX ADMIN — ACETAMINOPHEN ORAL SOLUTION 650 MG: 650 SOLUTION ORAL at 01:27

## 2020-09-13 RX ADMIN — Medication: at 17:31

## 2020-09-13 RX ADMIN — OXYCODONE HYDROCHLORIDE 5 MG: 5 TABLET ORAL at 05:21

## 2020-09-13 RX ADMIN — ASPIRIN 81 MG CHEWABLE TABLET 81 MG: 81 TABLET CHEWABLE at 08:32

## 2020-09-13 RX ADMIN — OXYCODONE HYDROCHLORIDE 5 MG: 5 TABLET ORAL at 12:15

## 2020-09-13 RX ADMIN — PROMETHAZINE HYDROCHLORIDE 12.5 MG: 25 TABLET ORAL at 09:05

## 2020-09-13 RX ADMIN — MICONAZOLE NITRATE 2 % TOPICAL POWDER: at 08:34

## 2020-09-13 RX ADMIN — FAMOTIDINE 20 MG: 40 POWDER, FOR SUSPENSION ORAL at 08:32

## 2020-09-13 RX ADMIN — MIDAZOLAM HYDROCHLORIDE 2 MG: 2 INJECTION, SOLUTION INTRAMUSCULAR; INTRAVENOUS at 01:27

## 2020-09-13 RX ADMIN — MICONAZOLE NITRATE 2 % TOPICAL POWDER: at 17:32

## 2020-09-13 RX ADMIN — Medication 10 ML: at 13:12

## 2020-09-13 RX ADMIN — OXYCODONE HYDROCHLORIDE 5 MG: 5 TABLET ORAL at 17:31

## 2020-09-13 RX ADMIN — OXYCODONE HYDROCHLORIDE 5 MG: 5 TABLET ORAL at 23:37

## 2020-09-13 RX ADMIN — QUETIAPINE FUMARATE 100 MG: 100 TABLET ORAL at 21:00

## 2020-09-13 RX ADMIN — ENOXAPARIN SODIUM 50 MG: 60 INJECTION SUBCUTANEOUS at 21:00

## 2020-09-13 NOTE — PROGRESS NOTES
[facility]    ICU TEAM Progress Note    Name: Charley Vasques   : 1990   MRN: [id number]   Date: 2020      I  Subjective:   Progress Note: 2020      Reason for ICU Admission: Respiratory failure due to COVID-19 infection    Interval history:  72-year-old male with no history of significant past medical history except smoking half pack per day. Eileen Hernández was admitted on 2020 at [facility] with acute hypoxemic respiratory failure from COVID pneumonia. Eileen Hernández continued to deteriorate and got intubated on 8/10.  Due to worsening hypoxemia, he is transferred to [facility] for ECMO evaluation. Eileen Hernández has bee given convalescent plasma twice on  and  and treated with Remdesivir which was completed on 8/10. Eileen Hernández is also on dexamethasone. Eileen Hernández completed course of empiric antibiotics including azithromycin which was completed on  and cefepime was completed on .  During his hospital course he also developed pneumothorax and has left-sided chest tube since .  Has a tracheostomy tube placed on .  On September 3 patient had significant worsening on his right arm, CT head was unrevealing.  He is already on high-dose Lovenox and aspirin.  On  he had acute weakness on the left arm code stroke was called and again CTA was unrevealing. Since then left arm is back to baseline, showed some improvement on right arm but still weak compared to left with minimal ability to move against gravity. Overnight Events:   No acute event, had another episode of vomiting. No worsening and ventilator support requirement, no change in sedation.   No new neurological deficits    Active Problem List:     Problem List  Date Reviewed: 2020          Codes Class    Acute respiratory failure ([facility] [address] ICD-10-CM: J96.00  ICD-9-CM: 518.81         Pneumothorax on left ICD-10-CM: J93.9  ICD-9-CM: 512.89         Pneumonia due to severe acute respiratory syndrome coronavirus 2 (SARS-CoV-2) ICD-10-CM: U07.1, J12.89  ICD-9-CM: 480.8         Respiratory failure with hypoxia (HCC) ICD-10-CM: J96.91  ICD-9-CM: 518.81               Past Medical History:      has a past medical history of History of vascular access device (08/10/2020). Past Surgical History:      has a past surgical history that includes ir thora/ins chest tube(pneumo) wo image (2020); ir thora/ins chest tube(pneumo) wo image (2020); and pr tracheostomy, planned (2020). Home Medications:     Prior to Admission medications    Medication Sig Start Date End Date Taking? Authorizing Provider   benzonatate (Tessalon Perles) 100 mg capsule Take 100 mg by mouth three (3) times daily as needed for Cough. Provider, Historical       Allergies/Social/Family History:     No Known Allergies   Social History     Tobacco Use    Smoking status: Current Some Day Smoker    Smokeless tobacco: Never Used   Substance Use Topics    Alcohol use: Not on file      History reviewed. No pertinent family history. Review of Systems:     Not able to obtain due to his medical condition    Objective:   Vital Signs:  Visit Vitals  /67   Pulse (!) 103   Temp 99.1 °F (37.3 °C)   Resp 19   Ht 5' 11\" (1.803 m)   Wt 95 kg (209 lb 7 oz)   SpO2 98%   BMI 29.21 kg/m²      O2 Device: Tracheostomy, Ventilator Temp (24hrs), Av.7 °F (37.1 °C), Min:98 °F (36.7 °C), Max:99.1 °F (37.3 °C)           Intake/Output:     Intake/Output Summary (Last 24 hours) at 2020 0730  Last data filed at 2020 0715  Gross per 24 hour   Intake 2080.04 ml   Output 1460 ml   Net 620.04 ml       Physical Exam:  General:   Sedated on mechanical ventilation via tracheostomy   Eyes:  Sclera anicteric. Pupils equally round and reactive to light.    Mouth/Throat: Mucous membranes normal, oral pharynx clear   Neck: Supple, some thick creamy secretion around the tracheostomy site, minimal secretion   Lungs:    Good air entry bilaterally, fine crepitation CV:  Regular rate and rhythm,no murmur, click, rub or gallop   Abdomen:   Soft, non-tender. bowel sounds normal. non-distended   Extremities: No cyanosis , evolving edema   Skin: Skin color, texture, turgor normal. no acute rash or lesions   Lymph nodes: Cervical and supraclavicular normal   Musculoskeletal: No swelling or deformity   Lines/Devices:  Intact, no erythema, drainage or tenderness   Psych:  Sedated, opens eyes spontaneously, sometimes seems to be tracking and follow simple command     Moving right arm but not against gravityno deformity noticed, good pulses good color good capillary refill no shoulder or neck tenderness or swelling.  .Face is symmetrical.  Moves left arm spontaneously, wiggle left toes.  Profound weakness          LABS AND  DATA: Personally reviewed  Recent Labs     09/13/20  0555 09/12/20  0246   WBC 4.9 7.3   HGB 8.7* 8.7*   HCT 28.2* 28.2*    321     Recent Labs     09/13/20  0555 09/12/20  0246    136   K 3.5 5.0    102   CO2 31 28   BUN 18 22*   CREA 0.50* 0.56*   * 117*   CA 9.1 8.9   MG 1.8 2.1   PHOS 4.0 5.3*     Recent Labs     09/13/20  0555 09/12/20  0246    130*   TP 6.7 7.0   ALB 2.8* 2.5*   GLOB 3.9 4.5*     No results for input(s): INR, PTP, APTT, INREXT in the last 72 hours. Recent Labs     09/13/20  0326 09/12/20  0318   PHI 7.43 7.42   PCO2I 47.2* 44.9   PO2I 113* 73*   FIO2I 45 45     No results for input(s): CPK, CKMB, TROIQ, BNPP in the last 72 hours.     Hemodynamics:   PAP:   CO:     Wedge:   CI:     CVP:    SVR:       PVR:       Ventilator Settings:  Mode Rate Tidal Volume Pressure FiO2 PEEP   Pressure control   2 ml  10 cm H2O 45 % 5 cm H20     Peak airway pressure: 30 cm H2O    Minute ventilation: 8.59 l/min        MEDS: Reviewed    Chest X-Ray:  CXR Results  (Last 48 hours)    None          Assessment and Plan:   -Bilateral pneumonia due to COVID-19 infection  -Acute right arm weakness:  Patient already on aspirin and moderate dose anticoagulation.  Still pending CTA head and neck, CT head showed no bleed. -Right arm weakness  -Sepsis:  fever persisted:   - Acute hypoxic respiratory failure due to COVID-19 infection status post tracheostomy on August 26  - Pneumothorax status post left chest tube insertion on August 11 without air leak  - Possible bacterial super imposed infection: Completed antibiotic course  -History of tobacco use     -Continue to wean sedation as tolerated.    - At this time on PEEP of 5 and FiO2 of 40%, PO2 is adequate.  pressure support trial as tolerated yesterday he failed due to tachypnea while on pressure support, complicated with tachycardia and agitation.  - Good urine output.  No need for diuretics today.  Will use as needed.  Replace electrolytes as indicated  -Right arm paralysis, no clear underlying etiology, CT showed abnormal bilateral frontoparietal enhancement.  Perceptual diagnosis is wide.  Power is improving on right arm but still significantly weaker than left  - Fever subsided after antibiotic was restarted on September 4. Completed empirical course and now off antibiotic  - Completed COVID-19 treatment  - DVT prophylaxis with high-dose Lovenox.  GI prophylaxis.  Nutritional support       DISPOSITION  Stay in ICU    CRITICAL CARE CONSULTANT NOTE  I had a face to face encounter with the patient, reviewed and interpreted patient data including clinical events, labs, images, vital signs, I/O's, and examined patient. I have discussed the case and the plan and management of the patient's care with the consulting services, the bedside nurses and the respiratory therapist.      NOTE OF PERSONAL INVOLVEMENT IN CARE   This patient has a high probability of imminent, clinically significant deterioration, which requires the highest level of preparedness to intervene urgently.  I participated in the decision-making and personally managed or directed the management of the following life and organ supporting interventions that required my frequent assessment to treat or prevent imminent deterioration. I personally spent 40 minutes of critical care time. This is time spent at this critically ill patient's bedside actively involved in patient care as well as the coordination of care and discussions with the patient's family. This does not include any procedural time which has been billed separately. Randa Mallory M.D.   Staff Intensivist/Pulmonologist  [facility]  9/13/2020

## 2020-09-13 NOTE — PROGRESS NOTES
1930: Bedside and Verbal shift change report given to TAIWO Ortez RN (oncoming nurse) by [facility]. Nick Pinon (offgoing nurse). Report included the following information SBAR, Kardex, Intake/Output, MAR, Accordion, Recent Results, Cardiac Rhythm NSR/ST, Alarm Parameters  and Dual Neuro Assessment. 1930: Bedside and Verbal shift change report given to JONATHAN Olsen RN (oncoming nurse) by [facility]. CATINA Ortez (offgoing nurse). Report included the following information SBAR, Kardex, Intake/Output, MAR, Accordion, Recent Results, Cardiac Rhythm NSR/ST, Alarm Parameters  and Dual Neuro Assessment. Shift Summary:  Patient remained neurologically and respiratory stable overnight. Required PRN medications for increased anxiety, patient most comfortable when RN is at bedside. 2 small loose stools noted overnight, some excoriation noted to perianal area. Rash on back treated with newly ordered miconazole.

## 2020-09-13 NOTE — PROGRESS NOTES
Orders received, chart reviewed and patient is currently positive for COVID-19. In attempts to have only essential personnel enter the room, we will confer with nursing and/or the referring provider to determine the most appropriate timing of our therapy intervention. Until this time, we will follow the patient Monday for full evaluation  to support nursing staff on an appropriate care plan. Recommend nursing to complete with patient, as able, in order to promote cardiopulmonary systems, maintain strength, endurance and independence:   -bed in chair position with foot board on 3x/day ~30-60 mins each and/OR reverse trendelenburg with foot board on and non-skid footwear  -passive ROM during bathing B UEs and LEs  -positioning to prevent contractures and edema. Thank you for your assistance.

## 2020-09-13 NOTE — PROGRESS NOTES
0730: Bedside and Verbal shift change report given to CATINA Donaldson (oncoming nurse) by Noah Lama RN (offgoing nurse). Report included the following information SBAR, Kardex, Intake/Output, MAR, Recent Results, Cardiac Rhythm SR-ST, Alarm Parameters  and Dual Neuro Assessment. 1000: Dr. María Adair notified of patient's N/V this AM and TF being held, MD said to restart TF around 1600 today. 1500: Reviewed patient's soft BP:87/52(63), Patient is asleep with rest of the VSS, will continue to monitor. 1630: TF restarted at 50 mL/hr with 80 mL water flush Y5febns.

## 2020-09-13 NOTE — PROGRESS NOTES
1930: Bedside shift change report given to [address] [place] [address] (oncoming nurse) by Yulissa Davalos RN (offgoing nurse). Report included the following information SBAR, Kardex, Intake/Output, MAR, Recent Results, Cardiac Rhythm sinus tach and Alarm Parameters . 2000: Shift assessment. Pt is anxious and coughing/gagging. Concern for dobhoff being coiled due to recent emesis but unable to visualize. 2100: Restarted precedex due to agitation. 2130: Ordered chest x-ray to rule out dobhoff coiling per Dr. Anabelle Jovel.     0130: Appropriate placement of dobhoff confirmed by x-ray. 0405Verlan Boeck NP made aware of BP 91/47 MAP 61. No new orders received. 0730: Bedside shift change report given to [address] [facility] (oncoming nurse) by [address] [place] [address] (offgoing nurse). Report included the following information SBAR, Kardex, Intake/Output, MAR, Recent Results, Cardiac Rhythm Sinus tach and Alarm Parameters .

## 2020-09-14 ENCOUNTER — APPOINTMENT (OUTPATIENT)
Dept: GENERAL RADIOLOGY | Age: 30
DRG: 004 | End: 2020-09-14
Attending: NURSE PRACTITIONER
Payer: COMMERCIAL

## 2020-09-14 LAB
ALBUMIN SERPL-MCNC: 2.8 G/DL (ref 3.5–5)
ALBUMIN/GLOB SERPL: 0.7 {RATIO} (ref 1.1–2.2)
ALP SERPL-CCNC: 100 U/L (ref 45–117)
ALT SERPL-CCNC: 56 U/L (ref 12–78)
ANION GAP SERPL CALC-SCNC: 6 MMOL/L (ref 5–15)
ARTERIAL PATENCY WRIST A: YES
AST SERPL-CCNC: 25 U/L (ref 15–37)
BASE EXCESS BLD CALC-SCNC: 9 MMOL/L
BASOPHILS # BLD: 0 K/UL (ref 0–0.1)
BASOPHILS NFR BLD: 0 % (ref 0–1)
BDY SITE: ABNORMAL
BILIRUB SERPL-MCNC: 0.3 MG/DL (ref 0.2–1)
BUN SERPL-MCNC: 16 MG/DL (ref 6–20)
BUN/CREAT SERPL: 33 (ref 12–20)
CA-I BLD-SCNC: 1.3 MMOL/L (ref 1.12–1.32)
CALCIUM SERPL-MCNC: 9.1 MG/DL (ref 8.5–10.1)
CHLORIDE SERPL-SCNC: 100 MMOL/L (ref 97–108)
CO2 SERPL-SCNC: 31 MMOL/L (ref 21–32)
CREAT SERPL-MCNC: 0.49 MG/DL (ref 0.7–1.3)
DIFFERENTIAL METHOD BLD: ABNORMAL
EOSINOPHIL # BLD: 0.1 K/UL (ref 0–0.4)
EOSINOPHIL NFR BLD: 2 % (ref 0–7)
ERYTHROCYTE [DISTWIDTH] IN BLOOD BY AUTOMATED COUNT: 13.9 % (ref 11.5–14.5)
GAS FLOW.O2 O2 DELIVERY SYS: ABNORMAL L/MIN
GAS FLOW.O2 SETTING OXYMISER: 22 BPM
GLOBULIN SER CALC-MCNC: 3.8 G/DL (ref 2–4)
GLUCOSE SERPL-MCNC: 112 MG/DL (ref 65–100)
HCO3 BLD-SCNC: 33 MMOL/L (ref 22–26)
HCT VFR BLD AUTO: 26.9 % (ref 36.6–50.3)
HGB BLD-MCNC: 8.6 G/DL (ref 12.1–17)
IMM GRANULOCYTES # BLD AUTO: 0 K/UL
IMM GRANULOCYTES NFR BLD AUTO: 0 %
INSPIRATION.DURATION SETTING TIME VENT: 0.91 SEC
LYMPHOCYTES # BLD: 1.3 K/UL (ref 0.8–3.5)
LYMPHOCYTES NFR BLD: 31 % (ref 12–49)
MAGNESIUM SERPL-MCNC: 1.9 MG/DL (ref 1.6–2.4)
MCH RBC QN AUTO: 29 PG (ref 26–34)
MCHC RBC AUTO-ENTMCNC: 32 G/DL (ref 30–36.5)
MCV RBC AUTO: 90.6 FL (ref 80–99)
METAMYELOCYTES NFR BLD MANUAL: 1 %
MONOCYTES # BLD: 0.2 K/UL (ref 0–1)
MONOCYTES NFR BLD: 4 % (ref 5–13)
NEUTS SEG # BLD: 2.7 K/UL (ref 1.8–8)
NEUTS SEG NFR BLD: 62 % (ref 32–75)
NRBC # BLD: 0 K/UL (ref 0–0.01)
NRBC BLD-RTO: 0 PER 100 WBC
O2/TOTAL GAS SETTING VFR VENT: 45 %
PCO2 BLD: 51 MMHG (ref 35–45)
PEEP RESPIRATORY: 5 CMH2O
PH BLD: 7.42 [PH] (ref 7.35–7.45)
PHOSPHATE SERPL-MCNC: 5 MG/DL (ref 2.6–4.7)
PIP ISTAT,IPIP: 24
PLATELET # BLD AUTO: 259 K/UL (ref 150–400)
PMV BLD AUTO: 10 FL (ref 8.9–12.9)
PO2 BLD: 114 MMHG (ref 80–100)
POTASSIUM SERPL-SCNC: 3.6 MMOL/L (ref 3.5–5.1)
PROT SERPL-MCNC: 6.6 G/DL (ref 6.4–8.2)
RBC # BLD AUTO: 2.97 M/UL (ref 4.1–5.7)
RBC MORPH BLD: ABNORMAL
SAO2 % BLD: 98 % (ref 92–97)
SODIUM SERPL-SCNC: 137 MMOL/L (ref 136–145)
SPECIMEN TYPE: ABNORMAL
TOTAL RESP. RATE, ITRR: 22
VENTILATION MODE VENT: ABNORMAL
WBC # BLD AUTO: 4.3 K/UL (ref 4.1–11.1)

## 2020-09-14 PROCEDURE — 36600 WITHDRAWAL OF ARTERIAL BLOOD: CPT

## 2020-09-14 PROCEDURE — 83735 ASSAY OF MAGNESIUM: CPT

## 2020-09-14 PROCEDURE — 97530 THERAPEUTIC ACTIVITIES: CPT

## 2020-09-14 PROCEDURE — 65610000006 HC RM INTENSIVE CARE

## 2020-09-14 PROCEDURE — 74011250637 HC RX REV CODE- 250/637: Performed by: INTERNAL MEDICINE

## 2020-09-14 PROCEDURE — 99291 CRITICAL CARE FIRST HOUR: CPT | Performed by: THORACIC SURGERY (CARDIOTHORACIC VASCULAR SURGERY)

## 2020-09-14 PROCEDURE — 94003 VENT MGMT INPAT SUBQ DAY: CPT

## 2020-09-14 PROCEDURE — 74011250636 HC RX REV CODE- 250/636: Performed by: NURSE PRACTITIONER

## 2020-09-14 PROCEDURE — 85025 COMPLETE CBC W/AUTO DIFF WBC: CPT

## 2020-09-14 PROCEDURE — 71045 X-RAY EXAM CHEST 1 VIEW: CPT

## 2020-09-14 PROCEDURE — 97165 OT EVAL LOW COMPLEX 30 MIN: CPT

## 2020-09-14 PROCEDURE — 74011000250 HC RX REV CODE- 250: Performed by: INTERNAL MEDICINE

## 2020-09-14 PROCEDURE — 74011250637 HC RX REV CODE- 250/637: Performed by: NURSE PRACTITIONER

## 2020-09-14 PROCEDURE — 36415 COLL VENOUS BLD VENIPUNCTURE: CPT

## 2020-09-14 PROCEDURE — 74011000258 HC RX REV CODE- 258: Performed by: INTERNAL MEDICINE

## 2020-09-14 PROCEDURE — 74011250637 HC RX REV CODE- 250/637: Performed by: HOSPITALIST

## 2020-09-14 PROCEDURE — 82803 BLOOD GASES ANY COMBINATION: CPT

## 2020-09-14 PROCEDURE — 97166 OT EVAL MOD COMPLEX 45 MIN: CPT

## 2020-09-14 PROCEDURE — 80053 COMPREHEN METABOLIC PANEL: CPT

## 2020-09-14 PROCEDURE — 84100 ASSAY OF PHOSPHORUS: CPT

## 2020-09-14 RX ADMIN — ALPRAZOLAM 0.5 MG: 0.5 TABLET ORAL at 21:16

## 2020-09-14 RX ADMIN — DEXMEDETOMIDINE 0.9 MCG/KG/HR: 100 INJECTION, SOLUTION, CONCENTRATE INTRAVENOUS at 16:32

## 2020-09-14 RX ADMIN — OXYCODONE HYDROCHLORIDE 5 MG: 5 TABLET ORAL at 05:01

## 2020-09-14 RX ADMIN — CHLORHEXIDINE GLUCONATE 15 ML: 0.12 RINSE ORAL at 09:11

## 2020-09-14 RX ADMIN — Medication: at 18:00

## 2020-09-14 RX ADMIN — ALPRAZOLAM 0.5 MG: 0.5 TABLET ORAL at 16:25

## 2020-09-14 RX ADMIN — DOCUSATE SODIUM 100 MG: 50 LIQUID ORAL at 09:12

## 2020-09-14 RX ADMIN — POLYETHYLENE GLYCOL 3350 17 G: 17 POWDER, FOR SOLUTION ORAL at 09:12

## 2020-09-14 RX ADMIN — ONDANSETRON 4 MG: 2 INJECTION INTRAMUSCULAR; INTRAVENOUS at 22:30

## 2020-09-14 RX ADMIN — MIDAZOLAM HYDROCHLORIDE 2 MG: 2 INJECTION, SOLUTION INTRAMUSCULAR; INTRAVENOUS at 16:36

## 2020-09-14 RX ADMIN — DEXMEDETOMIDINE 0.5 MCG/KG/HR: 100 INJECTION, SOLUTION, CONCENTRATE INTRAVENOUS at 09:10

## 2020-09-14 RX ADMIN — OXYCODONE HYDROCHLORIDE 5 MG: 5 TABLET ORAL at 23:04

## 2020-09-14 RX ADMIN — FAMOTIDINE 20 MG: 40 POWDER, FOR SUSPENSION ORAL at 09:12

## 2020-09-14 RX ADMIN — Medication 10 ML: at 06:27

## 2020-09-14 RX ADMIN — FAMOTIDINE 20 MG: 40 POWDER, FOR SUSPENSION ORAL at 18:16

## 2020-09-14 RX ADMIN — CHLORHEXIDINE GLUCONATE 15 ML: 0.12 RINSE ORAL at 20:47

## 2020-09-14 RX ADMIN — ENOXAPARIN SODIUM 50 MG: 60 INJECTION SUBCUTANEOUS at 21:16

## 2020-09-14 RX ADMIN — MIDAZOLAM HYDROCHLORIDE 2 MG: 2 INJECTION, SOLUTION INTRAMUSCULAR; INTRAVENOUS at 23:15

## 2020-09-14 RX ADMIN — MICONAZOLE NITRATE 2 % TOPICAL POWDER: at 09:00

## 2020-09-14 RX ADMIN — ONDANSETRON 4 MG: 2 INJECTION INTRAMUSCULAR; INTRAVENOUS at 16:34

## 2020-09-14 RX ADMIN — MICONAZOLE NITRATE 2 % TOPICAL POWDER: at 18:00

## 2020-09-14 RX ADMIN — ACETAMINOPHEN ORAL SOLUTION 650 MG: 650 SOLUTION ORAL at 21:17

## 2020-09-14 RX ADMIN — QUETIAPINE FUMARATE 100 MG: 100 TABLET ORAL at 21:16

## 2020-09-14 RX ADMIN — OXYCODONE HYDROCHLORIDE 5 MG: 5 TABLET ORAL at 18:16

## 2020-09-14 RX ADMIN — MIDAZOLAM HYDROCHLORIDE 2 MG: 2 INJECTION, SOLUTION INTRAMUSCULAR; INTRAVENOUS at 11:45

## 2020-09-14 RX ADMIN — ENOXAPARIN SODIUM 50 MG: 60 INJECTION SUBCUTANEOUS at 09:12

## 2020-09-14 RX ADMIN — DEXMEDETOMIDINE 1 MCG/KG/HR: 100 INJECTION, SOLUTION, CONCENTRATE INTRAVENOUS at 21:16

## 2020-09-14 RX ADMIN — DEXMEDETOMIDINE 0.7 MCG/KG/HR: 100 INJECTION, SOLUTION, CONCENTRATE INTRAVENOUS at 16:25

## 2020-09-14 RX ADMIN — OXYCODONE HYDROCHLORIDE 5 MG: 5 TABLET ORAL at 11:45

## 2020-09-14 RX ADMIN — ASPIRIN 81 MG CHEWABLE TABLET 81 MG: 81 TABLET CHEWABLE at 09:11

## 2020-09-14 RX ADMIN — Medication: at 09:00

## 2020-09-14 RX ADMIN — Medication 10 ML: at 21:32

## 2020-09-14 RX ADMIN — ALPRAZOLAM 0.5 MG: 0.5 TABLET ORAL at 09:11

## 2020-09-14 NOTE — WOUND CARE
WOCN Note:     Rm: 2816. Patient is Covid +  Did not go into room and assess patient. Spoke with Fransisco Avila RN and discussed trach site and red non blanching area to sacrum. Assessment:     Trach Site: skin is intact, small minute scab noted but much improved. Hydrocolloid in place. Mid sacrum: red non blanching area:   \"Patient is stooling and area is excoriated. Buttock cheeks are chaffed and NO non blanching sacral area. Cleaned incontinence and applying barrier cream.   Heel Medix boots are intact. Recommendations:      - Trach Site: hydrocolloid to cover and rest under edge of bottom of trach collar.    - Sacrum: venelex ointment BID prophylactically. - Buttock , anal area: twice daily and with stooling: Z-Guard Paste / Barrier Cream to excoriated areas. Minimize layers of linen/pads under patient to optimize support surface. Turn/reposition approximately every 2 hours and offload heels. Manage incontinence / promote continence; Z-Guard to buttocks and sacrum daily and as needed with incontinence care. Specialty bed: [facility] In Touch ICU Bed.   Discussed above plan with Fransisco Avila / RN    Transition of Care: Plan to follow weekly and as needed while admitted to hospital.    Homer VASQUEZ RN  Wound Care Department  Office: [phone]  Pager: [phone]

## 2020-09-14 NOTE — PROGRESS NOTES
1930: Bedside shift change report given to [address] [place] [address] (oncoming nurse) by Lianet White RN (offgoing nurse). Report included the following information SBAR, Kardex, Intake/Output, MAR, Recent Results, Cardiac Rhythm NSR/sinus tach and Alarm Parameters . 1950: Pauses seen on monitor, bradycardic as low as 40s. 2220: Phone update to mom, Vashti Benz.    31 75 62: Pt vomiting and desat to high 70s and having period of heart block. Marcelina Hatch NP at bedside. Stat chest x-ray ordered. Given prn zofran. 0300: Pt had another episode of vomiting. 0730: Bedside shift change report given to Syeda Hearn RN (oncoming nurse) by [address] [place] [address] (offgoing nurse). Report included the following information SBAR, Kardex, Intake/Output, MAR, Recent Results, Cardiac Rhythm NSR and Alarm Parameters .

## 2020-09-14 NOTE — PROGRESS NOTES
Problem: Self Care Deficits Care Plan (Adult)  Goal: *Acute Goals and Plan of Care (Insert Text)  Description:   FUNCTIONAL STATUS PRIOR TO ADMISSION: Patient unable to provide history at OT evaluation. Per chart review, patient was fully independent    HOME SUPPORT: Per chart review, patient lived alone    Occupational Therapy Goals  Initiated 9/14/2020  1. Patient will perform grooming with minimum assistance within 7 day(s). 2.  Patient will perform bathing with moderate assistance  within 7 day(s). 3.  Patient will perform upper body dressing with minimum assistance  within 7 day(s). 5.  Patient will perform lower body dressing with moderate assistance within 7 day(s). 6.  Patient will perform toilet transfers with moderate assistance  within 7 day(s). 7.  Patient will perform all aspects of toileting with moderate assistance  within 7 day(s). 8.  Patient will participate in upper extremity therapeutic exercise/activities with minimal assistance for 10 minutes within 7 day(s). 9.  Patient will utilize energy conservation techniques during functional activities with verbal cues within 7 day(s). Outcome: Not Met    OCCUPATIONAL THERAPY EVALUATION  Patient: Yael Woods (28 y.o. male)  Date: 9/14/2020  Primary Diagnosis: Acute respiratory failure ([facility] [address] [J96.00]  Procedure(s) (LRB):  VV EXTRACORPOREAL MEMBRANE OXYGENATION (ECMO) (N/A)     Precautions: fall       ASSESSMENT  Noted patient with prolonged hospitalization primarily for respiratory failure/ [id number] pneumonia complicated by pneumothorax, sepsis, and acute RUE weakness (since 9/3 per chart, etiology unclear at this time). Per chart review, patient was originally admitted to [facility] on 8/5, was intubated 8/10, transferred to [facility] 8/18, and received tracheostomy 8/26. Patient seen today ventilated via trach (FIO2 45%, PEEP 5) with SPO2 stable and HR 110s-130s with activity.   Patient presents with focal RUE weakness (shoulder & elbow 0/5, wrist & hand 3- to 3/5, full composite R hand flexion/ extension although minimal ), impaired cardiopulmonary endurance, and general weakness. Patient maintained alertness throughout session, followed most simple commands to motor ability, and verbalized intermittently. Initiated functional use of LUE to reach for objects, to reposition laterally with HOB raised, and to pull to roll. RUE functional use limited by proximal weakness. Patient required maximum assistance x2 for rolling in bed to attempt bed pan for toileting. Patient is significantly below independent functional baseline. Discharge recommendation TBD pending progress, hopeful for inpatient rehab. Current Level of Function Impacting Discharge (ADLs/self-care): moderate to total assistance    Functional Outcome Measure: The patient scored 0/100 on the Barthel Index outcome measure which is indicative of ~100% impairment in functional performance. Patient will benefit from skilled therapy intervention to address the above noted impairments. PLAN :  Recommendations and Planned Interventions: self care training, functional mobility training, therapeutic exercise, balance training, visual/perceptual training, therapeutic activities, cognitive retraining, endurance activities, neuromuscular re-education, patient education, home safety training, and family training/education    Frequency/Duration: Patient will be followed by occupational therapy 5 times a week to address goals. Recommendation for discharge: (in order for the patient to meet his/her long term goals)  To be determined:  pending progress, hopeful for inpatient rehab. This discharge recommendation:  Has not yet been discussed the attending provider and/or case management         SUBJECTIVE:   Patient stated I need to take a shit.     OBJECTIVE DATA SUMMARY:   HISTORY:   Past Medical History:   Diagnosis Date    History of vascular access device 08/10/2020    5 Fr triple PICC, hemodynamically unstable, 45 cm L basilic     Past Surgical History:   Procedure Laterality Date    IR THORA/INS CHEST TUBE(PNEUMO) WO IMAGE  8/11/2020    IR THORA/INS CHEST TUBE(PNEUMO) WO IMAGE  8/11/2020    NC TRACHEOSTOMY, PLANNED  8/26/2020            Expanded or extensive additional review of patient history:     Home Situation  Home Environment: Private residence  # Steps to Enter: 0  One/Two Story Residence: One story  Living Alone: Yes  Support Systems: Family member(s)  Patient Expects to be Discharged to[de-identified] Rehabilitation facility  Current DME Used/Available at Home: None    Hand dominance: Right    EXAMINATION OF PERFORMANCE DEFICITS:  Cognitive/Behavioral Status:  Neurologic State: Alert  Orientation Level: Unable to verbalize  Cognition: Follows commands  Perception: Appears intact  Perseveration: No perseveration noted       Skin: visible skin appears intact    Edema: none noted    Hearing: Auditory  Auditory Impairment: None    Vision/Perceptual:        Fields: Arcuately detected stimuli in all fields. Grossly attended to both sides    Tracking limited    Intermittently closed 1 eyelid. When asked if he was seeing double he stated \"Maybe. \" Recommend further assessment as able                                    Range of Motion:    AROM: (LUE generally decreased/ functional, RUE grossly decreased)                         Strength:    Strength: (LUE generally decreased/ functional, RUE grossly decreased)                Coordination:  Coordination: (LUE generally decreased/ functional, RUE grossly decreased)  Fine Motor Skills-Upper: Left Impaired;Right Impaired    Gross Motor Skills-Upper: Left Impaired;Right Impaired    Tone & Sensation:    Tone: Abnormal(RUE decreased)  Sensation: (unable to assess)                        Functional Mobility and Transfers for ADLs:  Bed Mobility:  Rolling: Maximum assistance;Assist x2  Supine to Sit: (bed in chair position, unable to support trunk)        ADL Assessment:  Feeding: Total assistance(NPO + tube)    Oral Facial Hygiene/Grooming: Moderate assistance(inferred, demonstrated reach to face with LUE)    Bathing: Maximum assistance(inferred d/t AROM, mobility, RUE weakness)    Upper Body Dressing: Maximum assistance(inferred d/t AROM, mobility, RUE weakness)    Lower Body Dressing: Total assistance(inferred d/t AROM, mobility, RUE weakness)    Toileting: Total assistance(inferred d/t AROM, mobility, RUE weakness)            Functional Measure:  Barthel Index:    Bathin  Bladder: 0  Bowels: 0  Groomin  Dressin  Feedin  Mobility: 0  Stairs: 0  Toilet Use: 0  Transfer (Bed to Chair and Back): 0  Total: 0/100        The Barthel ADL Index: Guidelines  1. The index should be used as a record of what a patient does, not as a record of what a patient could do. 2. The main aim is to establish degree of independence from any help, physical or verbal, however minor and for whatever reason. 3. The need for supervision renders the patient not independent. 4. A patient's performance should be established using the best available evidence. Asking the patient, friends/relatives and nurses are the usual sources, but direct observation and common sense are also important. However direct testing is not needed. 5. Usually the patient's performance over the preceding 24-48 hours is important, but occasionally longer periods will be relevant. 6. Middle categories imply that the patient supplies over 50 per cent of the effort. 7. Use of aids to be independent is allowed. Jessica Lopez., Barthel, D.W. ([id number]). Functional evaluation: the Barthel Index. [address] [facility] (14)2. SAHIL Luong, Cassie Hector., Candido Giraldo., Rosie, [address] [facility] (). Measuring the change indisability after inpatient rehabilitation; comparison of the responsiveness of the Barthel Index and Functional [place] Measure.  Journal of Neurology, Neurosurgery, and Psychiatry, 664), 914-195. INOCENCIA Castillo.LANCE, NICHOLE Contreras, & Ridge Birmingham M.A. (2004.) Assessment of post-stroke quality of life in cost-effectiveness studies: The usefulness of the Barthel Index and the EuroQoL-5D. Quality of Life Research, 15, 053-22         Occupational Therapy Evaluation Charge Determination   History Examination Decision-Making   MEDIUM Complexity : Expanded review of history including physical, cognitive and psychosocial  history  MEDIUM Complexity : 3-5 performance deficits relating to physical, cognitive , or psychosocial skils that result in activity limitations and / or participation restrictions MEDIUM Complexity : Patient may present with comorbidities that affect occupational performnce. Miniml to moderate modification of tasks or assistance (eg, physical or verbal ) with assesment(s) is necessary to enable patient to complete evaluation       Based on the above components, the patient evaluation is determined to be of the following complexity level: MEDIUM  Pain Rating:  Patient did not report pain    Activity Tolerance:   Fair    After treatment patient left in no apparent distress:    Supine in bed and Call bell within reach, L wrist restraint reapplied    COMMUNICATION/EDUCATION:   The patients plan of care was discussed with: Physical therapist and Registered nurse. Home safety education was provided and the patient/caregiver indicated understanding., Patient/family have participated as able in goal setting and plan of care. , and Patient/family agree to work toward stated goals and plan of care. This patients plan of care is appropriate for delegation to [facility].     Thank you for this referral.  Oksana Fountain OT  Time Calculation: 45 mins

## 2020-09-14 NOTE — ROUTINE PROCESS
Bedside, Verbal and Written shift change report given to [facility] Marcos Olivo (oncoming nurse) by Roberta Seo (offgoing nurse). Report included the following information SBAR, Kardex, ED Summary, Procedure Summary, Intake/Output, MAR, Accordion and Recent Results.

## 2020-09-14 NOTE — PROGRESS NOTES
[facility]    ICU TEAM Progress Note    Name: Charley Vasques   : 1990   MRN: [id number]   Date: 2020      I  Subjective:   Progress Note: 2020      Reason for ICU Admission: Respiratory failure due to COVID-19 infection    Interval history:  70-year-old male with no history of significant past medical history except smoking half pack per day. Eileen Hernández was admitted on 2020 at [facility] with acute hypoxemic respiratory failure from COVID pneumonia. Eileen Hernández continued to deteriorate and got intubated on 8/10.  Due to worsening hypoxemia, he is transferred to [facility] for ECMO evaluation. Eileen Hernández has bee given convalescent plasma twice on  and  and treated with Remdesivir which was completed on 8/10. Eileen Hernández is also on dexamethasone. Eileen Hernández completed course of empiric antibiotics including azithromycin which was completed on  and cefepime was completed on .  During his hospital course he also developed pneumothorax and has left-sided chest tube since .  Has a tracheostomy tube placed on .  On September 3 patient had significant worsening on his right arm, CT head was unrevealing.  He is already on high-dose Lovenox and aspirin.  On  he had acute weakness on the left arm code stroke was called and again CTA was unrevealing.  Since then left arm is back to baseline, showed some improvement on right arm but still weak compared to left with minimal ability to move against gravity. Overnight Events:   No acute event, continue to have episode of anxiety requiring PRN medication. Tolerating tube feeding, bowel movement.     Active Problem List:     Problem List  Date Reviewed: 2020          Codes Class    Acute respiratory failure ([facility] ICD-10-CM: J96.00  ICD-9-CM: 518.81         Pneumothorax on left ICD-10-CM: J93.9  ICD-9-CM: 512.89         Pneumonia due to severe acute respiratory syndrome coronavirus 2 (SARS-CoV-2) ICD-10-CM: U07.1, J12.89  ICD-9-CM: 480.8         Respiratory failure with hypoxia (HCC) ICD-10-CM: J96.91  ICD-9-CM: 518.81               Past Medical History:      has a past medical history of History of vascular access device (08/10/2020). Past Surgical History:      has a past surgical history that includes ir thora/ins chest tube(pneumo) wo image (2020); ir thora/ins chest tube(pneumo) wo image (2020); and pr tracheostomy, planned (2020). Home Medications:     Prior to Admission medications    Medication Sig Start Date End Date Taking? Authorizing Provider   benzonatate (Tessalon Perles) 100 mg capsule Take 100 mg by mouth three (3) times daily as needed for Cough. Provider, Historical       Allergies/Social/Family History:     No Known Allergies   Social History     Tobacco Use    Smoking status: Current Some Day Smoker    Smokeless tobacco: Never Used   Substance Use Topics    Alcohol use: Not on file      History reviewed. No pertinent family history. Review of Systems:     Not able to obtain due to his medical condition    Objective:   Vital Signs:  Visit Vitals  BP (!) 146/94   Pulse (!) 139   Temp 99 °F (37.2 °C)   Resp 21   Ht 5' 11\" (1.803 m)   Wt 94.2 kg (207 lb 10.8 oz)   SpO2 100%   BMI 28.96 kg/m²      O2 Device: Tracheostomy, Ventilator Temp (24hrs), Av °F (37.2 °C), Min:98.3 °F (36.8 °C), Max:99.5 °F (37.5 °C)           Intake/Output:     Intake/Output Summary (Last 24 hours) at 2020 0711  Last data filed at 2020 0600  Gross per 24 hour   Intake 1685.34 ml   Output 1535 ml   Net 150.34 ml       Physical Exam:  General:   Sedated on mechanical ventilation via tracheostomy   Eyes:  Sclera anicteric. Pupils equally round and reactive to light.    Mouth/Throat: Mucous membranes normal, oral pharynx clear   Neck: Supple, some thick creamy secretion around the tracheostomy site, minimal secretion   Lungs:    Good air entry bilaterally, fine crepitation   CV:  Regular rate and rhythm,no murmur, click, rub or gallop   Abdomen:   Soft, non-tender. bowel sounds normal. non-distended   Extremities: No cyanosis , evolving edema   Skin: Skin color, texture, turgor normal. no acute rash or lesions   Lymph nodes: Cervical and supraclavicular normal   Musculoskeletal: No swelling or deformity   Lines/Devices:  Intact, no erythema, drainage or tenderness   Psych:  Minimal sedation, opens eyes spontaneously, sometimes seems to be tracking and follow simple command     Moving right arm but not against gravityno deformity noticed, good pulses good color good capillary refill no shoulder or neck tenderness or swelling.  .Face is symmetrical.  Moves left arm spontaneously, wiggle left toes.  Profound weakness            LABS AND  DATA: Personally reviewed  Recent Labs     09/14/20 0221 09/13/20  0555   WBC 4.3 4.9   HGB 8.6* 8.7*   HCT 26.9* 28.2*    298     Recent Labs     09/14/20 0221 09/13/20  0555    137   K 3.6 3.5    100   CO2 31 31   BUN 16 18   CREA 0.49* 0.50*   * 101*   CA 9.1 9.1   MG 1.9 1.8   PHOS 5.0* 4.0     Recent Labs     09/14/20 0221 09/13/20  0555    112   TP 6.6 6.7   ALB 2.8* 2.8*   GLOB 3.8 3.9     No results for input(s): INR, PTP, APTT, INREXT in the last 72 hours. Recent Labs     09/14/20  0319 09/13/20  0326   PHI 7.42 7.43   PCO2I 51.0* 47.2*   PO2I 114* 113*   FIO2I 45 45     No results for input(s): CPK, CKMB, TROIQ, BNPP in the last 72 hours.     Hemodynamics:   PAP:   CO:     Wedge:   CI:     CVP:    SVR:       PVR:       Ventilator Settings:  Mode Rate Tidal Volume Pressure FiO2 PEEP   Pressure control   2 ml  10 cm H2O 45 % 5 cm H20     Peak airway pressure: 30 cm H2O    Minute ventilation: 8.84 l/min        MEDS: Reviewed    Chest X-Ray:  CXR Results  (Last 48 hours)    None          Assessment and Plan:   -Bilateral pneumonia due to COVID-19 infection  -Acute right arm weakness:  Patient already on aspirin and moderate dose anticoagulation.  Still pending CTA head and neck, CT head showed no bleed. -Right arm weakness  -Sepsis:  fever persisted:   - Acute hypoxic respiratory failure due to COVID-19 infection status post tracheostomy on August 26  - Pneumothorax status post left chest tube insertion on August 11 without air leak  - Possible bacterial super imposed infection: Completed antibiotic course  -History of tobacco use     -Continue to wean sedation as tolerated.    - At this time on PEEP of 5 and FiO2 of 40%, PO2 is adequate.  pressure support trial as tolerated. On pressure support to get very anxious agitated and tachypneic with low tidal volume.  - Good urine output.  No need for diuretics today.  Will use as needed.  Replace electrolytes as indicated  -Right arm paralysis, no clear underlying etiology, CT showed abnormal bilateral frontoparietal enhancement.  Perceptual diagnosis is wide.  Power is improving on right arm but still significantly weaker than left  - Fever subsided after antibiotic was restarted on September 4.  Completed empirical course and now off antibiotic  - Completed COVID-19 treatment  - DVT prophylaxis with high-dose Lovenox.  GI prophylaxis.  Nutritional support    DISPOSITION  Stay in ICU    CRITICAL CARE CONSULTANT NOTE  I had a face to face encounter with the patient, reviewed and interpreted patient data including clinical events, labs, images, vital signs, I/O's, and examined patient. I have discussed the case and the plan and management of the patient's care with the consulting services, the bedside nurses and the respiratory therapist.      NOTE OF PERSONAL INVOLVEMENT IN CARE   This patient has a high probability of imminent, clinically significant deterioration, which requires the highest level of preparedness to intervene urgently.  I participated in the decision-making and personally managed or directed the management of the following life and organ supporting interventions that required my frequent assessment to treat or prevent imminent deterioration. I personally spent 40 minutes of critical care time. This is time spent at this critically ill patient's bedside actively involved in patient care as well as the coordination of care and discussions with the patient's family. This does not include any procedural time which has been billed separately. Nayana Palm M.D.   Staff Intensivist/Pulmonologist  [facility]  9/14/2020

## 2020-09-14 NOTE — PROGRESS NOTES
0800. Report received and care assumed. 1030. IDR rounds completed  1130. Patient placed on SBT per RT.   1145. Patient HR into the 140s, RR in the 40s - RT notified - will come to place patient back on ventilator. PRN versed given. See MAR.   1600. Spoke with the patient's mother to update her on events of this morning.

## 2020-09-14 NOTE — PROGRESS NOTES
ARDS  Acute hypoxic respiratory failure  COVID positive  Sinus pauses  S/P tracheostomy     Going over weekend issues     Remains on vent    Still trying SBT's - still gets fairly anxious     Continues on TFs at goal     Some pressure sacral sores    WBCs normal    Hgb looks good    Platelets a little low    Creatinine normal    ABG - 7.42 / 51 / 114 / 33 / 9     PEEP 5, FiO2 45%     Bilirubin and other LFTs look good    Continues on Lovenox     Continues on Xanax / Seroquel     Continues on Dilaudid and Precedex    Off abx's       Intake/Output Summary (Last 24 hours) at 9/14/2020 1342  Last data filed at 9/14/2020 1200  Gross per 24 hour   Intake 1801.28 ml   Output 1310 ml   Net 491.28 ml     Visit Vitals  /86   Pulse (!) 113   Temp 99.2 °F (37.3 °C)   Resp 20   Ht 5' 11\" (1.803 m)   Wt 207 lb 10.8 oz (94.2 kg)   SpO2 99%   BMI 28.96 kg/m²       Risk of morbidity and mortality - high  Medical decision making - high complexity    Total critical care time - 30 minutes (CPT 35262)     I personally spent the above critical care time. This is time spent at this critically ill patient's bedside / unit / floor actively involved in patient care as well as the coordination of care and discussions with the patient's family. This does not include any procedural time which has been billed separately.

## 2020-09-14 NOTE — PROGRESS NOTES
Nutrition Note    Chart reviewed for brief follow-up; discussed during MDR. Plan to change tube feeding to a more concentrated formula. It has been difficult to reach goal rate of 65 ml/hr. New tube feeding: Two Lloyd HN @ 40 ml/hr with 2 packets Prosource bid and 200 ml water flush q 4 hr. This will provide 960 ml, 2140 calories, 140 gm protein and 2100 ml free water (tube feeding/flush) per day to meet 90-94% estimated protein and energy needs, respectively. RD to follow.     Estimated Nutrition Needs:   Energy: 2125 (25 kcal/kg)  Wt used: Current(101 kg)  Protein: 156 (2g/kg IBW)  Wt used: Ideal   Fluid: 1 ml/kcal       Electronically signed by Jesús Singh RD on 9/14/2020 at 1:24 PM  Contact: [employer]

## 2020-09-14 NOTE — PROGRESS NOTES
Problem: Mobility Impaired (Adult and Pediatric)  Goal: *Acute Goals and Plan of Care (Insert Text)  Description: FUNCTIONAL STATUS PRIOR TO ADMISSION: Patient was independent and active without use of DME. 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 Physical Therapy Goals  Initiated 9/11/2020  1. Patient will move from supine to sit and sit to supine , scoot up and down, and roll side to side in bed with maximal assistance within 7 day(s). 2.  Patient will transfer from bed to chair and chair to bed with maximal assistance using the least restrictive device within 7 day(s). 3.  Patient will perform sit to stand with maximal assistance within 7 day(s). 4.  Patient will tolerate sitting EOB with maximal assistance for 5 minutes within 7 day(s). Outcome: Progressing Towards Goal   PHYSICAL THERAPY TREATMENT  Patient: Eliana Tolbert (20 y.o. male)  Date: 9/14/2020  Diagnosis: [facility] [J96.00]   <principal problem not specified>  Procedure(s) (LRB):  VV EXTRACORPOREAL MEMBRANE OXYGENATION (ECMO) (N/A)    Precautions:    Chart, physical therapy assessment, plan of care and goals were reviewed. ASSESSMENT  Patient continues with skilled PT services and is progressing towards goals. He is more alert and interactive today and demonstrating some active motion in the RUE. He is able to follow motor commands in all extremities and was making effort to talk around his trach. He seems to try to use several different methods to communicate and becomes more frustrated and agitated when he is not understood. When he started to talk, he continued to use that method,  but it is very difficult for him since he is still on the vent. He worked on activating his extremities in supine and then sitting in chair position in the bed.   HR max 137, but primarily in the 120s with activity. RR remained in the 20s. Although he is still profoundly weak, he is making progress and expect he will continue to improve his strength as his medical status continues to improve. Goal will be to get sitting EOB with support this week if he is able to tolerate it. Current Level of Function Impacting Discharge (mobility/balance): total care    Other factors to consider for discharge: independent prior to admission         PLAN :  Patient continues to benefit from skilled intervention to address the above impairments. Continue treatment per established plan of care. to address goals. Recommendation for discharge: (in order for the patient to meet his/her long term goals)  To be determined: likely inpatient rehab as long as he continues to improve    This discharge recommendation:  Has not yet been discussed the attending provider and/or case management    IF patient discharges home will need the following DME: to be determined (TBD)       SUBJECTIVE:   Patient stated I need to take a shit.     OBJECTIVE DATA SUMMARY:   Critical Behavior:  Neurologic State: Eyes open to voice, Drowsy  Orientation Level: Unable to verbalize  Cognition: Decreased command following, Decreased attention/concentration     Functional Mobility Training:  Bed Mobility:  Rolling: Maximum assistance;Assist x2  Supine to Sit: (bed in chair position, unable to support trunk)              Transfers:                                   Balance:     Ambulation/Gait Training:                                                        Stairs: Therapeutic Exercises:   AAROM of UEs, LEs communication attempts, rolling side to side  Pain Rating:  No complaints of pain    Activity Tolerance:   Fair and requires rest breaks  Please refer to the flowsheet for vital signs taken during this treatment.     After treatment patient left in no apparent distress:   Supine in bed, Call bell within reach, and Side rails x 3    COMMUNICATION/COLLABORATION:   The patients plan of care was discussed with: Occupational therapist and Registered nurse.      Christ Potter, PT   Time Calculation: 43 mins

## 2020-09-14 NOTE — PROGRESS NOTES
MIGUELINA  Patient presented to [facility] ED from home with increased shortness of breath, diaphoresis and fever. Transferred to [facility] for possible ECMO placement  COVID Positive. RUR: 25%  Disposition: Will need rehab  Patient remains intubated on Precedex gtt. Neuro: opens eyes, following commands. Patient requiring  medication for anxiety. Care management is continuing to follow for transitions of care.    Lolis Hurtado RN,CRM

## 2020-09-15 LAB
ALBUMIN SERPL-MCNC: 2.7 G/DL (ref 3.5–5)
ALBUMIN/GLOB SERPL: 0.6 {RATIO} (ref 1.1–2.2)
ALP SERPL-CCNC: 94 U/L (ref 45–117)
ALT SERPL-CCNC: 52 U/L (ref 12–78)
ANION GAP SERPL CALC-SCNC: 6 MMOL/L (ref 5–15)
ARTERIAL PATENCY WRIST A: YES
AST SERPL-CCNC: 26 U/L (ref 15–37)
BASE EXCESS BLD CALC-SCNC: 8 MMOL/L
BASOPHILS # BLD: 0 K/UL (ref 0–0.1)
BASOPHILS NFR BLD: 0 % (ref 0–1)
BDY SITE: ABNORMAL
BILIRUB SERPL-MCNC: 0.4 MG/DL (ref 0.2–1)
BUN SERPL-MCNC: 16 MG/DL (ref 6–20)
BUN/CREAT SERPL: 31 (ref 12–20)
CA-I BLD-SCNC: 1.29 MMOL/L (ref 1.12–1.32)
CALCIUM SERPL-MCNC: 9.5 MG/DL (ref 8.5–10.1)
CHLORIDE SERPL-SCNC: 102 MMOL/L (ref 97–108)
CO2 SERPL-SCNC: 29 MMOL/L (ref 21–32)
CREAT SERPL-MCNC: 0.52 MG/DL (ref 0.7–1.3)
DIFFERENTIAL METHOD BLD: ABNORMAL
EOSINOPHIL # BLD: 0.1 K/UL (ref 0–0.4)
EOSINOPHIL NFR BLD: 1 % (ref 0–7)
ERYTHROCYTE [DISTWIDTH] IN BLOOD BY AUTOMATED COUNT: 13.9 % (ref 11.5–14.5)
GAS FLOW.O2 O2 DELIVERY SYS: ABNORMAL L/MIN
GAS FLOW.O2 SETTING OXYMISER: 22 BPM
GLOBULIN SER CALC-MCNC: 4.5 G/DL (ref 2–4)
GLUCOSE SERPL-MCNC: 115 MG/DL (ref 65–100)
HCO3 BLD-SCNC: 31.6 MMOL/L (ref 22–26)
HCT VFR BLD AUTO: 26.2 % (ref 36.6–50.3)
HGB BLD-MCNC: 8.4 G/DL (ref 12.1–17)
IMM GRANULOCYTES # BLD AUTO: 0 K/UL
IMM GRANULOCYTES NFR BLD AUTO: 0 %
INSPIRATION.DURATION SETTING TIME VENT: 0.91 SEC
LYMPHOCYTES # BLD: 1.2 K/UL (ref 0.8–3.5)
LYMPHOCYTES NFR BLD: 22 % (ref 12–49)
MAGNESIUM SERPL-MCNC: 1.9 MG/DL (ref 1.6–2.4)
MCH RBC QN AUTO: 28.7 PG (ref 26–34)
MCHC RBC AUTO-ENTMCNC: 32.1 G/DL (ref 30–36.5)
MCV RBC AUTO: 89.4 FL (ref 80–99)
METAMYELOCYTES NFR BLD MANUAL: 4 %
MONOCYTES # BLD: 0.4 K/UL (ref 0–1)
MONOCYTES NFR BLD: 8 % (ref 5–13)
NEUTS BAND NFR BLD MANUAL: 1 % (ref 0–6)
NEUTS SEG # BLD: 3.5 K/UL (ref 1.8–8)
NEUTS SEG NFR BLD: 64 % (ref 32–75)
NRBC # BLD: 0 K/UL (ref 0–0.01)
NRBC BLD-RTO: 0 PER 100 WBC
O2/TOTAL GAS SETTING VFR VENT: 45 %
PCO2 BLD: 46.2 MMHG (ref 35–45)
PH BLD: 7.44 [PH] (ref 7.35–7.45)
PHOSPHATE SERPL-MCNC: 4.8 MG/DL (ref 2.6–4.7)
PIP ISTAT,IPIP: 24
PLATELET # BLD AUTO: 282 K/UL (ref 150–400)
PMV BLD AUTO: 10.5 FL (ref 8.9–12.9)
PO2 BLD: 88 MMHG (ref 80–100)
POTASSIUM SERPL-SCNC: 3.6 MMOL/L (ref 3.5–5.1)
PROT SERPL-MCNC: 7.2 G/DL (ref 6.4–8.2)
RBC # BLD AUTO: 2.93 M/UL (ref 4.1–5.7)
RBC MORPH BLD: ABNORMAL
SAO2 % BLD: 97 % (ref 92–97)
SODIUM SERPL-SCNC: 137 MMOL/L (ref 136–145)
SPECIMEN TYPE: ABNORMAL
TOTAL RESP. RATE, ITRR: 22
VENTILATION MODE VENT: ABNORMAL
WBC # BLD AUTO: 5.4 K/UL (ref 4.1–11.1)

## 2020-09-15 PROCEDURE — 83735 ASSAY OF MAGNESIUM: CPT

## 2020-09-15 PROCEDURE — 82803 BLOOD GASES ANY COMBINATION: CPT

## 2020-09-15 PROCEDURE — 74011250637 HC RX REV CODE- 250/637: Performed by: NURSE PRACTITIONER

## 2020-09-15 PROCEDURE — 74011000250 HC RX REV CODE- 250: Performed by: INTERNAL MEDICINE

## 2020-09-15 PROCEDURE — 85025 COMPLETE CBC W/AUTO DIFF WBC: CPT

## 2020-09-15 PROCEDURE — 74011250636 HC RX REV CODE- 250/636: Performed by: INTERNAL MEDICINE

## 2020-09-15 PROCEDURE — 36600 WITHDRAWAL OF ARTERIAL BLOOD: CPT

## 2020-09-15 PROCEDURE — 94003 VENT MGMT INPAT SUBQ DAY: CPT

## 2020-09-15 PROCEDURE — 80053 COMPREHEN METABOLIC PANEL: CPT

## 2020-09-15 PROCEDURE — 65610000006 HC RM INTENSIVE CARE

## 2020-09-15 PROCEDURE — 74011250636 HC RX REV CODE- 250/636: Performed by: NURSE PRACTITIONER

## 2020-09-15 PROCEDURE — 74011000258 HC RX REV CODE- 258: Performed by: INTERNAL MEDICINE

## 2020-09-15 PROCEDURE — 84100 ASSAY OF PHOSPHORUS: CPT

## 2020-09-15 PROCEDURE — 74011250637 HC RX REV CODE- 250/637: Performed by: INTERNAL MEDICINE

## 2020-09-15 PROCEDURE — 36415 COLL VENOUS BLD VENIPUNCTURE: CPT

## 2020-09-15 PROCEDURE — 99291 CRITICAL CARE FIRST HOUR: CPT | Performed by: THORACIC SURGERY (CARDIOTHORACIC VASCULAR SURGERY)

## 2020-09-15 PROCEDURE — 74011250637 HC RX REV CODE- 250/637: Performed by: HOSPITALIST

## 2020-09-15 RX ORDER — METOCLOPRAMIDE HYDROCHLORIDE 5 MG/ML
5 INJECTION INTRAMUSCULAR; INTRAVENOUS EVERY 6 HOURS
Status: DISCONTINUED | OUTPATIENT
Start: 2020-09-15 | End: 2020-09-25

## 2020-09-15 RX ORDER — BUSPIRONE HYDROCHLORIDE 5 MG/1
5 TABLET ORAL 3 TIMES DAILY
Status: DISCONTINUED | OUTPATIENT
Start: 2020-09-15 | End: 2020-09-23

## 2020-09-15 RX ADMIN — Medication 10 ML: at 05:58

## 2020-09-15 RX ADMIN — Medication 10 ML: at 22:58

## 2020-09-15 RX ADMIN — DEXMEDETOMIDINE 1 MCG/KG/HR: 100 INJECTION, SOLUTION, CONCENTRATE INTRAVENOUS at 17:38

## 2020-09-15 RX ADMIN — DEXMEDETOMIDINE 0.6 MCG/KG/HR: 100 INJECTION, SOLUTION, CONCENTRATE INTRAVENOUS at 23:02

## 2020-09-15 RX ADMIN — ALPRAZOLAM 0.5 MG: 0.5 TABLET ORAL at 15:09

## 2020-09-15 RX ADMIN — ONDANSETRON 4 MG: 2 INJECTION INTRAMUSCULAR; INTRAVENOUS at 20:29

## 2020-09-15 RX ADMIN — ALPRAZOLAM 0.5 MG: 0.5 TABLET ORAL at 22:54

## 2020-09-15 RX ADMIN — ALPRAZOLAM 0.5 MG: 0.5 TABLET ORAL at 08:42

## 2020-09-15 RX ADMIN — DEXMEDETOMIDINE 1.4 MCG/KG/HR: 100 INJECTION, SOLUTION, CONCENTRATE INTRAVENOUS at 10:58

## 2020-09-15 RX ADMIN — BUSPIRONE HYDROCHLORIDE 5 MG: 5 TABLET ORAL at 22:48

## 2020-09-15 RX ADMIN — METOCLOPRAMIDE 5 MG: 5 INJECTION, SOLUTION INTRAMUSCULAR; INTRAVENOUS at 17:06

## 2020-09-15 RX ADMIN — OXYCODONE HYDROCHLORIDE 5 MG: 5 TABLET ORAL at 17:07

## 2020-09-15 RX ADMIN — MIDAZOLAM HYDROCHLORIDE 2 MG: 2 INJECTION, SOLUTION INTRAMUSCULAR; INTRAVENOUS at 10:49

## 2020-09-15 RX ADMIN — BUSPIRONE HYDROCHLORIDE 5 MG: 5 TABLET ORAL at 16:51

## 2020-09-15 RX ADMIN — QUETIAPINE FUMARATE 100 MG: 100 TABLET ORAL at 22:48

## 2020-09-15 RX ADMIN — FAMOTIDINE 20 MG: 40 POWDER, FOR SUSPENSION ORAL at 08:43

## 2020-09-15 RX ADMIN — METOCLOPRAMIDE 5 MG: 5 INJECTION, SOLUTION INTRAMUSCULAR; INTRAVENOUS at 11:36

## 2020-09-15 RX ADMIN — ASPIRIN 81 MG CHEWABLE TABLET 81 MG: 81 TABLET CHEWABLE at 08:42

## 2020-09-15 RX ADMIN — ACETAMINOPHEN ORAL SOLUTION 650 MG: 650 SOLUTION ORAL at 20:10

## 2020-09-15 RX ADMIN — OXYCODONE HYDROCHLORIDE 5 MG: 5 TABLET ORAL at 05:58

## 2020-09-15 RX ADMIN — MICONAZOLE NITRATE 2 % TOPICAL POWDER: at 17:06

## 2020-09-15 RX ADMIN — FAMOTIDINE 20 MG: 10 INJECTION INTRAVENOUS at 20:03

## 2020-09-15 RX ADMIN — DEXMEDETOMIDINE 1.4 MCG/KG/HR: 100 INJECTION, SOLUTION, CONCENTRATE INTRAVENOUS at 13:54

## 2020-09-15 RX ADMIN — ENOXAPARIN SODIUM 50 MG: 60 INJECTION SUBCUTANEOUS at 09:30

## 2020-09-15 RX ADMIN — MIDAZOLAM HYDROCHLORIDE 2 MG: 2 INJECTION, SOLUTION INTRAMUSCULAR; INTRAVENOUS at 02:39

## 2020-09-15 RX ADMIN — Medication: at 17:04

## 2020-09-15 RX ADMIN — OXYCODONE HYDROCHLORIDE 5 MG: 5 SOLUTION ORAL at 08:44

## 2020-09-15 RX ADMIN — CHLORHEXIDINE GLUCONATE 15 ML: 0.12 RINSE ORAL at 08:43

## 2020-09-15 RX ADMIN — Medication: at 08:43

## 2020-09-15 RX ADMIN — FENTANYL CITRATE 100 MCG: 50 INJECTION, SOLUTION INTRAMUSCULAR; INTRAVENOUS at 11:36

## 2020-09-15 RX ADMIN — MICONAZOLE NITRATE 2 % TOPICAL POWDER: at 08:43

## 2020-09-15 RX ADMIN — DEXMEDETOMIDINE 1.1 MCG/KG/HR: 100 INJECTION, SOLUTION, CONCENTRATE INTRAVENOUS at 02:28

## 2020-09-15 RX ADMIN — ONDANSETRON 4 MG: 2 INJECTION INTRAMUSCULAR; INTRAVENOUS at 09:32

## 2020-09-15 RX ADMIN — CHLORHEXIDINE GLUCONATE 15 ML: 0.12 RINSE ORAL at 20:03

## 2020-09-15 RX ADMIN — SODIUM CHLORIDE 10 MG: 9 INJECTION INTRAMUSCULAR; INTRAVENOUS; SUBCUTANEOUS at 16:51

## 2020-09-15 RX ADMIN — DEXMEDETOMIDINE 1 MCG/KG/HR: 100 INJECTION, SOLUTION, CONCENTRATE INTRAVENOUS at 06:58

## 2020-09-15 RX ADMIN — FENTANYL CITRATE 100 MCG: 50 INJECTION, SOLUTION INTRAMUSCULAR; INTRAVENOUS at 15:09

## 2020-09-15 RX ADMIN — OXYCODONE HYDROCHLORIDE 5 MG: 5 TABLET ORAL at 11:37

## 2020-09-15 RX ADMIN — Medication 40 ML: at 13:56

## 2020-09-15 RX ADMIN — ENOXAPARIN SODIUM 50 MG: 60 INJECTION SUBCUTANEOUS at 22:48

## 2020-09-15 RX ADMIN — MIDAZOLAM HYDROCHLORIDE 2 MG: 2 INJECTION, SOLUTION INTRAMUSCULAR; INTRAVENOUS at 13:54

## 2020-09-15 NOTE — PROGRESS NOTES
Primary Nurse Erika Palm RN and Rosa Frank RN performed a dual skin assessment on this patient No impairment noted  Enrique score is 10

## 2020-09-15 NOTE — PROGRESS NOTES
Physical Therapy    Overnight events noted and he is still not appropriate for PT intervention at this time. Discussed with RN. We will follow up tomorrow to progress as he is able to tolerate.     Gordon Sandoval, PT

## 2020-09-15 NOTE — PROGRESS NOTES
[facility]    ICU TEAM Progress Note    Name: Elsie Khoury   : 1990   MRN: [id number]   Date: 9/15/2020      I  Subjective:   Progress Note: 9/15/2020      Reason for ICU Admission: Respiratory failure due to COVID-19 infection    Interval history:  28-year-old male with no history of significant past medical history except smoking half pack per day. Joseph Dominguez was admitted on 2020 at [facility] with acute hypoxemic respiratory failure from COVID pneumonia. Joseph Dominguez continued to deteriorate and got intubated on 8/10.  Due to worsening hypoxemia, he is transferred to [facility] for ECMO evaluation. Joseph Dominguez has bee given convalescent plasma twice on  and  and treated with Remdesivir which was completed on 8/10. Joseph Dominguez is also on dexamethasone. Joseph Dominguez completed course of empiric antibiotics including azithromycin which was completed on  and cefepime was completed on .  During his hospital course he also developed pneumothorax and has left-sided chest tube since .  Has a tracheostomy tube placed on .  On September 3 patient had significant worsening on his right arm, CT head was unrevealing.  He is already on high-dose Lovenox and aspirin.  On  he had acute weakness on the left arm code stroke was called and again CTA was unrevealing.  Since then left arm is back to baseline, showed some improvement on right arm but still weak compared to left with minimal ability to move against gravity. Overnight Events:   No acute event, still episodes of vomiting, still episodes of anxiety. Positive for bowel movement no fever.     Active Problem List:     Problem List  Date Reviewed: 2020          Codes Class    Acute respiratory failure ([facility] [address] ICD-10-CM: J96.00  ICD-9-CM: 518.81         Pneumothorax on left ICD-10-CM: J93.9  ICD-9-CM: 512.89         Pneumonia due to severe acute respiratory syndrome coronavirus 2 (SARS-CoV-2) ICD-10-CM: U07.1, J12.89  ICD-9-CM: 480.8         Respiratory failure with hypoxia (HCC) ICD-10-CM: J96.91  ICD-9-CM: 518.81               Past Medical History:      has a past medical history of History of vascular access device (08/10/2020). Past Surgical History:      has a past surgical history that includes ir thora/ins chest tube(pneumo) wo image (2020); ir thora/ins chest tube(pneumo) wo image (2020); and pr tracheostomy, planned (2020). Home Medications:     Prior to Admission medications    Medication Sig Start Date End Date Taking? Authorizing Provider   benzonatate (Tessalon Perles) 100 mg capsule Take 100 mg by mouth three (3) times daily as needed for Cough. Provider, Historical       Allergies/Social/Family History:     No Known Allergies   Social History     Tobacco Use    Smoking status: Current Some Day Smoker    Smokeless tobacco: Never Used   Substance Use Topics    Alcohol use: Not on file      History reviewed. No pertinent family history. Review of Systems:     Not able to obtain due to his medical condition    Objective:   Vital Signs:  Visit Vitals  /60   Pulse 82   Temp 99.1 °F (37.3 °C)   Resp 19   Ht 5' 11\" (1.803 m)   Wt 94.2 kg (207 lb 10.8 oz)   SpO2 100%   BMI 28.96 kg/m²      O2 Device: Tracheostomy, Ventilator Temp (24hrs), Av.2 °F (37.3 °C), Min:98.2 °F (36.8 °C), Max:100 °F (37.8 °C)           Intake/Output:     Intake/Output Summary (Last 24 hours) at 9/15/2020 0713  Last data filed at 9/15/2020 0700  Gross per 24 hour   Intake 1626.15 ml   Output 1635 ml   Net -8.85 ml       Physical Exam:    General:   Sedated on mechanical ventilation via tracheostomy   Eyes:  Sclera anicteric. Pupils equally round and reactive to light.    Mouth/Throat: Mucous membranes normal, oral pharynx clear   Neck: Supple, tracheostomy tube in place   Lungs:    Good air entry bilaterally, fine crepitation   CV:  Regular rate and rhythm,no murmur, click, rub or gallop   Abdomen:   Soft, non-tender. bowel sounds normal. non-distended   Extremities: No cyanosis , evolving edema   Skin: Skin color, texture, turgor normal. no acute rash or lesions   Lymph nodes: Cervical and supraclavicular normal   Musculoskeletal: No swelling or deformity   Lines/Devices:  Intact, no erythema, drainage or tenderness   Psych:  Minimal sedation, opens eyes spontaneously, sometimes seems to be tracking and follow simple command     Moving right arm but not against gravityno deformity noticed, good pulses good color good capillary refill no shoulder or neck tenderness or swelling.  .Face is symmetrical.  Moves left arm spontaneously, wiggle left toes.  Profound weakness         LABS AND  DATA: Personally reviewed  Recent Labs     09/15/20  0357 09/14/20  0221   WBC 5.4 4.3   HGB 8.4* 8.6*   HCT 26.2* 26.9*    259     Recent Labs     09/15/20  0357 09/14/20  0221    137   K 3.6 3.6    100   CO2 29 31   BUN 16 16   CREA 0.52* 0.49*   * 112*   CA 9.5 9.1   MG 1.9 1.9   PHOS 4.8* 5.0*     Recent Labs     09/15/20  0357 09/14/20  0221   AP 94 100   TP 7.2 6.6   ALB 2.7* 2.8*   GLOB 4.5* 3.8     No results for input(s): INR, PTP, APTT, INREXT in the last 72 hours. Recent Labs     09/15/20  0400 09/14/20  0319   PHI 7.44 7.42   PCO2I 46.2* 51.0*   PO2I 88 114*   FIO2I 45 45     No results for input(s): CPK, CKMB, TROIQ, BNPP in the last 72 hours. Hemodynamics:   PAP:   CO:     Wedge:   CI:     CVP:    SVR:       PVR:       Ventilator Settings:  Mode Rate Tidal Volume Pressure FiO2 PEEP   Pressure control   2 ml  10 cm H2O 45 % 5 cm H20     Peak airway pressure: 30 cm H2O    Minute ventilation: 9.71 l/min        MEDS: Reviewed    Chest X-Ray:  CXR Results  (Last 48 hours)               09/14/20 2338  XR CHEST PORT Final result    Impression:  IMPRESSION:       Stable patchy bilateral lung opacities.            Narrative:  EXAM:  XR CHEST PORT       INDICATION: Aspiration       COMPARISON: 9/9/2020       TECHNIQUE: Portable AP semiupright chest view at 2249 hours       FINDINGS: The tracheostomy tube and enteric tube are stable. Cardiac monitoring   wires overlie the thorax. The cardiomediastinal contours are stable. There is stable patchy bilateral lung opacities and low lung volumes. There is   no pleural effusion or pneumothorax. The bones and upper abdomen are stable. Assessment and Plan:   -Bilateral pneumonia due to COVID-19 infection  -Acute right arm weakness:  Patient already on aspirin and moderate dose anticoagulation.  Still pending CTA head and neck, CT head showed no bleed. -Right arm weakness  -Sepsis: Resolved  - Acute hypoxic respiratory failure due to COVID-19 infection status post tracheostomy on August 26  - Pneumothorax status post left chest tube insertion on August 11 without air leak  - Possible bacterial super imposed infection: Completed antibiotic course  -History of tobacco use     -Continue to wean sedation as tolerated.    - At this time on PEEP of 5 and FiO2 of 40%, PO2 is adequate.  pressure support trial as tolerated.   On pressure support to get very anxious agitated and tachypneic with low tidal volume.  - Good urine output.  No need for diuretics today.  Will use as needed.  Replace electrolytes as indicated  -Right arm paralysis, no clear underlying etiology, CT showed abnormal bilateral frontoparietal enhancement.  Perceptual diagnosis is wide.  Power is improving on right arm but still significantly weaker than left  - Fever subsided after antibiotic was restarted on September 4.  Completed empirical course and now off antibiotic  - Completed COVID-19 treatment  - DVT prophylaxis with high-dose Lovenox.  GI prophylaxis.  Nutritional support    DISPOSITION  Stay in ICU    CRITICAL CARE CONSULTANT NOTE  I had a face to face encounter with the patient, reviewed and interpreted patient data including clinical events, labs, images, vital signs, I/O's, and examined patient. I have discussed the case and the plan and management of the patient's care with the consulting services, the bedside nurses and the respiratory therapist.      NOTE OF PERSONAL INVOLVEMENT IN CARE   This patient has a high probability of imminent, clinically significant deterioration, which requires the highest level of preparedness to intervene urgently. I participated in the decision-making and personally managed or directed the management of the following life and organ supporting interventions that required my frequent assessment to treat or prevent imminent deterioration. I personally spent 40 minutes of critical care time. This is time spent at this critically ill patient's bedside actively involved in patient care as well as the coordination of care and discussions with the patient's family. This does not include any procedural time which has been billed separately. Ozzie Nelson M.D.   Staff Intensivist/Pulmonologist  [facility]  9/15/2020

## 2020-09-15 NOTE — PROGRESS NOTES
ARDS  Acute hypoxic respiratory failure  COVID positive  Sinus pauses  S/P tracheostomy     Remains intubated    Still anxious at times    Some vomiting    Having some issues with tachycardia    Not able to go with SBT today    Creatinine normal    Bilirubin and other LFTs look good    ABG 7.44 / 46 / 88 / 32 / 8     Vent FiO2 45, PEEP 5, RR 22    Remains on Lovenox     Remains on Xanax / Seroquel     Remains on Dilaudid and Precedex    CXR - patchy airspace disease persists      Intake/Output Summary (Last 24 hours) at 9/15/2020 1439  Last data filed at 9/15/2020 1358  Gross per 24 hour   Intake 1436.78 ml   Output 2335 ml   Net -898.22 ml     Visit Vitals  /74   Pulse (!) 123   Temp 99.4 °F (37.4 °C)   Resp 29   Ht 5' 11\" (1.803 m)   Wt 206 lb 9.1 oz (93.7 kg)   SpO2 97%   BMI 28.81 kg/m²       Risk of morbidity and mortality - high  Medical decision making - high complexity    Total critical care time - 30 minutes (CPT 99194)     I personally spent the above critical care time. This is time spent at this critically ill patient's bedside / unit / floor actively involved in patient care as well as the coordination of care and discussions with the patient's family. This does not include any procedural time which has been billed separately.

## 2020-09-15 NOTE — PROGRESS NOTES
Occupational Therapy: hold    Chart reviewed, consulted with PT. Noted patient is not medically appropriate for activity. Will hold OT today and will f/u tomorrow as able and appropriate.     Renny Logan, OTR/L

## 2020-09-16 ENCOUNTER — APPOINTMENT (OUTPATIENT)
Dept: CT IMAGING | Age: 30
DRG: 004 | End: 2020-09-16
Attending: INTERNAL MEDICINE
Payer: COMMERCIAL

## 2020-09-16 LAB
ALBUMIN SERPL-MCNC: 3.1 G/DL (ref 3.5–5)
ALBUMIN/GLOB SERPL: 0.6 {RATIO} (ref 1.1–2.2)
ALP SERPL-CCNC: 106 U/L (ref 45–117)
ALT SERPL-CCNC: 67 U/L (ref 12–78)
ANION GAP SERPL CALC-SCNC: 5 MMOL/L (ref 5–15)
ARTERIAL PATENCY WRIST A: YES
AST SERPL-CCNC: 43 U/L (ref 15–37)
BASE EXCESS BLD CALC-SCNC: 11 MMOL/L
BASOPHILS # BLD: 0 K/UL (ref 0–0.1)
BASOPHILS NFR BLD: 1 % (ref 0–1)
BDY SITE: ABNORMAL
BILIRUB SERPL-MCNC: 0.4 MG/DL (ref 0.2–1)
BUN SERPL-MCNC: 17 MG/DL (ref 6–20)
BUN/CREAT SERPL: 29 (ref 12–20)
CA-I BLD-SCNC: 1.23 MMOL/L (ref 1.12–1.32)
CALCIUM SERPL-MCNC: 9.6 MG/DL (ref 8.5–10.1)
CHLORIDE SERPL-SCNC: 102 MMOL/L (ref 97–108)
CO2 SERPL-SCNC: 31 MMOL/L (ref 21–32)
CREAT SERPL-MCNC: 0.59 MG/DL (ref 0.7–1.3)
DIFFERENTIAL METHOD BLD: ABNORMAL
EOSINOPHIL # BLD: 0.1 K/UL (ref 0–0.4)
EOSINOPHIL NFR BLD: 1 % (ref 0–7)
ERYTHROCYTE [DISTWIDTH] IN BLOOD BY AUTOMATED COUNT: 14.1 % (ref 11.5–14.5)
GAS FLOW.O2 O2 DELIVERY SYS: ABNORMAL L/MIN
GAS FLOW.O2 SETTING OXYMISER: 22 BPM
GLOBULIN SER CALC-MCNC: 4.9 G/DL (ref 2–4)
GLUCOSE SERPL-MCNC: 112 MG/DL (ref 65–100)
HCO3 BLD-SCNC: 32.8 MMOL/L (ref 22–26)
HCT VFR BLD AUTO: 29.4 % (ref 36.6–50.3)
HGB BLD-MCNC: 9.2 G/DL (ref 12.1–17)
IMM GRANULOCYTES # BLD AUTO: 0.2 K/UL (ref 0–0.04)
IMM GRANULOCYTES NFR BLD AUTO: 2 % (ref 0–0.5)
INSPIRATION.DURATION SETTING TIME VENT: 0.91 SEC
LYMPHOCYTES # BLD: 1.7 K/UL (ref 0.8–3.5)
LYMPHOCYTES NFR BLD: 19 % (ref 12–49)
MAGNESIUM SERPL-MCNC: 1.9 MG/DL (ref 1.6–2.4)
MCH RBC QN AUTO: 28.1 PG (ref 26–34)
MCHC RBC AUTO-ENTMCNC: 31.3 G/DL (ref 30–36.5)
MCV RBC AUTO: 89.9 FL (ref 80–99)
MONOCYTES # BLD: 0.8 K/UL (ref 0–1)
MONOCYTES NFR BLD: 9 % (ref 5–13)
NEUTS SEG # BLD: 6.1 K/UL (ref 1.8–8)
NEUTS SEG NFR BLD: 68 % (ref 32–75)
NRBC # BLD: 0 K/UL (ref 0–0.01)
NRBC BLD-RTO: 0 PER 100 WBC
O2/TOTAL GAS SETTING VFR VENT: 40 %
PCO2 BLD: 37 MMHG (ref 35–45)
PEEP RESPIRATORY: 5 CMH2O
PH BLD: 7.56 [PH] (ref 7.35–7.45)
PHOSPHATE SERPL-MCNC: 3.6 MG/DL (ref 2.6–4.7)
PIP ISTAT,IPIP: 30
PLATELET # BLD AUTO: 318 K/UL (ref 150–400)
PMV BLD AUTO: 10 FL (ref 8.9–12.9)
PO2 BLD: 122 MMHG (ref 80–100)
POTASSIUM SERPL-SCNC: 3.1 MMOL/L (ref 3.5–5.1)
PROT SERPL-MCNC: 8 G/DL (ref 6.4–8.2)
RBC # BLD AUTO: 3.27 M/UL (ref 4.1–5.7)
SAO2 % BLD: 99 % (ref 92–97)
SODIUM SERPL-SCNC: 138 MMOL/L (ref 136–145)
SPECIMEN TYPE: ABNORMAL
TOTAL RESP. RATE, ITRR: 22
VENTILATION MODE VENT: ABNORMAL
WBC # BLD AUTO: 8.8 K/UL (ref 4.1–11.1)

## 2020-09-16 PROCEDURE — 80053 COMPREHEN METABOLIC PANEL: CPT

## 2020-09-16 PROCEDURE — 74011250637 HC RX REV CODE- 250/637: Performed by: INTERNAL MEDICINE

## 2020-09-16 PROCEDURE — 85025 COMPLETE CBC W/AUTO DIFF WBC: CPT

## 2020-09-16 PROCEDURE — 74011250636 HC RX REV CODE- 250/636: Performed by: NURSE PRACTITIONER

## 2020-09-16 PROCEDURE — 65610000006 HC RM INTENSIVE CARE

## 2020-09-16 PROCEDURE — 74011250637 HC RX REV CODE- 250/637: Performed by: NURSE PRACTITIONER

## 2020-09-16 PROCEDURE — 74011250636 HC RX REV CODE- 250/636: Performed by: INTERNAL MEDICINE

## 2020-09-16 PROCEDURE — 87040 BLOOD CULTURE FOR BACTERIA: CPT

## 2020-09-16 PROCEDURE — 74011000250 HC RX REV CODE- 250: Performed by: INTERNAL MEDICINE

## 2020-09-16 PROCEDURE — 74011000258 HC RX REV CODE- 258: Performed by: INTERNAL MEDICINE

## 2020-09-16 PROCEDURE — 82803 BLOOD GASES ANY COMBINATION: CPT

## 2020-09-16 PROCEDURE — 74011250637 HC RX REV CODE- 250/637: Performed by: HOSPITALIST

## 2020-09-16 PROCEDURE — 94003 VENT MGMT INPAT SUBQ DAY: CPT

## 2020-09-16 PROCEDURE — 36415 COLL VENOUS BLD VENIPUNCTURE: CPT

## 2020-09-16 PROCEDURE — 99291 CRITICAL CARE FIRST HOUR: CPT | Performed by: THORACIC SURGERY (CARDIOTHORACIC VASCULAR SURGERY)

## 2020-09-16 PROCEDURE — 71250 CT THORAX DX C-: CPT

## 2020-09-16 PROCEDURE — 83735 ASSAY OF MAGNESIUM: CPT

## 2020-09-16 PROCEDURE — 36600 WITHDRAWAL OF ARTERIAL BLOOD: CPT

## 2020-09-16 PROCEDURE — 84100 ASSAY OF PHOSPHORUS: CPT

## 2020-09-16 PROCEDURE — 74176 CT ABD & PELVIS W/O CONTRAST: CPT

## 2020-09-16 RX ORDER — PROPOFOL 10 MG/ML
INJECTION, EMULSION INTRAVENOUS
Status: DISPENSED
Start: 2020-09-16 | End: 2020-09-17

## 2020-09-16 RX ORDER — MIDAZOLAM HYDROCHLORIDE 1 MG/ML
4 INJECTION, SOLUTION INTRAMUSCULAR; INTRAVENOUS ONCE
Status: COMPLETED | OUTPATIENT
Start: 2020-09-16 | End: 2020-09-16

## 2020-09-16 RX ORDER — ENOXAPARIN SODIUM 100 MG/ML
45 INJECTION SUBCUTANEOUS EVERY 12 HOURS
Status: DISCONTINUED | OUTPATIENT
Start: 2020-09-16 | End: 2020-09-22

## 2020-09-16 RX ORDER — PROPOFOL 10 MG/ML
0-50 VIAL (ML) INTRAVENOUS
Status: DISCONTINUED | OUTPATIENT
Start: 2020-09-16 | End: 2020-09-16

## 2020-09-16 RX ADMIN — SODIUM CHLORIDE 1000 ML: 9 INJECTION, SOLUTION INTRAVENOUS at 15:15

## 2020-09-16 RX ADMIN — BUSPIRONE HYDROCHLORIDE 5 MG: 5 TABLET ORAL at 21:02

## 2020-09-16 RX ADMIN — METOCLOPRAMIDE 5 MG: 5 INJECTION, SOLUTION INTRAMUSCULAR; INTRAVENOUS at 00:59

## 2020-09-16 RX ADMIN — DEXMEDETOMIDINE 1 MCG/KG/HR: 100 INJECTION, SOLUTION, CONCENTRATE INTRAVENOUS at 18:08

## 2020-09-16 RX ADMIN — ACETAMINOPHEN ORAL SOLUTION 650 MG: 650 SOLUTION ORAL at 21:05

## 2020-09-16 RX ADMIN — Medication 40 ML: at 15:06

## 2020-09-16 RX ADMIN — OXYCODONE HYDROCHLORIDE 5 MG: 5 TABLET ORAL at 11:36

## 2020-09-16 RX ADMIN — ENOXAPARIN SODIUM 45 MG: 60 INJECTION SUBCUTANEOUS at 21:01

## 2020-09-16 RX ADMIN — Medication 10 ML: at 21:02

## 2020-09-16 RX ADMIN — CHLORHEXIDINE GLUCONATE 15 ML: 0.12 RINSE ORAL at 08:32

## 2020-09-16 RX ADMIN — MIDAZOLAM 4 MG: 1 INJECTION INTRAMUSCULAR; INTRAVENOUS at 09:56

## 2020-09-16 RX ADMIN — MIDAZOLAM HYDROCHLORIDE 2 MG: 2 INJECTION, SOLUTION INTRAMUSCULAR; INTRAVENOUS at 07:10

## 2020-09-16 RX ADMIN — PROPOFOL 10 MCG/KG/MIN: 10 INJECTION, EMULSION INTRAVENOUS at 12:43

## 2020-09-16 RX ADMIN — FENTANYL CITRATE 100 MCG: 50 INJECTION, SOLUTION INTRAMUSCULAR; INTRAVENOUS at 11:36

## 2020-09-16 RX ADMIN — METOCLOPRAMIDE 5 MG: 5 INJECTION, SOLUTION INTRAMUSCULAR; INTRAVENOUS at 11:35

## 2020-09-16 RX ADMIN — METOCLOPRAMIDE 5 MG: 5 INJECTION, SOLUTION INTRAMUSCULAR; INTRAVENOUS at 06:47

## 2020-09-16 RX ADMIN — ONDANSETRON 4 MG: 2 INJECTION INTRAMUSCULAR; INTRAVENOUS at 21:25

## 2020-09-16 RX ADMIN — ALPRAZOLAM 0.5 MG: 0.5 TABLET ORAL at 21:02

## 2020-09-16 RX ADMIN — MICONAZOLE NITRATE 2 % TOPICAL POWDER: at 08:32

## 2020-09-16 RX ADMIN — DEXMEDETOMIDINE 1.4 MCG/KG/HR: 100 INJECTION, SOLUTION, CONCENTRATE INTRAVENOUS at 14:57

## 2020-09-16 RX ADMIN — METOCLOPRAMIDE 5 MG: 5 INJECTION, SOLUTION INTRAMUSCULAR; INTRAVENOUS at 17:35

## 2020-09-16 RX ADMIN — ASPIRIN 81 MG CHEWABLE TABLET 81 MG: 81 TABLET CHEWABLE at 08:31

## 2020-09-16 RX ADMIN — MICONAZOLE NITRATE 2 % TOPICAL POWDER: at 17:36

## 2020-09-16 RX ADMIN — ONDANSETRON 4 MG: 2 INJECTION INTRAMUSCULAR; INTRAVENOUS at 08:20

## 2020-09-16 RX ADMIN — BUSPIRONE HYDROCHLORIDE 5 MG: 5 TABLET ORAL at 15:04

## 2020-09-16 RX ADMIN — ALPRAZOLAM 0.5 MG: 0.5 TABLET ORAL at 08:31

## 2020-09-16 RX ADMIN — Medication: at 17:35

## 2020-09-16 RX ADMIN — SODIUM CHLORIDE 10 MG: 9 INJECTION INTRAMUSCULAR; INTRAVENOUS; SUBCUTANEOUS at 12:15

## 2020-09-16 RX ADMIN — DEXMEDETOMIDINE 1.4 MCG/KG/HR: 100 INJECTION, SOLUTION, CONCENTRATE INTRAVENOUS at 10:57

## 2020-09-16 RX ADMIN — FAMOTIDINE 20 MG: 10 INJECTION INTRAVENOUS at 08:23

## 2020-09-16 RX ADMIN — Medication 10 ML: at 07:07

## 2020-09-16 RX ADMIN — OXYCODONE HYDROCHLORIDE 5 MG: 5 TABLET ORAL at 17:35

## 2020-09-16 RX ADMIN — ACETAMINOPHEN ORAL SOLUTION 650 MG: 650 SOLUTION ORAL at 15:04

## 2020-09-16 RX ADMIN — MIDAZOLAM HYDROCHLORIDE 2 MG: 2 INJECTION, SOLUTION INTRAMUSCULAR; INTRAVENOUS at 17:35

## 2020-09-16 RX ADMIN — FAMOTIDINE 20 MG: 10 INJECTION INTRAVENOUS at 21:01

## 2020-09-16 RX ADMIN — Medication: at 08:31

## 2020-09-16 RX ADMIN — SODIUM CHLORIDE 10 MG: 9 INJECTION INTRAMUSCULAR; INTRAVENOUS; SUBCUTANEOUS at 04:06

## 2020-09-16 RX ADMIN — ENOXAPARIN SODIUM 50 MG: 60 INJECTION SUBCUTANEOUS at 09:57

## 2020-09-16 RX ADMIN — ALPRAZOLAM 0.5 MG: 0.5 TABLET ORAL at 15:04

## 2020-09-16 RX ADMIN — MIDAZOLAM 4 MG: 1 INJECTION INTRAMUSCULAR; INTRAVENOUS at 11:36

## 2020-09-16 RX ADMIN — ACETAMINOPHEN ORAL SOLUTION 650 MG: 650 SOLUTION ORAL at 08:31

## 2020-09-16 RX ADMIN — FENTANYL CITRATE 100 MCG: 50 INJECTION, SOLUTION INTRAMUSCULAR; INTRAVENOUS at 15:03

## 2020-09-16 RX ADMIN — OXYCODONE HYDROCHLORIDE 5 MG: 5 TABLET ORAL at 00:59

## 2020-09-16 RX ADMIN — CHLORHEXIDINE GLUCONATE 15 ML: 0.12 RINSE ORAL at 21:01

## 2020-09-16 RX ADMIN — OXYCODONE HYDROCHLORIDE 5 MG: 5 TABLET ORAL at 06:47

## 2020-09-16 RX ADMIN — DEXMEDETOMIDINE 0.6 MCG/KG/HR: 100 INJECTION, SOLUTION, CONCENTRATE INTRAVENOUS at 06:49

## 2020-09-16 RX ADMIN — BUSPIRONE HYDROCHLORIDE 5 MG: 5 TABLET ORAL at 08:31

## 2020-09-16 RX ADMIN — OXYCODONE HYDROCHLORIDE 5 MG: 5 SOLUTION ORAL at 08:31

## 2020-09-16 RX ADMIN — QUETIAPINE FUMARATE 100 MG: 100 TABLET ORAL at 21:02

## 2020-09-16 NOTE — PROGRESS NOTES
MIGUELINA  Patient presented to [facility] ED from home with increased shortness of breath, diaphoresis and fever. Transferred to [facility] for possible ECMO placement  COVID Positive. RUR: 25%  Disposition: Will need rehab  Patient remains intubated on precedex gtt. Neuro: opens eyes, following commands. Patient requiring  medication for anxiety. Patient continues to vomit, receiving reglan and zofran. Care management is continuing to follow for transitions of care.    Kallie Villatoro RN,CRM

## 2020-09-16 NOTE — PROGRESS NOTES
Comprehensive Nutrition Assessment    Type and Reason for Visit: Reassess    Nutrition Recommendations/Plan:      1. Continue with latest tube feeding regimen (changed on 9/14) to:   --- 2cal HN at 40 ml/hr, x2 packets Liquid Prosource BID, 200 ml water flushes every 4 hours   --- This provides 2140 kcals, 140 gm pro, 2100 ml free fluid    2. Pt had CT of chest and abdomen this afternoon d/t continued vomiting (results pending). Would make sure that dobhoff is actually in small bowel and not stomach if vomiting continues    3. It's unlikely, but could pt's multiple bowel meds be contributing to his emesis (pt is on miralax, reglan, colace, lactulose)? 4.  Could pyloric spasms potentially be cause for emesis? 5.  Consider adjusting bowel meds as stools have been quite loose       Nutrition Assessment:    Pt admitted to [facility] d/t Respiratory failure with hypoxia. No PMHx except smoking. Noted: Acuter hypoxic respiratory failure d/t COVID 19 PNA,  intubated 8/10. ARDS. Transferred to [facility] for possible ECMO support-not indicated. S/P convalescent plasma x 2, Remdisivir. B/L PTX-s/p left CT-removed. 8/26 Perc trach placed; SBT x 2 hr today. Neurology consulted-?acute/subacute CVA. 9/16:  Pt and pt's nurse were off the floor in CT at time of my visit, but I spoke with another RN who was familiar with pt. Pt is extremely anxious when awake, and RN reports that pt will start coughing, then gagging, and will then throw up (sometimes emesis is just gastric secretions). Pt had some episodes of emesis about 2 weeks ago but that seemed to resolve. But around 9/10, it seems that his emesis became more frequent, several times per day. Pt had CT of abdomen and chest today to look for possible causes for the emesis.   Pt had already been on a semi-elemental formula (Vital 1.2), but could not consistently receive goal rate d/t vomiting; formula was switched to 2cal HN on 9/14 in the hope that a higher calorie formula delivered at a lower rate would be better tolerated. RD continues to follow. Malnutrition Assessment:  Malnutrition Status:  Insufficient data      Nutritionally Significant Medications:   Colace, Miralax, lactulose, reglan    Estimated Daily Nutrient Needs:  Energy (kcal):  2275 (25 kcal/kg)  Protein (g):  156 (2g/kg IBW)       Fluid (ml/day):  1 ml/kcal    Nutrition Related Findings:  Last BM was 9/15 (loose, diarrhea), edema 1+ genitals, RUE    Wounds:  None       Current Nutrition Therapies:   Diet: NPO  Tube Feeding: see recommendations    Anthropometric Measures:  · Height:  5' 11\" (180.3 cm)  · Current Body Wt:  94.2 kg (207 lb 10.8 oz)   · Admission Body Wt:  242 lb 8.1 oz      · Ideal Body Wt:  172#  :  120.7 %   · BMI Categories:  Obese class 1 (BMI 30.0-34. 9)     Wt Readings from Last 10 Encounters:   09/15/20 93.7 kg (206 lb 9.1 oz)   08/18/20 100.7 kg (222 lb 0.1 oz)     Nutrition Diagnosis:   · Inadequate protein-energy intake related to altered GI function as evidenced by nausea, vomiting    Nutrition Interventions:   Food and/or Nutrient Delivery: Continue tube feeding  Nutrition Education and Counseling: No recommendations at this time  Coordination of Nutrition Care: Continued inpatient monitoring    Goals:  Pt will tolerate tube feeds without emesis over the next week       Nutrition Monitoring and Evaluation:   Food/Nutrient Intake Outcomes: Enteral nutrition intake/tolerance  Physical Signs/Symptoms Outcomes: Biochemical data, Hemodynamic status, GI status, Fluid status or edema, Weight    Discharge Planning:     Too soon to determine     Sherry Rockwell, [address] [place] [address], [facility]  Contact: [phone]

## 2020-09-16 NOTE — PROGRESS NOTES
Occupational Therapy: hold    Chart reviewed, consulted with RN. Noted patient's HR upper 120s and RR upper 30s at rest.  Per RN, patient also with increased sedation and plans for CT when able. Will hold OT at this time and will f/u when patient is medially appropriate and able to participate.     Dwight Soto, OTR/L

## 2020-09-16 NOTE — PROGRESS NOTES
[facility]    ICU TEAM Progress Note    Name: Josette Enciso   : 1990   MRN: [id number]   Date: 2020      I  Subjective:   Progress Note: 2020      Reason for ICU Admission: Respiratory failure due to COVID    Interval history:  25-year-old male with no history of significant past medical history except smoking half pack per day. [facility] was admitted on 2020 at [facility] with acute hypoxemic respiratory failure from COVID pneumonia. [facility] continued to deteriorate and got intubated on 8/10.  Due to worsening hypoxemia, he is transferred to [facility] for ECMO evaluation. [facility] has bee given convalescent plasma twice on  and  and treated with Remdesivir which was completed on 8/10. [facility] is also on dexamethasone. [facility] completed course of empiric antibiotics including azithromycin which was completed on  and cefepime was completed on .  During his hospital course he also developed pneumothorax and has left-sided chest tube since .  Has a tracheostomy tube placed on .  On September 3 patient had significant worsening on his right arm, CT head was unrevealing.  He is already on high-dose Lovenox and aspirin.  On  he had acute weakness on the left arm code stroke was called and again CTA was unrevealing.  Since then left arm is back to baseline, showed some improvement on right arm but still weak compared to left with minimal ability to move against gravity. Overnight Events:   Still episode of vomiting, associated with anxiety.   Requiring PRN midazolam, no other acute issues    Active Problem List:     Problem List  Date Reviewed: 2020          Codes Class    Acute respiratory failure ([facility] [address] ICD-10-CM: J96.00  ICD-9-CM: 518.81         Pneumothorax on left ICD-10-CM: J93.9  ICD-9-CM: 512.89         Pneumonia due to severe acute respiratory syndrome coronavirus 2 (SARS-CoV-2) ICD-10-CM: U07.1, J12.89  ICD-9-CM: 480.8 Respiratory failure with hypoxia [facility]) ICD-10-CM: J96.91  ICD-9-CM: 518.81               Past Medical History:      has a past medical history of History of vascular access device (08/10/2020). Past Surgical History:      has a past surgical history that includes ir thora/ins chest tube(pneumo) wo image (2020); ir thora/ins chest tube(pneumo) wo image (2020); and pr tracheostomy, planned (2020). Home Medications:     Prior to Admission medications    Medication Sig Start Date End Date Taking? Authorizing Provider   benzonatate (Tessalon Perles) 100 mg capsule Take 100 mg by mouth three (3) times daily as needed for Cough. Provider, Historical       Allergies/Social/Family History:     No Known Allergies   Social History     Tobacco Use    Smoking status: Current Some Day Smoker    Smokeless tobacco: Never Used   Substance Use Topics    Alcohol use: Not on file      History reviewed. No pertinent family history. Review of Systems:     Not able to obtain due to his medical condition    Objective:   Vital Signs:  Visit Vitals  /68   Pulse (!) 119   Temp 99.9 °F (37.7 °C)   Resp (!) 35   Ht 5' 11\" (1.803 m)   Wt 93.7 kg (206 lb 9.1 oz)   SpO2 94%   BMI 28.81 kg/m²      O2 Device: Tracheostomy Temp (24hrs), Av.9 °F (37.7 °C), Min:99.4 °F (37.4 °C), Max:100.6 °F (38.1 °C)           Intake/Output:     Intake/Output Summary (Last 24 hours) at 2020 1007  Last data filed at 2020 0800  Gross per 24 hour   Intake 1781.46 ml   Output 1870 ml   Net -88.54 ml       Physical Exam:    General:   Sedated on mechanical ventilation via tracheostomy   Eyes:  Sclera anicteric. Pupils equally round and reactive to light. Mouth/Throat: Mucous membranes normal, oral pharynx clear   Neck: Supple, tracheostomy tube in place   Lungs:    Good air entry bilaterally, fine crepitation   CV:  Regular rate and rhythm,no murmur, click, rub or gallop   Abdomen:   Soft, non-tender.  bowel sounds normal. non-distended   Extremities: No cyanosis , evolving edema   Skin: Skin color, texture, turgor normal. no acute rash or lesions   Lymph nodes: Cervical and supraclavicular normal   Musculoskeletal: No swelling or deformity   Lines/Devices:  Intact, no erythema, drainage or tenderness   Psych:  Minimal sedation, opens eyes spontaneously, sometimes seems to be tracking and follow simple command     Moving right arm but not against gravityno deformity noticed, good pulses good color good capillary refill no shoulder or neck tenderness or swelling.  .Face is symmetrical.  Moves left arm spontaneously, wiggle left toes.  Profound weakness         LABS AND  DATA: Personally reviewed  Recent Labs     09/16/20  0441 09/15/20  0357   WBC 8.8 5.4   HGB 9.2* 8.4*   HCT 29.4* 26.2*    282     Recent Labs     09/16/20  0441 09/15/20  0357    137   K 3.1* 3.6    102   CO2 31 29   BUN 17 16   CREA 0.59* 0.52*   * 115*   CA 9.6 9.5   MG 1.9 1.9   PHOS 3.6 4.8*     Recent Labs     09/16/20  0441 09/15/20  0357    94   TP 8.0 7.2   ALB 3.1* 2.7*   GLOB 4.9* 4.5*     No results for input(s): INR, PTP, APTT, INREXT in the last 72 hours. Recent Labs     09/16/20  0622 09/15/20  0400   PHI 7.56* 7.44   PCO2I 37.0 46.2*   PO2I 122* 88   FIO2I 40 45     No results for input(s): CPK, CKMB, TROIQ, BNPP in the last 72 hours. Hemodynamics:   PAP:   CO:     Wedge:   CI:     CVP:    SVR:       PVR:       Ventilator Settings:  Mode Rate Tidal Volume Pressure FiO2 PEEP   Assist control   2 ml  10 cm H2O 40 % 8 cm H20     Peak airway pressure: 30 cm H2O    Minute ventilation: 10.1 l/min        MEDS: Reviewed    Chest X-Ray:  CXR Results  (Last 48 hours)               09/14/20 4956  XR CHEST PORT Final result    Impression:  IMPRESSION:       Stable patchy bilateral lung opacities.            Narrative:  EXAM:  XR CHEST PORT       INDICATION: Aspiration       COMPARISON: 9/9/2020       TECHNIQUE: Portable AP semiupright chest view at 2249 hours       FINDINGS: The tracheostomy tube and enteric tube are stable. Cardiac monitoring   wires overlie the thorax. The cardiomediastinal contours are stable. There is stable patchy bilateral lung opacities and low lung volumes. There is   no pleural effusion or pneumothorax. The bones and upper abdomen are stable. Assessment and Plan:   -Bilateral pneumonia due to COVID-19 infection  -Acute right arm weakness:  Patient already on aspirin and moderate dose anticoagulation.  Still pending CTA head and neck, CT head showed no bleed. -Right arm weakness  -Sepsis: Resolved  - Acute hypoxic respiratory failure due to COVID-19 infection status post tracheostomy on August 26  - Pneumothorax status post left chest tube insertion on August 11 without air leak  - Possible bacterial super imposed infection: Completed antibiotic course  -History of tobacco use     -Continue to wean sedation as tolerated. Added BuSpar on for mood stabilization, he is on Seroquel nightly  - At this time on PEEP of 5 and FiO2 of 40%, PO2 is adequate.  pressure support trial as tolerated.  On pressure support to get very anxious agitated and tachypneic with low tidal volume.  - Good urine output.  No need for diuretics today.  Will use as needed.  Replace electrolytes as indicated  -Right arm paralysis, no clear underlying etiology, CT showed abnormal bilateral frontoparietal enhancement.  Perceptual diagnosis is wide.  Power is improving on right arm but still significantly weaker than left  - Fever subsided after antibiotic was restarted on September 4.  Completed empirical course and now off antibiotic  -Recurrent vomiting.   Added Reglan yesterday, I will obtain a CT abdomen without contrast.  While he is down in CAT scan I will obtain a CT chest without contrast to follow-up on his lung post COVID infection changes  - Completed COVID-19 treatment  - DVT prophylaxis with high-dose Lovenox.  GI prophylaxis.  Nutritional support    Addendum:  CT abdomen did not show acute abnormality, CT chest showed significant fibrotic changes as expected from his severe COVID-19 infection. Patient remains anxious agitated tachycardic and tachypneic, I will give him 1 L fluid bolus, repeat blood culture. We will continue to monitor closely    DISPOSITION  Stay in ICU    CRITICAL CARE CONSULTANT NOTE  I had a face to face encounter with the patient, reviewed and interpreted patient data including clinical events, labs, images, vital signs, I/O's, and examined patient. I have discussed the case and the plan and management of the patient's care with the consulting services, the bedside nurses and the respiratory therapist.      NOTE OF PERSONAL INVOLVEMENT IN CARE   This patient has a high probability of imminent, clinically significant deterioration, which requires the highest level of preparedness to intervene urgently. I participated in the decision-making and personally managed or directed the management of the following life and organ supporting interventions that required my frequent assessment to treat or prevent imminent deterioration. I personally spent 40 minutes of critical care time. This is time spent at this critically ill patient's bedside actively involved in patient care as well as the coordination of care and discussions with the patient's family. This does not include any procedural time which has been billed separately. Jarek Wagner M.D.   Staff Intensivist/Pulmonologist  [facility]  9/16/2020

## 2020-09-16 NOTE — PROGRESS NOTES
1930: Bedside shift change report given to [address] [place] [address] (oncoming nurse) by Mady Moreira (offgoing nurse). Report included the following information SBAR, Kardex, Intake/Output, MAR, Recent Results, Cardiac Rhythm NSR/sinus tach and Alarm Parameters . 2030: Pt vomited small to medium amnt of yellow emesis. Prn zofran given. 2140: Phone update to Mercedes adler.     0400: Pt vomited, prn compazine given. 9650: Pt vomited after being suctioned. Agitated and alarming vent for high RR and low volumes. Given prn versed for sedation and titrating precedex gtt appropriately. 0730: Bedside shift change report given to Lyubov Bustamante RN (oncoming nurse) by [address] [place] [address] (offgoing nurse). Report included the following information SBAR, Kardex, Intake/Output, MAR, Recent Results, Cardiac Rhythm NSR/sinus tach and Alarm Parameters .

## 2020-09-16 NOTE — PROGRESS NOTES
ARDS  Acute hypoxic respiratory failure  COVID positive  Sinus pauses  S/P tracheostomy     Remains on vent    Anxiety still an issus with SBT's    TFs at goal    Some issues with vomiting    Versed / Precedex for high RR and high HR    WBCs normal    Hgb and platelets look good    Creatinine normal    Bilirubin and other LFTs look good    ABG - 7.56 / 37 / 122 / 33 / 11    Vent - FiO2 40%, PEEP 5, RR 22    CXR - diffuse opacities persist      Intake/Output Summary (Last 24 hours) at 9/16/2020 1601  Last data filed at 9/16/2020 1500  Gross per 24 hour   Intake 1532.03 ml   Output 1150 ml   Net 382.03 ml     Visit Vitals  /84   Pulse (!) 108   Temp (!) 102.8 °F (39.3 °C)   Resp (!) 36   Ht 5' 11\" (1.803 m)   Wt 206 lb 9.1 oz (93.7 kg)   SpO2 (!) 86%   BMI 28.81 kg/m²       Risk of morbidity and mortality - high  Medical decision making - high complexity    Total critical care time - 30 minutes (CPT 26783)     I personally spent the above critical care time. This is time spent at this critically ill patient's bedside / unit / floor actively involved in patient care as well as the coordination of care and discussions with the patient's family. This does not include any procedural time which has been billed separately.

## 2020-09-16 NOTE — PROGRESS NOTES
Lovenox Monitoring  Indication:  Intermediate Anticoagulation in COVID-19+ patient  Recent Labs     09/16/20  0441 09/15/20  0357 09/14/20  0221   HGB 9.2* 8.4* 8.6*    282 259   CREA 0.59* 0.52* 0.49*     Current Weight: 93.7 kg  Est. CrCl = >100 ml/min  Current Dose: 50 mg subcutaneously every 12 hours. Plan: Change to 45 mg (~0.5 mg/kg based on weight of 93.7 kg) subcutaneously every 12 hours per the [facility]-approved Lovenox dose rounding protocol.

## 2020-09-17 LAB
ALBUMIN SERPL-MCNC: 2.8 G/DL (ref 3.5–5)
ALBUMIN/GLOB SERPL: 0.6 {RATIO} (ref 1.1–2.2)
ALP SERPL-CCNC: 103 U/L (ref 45–117)
ALT SERPL-CCNC: 70 U/L (ref 12–78)
ANION GAP SERPL CALC-SCNC: 5 MMOL/L (ref 5–15)
ARTERIAL PATENCY WRIST A: YES
AST SERPL-CCNC: 31 U/L (ref 15–37)
BASE EXCESS BLD CALC-SCNC: 6 MMOL/L
BASOPHILS # BLD: 0 K/UL (ref 0–0.1)
BASOPHILS NFR BLD: 0 % (ref 0–1)
BDY SITE: ABNORMAL
BILIRUB SERPL-MCNC: 0.5 MG/DL (ref 0.2–1)
BUN SERPL-MCNC: 13 MG/DL (ref 6–20)
BUN/CREAT SERPL: 26 (ref 12–20)
CA-I BLD-SCNC: 1.27 MMOL/L (ref 1.12–1.32)
CALCIUM SERPL-MCNC: 9.2 MG/DL (ref 8.5–10.1)
CHLORIDE SERPL-SCNC: 101 MMOL/L (ref 97–108)
CO2 SERPL-SCNC: 31 MMOL/L (ref 21–32)
CREAT SERPL-MCNC: 0.5 MG/DL (ref 0.7–1.3)
DIFFERENTIAL METHOD BLD: ABNORMAL
EOSINOPHIL # BLD: 0 K/UL (ref 0–0.4)
EOSINOPHIL NFR BLD: 0 % (ref 0–7)
ERYTHROCYTE [DISTWIDTH] IN BLOOD BY AUTOMATED COUNT: 14.6 % (ref 11.5–14.5)
GAS FLOW.O2 O2 DELIVERY SYS: ABNORMAL L/MIN
GAS FLOW.O2 SETTING OXYMISER: 20 BPM
GLOBULIN SER CALC-MCNC: 4.4 G/DL (ref 2–4)
GLUCOSE SERPL-MCNC: 112 MG/DL (ref 65–100)
HCO3 BLD-SCNC: 30.6 MMOL/L (ref 22–26)
HCT VFR BLD AUTO: 26.4 % (ref 36.6–50.3)
HGB BLD-MCNC: 8.3 G/DL (ref 12.1–17)
IMM GRANULOCYTES # BLD AUTO: 0.1 K/UL (ref 0–0.04)
IMM GRANULOCYTES NFR BLD AUTO: 1 % (ref 0–0.5)
INSPIRATION.DURATION SETTING TIME VENT: 0.91 SEC
LYMPHOCYTES # BLD: 1.6 K/UL (ref 0.8–3.5)
LYMPHOCYTES NFR BLD: 16 % (ref 12–49)
MAGNESIUM SERPL-MCNC: 1.7 MG/DL (ref 1.6–2.4)
MCH RBC QN AUTO: 28.4 PG (ref 26–34)
MCHC RBC AUTO-ENTMCNC: 31.4 G/DL (ref 30–36.5)
MCV RBC AUTO: 90.4 FL (ref 80–99)
MONOCYTES # BLD: 0.6 K/UL (ref 0–1)
MONOCYTES NFR BLD: 6 % (ref 5–13)
NEUTS SEG # BLD: 7.7 K/UL (ref 1.8–8)
NEUTS SEG NFR BLD: 77 % (ref 32–75)
NRBC # BLD: 0 K/UL (ref 0–0.01)
NRBC BLD-RTO: 0 PER 100 WBC
O2/TOTAL GAS SETTING VFR VENT: 45 %
PCO2 BLD: 45.3 MMHG (ref 35–45)
PEEP RESPIRATORY: 8 CMH2O
PH BLD: 7.44 [PH] (ref 7.35–7.45)
PHOSPHATE SERPL-MCNC: 2.9 MG/DL (ref 2.6–4.7)
PIP ISTAT,IPIP: 22
PLATELET # BLD AUTO: 278 K/UL (ref 150–400)
PMV BLD AUTO: 10.4 FL (ref 8.9–12.9)
PO2 BLD: 87 MMHG (ref 80–100)
POTASSIUM SERPL-SCNC: 3 MMOL/L (ref 3.5–5.1)
PROT SERPL-MCNC: 7.2 G/DL (ref 6.4–8.2)
RBC # BLD AUTO: 2.92 M/UL (ref 4.1–5.7)
SAO2 % BLD: 97 % (ref 92–97)
SODIUM SERPL-SCNC: 137 MMOL/L (ref 136–145)
SPECIMEN TYPE: ABNORMAL
VENTILATION MODE VENT: ABNORMAL
WBC # BLD AUTO: 10 K/UL (ref 4.1–11.1)

## 2020-09-17 PROCEDURE — 74011000250 HC RX REV CODE- 250: Performed by: INTERNAL MEDICINE

## 2020-09-17 PROCEDURE — 87635 SARS-COV-2 COVID-19 AMP PRB: CPT

## 2020-09-17 PROCEDURE — 74011250637 HC RX REV CODE- 250/637: Performed by: HOSPITALIST

## 2020-09-17 PROCEDURE — 80053 COMPREHEN METABOLIC PANEL: CPT

## 2020-09-17 PROCEDURE — 94003 VENT MGMT INPAT SUBQ DAY: CPT

## 2020-09-17 PROCEDURE — 74011250637 HC RX REV CODE- 250/637: Performed by: NURSE PRACTITIONER

## 2020-09-17 PROCEDURE — 74011250637 HC RX REV CODE- 250/637: Performed by: INTERNAL MEDICINE

## 2020-09-17 PROCEDURE — 85025 COMPLETE CBC W/AUTO DIFF WBC: CPT

## 2020-09-17 PROCEDURE — 36415 COLL VENOUS BLD VENIPUNCTURE: CPT

## 2020-09-17 PROCEDURE — 74011250636 HC RX REV CODE- 250/636: Performed by: INTERNAL MEDICINE

## 2020-09-17 PROCEDURE — 84100 ASSAY OF PHOSPHORUS: CPT

## 2020-09-17 PROCEDURE — 99291 CRITICAL CARE FIRST HOUR: CPT | Performed by: THORACIC SURGERY (CARDIOTHORACIC VASCULAR SURGERY)

## 2020-09-17 PROCEDURE — 82803 BLOOD GASES ANY COMBINATION: CPT

## 2020-09-17 PROCEDURE — 83735 ASSAY OF MAGNESIUM: CPT

## 2020-09-17 PROCEDURE — 74011000258 HC RX REV CODE- 258: Performed by: INTERNAL MEDICINE

## 2020-09-17 PROCEDURE — 74011250636 HC RX REV CODE- 250/636: Performed by: NURSE PRACTITIONER

## 2020-09-17 PROCEDURE — 36600 WITHDRAWAL OF ARTERIAL BLOOD: CPT

## 2020-09-17 PROCEDURE — 65610000006 HC RM INTENSIVE CARE

## 2020-09-17 RX ORDER — ALPRAZOLAM 0.5 MG/1
1 TABLET ORAL 3 TIMES DAILY
Status: DISCONTINUED | OUTPATIENT
Start: 2020-09-17 | End: 2020-09-19

## 2020-09-17 RX ORDER — ALPRAZOLAM 0.5 MG/1
0.5 TABLET ORAL ONCE
Status: COMPLETED | OUTPATIENT
Start: 2020-09-17 | End: 2020-09-17

## 2020-09-17 RX ORDER — MAGNESIUM SULFATE 1 G/100ML
1 INJECTION INTRAVENOUS ONCE
Status: COMPLETED | OUTPATIENT
Start: 2020-09-17 | End: 2020-09-17

## 2020-09-17 RX ORDER — QUETIAPINE FUMARATE 100 MG/1
200 TABLET, FILM COATED ORAL
Status: DISCONTINUED | OUTPATIENT
Start: 2020-09-17 | End: 2020-09-21

## 2020-09-17 RX ADMIN — ENOXAPARIN SODIUM 45 MG: 60 INJECTION SUBCUTANEOUS at 21:27

## 2020-09-17 RX ADMIN — METOCLOPRAMIDE 5 MG: 5 INJECTION, SOLUTION INTRAMUSCULAR; INTRAVENOUS at 05:21

## 2020-09-17 RX ADMIN — OXYCODONE HYDROCHLORIDE 5 MG: 5 TABLET ORAL at 17:15

## 2020-09-17 RX ADMIN — MAGNESIUM SULFATE HEPTAHYDRATE 1 G: 1 INJECTION, SOLUTION INTRAVENOUS at 05:25

## 2020-09-17 RX ADMIN — ALPRAZOLAM 0.5 MG: 0.5 TABLET ORAL at 09:20

## 2020-09-17 RX ADMIN — FENTANYL CITRATE 100 MCG: 50 INJECTION, SOLUTION INTRAMUSCULAR; INTRAVENOUS at 10:59

## 2020-09-17 RX ADMIN — ALPRAZOLAM 1 MG: 0.5 TABLET ORAL at 21:27

## 2020-09-17 RX ADMIN — Medication 10 ML: at 13:36

## 2020-09-17 RX ADMIN — BUSPIRONE HYDROCHLORIDE 5 MG: 5 TABLET ORAL at 15:41

## 2020-09-17 RX ADMIN — MIDAZOLAM HYDROCHLORIDE 2 MG: 2 INJECTION, SOLUTION INTRAMUSCULAR; INTRAVENOUS at 09:22

## 2020-09-17 RX ADMIN — FAMOTIDINE 20 MG: 10 INJECTION INTRAVENOUS at 09:19

## 2020-09-17 RX ADMIN — METOCLOPRAMIDE 5 MG: 5 INJECTION, SOLUTION INTRAMUSCULAR; INTRAVENOUS at 23:52

## 2020-09-17 RX ADMIN — DOCUSATE SODIUM 100 MG: 50 LIQUID ORAL at 17:15

## 2020-09-17 RX ADMIN — SODIUM CHLORIDE 10 MG: 9 INJECTION INTRAMUSCULAR; INTRAVENOUS; SUBCUTANEOUS at 05:03

## 2020-09-17 RX ADMIN — POLYETHYLENE GLYCOL 3350 17 G: 17 POWDER, FOR SOLUTION ORAL at 17:15

## 2020-09-17 RX ADMIN — CHLORHEXIDINE GLUCONATE 15 ML: 0.12 RINSE ORAL at 21:28

## 2020-09-17 RX ADMIN — DEXMEDETOMIDINE 0.9 MCG/KG/HR: 100 INJECTION, SOLUTION, CONCENTRATE INTRAVENOUS at 23:50

## 2020-09-17 RX ADMIN — METOCLOPRAMIDE 5 MG: 5 INJECTION, SOLUTION INTRAMUSCULAR; INTRAVENOUS at 17:15

## 2020-09-17 RX ADMIN — MICONAZOLE NITRATE 2 % TOPICAL POWDER: at 09:21

## 2020-09-17 RX ADMIN — MIDAZOLAM HYDROCHLORIDE 2 MG: 2 INJECTION, SOLUTION INTRAMUSCULAR; INTRAVENOUS at 02:49

## 2020-09-17 RX ADMIN — POLYETHYLENE GLYCOL 3350 17 G: 17 POWDER, FOR SOLUTION ORAL at 09:20

## 2020-09-17 RX ADMIN — BUSPIRONE HYDROCHLORIDE 5 MG: 5 TABLET ORAL at 09:20

## 2020-09-17 RX ADMIN — DEXMEDETOMIDINE 0.7 MCG/KG/HR: 100 INJECTION, SOLUTION, CONCENTRATE INTRAVENOUS at 09:22

## 2020-09-17 RX ADMIN — OXYCODONE HYDROCHLORIDE 5 MG: 5 TABLET ORAL at 23:51

## 2020-09-17 RX ADMIN — POTASSIUM BICARBONATE 40 MEQ: 782 TABLET, EFFERVESCENT ORAL at 05:25

## 2020-09-17 RX ADMIN — DEXMEDETOMIDINE 0.9 MCG/KG/HR: 100 INJECTION, SOLUTION, CONCENTRATE INTRAVENOUS at 13:36

## 2020-09-17 RX ADMIN — METOCLOPRAMIDE 5 MG: 5 INJECTION, SOLUTION INTRAMUSCULAR; INTRAVENOUS at 11:00

## 2020-09-17 RX ADMIN — OXYCODONE HYDROCHLORIDE 5 MG: 5 TABLET ORAL at 11:00

## 2020-09-17 RX ADMIN — Medication 10 ML: at 21:28

## 2020-09-17 RX ADMIN — ENOXAPARIN SODIUM 45 MG: 60 INJECTION SUBCUTANEOUS at 09:20

## 2020-09-17 RX ADMIN — ALPRAZOLAM 1 MG: 0.5 TABLET ORAL at 15:41

## 2020-09-17 RX ADMIN — DOCUSATE SODIUM 100 MG: 50 LIQUID ORAL at 09:19

## 2020-09-17 RX ADMIN — QUETIAPINE FUMARATE 200 MG: 100 TABLET ORAL at 21:27

## 2020-09-17 RX ADMIN — Medication: at 17:16

## 2020-09-17 RX ADMIN — DEXMEDETOMIDINE 0.9 MCG/KG/HR: 100 INJECTION, SOLUTION, CONCENTRATE INTRAVENOUS at 18:36

## 2020-09-17 RX ADMIN — MICONAZOLE NITRATE 2 % TOPICAL POWDER: at 17:21

## 2020-09-17 RX ADMIN — ALPRAZOLAM 0.5 MG: 0.5 TABLET ORAL at 12:14

## 2020-09-17 RX ADMIN — OXYCODONE HYDROCHLORIDE 5 MG: 5 TABLET ORAL at 00:11

## 2020-09-17 RX ADMIN — ASPIRIN 81 MG CHEWABLE TABLET 81 MG: 81 TABLET CHEWABLE at 09:20

## 2020-09-17 RX ADMIN — METOCLOPRAMIDE 5 MG: 5 INJECTION, SOLUTION INTRAMUSCULAR; INTRAVENOUS at 00:11

## 2020-09-17 RX ADMIN — FAMOTIDINE 20 MG: 10 INJECTION INTRAVENOUS at 21:27

## 2020-09-17 RX ADMIN — POTASSIUM BICARBONATE 40 MEQ: 782 TABLET, EFFERVESCENT ORAL at 12:14

## 2020-09-17 RX ADMIN — DEXMEDETOMIDINE 0.6 MCG/KG/HR: 100 INJECTION, SOLUTION, CONCENTRATE INTRAVENOUS at 02:48

## 2020-09-17 RX ADMIN — BUSPIRONE HYDROCHLORIDE 5 MG: 5 TABLET ORAL at 21:27

## 2020-09-17 RX ADMIN — CHLORHEXIDINE GLUCONATE 15 ML: 0.12 RINSE ORAL at 09:21

## 2020-09-17 RX ADMIN — OXYCODONE HYDROCHLORIDE 5 MG: 5 TABLET ORAL at 05:21

## 2020-09-17 RX ADMIN — Medication 10 ML: at 05:22

## 2020-09-17 RX ADMIN — Medication: at 09:20

## 2020-09-17 NOTE — WOUND CARE
Wound Visit:  Patient in ICU, alert; COVID positive. Spoke with patient's nurse Milagros Vasques. She assessed patient with skin care and reports no wounds; rash on back being treated. Trach site healed on last visit. Will sign off; please re-consult if needed.   Shannan Fierro RN,CWCN

## 2020-09-17 NOTE — PROGRESS NOTES
1930: Bedside and Verbal shift change report given to [address] (oncoming nurse) by Russell Ward RN (offgoing nurse). Report included the following information SBAR, Kardex, ED Summary, Intake/Output, MAR, Recent Results, Cardiac Rhythm SR/ST and Alarm Parameters . Shift summary: Pt anxious/agitated throughout shift. Follows commands and will occasionally nod appropriated. RUE weaker than LUE. Precedex titrated and PRN versed given. Vomited 3 times during shift, usually during coughing fits or while being suctioned. Precedex at 0.7.    0730: Bedside and Verbal shift change report given to [address] (oncoming nurse) by [address] [facility] (offgoing nurse). Report included the following information SBAR, Kardex, ED Summary, Intake/Output, MAR, Recent Results, Cardiac Rhythm ST and Alarm Parameters .

## 2020-09-17 NOTE — PROGRESS NOTES
[facility]    ICU TEAM Progress Note    Name: Michael Bland   : 1990   MRN: [id number]   Date: 2020      I  Subjective:   Progress Note: 2020      Reason for ICU Admission: Respiratory failure due to COVID-19 infection    Interval history:  43-year-old male with no history of significant past medical history except smoking half pack per day. Rubi Stein was admitted on 2020 at [facility] with acute hypoxemic respiratory failure from COVID pneumonia. Rubi Stein continued to deteriorate and got intubated on 8/10.  Due to worsening hypoxemia, he is transferred to [facility] for ECMO evaluation. Rubi Stein has bee given convalescent plasma twice on  and  and treated with Remdesivir which was completed on 8/10. Rubi Stein is also on dexamethasone. Rubi Stein completed course of empiric antibiotics including azithromycin which was completed on  and cefepime was completed on .  During his hospital course he also developed pneumothorax and has left-sided chest tube since .  Has a tracheostomy tube placed on .  On September 3 patient had significant worsening on his right arm, CT head was unrevealing.  He is already on high-dose Lovenox and aspirin.  On  he had acute weakness on the left arm code stroke was called and again CTA was unrevealing.  Since then left arm is back to baseline, showed some improvement on right arm but still weak compared to left with minimal ability to move against gravity. Mental status continues to improve but now he is very anxious with recurrent episode of nausea and vomiting associated with tachycardia.     Overnight Events:   Again episode of anxiety agitation and vomiting. Repeated CT abdomen yesterday showed no acute pathology.   Patient spiking fever up to 102, WBC climbing up slowly to 10, blood culture drawn on  still negative    Active Problem List:     Problem List  Date Reviewed: 2020          Codes Class    Acute respiratory failure (HCC) ICD-10-CM: J96.00  ICD-9-CM: 518.81         Pneumothorax on left ICD-10-CM: J93.9  ICD-9-CM: 512.89         Pneumonia due to severe acute respiratory syndrome coronavirus 2 (SARS-CoV-2) ICD-10-CM: U07.1, J12.89  ICD-9-CM: 480.8         Respiratory failure with hypoxia (HCC) ICD-10-CM: J96.91  ICD-9-CM: 518.81               Past Medical History:      has a past medical history of History of vascular access device (08/10/2020). Past Surgical History:      has a past surgical history that includes ir thora/ins chest tube(pneumo) wo image (2020); ir thora/ins chest tube(pneumo) wo image (2020); and pr tracheostomy, planned (2020). Home Medications:     Prior to Admission medications    Medication Sig Start Date End Date Taking? Authorizing Provider   benzonatate (Tessalon Perles) 100 mg capsule Take 100 mg by mouth three (3) times daily as needed for Cough. Provider, Historical       Allergies/Social/Family History:     No Known Allergies   Social History     Tobacco Use    Smoking status: Current Some Day Smoker    Smokeless tobacco: Never Used   Substance Use Topics    Alcohol use: Not on file      History reviewed. No pertinent family history.     Review of Systems:     Not able to obtain due to his medical condition    Objective:   Vital Signs:  Visit Vitals  /86 (BP 1 Location: Left arm, BP Patient Position: At rest)   Pulse 94   Temp 100.2 °F (37.9 °C)   Resp 27   Ht 5' 11\" (1.803 m)   Wt 96.1 kg (211 lb 13.8 oz)   SpO2 98%   BMI 29.55 kg/m²      O2 Device: Tracheostomy Temp (24hrs), Av.9 °F (37.7 °C), Min:99.2 °F (37.3 °C), Max:100.5 °F (38.1 °C)           Intake/Output:     Intake/Output Summary (Last 24 hours) at 2020 1325  Last data filed at 2020 1200  Gross per 24 hour   Intake 3201.58 ml   Output 1840 ml   Net 1361.58 ml       Physical Exam:  General:   Low-dose Precedex, conscious alert, maintaining eye contact follow simple command obviously anxious mechanical ventilation via tracheostomy   Eyes:  Sclera anicteric. Pupils equally round and reactive to light. Mouth/Throat: Mucous membranes normal, oral pharynx clear   Neck: Supple, tracheostomy tube in place   Lungs:    Good air entry bilaterally, fine crepitation   CV:  Regular rate and rhythm,no murmur, click, rub or gallop   Abdomen:   Soft, non-tender. bowel sounds normal. non-distended   Extremities: No cyanosis , evolving edema   Skin: Skin color, texture, turgor normal. no acute rash or lesions   Lymph nodes: Cervical and supraclavicular normal   Musculoskeletal: No swelling or deformity   Lines/Devices:  Intact, no erythema, drainage or tenderness   Psych: On minimal sedation, conscious and alert, follows simple command, anxious, still weak on right arm but able to move minimally against gravity            LABS AND  DATA: Personally reviewed  Recent Labs     09/17/20 0308 09/16/20 0441   WBC 10.0 8.8   HGB 8.3* 9.2*   HCT 26.4* 29.4*    318     Recent Labs     09/17/20 0308 09/16/20 0441    138   K 3.0* 3.1*    102   CO2 31 31   BUN 13 17   CREA 0.50* 0.59*   * 112*   CA 9.2 9.6   MG 1.7 1.9   PHOS 2.9 3.6     Recent Labs     09/17/20 0308 09/16/20 0441    106   TP 7.2 8.0   ALB 2.8* 3.1*   GLOB 4.4* 4.9*     No results for input(s): INR, PTP, APTT, INREXT in the last 72 hours. Recent Labs     09/17/20  0512 09/16/20  0622   PHI 7.44 7.56*   PCO2I 45.3* 37.0   PO2I 87 122*   FIO2I 45 40     No results for input(s): CPK, CKMB, TROIQ, BNPP in the last 72 hours.     Hemodynamics:   PAP:   CO:     Wedge:   CI:     CVP:    SVR:       PVR:       Ventilator Settings:  Mode Rate Tidal Volume Pressure FiO2 PEEP   Assist control, Pressure control   2 ml  10 cm H2O 45 % 8 cm H20     Peak airway pressure: 22 cm H2O    Minute ventilation: 12.2 l/min        MEDS: Reviewed    Chest X-Ray:  CXR Results  (Last 48 hours)    None          Assessment and Plan:   -Bilateral pneumonia due to COVID-19 infection  -Acute right arm weakness:  Patient already on aspirin and moderate dose anticoagulation.  Still pending CTA head and neck, CT head showed no bleed. -Right arm weakness  -Fever: No clear etiology, culture remain negative, images not suggestive of acute infectious process  - Acute hypoxic respiratory failure due to COVID-19 infection status post tracheostomy on August 26  - Pneumothorax status post left chest tube insertion on August 11 without air leak  - Possible bacterial super imposed infection: Completed antibiotic course  -History of tobacco use     -Now on low-dose Precedex. Added BuSpar on for mood stabilization, increased dose o Seroquel and Xanax   - At this time on PEEP of 5 and FiO2 of 40%, PO2 is adequate.  pressure support trial as tolerated.  On pressure support to get very anxious agitated and tachypneic with low tidal volume.  - Good urine output.  No need for diuretics today.  Will use as needed.  Replace electrolytes as indicated  -Right arm paralysis, no clear underlying etiology, CT showed abnormal bilateral frontoparietal enhancement.  Perceptual diagnosis is wide.  Power is improving on right arm but still significantly weaker than left  - Spiking fever again, blood culture repeated on the 16th, WBC increasing slowly. No clear source of infection  -Recurrent vomiting. Added Reglan , advance tube feeding as tolerated  - Completed COVID-19 treatment  - DVT prophylaxis with high-dose Lovenox.  GI prophylaxis.  Nutritional support    DISPOSITION  Stay in ICU    CRITICAL CARE CONSULTANT NOTE  I had a face to face encounter with the patient, reviewed and interpreted patient data including clinical events, labs, images, vital signs, I/O's, and examined patient.   I have discussed the case and the plan and management of the patient's care with the consulting services, the bedside nurses and the respiratory therapist.      NOTE OF PERSONAL INVOLVEMENT IN CARE   This patient has a high probability of imminent, clinically significant deterioration, which requires the highest level of preparedness to intervene urgently. I participated in the decision-making and personally managed or directed the management of the following life and organ supporting interventions that required my frequent assessment to treat or prevent imminent deterioration. I personally spent 40 minutes of critical care time. This is time spent at this critically ill patient's bedside actively involved in patient care as well as the coordination of care and discussions with the patient's family. This does not include any procedural time which has been billed separately. Liu Keenan M.D.   Staff Intensivist/Pulmonologist  [facility]  9/17/2020

## 2020-09-17 NOTE — PROGRESS NOTES
ARDS  Acute hypoxic respiratory failure  COVID positive  Sinus pauses  S/P tracheostomy     Remains on Vent    TFs remain at goal     Still having issues with vomiting - may be anxiety related    Not making much progress on SBT due to anxiety issues    PRN versed    WBCs normal    Hgb and platelets look good    Creatinine normal    Bilirubin and other LFTs look good    ABG 7.44 / 45 / 87 / 31 / 6    Started on Buspar    Remains on Lovenox     Remains on Xanax / Seroquel     Remains Precedex    CXR - mild to moderate diffuse air-space disease      Intake/Output Summary (Last 24 hours) at 9/17/2020 1445  Last data filed at 9/17/2020 1336  Gross per 24 hour   Intake 3161.88 ml   Output 1915 ml   Net 1246.88 ml     Visit Vitals  BP (!) 151/91   Pulse 98   Temp 100.2 °F (37.9 °C)   Resp (!) 33   Ht 5' 11\" (1.803 m)   Wt 211 lb 13.8 oz (96.1 kg)   SpO2 96%   BMI 29.55 kg/m²       Risk of morbidity and mortality - high  Medical decision making - high complexity    Total critical care time - 30 minutes (CPT 11155)     I personally spent the above critical care time. This is time spent at this critically ill patient's bedside / unit / floor actively involved in patient care as well as the coordination of care and discussions with the patient's family. This does not include any procedural time which has been billed separately.

## 2020-09-17 NOTE — PROGRESS NOTES
0730: Bedside and Verbal shift change report given to Yuri Barrios RN (oncoming nurse) by Lynda Pederson RN (offgoing nurse). Report included the following information SBAR, Kardex, Intake/Output, MAR, Recent Results, Med Rec Status, Cardiac Rhythm Sinus Tach and Alarm Parameters . 0800: Assumed Care of patient; assessment documented via flowsheet; drips verified;     1040: ICU multidisciplinary rounds lead by Dr. Dangelo Arriaga (Intensivist): The following were reviewed and discussed: current labs,  recent imaging, lines/drains, review of body systems, nutrition, cultures, mobility, length of ICU stay. The plan of care for the day is as follows  Increase xanax and seroquel; supplement potassium (written note by RN)     1350:  Bandar Mcmahon is here visiting patient; Dr. Dangelo Arriaga spoke with her regarding pts condition    1600: Primary Nurse Yung Fletcher and Mini Jenkins RN performed a dual skin assessment on this patient Impairment noted- see wound doc flow sheet  Enrique score is 14

## 2020-09-17 NOTE — PROGRESS NOTES
1930: Bedside and Verbal shift change report given to [address] (oncoming nurse) by Milagros Vasques RN (offgoing nurse). Report included the following information SBAR, Kardex, ED Summary, Intake/Output, MAR, Recent Results, Cardiac Rhythm SR/ST and Alarm Parameters . 0664: While being turned, pt had episode of bradycardia with HR going from 110s to 40s. Precedex paused. HR sustained in 40s-50s for about 10 minutes w/ monitor showing sinus martine and junctional rhythm. Montse Pencil NP notified of bradycardia, ok to resume Precedex gtt once HR back to normal.    0730: Bedside and Verbal shift change report given to Mendoza DOMINIQUE (oncoming nurse) by [address] (offgoing nurse). Report included the following information SBAR, Kardex, ED Summary, Intake/Output, MAR, Recent Results, Cardiac Rhythm SR/ST and Alarm Parameters .

## 2020-09-18 LAB
ALBUMIN SERPL-MCNC: 2.7 G/DL (ref 3.5–5)
ALBUMIN/GLOB SERPL: 0.6 {RATIO} (ref 1.1–2.2)
ALP SERPL-CCNC: 90 U/L (ref 45–117)
ALT SERPL-CCNC: 83 U/L (ref 12–78)
ANION GAP SERPL CALC-SCNC: 3 MMOL/L (ref 5–15)
AST SERPL-CCNC: 42 U/L (ref 15–37)
BASOPHILS # BLD: 0.1 K/UL (ref 0–0.1)
BASOPHILS NFR BLD: 1 % (ref 0–1)
BILIRUB SERPL-MCNC: 0.3 MG/DL (ref 0.2–1)
BUN SERPL-MCNC: 12 MG/DL (ref 6–20)
BUN/CREAT SERPL: 25 (ref 12–20)
CALCIUM SERPL-MCNC: 9.2 MG/DL (ref 8.5–10.1)
CHLORIDE SERPL-SCNC: 102 MMOL/L (ref 97–108)
CO2 SERPL-SCNC: 31 MMOL/L (ref 21–32)
COVID-19, XGCOVT: NOT DETECTED
CREAT SERPL-MCNC: 0.48 MG/DL (ref 0.7–1.3)
DIFFERENTIAL METHOD BLD: ABNORMAL
EOSINOPHIL # BLD: 0.1 K/UL (ref 0–0.4)
EOSINOPHIL NFR BLD: 2 % (ref 0–7)
ERYTHROCYTE [DISTWIDTH] IN BLOOD BY AUTOMATED COUNT: 14.4 % (ref 11.5–14.5)
GLOBULIN SER CALC-MCNC: 4.6 G/DL (ref 2–4)
GLUCOSE SERPL-MCNC: 133 MG/DL (ref 65–100)
HCT VFR BLD AUTO: 25.3 % (ref 36.6–50.3)
HEALTH STATUS, XMCV2T: NORMAL
HGB BLD-MCNC: 7.9 G/DL (ref 12.1–17)
IMM GRANULOCYTES # BLD AUTO: 0 K/UL
IMM GRANULOCYTES NFR BLD AUTO: 0 %
LYMPHOCYTES # BLD: 1.3 K/UL (ref 0.8–3.5)
LYMPHOCYTES NFR BLD: 20 % (ref 12–49)
MAGNESIUM SERPL-MCNC: 1.9 MG/DL (ref 1.6–2.4)
MCH RBC QN AUTO: 28.4 PG (ref 26–34)
MCHC RBC AUTO-ENTMCNC: 31.2 G/DL (ref 30–36.5)
MCV RBC AUTO: 91 FL (ref 80–99)
MONOCYTES # BLD: 0.5 K/UL (ref 0–1)
MONOCYTES NFR BLD: 8 % (ref 5–13)
MYELOCYTES NFR BLD MANUAL: 1 %
NEUTS BAND NFR BLD MANUAL: 2 % (ref 0–6)
NEUTS SEG # BLD: 4.4 K/UL (ref 1.8–8)
NEUTS SEG NFR BLD: 66 % (ref 32–75)
NRBC # BLD: 0 K/UL (ref 0–0.01)
NRBC BLD-RTO: 0 PER 100 WBC
PHOSPHATE SERPL-MCNC: 3.8 MG/DL (ref 2.6–4.7)
PLATELET # BLD AUTO: 236 K/UL (ref 150–400)
PLATELET COMMENTS,PCOM: ABNORMAL
PMV BLD AUTO: 10.6 FL (ref 8.9–12.9)
POTASSIUM SERPL-SCNC: 3.4 MMOL/L (ref 3.5–5.1)
PROT SERPL-MCNC: 7.3 G/DL (ref 6.4–8.2)
RBC # BLD AUTO: 2.78 M/UL (ref 4.1–5.7)
RBC MORPH BLD: ABNORMAL
RBC MORPH BLD: ABNORMAL
SODIUM SERPL-SCNC: 136 MMOL/L (ref 136–145)
SOURCE, COVRS: NORMAL
SPECIMEN SOURCE, FCOV2M: NORMAL
SPECIMEN TYPE, XMCV1T: NORMAL
WBC # BLD AUTO: 6.4 K/UL (ref 4.1–11.1)

## 2020-09-18 PROCEDURE — 74011250637 HC RX REV CODE- 250/637: Performed by: HOSPITALIST

## 2020-09-18 PROCEDURE — 74011250636 HC RX REV CODE- 250/636: Performed by: INTERNAL MEDICINE

## 2020-09-18 PROCEDURE — 65610000006 HC RM INTENSIVE CARE

## 2020-09-18 PROCEDURE — 80053 COMPREHEN METABOLIC PANEL: CPT

## 2020-09-18 PROCEDURE — 97530 THERAPEUTIC ACTIVITIES: CPT

## 2020-09-18 PROCEDURE — 74011000250 HC RX REV CODE- 250: Performed by: INTERNAL MEDICINE

## 2020-09-18 PROCEDURE — 84100 ASSAY OF PHOSPHORUS: CPT

## 2020-09-18 PROCEDURE — 74011000258 HC RX REV CODE- 258: Performed by: INTERNAL MEDICINE

## 2020-09-18 PROCEDURE — 74011250637 HC RX REV CODE- 250/637: Performed by: INTERNAL MEDICINE

## 2020-09-18 PROCEDURE — 74011250636 HC RX REV CODE- 250/636: Performed by: NURSE PRACTITIONER

## 2020-09-18 PROCEDURE — 74011250637 HC RX REV CODE- 250/637: Performed by: NURSE PRACTITIONER

## 2020-09-18 PROCEDURE — 83735 ASSAY OF MAGNESIUM: CPT

## 2020-09-18 PROCEDURE — 36415 COLL VENOUS BLD VENIPUNCTURE: CPT

## 2020-09-18 PROCEDURE — 94003 VENT MGMT INPAT SUBQ DAY: CPT

## 2020-09-18 PROCEDURE — 85025 COMPLETE CBC W/AUTO DIFF WBC: CPT

## 2020-09-18 PROCEDURE — 99291 CRITICAL CARE FIRST HOUR: CPT | Performed by: THORACIC SURGERY (CARDIOTHORACIC VASCULAR SURGERY)

## 2020-09-18 RX ORDER — MAGNESIUM SULFATE 1 G/100ML
1 INJECTION INTRAVENOUS ONCE
Status: COMPLETED | OUTPATIENT
Start: 2020-09-18 | End: 2020-09-18

## 2020-09-18 RX ORDER — DEXMEDETOMIDINE HYDROCHLORIDE 4 UG/ML
.2-1.4 INJECTION, SOLUTION INTRAVENOUS
Status: DISCONTINUED | OUTPATIENT
Start: 2020-09-18 | End: 2020-09-22

## 2020-09-18 RX ORDER — OXYCODONE HYDROCHLORIDE 5 MG/1
5 TABLET ORAL EVERY 12 HOURS
Status: DISCONTINUED | OUTPATIENT
Start: 2020-09-19 | End: 2020-09-20

## 2020-09-18 RX ADMIN — MAGNESIUM SULFATE HEPTAHYDRATE 1 G: 1 INJECTION, SOLUTION INTRAVENOUS at 07:09

## 2020-09-18 RX ADMIN — OXYCODONE HYDROCHLORIDE 5 MG: 5 TABLET ORAL at 17:33

## 2020-09-18 RX ADMIN — SODIUM CHLORIDE 10 MG: 9 INJECTION INTRAMUSCULAR; INTRAVENOUS; SUBCUTANEOUS at 17:00

## 2020-09-18 RX ADMIN — OXYCODONE HYDROCHLORIDE 5 MG: 5 TABLET ORAL at 12:36

## 2020-09-18 RX ADMIN — Medication 10 ML: at 15:33

## 2020-09-18 RX ADMIN — FAMOTIDINE 20 MG: 10 INJECTION INTRAVENOUS at 09:20

## 2020-09-18 RX ADMIN — BUSPIRONE HYDROCHLORIDE 5 MG: 5 TABLET ORAL at 21:33

## 2020-09-18 RX ADMIN — ASPIRIN 81 MG CHEWABLE TABLET 81 MG: 81 TABLET CHEWABLE at 09:20

## 2020-09-18 RX ADMIN — Medication 10 ML: at 21:36

## 2020-09-18 RX ADMIN — ALPRAZOLAM 1 MG: 0.5 TABLET ORAL at 09:19

## 2020-09-18 RX ADMIN — ENOXAPARIN SODIUM 45 MG: 60 INJECTION SUBCUTANEOUS at 09:52

## 2020-09-18 RX ADMIN — Medication: at 09:21

## 2020-09-18 RX ADMIN — MICONAZOLE NITRATE 2 % TOPICAL POWDER: at 17:33

## 2020-09-18 RX ADMIN — MICONAZOLE NITRATE 2 % TOPICAL POWDER: at 09:22

## 2020-09-18 RX ADMIN — BUSPIRONE HYDROCHLORIDE 5 MG: 5 TABLET ORAL at 09:19

## 2020-09-18 RX ADMIN — METOCLOPRAMIDE 5 MG: 5 INJECTION, SOLUTION INTRAMUSCULAR; INTRAVENOUS at 12:36

## 2020-09-18 RX ADMIN — FAMOTIDINE 20 MG: 10 INJECTION INTRAVENOUS at 21:32

## 2020-09-18 RX ADMIN — Medication 10 ML: at 05:43

## 2020-09-18 RX ADMIN — Medication 1 DROP: at 15:42

## 2020-09-18 RX ADMIN — DEXMEDETOMIDINE 0.7 MCG/KG/HR: 100 INJECTION, SOLUTION, CONCENTRATE INTRAVENOUS at 09:54

## 2020-09-18 RX ADMIN — Medication: at 17:32

## 2020-09-18 RX ADMIN — DEXMEDETOMIDINE HYDROCHLORIDE 0.6 MCG/KG/HR: 400 INJECTION INTRAVENOUS at 23:59

## 2020-09-18 RX ADMIN — POLYETHYLENE GLYCOL 3350 17 G: 17 POWDER, FOR SOLUTION ORAL at 09:22

## 2020-09-18 RX ADMIN — DEXMEDETOMIDINE 0.9 MCG/KG/HR: 100 INJECTION, SOLUTION, CONCENTRATE INTRAVENOUS at 04:10

## 2020-09-18 RX ADMIN — CHLORHEXIDINE GLUCONATE 15 ML: 0.12 RINSE ORAL at 21:32

## 2020-09-18 RX ADMIN — POTASSIUM BICARBONATE 40 MEQ: 782 TABLET, EFFERVESCENT ORAL at 07:10

## 2020-09-18 RX ADMIN — ALPRAZOLAM 1 MG: 0.5 TABLET ORAL at 21:32

## 2020-09-18 RX ADMIN — DOCUSATE SODIUM 100 MG: 50 LIQUID ORAL at 09:20

## 2020-09-18 RX ADMIN — ENOXAPARIN SODIUM 45 MG: 60 INJECTION SUBCUTANEOUS at 21:35

## 2020-09-18 RX ADMIN — ALPRAZOLAM 1 MG: 0.5 TABLET ORAL at 15:34

## 2020-09-18 RX ADMIN — CHLORHEXIDINE GLUCONATE 15 ML: 0.12 RINSE ORAL at 09:21

## 2020-09-18 RX ADMIN — METOCLOPRAMIDE 5 MG: 5 INJECTION, SOLUTION INTRAMUSCULAR; INTRAVENOUS at 05:42

## 2020-09-18 RX ADMIN — METOCLOPRAMIDE 5 MG: 5 INJECTION, SOLUTION INTRAMUSCULAR; INTRAVENOUS at 17:33

## 2020-09-18 RX ADMIN — OXYCODONE HYDROCHLORIDE 5 MG: 5 TABLET ORAL at 05:42

## 2020-09-18 RX ADMIN — BUSPIRONE HYDROCHLORIDE 5 MG: 5 TABLET ORAL at 15:34

## 2020-09-18 RX ADMIN — QUETIAPINE FUMARATE 200 MG: 100 TABLET ORAL at 21:33

## 2020-09-18 RX ADMIN — DEXMEDETOMIDINE HYDROCHLORIDE 0.5 MCG/KG/HR: 400 INJECTION INTRAVENOUS at 15:33

## 2020-09-18 RX ADMIN — LACTULOSE 15 ML: 20 SOLUTION ORAL at 09:20

## 2020-09-18 NOTE — PROGRESS NOTES
0730: Bedside and Verbal shift change report given to CATINA Donaldson (oncoming nurse) by ΣΑΡΑΝCATINA TRONCOSO (offgoing nurse). Report included the following information SBAR, Kardex, Intake/Output, MAR, Recent Results, Cardiac Rhythm SR-ST, Alarm Parameters  and Dual Neuro Assessment. 1700: Compazine given for vomiting, TubeFeed stopped, will continue to monitor and resume tube feeding after couple hours. Attending NP aware. 1730: Mcwilliams removed, Condom catheter placed. 1930: Reported to Nadya Brewer RN about the need to complete ordered EKG. Bedside and Verbal shift change report given to RN (oncoming nurse) by Nini Tapia RN (offgoing nurse). Report included the following information SBAR, Kardex, Intake/Output, MAR, Recent Results, Cardiac Rhythm SR-ST, Alarm Parameters  and Dual Neuro Assessment.

## 2020-09-18 NOTE — PROGRESS NOTES
[facility]    ICU TEAM Progress Note    Name: Fatemeh Prince   : 1990   MRN: [id number]   Date: 2020      I  Subjective:   Progress Note: 2020      Reason for ICU Admission: Respiratory failure due to COVID-19 infection    Interval history:  27-year-old male with no history of significant past medical history except smoking half pack per day. Rosa Tolbert was admitted on 2020 at [facility] with acute hypoxemic respiratory failure from COVID pneumonia. Rosa Tolbert continued to deteriorate and got intubated on 8/10.  Due to worsening hypoxemia, he is transferred to [facility] for ECMO evaluation. Rosa Tolbert has bee given convalescent plasma twice on  and  and treated with Remdesivir which was completed on 8/10. Rosa Tolbert is also on dexamethasone. Rosa Tolbert completed course of empiric antibiotics including azithromycin which was completed on  and cefepime was completed on .  During his hospital course he also developed pneumothorax and has left-sided chest tube since .  Has a tracheostomy tube placed on .  On September 3 patient had significant worsening on his right arm, CT head was unrevealing.  He is already on high-dose Lovenox and aspirin.  On  he had acute weakness on the left arm code stroke was called and again CTA was unrevealing.  Since then left arm is back to baseline, showed some improvement on right arm but still weak compared to left with minimal ability to move against gravity. Mental status continues to improve but now he is very anxious with recurrent episode of nausea and vomiting associated with tachycardia.     Overnight Events:   Continues to have episodes of vomiting. They seem to be associated with trach manipulation and vagal episodes. Unfortunately no good pharmaco-prophylaxis against this. Ativan appears to help. Could be related to oxycodone as well. Will continue to taper dosing.      Active Problem List:     Problem List Date Reviewed: 2020          Codes Class    Acute respiratory failure ([facility] [address] ICD-10-CM: J96.00  ICD-9-CM: 518.81         Pneumothorax on left ICD-10-CM: J93.9  ICD-9-CM: 512.89         Pneumonia due to severe acute respiratory syndrome coronavirus 2 (SARS-CoV-2) ICD-10-CM: U07.1, J12.89  ICD-9-CM: 480.8         Respiratory failure with hypoxia (HCC) ICD-10-CM: J96.91  ICD-9-CM: 518.81               Past Medical History:      has a past medical history of History of vascular access device (08/10/2020). Past Surgical History:      has a past surgical history that includes ir thora/ins chest tube(pneumo) wo image (2020); ir thora/ins chest tube(pneumo) wo image (2020); and pr tracheostomy, planned (2020). Home Medications:     Prior to Admission medications    Medication Sig Start Date End Date Taking? Authorizing Provider   benzonatate (Tessalon Perles) 100 mg capsule Take 100 mg by mouth three (3) times daily as needed for Cough. Provider, Historical       Allergies/Social/Family History:     No Known Allergies   Social History     Tobacco Use    Smoking status: Current Some Day Smoker    Smokeless tobacco: Never Used   Substance Use Topics    Alcohol use: Not on file      History reviewed. No pertinent family history.     Review of Systems:     Not able to obtain due to his medical condition    Objective:   Vital Signs:  Visit Vitals  /87   Pulse 91   Temp 99.9 °F (37.7 °C)   Resp 28   Ht 5' 11\" (1.803 m)   Wt 94 kg (207 lb 3.7 oz)   SpO2 100%   BMI 28.90 kg/m²      O2 Device: Tracheostomy Temp (24hrs), Av.7 °F (37.6 °C), Min:99.6 °F (37.6 °C), Max:99.9 °F (37.7 °C)           Intake/Output:     Intake/Output Summary (Last 24 hours) at 2020 1744  Last data filed at 2020 1700  Gross per 24 hour   Intake 2954.49 ml   Output 2680 ml   Net 274.49 ml       Physical Exam:  General:   Low-dose Precedex, conscious alert, maintaining eye contact follow simple command obviously anxious mechanical ventilation via tracheostomy   Eyes:  Sclera anicteric. Pupils equally round and reactive to light. Mouth/Throat: Mucous membranes normal, oral pharynx clear   Neck: Supple, tracheostomy tube in place   Lungs:    Good air entry bilaterally, fine crepitation   CV:  Regular rate and rhythm,no murmur, click, rub or gallop   Abdomen:   Soft, non-tender. bowel sounds normal. non-distended   Extremities: No cyanosis , evolving edema   Skin: Skin color, texture, turgor normal. no acute rash or lesions   Lymph nodes: Cervical and supraclavicular normal   Musculoskeletal: No swelling or deformity   Lines/Devices:  Intact, no erythema, drainage or tenderness   Psych: On minimal sedation, conscious and alert, follows simple command, anxious, still weak on right arm but able to move minimally against gravity            LABS AND  DATA: Personally reviewed  Recent Labs     09/18/20 0418 09/17/20  0308   WBC 6.4 10.0   HGB 7.9* 8.3*   HCT 25.3* 26.4*    278     Recent Labs     09/18/20 0418 09/17/20  0308    137   K 3.4* 3.0*    101   CO2 31 31   BUN 12 13   CREA 0.48* 0.50*   * 112*   CA 9.2 9.2   MG 1.9 1.7   PHOS 3.8 2.9     Recent Labs     09/18/20 0418 09/17/20  0308   AP 90 103   TP 7.3 7.2   ALB 2.7* 2.8*   GLOB 4.6* 4.4*     No results for input(s): INR, PTP, APTT, INREXT, INREXT in the last 72 hours. Recent Labs     09/17/20  0512 09/16/20  0622   PHI 7.44 7.56*   PCO2I 45.3* 37.0   PO2I 87 122*   FIO2I 45 40     No results for input(s): CPK, CKMB, TROIQ, BNPP in the last 72 hours.     Hemodynamics:   PAP:   CO:     Wedge:   CI:     CVP:    SVR:       PVR:       Ventilator Settings:  Mode Rate Tidal Volume Pressure FiO2 PEEP   Assist control, Pressure control   0 ml  0 cm H2O 45 % 8 cm H20     Peak airway pressure: 31 cm H2O    Minute ventilation: 8.59 l/min        MEDS: Reviewed    Chest X-Ray:  CXR Results  (Last 48 hours)    None          Assessment and Plan: -Bilateral pneumonia due to COVID-19 infection  -Acute right arm weakness:  Patient already on aspirin and moderate dose anticoagulation.  Still pending CTA head and neck, CT head showed no bleed. -Right arm weakness  -Fever: No clear etiology, culture remain negative, images not suggestive of acute infectious process  - Acute hypoxic respiratory failure due to COVID-19 infection status post tracheostomy on August 26  - Pneumothorax status post left chest tube insertion on August 11 without air leak  - Possible bacterial super imposed infection: Completed antibiotic course  -History of tobacco use     -Now on low-dose Precedex. On BuSpar, Seroquel, and Xanax  - At this time on PEEP of 5 and FiO2 of 40%. Decreased PC support from 22 to 20 with minimal loss in TV.   - Good urine output.  No need for diuretics today.  Will use as needed.  Replace electrolytes as indicated  -Right arm paralysis, no clear underlying etiology, CT showed abnormal bilateral frontoparietal enhancement.  Perceptual diagnosis is wide.  Power is improving on right arm but still significantly weaker than left  - No fever o/n  -Recurrent vomiting. Added Reglan , advance tube feeding as tolerated  - Completed COVID-19 treatment  - DVT prophylaxis with high-dose Lovenox.  GI prophylaxis.  Nutritional support    DISPOSITION  Stay in ICU    CRITICAL CARE CONSULTANT NOTE  I had a face to face encounter with the patient, reviewed and interpreted patient data including clinical events, labs, images, vital signs, I/O's, and examined patient. I have discussed the case and the plan and management of the patient's care with the consulting services, the bedside nurses and the respiratory therapist.      NOTE OF PERSONAL INVOLVEMENT IN CARE   This patient has a high probability of imminent, clinically significant deterioration, which requires the highest level of preparedness to intervene urgently.  I participated in the decision-making and personally managed or directed the management of the following life and organ supporting interventions that required my frequent assessment to treat or prevent imminent deterioration. I personally spent 40 minutes of critical care time. This is time spent at this critically ill patient's bedside actively involved in patient care as well as the coordination of care and discussions with the patient's family. This does not include any procedural time which has been billed separately.     Regan Burr MD  Staff Intensivist/Anesthesiologist  [facility]  9/18/2020

## 2020-09-18 NOTE — PROGRESS NOTES
Problem: Mobility Impaired (Adult and Pediatric)  Goal: *Acute Goals and Plan of Care (Insert Text)  Description: FUNCTIONAL STATUS PRIOR TO ADMISSION: Patient was independent and active without use of DME. 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 Physical Therapy Goals    Reassessment completed 9/18/2020 and all goals remain appropriate  at this time. Initiated 9/11/2020  1. Patient will move from supine to sit and sit to supine , scoot up and down, and roll side to side in bed with maximal assistance within 7 day(s). 2.  Patient will transfer from bed to chair and chair to bed with maximal assistance using the least restrictive device within 7 day(s). 3.  Patient will perform sit to stand with maximal assistance within 7 day(s). 4.  Patient will tolerate sitting EOB with maximal assistance for 5 minutes within 7 day(s). 9/18/2020 1356 by Saravanan Andre PT  Outcome: Progressing Towards Goal   PHYSICAL THERAPY TREATMENT: WEEKLY REASSESSMENT  Patient: Ann Marie Kelly (27 y.o. male)  Date: 9/18/2020  Primary Diagnosis: Acute respiratory failure ([facility] [address] [J96.00]  Procedure(s) (LRB):  VV EXTRACORPOREAL MEMBRANE OXYGENATION (ECMO) (N/A)     Precautions:          ASSESSMENT  Patient continues with skilled PT services and is progressing towards goals. He continues to be more alert and answers questions appropriately through most of the session. His weakness is profound throughout, with focally more weakness in the RUE. Even his head control is poor. After positioning him in chair position in the bed, worked on active assisted motion in all extremities and head control. He is able to elicit trace contractions in all major mm groups but he fatigues very quickly. Also note paradoxical breathing throughout session with RR in the upper 20s to low 30s.     Highly recommend he be up in the chair position in bed at least 3 times a day for about 45 minutes and provided some written exercises for head control on his white board. We will continue to progress as he is able to tolerate. Patient's progression toward goals since last assessment: more alert, some active movement in all extremities, continued RUE weakness moreso than other extremities    Current Level of Function Impacting Discharge (mobility/balance): total assist    Other factors to consider for discharge: COIVD positive         PLAN :  Goals have been updated based on progression since last assessment. Patient continues to benefit from skilled intervention to address the above impairments. Recommendations and Planned Interventions: bed mobility training, transfer training, gait training, therapeutic exercises, neuromuscular re-education, patient and family training/education, and therapeutic activities      Frequency/Duration: Patient will be followed by physical therapy:  5 times a week to address goals. Recommendation for discharge: (in order for the patient to meet his/her long term goals)  To be determined: based on progress    This discharge recommendation:  Has not yet been discussed the attending provider and/or case management    IF patient discharges home will need the following DME: to be determined (TBD)         SUBJECTIVE:   Patient stated I feel OK.     OBJECTIVE DATA SUMMARY:   HISTORY:    Past Medical History:   Diagnosis Date    History of vascular access device 08/10/2020    5 Fr triple PICC, hemodynamically unstable, 45 cm L basilic     Past Surgical History:   Procedure Laterality Date    IR THORA/INS CHEST TUBE(PNEUMO) WO IMAGE  8/11/2020    IR THORA/INS CHEST TUBE(PNEUMO) WO IMAGE  8/11/2020    AL TRACHEOSTOMY, PLANNED  8/26/2020              Home Situation  Home Environment: Private residence  # Steps to Enter: 0  One/Two Story Residence: One story  Living Alone: Yes  Support Systems: Family member(s)  Patient Expects to be Discharged to[de-identified] Rehabilitation facility  Current DME Used/Available at Home: None    EXAMINATION/PRESENTATION/DECISION MAKING:   Critical Behavior:  Neurologic State: Alert, Eyes open spontaneously  Orientation Level: Unable to verbalize  Cognition: Follows commands     Hearing: Auditory  Auditory Impairment: None  Skin:  per nursing  Edema: bilateral LE and UE edema  Range Of Motion:  AROM: (limited by global weakness)           PROM: Within functional limits           Strength:    Strength: Grossly decreased, non-functional(trace contractions in all major mm groups)                    Tone & Sensation:   Tone: Abnormal(generally decreased)              Sensation: Intact               Coordination:  Coordination: Grossly decreased, non-functional(due to weakness)  Vision:      Functional Mobility:  Bed Mobility:  Rolling: Total assistance           Transfers:                             Balance:   Sitting: (positioned in chair position in bed)  Ambulation/Gait Training:                                                         Stairs: Therapeutic Exercises:   Assisted ROM in all 4 extremities and head    Functional Measure:        Pain Rating:  No complaints of pain    Activity Tolerance:   Poor and observed SOB with activity  Please refer to the flowsheet for vital signs taken during this treatment. After treatment patient left in no apparent distress:   Call bell within reach, Side rails x 3, Restraints, and bed in chair position    COMMUNICATION/EDUCATION:   The patients plan of care was discussed with: Registered nurse and Respiratory therapist.     Fall prevention education was provided and the patient/caregiver indicated understanding., Patient/family have participated as able in goal setting and plan of care. , and Patient/family agree to work toward stated goals and plan of care.     Thank you for this referral.  Gordon Sandoval, PT   Time Calculation: 45 mins

## 2020-09-19 LAB
ALBUMIN SERPL-MCNC: 2.7 G/DL (ref 3.5–5)
ALBUMIN/GLOB SERPL: 0.6 {RATIO} (ref 1.1–2.2)
ALP SERPL-CCNC: 105 U/L (ref 45–117)
ALT SERPL-CCNC: 103 U/L (ref 12–78)
AMMONIA PLAS-SCNC: 32 UMOL/L
ANION GAP SERPL CALC-SCNC: 8 MMOL/L (ref 5–15)
AST SERPL-CCNC: 40 U/L (ref 15–37)
BASOPHILS # BLD: 0.1 K/UL (ref 0–0.1)
BASOPHILS NFR BLD: 1 % (ref 0–1)
BILIRUB SERPL-MCNC: 0.4 MG/DL (ref 0.2–1)
BUN SERPL-MCNC: 12 MG/DL (ref 6–20)
BUN/CREAT SERPL: 27 (ref 12–20)
CALCIUM SERPL-MCNC: 9.8 MG/DL (ref 8.5–10.1)
CHLORIDE SERPL-SCNC: 101 MMOL/L (ref 97–108)
CO2 SERPL-SCNC: 27 MMOL/L (ref 21–32)
CREAT SERPL-MCNC: 0.45 MG/DL (ref 0.7–1.3)
DIFFERENTIAL METHOD BLD: ABNORMAL
EOSINOPHIL # BLD: 0.2 K/UL (ref 0–0.4)
EOSINOPHIL NFR BLD: 4 % (ref 0–7)
ERYTHROCYTE [DISTWIDTH] IN BLOOD BY AUTOMATED COUNT: 14.2 % (ref 11.5–14.5)
GLOBULIN SER CALC-MCNC: 4.6 G/DL (ref 2–4)
GLUCOSE SERPL-MCNC: 105 MG/DL (ref 65–100)
HCT VFR BLD AUTO: 26.9 % (ref 36.6–50.3)
HGB BLD-MCNC: 8.4 G/DL (ref 12.1–17)
IMM GRANULOCYTES # BLD AUTO: 0 K/UL
IMM GRANULOCYTES NFR BLD AUTO: 0 %
LYMPHOCYTES # BLD: 1 K/UL (ref 0.8–3.5)
LYMPHOCYTES NFR BLD: 18 % (ref 12–49)
MAGNESIUM SERPL-MCNC: 1.9 MG/DL (ref 1.6–2.4)
MCH RBC QN AUTO: 28.2 PG (ref 26–34)
MCHC RBC AUTO-ENTMCNC: 31.2 G/DL (ref 30–36.5)
MCV RBC AUTO: 90.3 FL (ref 80–99)
MONOCYTES # BLD: 0.7 K/UL (ref 0–1)
MONOCYTES NFR BLD: 13 % (ref 5–13)
NEUTS BAND NFR BLD MANUAL: 2 % (ref 0–6)
NEUTS SEG # BLD: 3.7 K/UL (ref 1.8–8)
NEUTS SEG NFR BLD: 62 % (ref 32–75)
NRBC # BLD: 0 K/UL (ref 0–0.01)
NRBC BLD-RTO: 0 PER 100 WBC
PHOSPHATE SERPL-MCNC: 4.9 MG/DL (ref 2.6–4.7)
PLATELET # BLD AUTO: 249 K/UL (ref 150–400)
PMV BLD AUTO: 10.4 FL (ref 8.9–12.9)
POTASSIUM SERPL-SCNC: 3.6 MMOL/L (ref 3.5–5.1)
PROT SERPL-MCNC: 7.3 G/DL (ref 6.4–8.2)
RBC # BLD AUTO: 2.98 M/UL (ref 4.1–5.7)
RBC MORPH BLD: ABNORMAL
SODIUM SERPL-SCNC: 136 MMOL/L (ref 136–145)
WBC # BLD AUTO: 5.7 K/UL (ref 4.1–11.1)

## 2020-09-19 PROCEDURE — 74011250637 HC RX REV CODE- 250/637: Performed by: INTERNAL MEDICINE

## 2020-09-19 PROCEDURE — 74011250636 HC RX REV CODE- 250/636: Performed by: INTERNAL MEDICINE

## 2020-09-19 PROCEDURE — 94003 VENT MGMT INPAT SUBQ DAY: CPT

## 2020-09-19 PROCEDURE — 82140 ASSAY OF AMMONIA: CPT

## 2020-09-19 PROCEDURE — 74011250636 HC RX REV CODE- 250/636: Performed by: NURSE PRACTITIONER

## 2020-09-19 PROCEDURE — 99291 CRITICAL CARE FIRST HOUR: CPT | Performed by: THORACIC SURGERY (CARDIOTHORACIC VASCULAR SURGERY)

## 2020-09-19 PROCEDURE — 74011000250 HC RX REV CODE- 250: Performed by: INTERNAL MEDICINE

## 2020-09-19 PROCEDURE — 74011250637 HC RX REV CODE- 250/637: Performed by: NURSE PRACTITIONER

## 2020-09-19 PROCEDURE — 83735 ASSAY OF MAGNESIUM: CPT

## 2020-09-19 PROCEDURE — 87635 SARS-COV-2 COVID-19 AMP PRB: CPT

## 2020-09-19 PROCEDURE — 36415 COLL VENOUS BLD VENIPUNCTURE: CPT

## 2020-09-19 PROCEDURE — 65610000006 HC RM INTENSIVE CARE

## 2020-09-19 PROCEDURE — 74011250637 HC RX REV CODE- 250/637: Performed by: STUDENT IN AN ORGANIZED HEALTH CARE EDUCATION/TRAINING PROGRAM

## 2020-09-19 PROCEDURE — 74011250637 HC RX REV CODE- 250/637: Performed by: HOSPITALIST

## 2020-09-19 PROCEDURE — 85025 COMPLETE CBC W/AUTO DIFF WBC: CPT

## 2020-09-19 PROCEDURE — 80053 COMPREHEN METABOLIC PANEL: CPT

## 2020-09-19 PROCEDURE — 84100 ASSAY OF PHOSPHORUS: CPT

## 2020-09-19 RX ORDER — MAGNESIUM SULFATE 1 G/100ML
1 INJECTION INTRAVENOUS ONCE
Status: COMPLETED | OUTPATIENT
Start: 2020-09-19 | End: 2020-09-19

## 2020-09-19 RX ORDER — ALPRAZOLAM 0.5 MG/1
1 TABLET ORAL 2 TIMES DAILY
Status: DISCONTINUED | OUTPATIENT
Start: 2020-09-19 | End: 2020-09-22

## 2020-09-19 RX ADMIN — OXYCODONE 5 MG: 5 TABLET ORAL at 17:19

## 2020-09-19 RX ADMIN — BUSPIRONE HYDROCHLORIDE 5 MG: 5 TABLET ORAL at 21:21

## 2020-09-19 RX ADMIN — FAMOTIDINE 20 MG: 10 INJECTION INTRAVENOUS at 08:37

## 2020-09-19 RX ADMIN — POTASSIUM BICARBONATE 20 MEQ: 782 TABLET, EFFERVESCENT ORAL at 07:07

## 2020-09-19 RX ADMIN — ALUMINUM HYDROXIDE AND MAGNESIUM HYDROXIDE 15 ML: 200; 200 SUSPENSION ORAL at 08:50

## 2020-09-19 RX ADMIN — Medication: at 08:37

## 2020-09-19 RX ADMIN — OXYCODONE 5 MG: 5 TABLET ORAL at 05:39

## 2020-09-19 RX ADMIN — ASPIRIN 81 MG CHEWABLE TABLET 81 MG: 81 TABLET CHEWABLE at 08:36

## 2020-09-19 RX ADMIN — ALPRAZOLAM 1 MG: 0.5 TABLET ORAL at 17:18

## 2020-09-19 RX ADMIN — QUETIAPINE FUMARATE 200 MG: 100 TABLET ORAL at 21:21

## 2020-09-19 RX ADMIN — FAMOTIDINE 20 MG: 10 INJECTION INTRAVENOUS at 21:14

## 2020-09-19 RX ADMIN — MAGNESIUM SULFATE HEPTAHYDRATE 1 G: 1 INJECTION, SOLUTION INTRAVENOUS at 07:08

## 2020-09-19 RX ADMIN — METOCLOPRAMIDE 5 MG: 5 INJECTION, SOLUTION INTRAMUSCULAR; INTRAVENOUS at 05:39

## 2020-09-19 RX ADMIN — METOCLOPRAMIDE 5 MG: 5 INJECTION, SOLUTION INTRAMUSCULAR; INTRAVENOUS at 17:19

## 2020-09-19 RX ADMIN — DEXMEDETOMIDINE HYDROCHLORIDE 0.5 MCG/KG/HR: 400 INJECTION INTRAVENOUS at 12:15

## 2020-09-19 RX ADMIN — MICONAZOLE NITRATE 2 % TOPICAL POWDER: at 17:20

## 2020-09-19 RX ADMIN — CHLORHEXIDINE GLUCONATE 15 ML: 0.12 RINSE ORAL at 08:48

## 2020-09-19 RX ADMIN — MICONAZOLE NITRATE 2 % TOPICAL POWDER: at 08:49

## 2020-09-19 RX ADMIN — ENOXAPARIN SODIUM 45 MG: 60 INJECTION SUBCUTANEOUS at 10:00

## 2020-09-19 RX ADMIN — METOCLOPRAMIDE 5 MG: 5 INJECTION, SOLUTION INTRAMUSCULAR; INTRAVENOUS at 00:00

## 2020-09-19 RX ADMIN — BUSPIRONE HYDROCHLORIDE 5 MG: 5 TABLET ORAL at 08:37

## 2020-09-19 RX ADMIN — Medication: at 17:19

## 2020-09-19 RX ADMIN — BUSPIRONE HYDROCHLORIDE 5 MG: 5 TABLET ORAL at 17:19

## 2020-09-19 RX ADMIN — METOCLOPRAMIDE 5 MG: 5 INJECTION, SOLUTION INTRAMUSCULAR; INTRAVENOUS at 12:14

## 2020-09-19 RX ADMIN — CHLORHEXIDINE GLUCONATE 15 ML: 0.12 RINSE ORAL at 21:09

## 2020-09-19 RX ADMIN — DEXMEDETOMIDINE HYDROCHLORIDE 0.5 MCG/KG/HR: 400 INJECTION INTRAVENOUS at 21:18

## 2020-09-19 RX ADMIN — ALPRAZOLAM 1 MG: 0.5 TABLET ORAL at 08:36

## 2020-09-19 RX ADMIN — DEXMEDETOMIDINE HYDROCHLORIDE 0.5 MCG/KG/HR: 400 INJECTION INTRAVENOUS at 05:37

## 2020-09-19 RX ADMIN — Medication 10 ML: at 07:07

## 2020-09-19 RX ADMIN — Medication 10 ML: at 21:09

## 2020-09-19 RX ADMIN — ENOXAPARIN SODIUM 45 MG: 60 INJECTION SUBCUTANEOUS at 21:17

## 2020-09-19 NOTE — PROGRESS NOTES
ARDS  Acute hypoxic respiratory failure  COVID positive  Sinus pauses  S/P tracheostomy     Remains on vent    Continues to have issues with vomiting    Hard to keep his TFs continuous    Still trying to work towards SBTs    WBCs normal    Hgb and platelets look good    Creatinine normal    Bilirubin and other LFTs look good    ABG 7.44 / 45 / 87 / 31 / 6    Vent FiO2 45%, PEEP 8, RR 22    Remains on Lovenox     Remains on Xanax / Seroquel     Remains Precedex    Will keep trying to work on anxiety      Intake/Output Summary (Last 24 hours) at 9/18/2020 2150  Last data filed at 9/18/2020 1900  Gross per 24 hour   Intake 2561. 59 ml   Output 2395 ml   Net 166.59 ml     Visit Vitals  /89   Pulse 87   Temp 99.9 °F (37.7 °C)   Resp 25   Ht 5' 11\" (1.803 m)   Wt 207 lb 3.7 oz (94 kg)   SpO2 99%   BMI 28.90 kg/m²       Risk of morbidity and mortality - high  Medical decision making - high complexity    Total critical care time - 30 minutes (CPT 59860)     I personally spent the above critical care time. This is time spent at this critically ill patient's bedside / unit / floor actively involved in patient care as well as the coordination of care and discussions with the patient's family. This does not include any procedural time which has been billed separately.

## 2020-09-19 NOTE — PROGRESS NOTES
[facility]    ICU TEAM Progress Note    Name: Maximus Escudero   : 1990   MRN: [id number]   Date: 2020      I  Subjective:   Progress Note: 2020      Reason for ICU Admission: Respiratory failure due to COVID-19 infection    Interval history:  51-year-old male with no history of significant past medical history except smoking half pack per day. [facility] was admitted on 2020 at [facility] with acute hypoxemic respiratory failure from COVID pneumonia. [facility] continued to deteriorate and got intubated on 8/10.  Due to worsening hypoxemia, he is transferred to [facility] for ECMO evaluation. [facility] has bee given convalescent plasma twice on  and  and treated with Remdesivir which was completed on 8/10. [facility] is also on dexamethasone. [facility] completed course of empiric antibiotics including azithromycin which was completed on  and cefepime was completed on .  During his hospital course he also developed pneumothorax and has left-sided chest tube since .  Has a tracheostomy tube placed on .  On September 3 patient had significant worsening on his right arm, CT head was unrevealing.  He is already on high-dose Lovenox and aspirin.  On  he had acute weakness on the left arm code stroke was called and again CTA was unrevealing.  Since then left arm is back to baseline, showed some improvement on right arm but still weak compared to left with minimal ability to move against gravity. Mental status continues to improve but now he is very anxious with recurrent episode of nausea and vomiting associated with tachycardia.     Overnight Events: Tolerated drop in PC very well.  No complaints this am.     Active Problem List:     Problem List  Date Reviewed: 2020          Codes Class    [facility] ICD-10-CM: J96.00  ICD-9-CM: 518.81         Pneumothorax on left ICD-10-CM: J93.9  ICD-9-CM: 512.89         Pneumonia due to severe acute respiratory syndrome coronavirus 2 (SARS-CoV-2) ICD-10-CM: U07.1, J12.89  ICD-9-CM: 480.8         Respiratory failure with hypoxia (HCC) ICD-10-CM: J96.91  ICD-9-CM: 518.81               Past Medical History:      has a past medical history of History of vascular access device (08/10/2020). Past Surgical History:      has a past surgical history that includes ir thora/ins chest tube(pneumo) wo image (2020); ir thora/ins chest tube(pneumo) wo image (2020); and pr tracheostomy, planned (2020). Home Medications:     Prior to Admission medications    Medication Sig Start Date End Date Taking? Authorizing Provider   benzonatate (Tessalon Perles) 100 mg capsule Take 100 mg by mouth three (3) times daily as needed for Cough. Provider, Historical       Allergies/Social/Family History:     No Known Allergies   Social History     Tobacco Use    Smoking status: Current Some Day Smoker    Smokeless tobacco: Never Used   Substance Use Topics    Alcohol use: Not on file      History reviewed. No pertinent family history. Review of Systems:     Not able to obtain due to his medical condition    Objective:   Vital Signs:  Visit Vitals  /86   Pulse 83   Temp 99 °F (37.2 °C)   Resp 23   Ht 5' 11\" (1.803 m)   Wt 92.7 kg (204 lb 5.9 oz)   SpO2 99%   BMI 28.50 kg/m²      O2 Device: Tracheostomy, Ventilator Temp (24hrs), Av.6 °F (37.6 °C), Min:99 °F (37.2 °C), Max:99.9 °F (37.7 °C)           Intake/Output:     Intake/Output Summary (Last 24 hours) at 2020 0956  Last data filed at 2020 0800  Gross per 24 hour   Intake 1878.48 ml   Output 1200 ml   Net 678.48 ml       Physical Exam:  General:  Awake and interactive. Writing on communication board. Frustrated with speed of communication but this is understandable. Eyes:  Sclera anicteric. Pupils equally round and reactive to light.    Mouth/Throat: Mucous membranes normal, oral pharynx clear   Neck: Supple, tracheostomy tube in place   Lungs:    Good air entry bilaterally, fine crepitation   CV:  Regular rate and rhythm,no murmur, click, rub or gallop   Abdomen:   Soft, non-tender. bowel sounds normal. non-distended   Extremities: No cyanosis , evolving edema   Skin: Skin color, texture, turgor normal. no acute rash or lesions   Lymph nodes: Cervical and supraclavicular normal   Musculoskeletal: No swelling or deformity   Lines/Devices:  Intact, no erythema, drainage or tenderness   Psych: On minimal sedation, conscious and alert, follows simple command, anxious, still weak on right arm but able to move minimally against gravity            LABS AND  DATA: Personally reviewed  Recent Labs     09/19/20 0451 09/18/20  0418   WBC 5.7 6.4   HGB 8.4* 7.9*   HCT 26.9* 25.3*    236     Recent Labs     09/19/20 0451 09/18/20  0418    136   K 3.6 3.4*    102   CO2 27 31   BUN 12 12   CREA 0.45* 0.48*   * 133*   CA 9.8 9.2   MG 1.9 1.9   PHOS 4.9* 3.8     Recent Labs     09/19/20 0451 09/18/20  0418    90   TP 7.3 7.3   ALB 2.7* 2.7*   GLOB 4.6* 4.6*     No results for input(s): INR, PTP, APTT, INREXT, INREXT in the last 72 hours. Recent Labs     09/17/20  0512   PHI 7.44   PCO2I 45.3*   PO2I 87   FIO2I 45     No results for input(s): CPK, CKMB, TROIQ, BNPP in the last 72 hours. Hemodynamics:   PAP:   CO:     Wedge:   CI:     CVP:    SVR:       PVR:       Ventilator Settings:  Mode Rate Tidal Volume Pressure FiO2 PEEP   Pressure control   0 ml  0 cm H2O 45 % 8 cm H20     Peak airway pressure: 29 cm H2O    Minute ventilation: 7.93 l/min        MEDS: Reviewed    Chest X-Ray:  CXR Results  (Last 48 hours)    None          Assessment and Plan:   -Bilateral pneumonia due to COVID-19 infection  -Acute right arm weakness:  Patient already on aspirin and moderate dose anticoagulation.  Still pending CTA head and neck, CT head showed no bleed.   -Right arm weakness  -Fever: No clear etiology, culture remain negative, images not suggestive of acute infectious process  - Acute hypoxic respiratory failure due to COVID-19 infection status post tracheostomy on August 26  - Pneumothorax status post left chest tube insertion on August 11 without air leak  - Possible bacterial super imposed infection: Completed antibiotic course  -History of tobacco use     -Now on low-dose Precedex. On BuSpar, Seroquel, and Xanax. Continue xanax and oxycodone wean  - At this time on PEEP of 5 and FiO2 of 40%. PC support to 20 last night - will decrease further to 18 today.   - Good urine output though positive fluid balance. Diuretics today. Marco Spittle use as needed.  Replace electrolytes as indicated  -Right arm paralysis, no clear underlying etiology, CT showed abnormal bilateral frontoparietal enhancement.  Perceptual diagnosis is wide.  Power is improving on right arm but still significantly weaker than left  - No fever o/n  -Recurrent vomiting. Added Reglan , advance tube feeding as tolerated  - Completed COVID-19 treatment  - DVT prophylaxis with high-dose Lovenox.  GI prophylaxis.  Nutritional support    DISPOSITION  Stay in ICU    CRITICAL CARE CONSULTANT NOTE  I had a face to face encounter with the patient, reviewed and interpreted patient data including clinical events, labs, images, vital signs, I/O's, and examined patient. I have discussed the case and the plan and management of the patient's care with the consulting services, the bedside nurses and the respiratory therapist.      NOTE OF PERSONAL INVOLVEMENT IN CARE   This patient has a high probability of imminent, clinically significant deterioration, which requires the highest level of preparedness to intervene urgently. I participated in the decision-making and personally managed or directed the management of the following life and organ supporting interventions that required my frequent assessment to treat or prevent imminent deterioration.     I personally spent 60 minutes of critical care time. This is time spent at this critically ill patient's bedside actively involved in patient care as well as the coordination of care and discussions with the patient's family. This does not include any procedural time which has been billed separately.     Simona Martinez MD  Staff Intensivist/Anesthesiologist  [facility] [place] [facility]  9/19/2020

## 2020-09-19 NOTE — PROGRESS NOTES
ARDS  Acute hypoxic respiratory failure  COVID positive  Sinus pauses  S/P tracheostomy     Remains on vent    Dropped PC this am - seems to be tolerating     Working on anxiety     Very alert this evening    Some issues with vomiting last night; better today     WBCs normal    Hgb and platelets look good    Creatinine normal    Bilirubin and other LFTs up a bit    Ammonia normal    ABG - 7.44 / 45 / 87 / 31 / 6    Vent - FiO2 45%, PEEP 8, RR 20    Remains on Lovenox     Remains on Xanax / Seroquel     Remains Precedex    Mcwilliams removed    Condom catheter placed    Discussed with ICU attending       Intake/Output Summary (Last 24 hours) at 9/19/2020 1622  Last data filed at 9/19/2020 1600  Gross per 24 hour   Intake 1826.32 ml   Output 550 ml   Net 1276.32 ml     Visit Vitals  /88 (BP 1 Location: Left arm, BP Patient Position: At rest)   Pulse 89   Temp 99.1 °F (37.3 °C)   Resp 25   Ht 5' 11\" (1.803 m)   Wt 204 lb 5.9 oz (92.7 kg)   SpO2 99%   BMI 28.50 kg/m²       Risk of morbidity and mortality - high  Medical decision making - high complexity    Total critical care time - 30 minutes (CPT 12059)     I personally spent the above critical care time. This is time spent at this critically ill patient's bedside / unit / floor actively involved in patient care as well as the coordination of care and discussions with the patient's family. This does not include any procedural time which has been billed separately.

## 2020-09-20 LAB
ALBUMIN SERPL-MCNC: 2.8 G/DL (ref 3.5–5)
ALBUMIN/GLOB SERPL: 0.6 {RATIO} (ref 1.1–2.2)
ALP SERPL-CCNC: 97 U/L (ref 45–117)
ALT SERPL-CCNC: 108 U/L (ref 12–78)
ANION GAP SERPL CALC-SCNC: 4 MMOL/L (ref 5–15)
ARTERIAL PATENCY WRIST A: ABNORMAL
AST SERPL-CCNC: 40 U/L (ref 15–37)
BASE EXCESS BLD CALC-SCNC: 10 MMOL/L
BASOPHILS # BLD: 0 K/UL (ref 0–0.1)
BASOPHILS NFR BLD: 0 % (ref 0–1)
BDY SITE: ABNORMAL
BILIRUB SERPL-MCNC: 0.3 MG/DL (ref 0.2–1)
BUN SERPL-MCNC: 12 MG/DL (ref 6–20)
BUN/CREAT SERPL: 23 (ref 12–20)
CA-I BLD-SCNC: 1.26 MMOL/L (ref 1.12–1.32)
CALCIUM SERPL-MCNC: 9.5 MG/DL (ref 8.5–10.1)
CHLORIDE SERPL-SCNC: 100 MMOL/L (ref 97–108)
CO2 SERPL-SCNC: 29 MMOL/L (ref 21–32)
COVID-19, XGCOVT: NOT DETECTED
CREAT SERPL-MCNC: 0.53 MG/DL (ref 0.7–1.3)
DIFFERENTIAL METHOD BLD: ABNORMAL
EOSINOPHIL # BLD: 0.2 K/UL (ref 0–0.4)
EOSINOPHIL NFR BLD: 2 % (ref 0–7)
ERYTHROCYTE [DISTWIDTH] IN BLOOD BY AUTOMATED COUNT: 14 % (ref 11.5–14.5)
GAS FLOW.O2 O2 DELIVERY SYS: ABNORMAL L/MIN
GAS FLOW.O2 SETTING OXYMISER: 20 BPM
GLOBULIN SER CALC-MCNC: 4.7 G/DL (ref 2–4)
GLUCOSE SERPL-MCNC: 111 MG/DL (ref 65–100)
HCO3 BLD-SCNC: 32.9 MMOL/L (ref 22–26)
HCT VFR BLD AUTO: 28.4 % (ref 36.6–50.3)
HEALTH STATUS, XMCV2T: NORMAL
HGB BLD-MCNC: 8.9 G/DL (ref 12.1–17)
IMM GRANULOCYTES # BLD AUTO: 0 K/UL
IMM GRANULOCYTES NFR BLD AUTO: 0 %
INSPIRATION.DURATION SETTING TIME VENT: 1 SEC
LYMPHOCYTES # BLD: 1.1 K/UL (ref 0.8–3.5)
LYMPHOCYTES NFR BLD: 14 % (ref 12–49)
MAGNESIUM SERPL-MCNC: 2.1 MG/DL (ref 1.6–2.4)
MCH RBC QN AUTO: 27.9 PG (ref 26–34)
MCHC RBC AUTO-ENTMCNC: 31.3 G/DL (ref 30–36.5)
MCV RBC AUTO: 89 FL (ref 80–99)
METAMYELOCYTES NFR BLD MANUAL: 4 %
MONOCYTES # BLD: 0.6 K/UL (ref 0–1)
MONOCYTES NFR BLD: 8 % (ref 5–13)
NEUTS BAND NFR BLD MANUAL: 1 % (ref 0–6)
NEUTS SEG # BLD: 5.4 K/UL (ref 1.8–8)
NEUTS SEG NFR BLD: 71 % (ref 32–75)
NRBC # BLD: 0 K/UL (ref 0–0.01)
NRBC BLD-RTO: 0 PER 100 WBC
O2/TOTAL GAS SETTING VFR VENT: 45 %
PCO2 BLD: 43.7 MMHG (ref 35–45)
PEEP RESPIRATORY: 8 CMH2O
PH BLD: 7.49 [PH] (ref 7.35–7.45)
PHOSPHATE SERPL-MCNC: 5 MG/DL (ref 2.6–4.7)
PIP ISTAT,IPIP: 16
PLATELET # BLD AUTO: 272 K/UL (ref 150–400)
PMV BLD AUTO: 10.2 FL (ref 8.9–12.9)
PO2 BLD: 100 MMHG (ref 80–100)
POTASSIUM SERPL-SCNC: 4.1 MMOL/L (ref 3.5–5.1)
PROT SERPL-MCNC: 7.5 G/DL (ref 6.4–8.2)
RBC # BLD AUTO: 3.19 M/UL (ref 4.1–5.7)
RBC MORPH BLD: ABNORMAL
SAO2 % BLD: 98 % (ref 92–97)
SODIUM SERPL-SCNC: 133 MMOL/L (ref 136–145)
SOURCE, COVRS: NORMAL
SPECIMEN SOURCE, FCOV2M: NORMAL
SPECIMEN TYPE, XMCV1T: NORMAL
SPECIMEN TYPE: ABNORMAL
VENTILATION MODE VENT: ABNORMAL
WBC # BLD AUTO: 7.5 K/UL (ref 4.1–11.1)

## 2020-09-20 PROCEDURE — 74011250637 HC RX REV CODE- 250/637: Performed by: NURSE PRACTITIONER

## 2020-09-20 PROCEDURE — 80053 COMPREHEN METABOLIC PANEL: CPT

## 2020-09-20 PROCEDURE — 74011250636 HC RX REV CODE- 250/636: Performed by: INTERNAL MEDICINE

## 2020-09-20 PROCEDURE — 74011250637 HC RX REV CODE- 250/637: Performed by: INTERNAL MEDICINE

## 2020-09-20 PROCEDURE — 94003 VENT MGMT INPAT SUBQ DAY: CPT

## 2020-09-20 PROCEDURE — 74011000250 HC RX REV CODE- 250: Performed by: INTERNAL MEDICINE

## 2020-09-20 PROCEDURE — 99291 CRITICAL CARE FIRST HOUR: CPT | Performed by: THORACIC SURGERY (CARDIOTHORACIC VASCULAR SURGERY)

## 2020-09-20 PROCEDURE — 36415 COLL VENOUS BLD VENIPUNCTURE: CPT

## 2020-09-20 PROCEDURE — 85025 COMPLETE CBC W/AUTO DIFF WBC: CPT

## 2020-09-20 PROCEDURE — 83735 ASSAY OF MAGNESIUM: CPT

## 2020-09-20 PROCEDURE — 65610000006 HC RM INTENSIVE CARE

## 2020-09-20 PROCEDURE — 84100 ASSAY OF PHOSPHORUS: CPT

## 2020-09-20 PROCEDURE — 74011250637 HC RX REV CODE- 250/637: Performed by: HOSPITALIST

## 2020-09-20 PROCEDURE — 74011250637 HC RX REV CODE- 250/637: Performed by: STUDENT IN AN ORGANIZED HEALTH CARE EDUCATION/TRAINING PROGRAM

## 2020-09-20 PROCEDURE — 82803 BLOOD GASES ANY COMBINATION: CPT

## 2020-09-20 PROCEDURE — 77030018861

## 2020-09-20 PROCEDURE — 74011000250 HC RX REV CODE- 250: Performed by: STUDENT IN AN ORGANIZED HEALTH CARE EDUCATION/TRAINING PROGRAM

## 2020-09-20 RX ORDER — BUMETANIDE 0.25 MG/ML
1 INJECTION INTRAMUSCULAR; INTRAVENOUS ONCE
Status: COMPLETED | OUTPATIENT
Start: 2020-09-20 | End: 2020-09-20

## 2020-09-20 RX ORDER — OXYCODONE HYDROCHLORIDE 5 MG/1
5 TABLET ORAL
Status: DISCONTINUED | OUTPATIENT
Start: 2020-09-20 | End: 2020-09-21

## 2020-09-20 RX ADMIN — ASPIRIN 81 MG CHEWABLE TABLET 81 MG: 81 TABLET CHEWABLE at 08:06

## 2020-09-20 RX ADMIN — MICONAZOLE NITRATE 2 % TOPICAL POWDER: at 17:49

## 2020-09-20 RX ADMIN — FAMOTIDINE 20 MG: 10 INJECTION INTRAVENOUS at 08:06

## 2020-09-20 RX ADMIN — BUSPIRONE HYDROCHLORIDE 5 MG: 5 TABLET ORAL at 15:49

## 2020-09-20 RX ADMIN — ALUMINUM HYDROXIDE AND MAGNESIUM HYDROXIDE 15 ML: 200; 200 SUSPENSION ORAL at 00:43

## 2020-09-20 RX ADMIN — METOCLOPRAMIDE 5 MG: 5 INJECTION, SOLUTION INTRAMUSCULAR; INTRAVENOUS at 06:17

## 2020-09-20 RX ADMIN — BUSPIRONE HYDROCHLORIDE 5 MG: 5 TABLET ORAL at 08:06

## 2020-09-20 RX ADMIN — DEXMEDETOMIDINE HYDROCHLORIDE 0.6 MCG/KG/HR: 400 INJECTION INTRAVENOUS at 10:08

## 2020-09-20 RX ADMIN — FAMOTIDINE 20 MG: 10 INJECTION INTRAVENOUS at 20:31

## 2020-09-20 RX ADMIN — ALPRAZOLAM 1 MG: 0.5 TABLET ORAL at 08:06

## 2020-09-20 RX ADMIN — Medication 10 ML: at 06:22

## 2020-09-20 RX ADMIN — ENOXAPARIN SODIUM 45 MG: 60 INJECTION SUBCUTANEOUS at 10:07

## 2020-09-20 RX ADMIN — Medication: at 17:48

## 2020-09-20 RX ADMIN — CHLORHEXIDINE GLUCONATE 15 ML: 0.12 RINSE ORAL at 08:06

## 2020-09-20 RX ADMIN — BUMETANIDE 1 MG: 0.25 INJECTION INTRAMUSCULAR; INTRAVENOUS at 10:53

## 2020-09-20 RX ADMIN — METOCLOPRAMIDE 5 MG: 5 INJECTION, SOLUTION INTRAMUSCULAR; INTRAVENOUS at 00:43

## 2020-09-20 RX ADMIN — DEXMEDETOMIDINE HYDROCHLORIDE 0.6 MCG/KG/HR: 400 INJECTION INTRAVENOUS at 03:06

## 2020-09-20 RX ADMIN — ENOXAPARIN SODIUM 45 MG: 60 INJECTION SUBCUTANEOUS at 21:43

## 2020-09-20 RX ADMIN — DEXMEDETOMIDINE HYDROCHLORIDE 0.6 MCG/KG/HR: 400 INJECTION INTRAVENOUS at 17:45

## 2020-09-20 RX ADMIN — SODIUM CHLORIDE 10 MG: 9 INJECTION INTRAMUSCULAR; INTRAVENOUS; SUBCUTANEOUS at 02:15

## 2020-09-20 RX ADMIN — OXYCODONE 5 MG: 5 TABLET ORAL at 06:17

## 2020-09-20 RX ADMIN — CHLORHEXIDINE GLUCONATE 15 ML: 0.12 RINSE ORAL at 20:30

## 2020-09-20 RX ADMIN — ALPRAZOLAM 1 MG: 0.5 TABLET ORAL at 17:46

## 2020-09-20 RX ADMIN — METOCLOPRAMIDE 5 MG: 5 INJECTION, SOLUTION INTRAMUSCULAR; INTRAVENOUS at 11:36

## 2020-09-20 RX ADMIN — Medication 10 ML: at 21:44

## 2020-09-20 RX ADMIN — METOCLOPRAMIDE 5 MG: 5 INJECTION, SOLUTION INTRAMUSCULAR; INTRAVENOUS at 17:48

## 2020-09-20 RX ADMIN — MICONAZOLE NITRATE 2 % TOPICAL POWDER: at 10:54

## 2020-09-20 RX ADMIN — Medication 10 ML: at 14:22

## 2020-09-20 RX ADMIN — BUSPIRONE HYDROCHLORIDE 5 MG: 5 TABLET ORAL at 21:43

## 2020-09-20 RX ADMIN — QUETIAPINE FUMARATE 200 MG: 100 TABLET ORAL at 21:43

## 2020-09-20 RX ADMIN — Medication: at 08:40

## 2020-09-20 NOTE — PROGRESS NOTES
Bedside, Verbal and Written shift change report given to Nadya Brewer RN (oncoming nurse) by Pablito Alan RN (offgoing nurse). Report included the following information SBAR, Kardex, ED Summary, Procedure Summary, Intake/Output, MAR, Accordion, Recent Results, Cardiac Rhythm NSR and Alarm Parameters . 0700: Patient states he is losing feeling in all four extremities. Informed intensivist.     Shift Summary: Patient A&O x4. Remains anxious. Right arm weaker than left. Remains on trach and ventilator. Precedex at 0.6.

## 2020-09-20 NOTE — PROGRESS NOTES
1930: Bedside and Verbal shift change report given to [address] (oncoming nurse) by Lissy Schneider RN (offgoing nurse). Report included the following information SBAR, Kardex, ED Summary, MAR, Recent Results, and Cardiac Rhythm NSR .

## 2020-09-20 NOTE — PROGRESS NOTES
[facility]    ICU TEAM Progress Note    Name: Nic White   : 1990   MRN: [id number]   Date: 2020      I  Subjective:   Progress Note: 2020      Reason for ICU Admission: Respiratory failure due to COVID-19 infection    Interval history:  80-year-old male with no history of significant past medical history except smoking half pack per day. Erling Baumgarten was admitted on 2020 at [facility] with acute hypoxemic respiratory failure from COVID pneumonia. Erling Baumgarten continued to deteriorate and got intubated on 8/10.  Due to worsening hypoxemia, he is transferred to [facility] for ECMO evaluation. Erling Baumgarten has bee given convalescent plasma twice on  and  and treated with Remdesivir which was completed on 8/10. Erling Baumgarten is also on dexamethasone. Erling Baumgarten completed course of empiric antibiotics including azithromycin which was completed on  and cefepime was completed on .  During his hospital course he also developed pneumothorax and has left-sided chest tube since .  Has a tracheostomy tube placed on .  On September 3 patient had significant worsening on his right arm, CT head was unrevealing.  He is already on high-dose Lovenox and aspirin.  On  he had acute weakness on the left arm code stroke was called and again CTA was unrevealing.  Since then left arm is back to baseline, showed some improvement on right arm but still weak compared to left with minimal ability to move against gravity. Mental status continues to improve but now he is very anxious with recurrent episode of nausea and vomiting associated with tachycardia.     Overnight Events:   Again, tolerated drop in PC very well. Remains on low dose precedex. Continues to do well on PIP wean. Anticipate that we will be able to have pressure support trial this week and possible PMV.     Active Problem List:     Problem List  Date Reviewed: 2020          Codes Class    Acute respiratory failure ([facility] ICD-10-CM: J96.00  ICD-9-CM: 518.81         Pneumothorax on left ICD-10-CM: J93.9  ICD-9-CM: 512.89         Pneumonia due to severe acute respiratory syndrome coronavirus 2 (SARS-CoV-2) ICD-10-CM: U07.1, J12.89  ICD-9-CM: 480.8         Respiratory failure with hypoxia (HCC) ICD-10-CM: J96.91  ICD-9-CM: 518.81               Past Medical History:      has a past medical history of History of vascular access device (08/10/2020). Past Surgical History:      has a past surgical history that includes ir thora/ins chest tube(pneumo) wo image (2020); ir thora/ins chest tube(pneumo) wo image (2020); and pr tracheostomy, planned (2020). Home Medications:     Prior to Admission medications    Medication Sig Start Date End Date Taking? Authorizing Provider   benzonatate (Tessalon Perles) 100 mg capsule Take 100 mg by mouth three (3) times daily as needed for Cough. Provider, Historical       Allergies/Social/Family History:     No Known Allergies   Social History     Tobacco Use    Smoking status: Current Some Day Smoker    Smokeless tobacco: Never Used   Substance Use Topics    Alcohol use: Not on file      History reviewed. No pertinent family history. Review of Systems:     Not able to obtain due to his medical condition    Objective:   Vital Signs:  Visit Vitals  /80   Pulse 89   Temp 99.1 °F (37.3 °C)   Resp 26   Ht 5' 11\" (1.803 m)   Wt 91.4 kg (201 lb 8 oz)   SpO2 96%   BMI 28.10 kg/m²      O2 Device: Tracheostomy, Ventilator Temp (24hrs), Av.2 °F (37.3 °C), Min:99 °F (37.2 °C), Max:99.7 °F (37.6 °C)           Intake/Output:     Intake/Output Summary (Last 24 hours) at 2020 1009  Last data filed at 2020 0700  Gross per 24 hour   Intake 2249.96 ml   Output 585 ml   Net 1664.96 ml       Physical Exam:  General:  Awake and interactive. Writing on communication board. Frustrated with speed of communication but this is understandable. Eyes:  Sclera anicteric. Pupils equally round and reactive to light. Mouth/Throat: Mucous membranes normal, oral pharynx clear   Neck: Supple, tracheostomy tube in place   Lungs:    Good air entry bilaterally, fine crepitation   CV:  Regular rate and rhythm,no murmur, click, rub or gallop   Abdomen:   Soft, non-tender. bowel sounds normal. non-distended   Extremities: No cyanosis , evolving edema   Skin: Skin color, texture, turgor normal. no acute rash or lesions   Lymph nodes: Cervical and supraclavicular normal   Musculoskeletal: No swelling or deformity   Lines/Devices:  Intact, no erythema, drainage or tenderness   Psych: On minimal sedation, conscious and alert, follows simple command, anxious, still weak on right arm but able to move minimally against gravity        LABS AND  DATA: Personally reviewed  Recent Labs     09/20/20 0502 09/19/20  0451   WBC 7.5 5.7   HGB 8.9* 8.4*   HCT 28.4* 26.9*    249     Recent Labs     09/20/20  0502 09/19/20  0451   * 136   K 4.1 3.6    101   CO2 29 27   BUN 12 12   CREA 0.53* 0.45*   * 105*   CA 9.5 9.8   MG 2.1 1.9   PHOS 5.0* 4.9*     Recent Labs     09/20/20  0502 09/19/20  0451   AP 97 105   TP 7.5 7.3   ALB 2.8* 2.7*   GLOB 4.7* 4.6*     No results for input(s): INR, PTP, APTT, INREXT, INREXT in the last 72 hours. No results for input(s): PHI, PCO2I, PO2I, FIO2I in the last 72 hours. No results for input(s): CPK, CKMB, TROIQ, BNPP in the last 72 hours.     Hemodynamics:   PAP:   CO:     Wedge:   CI:     CVP:    SVR:       PVR:       Ventilator Settings:  Mode Rate Tidal Volume Pressure FiO2 PEEP   Pressure control   0 ml  0 cm H2O 45 % 8 cm H20     Peak airway pressure: 29 cm H2O    Minute ventilation: 9.5 l/min        MEDS: Reviewed    Chest X-Ray:  CXR Results  (Last 48 hours)    None          Assessment and Plan:   -Bilateral pneumonia due to COVID-19 infection  -Acute right arm weakness:  Patient already on aspirin and moderate dose anticoagulation. Elzie Ou pending CTA head and neck, CT head showed no bleed. -Right arm weakness  -Fever: No clear etiology, culture remain negative, images not suggestive of acute infectious process  - Acute hypoxic respiratory failure due to COVID-19 infection status post tracheostomy on August 26  - Pneumothorax status post left chest tube insertion on August 11 without air leak  - Possible bacterial super imposed infection: Completed antibiotic course  -History of tobacco use     -Now on low-dose Precedex. On BuSpar, Seroquel, and Xanax. Continue xanax wean  - Change oxy from q12h scheduled to q12h PRN  - At this time on PEEP of 5 and FiO2 of 40%. PC support to 18 last night - will decrease further to 16 today. Blood gas today  - Good urine output though positive fluid balance. Bumex today.  Will use as needed.  Replace electrolytes as indicated  -Right arm paralysis, no clear underlying etiology, CT showed abnormal bilateral frontoparietal enhancement.  Perceptual diagnosis is wide.  Power is improving on right arm but still significantly weaker than left  - No fever o/n  - No vomiting overnight. Added Reglan, advance tube feeding as tolerated  -- Slight hyponatremia today, will decrease free water flushes  - Completed COVID-19 treatment  - DVT prophylaxis with high-dose Lovenox.  GI prophylaxis.  Nutritional support    DISPOSITION  Stay in ICU    CRITICAL CARE CONSULTANT NOTE  I had a face to face encounter with the patient, reviewed and interpreted patient data including clinical events, labs, images, vital signs, I/O's, and examined patient. I have discussed the case and the plan and management of the patient's care with the consulting services, the bedside nurses and the respiratory therapist.      NOTE OF PERSONAL INVOLVEMENT IN CARE   This patient has a high probability of imminent, clinically significant deterioration, which requires the highest level of preparedness to intervene urgently.  I participated in the decision-making and personally managed or directed the management of the following life and organ supporting interventions that required my frequent assessment to treat or prevent imminent deterioration. I personally spent 40 minutes of critical care time. This is time spent at this critically ill patient's bedside actively involved in patient care as well as the coordination of care and discussions with the patient's family. This does not include any procedural time which has been billed separately.     Marlen Fu MD  Staff Intensivist/Anesthesiologist  [facility]  9/20/2020

## 2020-09-20 NOTE — PROGRESS NOTES
ARDS  Acute hypoxic respiratory failure  COVID positive  Sinus pauses  S/P tracheostomy     Remains on Vent    Making some progress    Very alert    Less anxiety    Tolerated drop in PC well    Looking at PS trial this week    Continues on low dose precedex    WBCs normal    Hgb and platelets look good    Creatinine normal    Bilirubin normal    LFTs up a bit    ABG - 7.44 / 45 / 87 / 31 / 6    Vent - RR 20, PEEP 8, FiO2 45%    Continues on Lovenox     Continues on  Xanax / Seroquel     Remains on Buspar     PRN Versed / Fentanyl      Intake/Output Summary (Last 24 hours) at 9/20/2020 1344  Last data filed at 9/20/2020 1249  Gross per 24 hour   Intake 2516.96 ml   Output 1875 ml   Net 641.96 ml     Visit Vitals  /80   Pulse 77   Temp 99.2 °F (37.3 °C)   Resp 22   Ht 5' 11\" (1.803 m)   Wt 201 lb 8 oz (91.4 kg)   SpO2 99%   BMI 28.10 kg/m²       Risk of morbidity and mortality - high  Medical decision making - high complexity    Total critical care time - 30 minutes (CPT 76720)     I personally spent the above critical care time. This is time spent at this critically ill patient's bedside / unit / floor actively involved in patient care as well as the coordination of care and discussions with the patient's family. This does not include any procedural time which has been billed separately.

## 2020-09-20 NOTE — PROGRESS NOTES
0730-Bedside and Verbal shift change report given to [address] (oncoming nurse) by Wilmar Birmingham (offgoing nurse).  Report included the following information SBAR, Kardex, ED Summary, Procedure Summary, Intake/Output, MAR, Recent Results and Cardiac Rhythm SR.

## 2020-09-21 LAB
ALBUMIN SERPL-MCNC: 3.1 G/DL (ref 3.5–5)
ALBUMIN/GLOB SERPL: 0.6 {RATIO} (ref 1.1–2.2)
ALP SERPL-CCNC: 105 U/L (ref 45–117)
ALT SERPL-CCNC: 98 U/L (ref 12–78)
ANION GAP SERPL CALC-SCNC: 6 MMOL/L (ref 5–15)
AST SERPL-CCNC: 31 U/L (ref 15–37)
BACTERIA SPEC CULT: NORMAL
BASOPHILS # BLD: 0.1 K/UL (ref 0–0.1)
BASOPHILS NFR BLD: 1 % (ref 0–1)
BILIRUB SERPL-MCNC: 0.3 MG/DL (ref 0.2–1)
BUN SERPL-MCNC: 20 MG/DL (ref 6–20)
BUN/CREAT SERPL: 31 (ref 12–20)
CALCIUM SERPL-MCNC: 9.7 MG/DL (ref 8.5–10.1)
CHLORIDE SERPL-SCNC: 98 MMOL/L (ref 97–108)
CO2 SERPL-SCNC: 30 MMOL/L (ref 21–32)
CREAT SERPL-MCNC: 0.64 MG/DL (ref 0.7–1.3)
DIFFERENTIAL METHOD BLD: ABNORMAL
EOSINOPHIL # BLD: 0.2 K/UL (ref 0–0.4)
EOSINOPHIL NFR BLD: 3 % (ref 0–7)
ERYTHROCYTE [DISTWIDTH] IN BLOOD BY AUTOMATED COUNT: 14.1 % (ref 11.5–14.5)
GLOBULIN SER CALC-MCNC: 5 G/DL (ref 2–4)
GLUCOSE SERPL-MCNC: 130 MG/DL (ref 65–100)
HCT VFR BLD AUTO: 31.8 % (ref 36.6–50.3)
HGB BLD-MCNC: 10 G/DL (ref 12.1–17)
IMM GRANULOCYTES # BLD AUTO: 0 K/UL
IMM GRANULOCYTES NFR BLD AUTO: 0 %
LYMPHOCYTES # BLD: 1.6 K/UL (ref 0.8–3.5)
LYMPHOCYTES NFR BLD: 21 % (ref 12–49)
MAGNESIUM SERPL-MCNC: 2 MG/DL (ref 1.6–2.4)
MCH RBC QN AUTO: 27.9 PG (ref 26–34)
MCHC RBC AUTO-ENTMCNC: 31.4 G/DL (ref 30–36.5)
MCV RBC AUTO: 88.6 FL (ref 80–99)
MONOCYTES # BLD: 0.9 K/UL (ref 0–1)
MONOCYTES NFR BLD: 11 % (ref 5–13)
MYELOCYTES NFR BLD MANUAL: 1 %
NEUTS BAND NFR BLD MANUAL: 3 % (ref 0–6)
NEUTS SEG # BLD: 4.9 K/UL (ref 1.8–8)
NEUTS SEG NFR BLD: 60 % (ref 32–75)
NRBC # BLD: 0 K/UL (ref 0–0.01)
NRBC BLD-RTO: 0 PER 100 WBC
PHOSPHATE SERPL-MCNC: 5.6 MG/DL (ref 2.6–4.7)
PLATELET # BLD AUTO: 281 K/UL (ref 150–400)
PLATELET COMMENTS,PCOM: ABNORMAL
PMV BLD AUTO: 10.2 FL (ref 8.9–12.9)
POTASSIUM SERPL-SCNC: 3.8 MMOL/L (ref 3.5–5.1)
PROT SERPL-MCNC: 8.1 G/DL (ref 6.4–8.2)
RBC # BLD AUTO: 3.59 M/UL (ref 4.1–5.7)
RBC MORPH BLD: ABNORMAL
SERVICE CMNT-IMP: NORMAL
SODIUM SERPL-SCNC: 134 MMOL/L (ref 136–145)
WBC # BLD AUTO: 7.8 K/UL (ref 4.1–11.1)

## 2020-09-21 PROCEDURE — 74011250637 HC RX REV CODE- 250/637: Performed by: HOSPITALIST

## 2020-09-21 PROCEDURE — 80053 COMPREHEN METABOLIC PANEL: CPT

## 2020-09-21 PROCEDURE — 74011250636 HC RX REV CODE- 250/636: Performed by: INTERNAL MEDICINE

## 2020-09-21 PROCEDURE — 74011250637 HC RX REV CODE- 250/637: Performed by: EMERGENCY MEDICINE

## 2020-09-21 PROCEDURE — 83735 ASSAY OF MAGNESIUM: CPT

## 2020-09-21 PROCEDURE — 74011250637 HC RX REV CODE- 250/637: Performed by: INTERNAL MEDICINE

## 2020-09-21 PROCEDURE — 97530 THERAPEUTIC ACTIVITIES: CPT

## 2020-09-21 PROCEDURE — 94003 VENT MGMT INPAT SUBQ DAY: CPT

## 2020-09-21 PROCEDURE — 84100 ASSAY OF PHOSPHORUS: CPT

## 2020-09-21 PROCEDURE — 74011000250 HC RX REV CODE- 250: Performed by: INTERNAL MEDICINE

## 2020-09-21 PROCEDURE — 36415 COLL VENOUS BLD VENIPUNCTURE: CPT

## 2020-09-21 PROCEDURE — 97110 THERAPEUTIC EXERCISES: CPT

## 2020-09-21 PROCEDURE — 74011250637 HC RX REV CODE- 250/637: Performed by: NURSE PRACTITIONER

## 2020-09-21 PROCEDURE — 99291 CRITICAL CARE FIRST HOUR: CPT | Performed by: THORACIC SURGERY (CARDIOTHORACIC VASCULAR SURGERY)

## 2020-09-21 PROCEDURE — 74011250637 HC RX REV CODE- 250/637: Performed by: STUDENT IN AN ORGANIZED HEALTH CARE EDUCATION/TRAINING PROGRAM

## 2020-09-21 PROCEDURE — 85025 COMPLETE CBC W/AUTO DIFF WBC: CPT

## 2020-09-21 PROCEDURE — 65610000006 HC RM INTENSIVE CARE

## 2020-09-21 RX ORDER — NYSTATIN 100000 [USP'U]/ML
500000 SUSPENSION ORAL 4 TIMES DAILY
Status: DISCONTINUED | OUTPATIENT
Start: 2020-09-21 | End: 2020-10-12 | Stop reason: HOSPADM

## 2020-09-21 RX ADMIN — METOCLOPRAMIDE 5 MG: 5 INJECTION, SOLUTION INTRAMUSCULAR; INTRAVENOUS at 00:17

## 2020-09-21 RX ADMIN — ACETAMINOPHEN ORAL SOLUTION 650 MG: 650 SOLUTION ORAL at 08:16

## 2020-09-21 RX ADMIN — ALPRAZOLAM 1 MG: 0.5 TABLET ORAL at 08:16

## 2020-09-21 RX ADMIN — POLYETHYLENE GLYCOL 3350 17 G: 17 POWDER, FOR SOLUTION ORAL at 18:48

## 2020-09-21 RX ADMIN — DOCUSATE SODIUM 100 MG: 50 LIQUID ORAL at 18:47

## 2020-09-21 RX ADMIN — Medication: at 18:48

## 2020-09-21 RX ADMIN — Medication 1 DROP: at 21:58

## 2020-09-21 RX ADMIN — BUSPIRONE HYDROCHLORIDE 5 MG: 5 TABLET ORAL at 16:37

## 2020-09-21 RX ADMIN — NYSTATIN 500000 UNITS: 500000 SUSPENSION ORAL at 21:59

## 2020-09-21 RX ADMIN — OXYCODONE HYDROCHLORIDE 5 MG: 5 SOLUTION ORAL at 18:47

## 2020-09-21 RX ADMIN — Medication: at 08:17

## 2020-09-21 RX ADMIN — METOCLOPRAMIDE 5 MG: 5 INJECTION, SOLUTION INTRAMUSCULAR; INTRAVENOUS at 23:51

## 2020-09-21 RX ADMIN — NYSTATIN 500000 UNITS: 500000 SUSPENSION ORAL at 16:41

## 2020-09-21 RX ADMIN — MICONAZOLE NITRATE 2 % TOPICAL POWDER: at 08:18

## 2020-09-21 RX ADMIN — MICONAZOLE NITRATE 2 % TOPICAL POWDER: at 18:48

## 2020-09-21 RX ADMIN — FAMOTIDINE 20 MG: 10 INJECTION INTRAVENOUS at 20:03

## 2020-09-21 RX ADMIN — BUSPIRONE HYDROCHLORIDE 5 MG: 5 TABLET ORAL at 21:59

## 2020-09-21 RX ADMIN — QUETIAPINE FUMARATE 150 MG: 100 TABLET ORAL at 13:27

## 2020-09-21 RX ADMIN — METOCLOPRAMIDE 5 MG: 5 INJECTION, SOLUTION INTRAMUSCULAR; INTRAVENOUS at 18:47

## 2020-09-21 RX ADMIN — DEXMEDETOMIDINE HYDROCHLORIDE 0.6 MCG/KG/HR: 400 INJECTION INTRAVENOUS at 01:12

## 2020-09-21 RX ADMIN — FAMOTIDINE 20 MG: 10 INJECTION INTRAVENOUS at 08:16

## 2020-09-21 RX ADMIN — Medication 10 ML: at 06:52

## 2020-09-21 RX ADMIN — Medication 40 ML: at 13:32

## 2020-09-21 RX ADMIN — ENOXAPARIN SODIUM 45 MG: 60 INJECTION SUBCUTANEOUS at 21:59

## 2020-09-21 RX ADMIN — ALPRAZOLAM 1 MG: 0.5 TABLET ORAL at 18:47

## 2020-09-21 RX ADMIN — DEXMEDETOMIDINE HYDROCHLORIDE 0.5 MCG/KG/HR: 400 INJECTION INTRAVENOUS at 16:37

## 2020-09-21 RX ADMIN — POTASSIUM BICARBONATE 20 MEQ: 782 TABLET, EFFERVESCENT ORAL at 08:16

## 2020-09-21 RX ADMIN — BUSPIRONE HYDROCHLORIDE 5 MG: 5 TABLET ORAL at 08:16

## 2020-09-21 RX ADMIN — DEXMEDETOMIDINE HYDROCHLORIDE 0.5 MCG/KG/HR: 400 INJECTION INTRAVENOUS at 08:00

## 2020-09-21 RX ADMIN — METOCLOPRAMIDE 5 MG: 5 INJECTION, SOLUTION INTRAMUSCULAR; INTRAVENOUS at 13:27

## 2020-09-21 RX ADMIN — OXYCODONE HYDROCHLORIDE 5 MG: 5 SOLUTION ORAL at 08:23

## 2020-09-21 RX ADMIN — Medication 20 ML: at 21:59

## 2020-09-21 RX ADMIN — DOCUSATE SODIUM 100 MG: 50 LIQUID ORAL at 08:17

## 2020-09-21 RX ADMIN — ENOXAPARIN SODIUM 45 MG: 60 INJECTION SUBCUTANEOUS at 10:08

## 2020-09-21 RX ADMIN — CHLORHEXIDINE GLUCONATE 15 ML: 0.12 RINSE ORAL at 08:17

## 2020-09-21 RX ADMIN — QUETIAPINE FUMARATE 150 MG: 100 TABLET ORAL at 20:03

## 2020-09-21 RX ADMIN — CHLORHEXIDINE GLUCONATE 15 ML: 0.12 RINSE ORAL at 20:03

## 2020-09-21 RX ADMIN — ASPIRIN 81 MG CHEWABLE TABLET 81 MG: 81 TABLET CHEWABLE at 08:16

## 2020-09-21 NOTE — PROGRESS NOTES
Problem: Self Care Deficits Care Plan (Adult)  Goal: *Acute Goals and Plan of Care (Insert Text)  Description:   FUNCTIONAL STATUS PRIOR TO ADMISSION: Patient unable to provide history at OT evaluation. Per chart review, patient was fully independent    HOME SUPPORT: Per chart review, patient lived alone    Occupational Therapy Goals  Initiated 9/14/2020, continued 9/21/2020  1. Patient will perform grooming with minimum assistance within 7 day(s). 2.  Patient will perform bathing with moderate assistance  within 7 day(s). 3.  Patient will perform upper body dressing with minimum assistance  within 7 day(s). 5.  Patient will perform lower body dressing with moderate assistance within 7 day(s). 6.  Patient will perform toilet transfers with moderate assistance  within 7 day(s). 7.  Patient will perform all aspects of toileting with moderate assistance  within 7 day(s). 8.  Patient will participate in upper extremity therapeutic exercise/activities with minimal assistance for 10 minutes within 7 day(s). 9.  Patient will utilize energy conservation techniques during functional activities with verbal cues within 7 day(s). Outcome: Progressing Towards Goal     OCCUPATIONAL THERAPY TREATMENT/WEEKLY RE-ASSESSMENT  Patient: Donita Vásquez (38 y.o. male)  Date: 9/21/2020  Diagnosis: Acute respiratory failure ([facility] [J96.00]   <principal problem not specified>  Procedure(s) (LRB):  VV EXTRACORPOREAL MEMBRANE OXYGENATION (ECMO) (N/A)    Precautions:  fall  Chart, occupational therapy assessment, plan of care, and goals were reviewed. ASSESSMENT  Patient continues with skilled OT services and is progressing towards goals. Since last OT session on 9/14/2020, patient presents with improved command following, improved activity tolerance, more prompt and consistent responses to Y/N questions, less distress, and more stable vitals (HR 90s, RR to 20s-30).   Patient now COVID-19 negative and off droplet plus precautions. Remains intubated via trach with PEEP 8 and FIO2 45%. Patient remains limited by general debility + focal RUE and LLE weakness. Also indicates RUE and LLE diminished sensation. LUE with overall 4/5 strength. Today RUE with 3-/5 scapular elevation, 1/5 shoulder flexion, 2-/5 elbow flexion/ extension, 2-/5 wrist flexion/ extension, and 3/5 composite grasp. Patient placed in bed-in-chair position for RUE AAROM/ PROM exercises and core strengthening/ anterior leaning to unsupported sit with heavy reliance on LUE. Discharge recommendation TBD pending progress/ vent weaning, anticipate extensive rehab needs. Current Level of Function Impacting Discharge (ADLs): infer moderate to total assistance UB ADLs, total assistance LB ADLs         PLAN :  Goals have been updated based on progression since last assessment. Patient continues to benefit from skilled intervention to address the above impairments. Continue to follow patient 5 times a week to address goals. Recommendation for discharge: (in order for the patient to meet his/her long term goals)   Discharge recommendation TBD pending progress/ vent weaning, anticipate extensive rehab needs.     This discharge recommendation:  Has not yet been discussed the attending provider and/or case management       SUBJECTIVE:   Patient nodded when asked if comfortable in chair position    OBJECTIVE DATA SUMMARY:   Cognitive/Behavioral Status:  Neurologic State: Alert;Eyes open spontaneously  Orientation Level: Unable to verbalize  Cognition: Follows commands                  Balance:  Sitting: Impaired(long sitting briefly in bed with assist)  Sitting - Static: Poor (constant support)  Sitting - Dynamic: Poor (constant support)    Therapeutic Exercises:   5 reps R scapular elevation (AROM)  5 reps R shoulder flexion to 90 (PROM)  5 reps R elbow flexion/ extension (AAROM in gravity eliminated)  5 reps R wrist flexion/ extension (AAROM in gravity eliminated)  5 reps R hand flexion/ extension (AROM)    Pain:  Patient did not report pain    Activity Tolerance:   VSS, fair    After treatment patient left in no apparent distress:   Supine in bed and Call bell within reach    COMMUNICATION/COLLABORATION:   The patients plan of care was discussed with: Physical therapist and Registered nurse.      Jacob German, OT  Time Calculation: 24 mins

## 2020-09-21 NOTE — PROGRESS NOTES
2330: Bedside and Verbal shift change report given to [address] [place] [address] (oncoming nurse) by Agnes Price (offgoing nurse). Report included the following information SBAR, Kardex, Procedure Summary, Intake/Output, MAR, Recent Results, Cardiac Rhythm NSR and Alarm Parameters . 0730: Bedside and Verbal shift change report given to Lisa Padron RN (oncoming nurse) by Chiqui Herring RN (offgoing nurse). Report included the following information SBAR, Kardex, ED Summary, Intake/Output, MAR, Recent Results, Cardiac Rhythm NSR and Alarm Parameters .

## 2020-09-21 NOTE — PROGRESS NOTES
[facility]    ICU TEAM Progress Note    Name: Josette Enciso   : 1990   MRN: [id number]   Date: 2020      I  Subjective:   Progress Note: 2020      Reason for ICU Admission: Respiratory failure due to COVID-19 infection    Interval history:  60-year-old male with no history of significant past medical history except smoking half pack per day. [facility] was admitted on 2020 at [facility] with acute hypoxemic respiratory failure from COVID pneumonia. [facility] continued to deteriorate and got intubated on 8/10.  Due to worsening hypoxemia, he is transferred to [facility] for ECMO evaluation. [facility] has bee given convalescent plasma twice on  and  and treated with Remdesivir which was completed on 8/10. [facility] is also on dexamethasone. [facility] completed course of empiric antibiotics including azithromycin which was completed on  and cefepime was completed on .  During his hospital course he also developed pneumothorax and has left-sided chest tube since .  Has a tracheostomy tube placed on .  On September 3 patient had significant worsening on his right arm, CT head was unrevealing.  He is already on high-dose Lovenox and aspirin.  On  he had acute weakness on the left arm code stroke was called and again CTA was unrevealing.  Since then left arm is back to baseline, showed some improvement on right arm but still weak compared to left with minimal ability to move against gravity.   Mental status continues to improve but now he is very anxious with recurrent episode of nausea and vomiting associated with tachycardia.     Overnight Events:   Still on Precedex    Active Problem List:     Problem List  Date Reviewed: 2020          Codes Class    Acute respiratory failure ([facility] ICD-10-CM: J96.00  ICD-9-CM: 518.81         Pneumothorax on left ICD-10-CM: J93.9  ICD-9-CM: 512.89         Pneumonia due to severe acute respiratory syndrome coronavirus 2 (SARS-CoV-2) ICD-10-CM: U07.1, J12.89  ICD-9-CM: 480.8         Respiratory failure with hypoxia (HCC) ICD-10-CM: J96.91  ICD-9-CM: 518.81               Past Medical History:      has a past medical history of History of vascular access device (08/10/2020). Past Surgical History:      has a past surgical history that includes ir thora/ins chest tube(pneumo) wo image (2020); ir thora/ins chest tube(pneumo) wo image (2020); and pr tracheostomy, planned (2020). Home Medications:     Prior to Admission medications    Medication Sig Start Date End Date Taking? Authorizing Provider   benzonatate (Tessalon Perles) 100 mg capsule Take 100 mg by mouth three (3) times daily as needed for Cough. Provider, Historical       Allergies/Social/Family History:     No Known Allergies   Social History     Tobacco Use    Smoking status: Current Some Day Smoker    Smokeless tobacco: Never Used   Substance Use Topics    Alcohol use: Not on file      History reviewed. No pertinent family history. Review of Systems:     Not able to obtain due to his medical condition    Objective:   Vital Signs:  Visit Vitals  /72   Pulse 77   Temp 99.2 °F (37.3 °C)   Resp 20   Ht 5' 11\" (1.803 m)   Wt 91.4 kg (201 lb 8 oz)   SpO2 98%   BMI 28.10 kg/m²      O2 Device: Tracheostomy Temp (24hrs), Av.5 °F (37.5 °C), Min:99.2 °F (37.3 °C), Max:99.9 °F (37.7 °C)           Intake/Output:     Intake/Output Summary (Last 24 hours) at 2020 1102  Last data filed at 2020 1000  Gross per 24 hour   Intake 2221 ml   Output 2945 ml   Net -724 ml       Physical Exam:  General:  Awake and interactive. Writing on communication board. Frustrated with speed of communication but this is understandable. Eyes:  Sclera anicteric. Pupils equally round and reactive to light.    Mouth/Throat: Mucous membranes normal, oral pharynx clear   Neck: Supple, tracheostomy tube in place   Lungs:    Good air entry bilaterally, fine crepitation   CV:  Regular rate and rhythm,no murmur, click, rub or gallop   Abdomen:   Soft, non-tender. bowel sounds normal. non-distended   Extremities: No cyanosis , evolving edema   Skin: Skin color, texture, turgor normal. no acute rash or lesions   Lymph nodes: Cervical and supraclavicular normal   Musculoskeletal: No swelling or deformity   Lines/Devices:  Intact, no erythema, drainage or tenderness   Psych: On minimal sedation, conscious and alert, follows simple command, anxious, still weak on right arm but able to move minimally against gravity        LABS AND  DATA: Personally reviewed  Recent Labs     09/21/20 0419 09/20/20  0502   WBC 7.8 7.5   HGB 10.0* 8.9*   HCT 31.8* 28.4*    272     Recent Labs     09/21/20  0419 09/20/20  0502   * 133*   K 3.8 4.1   CL 98 100   CO2 30 29   BUN 20 12   CREA 0.64* 0.53*   * 111*   CA 9.7 9.5   MG 2.0 2.1   PHOS 5.6* 5.0*     Recent Labs     09/21/20  0419 09/20/20  0502    97   TP 8.1 7.5   ALB 3.1* 2.8*   GLOB 5.0* 4.7*     No results for input(s): INR, PTP, APTT, INREXT, INREXT in the last 72 hours. Recent Labs     09/20/20  1631   PHI 7.49*   PCO2I 43.7   PO2I 100   FIO2I 45     No results for input(s): CPK, CKMB, TROIQ, BNPP in the last 72 hours. Hemodynamics:   PAP:   CO:     Wedge:   CI:     CVP:    SVR:       PVR:       Ventilator Settings:  Mode Rate Tidal Volume Pressure FiO2 PEEP   Assist control, Pressure control   0 ml  0 cm H2O 45 % 8 cm H20     Peak airway pressure: 26 cm H2O    Minute ventilation: 9.9 l/min        MEDS: Reviewed    Chest X-Ray:  CXR Results  (Last 48 hours)    None          Assessment and Plan:   -Bilateral pneumonia due to COVID-19 infection  -Acute right arm weakness:  Patient already on aspirin and moderate dose anticoagulation.  Still pending CTA head and neck, CT head showed no bleed.   -Right arm weakness  -Fever: No clear etiology, culture remain negative, images not suggestive of acute infectious process  - Acute hypoxic respiratory failure due to COVID-19 infection status post tracheostomy on August 26  - Pneumothorax status post left chest tube insertion on August 11 without air leak  - Possible bacterial super imposed infection: Completed antibiotic course  -History of tobacco use     -Still on low-dose Precedex. On BuSpar, Seroquel, and Xanax. Increase Seroquel dose  - Vent weaning as tolerated  -Right arm paralysis, no clear underlying etiology, CT showed abnormal bilateral frontoparietal enhancement.  Perceptual diagnosis is wide.  Power is improving on right arm but still significantly weaker than left  - No fever o/n  - Persistent vomiting - continue Reglan  - Completed COVID-19 treatment  - DVT prophylaxis with high-dose Lovenox.  GI prophylaxis.  Nutritional support    DISPOSITION  Stay in ICU    CRITICAL CARE CONSULTANT NOTE  I had a face to face encounter with the patient, reviewed and interpreted patient data including clinical events, labs, images, vital signs, I/O's, and examined patient. I have discussed the case and the plan and management of the patient's care with the consulting services, the bedside nurses and the respiratory therapist.      NOTE OF PERSONAL INVOLVEMENT IN CARE   This patient has a high probability of imminent, clinically significant deterioration, which requires the highest level of preparedness to intervene urgently. I participated in the decision-making and personally managed or directed the management of the following life and organ supporting interventions that required my frequent assessment to treat or prevent imminent deterioration. I personally spent 35 minutes of critical care time. This is time spent at this critically ill patient's bedside actively involved in patient care as well as the coordination of care and discussions with the patient's family. This does not include any procedural time which has been billed separately.     Tucker Andersen Britany Bills, [address] [facility]  9/21/2020

## 2020-09-21 NOTE — PROGRESS NOTES
Problem: Mobility Impaired (Adult and Pediatric)  Goal: *Acute Goals and Plan of Care (Insert Text)  Description: FUNCTIONAL STATUS PRIOR TO ADMISSION: Patient was independent and active without use of DME. 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 Physical Therapy Goals    Reassessment completed 9/18/2020 and all goals remain appropriate  at this time. Initiated 9/11/2020  1. Patient will move from supine to sit and sit to supine , scoot up and down, and roll side to side in bed with maximal assistance within 7 day(s). 2.  Patient will transfer from bed to chair and chair to bed with maximal assistance using the least restrictive device within 7 day(s). 3.  Patient will perform sit to stand with maximal assistance within 7 day(s). 4.  Patient will tolerate sitting EOB with maximal assistance for 5 minutes within 7 day(s). Outcome: Progressing Towards Goal   PHYSICAL THERAPY TREATMENT  Patient: Charley Vasques (56 y.o. male)  Date: 9/21/2020  Diagnosis: Acute respiratory failure ([facility] [J96.00]   <principal problem not specified>  Procedure(s) (LRB):  VV EXTRACORPOREAL MEMBRANE OXYGENATION (ECMO) (N/A)    Precautions:    Chart, physical therapy assessment, plan of care and goals were reviewed. ASSESSMENT  Patient continues with skilled PT services and is progressing towards goals. RN cleared for therapy. Pt received supine in bed and agreeable to therapy. Pt remains intubated via trach on A/C, Pepp: 8 and FiO2: 45%. Pt tolerated session well and making gains in overall strength, but continues to present with significant weakness in RUE and LLE, impaired sensation RUE and LLE. Pt placed in chair position and worked on core control pulling self forward with LUE and mod-max A at trunk to sit forward. Pt only able to hold position for <5 seconds each repetition.  Pt performed LE therex with verbal cueing for slow and controlled movements. Pt with much improved head control. Pt will continue to benefit from PT to progress mobility, improve strength as tolerated. Current Level of Function Impacting Discharge (mobility/balance): mod-max A pull to long sitting with L UE in chair position    Other factors to consider for discharge: previously independent, active         PLAN :  Patient continues to benefit from skilled intervention to address the above impairments. Continue treatment per established plan of care. to address goals. Recommendation for discharge: (in order for the patient to meet his/her long term goals)  To be determined: pending progress and medical course    This discharge recommendation:  Has been made in collaboration with the attending provider and/or case management    IF patient discharges home will need the following DME: to be determined (TBD)       SUBJECTIVE:   Patient mouthing \"I have tennis shoes\"    OBJECTIVE DATA SUMMARY:   Critical Behavior:  Neurologic State: Alert, Eyes open spontaneously  Orientation Level: Unable to verbalize  Cognition: Follows commands     Functional Mobility Training:            Balance:  Sitting: Impaired(long sitting briefly in bed with assist)  Sitting - Static: Poor (constant support)  Sitting - Dynamic: Poor (constant support)    Stairs: Therapeutic Exercises:   Pull to long sitting with LUE x 5 reps  Ankle pumps, LAQ, manual resistive hip abd/adduction  Pain Rating:  Pt denied pain    Activity Tolerance:   Good and Fair. RR increased to 30 while pulling to long sit  Please refer to the flowsheet for vital signs taken during this treatment. After treatment patient left in no apparent distress:   Supine in bed, Call bell within reach, and Side rails x 3    COMMUNICATION/COLLABORATION:   The patients plan of care was discussed with: Occupational therapist and Registered nurse.      Yoana Hester, PT, DPT   Time Calculation: 18 mins

## 2020-09-21 NOTE — PROGRESS NOTES
GI note:    Pt examined. Full consult tomorrow. NPO after midnight for possible PEG tomorrow.     Dr. Oliverio Acevedo

## 2020-09-21 NOTE — PROGRESS NOTES
Report received from [address] [place] [address]. 1700: mother, abelardo in to see pt. Consent signed for peg tube placement for tomorrow 9/22/20. rn talked to  Tj Osborne np, will call mother in the morning to provide information on procedure.

## 2020-09-21 NOTE — PROGRESS NOTES
ARDS  Acute hypoxic respiratory failure  COVID positive  Sinus pauses  S/P tracheostomy     Remains on Vent     COVID test negative x 2    Minimal symptoms    Recovering from ARDS    Working with PT/OT    Remains on some Precedex    Still some anxiety treated with PRN ativan    WBCs normal    Hgb and platelets look good    Creatinine normal    Bilirubin and other LFTs look reasonable    ABG - 7.49 / 44 / 100 / 33 / 10    Vent - FiO2 45%, PEEP 8, RR 20    Will get GI to see him for PEG tube    Remains on Lovenox     Remains on Xanax / Seroquel    Discussed with ICU attending        Intake/Output Summary (Last 24 hours) at 9/21/2020 1327  Last data filed at 9/21/2020 1300  Gross per 24 hour   Intake 2178.4 ml   Output 1700 ml   Net 478.4 ml     Visit Vitals  /79 (BP 1 Location: Left arm, BP Patient Position: At rest)   Pulse 76   Temp 99.6 °F (37.6 °C)   Resp 20   Ht 5' 11\" (1.803 m)   Wt 201 lb 8 oz (91.4 kg)   SpO2 100%   BMI 28.10 kg/m²       Risk of morbidity and mortality - high  Medical decision making - high complexity    Total critical care time - 30 minutes (CPT 27241)     I personally spent the above critical care time. This is time spent at this critically ill patient's bedside / unit / floor actively involved in patient care as well as the coordination of care and discussions with the patient's family. This does not include any procedural time which has been billed separately.

## 2020-09-21 NOTE — PROGRESS NOTES
Critical Care Update    Patient with two negative SARs-COV-2 at least 24 hrs apart. Taken off Droplet Plus precautions.     Date: 9/17/2020  Department: [place] [address] Intensive Care  Released By/Authorizing: Chery Hemphill MD (auto-released)    Component  Value  Flag  Ref Range  Units  Status    Specimen source  Nasopharyngeal       Final    Specimen source  Nasopharyngeal       Final    Specimen type  NP Swab       Final    Health status        Final    Symptomatic Testing    COVID-19  Not Detected   Not Detected     Final    Comment:        Date: 9/19/2020  Department: [place] [address] Intensive Care  Released By/Authorizing: FAMILIA Rodríguez (auto-released)    Component  Value  Flag  Ref Range  Units  Status    Specimen source  Nasopharyngeal       Final    Specimen source  Nasopharyngeal       Final    Specimen type  NP Swab       Final    Health status        Final    Symptomatic Testing    COVID-19  Not Detected   Not Detected     Final          Fatou Fish [facility]     [address] [facility]

## 2020-09-22 ENCOUNTER — APPOINTMENT (OUTPATIENT)
Dept: GENERAL RADIOLOGY | Age: 30
DRG: 004 | End: 2020-09-22
Attending: EMERGENCY MEDICINE
Payer: COMMERCIAL

## 2020-09-22 ENCOUNTER — APPOINTMENT (OUTPATIENT)
Dept: MRI IMAGING | Age: 30
DRG: 004 | End: 2020-09-22
Attending: EMERGENCY MEDICINE
Payer: COMMERCIAL

## 2020-09-22 LAB
ALBUMIN SERPL-MCNC: 3.7 G/DL (ref 3.5–5)
ALBUMIN/GLOB SERPL: 0.7 {RATIO} (ref 1.1–2.2)
ALP SERPL-CCNC: 110 U/L (ref 45–117)
ALT SERPL-CCNC: 88 U/L (ref 12–78)
ANION GAP SERPL CALC-SCNC: 9 MMOL/L (ref 5–15)
AST SERPL-CCNC: 24 U/L (ref 15–37)
BASOPHILS # BLD: 0 K/UL (ref 0–0.1)
BASOPHILS NFR BLD: 0 % (ref 0–1)
BILIRUB SERPL-MCNC: 0.4 MG/DL (ref 0.2–1)
BUN SERPL-MCNC: 15 MG/DL (ref 6–20)
BUN/CREAT SERPL: 23 (ref 12–20)
CALCIUM SERPL-MCNC: 10.2 MG/DL (ref 8.5–10.1)
CHLORIDE SERPL-SCNC: 98 MMOL/L (ref 97–108)
CO2 SERPL-SCNC: 28 MMOL/L (ref 21–32)
CREAT SERPL-MCNC: 0.66 MG/DL (ref 0.7–1.3)
DIFFERENTIAL METHOD BLD: ABNORMAL
EOSINOPHIL # BLD: 0.2 K/UL (ref 0–0.4)
EOSINOPHIL NFR BLD: 2 % (ref 0–7)
ERYTHROCYTE [DISTWIDTH] IN BLOOD BY AUTOMATED COUNT: 14.1 % (ref 11.5–14.5)
GLOBULIN SER CALC-MCNC: 5.2 G/DL (ref 2–4)
GLUCOSE SERPL-MCNC: 120 MG/DL (ref 65–100)
HCT VFR BLD AUTO: 38.9 % (ref 36.6–50.3)
HGB BLD-MCNC: 12.2 G/DL (ref 12.1–17)
IMM GRANULOCYTES # BLD AUTO: 0 K/UL
IMM GRANULOCYTES NFR BLD AUTO: 0 %
LYMPHOCYTES # BLD: 2.3 K/UL (ref 0.8–3.5)
LYMPHOCYTES NFR BLD: 20 % (ref 12–49)
MAGNESIUM SERPL-MCNC: 2.2 MG/DL (ref 1.6–2.4)
MCH RBC QN AUTO: 27.9 PG (ref 26–34)
MCHC RBC AUTO-ENTMCNC: 31.4 G/DL (ref 30–36.5)
MCV RBC AUTO: 89 FL (ref 80–99)
METAMYELOCYTES NFR BLD MANUAL: 2 %
MONOCYTES # BLD: 0.7 K/UL (ref 0–1)
MONOCYTES NFR BLD: 6 % (ref 5–13)
NEUTS SEG # BLD: 7.9 K/UL (ref 1.8–8)
NEUTS SEG NFR BLD: 70 % (ref 32–75)
NRBC # BLD: 0 K/UL (ref 0–0.01)
NRBC BLD-RTO: 0 PER 100 WBC
PHOSPHATE SERPL-MCNC: 5 MG/DL (ref 2.6–4.7)
PLATELET # BLD AUTO: 354 K/UL (ref 150–400)
PMV BLD AUTO: 10 FL (ref 8.9–12.9)
POTASSIUM SERPL-SCNC: 3.9 MMOL/L (ref 3.5–5.1)
PROT SERPL-MCNC: 8.9 G/DL (ref 6.4–8.2)
RBC # BLD AUTO: 4.37 M/UL (ref 4.1–5.7)
RBC MORPH BLD: ABNORMAL
RBC MORPH BLD: ABNORMAL
SODIUM SERPL-SCNC: 135 MMOL/L (ref 136–145)
WBC # BLD AUTO: 11.3 K/UL (ref 4.1–11.1)
WBC MORPH BLD: ABNORMAL

## 2020-09-22 PROCEDURE — 74011250637 HC RX REV CODE- 250/637: Performed by: HOSPITALIST

## 2020-09-22 PROCEDURE — 74011250637 HC RX REV CODE- 250/637: Performed by: STUDENT IN AN ORGANIZED HEALTH CARE EDUCATION/TRAINING PROGRAM

## 2020-09-22 PROCEDURE — 74011000250 HC RX REV CODE- 250: Performed by: INTERNAL MEDICINE

## 2020-09-22 PROCEDURE — 71045 X-RAY EXAM CHEST 1 VIEW: CPT

## 2020-09-22 PROCEDURE — 74011000250 HC RX REV CODE- 250

## 2020-09-22 PROCEDURE — 74011250637 HC RX REV CODE- 250/637: Performed by: INTERNAL MEDICINE

## 2020-09-22 PROCEDURE — 80053 COMPREHEN METABOLIC PANEL: CPT

## 2020-09-22 PROCEDURE — 85025 COMPLETE CBC W/AUTO DIFF WBC: CPT

## 2020-09-22 PROCEDURE — 74011250637 HC RX REV CODE- 250/637: Performed by: NURSE PRACTITIONER

## 2020-09-22 PROCEDURE — 74011250636 HC RX REV CODE- 250/636: Performed by: EMERGENCY MEDICINE

## 2020-09-22 PROCEDURE — 77030021593 HC FCPS BIOP ENDOSC BSC -A: Performed by: INTERNAL MEDICINE

## 2020-09-22 PROCEDURE — 84100 ASSAY OF PHOSPHORUS: CPT

## 2020-09-22 PROCEDURE — 99291 CRITICAL CARE FIRST HOUR: CPT | Performed by: THORACIC SURGERY (CARDIOTHORACIC VASCULAR SURGERY)

## 2020-09-22 PROCEDURE — 74011250636 HC RX REV CODE- 250/636

## 2020-09-22 PROCEDURE — 2709999900 HC NON-CHARGEABLE SUPPLY: Performed by: INTERNAL MEDICINE

## 2020-09-22 PROCEDURE — 74011250636 HC RX REV CODE- 250/636: Performed by: NURSE PRACTITIONER

## 2020-09-22 PROCEDURE — 74011250636 HC RX REV CODE- 250/636: Performed by: INTERNAL MEDICINE

## 2020-09-22 PROCEDURE — 83735 ASSAY OF MAGNESIUM: CPT

## 2020-09-22 PROCEDURE — 97530 THERAPEUTIC ACTIVITIES: CPT

## 2020-09-22 PROCEDURE — 36415 COLL VENOUS BLD VENIPUNCTURE: CPT

## 2020-09-22 PROCEDURE — 94003 VENT MGMT INPAT SUBQ DAY: CPT

## 2020-09-22 PROCEDURE — 70551 MRI BRAIN STEM W/O DYE: CPT

## 2020-09-22 PROCEDURE — 97110 THERAPEUTIC EXERCISES: CPT

## 2020-09-22 PROCEDURE — 76040000007: Performed by: INTERNAL MEDICINE

## 2020-09-22 PROCEDURE — 65610000006 HC RM INTENSIVE CARE

## 2020-09-22 PROCEDURE — 74018 RADEX ABDOMEN 1 VIEW: CPT

## 2020-09-22 PROCEDURE — 74011250637 HC RX REV CODE- 250/637: Performed by: EMERGENCY MEDICINE

## 2020-09-22 PROCEDURE — 88305 TISSUE EXAM BY PATHOLOGIST: CPT

## 2020-09-22 PROCEDURE — 0DB58ZX EXCISION OF ESOPHAGUS, VIA NATURAL OR ARTIFICIAL OPENING ENDOSCOPIC, DIAGNOSTIC: ICD-10-PCS | Performed by: INTERNAL MEDICINE

## 2020-09-22 RX ORDER — ALPRAZOLAM 1 MG/1
1 TABLET ORAL EVERY 8 HOURS
Status: DISCONTINUED | OUTPATIENT
Start: 2020-09-22 | End: 2020-10-02

## 2020-09-22 RX ORDER — SODIUM CHLORIDE 0.9 % (FLUSH) 0.9 %
5-40 SYRINGE (ML) INJECTION EVERY 8 HOURS
Status: DISCONTINUED | OUTPATIENT
Start: 2020-09-22 | End: 2020-10-03

## 2020-09-22 RX ORDER — ONDANSETRON 2 MG/ML
4 INJECTION INTRAMUSCULAR; INTRAVENOUS
Status: DISCONTINUED | OUTPATIENT
Start: 2020-09-22 | End: 2020-10-12 | Stop reason: HOSPADM

## 2020-09-22 RX ORDER — NALOXONE HYDROCHLORIDE 0.4 MG/ML
0.4 INJECTION, SOLUTION INTRAMUSCULAR; INTRAVENOUS; SUBCUTANEOUS
Status: ACTIVE | OUTPATIENT
Start: 2020-09-22 | End: 2020-09-22

## 2020-09-22 RX ORDER — DIPHENHYDRAMINE HYDROCHLORIDE 50 MG/ML
50 INJECTION, SOLUTION INTRAMUSCULAR; INTRAVENOUS ONCE
Status: COMPLETED | OUTPATIENT
Start: 2020-09-22 | End: 2020-09-22

## 2020-09-22 RX ORDER — SODIUM CHLORIDE, SODIUM LACTATE, POTASSIUM CHLORIDE, CALCIUM CHLORIDE 600; 310; 30; 20 MG/100ML; MG/100ML; MG/100ML; MG/100ML
50 INJECTION, SOLUTION INTRAVENOUS CONTINUOUS
Status: DISCONTINUED | OUTPATIENT
Start: 2020-09-22 | End: 2020-09-25

## 2020-09-22 RX ORDER — MIDAZOLAM HYDROCHLORIDE 1 MG/ML
.25-1 INJECTION, SOLUTION INTRAMUSCULAR; INTRAVENOUS
Status: DISPENSED | OUTPATIENT
Start: 2020-09-22 | End: 2020-09-22

## 2020-09-22 RX ORDER — DEXTROMETHORPHAN/PSEUDOEPHED 2.5-7.5/.8
1.2 DROPS ORAL
Status: DISCONTINUED | OUTPATIENT
Start: 2020-09-22 | End: 2020-10-01

## 2020-09-22 RX ORDER — EPINEPHRINE 0.1 MG/ML
1 INJECTION INTRACARDIAC; INTRAVENOUS
Status: ACTIVE | OUTPATIENT
Start: 2020-09-22 | End: 2020-09-23

## 2020-09-22 RX ORDER — FLUMAZENIL 0.1 MG/ML
0.2 INJECTION INTRAVENOUS
Status: ACTIVE | OUTPATIENT
Start: 2020-09-22 | End: 2020-09-22

## 2020-09-22 RX ORDER — PROPOFOL 10 MG/ML
0-50 VIAL (ML) INTRAVENOUS
Status: DISCONTINUED | OUTPATIENT
Start: 2020-09-22 | End: 2020-09-25

## 2020-09-22 RX ORDER — SODIUM CHLORIDE 0.9 % (FLUSH) 0.9 %
5-40 SYRINGE (ML) INJECTION AS NEEDED
Status: DISCONTINUED | OUTPATIENT
Start: 2020-09-22 | End: 2020-10-03

## 2020-09-22 RX ORDER — AMOXICILLIN AND CLAVULANATE POTASSIUM 875; 125 MG/1; MG/1
1 TABLET, FILM COATED ORAL EVERY 12 HOURS
Status: DISPENSED | OUTPATIENT
Start: 2020-09-22 | End: 2020-09-27

## 2020-09-22 RX ORDER — ENOXAPARIN SODIUM 100 MG/ML
30 INJECTION SUBCUTANEOUS EVERY 12 HOURS
Status: DISCONTINUED | OUTPATIENT
Start: 2020-09-22 | End: 2020-10-12 | Stop reason: HOSPADM

## 2020-09-22 RX ORDER — ATROPINE SULFATE 0.1 MG/ML
0.5 INJECTION INTRAVENOUS
Status: ACTIVE | OUTPATIENT
Start: 2020-09-22 | End: 2020-09-23

## 2020-09-22 RX ORDER — CEFAZOLIN SODIUM/WATER 2 G/20 ML
2 SYRINGE (ML) INTRAVENOUS ONCE
Status: COMPLETED | OUTPATIENT
Start: 2020-09-22 | End: 2020-09-22

## 2020-09-22 RX ORDER — PROMETHAZINE HYDROCHLORIDE 25 MG/ML
25 INJECTION, SOLUTION INTRAMUSCULAR; INTRAVENOUS
Status: DISCONTINUED | OUTPATIENT
Start: 2020-09-22 | End: 2020-09-22 | Stop reason: SDUPTHER

## 2020-09-22 RX ORDER — FENTANYL CITRATE 50 UG/ML
25-200 INJECTION, SOLUTION INTRAMUSCULAR; INTRAVENOUS
Status: DISPENSED | OUTPATIENT
Start: 2020-09-22 | End: 2020-09-22

## 2020-09-22 RX ORDER — FENTANYL CITRATE 50 UG/ML
25 INJECTION, SOLUTION INTRAMUSCULAR; INTRAVENOUS
Status: DISCONTINUED | OUTPATIENT
Start: 2020-09-22 | End: 2020-10-02

## 2020-09-22 RX ORDER — DIPHENHYDRAMINE HYDROCHLORIDE 50 MG/ML
25 INJECTION, SOLUTION INTRAMUSCULAR; INTRAVENOUS ONCE
Status: COMPLETED | OUTPATIENT
Start: 2020-09-22 | End: 2020-09-22

## 2020-09-22 RX ORDER — SODIUM CHLORIDE 9 MG/ML
50 INJECTION, SOLUTION INTRAVENOUS CONTINUOUS
Status: DISPENSED | OUTPATIENT
Start: 2020-09-22 | End: 2020-09-22

## 2020-09-22 RX ORDER — LORAZEPAM 2 MG/ML
1 INJECTION INTRAMUSCULAR
Status: DISCONTINUED | OUTPATIENT
Start: 2020-09-22 | End: 2020-10-02

## 2020-09-22 RX ADMIN — FAMOTIDINE 20 MG: 10 INJECTION INTRAVENOUS at 09:10

## 2020-09-22 RX ADMIN — METOCLOPRAMIDE 5 MG: 5 INJECTION, SOLUTION INTRAMUSCULAR; INTRAVENOUS at 19:12

## 2020-09-22 RX ADMIN — ONDANSETRON 4 MG: 2 INJECTION INTRAMUSCULAR; INTRAVENOUS at 04:43

## 2020-09-22 RX ADMIN — SODIUM CHLORIDE 10 MG: 9 INJECTION INTRAMUSCULAR; INTRAVENOUS; SUBCUTANEOUS at 06:39

## 2020-09-22 RX ADMIN — SODIUM CHLORIDE, SODIUM LACTATE, POTASSIUM CHLORIDE, AND CALCIUM CHLORIDE 100 ML/HR: 600; 310; 30; 20 INJECTION, SOLUTION INTRAVENOUS at 19:12

## 2020-09-22 RX ADMIN — CHLORHEXIDINE GLUCONATE 15 ML: 0.12 RINSE ORAL at 21:59

## 2020-09-22 RX ADMIN — CEFAZOLIN SODIUM 2 G: 100 INJECTION, POWDER, LYOPHILIZED, FOR SOLUTION INTRAVENOUS at 13:24

## 2020-09-22 RX ADMIN — QUETIAPINE FUMARATE 150 MG: 100 TABLET ORAL at 09:10

## 2020-09-22 RX ADMIN — ASPIRIN 81 MG CHEWABLE TABLET 81 MG: 81 TABLET CHEWABLE at 09:10

## 2020-09-22 RX ADMIN — MIDAZOLAM HYDROCHLORIDE 2 MG: 2 INJECTION, SOLUTION INTRAMUSCULAR; INTRAVENOUS at 05:05

## 2020-09-22 RX ADMIN — MICONAZOLE NITRATE 2 % TOPICAL POWDER: at 09:11

## 2020-09-22 RX ADMIN — PROMETHAZINE HYDROCHLORIDE 25 MG: 25 INJECTION INTRAMUSCULAR; INTRAVENOUS at 22:33

## 2020-09-22 RX ADMIN — CHLORHEXIDINE GLUCONATE 15 ML: 0.12 RINSE ORAL at 09:00

## 2020-09-22 RX ADMIN — Medication 10 ML: at 14:00

## 2020-09-22 RX ADMIN — NYSTATIN 500000 UNITS: 500000 SUSPENSION ORAL at 22:32

## 2020-09-22 RX ADMIN — POLYETHYLENE GLYCOL 3350 17 G: 17 POWDER, FOR SOLUTION ORAL at 09:00

## 2020-09-22 RX ADMIN — SODIUM CHLORIDE 10 MG: 9 INJECTION INTRAMUSCULAR; INTRAVENOUS; SUBCUTANEOUS at 17:08

## 2020-09-22 RX ADMIN — Medication 10 ML: at 22:00

## 2020-09-22 RX ADMIN — NYSTATIN 500000 UNITS: 500000 SUSPENSION ORAL at 13:13

## 2020-09-22 RX ADMIN — BUSPIRONE HYDROCHLORIDE 5 MG: 5 TABLET ORAL at 09:10

## 2020-09-22 RX ADMIN — METOCLOPRAMIDE 5 MG: 5 INJECTION, SOLUTION INTRAMUSCULAR; INTRAVENOUS at 11:35

## 2020-09-22 RX ADMIN — SODIUM CHLORIDE, SODIUM LACTATE, POTASSIUM CHLORIDE, AND CALCIUM CHLORIDE 100 ML/HR: 600; 310; 30; 20 INJECTION, SOLUTION INTRAVENOUS at 19:13

## 2020-09-22 RX ADMIN — AMOXICILLIN AND CLAVULANATE POTASSIUM 1 TABLET: 875; 125 TABLET, FILM COATED ORAL at 11:35

## 2020-09-22 RX ADMIN — FENTANYL CITRATE 25 MCG: 50 INJECTION, SOLUTION INTRAMUSCULAR; INTRAVENOUS at 22:32

## 2020-09-22 RX ADMIN — POTASSIUM BICARBONATE 20 MEQ: 782 TABLET, EFFERVESCENT ORAL at 09:10

## 2020-09-22 RX ADMIN — METOCLOPRAMIDE 5 MG: 5 INJECTION, SOLUTION INTRAMUSCULAR; INTRAVENOUS at 05:05

## 2020-09-22 RX ADMIN — ALPRAZOLAM 1 MG: 0.5 TABLET ORAL at 09:10

## 2020-09-22 RX ADMIN — MICONAZOLE NITRATE 2 % TOPICAL POWDER: at 18:00

## 2020-09-22 RX ADMIN — Medication: at 09:00

## 2020-09-22 RX ADMIN — SODIUM CHLORIDE, SODIUM LACTATE, POTASSIUM CHLORIDE, AND CALCIUM CHLORIDE 100 ML/HR: 600; 310; 30; 20 INJECTION, SOLUTION INTRAVENOUS at 09:12

## 2020-09-22 RX ADMIN — DOCUSATE SODIUM 100 MG: 50 LIQUID ORAL at 09:11

## 2020-09-22 RX ADMIN — DEXMEDETOMIDINE HYDROCHLORIDE 0.5 MCG/KG/HR: 400 INJECTION INTRAVENOUS at 01:03

## 2020-09-22 RX ADMIN — DIPHENHYDRAMINE HYDROCHLORIDE 25 MG: 50 INJECTION, SOLUTION INTRAMUSCULAR; INTRAVENOUS at 14:00

## 2020-09-22 RX ADMIN — Medication: at 18:00

## 2020-09-22 RX ADMIN — NYSTATIN 500000 UNITS: 500000 SUSPENSION ORAL at 16:00

## 2020-09-22 RX ADMIN — LORAZEPAM 1 MG: 2 INJECTION INTRAMUSCULAR; INTRAVENOUS at 17:13

## 2020-09-22 RX ADMIN — Medication 20 ML: at 05:06

## 2020-09-22 RX ADMIN — NYSTATIN 500000 UNITS: 500000 SUSPENSION ORAL at 09:10

## 2020-09-22 RX ADMIN — PROPOFOL 25 MCG/KG/MIN: 10 INJECTION, EMULSION INTRAVENOUS at 13:09

## 2020-09-22 RX ADMIN — FAMOTIDINE 20 MG: 10 INJECTION INTRAVENOUS at 22:44

## 2020-09-22 NOTE — PROGRESS NOTES
Problem: Self Care Deficits Care Plan (Adult)  Goal: *Acute Goals and Plan of Care (Insert Text)  Description:   FUNCTIONAL STATUS PRIOR TO ADMISSION: Patient unable to provide history at OT evaluation. Per chart review, patient was fully independent    HOME SUPPORT: Per chart review, patient lived alone    Occupational Therapy Goals  Initiated 9/14/2020, continued 9/21/2020  1. Patient will perform grooming with minimum assistance within 7 day(s). 2.  Patient will perform bathing with moderate assistance  within 7 day(s). 3.  Patient will perform upper body dressing with minimum assistance  within 7 day(s). 5.  Patient will perform lower body dressing with moderate assistance within 7 day(s). 6.  Patient will perform toilet transfers with moderate assistance  within 7 day(s). 7.  Patient will perform all aspects of toileting with moderate assistance  within 7 day(s). 8.  Patient will participate in upper extremity therapeutic exercise/activities with minimal assistance for 10 minutes within 7 day(s). 9.  Patient will utilize energy conservation techniques during functional activities with verbal cues within 7 day(s)    Outcome: Progressing Towards Goal    OCCUPATIONAL THERAPY TREATMENT  Patient: Noel Christianson (89 y.o. male)  Date: 9/22/2020  Diagnosis: Acute respiratory failure ([facility] [address] [J96.00]   <principal problem not specified>  Procedure(s) (LRB):  PERCUTANEOUS ENDOSCOPIC GASTROSTOMY TUBE INSERTION at bedside (N/A)  ESOPHAGOGASTRODUODENOSCOPY (EGD) (N/A) Day of Surgery  Precautions:  fall  Chart, occupational therapy assessment, plan of care, and goals were reviewed. ASSESSMENT  Patient continues with skilled OT services and is progressing towards goals.    Patient continues to present with improved command following, improved activity tolerance, and more prompt/ consistent responses to Y/N questions but remains limited by cardiopulmonary tolerance, general weakness + focal RUE and LLE weakness, and impaired sitting balance. Noted brain MRI today positive for multiple areas of ischemic vs infectious/inflammatory encephalomalacia. Patient seen on vent via trach with FIO2 45%, PEEP 8. Patient appeared motivated and eager to participate today and progressed mobility to EOB for first time since decline. Required moderate assistance x2 for supine-sit (to R) and tolerated static sitting ~7 min with R/ posterior lean requiring constant moderate assistance but was able to initiate postural corrections. HR elevated to 120-160 with RR up to 35. Patient able to verbalize intermittently (SLP notified) and also coughed vent off trach (quickly reapplied and RN notified). Discharge recommendation TBD pending progress/ vent weaning, anticipate extensive rehab needs. Current Level of Function Impacting Discharge (ADLs): infer moderate to total assistance UB ADLs, total assistance LB ADLs       PLAN :  Patient continues to benefit from skilled intervention to address the above impairments. Continue treatment per established plan of care. to address goals. Recommendation for discharge: (in order for the patient to meet his/her long term goals)  Discharge recommendation TBD pending progress/ vent weaning, anticipate extensive rehab needs. This discharge recommendation:  Has not yet been discussed the attending provider and/or case management       SUBJECTIVE:   Patient stated \"Therapy tomorrow.     OBJECTIVE DATA SUMMARY:   Cognitive/Behavioral Status:  Neurologic State: Alert;Eyes open spontaneously  Orientation Level: Oriented X4  Cognition: Follows commands             Functional Mobility and Transfers for ADLs:  Bed Mobility:  Rolling:  Moderate assistance;Assist x2  Supine to Sit: Moderate assistance;Assist x2  Sit to Supine: Maximum assistance;Assist x2  Scooting: Maximum assistance(to scoot each hip forward)      Balance:  Sitting: Impaired  Sitting - Static: Poor (constant support)(Right lateral lean)  Sitting - Dynamic: Poor (constant support)    ADL Intervention:     Total assistance to don shoes          Pain:  Patient did not indicate pain    Activity Tolerance:   Fair    After treatment patient left in no apparent distress:   Supine in bed and Call bell within reach    COMMUNICATION/COLLABORATION:   The patients plan of care was discussed with: Physical therapist and Registered nurse.      Kevin Taylor OT  Time Calculation: 23 mins

## 2020-09-22 NOTE — H&P
[address], [address]          Pre-procedure History and Physical       NAME:  Adonis Rosenthal   :   1990   MRN:   [id number]     CHIEF COMPLAINT/HPI: See procedure note    PMH:  Past Medical History:   Diagnosis Date    History of vascular access device 08/10/2020    5 Fr triple PICC, hemodynamically unstable, 45 cm L basilic       PSH:  Past Surgical History:   Procedure Laterality Date    IR THORA/INS CHEST TUBE(PNEUMO) WO IMAGE  2020    IR THORA/INS CHEST TUBE(PNEUMO) WO IMAGE  2020    HI TRACHEOSTOMY, PLANNED  2020            Allergies:  No Known Allergies    Home Medications:  Prior to Admission Medications   Prescriptions Last Dose Informant Patient Reported? Taking?   benzonatate (Tessalon Perles) 100 mg capsule Not Taking at Unknown time  Yes No   Sig: Take 100 mg by mouth three (3) times daily as needed for Cough.       Facility-Administered Medications: None       Hospital Medications:  Current Facility-Administered Medications   Medication Dose Route Frequency    0.9% sodium chloride infusion  50 mL/hr IntraVENous CONTINUOUS    sodium chloride (NS) flush 5-40 mL  5-40 mL IntraVENous Q8H    sodium chloride (NS) flush 5-40 mL  5-40 mL IntraVENous PRN    midazolam (VERSED) injection 0.25-10 mg  0.25-10 mg IntraVENous Multiple    fentaNYL citrate (PF) injection  mcg   mcg IntraVENous Multiple    naloxone (NARCAN) injection 0.4 mg  0.4 mg IntraVENous Multiple    flumazeniL (ROMAZICON) 0.1 mg/mL injection 0.2 mg  0.2 mg IntraVENous Multiple    benzocaine (HURRICANE) 20 % spray   Mucous Membrane ONCE    simethicone (MYLICON) 13ME/9.1QG oral drops 80 mg  1.2 mL Oral Multiple    diphenhydrAMINE (BENADRYL) injection 50 mg  50 mg IntraVENous ONCE    atropine injection 0.5 mg  0.5 mg IntraVENous ONCE PRN    EPINEPHrine (ADRENALIN) 0.1 mg/mL syringe 1 mg  1 mg Endoscopically ONCE PRN    lactated Ringers infusion  100 mL/hr IntraVENous CONTINUOUS    amoxicillin-clavulanate (AUGMENTIN) 875-125 mg per tablet 1 Tab  1 Tab Per NG tube Q12H    enoxaparin (LOVENOX) injection 30 mg  30 mg SubCUTAneous Q12H    ALPRAZolam (XANAX) tablet 1 mg  1 mg Per NG tube Q8H    ondansetron (ZOFRAN) injection 4 mg  4 mg IntraVENous Q4H PRN    LORazepam (ATIVAN) injection 1 mg  1 mg IntraVENous Q6H PRN    fentaNYL citrate (PF) injection 25 mcg  25 mcg IntraVENous Q4H PRN    propofol (DIPRIVAN) 10 mg/mL infusion  0-50 mcg/kg/min IntraVENous TITRATE    potassium bicarb-citric acid (EFFER-K) tablet 20 mEq  20 mEq Oral DAILY    QUEtiapine (SEROquel) tablet 150 mg  150 mg Per NG tube Q12H    nystatin (MYCOSTATIN) 100,000 unit/mL oral suspension 500,000 Units  500,000 Units Oral QID    aluminum-magnesium hydroxide (MAALOX) oral suspension 15 mL  15 mL Oral QID PRN    prochlorperazine (COMPAZINE) with saline injection 10 mg  10 mg IntraVENous Q6H PRN    metoclopramide HCl (REGLAN) injection 5 mg  5 mg IntraVENous Q6H    famotidine (PF) (PEPCID) 20 mg in 0.9% sodium chloride 10 mL injection  20 mg IntraVENous Q12H    busPIRone (BUSPAR) tablet 5 mg  5 mg Oral TID    miconazole (MICOTIN) 2 % powder   Topical BID    polyethylene glycol (MIRALAX) packet 17 g  17 g Per NG tube BID    oxyCODONE (ROXICODONE) 5 mg/5 mL oral solution 5 mg  5 mg Oral Q4H PRN    ELECTROLYTE REPLACEMENT PROTOCOL - Potassium and Magnesium  1 Each Other PRN    ELECTROLYTE REPLACEMENT PROTOCOL - Phosphorus  1 Each Other PRN    guaiFENesin (ROBITUSSIN) 100 mg/5 mL oral liquid 400 mg  400 mg Per NG tube Q4H PRN    acetaminophen (TYLENOL) solution 650 mg  650 mg Per NG tube Q4H PRN    metoprolol (LOPRESSOR) injection 5 mg  5 mg IntraVENous Q6H PRN    balsam peru-castor oiL (VENELEX) ointment   Topical BID    docusate (COLACE) 50 mg/5 mL oral liquid 100 mg  100 mg Per NG tube BID    acetaminophen (TYLENOL) suppository 650 mg  650 mg Rectal Q6H PRN    alteplase (CATHFLO) 1 mg in sterile water (preservative free) 1 mL injection  1 mg InterCATHeter PRN    polyvinyl alcohol-povidone (NATURAL TEARS) 0.5-0.6 % ophthalmic solution 1 Drop  1 Drop Both Eyes PRN    dextrose 10% infusion 0-250 mL  0-250 mL IntraVENous PRN    glucagon (GLUCAGEN) injection 1 mg  1 mg IntraMUSCular PRN    glucose chewable tablet 16 g  4 Tab Oral PRN    sodium chloride (NS) flush 5-40 mL  5-40 mL IntraVENous Q8H    sodium chloride (NS) flush 5-40 mL  5-40 mL IntraVENous PRN    aspirin chewable tablet 81 mg  81 mg Per NG tube DAILY    chlorhexidine (ORAL CARE KIT) 0.12 % mouthwash 15 mL  15 mL Oral Q12H    albuterol-ipratropium (DUO-NEB) 2.5 MG-0.5 MG/3 ML  3 mL Nebulization Q6H PRN    0.9% sodium chloride infusion  5 mL/hr IntraVENous CONTINUOUS       Family History:  History reviewed. No pertinent family history. Social History:  Social History     Tobacco Use    Smoking status: Current Some Day Smoker    Smokeless tobacco: Never Used   Substance Use Topics    Alcohol use: Not on file       The patient was counseled at length about the risks of silver Covid-19 in the verona-operative and post-operative states including the recovery window of their procedure. The patient was made aware that silver Covid-19 after a surgical procedure may worsen their prognosis for recovering from the virus and lend to a higher morbidity and or mortality risk. The patient was given the options of postponing their procedure. All of the risks, benefits, and alternatives were discussed. The patient does  wish to proceed with the procedure. PHYSICAL EXAM PRIOR TO SEDATION:  General: Alert, in no acute distress    Lungs:            CTA bilaterally  Heart:  Normal S1, S2    Abdomen: Soft, Non distended, Non tender. Normoactive bowel sounds. Assessment:   Stable for sedation administration.     Plan:     · Endoscopic procedure with sedation     Signed By: Radha Piedra MD 9/22/2020  1:46 PM

## 2020-09-22 NOTE — PROGRESS NOTES
ARDS  Acute hypoxic respiratory failure  COVID positive  Sinus pauses  S/P tracheostomy     Remains on vent     Plan for PEG tube later today    Addendum:    Not able to place PEG tube - stomach too high up in sternum    WBCs up a bit    Hgb and platelets look good    Creatinine normal    Bilirubin looks good    Other LFTs improving     ABG - 7.49 / 44 / 100 / 33 / 10    Vent - PEEP 8, FiO2 45, RR 20    Remains on Lovenox     Remains on Xanax / Seroquel    Precedex       Intake/Output Summary (Last 24 hours) at 9/22/2020 1444  Last data filed at 9/22/2020 1200  Gross per 24 hour   Intake 1283.2 ml   Output 1605 ml   Net -321.8 ml     Visit Vitals  BP (!) 121/90   Pulse (!) 108   Temp 99.1 °F (37.3 °C)   Resp 20   Ht 5' 11\" (1.803 m)   Wt 197 lb 5 oz (89.5 kg)   SpO2 100%   BMI 27.52 kg/m²       Risk of morbidity and mortality - high  Medical decision making - high complexity    Total critical care time - 30 minutes (CPT 41609)     I personally spent the above critical care time. This is time spent at this critically ill patient's bedside / unit / floor actively involved in patient care as well as the coordination of care and discussions with the patient's family. This does not include any procedural time which has been billed separately.

## 2020-09-22 NOTE — PROGRESS NOTES
1300 SBAR report received from [address] [place] [address]. Propofol gtt to be initiated by primary RN. Consent for PEG on chart, signed by pt's mother. [address] given via IVP per order. 1500 SBAR report given to [address] [place] [address]. VSS. PEG placement unsuccessful. Pt responds to voice, denies discomfort.     Endoscope was pre-cleaned at bedside immediately following procedure by Halina Bermeo.

## 2020-09-22 NOTE — PROGRESS NOTES
[facility]    ICU TEAM Progress Note    Name: Jose Manuel García   : 1990   MRN: [id number]   Date: 2020      I  Subjective:   Progress Note: 2020      Reason for ICU Admission: Respiratory failure due to COVID-19 infection    Interval history:  55-year-old male with no history of significant past medical history except smoking half pack per day. Sammie Chacon was admitted on 2020 at [facility] with acute hypoxemic respiratory failure from COVID pneumonia. Sammie Chacon continued to deteriorate and got intubated on 8/10.  Due to worsening hypoxemia, he is transferred to [address] [place] [address] for ECMO evaluation. Sammie Chacon has bee given convalescent plasma twice on  and  and treated with Remdesivir which was completed on 8/10. Sammie Chacon is also on dexamethasone. Sammie Chacon completed course of empiric antibiotics including azithromycin which was completed on  and cefepime was completed on .  During his hospital course he also developed pneumothorax and has left-sided chest tube since .  Has a tracheostomy tube placed on .  On September 3 patient had significant worsening on his right arm, CT head was unrevealing.  He is already on high-dose Lovenox and aspirin.  On  he had acute weakness on the left arm code stroke was called and again CTA was unrevealing.  Since then left arm is back to baseline, showed some improvement on right arm but still weak compared to left with minimal ability to move against gravity.   Mental status continues to improve but now he is very anxious with recurrent episode of nausea and vomiting associated with tachycardia.     Overnight Events:   Now off precedex    Active Problem List:     Problem List  Date Reviewed: 2020          Codes Class    Acute respiratory failure ([facility] [address] ICD-10-CM: J96.00  ICD-9-CM: 518.81         Pneumothorax on left ICD-10-CM: J93.9  ICD-9-CM: 512.89         Pneumonia due to severe acute respiratory syndrome coronavirus 2 (SARS-CoV-2) ICD-10-CM: U07.1, J12.89  ICD-9-CM: 480.8         Respiratory failure with hypoxia (HCC) ICD-10-CM: J96.91  ICD-9-CM: 518.81               Past Medical History:      has a past medical history of History of vascular access device (08/10/2020). Past Surgical History:      has a past surgical history that includes ir thora/ins chest tube(pneumo) wo image (2020); ir thora/ins chest tube(pneumo) wo image (2020); and pr tracheostomy, planned (2020). Home Medications:     Prior to Admission medications    Medication Sig Start Date End Date Taking? Authorizing Provider   benzonatate (Tessalon Perles) 100 mg capsule Take 100 mg by mouth three (3) times daily as needed for Cough. Provider, Historical       Allergies/Social/Family History:     No Known Allergies   Social History     Tobacco Use    Smoking status: Current Some Day Smoker    Smokeless tobacco: Never Used   Substance Use Topics    Alcohol use: Not on file      History reviewed. No pertinent family history. Review of Systems:     Not able to obtain due to his medical condition    Objective:   Vital Signs:  Visit Vitals  BP (!) 124/93   Pulse 100   Temp 99.1 °F (37.3 °C)   Resp 20   Ht 5' 11\" (1.803 m)   Wt 89.5 kg (197 lb 5 oz)   SpO2 100%   BMI 27.52 kg/m²      O2 Device: Ventilator, Tracheostomy Temp (24hrs), Av.2 °F (37.3 °C), Min:99 °F (37.2 °C), Max:99.6 °F (37.6 °C)           Intake/Output:     Intake/Output Summary (Last 24 hours) at 2020 1507  Last data filed at 2020 1200  Gross per 24 hour   Intake 1231 ml   Output 1605 ml   Net -374 ml       Physical Exam:  General:  Awake and interactive. Writing on communication board. Frustrated with speed of communication but this is understandable. Eyes:  Sclera anicteric. Pupils equally round and reactive to light.    Mouth/Throat: Mucous membranes normal, oral pharynx clear   Neck: Supple, tracheostomy tube in place   Lungs:    Good air entry bilaterally, fine crepitation   CV:  Regular rate and rhythm,no murmur, click, rub or gallop   Abdomen:   Soft, non-tender. bowel sounds normal. non-distended   Extremities: No cyanosis , evolving edema   Skin: Skin color, texture, turgor normal. no acute rash or lesions   Lymph nodes: Cervical and supraclavicular normal   Musculoskeletal: No swelling or deformity   Lines/Devices:  Intact, no erythema, drainage or tenderness   Psych: On minimal sedation, conscious and alert, follows simple command, anxious, still weak on right arm but able to move minimally against gravity        LABS AND  DATA: Personally reviewed  Recent Labs     09/22/20 0454 09/21/20 0419   WBC 11.3* 7.8   HGB 12.2 10.0*   HCT 38.9 31.8*    281     Recent Labs     09/22/20 0454 09/21/20 0419   * 134*   K 3.9 3.8   CL 98 98   CO2 28 30   BUN 15 20   CREA 0.66* 0.64*   * 130*   CA 10.2* 9.7   MG 2.2 2.0   PHOS 5.0* 5.6*     Recent Labs     09/22/20 0454 09/21/20 0419    105   TP 8.9* 8.1   ALB 3.7 3.1*   GLOB 5.2* 5.0*     No results for input(s): INR, PTP, APTT, INREXT, INREXT in the last 72 hours. Recent Labs     09/20/20  1631   PHI 7.49*   PCO2I 43.7   PO2I 100   FIO2I 45     No results for input(s): CPK, CKMB, TROIQ, BNPP in the last 72 hours. Hemodynamics:   PAP:   CO:     Wedge:   CI:     CVP:    SVR:       PVR:       Ventilator Settings:  Mode Rate Tidal Volume Pressure FiO2 PEEP   Assist control, Pressure control   0 ml  0 cm H2O 45 % 8 cm H20     Peak airway pressure: 26 cm H2O    Minute ventilation: 9.72 l/min        MEDS: Reviewed    Chest X-Ray:  CXR Results  (Last 48 hours)               09/22/20 0901  XR CHEST PORT Final result    Impression:  IMPRESSION:        1. Improved interstitial and airspace opacities with persistent low lung   volumes. 2.  Dilated gastric lumen. Possible dilated loops of bowel in the abdomen.    Consider dedicated abdominal       Narrative:  EXAM: XR CHEST PORT       INDICATION: feeding tube placement       COMPARISON: 9/14/2020       FINDINGS: A portable AP radiograph of the chest was obtained at 853 hours. The   patient is on a cardiac monitor. Diffuse interstitial and airspace opacities   with interval improvement. Lung volumes remain low. Tubes and lines are stable. .   The cardiac and mediastinal contours and pulmonary vascularity are normal.    Interval gastric distention. Dilated loops of bowel in the upper abdomen are not   well evaluated. .                  Assessment and Plan:   -Bilateral pneumonia due to COVID-19 infection  -Acute right arm weakness:  Patient already on aspirin and moderate dose anticoagulation.  Still pending CTA head and neck, CT head showed no bleed. -Right arm weakness  -Fever: No clear etiology, culture remain negative, images not suggestive of acute infectious process  - Acute hypoxic respiratory failure due to COVID-19 infection status post tracheostomy on August 26  - Pneumothorax status post left chest tube insertion on August 11 without air leak  - Possible bacterial super imposed infection: Completed antibiotic course  -History of tobacco use         -Now off precedex. On BuSpar, Seroquel, and Xanax. - Vent weaning as tolerated  -Right arm/lefty leg weakness - MRI showed 1. Evolving cortical/subcortical encephalomalacia and atrophy in the bilateral  frontoparietal lobes with signal changes - this may represent evolving ischemic changes from a hypoxic-ischemic  event, vasculitic involvement from COVID, or evolving encephalomalacia from a  infectious/inflammatory process related to COVID. 2. Small area of signal abnormality with restricted diffusion in the right  posterior body of the corpus callosum, which likely represents the same process  as above, again either ischemic or infectious/inflammatory. 3. Chronic area of encephalomalacia in the right cerebellum with associated  chronic hemorrhage.   4. Complete opacification of the right sphenoid sinus and near complete  opacification of the right maxillary sinus with air-fluid level, suspicious for  acute sinusitis. Large bilateral mastoid effusions. PT/OT as tolerated  Power is improving on right arm but still significantly weaker than left  Will treat sinusitis with Augmentin - increasing WBC  - Persistent vomiting - continue Reglan, zofran, compazine, add ativan to regimen  - Completed COVID-19 treatment  - DVT prophylaxis with Lovenox.  GI prophylaxis.  Nutritional support  -once s/p PEG placement should be able to move to [facility]  Stay in ICU      I personally spent 35 minutes of time. This is time spent actively involved in patient care as well as the coordination of care and discussions with the patient's family.

## 2020-09-22 NOTE — PROCEDURES
[address] Nicki Slade M.D.  [facility], [address] [place] [address]  [phone]               Esophagogastroduodenoscopy (EGD) Procedure Note    NAME: Lori Sandhu  :  1990  MRN:  [id number]    Indications:  Feeding difficulties     : Blanca Padilla MD    Referring Provider:  Delphine, MD Silas    Surgical Assistant: none    Prosthetic devices, grafts, tissues, transplant, or devices implanted: none    Medicine:  Fentanyl 125 mcg IV; Versed 7 mg IV, Benadryl 50 mg IV in addition to Propofol drip; Ancef 2 g IV      Procedure Details:  After informed consent was obtained with all risks and benefits of the procedure explained and preprocedure exam completed, the patient was placed in the left lateral decubitus position. Universal protocol for patient identification was performed and documented in the nursing notes. Throughout the procedure, the patient's blood pressure was monitored at least every five minutes; pulse, and oxygen saturations were monitored continuously. All vital signs were documented in the nursing notes. The endoscope was inserted into the mouth and advanced under direct vision to second portion of the duodenum. A careful inspection was made as the gastroscope was withdrawn, including a retroflexed view of the proximal stomach; findings and interventions are described below. Findings:   Esophagus: slight nodularity at the GE junction s/p biopsies  Stomach: normal; transillumination at the level of the sternum-unable to place PEG tube  Duodenum: normal    Interventions:    biopsy of esophagus    Specimens:   ID Type Source Tests Collected by Time Destination   1 : GE Junction Bx Preservative   Krissy Sepulveda MD 2020 1424 Pathology        EBL: None          Complications:     No immediate complications        Impression:  -See post-procedure diagnoses. Unable to place G tube endoscopically.      Recommendations:  -IR placement of G tube    Signed by:  Juan Carlos Cody MD         9/22/2020 2:30 PM

## 2020-09-22 NOTE — PROGRESS NOTES
SLP Contact Note    SLP consult received and appreciated. Patient is currently on the ventilator and therefore will confer with RT as to appropriateness for in-line PMV. Of note, in-line PMV parameters are as follows:    -Can tolerate cuff deflation on the ventilator  -FiO2 below 60%  -PEEP less than 10  -PIP less than 40  -RR of 18 or less    Patient appears to be appropriate for in-line PMV at this time, however, pt going for a PEG tube placement this afternoon. Therefore, will hold in-line today and plan to complete this tomorrow.     Thank you,  Reba Avendano, M.Ed, [address] [facility]  Speech-Language Pathologist

## 2020-09-22 NOTE — PROGRESS NOTES
MIGUELINA  Patient presented to [facility] ED from home with increased shortness of breath, diaphoresis and fever. Transferred to [facility] for possible ECMO placement   RUR: 25%  Anni Ny, will need insurance authorization and ALS with vent transport. .     Patient remains in the ICU vented via trach. Plan today is for PEG tube placement. Care manager spoke with patient's mother Adam George to discuss LTAC as next level of care for her son. Per mother, she would like somewhere local. CM explained to her about [facility] and the benefits of LTAC being Vent weaning and physical therapy. She is agreeable to speak with [facility] liaison. Referral made through [facility]/ [employer].    Pauline Hawk RN,Care Management

## 2020-09-22 NOTE — CONSULTS
[address] NOTE  [facility]  [phone] NP in-hospital cell phone M-F until 4:30  After 5pm or on weekends, please call  for physician on call        NAME:  Jordyn Booth   :   1990   MRN:   [id number]       Referring Physician: Nura Allen [id number] Date: 2020 10:11 AM    Chief Complaint: PEG tube     History of Present Illness:  Patient is a 27 y.o. who is seen in consultation at the request of Dr. Jany Monroe for PEG tube. Medical history as listed below. Patient presented with pneumonia and COVID 2020. He is status post trach and recovering well. I have reviewed the emergency room note, hospital admission note, notes by all other clinicians who have seen the patient during this hospitalization to date. I have reviewed the problem list and the reason for this hospitalization. I have reviewed the allergies and the medications the patient was taking at home prior to this hospitalization. PMH:  Past Medical History:   Diagnosis Date    History of vascular access device 08/10/2020    5 Fr triple PICC, hemodynamically unstable, 45 cm L basilic       PSH:  Past Surgical History:   Procedure Laterality Date    IR THORA/INS CHEST TUBE(PNEUMO) WO IMAGE  2020    IR THORA/INS CHEST TUBE(PNEUMO) WO IMAGE  2020    NV TRACHEOSTOMY, PLANNED  2020            Allergies:  No Known Allergies    Home Medications:  Prior to Admission Medications   Prescriptions Last Dose Informant Patient Reported? Taking?   benzonatate (Tessalon Perles) 100 mg capsule Not Taking at Unknown time  Yes No   Sig: Take 100 mg by mouth three (3) times daily as needed for Cough.       Facility-Administered Medications: None       Hospital Medications:  Current Facility-Administered Medications   Medication Dose Route Frequency    lactated Ringers infusion  100 mL/hr IntraVENous CONTINUOUS    potassium bicarb-citric acid (EFFER-K) tablet 20 mEq  20 mEq Oral DAILY    QUEtiapine (SEROquel) tablet 150 mg  150 mg Per NG tube Q12H    nystatin (MYCOSTATIN) 100,000 unit/mL oral suspension 500,000 Units  500,000 Units Oral QID    aluminum-magnesium hydroxide (MAALOX) oral suspension 15 mL  15 mL Oral QID PRN    ALPRAZolam (XANAX) tablet 1 mg  1 mg Per NG tube BID    enoxaparin (LOVENOX) injection 45 mg  45 mg SubCUTAneous Q12H    prochlorperazine (COMPAZINE) with saline injection 10 mg  10 mg IntraVENous Q6H PRN    metoclopramide HCl (REGLAN) injection 5 mg  5 mg IntraVENous Q6H    famotidine (PF) (PEPCID) 20 mg in 0.9% sodium chloride 10 mL injection  20 mg IntraVENous Q12H    busPIRone (BUSPAR) tablet 5 mg  5 mg Oral TID    miconazole (MICOTIN) 2 % powder   Topical BID    polyethylene glycol (MIRALAX) packet 17 g  17 g Per NG tube BID    oxyCODONE (ROXICODONE) 5 mg/5 mL oral solution 5 mg  5 mg Oral Q4H PRN    ELECTROLYTE REPLACEMENT PROTOCOL - Potassium and Magnesium  1 Each Other PRN    ELECTROLYTE REPLACEMENT PROTOCOL - Phosphorus  1 Each Other PRN    guaiFENesin (ROBITUSSIN) 100 mg/5 mL oral liquid 400 mg  400 mg Per NG tube Q4H PRN    acetaminophen (TYLENOL) solution 650 mg  650 mg Per NG tube Q4H PRN    metoprolol (LOPRESSOR) injection 5 mg  5 mg IntraVENous Q6H PRN    balsam peru-castor oiL (VENELEX) ointment   Topical BID    fentaNYL citrate (PF) injection  mcg   mcg IntraVENous Q2H PRN    midazolam (VERSED) injection 1-2 mg  1-2 mg IntraVENous Q2H PRN    docusate (COLACE) 50 mg/5 mL oral liquid 100 mg  100 mg Per NG tube BID    acetaminophen (TYLENOL) suppository 650 mg  650 mg Rectal Q6H PRN    alteplase (CATHFLO) 1 mg in sterile water (preservative free) 1 mL injection  1 mg InterCATHeter PRN    polyvinyl alcohol-povidone (NATURAL TEARS) 0.5-0.6 % ophthalmic solution 1 Drop  1 Drop Both Eyes PRN    dextrose 10% infusion 0-250 mL  0-250 mL IntraVENous PRN    glucagon (GLUCAGEN) injection 1 mg  1 mg IntraMUSCular PRN    glucose chewable tablet 16 g  4 Tab Oral PRN    ondansetron (ZOFRAN) injection 4 mg  4 mg IntraVENous Q6H PRN    sodium chloride (NS) flush 5-40 mL  5-40 mL IntraVENous Q8H    sodium chloride (NS) flush 5-40 mL  5-40 mL IntraVENous PRN    aspirin chewable tablet 81 mg  81 mg Per NG tube DAILY    chlorhexidine (ORAL CARE KIT) 0.12 % mouthwash 15 mL  15 mL Oral Q12H    albuterol-ipratropium (DUO-NEB) 2.5 MG-0.5 MG/3 ML  3 mL Nebulization Q6H PRN    0.9% sodium chloride infusion  5 mL/hr IntraVENous CONTINUOUS       Social History:  Social History     Tobacco Use    Smoking status: Current Some Day Smoker    Smokeless tobacco: Never Used   Substance Use Topics    Alcohol use: Not on file       Family History:  History reviewed. No pertinent family history.     Review of Systems:  Constitutional: negative fever, negative chills, negative weight loss  Eyes:   negative visual changes  ENT:   negative sore throat, tongue or lip swelling  Respiratory:  negative cough, negative dyspnea  Cards:  negative for chest pain, palpitations, lower extremity edema  GI:   See HPI  :  negative for frequency, dysuria  Integument:  negative for rash and pruritus  Heme:  negative for easy bruising and gum/nose bleeding  Musculoskeletal:negative for myalgias, back pain and muscle weakness  Neuro:    negative for headaches, dizziness, vertigo  Psych: negative for feelings of anxiety, depression     Objective:     Patient Vitals for the past 8 hrs:   BP Temp Pulse Resp SpO2 Weight   09/22/20 1000 (!) 145/106 -- (!) 107 21 100 % --   09/22/20 0907 -- -- -- -- -- 89.5 kg (197 lb 5 oz)   09/22/20 0900 (!) 139/100 -- (!) 118 25 99 % --   09/22/20 0800 135/89 99.2 °F (37.3 °C) 100 23 99 % --   09/22/20 0705 -- -- (!) 108 29 99 % --   09/22/20 0700 (!) 152/103 -- (!) 128 (!) 32 98 % --   09/22/20 0600 (!) 144/89 -- (!) 104 21 98 % --   09/22/20 0500 (!) 159/100 -- (!) 125 26 100 % --   09/22/20 0447 -- -- (!) 108 25 100 % --   09/22/20 0400 -- 99.3 °F (37.4 °C) -- -- -- --     09/22 0701 - 09/22 1900  In: -   Out: 100 [Urine:100]  09/20 1901 - 09/22 0700  In: 2976.2 [I.V.:336.2]  Out: 2275 [Urine:2275]    EXAM:     CONST:  Pleasant male lying in bed, no acute distress   NEURO:  alert and oriented    HEENT: EOMI, no scleral icterus, trach, dobhoff   LUNGS: clear to ausculation, (-) wheeze   CARD:   S1 S2   ABD:  soft, no tenderness, no rebound, bowel sounds (+) all 4 quadrants, no masses, non distended   EXT:  warm   PSYCH: full, not anxious     Data Review     Recent Labs     09/22/20 0454 09/21/20 0419   WBC 11.3* 7.8   HGB 12.2 10.0*   HCT 38.9 31.8*    281     Recent Labs     09/22/20 0454 09/21/20 0419   * 134*   K 3.9 3.8   CL 98 98   CO2 28 30   BUN 15 20   CREA 0.66* 0.64*   * 130*   PHOS 5.0* 5.6*   CA 10.2* 9.7     Recent Labs     09/22/20 0454 09/21/20 0419    105   TP 8.9* 8.1   ALB 3.7 3.1*   GLOB 5.2* 5.0*     No results for input(s): INR, PTP, APTT, INREXT in the last 72 hours. Assessment:     · Dysphagia  · Respiratory failure: status post trach 8/26, COVID recovering     Patient Active Problem List   Diagnosis Code    Respiratory failure with hypoxia ([facility] [address] [address] J96.91    Pneumonia due to severe acute respiratory syndrome coronavirus 2 (SARS-CoV-2) U07.1, J12.89    Pneumothorax on left J93.9    Acute respiratory failure ([facility] [address] J96.00     Plan:       · NPO, lovenox held  · Discussed risks with patient and mother, agreeable for EGD/PEG placement  · Intensivest will give propofol for procedure  · Plan for EGD/PEG placement bedside today  · Patient discussed with and will be seen by Dr. Ashley Solitario  · Thank you for allowing me to participate in care of [id number] [address] [place] By: Daily Holliday NP     9/22/2020  10:11 AM       Gastroenterology Attending Physician attestation statement and comments.     This patient was seen and examined by me in a face-to-face visit today. I reviewed the medical record including lab work, imaging and other provider notes. I confirmed the history as described above. I spoke to the patient, reviewed the medical record including lab work, imaging and other provider notes. I discussed this case in detail with Beck LAMB. I formulated an  assessment of this patient and developed a treatment plan. I agree with the above consultation note. I agree with the history, exam and assessment and plan as outlined in the note. I would like to add the following:     Abd: normoactive BS, nt, nd, no rebound/guarding. EGD/PEG placement today.     Dr. Claire Whitehead

## 2020-09-22 NOTE — PROGRESS NOTES
Problem: Mobility Impaired (Adult and Pediatric)  Goal: *Acute Goals and Plan of Care (Insert Text)  Description: FUNCTIONAL STATUS PRIOR TO ADMISSION: Patient was independent and active without use of DME. 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 Physical Therapy Goals    Reassessment completed 9/18/2020 and all goals remain appropriate  at this time. Initiated 9/11/2020  1. Patient will move from supine to sit and sit to supine , scoot up and down, and roll side to side in bed with maximal assistance within 7 day(s). 2.  Patient will transfer from bed to chair and chair to bed with maximal assistance using the least restrictive device within 7 day(s). 3.  Patient will perform sit to stand with maximal assistance within 7 day(s). 4.  Patient will tolerate sitting EOB with maximal assistance for 5 minutes within 7 day(s). Outcome: Progressing Towards Goal   PHYSICAL THERAPY TREATMENT  Patient: Jack Miranda (3 y.o. male)  Date: 9/22/2020  Diagnosis: Acute respiratory failure ([facility] [address] [J96.00]   <principal problem not specified>  Procedure(s) (LRB):  VV EXTRACORPOREAL MEMBRANE OXYGENATION (ECMO) (N/A)    Precautions:    Chart, physical therapy assessment, plan of care and goals were reviewed. ASSESSMENT  Patient continues with skilled PT services and is progressing towards goals. Pt cleared by RN to work with therapy. Pt received supine in bed on ventilator via trach (A/C, PEEp:8, FiO2:45%). Pt tolerated session well and eager to work with therapies. Pt continues to be limited by generalized weakness (LLE, RUE more affected), decreased functional mobility, decreased tolerance to activity, impaired seated balance. Pt was able to progress to rolling and sitting EOB today, but required mod A X 2 and mod A for support posterior trunk due to R lateral-posterior leaning.  Pt's HR elevated to 160's, respiratory rate: 35 at it's highest. Pt with increased coughing (popped vent off and re-applied quickly by OT) and requested to be suctioned (RN in room to perform while seated EOB). Pt assisted back to supine position with max A x 2 and RN in room. Pt will continue to benefit from continued therapy to improve strength and overall tolerance to activity. Continue to recommend rehab upon discharge pending progress and medical course. Current Level of Function Impacting Discharge (mobility/balance): mod A x 2 supine to sit EOB, fair-poor sitting balance; sat EOB x 5 minutes    Other factors to consider for discharge: medical stability; previously independent and active         PLAN :  Patient continues to benefit from skilled intervention to address the above impairments. Continue treatment per established plan of care. to address goals. Recommendation for discharge: (in order for the patient to meet his/her long term goals)  To be determined: will need extensive rehab pending progress with acute therapies and medical course    This discharge recommendation:  Has been made in collaboration with the attending provider and/or case management    IF patient discharges home will need the following DME: to be determined (TBD)       SUBJECTIVE:   Patient stated Ethelene Sorrento tomorrow? Lamine Cast    OBJECTIVE DATA SUMMARY:   Critical Behavior:  Neurologic State: Alert, Eyes open spontaneously  Orientation Level: Oriented X4  Cognition: Follows commands     Functional Mobility Training:  Bed Mobility:  Rolling:  Moderate assistance;Assist x2  Supine to Sit: Moderate assistance;Assist x2  Sit to Supine: Maximum assistance;Assist x2  Scooting: Maximum assistance(to scoot each hip forward)        Transfers:                Balance:  Sitting: Impaired  Sitting - Static: Poor (constant support)(Right lateral lean)  Sitting - Dynamic: Poor (constant support)  Ambulation/Gait Training: Therapeutic Exercises:   Manual resistive L hip/knee extension  Pain Rating:  Pt denied pain    Activity Tolerance:   Fair, requires rest breaks, and observed SOB with activity  Please refer to the flowsheet for vital signs taken during this treatment. After treatment patient left in no apparent distress:   Supine in bed, Call bell within reach, Side rails x 3, and RN in room    COMMUNICATION/COLLABORATION:   The patients plan of care was discussed with: Occupational therapist and Registered nurse.      Andrew Cheng, PT, DPT   Time Calculation: 23 mins

## 2020-09-22 NOTE — PROGRESS NOTES
1930. Bedside and Verbal shift change report given to Bindu Ware RN (oncoming nurse) by Renny Resendiz RN (offgoing nurse). Report included the following information SBAR, Kardex, ED Summary, OR Summary, Procedure Summary, Intake/Output, MAR, Accordion, Recent Results, Med Rec Status, Cardiac Rhythm NSR and Quality Measures. Shift Summary. Pt taken to MRI overnight. Upon returning pt experiencing severe nausea/vomiting. Pt vomited 4 times overnight. Intensivist aware. Pt treated with PRN anti-emetics. Precedex off. Anxiety managed with PRN Versed. 0730. Bedside and Verbal shift change report given to Aneta Sherwood RN (oncoming nurse) by Bindu Ware RN (offgoing nurse). Report included the following information SBAR, Kardex, ED Summary, Procedure Summary, Intake/Output, MAR, Accordion, Recent Results, Med Rec Status, Cardiac Rhythm ST, Alarm Parameters  and Quality Measures.

## 2020-09-22 NOTE — PROGRESS NOTES
Problem: Ventilator Management  Goal: *Adequate oxygenation and ventilation  Outcome: Progressing Towards Goal  Goal: *Patient maintains clear airway/free of aspiration  Outcome: Progressing Towards Goal  Goal: *Absence of infection signs and symptoms  Outcome: Progressing Towards Goal  Goal: *Normal spontaneous ventilation  Outcome: Progressing Towards Goal     Problem: Patient Education: Go to Patient Education Activity  Goal: Patient/Family Education  Outcome: Progressing Towards Goal     Problem: Pressure Injury - Risk of  Goal: *Prevention of pressure injury  Description: Document Enrique Scale and appropriate interventions in the flowsheet. Outcome: Progressing Towards Goal  Note: Pressure Injury Interventions:  Sensory Interventions: Assess need for specialty bed, Assess changes in LOC, Avoid rigorous massage over bony prominences, Float heels, Turn and reposition approx. every two hours (pillows and wedges if needed), Minimize linen layers    Moisture Interventions: Check for incontinence Q2 hours and as needed, Internal/External urinary devices, Limit adult briefs, Minimize layers, Maintain skin hydration (lotion/cream), Contain wound drainage, Assess need for specialty bed, Apply protective barrier, creams and emollients    Activity Interventions: Assess need for specialty bed    Mobility Interventions: HOB 30 degrees or less, Assess need for specialty bed, Float heels, Pressure redistribution bed/mattress (bed type), Turn and reposition approx.  every two hours(pillow and wedges)    Nutrition Interventions: Document food/fluid/supplement intake    Friction and Shear Interventions: HOB 30 degrees or less, Minimize layers, Transferring/repositioning devices, Apply protective barrier, creams and emollients                Problem: Patient Education: Go to Patient Education Activity  Goal: Patient/Family Education  Outcome: Progressing Towards Goal     Problem: Falls - Risk of  Goal: *Absence of Falls  Description: [facility] and appropriate interventions in the flowsheet. Outcome: Progressing Towards Goal  Note: Fall Risk Interventions:  Mobility Interventions: Strengthening exercises (ROM-active/passive)    Mentation Interventions: Bed/chair exit alarm, Door open when patient unattended, Adequate sleep, hydration, pain control, Increase mobility, More frequent rounding, Reorient patient, Update white board    Medication Interventions: Evaluate medications/consider consulting pharmacy    Elimination Interventions:  Toilet paper/wipes in reach, Call light in reach    History of Falls Interventions: Vital signs minimum Q4HRs X 24 hrs (comment for end date), Consult care management for discharge planning, Door open when patient unattended         Problem: Patient Education: Go to Patient Education Activity  Goal: Patient/Family Education  Outcome: Progressing Towards Goal     Problem: Nutrition Deficit  Goal: *Optimize nutritional status  Outcome: Progressing Towards Goal     Problem: Breathing Pattern - Ineffective  Goal: *Absence of hypoxia  Outcome: Progressing Towards Goal  Goal: *Use of effective breathing techniques  Outcome: Progressing Towards Goal     Problem: Breathing Pattern - Ineffective  Goal: *Absence of hypoxia  Outcome: Progressing Towards Goal  Goal: *Use of effective breathing techniques  Outcome: Progressing Towards Goal  Goal: *PALLIATIVE CARE:  Alleviation of Dyspnea  Outcome: Progressing Towards Goal     Problem: Patient Education: Go to Patient Education Activity  Goal: Patient/Family Education  Outcome: Progressing Towards Goal     Problem: Breathing Pattern - Ineffective  Goal: *Absence of hypoxia  Outcome: Progressing Towards Goal  Goal: *Use of effective breathing techniques  Outcome: Progressing Towards Goal  Goal: *PALLIATIVE CARE:  Alleviation of Dyspnea  Outcome: Progressing Towards Goal     Problem: Patient Education: Go to Patient Education Activity  Goal: Patient/Family Education  Outcome: Progressing Towards Goal     Problem: Breathing Pattern - Ineffective  Goal: *Absence of hypoxia  Outcome: Progressing Towards Goal  Goal: *Use of effective breathing techniques  Outcome: Progressing Towards Goal  Goal: *PALLIATIVE CARE:  Alleviation of Dyspnea  Outcome: Progressing Towards Goal     Problem: Patient Education: Go to Patient Education Activity  Goal: Patient/Family Education  Outcome: Progressing Towards Goal     Problem: Breathing Pattern - Ineffective  Goal: *Absence of hypoxia  Outcome: Progressing Towards Goal  Goal: *Use of effective breathing techniques  Outcome: Progressing Towards Goal  Goal: *PALLIATIVE CARE:  Alleviation of Dyspnea  Outcome: Progressing Towards Goal     Problem: Patient Education: Go to Patient Education Activity  Goal: Patient/Family Education  Outcome: Progressing Towards Goal     Problem: Patient Education: Go to Patient Education Activity  Goal: Patient/Family Education  Outcome: Progressing Towards Goal     Problem: Breathing Pattern - Ineffective  Goal: *Absence of hypoxia  Outcome: Progressing Towards Goal  Goal: *Use of effective breathing techniques  Outcome: Progressing Towards Goal  Goal: *PALLIATIVE CARE:  Alleviation of Dyspnea  Outcome: Progressing Towards Goal     Problem: Patient Education: Go to Patient Education Activity  Goal: Patient/Family Education  Outcome: Progressing Towards Goal     Problem: Breathing Pattern - Ineffective  Goal: *Absence of hypoxia  Outcome: Progressing Towards Goal  Goal: *Use of effective breathing techniques  Outcome: Progressing Towards Goal  Goal: *PALLIATIVE CARE:  Alleviation of Dyspnea  Outcome: Progressing Towards Goal     Problem: Patient Education: Go to Patient Education Activity  Goal: Patient/Family Education  Outcome: Progressing Towards Goal     Problem: Breathing Pattern - Ineffective  Goal: *Absence of hypoxia  Outcome: Progressing Towards Goal  Goal: *Use of effective breathing techniques  Outcome: Progressing Towards Goal  Goal: *PALLIATIVE CARE:  Alleviation of Dyspnea  Outcome: Progressing Towards Goal     Problem: Patient Education: Go to Patient Education Activity  Goal: Patient/Family Education  Outcome: Progressing Towards Goal     Problem: Patient Education: Go to Patient Education Activity  Goal: Patient/Family Education  Outcome: Progressing Towards Goal

## 2020-09-23 ENCOUNTER — APPOINTMENT (OUTPATIENT)
Dept: INTERVENTIONAL RADIOLOGY/VASCULAR | Age: 30
DRG: 004 | End: 2020-09-23
Attending: INTERNAL MEDICINE
Payer: COMMERCIAL

## 2020-09-23 LAB
ALBUMIN SERPL-MCNC: 3.6 G/DL (ref 3.5–5)
ALBUMIN/GLOB SERPL: 0.7 {RATIO} (ref 1.1–2.2)
ALP SERPL-CCNC: 106 U/L (ref 45–117)
ALT SERPL-CCNC: 63 U/L (ref 12–78)
ANION GAP SERPL CALC-SCNC: 8 MMOL/L (ref 5–15)
AST SERPL-CCNC: 22 U/L (ref 15–37)
BASOPHILS # BLD: 0 K/UL (ref 0–0.1)
BASOPHILS NFR BLD: 0 % (ref 0–1)
BILIRUB SERPL-MCNC: 0.6 MG/DL (ref 0.2–1)
BUN SERPL-MCNC: 16 MG/DL (ref 6–20)
BUN/CREAT SERPL: 24 (ref 12–20)
CALCIUM SERPL-MCNC: 10.2 MG/DL (ref 8.5–10.1)
CHLORIDE SERPL-SCNC: 100 MMOL/L (ref 97–108)
CO2 SERPL-SCNC: 28 MMOL/L (ref 21–32)
CREAT SERPL-MCNC: 0.67 MG/DL (ref 0.7–1.3)
DIFFERENTIAL METHOD BLD: ABNORMAL
EOSINOPHIL # BLD: 0 K/UL (ref 0–0.4)
EOSINOPHIL NFR BLD: 0 % (ref 0–7)
ERYTHROCYTE [DISTWIDTH] IN BLOOD BY AUTOMATED COUNT: 14.4 % (ref 11.5–14.5)
GLOBULIN SER CALC-MCNC: 5.2 G/DL (ref 2–4)
GLUCOSE SERPL-MCNC: 110 MG/DL (ref 65–100)
HCT VFR BLD AUTO: 34.2 % (ref 36.6–50.3)
HGB BLD-MCNC: 10.8 G/DL (ref 12.1–17)
IMM GRANULOCYTES # BLD AUTO: 0 K/UL
IMM GRANULOCYTES NFR BLD AUTO: 0 %
LYMPHOCYTES # BLD: 2.1 K/UL (ref 0.8–3.5)
LYMPHOCYTES NFR BLD: 18 % (ref 12–49)
MAGNESIUM SERPL-MCNC: 2 MG/DL (ref 1.6–2.4)
MCH RBC QN AUTO: 28 PG (ref 26–34)
MCHC RBC AUTO-ENTMCNC: 31.6 G/DL (ref 30–36.5)
MCV RBC AUTO: 88.6 FL (ref 80–99)
MONOCYTES # BLD: 0.6 K/UL (ref 0–1)
MONOCYTES NFR BLD: 5 % (ref 5–13)
NEUTS SEG # BLD: 9.2 K/UL (ref 1.8–8)
NEUTS SEG NFR BLD: 77 % (ref 32–75)
NRBC # BLD: 0 K/UL (ref 0–0.01)
NRBC BLD-RTO: 0 PER 100 WBC
PHOSPHATE SERPL-MCNC: 4.7 MG/DL (ref 2.6–4.7)
PLATELET # BLD AUTO: 339 K/UL (ref 150–400)
PMV BLD AUTO: 10.3 FL (ref 8.9–12.9)
POTASSIUM SERPL-SCNC: 3.8 MMOL/L (ref 3.5–5.1)
PROCALCITONIN SERPL-MCNC: <0.05 NG/ML
PROT SERPL-MCNC: 8.8 G/DL (ref 6.4–8.2)
RBC # BLD AUTO: 3.86 M/UL (ref 4.1–5.7)
RBC MORPH BLD: ABNORMAL
SODIUM SERPL-SCNC: 136 MMOL/L (ref 136–145)
WBC # BLD AUTO: 11.9 K/UL (ref 4.1–11.1)

## 2020-09-23 PROCEDURE — 74011250637 HC RX REV CODE- 250/637: Performed by: INTERNAL MEDICINE

## 2020-09-23 PROCEDURE — 83735 ASSAY OF MAGNESIUM: CPT

## 2020-09-23 PROCEDURE — C1769 GUIDE WIRE: HCPCS

## 2020-09-23 PROCEDURE — 74011000258 HC RX REV CODE- 258: Performed by: STUDENT IN AN ORGANIZED HEALTH CARE EDUCATION/TRAINING PROGRAM

## 2020-09-23 PROCEDURE — 99291 CRITICAL CARE FIRST HOUR: CPT | Performed by: THORACIC SURGERY (CARDIOTHORACIC VASCULAR SURGERY)

## 2020-09-23 PROCEDURE — 74011000250 HC RX REV CODE- 250: Performed by: STUDENT IN AN ORGANIZED HEALTH CARE EDUCATION/TRAINING PROGRAM

## 2020-09-23 PROCEDURE — 36415 COLL VENOUS BLD VENIPUNCTURE: CPT

## 2020-09-23 PROCEDURE — 84100 ASSAY OF PHOSPHORUS: CPT

## 2020-09-23 PROCEDURE — 74011250637 HC RX REV CODE- 250/637: Performed by: EMERGENCY MEDICINE

## 2020-09-23 PROCEDURE — 99152 MOD SED SAME PHYS/QHP 5/>YRS: CPT

## 2020-09-23 PROCEDURE — 74011000636 HC RX REV CODE- 636: Performed by: STUDENT IN AN ORGANIZED HEALTH CARE EDUCATION/TRAINING PROGRAM

## 2020-09-23 PROCEDURE — 84145 PROCALCITONIN (PCT): CPT

## 2020-09-23 PROCEDURE — 85025 COMPLETE CBC W/AUTO DIFF WBC: CPT

## 2020-09-23 PROCEDURE — 74011250636 HC RX REV CODE- 250/636: Performed by: EMERGENCY MEDICINE

## 2020-09-23 PROCEDURE — 74011250637 HC RX REV CODE- 250/637: Performed by: NURSE PRACTITIONER

## 2020-09-23 PROCEDURE — 0DH63UZ INSERTION OF FEEDING DEVICE INTO STOMACH, PERCUTANEOUS APPROACH: ICD-10-PCS | Performed by: STUDENT IN AN ORGANIZED HEALTH CARE EDUCATION/TRAINING PROGRAM

## 2020-09-23 PROCEDURE — 80053 COMPREHEN METABOLIC PANEL: CPT

## 2020-09-23 PROCEDURE — 92524 BEHAVRAL QUALIT ANALYS VOICE: CPT

## 2020-09-23 PROCEDURE — 74011250636 HC RX REV CODE- 250/636: Performed by: INTERNAL MEDICINE

## 2020-09-23 PROCEDURE — 74011250636 HC RX REV CODE- 250/636: Performed by: STUDENT IN AN ORGANIZED HEALTH CARE EDUCATION/TRAINING PROGRAM

## 2020-09-23 PROCEDURE — 74011000250 HC RX REV CODE- 250: Performed by: INTERNAL MEDICINE

## 2020-09-23 PROCEDURE — 97110 THERAPEUTIC EXERCISES: CPT

## 2020-09-23 PROCEDURE — 77030003666 HC NDL SPINAL BD -A

## 2020-09-23 PROCEDURE — 74011000250 HC RX REV CODE- 250

## 2020-09-23 PROCEDURE — 2709999900 HC NON-CHARGEABLE SUPPLY

## 2020-09-23 PROCEDURE — 94003 VENT MGMT INPAT SUBQ DAY: CPT

## 2020-09-23 PROCEDURE — 77030018617 HC TU FEED GASTMY1 COOK -B

## 2020-09-23 PROCEDURE — 65610000006 HC RM INTENSIVE CARE

## 2020-09-23 PROCEDURE — 92610 EVALUATE SWALLOWING FUNCTION: CPT

## 2020-09-23 PROCEDURE — C1894 INTRO/SHEATH, NON-LASER: HCPCS

## 2020-09-23 RX ORDER — MIDAZOLAM HYDROCHLORIDE 1 MG/ML
INJECTION, SOLUTION INTRAMUSCULAR; INTRAVENOUS
Status: DISPENSED
Start: 2020-09-23 | End: 2020-09-24

## 2020-09-23 RX ORDER — LIDOCAINE HYDROCHLORIDE 20 MG/ML
JELLY TOPICAL AS NEEDED
Status: DISCONTINUED | OUTPATIENT
Start: 2020-09-23 | End: 2020-10-12 | Stop reason: HOSPADM

## 2020-09-23 RX ORDER — QUETIAPINE FUMARATE 100 MG/1
100 TABLET, FILM COATED ORAL EVERY 12 HOURS
Status: DISCONTINUED | OUTPATIENT
Start: 2020-09-23 | End: 2020-09-24

## 2020-09-23 RX ORDER — LIDOCAINE HYDROCHLORIDE 20 MG/ML
JELLY TOPICAL
Status: COMPLETED
Start: 2020-09-23 | End: 2020-09-23

## 2020-09-23 RX ORDER — SODIUM CHLORIDE 9 MG/ML
500 INJECTION, SOLUTION INTRAVENOUS CONTINUOUS
Status: DISCONTINUED | OUTPATIENT
Start: 2020-09-23 | End: 2020-09-23 | Stop reason: HOSPADM

## 2020-09-23 RX ORDER — FENTANYL CITRATE 50 UG/ML
INJECTION, SOLUTION INTRAMUSCULAR; INTRAVENOUS
Status: DISPENSED
Start: 2020-09-23 | End: 2020-09-24

## 2020-09-23 RX ORDER — SODIUM CHLORIDE 0.9 % (FLUSH) 0.9 %
10 SYRINGE (ML) INJECTION AS NEEDED
Status: DISCONTINUED | OUTPATIENT
Start: 2020-09-23 | End: 2020-09-23 | Stop reason: HOSPADM

## 2020-09-23 RX ORDER — LIDOCAINE HYDROCHLORIDE 20 MG/ML
20 INJECTION, SOLUTION INFILTRATION; PERINEURAL ONCE
Status: COMPLETED | OUTPATIENT
Start: 2020-09-23 | End: 2020-09-23

## 2020-09-23 RX ORDER — MIDAZOLAM HYDROCHLORIDE 1 MG/ML
5 INJECTION, SOLUTION INTRAMUSCULAR; INTRAVENOUS
Status: DISCONTINUED | OUTPATIENT
Start: 2020-09-23 | End: 2020-09-23 | Stop reason: HOSPADM

## 2020-09-23 RX ORDER — FENTANYL CITRATE 50 UG/ML
200 INJECTION, SOLUTION INTRAMUSCULAR; INTRAVENOUS
Status: DISCONTINUED | OUTPATIENT
Start: 2020-09-23 | End: 2020-09-23 | Stop reason: HOSPADM

## 2020-09-23 RX ORDER — LIDOCAINE HYDROCHLORIDE 20 MG/ML
INJECTION, SOLUTION INFILTRATION; PERINEURAL
Status: DISPENSED
Start: 2020-09-23 | End: 2020-09-24

## 2020-09-23 RX ADMIN — ALPRAZOLAM 1 MG: 0.5 TABLET ORAL at 21:50

## 2020-09-23 RX ADMIN — CHLORHEXIDINE GLUCONATE 15 ML: 0.12 RINSE ORAL at 09:21

## 2020-09-23 RX ADMIN — LIDOCAINE HYDROCHLORIDE 15 ML: 20 INJECTION, SOLUTION INFILTRATION; PERINEURAL at 17:52

## 2020-09-23 RX ADMIN — MICONAZOLE NITRATE 2 % TOPICAL POWDER: at 19:09

## 2020-09-23 RX ADMIN — NYSTATIN 500000 UNITS: 500000 SUSPENSION ORAL at 09:21

## 2020-09-23 RX ADMIN — FAMOTIDINE 20 MG: 10 INJECTION INTRAVENOUS at 09:21

## 2020-09-23 RX ADMIN — SODIUM CHLORIDE 10 MG: 9 INJECTION INTRAMUSCULAR; INTRAVENOUS; SUBCUTANEOUS at 11:52

## 2020-09-23 RX ADMIN — LORAZEPAM 1 MG: 2 INJECTION INTRAMUSCULAR; INTRAVENOUS at 03:12

## 2020-09-23 RX ADMIN — MIDAZOLAM HYDROCHLORIDE 1 MG: 1 INJECTION, SOLUTION INTRAMUSCULAR; INTRAVENOUS at 18:18

## 2020-09-23 RX ADMIN — Medication 20 ML: at 14:00

## 2020-09-23 RX ADMIN — Medication 10 ML: at 06:34

## 2020-09-23 RX ADMIN — FENTANYL CITRATE 50 MCG: 50 INJECTION, SOLUTION INTRAMUSCULAR; INTRAVENOUS at 17:57

## 2020-09-23 RX ADMIN — MIDAZOLAM HYDROCHLORIDE 1 MG: 1 INJECTION, SOLUTION INTRAMUSCULAR; INTRAVENOUS at 17:56

## 2020-09-23 RX ADMIN — CHLORHEXIDINE GLUCONATE 15 ML: 0.12 RINSE ORAL at 21:57

## 2020-09-23 RX ADMIN — AMOXICILLIN AND CLAVULANATE POTASSIUM 1 TABLET: 875; 125 TABLET, FILM COATED ORAL at 21:50

## 2020-09-23 RX ADMIN — NYSTATIN 500000 UNITS: 500000 SUSPENSION ORAL at 21:51

## 2020-09-23 RX ADMIN — Medication: at 09:21

## 2020-09-23 RX ADMIN — QUETIAPINE FUMARATE 100 MG: 100 TABLET ORAL at 21:50

## 2020-09-23 RX ADMIN — ACETAMINOPHEN 650 MG: 650 SUPPOSITORY RECTAL at 03:52

## 2020-09-23 RX ADMIN — FENTANYL CITRATE 25 MCG: 50 INJECTION, SOLUTION INTRAMUSCULAR; INTRAVENOUS at 18:10

## 2020-09-23 RX ADMIN — LORAZEPAM 1 MG: 2 INJECTION INTRAMUSCULAR; INTRAVENOUS at 00:32

## 2020-09-23 RX ADMIN — NYSTATIN 500000 UNITS: 500000 SUSPENSION ORAL at 13:15

## 2020-09-23 RX ADMIN — Medication: at 18:00

## 2020-09-23 RX ADMIN — ACETAMINOPHEN ORAL SOLUTION 650 MG: 650 SOLUTION ORAL at 21:50

## 2020-09-23 RX ADMIN — IOHEXOL 20 ML: 240 INJECTION, SOLUTION INTRATHECAL; INTRAVASCULAR; INTRAVENOUS; ORAL at 18:29

## 2020-09-23 RX ADMIN — Medication 10 ML: at 21:52

## 2020-09-23 RX ADMIN — MIDAZOLAM HYDROCHLORIDE 1 MG: 1 INJECTION, SOLUTION INTRAMUSCULAR; INTRAVENOUS at 18:06

## 2020-09-23 RX ADMIN — SODIUM CHLORIDE, SODIUM LACTATE, POTASSIUM CHLORIDE, AND CALCIUM CHLORIDE 100 ML/HR: 600; 310; 30; 20 INJECTION, SOLUTION INTRAVENOUS at 03:52

## 2020-09-23 RX ADMIN — Medication 10 ML: at 21:51

## 2020-09-23 RX ADMIN — METOCLOPRAMIDE 5 MG: 5 INJECTION, SOLUTION INTRAMUSCULAR; INTRAVENOUS at 06:33

## 2020-09-23 RX ADMIN — SODIUM CHLORIDE 500 ML: 900 INJECTION, SOLUTION INTRAVENOUS at 17:09

## 2020-09-23 RX ADMIN — METOCLOPRAMIDE 5 MG: 5 INJECTION, SOLUTION INTRAMUSCULAR; INTRAVENOUS at 11:51

## 2020-09-23 RX ADMIN — LIDOCAINE HYDROCHLORIDE 5 ML: 20 JELLY TOPICAL at 17:55

## 2020-09-23 RX ADMIN — METOCLOPRAMIDE 5 MG: 5 INJECTION, SOLUTION INTRAMUSCULAR; INTRAVENOUS at 00:37

## 2020-09-23 RX ADMIN — METOCLOPRAMIDE 5 MG: 5 INJECTION, SOLUTION INTRAMUSCULAR; INTRAVENOUS at 23:36

## 2020-09-23 RX ADMIN — METOCLOPRAMIDE 5 MG: 5 INJECTION, SOLUTION INTRAMUSCULAR; INTRAVENOUS at 19:08

## 2020-09-23 RX ADMIN — ENOXAPARIN SODIUM 30 MG: 30 INJECTION SUBCUTANEOUS at 23:37

## 2020-09-23 RX ADMIN — MICONAZOLE NITRATE 2 % TOPICAL POWDER: at 09:22

## 2020-09-23 RX ADMIN — FENTANYL CITRATE 25 MCG: 50 INJECTION, SOLUTION INTRAMUSCULAR; INTRAVENOUS at 18:17

## 2020-09-23 RX ADMIN — PIPERACILLIN AND TAZOBACTAM 3.38 G: 3; .375 INJECTION, POWDER, LYOPHILIZED, FOR SOLUTION INTRAVENOUS at 17:30

## 2020-09-23 RX ADMIN — FAMOTIDINE 20 MG: 10 INJECTION INTRAVENOUS at 21:00

## 2020-09-23 NOTE — PROGRESS NOTES
ARDS  Acute hypoxic respiratory failure  COVID positive  Sinus pauses  S/P tracheostomy      Remains on vent     Working on PT/OT     Slow going    Debilitated due to long ICU course    Plan for PEG tube in IR today    SBT today - PS 15, PEEP 5    PMV trial today    Continuing to wean sedation     Weaned off precedex    Hgb looks good    Platelets a little low    Creatinine normal    Bilirubin and other LFTs look good    Pro-calcitonin normal     ABG - 7.49 / 44 / 100 / 33 / 10     Vent - RR 20, PEEP 8, FiO2 45%    Remains on Lovenox     Remains on Xanax / Seroquel    CXR - patchy air space disease continues to improve      Intake/Output Summary (Last 24 hours) at 9/23/2020 1420  Last data filed at 9/23/2020 1200  Gross per 24 hour   Intake 2474.2 ml   Output 880 ml   Net 1594.2 ml     Visit Vitals  BP (!) 151/104   Pulse (!) 112   Temp 99.9 °F (37.7 °C)   Resp 27   Ht 5' 11\" (1.803 m)   Wt 197 lb 5 oz (89.5 kg)   SpO2 99%   BMI 27.52 kg/m²       Risk of morbidity and mortality - high  Medical decision making - high complexity    Total critical care time - 30 minutes (CPT 14961)     I personally spent the above critical care time. This is time spent at this critically ill patient's bedside / unit / floor actively involved in patient care as well as the coordination of care and discussions with the patient's family. This does not include any procedural time which has been billed separately.

## 2020-09-23 NOTE — PROGRESS NOTES
Problem: Dysphagia (Adult)  Goal: *Acute Goals and Plan of Care (Insert Text)  Description: Speech Therapy Goals  Initiated 9/23/2020    1. Patient will tolerate ice chips only when on PMV without adverse effects within 7 days. Outcome: Progressing Towards Goal     Problem: Voice Impaired (Adult)  Goal: *Acute Goals and Plan of Care (Insert Text)  Description: Speech Therapy Goals  Initiated 9/23/2020    1. Patient will tolerate in-line PMV for 10 minute trials with RT/SLP/RN without adverse effects within 7 days. 2. Patient will utilize in-line PMV to work towards ventilator weaning within 7 days. Outcome: Progressing Towards Goal   SPEECH LANGUAGE PATHOLOGY BEDSIDE SWALLOW/IN-LINE PMV EVALUATION  Patient: Sherry Whiting (27 y.o. male)  Date: 9/23/2020  Primary Diagnosis: Acute respiratory failure ([address] [J96.00]  Procedure(s) (LRB):  ESOPHAGOGASTRODUODENOSCOPY (EGD) @ bedside (N/A)  ESOPHAGOGASTRODUODENAL (EGD) BIOPSY (N/A) 1 Day Post-Op   Precautions: Aspiration       ASSESSMENT :  Pt tolerated 5 minute in-line PMV trial on this date. Pt tolerated cuff deflation by SLP in the presence of RT without any changes in vital signs. RT provided deep suctioning following cuff deflation. Noted vocalizations around trach once cuff deflated and thus in-line PMV placed. Pt tolerated without changes to vital signs for ~5 minutes until pt became diaphoretic and stated he was warm. In-line PMV doffed at this time and pt placed back on the ventilator with RT. Recommend continuing in-line PMV trials at least once a day to work towards weaning from ventilator. Pt also seen for small amounts of PO trials, given that pt is NPO for PEG placement.   Pt with high risk for prandial aspiration given recent, prolonged intubation, trach placement with associated reduced pharyngeal sensations/pressures, and decreased ability to tolerate any amount of aspiration 2/2 reduced pulmonary reserve due to recent COVID-19 infection. At this juncture, recommend ice chip trials when pt is on in-line PMV. Otherwise, would hold PO. Will continue to follow. Patient will benefit from skilled intervention to address the above impairments. Patients rehabilitation potential is considered to be Fair     PLAN :  Recommendations and Planned Interventions:  --NPO with PEG tube  --Ice chips only when on in-line PMV trials  --vigilant oral care    Speaking Valve Placement:  SLP / RT only  Recommended Speaking Valve Wearing Schedule:  [x] 5-10 minutes/1x a day  [] As tolerated  Frequency/Duration: Patient will be followed by speech-language pathology 4 times a week to address goals. Discharge Recommendations: To Be Determined     SUBJECTIVE:   Patient stated, \"[place]  when asked why he was in the hospital.    OBJECTIVE:   Cognitive and Communication Status:  Neurologic State: Alert  Orientation Level: Oriented X4  Cognition: Follows commands  Perception: Appears intact  Perseveration: No perseveration noted  Safety/Judgement: Awareness of environment  Tracheostomy:  #6 shiley cuffed trach  Baseline  RR: 28  O2: 100%  HR: 113    After in-line placement:  RR: 28-33  O2: %  HR: 114-118    Airway Clearance  Suction: Trach;Oral  Suction Device: Yankauer; Inline suction catheter  Sputum Method Obtained: Tracheal  Sputum Amount: Scant  Sputum Color/Odor: Clear  Sputum Consistency: Thin     PMV Trial   Vocal Quality: Other (comment); Hoarse(in-line PMV placed)  Oxygen Therapy  O2 Sat (%): 98 %  Pulse via Oximetry: 117 beats per minute  O2 Device: Tracheostomy; Ventilator  FIO2 (%): 45 %  Airway Clearance  Suction: Trach;Oral  Suction Device: Yankauer; Inline suction catheter  Sputum Method Obtained: Tracheal  Sputum Amount: Scant  Sputum Color/Odor: Clear  Sputum Consistency: Thin  Oral Assessment:  Oral Assessment  Labial: No impairment  Dentition: Intact; Natural  Oral Hygiene: oral mucosa moist and clear of secretions  Lingual: No impairment  Velum: No impairment  Mandible: No impairment  P.O. Trials:  Patient Position: upright in bed  Vocal quality prior to P.O.: Other (comment); Hoarse(in-line PMV placed)  Consistency Presented: Ice chips(only ice chips completed 2/2 NPO for PEG)  How Presented: Self-fed/presented;Cup/sip;Spoon;Straw     Bolus Acceptance: No impairment  Bolus Formation/Control: No impairment     Propulsion: No impairment  Oral Residue: None  Initiation of Swallow: No impairment  Laryngeal Elevation: Functional  Aspiration Signs/Symptoms: None  Pharyngeal Phase Characteristics: No impairment, issues, or problems (however, certainly at risk for silent aspiration)             Oral Phase Severity: Other (comment)  Pharyngeal Phase Severity : Other (comment)(at risk)    NOMS:  The NOMS functional outcome measure was used to quantify this patient's level of swallowing impairment. Based on the NOMS, the patient was determined to be at level 1 for swallow function         NOMS Swallowing Levels:  Level 1 (CN): NPO  Level 2 (CM): NPO but takes consistency in therapy  Level 3 (CL): Takes less than 50% of nutrition p.o. and continues with nonoral feedings; and/or safe with mod cues; and/or max diet restriction  Level 4 (CK): Safe swallow but needs mod cues; and/or mod diet restriction; and/or still requires some nonoral feeding/supplements  Level 5 (CJ): Safe swallow with min diet restriction; and/or needs min cues  Level 6 (CI): Independent with p.o.; rare cues; usually self cues; may need to avoid some foods or needs extra time  Level 7 ([address] [place] [address]): Independent for all p.o.  ANDREINA. (2003). National Outcomes Measurement System (NOMS): Adult Speech-Language Pathology User's Guide. The NOMS functional outcome measure was used to quantify this patient's level of voice  impairment. Based on the NOMS, the patient was determined to be at level 2 for voice function. NOMS Voice:  Level 1 (CN): Unable to use voice to communicate. Needs AAC. Level 2 (CM): Voice not functional most of time. Level 3 (CL): Voice functional but distracting & interferes with communication. Participation in activities is limited most of time. Level 4 (CK): Voice is functional and sometimes distracting. High vocal demand consistently impacted. Level 5 (CJ): Voice occasionally sounds normal with self monitoring; high vocal demand occasionally impacted. Level 6 (CI): Voice sounds normal across most situations. High vocal demand rarely impacted. Level 7 ([address] [place] [address]): Voice is normal and self monitoring is effective but only occasionally needed. ANDREINA. (2003). National Outcomes Measurement System (NOMS): Adult Speech-Language Pathology User's Guide. Pain:  Pain Scale 1: Numeric (0 - 10)  Pain Intensity 1: 0       After treatment:   Call bell within reach and Nursing notified    COMMUNICATION/EDUCATION:   Patient was educated regarding his deficit(s) of dysphagia as this relates to his diagnosis of COVID-19. He demonstrated Good understanding. The patient's plan of care including recommendations, planned interventions, and recommended diet changes were discussed with: Physical therapist, Registered nurse, and Rehabilitation technician, Respiratory Therapy    Education was provided to patient, family, and staff regarding speaking valve placement, wearing schedule, safety precautions including cuff deflation and removal when sleeping, and care/cleaning guidelines. Patient is unable to participate in goal setting and plan of care.     Thank you for this referral.  Zuleyma Acosta, SLP  Time Calculation: 20 mins

## 2020-09-23 NOTE — PROGRESS NOTES
1930: Bedside shift change report given to Alexander Miranda (oncoming nurse) by Jennie Mcfarlane (offgoing nurse). Report included the following information SBAR, Kardex, Intake/Output, MAR, Recent Results, Cardiac Rhythm sinus tach/NSR and Alarm Parameters . 2105: Pt pulled out NGT. Leigh Ann Zarate NP made aware, states ok to hold PO meds tonight until PEG is placed in the AM.     0730: Bedside shift change report given to Jennie Mcfarlane (oncoming nurse) by Alexander Miranda (offgoing nurse). Report included the following information SBAR, Kardex, Intake/Output, MAR, Recent Results, Cardiac Rhythm NSR/sinus tach and Alarm Parameters .

## 2020-09-23 NOTE — PROGRESS NOTES
Problem: Self Care Deficits Care Plan (Adult)  Goal: *Acute Goals and Plan of Care (Insert Text)  Description:   FUNCTIONAL STATUS PRIOR TO ADMISSION: Patient unable to provide history at OT evaluation. Per chart review, patient was fully independent    HOME SUPPORT: Per chart review, patient lived alone    Occupational Therapy Goals  Initiated 9/14/2020, continued 9/21/2020  1. Patient will perform grooming with minimum assistance within 7 day(s). 2.  Patient will perform bathing with moderate assistance  within 7 day(s). 3.  Patient will perform upper body dressing with minimum assistance  within 7 day(s). 5.  Patient will perform lower body dressing with moderate assistance within 7 day(s). 6.  Patient will perform toilet transfers with moderate assistance  within 7 day(s). 7.  Patient will perform all aspects of toileting with moderate assistance  within 7 day(s). 8.  Patient will participate in upper extremity therapeutic exercise/activities with minimal assistance for 10 minutes within 7 day(s). 9.  Patient will utilize energy conservation techniques during functional activities with verbal cues within 7 day(s). Outcome: Progressing Towards Goal    OCCUPATIONAL THERAPY TREATMENT  Patient: Debi Munoz (61 y.o. male)  Date: 9/23/2020  Diagnosis: Acute respiratory failure ([facility] [address] [J96.00]   <principal problem not specified>  Procedure(s) (LRB):  ESOPHAGOGASTRODUODENOSCOPY (EGD) @ bedside (N/A)  ESOPHAGOGASTRODUODENAL (EGD) BIOPSY (N/A) 1 Day Post-Op  Precautions:  fall  Chart, occupational therapy assessment, plan of care, and goals were reviewed. ASSESSMENT:  Patient continues with skilled OT services and is progressing towards goals. Patient is eager to participate in therapy but remains limited by impaired cardiopulmonary tolerance, general weakness + focal RUE and LLE weakness, and impaired sitting balance.   Patient cleared by RN to be seen during SBT and performed bed-level activity only. Bed mechanics used to partial bed-in-chair position, and patient was led through BUE and BLE AROM/ AAROM exercises. RR 30s, HR mostly 120s to 130s but increased to 140s + increased distress with attempt at unsupported anterior lean, which was aborted. Patient continues to demonstrate improved unsupported head control. Also performed simple grooming (washed face), using wash cloth in L hand. Will follow-up next session for EOB activity pending cardiopulmonary stability/ activity tolerance/ vent status. Discharge recommendation TBD pending progress/ vent weaning, anticipate extensive rehab needs. Current Level of Function Impacting Discharge (ADLs): minimum to total assistance UB ADLs, total assistance LB ADLs       PLAN :  Patient continues to benefit from skilled intervention to address the above impairments. Continue treatment per established plan of care. to address goals. Recommendation for discharge: (in order for the patient to meet his/her long term goals)  Discharge recommendation TBD pending progress/ vent weaning, anticipate extensive rehab needs.      This discharge recommendation:  Has been made in collaboration with the attending provider and/or case management       SUBJECTIVE:   Patient gave thumbs up when asked if he felt ok    OBJECTIVE DATA SUMMARY:   Cognitive/Behavioral Status:  Neurologic State: Alert  Orientation Level: Oriented X4  Cognition: Follows commands  Perception: Appears intact  Perseveration: No perseveration noted  Safety/Judgement: Awareness of environment    Balance:  Sitting: Impaired  Sitting - Static: Poor (constant support)(lean/pulling forward to long sit)  Sitting - Dynamic: Poor (constant support)    ADL Intervention:      Grooming: washed face with set-up using washcloth in L hand    Cognitive Retraining  Safety/Judgement: Awareness of environment    Pain:  Patient did not indicate pain    Activity Tolerance:   Fair    After treatment patient left in no apparent distress:   Supine in bed and Call bell within reach    COMMUNICATION/COLLABORATION:   The patients plan of care was discussed with: Physical therapist and Registered nurse.      Ward Pradhan OT  Time Calculation: 25 mins

## 2020-09-23 NOTE — PROGRESS NOTES
TRANSFER - OUT REPORT:    Verbal report given to Hemant Flynn RN(name) on [employer]  being transferred to ICU(unit) for routine progression of care       Report consisted of patients Situation, Background, Assessment and   Recommendations(SBAR). Information from the following report(s) SBAR, Procedure Summary, MAR and Cardiac Rhythm sinus tachycardia was reviewed with the receiving nurse. Lines:   Peripheral IV 09/11/20 Right Antecubital (Active)   Site Assessment Clean, dry, & intact 09/23/20 1600   Phlebitis Assessment 0 09/23/20 1600   Infiltration Assessment 0 09/23/20 1600   Dressing Status Clean, dry, & intact 09/23/20 1600   Dressing Type Tape;Transparent 09/23/20 1600   Hub Color/Line Status Pink; Infusing 09/23/20 1600   Action Taken Open ports on tubing capped 09/23/20 1600   Alcohol Cap Used Yes 09/23/20 1600       Peripheral IV 09/20/20 Left Antecubital (Active)   Site Assessment Clean, dry, & intact 09/23/20 1600   Phlebitis Assessment 0 09/23/20 1600   Infiltration Assessment 0 09/23/20 1600   Dressing Status Clean, dry, & intact 09/23/20 1600   Dressing Type Tape;Transparent 09/23/20 1600   Hub Color/Line Status Pink; Infusing 09/23/20 1600   Action Taken Open ports on tubing capped 09/23/20 1600   Alcohol Cap Used Yes 09/23/20 1600        Opportunity for questions and clarification was provided.       Patient transported with:   Monitor  O2 @ ventilator liters  Registered Nurse  Tech

## 2020-09-23 NOTE — H&P
Radiology History and Physical    Patient: Josephine Solis 27 y.o. male       Chief Complaint: Needs feeding access  History of Present Illness: 27year old male paralyzed & sedated, needing long term feeding access. Failed endoscopic placement. History:    Past Medical History:   Diagnosis Date    History of vascular access device 08/10/2020    5 Fr triple PICC, hemodynamically unstable, 45 cm L basilic     History reviewed. No pertinent family history.   Social History     Socioeconomic History    Marital status: SINGLE     Spouse name: Not on file    Number of children: Not on file    Years of education: Not on file    Highest education level: Not on file   Occupational History    Not on file   Social Needs    Financial resource strain: Not on file    Food insecurity     Worry: Not on file     Inability: Not on file    Transportation needs     Medical: Not on file     Non-medical: Not on file   Tobacco Use    Smoking status: Current Some Day Smoker    Smokeless tobacco: Never Used   Substance and Sexual Activity    Alcohol use: Not on file    Drug use: Not on file    Sexual activity: Not on file   Lifestyle    Physical activity     Days per week: Not on file     Minutes per session: Not on file    Stress: Not on file   Relationships    Social connections     Talks on phone: Not on file     Gets together: Not on file     Attends Scientologist service: Not on file     Active member of club or organization: Not on file     Attends meetings of clubs or organizations: Not on file     Relationship status: Not on file    Intimate partner violence     Fear of current or ex partner: Not on file     Emotionally abused: Not on file     Physically abused: Not on file     Forced sexual activity: Not on file   Other Topics Concern     Service Not Asked    Blood Transfusions Not Asked    Caffeine Concern Not Asked    Occupational Exposure Not Asked    Hobby Hazards Not Asked    Sleep Concern Not Asked    Stress Concern Not Asked    Weight Concern Not Asked    Special Diet Not Asked    Back Care Not Asked    Exercise Not Asked    Bike Helmet Not Asked   [address] [facility] [facility] Not Asked    Self-Exams Not Asked   Social History Narrative    Not on file       Allergies: No Known Allergies    Current Medications:  Current Facility-Administered Medications   Medication Dose Route Frequency    lidocaine (XYLOCAINE) 20 mg/mL (2 %) injection        QUEtiapine (SEROquel) tablet 100 mg  100 mg Per NG tube Q12H    midazolam (PF) (VERSED) injection 5 mg  5 mg IntraVENous Multiple    fentaNYL citrate (PF) injection 200 mcg  200 mcg IntraVENous Multiple    saline peripheral flush soln 10 mL  10 mL InterCATHeter PRN    0.9% sodium chloride infusion 500 mL  500 mL IntraVENous CONTINUOUS    lidocaine (XYLOCAINE) 20 mg/mL (2 %) injection 400 mg  20 mL IntraDERMal ONCE    iohexoL (OMNIPAQUE) 240 mg iodine/mL solution 50 mL  50 mL Oral RAD ONCE    midazolam (PF) (VERSED) 1 mg/mL injection        fentaNYL citrate (PF) 50 mcg/mL injection        lidocaine (XYLOCAINE) 2 % jelly   Mucous Membrane PRN    sodium chloride (NS) flush 5-40 mL  5-40 mL IntraVENous Q8H    sodium chloride (NS) flush 5-40 mL  5-40 mL IntraVENous PRN    simethicone (MYLICON) 58WN/2.0AK oral drops 80 mg  1.2 mL Oral Multiple    lactated Ringers infusion  50 mL/hr IntraVENous CONTINUOUS    amoxicillin-clavulanate (AUGMENTIN) 875-125 mg per tablet 1 Tab  1 Tab Per NG tube Q12H    enoxaparin (LOVENOX) injection 30 mg  30 mg SubCUTAneous Q12H    ALPRAZolam (XANAX) tablet 1 mg  1 mg Per NG tube Q8H    ondansetron (ZOFRAN) injection 4 mg  4 mg IntraVENous Q4H PRN    LORazepam (ATIVAN) injection 1 mg  1 mg IntraVENous Q6H PRN    fentaNYL citrate (PF) injection 25 mcg  25 mcg IntraVENous Q4H PRN    propofol (DIPRIVAN) 10 mg/mL infusion  0-50 mcg/kg/min IntraVENous TITRATE    promethazine (PHENERGAN) 25 mg in NS IVPB  25 mg IntraVENous Q6H PRN    potassium bicarb-citric acid (EFFER-K) tablet 20 mEq  20 mEq Oral DAILY    nystatin (MYCOSTATIN) 100,000 unit/mL oral suspension 500,000 Units  500,000 Units Oral QID    aluminum-magnesium hydroxide (MAALOX) oral suspension 15 mL  15 mL Oral QID PRN    prochlorperazine (COMPAZINE) with saline injection 10 mg  10 mg IntraVENous Q6H PRN    metoclopramide HCl (REGLAN) injection 5 mg  5 mg IntraVENous Q6H    famotidine (PEPCID) 20 mg in 0.9% sodium chloride 10 mL injection  20 mg IntraVENous Q12H    miconazole (MICOTIN) 2 % powder   Topical BID    polyethylene glycol (MIRALAX) packet 17 g  17 g Per NG tube BID    oxyCODONE (ROXICODONE) 5 mg/5 mL oral solution 5 mg  5 mg Oral Q4H PRN    ELECTROLYTE REPLACEMENT PROTOCOL - Potassium and Magnesium  1 Each Other PRN    ELECTROLYTE REPLACEMENT PROTOCOL - Phosphorus  1 Each Other PRN    guaiFENesin (ROBITUSSIN) 100 mg/5 mL oral liquid 400 mg  400 mg Per NG tube Q4H PRN    acetaminophen (TYLENOL) solution 650 mg  650 mg Per NG tube Q4H PRN    metoprolol (LOPRESSOR) injection 5 mg  5 mg IntraVENous Q6H PRN    balsam peru-castor oiL (VENELEX) ointment   Topical BID    docusate (COLACE) 50 mg/5 mL oral liquid 100 mg  100 mg Per NG tube BID    acetaminophen (TYLENOL) suppository 650 mg  650 mg Rectal Q6H PRN    alteplase (CATHFLO) 1 mg in sterile water (preservative free) 1 mL injection  1 mg InterCATHeter PRN    polyvinyl alcohol-povidone (NATURAL TEARS) 0.5-0.6 % ophthalmic solution 1 Drop  1 Drop Both Eyes PRN    dextrose 10% infusion 0-250 mL  0-250 mL IntraVENous PRN    glucagon (GLUCAGEN) injection 1 mg  1 mg IntraMUSCular PRN    glucose chewable tablet 16 g  4 Tab Oral PRN    sodium chloride (NS) flush 5-40 mL  5-40 mL IntraVENous Q8H    sodium chloride (NS) flush 5-40 mL  5-40 mL IntraVENous PRN    aspirin chewable tablet 81 mg  81 mg Per NG tube DAILY    chlorhexidine (ORAL CARE KIT) 0.12 % mouthwash 15 mL  15 mL Oral Q12H    albuterol-ipratropium (DUO-NEB) 2.5 MG-0.5 MG/3 ML  3 mL Nebulization Q6H PRN        Physical Exam:  Blood pressure (!) 134/91, pulse (!) 109, temperature 99.9 °F (37.7 °C), resp. rate 19, height 5' 11\" (1.803 m), weight 89.5 kg (197 lb 5 oz), SpO2 98 %. GENERAL: Seems to respond to verbal cues, tracks eyes  LUNG: Vent via trach  HEART: regular rate and rhythm, R radial & R DP pulse 2/2  EXT: No edema BLEs  ABD: Nontender, nondistended    Alerts:    Hospital Problems  Date Reviewed: 8/20/2020          Codes Class Noted POA    Acute respiratory failure [facility]) ICD-10-CM: J96.00  ICD-9-CM: 518.81  8/18/2020 Unknown              Laboratory:      Recent Labs     09/23/20  0328   HGB 10.8*   HCT 34.2*   WBC 11.9*      BUN 16   CREA 0.67*   K 3.8         Plan of Care/Planned Procedure:  Risks, benefits, and alternatives reviewed with patient and he agrees to proceed with the procedure. Percutaneous gastrostomy tube    Deemed appropriate for moderate sedation with versed and fentanyl.     Nereyda Mayen MD  Interventional Radiology  [facility]  6:28 PM, 9/23/2020

## 2020-09-23 NOTE — PROGRESS NOTES
Comprehensive Nutrition Assessment    Type and Reason for Visit: Reassess    Nutrition Recommendations/Plan:      1. Awaiting PEG placement by IR today-    When placed- restart tube feeding regimen:   --- 2cal HN at 40 ml/hr, x2 packets Liquid Prosource BID, 200 ml water flushes every 4 hours   --- This provides 2140 kcals, 140 gm pro, 2100 ml free fluid    2. Monitor tolerance, weight, n/v.     Nutrition Assessment:    Pt admitted to [facility] d/t Respiratory failure with hypoxia. No PMHx except smoking. Noted: Acuter hypoxic respiratory failure d/t COVID 19 PNA,  intubated 8/10. ARDS. Transferred to [facility] for possible ECMO support-not indicated. S/P convalescent plasma x 2, Remdisivir. B/L PTX-s/p left CT-removed. 8/26 Perc trach placed; SBT x 2 hr today. Neurology consulted-?acute/subacute CVA. 9/23: Pt had pulled out Dobbhoff 9/22. PEG placement was attempted but unsuccessful. Awaiting IR attempt for G-tube placement today (9/23) around 1-2pm. Pt vomited 4 x over night per note 9/21-9/22. Unsure cause. Pt is down 10 lbs over the last 7 days. IVF LR @ 50 mL/hr. Calcium up to 10.2,  mg/dL. 9/16:  Pt is extremely anxious when awake, and RN reports that pt will start coughing, then gagging, and will then throw up (sometimes emesis is just gastric secretions). Pt had some episodes of emesis about 2 weeks ago but that seemed to resolve. Around 9/10, it seems that his emesis became more frequent, several times per day. Pt had CT of abdomen and chest today to look for possible causes for the emesis. Pt had been on a semi-elemental formula (Vital 1.2), but could not consistently receive goal rate d/t vomiting; formula was switched to 2cal HN on 9/14 in the hope that a higher calorie formula delivered at a lower rate would be better tolerated. RD continues to follow.       Malnutrition Assessment:  Malnutrition Status:  Insufficient data      Nutritionally Significant Medications:   Colace, Miralax, reglan    Estimated Daily Nutrient Needs:  Energy (kcal):  2275 (25 kcal/kg)  Protein (g):  156 (2g/kg IBW)       Fluid (ml/day):  1 ml/kcal    Nutrition Related Findings:  Last BM was 9/23 (loose), edema 1+ genitals, RUE    Wounds:  None       Current Nutrition Therapies:   Diet: NPO  Tube Feeding: see recommendations    Anthropometric Measures:  · Height:  5' 11\" (180.3 cm)  · Current Body Wt:  89.4 kg (197 lb)   · Admission Body Wt:  242 lb 8.1 oz      · Ideal Body Wt:  172#  :  120.7 %   · BMI Categories:  Obese class 1 (BMI 30.0-34. 9)     Wt Readings from Last 10 Encounters:   09/22/20 89.5 kg (197 lb 5 oz)   09/21/20 91.4 kg (201 lb 8 oz)   08/18/20 100.7 kg (222 lb 0.1 oz)     Nutrition Diagnosis:   · Inadequate protein-energy intake related to altered GI function as evidenced by nausea, vomiting    Nutrition Interventions:   Food and/or Nutrient Delivery: Continue tube feeding  Nutrition Education and Counseling: No recommendations at this time  Coordination of Nutrition Care: Continued inpatient monitoring    Goals:  Pt will tolerate tube feeds without emesis over the next week       Nutrition Monitoring and Evaluation:   Food/Nutrient Intake Outcomes: Enteral nutrition intake/tolerance  Physical Signs/Symptoms Outcomes: Biochemical data, Hemodynamic status, GI status, Fluid status or edema, Weight    Discharge Planning:     Too soon to determine     Kirt Rosario, [address]: [phone]

## 2020-09-23 NOTE — PROGRESS NOTES
SLP Contact Note    Planning on completing in-line PMV trial with RT at 9:30 today.         Thank you,  SIVA NúñezEd, [address] [facility]  Speech-Language Pathologist

## 2020-09-23 NOTE — PROGRESS NOTES
0730: Bedside and Verbal shift change report given to [facility], RN (oncoming nurse) by Alvin Ruggiero RN (offgoing nurse). Report included the following information SBAR, Kardex, Intake/Output, MAR, Recent Results, Med Rec Status, Cardiac Rhythm NSR/ST and Alarm Parameters . 0800: IR called. Plan to do G tube around 1300/1400 this afternoon. 1000: Spoke with patient's mother. Updated her on overnight events. Patient's mother informed this RN that she would not like the patient to go to [facility]. Informed her that the Care Manager, Bess Villarreal, would be able to speak with her regarding other facility options. 1110: Patient placed on SBT by RT. Tolerating well at this time. 1130: ICU multidisciplinary rounds lead by Dr. Rom Brown (Intensivist): The following were reviewed and discussed: current labs,  recent imaging, lines/drains, review of body systems, nutrition, cultures, mobility, length of ICU stay. The plan of care for the day is as follows: decrease PO sedatives, PEG placement, place patient on SBT. MD Rom Brown made aware that patient's mother would like to speak with him when she comes up to ICU to visit the patient following PEG tube placement. 1400: IR called as the patient had not yet been fit into the schedule. Informed that the patient would be called for in 30-40 minutes. 1500: IR called this RN to inform that patient's case likely would not occur for at least another hour. Will call for patient once ready. Patient's mother, Kimber Ballard, informed of the delay. 1635: Patient to angio suite for PEG tube placement. Consent obtained and placed into chart. Transported with RT, RN, and tech while on monitor and ventilator. 1900: Returned to ICU 12 from IR after PEG procedure. 1930: Bedside and Verbal shift change report given to Melvina Berry RN (oncoming nurse) by [facility], RN (offgoing nurse).  Report included the following information SBAR, Kardex, Intake/Output, MAR, Recent Results, Med Rec Status, Cardiac Rhythm NSR/ST and Alarm Parameters .

## 2020-09-23 NOTE — PROGRESS NOTES
MIGUELINA  Patient presented to [facility] ED from home with increased shortness of breath, diaphoresis and fever. Transferred to [facility] for possible ECMO placement   RUR: 25%  [facility] accepted. Patient is scheduled for PEG tibe in IR today   Care manager spoke with patient's mother Itz Friend this morning and she has changed her mind regarding transfer to [address] [place] [address]. CM will Email her a list of additional LTAC's for her to research.    Radha Kerr RN,Care Manager

## 2020-09-23 NOTE — PROCEDURES
Interventional Radiology  Procedure Note        9/23/2020 6:30 PM    Patient: Ann Marie Kelly     Informed consent obtained    Diagnosis: Acute hypoxemic respiratory failure    Procedure(s): Perctaneous gastrostomy tube    Specimens removed:  none    Complications: None    Primary Physician: Catherine Triplett MD    Recomendations: N/A    Discharge Disposition: stable; return to ICU    Full dictated report to follow    Catherine Triplett MD  Interventional Radiology  [facility]  6:30 PM, 9/23/2020

## 2020-09-23 NOTE — PROGRESS NOTES
[facility]    ICU TEAM Progress Note    Name: Josette Enciso   : 1990   MRN: [id number]   Date: 2020      I  Subjective:   Progress Note: 2020      Reason for ICU Admission: Respiratory failure due to COVID-19 infection    Interval history:  44-year-old male with no history of significant past medical history except smoking half pack per day. Sadaf Crooks was admitted on 2020 at [facility] with acute hypoxemic respiratory failure from COVID pneumonia. Sadaf Crooks continued to deteriorate and got intubated on 8/10.  Due to worsening hypoxemia, he is transferred to [facility] for ECMO evaluation. Sadaf Crooks has bee given convalescent plasma twice on  and  and treated with Remdesivir which was completed on 8/10. Sadaf Crooks is also on dexamethasone. Sadaf Crooks completed course of empiric antibiotics including azithromycin which was completed on  and cefepime was completed on .  During his hospital course he also developed pneumothorax and has left-sided chest tube since .  Has a tracheostomy tube placed on .  On September 3 patient had significant worsening on his right arm, CT head was unrevealing.  He is already on high-dose Lovenox and aspirin.  On  he had acute weakness on the left arm code stroke was called and again CTA was unrevealing.  Since then left arm is back to baseline, showed some improvement on right arm but still weak compared to left with minimal ability to move against gravity.   Mental status continues to improve but now he is very anxious with recurrent episode of nausea and vomiting associated with tachycardia.     Overnight Events:   No major events ON    Active Problem List:     Problem List  Date Reviewed: 2020          Codes Class    Acute respiratory failure ([facility] [address] ICD-10-CM: J96.00  ICD-9-CM: 518.81         Pneumothorax on left ICD-10-CM: J93.9  ICD-9-CM: 512.89         Pneumonia due to severe acute respiratory syndrome coronavirus 2 (SARS-CoV-2) ICD-10-CM: U07.1, J12.89  ICD-9-CM: 480.8         Respiratory failure with hypoxia (HCC) ICD-10-CM: J96.91  ICD-9-CM: 518.81               Past Medical History:      has a past medical history of History of vascular access device (08/10/2020). Past Surgical History:      has a past surgical history that includes ir thora/ins chest tube(pneumo) wo image (2020); ir thora/ins chest tube(pneumo) wo image (2020); and pr tracheostomy, planned (2020). Home Medications:     Prior to Admission medications    Medication Sig Start Date End Date Taking? Authorizing Provider   benzonatate (Tessalon Perles) 100 mg capsule Take 100 mg by mouth three (3) times daily as needed for Cough. Provider, Historical       Allergies/Social/Family History:     No Known Allergies   Social History     Tobacco Use    Smoking status: Current Some Day Smoker    Smokeless tobacco: Never Used   Substance Use Topics    Alcohol use: Not on file      History reviewed. No pertinent family history. Review of Systems:     Not able to obtain due to his medical condition    Objective:   Vital Signs:  Visit Vitals  BP (!) 151/104   Pulse (!) 112   Temp 99.9 °F (37.7 °C)   Resp 27   Ht 5' 11\" (1.803 m)   Wt 89.5 kg (197 lb 5 oz)   SpO2 99%   BMI 27.52 kg/m²      O2 Device: Tracheostomy, Ventilator Temp (24hrs), Av.7 °F (37.6 °C), Min:99.1 °F (37.3 °C), Max:100.7 °F (38.2 °C)           Intake/Output:     Intake/Output Summary (Last 24 hours) at 2020 1501  Last data filed at 2020 1200  Gross per 24 hour   Intake 2474.2 ml   Output 880 ml   Net 1594.2 ml       Physical Exam:  General:  Awake and interactive. Writing on communication board. Frustrated with speed of communication but this is understandable. Eyes:  Sclera anicteric. Pupils equally round and reactive to light.    Mouth/Throat: Mucous membranes normal, oral pharynx clear   Neck: Supple, tracheostomy tube in place   Lungs:    Good air entry bilaterally, fine crepitation   CV:  Regular rate and rhythm,no murmur, click, rub or gallop   Abdomen:   Soft, non-tender. bowel sounds normal. non-distended   Extremities: No cyanosis , evolving edema   Skin: Skin color, texture, turgor normal. no acute rash or lesions   Lymph nodes: Cervical and supraclavicular normal   Musculoskeletal: No swelling or deformity   Lines/Devices:  Intact, no erythema, drainage or tenderness   Psych: On minimal sedation, conscious and alert, follows simple command, anxious, still weak on right arm but able to move minimally against gravity        LABS AND  DATA: Personally reviewed  Recent Labs     09/23/20 0328 09/22/20 0454   WBC 11.9* 11.3*   HGB 10.8* 12.2   HCT 34.2* 38.9    354     Recent Labs     09/23/20 0328 09/22/20 0454    135*   K 3.8 3.9    98   CO2 28 28   BUN 16 15   CREA 0.67* 0.66*   * 120*   CA 10.2* 10.2*   MG 2.0 2.2   PHOS 4.7 5.0*     Recent Labs     09/23/20 0328 09/22/20  0454    110   TP 8.8* 8.9*   ALB 3.6 3.7   GLOB 5.2* 5.2*     No results for input(s): INR, PTP, APTT, INREXT, INREXT in the last 72 hours. Recent Labs     09/20/20  1631   PHI 7.49*   PCO2I 43.7   PO2I 100   FIO2I 45     No results for input(s): CPK, CKMB, TROIQ, BNPP in the last 72 hours. Hemodynamics:   PAP:   CO:     Wedge:   CI:     CVP:    SVR:       PVR:       Ventilator Settings:  Mode Rate Tidal Volume Pressure FiO2 PEEP   Spontaneous   0 ml  15 cm H2O 45 % 5 cm H20     Peak airway pressure: 25 cm H2O    Minute ventilation: 9.48 l/min        MEDS: Reviewed    Chest X-Ray:  CXR Results  (Last 48 hours)               09/22/20 0901  XR CHEST PORT Final result    Impression:  IMPRESSION:        1. Improved interstitial and airspace opacities with persistent low lung   volumes. 2.  Dilated gastric lumen. Possible dilated loops of bowel in the abdomen.    Consider dedicated abdominal       Narrative:  EXAM: XR CHEST PORT INDICATION: feeding tube placement       COMPARISON: 9/14/2020       FINDINGS: A portable AP radiograph of the chest was obtained at 853 hours. The   patient is on a cardiac monitor. Diffuse interstitial and airspace opacities   with interval improvement. Lung volumes remain low. Tubes and lines are stable. .   The cardiac and mediastinal contours and pulmonary vascularity are normal.    Interval gastric distention. Dilated loops of bowel in the upper abdomen are not   well evaluated. .                  Assessment and Plan:   -Bilateral pneumonia due to COVID-19 infection  -Acute right arm weakness:  Patient already on aspirin and moderate dose anticoagulation.  Still pending CTA head and neck, CT head showed no bleed. -Right arm weakness  -Fever: No clear etiology, culture remain negative, images not suggestive of acute infectious process  - Acute hypoxic respiratory failure due to COVID-19 infection status post tracheostomy on August 26  - Pneumothorax status post left chest tube insertion on August 11 without air leak  - Possible bacterial super imposed infection: Completed antibiotic course  -History of tobacco use         -Off precedex. On BuSpar, Seroquel, and Xanax. - Vent weaning as tolerated - doing well on SBT today  -Right arm/lefty leg weakness - MRI showed 1. Evolving cortical/subcortical encephalomalacia and atrophy in the bilateral  frontoparietal lobes with signal changes - this may represent evolving ischemic changes from a hypoxic-ischemic  event, vasculitic involvement from COVID, or evolving encephalomalacia from a  infectious/inflammatory process related to COVID. 2. Small area of signal abnormality with restricted diffusion in the right  posterior body of the corpus callosum, which likely represents the same process  as above, again either ischemic or infectious/inflammatory. 3. Chronic area of encephalomalacia in the right cerebellum with associated  chronic hemorrhage.   4. Complete opacification of the right sphenoid sinus and near complete  opacification of the right maxillary sinus with air-fluid level, suspicious for  acute sinusitis. Large bilateral mastoid effusions - Treating sinusitis with Augmentin  PT/OT as tolerated  Power is improving on right arm but still significantly weaker than left    - Persistent vomiting - continue Reglan, zofran, compazine, ativan to regimen  - Completed COVID-19 treatment  - DVT prophylaxis with Lovenox.  GI prophylaxis.  Nutritional support  -once s/p PEG placement should be able to move to [facility]  Stay in ICU      I personally spent 35 minutes of time. This is time spent actively involved in patient care as well as the coordination of care and discussions with the patient's family.

## 2020-09-24 LAB
ALBUMIN SERPL-MCNC: 3.3 G/DL (ref 3.5–5)
ALBUMIN/GLOB SERPL: 0.8 {RATIO} (ref 1.1–2.2)
ALP SERPL-CCNC: 93 U/L (ref 45–117)
ALT SERPL-CCNC: 48 U/L (ref 12–78)
ANION GAP SERPL CALC-SCNC: 9 MMOL/L (ref 5–15)
AST SERPL-CCNC: 17 U/L (ref 15–37)
BASOPHILS # BLD: 0 K/UL (ref 0–0.1)
BASOPHILS NFR BLD: 0 % (ref 0–1)
BILIRUB SERPL-MCNC: 0.6 MG/DL (ref 0.2–1)
BUN SERPL-MCNC: 15 MG/DL (ref 6–20)
BUN/CREAT SERPL: 29 (ref 12–20)
CALCIUM SERPL-MCNC: 9.9 MG/DL (ref 8.5–10.1)
CHLORIDE SERPL-SCNC: 101 MMOL/L (ref 97–108)
CO2 SERPL-SCNC: 27 MMOL/L (ref 21–32)
CREAT SERPL-MCNC: 0.52 MG/DL (ref 0.7–1.3)
DIFFERENTIAL METHOD BLD: ABNORMAL
EOSINOPHIL # BLD: 0 K/UL (ref 0–0.4)
EOSINOPHIL NFR BLD: 0 % (ref 0–7)
ERYTHROCYTE [DISTWIDTH] IN BLOOD BY AUTOMATED COUNT: 14.2 % (ref 11.5–14.5)
GLOBULIN SER CALC-MCNC: 4.3 G/DL (ref 2–4)
GLUCOSE SERPL-MCNC: 88 MG/DL (ref 65–100)
HCT VFR BLD AUTO: 32.3 % (ref 36.6–50.3)
HGB BLD-MCNC: 10.2 G/DL (ref 12.1–17)
IMM GRANULOCYTES # BLD AUTO: 0 K/UL
IMM GRANULOCYTES NFR BLD AUTO: 0 %
LYMPHOCYTES # BLD: 1.4 K/UL (ref 0.8–3.5)
LYMPHOCYTES NFR BLD: 19 % (ref 12–49)
MAGNESIUM SERPL-MCNC: 1.9 MG/DL (ref 1.6–2.4)
MCH RBC QN AUTO: 28.1 PG (ref 26–34)
MCHC RBC AUTO-ENTMCNC: 31.6 G/DL (ref 30–36.5)
MCV RBC AUTO: 89 FL (ref 80–99)
MONOCYTES # BLD: 0.8 K/UL (ref 0–1)
MONOCYTES NFR BLD: 11 % (ref 5–13)
NEUTS BAND NFR BLD MANUAL: 3 % (ref 0–6)
NEUTS SEG # BLD: 5.1 K/UL (ref 1.8–8)
NEUTS SEG NFR BLD: 67 % (ref 32–75)
NRBC # BLD: 0 K/UL (ref 0–0.01)
NRBC BLD-RTO: 0 PER 100 WBC
PHOSPHATE SERPL-MCNC: 4.9 MG/DL (ref 2.6–4.7)
PLATELET # BLD AUTO: 286 K/UL (ref 150–400)
PMV BLD AUTO: 10.6 FL (ref 8.9–12.9)
POTASSIUM SERPL-SCNC: 3.4 MMOL/L (ref 3.5–5.1)
PROT SERPL-MCNC: 7.6 G/DL (ref 6.4–8.2)
RBC # BLD AUTO: 3.63 M/UL (ref 4.1–5.7)
RBC MORPH BLD: ABNORMAL
SODIUM SERPL-SCNC: 137 MMOL/L (ref 136–145)
WBC # BLD AUTO: 7.3 K/UL (ref 4.1–11.1)

## 2020-09-24 PROCEDURE — 74011250637 HC RX REV CODE- 250/637: Performed by: INTERNAL MEDICINE

## 2020-09-24 PROCEDURE — 74011000250 HC RX REV CODE- 250: Performed by: INTERNAL MEDICINE

## 2020-09-24 PROCEDURE — 97110 THERAPEUTIC EXERCISES: CPT

## 2020-09-24 PROCEDURE — 92526 ORAL FUNCTION THERAPY: CPT

## 2020-09-24 PROCEDURE — 65610000006 HC RM INTENSIVE CARE

## 2020-09-24 PROCEDURE — 74011250637 HC RX REV CODE- 250/637: Performed by: NURSE PRACTITIONER

## 2020-09-24 PROCEDURE — 74011250636 HC RX REV CODE- 250/636: Performed by: EMERGENCY MEDICINE

## 2020-09-24 PROCEDURE — 80053 COMPREHEN METABOLIC PANEL: CPT

## 2020-09-24 PROCEDURE — 83735 ASSAY OF MAGNESIUM: CPT

## 2020-09-24 PROCEDURE — 92507 TX SP LANG VOICE COMM INDIV: CPT

## 2020-09-24 PROCEDURE — 74011250637 HC RX REV CODE- 250/637: Performed by: HOSPITALIST

## 2020-09-24 PROCEDURE — 36415 COLL VENOUS BLD VENIPUNCTURE: CPT

## 2020-09-24 PROCEDURE — 85025 COMPLETE CBC W/AUTO DIFF WBC: CPT

## 2020-09-24 PROCEDURE — 97530 THERAPEUTIC ACTIVITIES: CPT

## 2020-09-24 PROCEDURE — 74011250636 HC RX REV CODE- 250/636: Performed by: INTERNAL MEDICINE

## 2020-09-24 PROCEDURE — 74011250637 HC RX REV CODE- 250/637: Performed by: EMERGENCY MEDICINE

## 2020-09-24 PROCEDURE — 84100 ASSAY OF PHOSPHORUS: CPT

## 2020-09-24 PROCEDURE — 94003 VENT MGMT INPAT SUBQ DAY: CPT

## 2020-09-24 PROCEDURE — 77030018798 HC PMP KT ENTRL FED COVD -A

## 2020-09-24 RX ORDER — TRAZODONE HYDROCHLORIDE 100 MG/1
100 TABLET ORAL
Status: DISCONTINUED | OUTPATIENT
Start: 2020-09-24 | End: 2020-10-12 | Stop reason: HOSPADM

## 2020-09-24 RX ORDER — METOPROLOL TARTRATE 25 MG/1
25 TABLET, FILM COATED ORAL 2 TIMES DAILY
Status: DISCONTINUED | OUTPATIENT
Start: 2020-09-24 | End: 2020-09-25

## 2020-09-24 RX ADMIN — MICONAZOLE NITRATE 2 % TOPICAL POWDER: at 08:47

## 2020-09-24 RX ADMIN — NYSTATIN 500000 UNITS: 500000 SUSPENSION ORAL at 21:54

## 2020-09-24 RX ADMIN — METOPROLOL TARTRATE 25 MG: 25 TABLET, FILM COATED ORAL at 17:06

## 2020-09-24 RX ADMIN — FAMOTIDINE 20 MG: 10 INJECTION INTRAVENOUS at 21:53

## 2020-09-24 RX ADMIN — METOCLOPRAMIDE 5 MG: 5 INJECTION, SOLUTION INTRAMUSCULAR; INTRAVENOUS at 23:03

## 2020-09-24 RX ADMIN — Medication 10 ML: at 14:00

## 2020-09-24 RX ADMIN — Medication 10 ML: at 21:55

## 2020-09-24 RX ADMIN — LORAZEPAM 1 MG: 2 INJECTION INTRAMUSCULAR; INTRAVENOUS at 18:41

## 2020-09-24 RX ADMIN — METOCLOPRAMIDE 5 MG: 5 INJECTION, SOLUTION INTRAMUSCULAR; INTRAVENOUS at 17:06

## 2020-09-24 RX ADMIN — ALPRAZOLAM 1 MG: 0.5 TABLET ORAL at 05:45

## 2020-09-24 RX ADMIN — NYSTATIN 500000 UNITS: 500000 SUSPENSION ORAL at 13:00

## 2020-09-24 RX ADMIN — NYSTATIN 500000 UNITS: 500000 SUSPENSION ORAL at 16:40

## 2020-09-24 RX ADMIN — POTASSIUM BICARBONATE 40 MEQ: 782 TABLET, EFFERVESCENT ORAL at 14:24

## 2020-09-24 RX ADMIN — MICONAZOLE NITRATE 2 % TOPICAL POWDER: at 17:06

## 2020-09-24 RX ADMIN — Medication 10 ML: at 05:45

## 2020-09-24 RX ADMIN — AMOXICILLIN AND CLAVULANATE POTASSIUM 1 TABLET: 875; 125 TABLET, FILM COATED ORAL at 08:48

## 2020-09-24 RX ADMIN — Medication: at 08:47

## 2020-09-24 RX ADMIN — SODIUM CHLORIDE, SODIUM LACTATE, POTASSIUM CHLORIDE, AND CALCIUM CHLORIDE 50 ML/HR: 600; 310; 30; 20 INJECTION, SOLUTION INTRAVENOUS at 03:21

## 2020-09-24 RX ADMIN — ENOXAPARIN SODIUM 30 MG: 30 INJECTION SUBCUTANEOUS at 22:04

## 2020-09-24 RX ADMIN — CHLORHEXIDINE GLUCONATE 15 ML: 0.12 RINSE ORAL at 08:48

## 2020-09-24 RX ADMIN — CHLORHEXIDINE GLUCONATE 15 ML: 0.12 RINSE ORAL at 21:00

## 2020-09-24 RX ADMIN — SODIUM CHLORIDE, SODIUM LACTATE, POTASSIUM CHLORIDE, AND CALCIUM CHLORIDE 50 ML/HR: 600; 310; 30; 20 INJECTION, SOLUTION INTRAVENOUS at 23:06

## 2020-09-24 RX ADMIN — POTASSIUM BICARBONATE 20 MEQ: 782 TABLET, EFFERVESCENT ORAL at 08:48

## 2020-09-24 RX ADMIN — ENOXAPARIN SODIUM 30 MG: 30 INJECTION SUBCUTANEOUS at 11:26

## 2020-09-24 RX ADMIN — METOCLOPRAMIDE 5 MG: 5 INJECTION, SOLUTION INTRAMUSCULAR; INTRAVENOUS at 05:45

## 2020-09-24 RX ADMIN — ALPRAZOLAM 1 MG: 0.5 TABLET ORAL at 21:52

## 2020-09-24 RX ADMIN — ACETAMINOPHEN ORAL SOLUTION 650 MG: 650 SOLUTION ORAL at 23:03

## 2020-09-24 RX ADMIN — ALPRAZOLAM 1 MG: 0.5 TABLET ORAL at 13:36

## 2020-09-24 RX ADMIN — ACETAMINOPHEN ORAL SOLUTION 650 MG: 650 SOLUTION ORAL at 11:52

## 2020-09-24 RX ADMIN — NYSTATIN 500000 UNITS: 500000 SUSPENSION ORAL at 08:48

## 2020-09-24 RX ADMIN — Medication: at 17:07

## 2020-09-24 RX ADMIN — TRAZODONE HYDROCHLORIDE 100 MG: 100 TABLET ORAL at 21:54

## 2020-09-24 RX ADMIN — AMOXICILLIN AND CLAVULANATE POTASSIUM 1 TABLET: 875; 125 TABLET, FILM COATED ORAL at 21:52

## 2020-09-24 RX ADMIN — QUETIAPINE FUMARATE 100 MG: 100 TABLET ORAL at 08:48

## 2020-09-24 RX ADMIN — FAMOTIDINE 20 MG: 10 INJECTION INTRAVENOUS at 08:47

## 2020-09-24 RX ADMIN — ACETAMINOPHEN ORAL SOLUTION 650 MG: 650 SOLUTION ORAL at 18:09

## 2020-09-24 RX ADMIN — METOCLOPRAMIDE 5 MG: 5 INJECTION, SOLUTION INTRAMUSCULAR; INTRAVENOUS at 11:52

## 2020-09-24 RX ADMIN — ASPIRIN 81 MG CHEWABLE TABLET 81 MG: 81 TABLET CHEWABLE at 08:48

## 2020-09-24 NOTE — PROGRESS NOTES
Problem: Self Care Deficits Care Plan (Adult)  Goal: *Acute Goals and Plan of Care (Insert Text)  Description:   FUNCTIONAL STATUS PRIOR TO ADMISSION: Patient unable to provide history at OT evaluation. Per chart review, patient was fully independent    HOME SUPPORT: Per chart review, patient lived alone    Occupational Therapy Goals  Initiated 9/14/2020, continued 9/21/2020  1. Patient will perform grooming with minimum assistance within 7 day(s). 2.  Patient will perform bathing with moderate assistance  within 7 day(s). 3.  Patient will perform upper body dressing with minimum assistance  within 7 day(s). 5.  Patient will perform lower body dressing with moderate assistance within 7 day(s). 6.  Patient will perform toilet transfers with moderate assistance  within 7 day(s). 7.  Patient will perform all aspects of toileting with moderate assistance  within 7 day(s). 8.  Patient will participate in upper extremity therapeutic exercise/activities with minimal assistance for 10 minutes within 7 day(s). 9.  Patient will utilize energy conservation techniques during functional activities with verbal cues within 7 day(s). Outcome: Progressing Towards Goal    OCCUPATIONAL THERAPY TREATMENT  Patient: Jack Miranda (42 y.o. male)  Date: 9/24/2020  Diagnosis: Acute respiratory failure ([facility] [address] [J96.00]   <principal problem not specified>  Procedure(s) (LRB):  ESOPHAGOGASTRODUODENOSCOPY (EGD) @ bedside (N/A)  ESOPHAGOGASTRODUODENAL (EGD) BIOPSY (N/A) 2 Days Post-Op  Precautions:    Chart, occupational therapy assessment, plan of care, and goals were reviewed. ASSESSMENT  Patient continues with skilled OT services and is progressing towards goals, remains limited by poor activity tolerance, impaired ROM/strength of RUE and LLE, and anxiety during mobility today.   Pt received today supine in bed, agreeable to therapy in modified bed to chair position (EOB mobility deferred today d/t pt on spontaneous breathing trial). Noted pt performing accessory muscle breathing throughout session and pt requiring suctioning once during session. Pt performed bed level UE exercises, only able to complete 3 reps of elbow flexion/extension on R side before requiring rest break. Hopeful for progression to EOB next session pending activity tolerance & cardiopulmonary status. D/c recommendation TBD pending acute progress, anticipate extensive rehab needs. Current Level of Function Impacting Discharge (ADLs): total A LB ADLs    Other factors to consider for discharge: independent PLOF, pt reports he was still working PTA         PLAN :  Patient continues to benefit from skilled intervention to address the above impairments. Continue treatment per established plan of care. to address goals. Recommend next OT session: EOB ADLs/UE exercises    Recommendation for discharge: (in order for the patient to meet his/her long term goals)  To be determined: hopeful for IPR    This discharge recommendation:  Has not yet been discussed the attending provider and/or case management    IF patient discharges home will need the following DME: To be determined       SUBJECTIVE:   Patient stated via communication board, \"I need air in my cuff. \"    OBJECTIVE DATA SUMMARY:   Cognitive/Behavioral Status:  Neurologic State: Alert  Orientation Level: Oriented X4  Cognition: Follows commands; Appropriate for age attention/concentration  Perception: Appears intact  Perseveration: No perseveration noted  Safety/Judgement: Awareness of environment; Insight into deficits    Functional Mobility and Transfers for ADLs:  Bed Mobility:       Transfers:       Balance:       ADL Intervention:   Pt denied simple grooming tasks, reporting he had just been given a bath. Cognitive Retraining  Safety/Judgement: Awareness of environment; Insight into deficits    Therapeutic Exercises:   Pt performed elbow flexion/extension AAROM in RUE x 3 before requiring break, reporting via communication board that he needed air in his cuff. Pt demos functional ROM in LUE but reports he feels it is getting weaker, will give pt foam block. Pain:  C/o R shoulder pain, reports this has been going on PTA    Activity Tolerance:   Poor, requires rest breaks, and HR consistently in 120s, elevated to 130s with bed level activity  Please refer to the flowsheet for vital signs taken during this treatment. After treatment patient left in no apparent distress:   Modified chair position in bed, RT entering room    COMMUNICATION/COLLABORATION:   The patients plan of care was discussed with: Physical therapist, Registered nurse, and Respiratory therapist.     DIOR Sy  Time Calculation: 25 mins    Regarding student involvement in patient care:  A student participated in this treatment session. Per CMS Medicare statements and AOTA guidelines I certify that the following was true:  1. I was present and directly observed the entire session. 2. I made all skilled judgments and clinical decisions regarding care. 3. I am the practitioner responsible for assessment, treatment, and documentation.

## 2020-09-24 NOTE — PROGRESS NOTES
[facility]    ICU TEAM Progress Note    Name: Jenise Giles   : 1990   MRN: [id number]   Date: 2020      I  Subjective:   Progress Note: 2020      Reason for ICU Admission: Respiratory failure due to COVID-19 infection    Interval history:  80-year-old male with no history of significant past medical history except smoking half pack per day. Frantz See was admitted on 2020 at [facility] with acute hypoxemic respiratory failure from COVID pneumonia. Frantz See continued to deteriorate and got intubated on 8/10.  Due to worsening hypoxemia, he is transferred to [facility] for ECMO evaluation. Frantz See has bee given convalescent plasma twice on  and  and treated with Remdesivir which was completed on 8/10. Frantz See is also on dexamethasone. Frantz See completed course of empiric antibiotics including azithromycin which was completed on  and cefepime was completed on .  During his hospital course he also developed pneumothorax and has left-sided chest tube since .  Has a tracheostomy tube placed on .  On September 3 patient had significant worsening on his right arm, CT head was unrevealing.  He is already on high-dose Lovenox and aspirin.  On  he had acute weakness on the left arm code stroke was called and again CTA was unrevealing.  Since then left arm is back to baseline, showed some improvement on right arm but still weak compared to left with minimal ability to move against gravity. Mental status continues to improve but now he is very anxious with recurrent episode of nausea and vomiting associated with tachycardia.  Now s/p PEG     Overnight Events:   No major events ON    Active Problem List:     Problem List  Date Reviewed: 2020          Codes Class    Acute respiratory failure ([facility] [address] ICD-10-CM: J96.00  ICD-9-CM: 518.81         Pneumothorax on left ICD-10-CM: J93.9  ICD-9-CM: 512.89         Pneumonia due to severe acute respiratory syndrome coronavirus 2 (SARS-CoV-2) ICD-10-CM: U07.1, J12.89  ICD-9-CM: 480.8         Respiratory failure with hypoxia (HCC) ICD-10-CM: J96.91  ICD-9-CM: 518.81               Past Medical History:      has a past medical history of History of vascular access device (08/10/2020). Past Surgical History:      has a past surgical history that includes ir thora/ins chest tube(pneumo) wo image (2020); ir thora/ins chest tube(pneumo) wo image (2020); pr tracheostomy, planned (2020); and ir percut gastrostromy tube (2020). Home Medications:     Prior to Admission medications    Medication Sig Start Date End Date Taking? Authorizing Provider   benzonatate (Tessalon Perles) 100 mg capsule Take 100 mg by mouth three (3) times daily as needed for Cough. Provider, Historical       Allergies/Social/Family History:     No Known Allergies   Social History     Tobacco Use    Smoking status: Current Some Day Smoker    Smokeless tobacco: Never Used   Substance Use Topics    Alcohol use: Not on file      History reviewed. No pertinent family history. Objective:   Vital Signs:  Visit Vitals  BP (!) 153/109   Pulse (!) 117   Temp 99 °F (37.2 °C)   Resp (!) 36   Ht 5' 11\" (1.803 m)   Wt 89.5 kg (197 lb 5 oz)   SpO2 99%   BMI 27.52 kg/m²      O2 Device: Tracheostomy, Ventilator Temp (24hrs), Av.6 °F (37.6 °C), Min:99 °F (37.2 °C), Max:100.5 °F (38.1 °C)           Intake/Output:     Intake/Output Summary (Last 24 hours) at 2020 1456  Last data filed at 2020 1425  Gross per 24 hour   Intake 1750 ml   Output 1106 ml   Net 644 ml       Physical Exam:  General:  Awake and interactive. Writing on communication board. Frustrated with speed of communication but this is understandable. Eyes:  Sclera anicteric. Pupils equally round and reactive to light.    Mouth/Throat: Mucous membranes normal, oral pharynx clear   Neck: Supple, tracheostomy tube in place   Lungs:    Good air entry bilaterally, fine crepitation   CV:  Regular rate and rhythm,no murmur, click, rub or gallop   Abdomen:   Soft, non-tender. bowel sounds normal. non-distended   Extremities: No cyanosis , evolving edema   Skin: Skin color, texture, turgor normal. no acute rash or lesions   Lymph nodes: Cervical and supraclavicular normal   Musculoskeletal: No swelling or deformity   Lines/Devices:  Intact, no erythema, drainage or tenderness   Psych:  conscious and alert, follows command, anxious, still weak on right arm but able to move minimally against gravity        LABS AND  DATA: Personally reviewed  Recent Labs     09/24/20 0347 09/23/20 0328   WBC 7.3 11.9*   HGB 10.2* 10.8*   HCT 32.3* 34.2*    339     Recent Labs     09/24/20 0347 09/23/20 0328    136   K 3.4* 3.8    100   CO2 27 28   BUN 15 16   CREA 0.52* 0.67*   GLU 88 110*   CA 9.9 10.2*   MG 1.9 2.0   PHOS 4.9* 4.7     Recent Labs     09/24/20 0347 09/23/20  0328   AP 93 106   TP 7.6 8.8*   ALB 3.3* 3.6   GLOB 4.3* 5.2*     No results for input(s): INR, PTP, APTT, INREXT, INREXT in the last 72 hours. No results for input(s): PHI, PCO2I, PO2I, FIO2I in the last 72 hours. No results for input(s): CPK, CKMB, TROIQ, BNPP in the last 72 hours. Hemodynamics:   PAP:   CO:     Wedge:   CI:     CVP:    SVR:       PVR:       Ventilator Settings:  Mode Rate Tidal Volume Pressure FiO2 PEEP   Spontaneous   0 ml  10 cm H2O(weaned) 35 % 5 cm H20     Peak airway pressure: 16 cm H2O    Minute ventilation: 11.5 l/min        MEDS: Reviewed    Chest X-Ray:  CXR Results  (Last 48 hours)    None          Assessment and Plan:   -Bilateral pneumonia due to COVID-19 infection  -Acute right arm weakness:  Patient already on aspirin and moderate dose anticoagulation.  Still pending CTA head and neck, CT head showed no bleed.   -Right arm weakness  -Fever: No clear etiology, culture remain negative, images not suggestive of acute infectious process  - Acute hypoxic respiratory failure due to COVID-19 infection status post tracheostomy on August 26  - Pneumothorax status post left chest tube insertion on August 11 without air leak  - Possible bacterial super imposed infection: Completed antibiotic course  -History of tobacco use         -Off precedex. On BuSpar, Seroquel, and Xanax. - will d/c xanax and start trazodone @ night  - Vent weaning as tolerated - doing well on SBT today - hopefully we can start TC trials soon  -Right arm/lefty leg weakness - MRI showed 1. Evolving cortical/subcortical encephalomalacia and atrophy in the bilateral  frontoparietal lobes with signal changes - this may represent evolving ischemic changes from a hypoxic-ischemic  event, vasculitic involvement from COVID, or evolving encephalomalacia from a  infectious/inflammatory process related to COVID. 2. Small area of signal abnormality with restricted diffusion in the right  posterior body of the corpus callosum, which likely represents the same process  as above, again either ischemic or infectious/inflammatory. 3. Chronic area of encephalomalacia in the right cerebellum with associated  chronic hemorrhage. 4. Complete opacification of the right sphenoid sinus and near complete  opacification of the right maxillary sinus with air-fluid level, suspicious for  acute sinusitis. Large bilateral mastoid effusions - Treating sinusitis with Augmentin  PT/OT as tolerated  Power is improving on right arm but still significantly weaker than left    - intermittent vomiting - continue Reglan, zofran, compazine, ativan  - Completed COVID-19 treatment  - DVT prophylaxis with Lovenox.  GI prophylaxis.  Nutritional support  -once s/p PEG placement should be able to move to LTAC  -cont nystatin for oral thrush      DISPOSITION  Stay in ICU while on vent    I personally spent 35 minutes of time.   This is time spent actively involved in patient care as well as the coordination of care and discussions with the patient's family.

## 2020-09-24 NOTE — PROGRESS NOTES
Problem: Mobility Impaired (Adult and Pediatric)  Goal: *Acute Goals and Plan of Care (Insert Text)  Description: FUNCTIONAL STATUS PRIOR TO ADMISSION: Patient was independent and active without use of DME. 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 Physical Therapy Goals    Reassessment completed 9/18/2020 and all goals remain appropriate  at this time. Initiated 9/11/2020  1. Patient will move from supine to sit and sit to supine , scoot up and down, and roll side to side in bed with maximal assistance within 7 day(s). 2.  Patient will transfer from bed to chair and chair to bed with maximal assistance using the least restrictive device within 7 day(s). 3.  Patient will perform sit to stand with maximal assistance within 7 day(s). 4.  Patient will tolerate sitting EOB with maximal assistance for 5 minutes within 7 day(s). Outcome: Progressing Towards Goal   PHYSICAL THERAPY TREATMENT  Patient: Noel Christianson (89 y.o. male)  Date: 9/24/2020  Diagnosis: Acute respiratory failure ([facility] [address] [J96.00]   <principal problem not specified>  Procedure(s) (LRB):  ESOPHAGOGASTRODUODENOSCOPY (EGD) @ bedside (N/A)  ESOPHAGOGASTRODUODENAL (EGD) BIOPSY (N/A) 2 Days Post-Op  Precautions:    Chart, physical therapy assessment, plan of care and goals were reviewed. ASSESSMENT  Patient continues with skilled PT services and is progressing towards goals. Pt received supine in bed and agreeable to therapy. Pt on spontaneous breathing trial vai trach (PEEP:5, FiO2: 35%). Pt continues to be limited by decreased strength, decreased functional mobility, decreased tolerance to activity and well below independent and active baseline mobility status. Pt's VSS during session, but needs frequent and prolonged rest breaks due to elevated HR and respiratory status (96-99%).  Pt was placed in chair position to improve upright tolerance to activity and able to perform LE therex with cues. Pt able to pull self and adjust upper body/trunk with LUE much better than previous session. Pt will continue to benefit from PT to progress mobility as tolerated. Pt will need extensive rehab upon discharge. .     Current Level of Function Impacting Discharge (mobility/balance): pt able to pull self towards the L with LUE on the railing    Other factors to consider for discharge: previously independent, active, working         PLAN :  Patient continues to benefit from skilled intervention to address the above impairments. Continue treatment per established plan of care. to address goals. Recommendation for discharge: (in order for the patient to meet his/her long term goals)  Therapy 3 hours per day 5-7 days per week    This discharge recommendation:  Has been made in collaboration with the attending provider and/or case management    IF patient discharges home will need the following DME: to be determined (TBD)       SUBJECTIVE:   Patient stated I need more air in the cuff.   pt mouthing and point out letters on communication board    OBJECTIVE DATA SUMMARY:   Critical Behavior:  Neurologic State: Alert  Orientation Level: Oriented X4  Cognition: Follows commands, Appropriate for age attention/concentration  Safety/Judgement: Awareness of environment, Insight into deficits  Functional Mobility Training:  Bed Mobility:           Scooting: Supervision(repositioning upper body/trunk with L UE on railing)        Transfers:                                   Balance:  Sitting: Impaired  Sitting - Static: (with support of bed )  Ambulation/Gait Training:                                                        Stairs: Therapeutic Exercises:    Ankle pumps, LAQ; pull to the L for repositioning  Pain Rating:  Pt c/o R shoulder soreness/pain    Activity Tolerance:   Fair and requires rest breaks  Please refer to the flowsheet for vital signs taken during this treatment. After treatment patient left in no apparent distress:   Supine in bed, Call bell within reach, and Side rails x 3    COMMUNICATION/COLLABORATION:   The patients plan of care was discussed with: Occupational therapist, Registered nurse, and Case management.      Dannielle Bowen, PT, DPT   Time Calculation: 28 mins

## 2020-09-24 NOTE — PROGRESS NOTES
Informed by staff that Mr Reese Melton in [address] [place] [address] would like to complete a Medical Power of [address]. Patient was sitting up in bed and his mother was at his bedside when  arrived. They both appeared to be in pretty good spirits. Patient's mother stated that she need a Power of  to be completed so that she could take care of Mr Cochran's business affairs while he was in the hospital. Informed patient and his mother that chaplains could assist in completing a Medical Power of  for medical decision only; that a General Power of [address] would be needed for mother to be able to attend to patient's business affairs. Patient and his mother stated that they did not want to do a MPOA. They denied any other needs/concerns at that time and express appreciation for visit. Assured them of prayers on their behalf and of ongoing  availability for support. : . Shelly Jackson.  Ian Myers; [facility], to contact [phone] [address] call: [phone]

## 2020-09-24 NOTE — PROGRESS NOTES
Bedside shift change report given to Sergio Cope RN (oncoming nurse) by Edelmira Velazquez RN (offgoing nurse). Report included the following information SBAR, Kardex, ED Summary, Procedure Summary, Intake/Output, MAR, Recent Results, Med Rec Status, Cardiac Rhythm NSR, Alarm Parameters , Quality Measures and Dual Neuro Assessment. Shift Summary: Pt remains alert and anxious. Pt on SBT throughout shift and tolerated well. Bedside shift change report given to Jhon Pearce RN (oncoming nurse) by Sergio Cope RN (offgoing nurse). Report included the following information SBAR, Kardex, ED Summary, Procedure Summary, Intake/Output, MAR, Recent Results, Med Rec Status, Cardiac Rhythm NSR, Alarm Parameters , Quality Measures and Dual Neuro Assessment.

## 2020-09-24 NOTE — PROGRESS NOTES
SPEECH LANGUAGE PATHOLOGY DYSPHAGIA TREATMENT  Patient: Fatemeh Prince (23 y.o. male)  Date: 9/24/2020  Diagnosis: [facility] [J96.00] <principal problem not specified>  Procedure(s) (LRB):  ESOPHAGOGASTRODUODENOSCOPY (EGD) @ bedside (N/A)  ESOPHAGOGASTRODUODENAL (EGD) BIOPSY (N/A) 2 Days Post-Op  Precautions: Fall      ASSESSMENT:  Pt tolerated 8 minute in-line PMV trial on this date. Pt tolerated cuff deflation by SLP in the presence of RT without any changes in vital signs. Noted vocalizations around trach once cuff deflated and thus in-line PMV placed. Pt reported that breathing with the PMV \"Felt different\" this time and RT confirmed that this was due to him being on a spontaneous trial. Pt tolerated PMV without changes to vital signs for ~8 minutes before he said he was ready to go back on the ventilator. In-line PMV doffed at this time and pt placed back on the ventilator with RT. Recommend continuing in-line PMV trials at least once a day to work towards weaning from ventilator.      Pt continues to be at high risk for prandial aspiration given recent, prolonged intubation, trach placement with associated reduced pharyngeal sensations/pressures, and decreased ability to tolerate any amount of aspiration 2/2 reduced pulmonary reserve due to recent COVID-19 infection. At this juncture, recommend ice chip trials when pt is on in-line PMV. Otherwise, would hold PO. Will continue to follow. Patient will benefit from skilled intervention to address the above impairments. Patients rehabilitation potential is considered to be Fair     PLAN:  Recommendations and Planned Interventions:  -- NPO with Peg tube  -- Ice chips only when on PMV  -- Vigilant oral care  Patient continues to benefit from skilled intervention to address the above impairments. Continue treatment per established plan of care.   Speaking Valve Placement:  SLP / RT only  Recommended Speaking Valve Wearing Schedule:  [x]    Per RT  []    As rob ated  Discharge Recommendations: To Be Determined     SUBJECTIVE:   Patient asked Can I have ice chips without the PMV. OBJECTIVE:   Cognitive and Communication Status:  Neurologic State: Alert  Orientation Level: Oriented X4  Cognition: Follows commands, Appropriate for age attention/concentration  Perception: Appears intact  Perseveration: No perseveration noted  Safety/Judgement: Awareness of environment, Insight into deficits  Tracheostomy:   Before in-line trial:   O2: 100%  RR: 16-22  HR: 123    During In-line trial:  O2: 100%  RR: 22-30  HR: 110-116     Oxygen Therapy  O2 Sat (%): 98 %  Pulse via Oximetry: 127 beats per minute  O2 Device: Tracheostomy; Ventilator  FIO2 (%): 35 %  Dysphagia Treatment:  Tracheostomy:  Airway Clearance  Suction: Trach  Suction Device: Inline suction catheter  Sputum Method Obtained: Tracheal  Sputum Amount: Scant  Sputum Color/Odor: Clear  Sputum Consistency: Thin        Oral Assessment:     P.O. Trials:     Vocal quality prior to P.O.:                            Pain:  Pain Scale 1: Numeric (0 - 10)  Pain Intensity 1: 5  Pain Location 1: Shoulder    After treatment:   Patient left in no apparent distress in bed, Call bell within reach and Nursing notified    COMMUNICATION/EDUCATION:   Patient was educated regarding his PMV as this relates to his ventilator status. He demonstrated Good understanding as evidenced by appropriate questions. The patient's plan of care including recommendations, planned interventions, and recommended diet changes were discussed with: Registered nurse and Respiratory therapist.     Education was provided to patient, family, and staff regarding speaking valve placement, wearing schedule, safety precautions including cuff deflation and removal when sleeping, and care/cleaning guidelines.       Rachael Barton  Time Calculation: 20 mins        Regarding student involvement in patient care:  A student participated in this treatment session. Per CMS Medicare statements and ANDRE[employer] guidelines I certify that the following was true:  1. I was present and directly observed the entire session. 2. I made all skilled judgments and clinical decisions regarding care. 3. I am the practitioner responsible for assessment, treatment, and documentation.

## 2020-09-24 NOTE — PROGRESS NOTES
Primary Nurse Paul Hilliard RN and Rosemary Mares RN performed a dual skin assessment on this patient Impairment noted- see wound doc flow sheet  Enrique score is 14

## 2020-09-24 NOTE — PROGRESS NOTES
MIGUELINA  Patient presented to [facility] ED from home with increased shortness of breath, diaphoresis and fever. Transferred to [facility] for possible ECMO placement   RUR: 25%  [facility]. VS inpatient rehab, pending medical progress and therapy recommendations. Patient remains in ICU , tolerating SBT. Patient is s/p peg tube placement 9/23/2020. Care Manager met with patient and his mother and provided her a list of inpatient rehabs as a second choice if patient continues to do well on SBT. [facility] has accepted patient , however patient's mother is considering other LTAC's should he not be weaned. Care management is continuing to follow for transitions of care.    Chel Biggs RN, Care Management

## 2020-09-25 ENCOUNTER — APPOINTMENT (OUTPATIENT)
Dept: GENERAL RADIOLOGY | Age: 30
DRG: 004 | End: 2020-09-25
Attending: EMERGENCY MEDICINE
Payer: COMMERCIAL

## 2020-09-25 LAB
ALBUMIN SERPL-MCNC: 3.4 G/DL (ref 3.5–5)
ALBUMIN/GLOB SERPL: 0.8 {RATIO} (ref 1.1–2.2)
ALP SERPL-CCNC: 98 U/L (ref 45–117)
ALT SERPL-CCNC: 42 U/L (ref 12–78)
ANION GAP SERPL CALC-SCNC: 9 MMOL/L (ref 5–15)
AST SERPL-CCNC: 17 U/L (ref 15–37)
BASOPHILS # BLD: 0 K/UL (ref 0–0.1)
BASOPHILS NFR BLD: 0 % (ref 0–1)
BILIRUB SERPL-MCNC: 0.6 MG/DL (ref 0.2–1)
BUN SERPL-MCNC: 9 MG/DL (ref 6–20)
BUN/CREAT SERPL: 17 (ref 12–20)
CALCIUM SERPL-MCNC: 9.8 MG/DL (ref 8.5–10.1)
CHLORIDE SERPL-SCNC: 98 MMOL/L (ref 97–108)
CO2 SERPL-SCNC: 29 MMOL/L (ref 21–32)
CREAT SERPL-MCNC: 0.54 MG/DL (ref 0.7–1.3)
DIFFERENTIAL METHOD BLD: ABNORMAL
EOSINOPHIL # BLD: 0.2 K/UL (ref 0–0.4)
EOSINOPHIL NFR BLD: 2 % (ref 0–7)
ERYTHROCYTE [DISTWIDTH] IN BLOOD BY AUTOMATED COUNT: 14 % (ref 11.5–14.5)
GLOBULIN SER CALC-MCNC: 4.4 G/DL (ref 2–4)
GLUCOSE SERPL-MCNC: 110 MG/DL (ref 65–100)
HCT VFR BLD AUTO: 32.1 % (ref 36.6–50.3)
HGB BLD-MCNC: 10.1 G/DL (ref 12.1–17)
IMM GRANULOCYTES # BLD AUTO: 0.1 K/UL (ref 0–0.04)
IMM GRANULOCYTES NFR BLD AUTO: 1 % (ref 0–0.5)
LYMPHOCYTES # BLD: 1.7 K/UL (ref 0.8–3.5)
LYMPHOCYTES NFR BLD: 22 % (ref 12–49)
MAGNESIUM SERPL-MCNC: 1.8 MG/DL (ref 1.6–2.4)
MCH RBC QN AUTO: 27.6 PG (ref 26–34)
MCHC RBC AUTO-ENTMCNC: 31.5 G/DL (ref 30–36.5)
MCV RBC AUTO: 87.7 FL (ref 80–99)
MONOCYTES # BLD: 0.9 K/UL (ref 0–1)
MONOCYTES NFR BLD: 11 % (ref 5–13)
NEUTS SEG # BLD: 4.8 K/UL (ref 1.8–8)
NEUTS SEG NFR BLD: 64 % (ref 32–75)
NRBC # BLD: 0 K/UL (ref 0–0.01)
NRBC BLD-RTO: 0 PER 100 WBC
PHOSPHATE SERPL-MCNC: 5 MG/DL (ref 2.6–4.7)
PLATELET # BLD AUTO: 283 K/UL (ref 150–400)
PMV BLD AUTO: 10.4 FL (ref 8.9–12.9)
POTASSIUM SERPL-SCNC: 3.3 MMOL/L (ref 3.5–5.1)
PROT SERPL-MCNC: 7.8 G/DL (ref 6.4–8.2)
RBC # BLD AUTO: 3.66 M/UL (ref 4.1–5.7)
SODIUM SERPL-SCNC: 136 MMOL/L (ref 136–145)
WBC # BLD AUTO: 7.5 K/UL (ref 4.1–11.1)

## 2020-09-25 PROCEDURE — 36415 COLL VENOUS BLD VENIPUNCTURE: CPT

## 2020-09-25 PROCEDURE — 74011250636 HC RX REV CODE- 250/636: Performed by: EMERGENCY MEDICINE

## 2020-09-25 PROCEDURE — 94003 VENT MGMT INPAT SUBQ DAY: CPT

## 2020-09-25 PROCEDURE — 74011250637 HC RX REV CODE- 250/637: Performed by: INTERNAL MEDICINE

## 2020-09-25 PROCEDURE — 97530 THERAPEUTIC ACTIVITIES: CPT

## 2020-09-25 PROCEDURE — 65610000006 HC RM INTENSIVE CARE

## 2020-09-25 PROCEDURE — 74011250636 HC RX REV CODE- 250/636: Performed by: INTERNAL MEDICINE

## 2020-09-25 PROCEDURE — 74011250637 HC RX REV CODE- 250/637: Performed by: NURSE PRACTITIONER

## 2020-09-25 PROCEDURE — 74011250637 HC RX REV CODE- 250/637: Performed by: EMERGENCY MEDICINE

## 2020-09-25 PROCEDURE — 97110 THERAPEUTIC EXERCISES: CPT

## 2020-09-25 PROCEDURE — 71045 X-RAY EXAM CHEST 1 VIEW: CPT

## 2020-09-25 PROCEDURE — 85025 COMPLETE CBC W/AUTO DIFF WBC: CPT

## 2020-09-25 PROCEDURE — 84100 ASSAY OF PHOSPHORUS: CPT

## 2020-09-25 PROCEDURE — 80053 COMPREHEN METABOLIC PANEL: CPT

## 2020-09-25 PROCEDURE — 74011250637 HC RX REV CODE- 250/637: Performed by: HOSPITALIST

## 2020-09-25 PROCEDURE — 74011000250 HC RX REV CODE- 250: Performed by: INTERNAL MEDICINE

## 2020-09-25 PROCEDURE — 83735 ASSAY OF MAGNESIUM: CPT

## 2020-09-25 RX ORDER — FAMOTIDINE 40 MG/5ML
20 POWDER, FOR SUSPENSION ORAL EVERY 12 HOURS
Status: DISCONTINUED | OUTPATIENT
Start: 2020-09-25 | End: 2020-10-12 | Stop reason: HOSPADM

## 2020-09-25 RX ORDER — METOCLOPRAMIDE HYDROCHLORIDE 5 MG/5ML
5 SOLUTION ORAL EVERY 6 HOURS
Status: DISCONTINUED | OUTPATIENT
Start: 2020-09-25 | End: 2020-10-12 | Stop reason: HOSPADM

## 2020-09-25 RX ORDER — PSEUDOEPHED/ACETAMINOPHEN/CPM 30-500-2MG
2 TABLET ORAL
Status: DISCONTINUED | OUTPATIENT
Start: 2020-09-25 | End: 2020-09-25

## 2020-09-25 RX ORDER — METOPROLOL TARTRATE 25 MG/1
37.5 TABLET, FILM COATED ORAL 2 TIMES DAILY
Status: DISCONTINUED | OUTPATIENT
Start: 2020-09-25 | End: 2020-09-26

## 2020-09-25 RX ORDER — LOPERAMIDE HYDROCHLORIDE 2 MG/1
2 CAPSULE ORAL
Status: DISCONTINUED | OUTPATIENT
Start: 2020-09-25 | End: 2020-10-12 | Stop reason: HOSPADM

## 2020-09-25 RX ADMIN — NYSTATIN 500000 UNITS: 500000 SUSPENSION ORAL at 12:25

## 2020-09-25 RX ADMIN — METOCLOPRAMIDE 5 MG: 5 INJECTION, SOLUTION INTRAMUSCULAR; INTRAVENOUS at 06:38

## 2020-09-25 RX ADMIN — Medication: at 17:21

## 2020-09-25 RX ADMIN — POTASSIUM BICARBONATE 40 MEQ: 782 TABLET, EFFERVESCENT ORAL at 08:42

## 2020-09-25 RX ADMIN — METOCLOPRAMIDE 5 MG: 5 INJECTION, SOLUTION INTRAMUSCULAR; INTRAVENOUS at 11:41

## 2020-09-25 RX ADMIN — AMOXICILLIN AND CLAVULANATE POTASSIUM 1 TABLET: 875; 125 TABLET, FILM COATED ORAL at 08:44

## 2020-09-25 RX ADMIN — NYSTATIN 500000 UNITS: 500000 SUSPENSION ORAL at 15:43

## 2020-09-25 RX ADMIN — NYSTATIN 500000 UNITS: 500000 SUSPENSION ORAL at 22:50

## 2020-09-25 RX ADMIN — ONDANSETRON 4 MG: 2 INJECTION INTRAMUSCULAR; INTRAVENOUS at 15:45

## 2020-09-25 RX ADMIN — FAMOTIDINE 20 MG: 40 POWDER, FOR SUSPENSION ORAL at 20:48

## 2020-09-25 RX ADMIN — CHLORHEXIDINE GLUCONATE 15 ML: 0.12 RINSE ORAL at 20:48

## 2020-09-25 RX ADMIN — ENOXAPARIN SODIUM 30 MG: 30 INJECTION SUBCUTANEOUS at 22:50

## 2020-09-25 RX ADMIN — LOPERAMIDE HYDROCHLORIDE 2 MG: 2 CAPSULE ORAL at 12:25

## 2020-09-25 RX ADMIN — ASPIRIN 81 MG CHEWABLE TABLET 81 MG: 81 TABLET CHEWABLE at 08:44

## 2020-09-25 RX ADMIN — Medication 10 ML: at 22:51

## 2020-09-25 RX ADMIN — METOPROLOL TARTRATE 37.5 MG: 25 TABLET, FILM COATED ORAL at 17:22

## 2020-09-25 RX ADMIN — AMOXICILLIN AND CLAVULANATE POTASSIUM 1 TABLET: 875; 125 TABLET, FILM COATED ORAL at 20:48

## 2020-09-25 RX ADMIN — MICONAZOLE NITRATE 2 % TOPICAL POWDER: at 08:45

## 2020-09-25 RX ADMIN — Medication 10 ML: at 06:38

## 2020-09-25 RX ADMIN — Medication: at 08:45

## 2020-09-25 RX ADMIN — LORAZEPAM 1 MG: 2 INJECTION INTRAMUSCULAR; INTRAVENOUS at 20:52

## 2020-09-25 RX ADMIN — OXYCODONE HYDROCHLORIDE 5 MG: 5 SOLUTION ORAL at 03:48

## 2020-09-25 RX ADMIN — ALPRAZOLAM 1 MG: 0.5 TABLET ORAL at 06:38

## 2020-09-25 RX ADMIN — Medication 10 ML: at 22:50

## 2020-09-25 RX ADMIN — Medication 10 ML: at 15:43

## 2020-09-25 RX ADMIN — CHLORHEXIDINE GLUCONATE 15 ML: 0.12 RINSE ORAL at 08:41

## 2020-09-25 RX ADMIN — METOCLOPRAMIDE HYDROCHLORIDE 5 MG: 5 SOLUTION ORAL at 17:21

## 2020-09-25 RX ADMIN — POTASSIUM BICARBONATE 20 MEQ: 782 TABLET, EFFERVESCENT ORAL at 08:41

## 2020-09-25 RX ADMIN — METOPROLOL TARTRATE 25 MG: 25 TABLET, FILM COATED ORAL at 08:44

## 2020-09-25 RX ADMIN — ACETAMINOPHEN ORAL SOLUTION 650 MG: 650 SOLUTION ORAL at 20:49

## 2020-09-25 RX ADMIN — TRAZODONE HYDROCHLORIDE 100 MG: 100 TABLET ORAL at 22:50

## 2020-09-25 RX ADMIN — Medication 10 ML: at 06:39

## 2020-09-25 RX ADMIN — ALPRAZOLAM 1 MG: 0.5 TABLET ORAL at 22:50

## 2020-09-25 RX ADMIN — ENOXAPARIN SODIUM 30 MG: 30 INJECTION SUBCUTANEOUS at 11:41

## 2020-09-25 RX ADMIN — NYSTATIN 500000 UNITS: 500000 SUSPENSION ORAL at 08:44

## 2020-09-25 RX ADMIN — ALPRAZOLAM 1 MG: 0.5 TABLET ORAL at 15:42

## 2020-09-25 RX ADMIN — MICONAZOLE NITRATE 2 % TOPICAL POWDER: at 17:21

## 2020-09-25 RX ADMIN — FAMOTIDINE 20 MG: 10 INJECTION INTRAVENOUS at 08:44

## 2020-09-25 NOTE — PROGRESS NOTES
[facility]    ICU TEAM Progress Note    Name: Eliana Tolbert   : 1990   MRN: [id number]   Date: 2020      I  Subjective:   Progress Note: 2020      Reason for ICU Admission: Respiratory failure due to COVID-19 infection    Interval history:  27-year-old male with no history of significant past medical history except smoking half pack per day. John Villalobos was admitted on 2020 at [facility] with acute hypoxemic respiratory failure from COVID pneumonia. John Villalobos continued to deteriorate and got intubated on 8/10.  Due to worsening hypoxemia, he is transferred to [facility] for ECMO evaluation. John Villalobos has bee given convalescent plasma twice on  and  and treated with Remdesivir which was completed on 8/10. John Villalobos is also on dexamethasone. John Villalobos completed course of empiric antibiotics including azithromycin which was completed on  and cefepime was completed on .  During his hospital course he also developed pneumothorax and has left-sided chest tube since .  Has a tracheostomy tube placed on .  On September 3 patient had significant worsening on his right arm, CT head was unrevealing.  He is already on high-dose Lovenox and aspirin.  On  he had acute weakness on the left arm code stroke was called and again CTA was unrevealing.  Since then left arm is back to baseline, showed some improvement on right arm but still weak compared to left with minimal ability to move against gravity. Mental status continues to improve but now he is very anxious with recurrent episode of nausea and vomiting associated with tachycardia.  Now s/p PEG     Overnight Events:   No major events ON    Active Problem List:     Problem List  Date Reviewed: 2020          Codes Class    Acute respiratory failure ([facility] [address] ICD-10-CM: J96.00  ICD-9-CM: 518.81         Pneumothorax on left ICD-10-CM: J93.9  ICD-9-CM: 512.89         Pneumonia due to severe acute respiratory syndrome coronavirus 2 (SARS-CoV-2) ICD-10-CM: U07.1, J12.89  ICD-9-CM: 480.8         Respiratory failure with hypoxia (HCC) ICD-10-CM: J96.91  ICD-9-CM: 518.81               Past Medical History:      has a past medical history of History of vascular access device (08/10/2020). Past Surgical History:      has a past surgical history that includes ir thora/ins chest tube(pneumo) wo image (2020); ir thora/ins chest tube(pneumo) wo image (2020); pr tracheostomy, planned (2020); ir percut gastrostromy tube (2020); and ir insert gastrostomy tube perc (2020). Home Medications:     Prior to Admission medications    Medication Sig Start Date End Date Taking? Authorizing Provider   benzonatate (Tessalon Perles) 100 mg capsule Take 100 mg by mouth three (3) times daily as needed for Cough. Provider, Historical       Allergies/Social/Family History:     No Known Allergies   Social History     Tobacco Use    Smoking status: Current Some Day Smoker    Smokeless tobacco: Never Used   Substance Use Topics    Alcohol use: Not on file      History reviewed. No pertinent family history. Objective:   Vital Signs:  Visit Vitals  BP (!) 148/100   Pulse 97   Temp 99.1 °F (37.3 °C)   Resp 22   Ht 5' 11\" (1.803 m)   Wt 89.5 kg (197 lb 5 oz)   SpO2 95%   BMI 27.52 kg/m²      O2 Device: Tracheostomy, Ventilator Temp (24hrs), Av.4 °F (37.4 °C), Min:98.8 °F (37.1 °C), Max:100.2 °F (37.9 °C)           Intake/Output:     Intake/Output Summary (Last 24 hours) at 2020 1410  Last data filed at 2020 1300  Gross per 24 hour   Intake 3357.5 ml   Output 3035 ml   Net 322.5 ml       Physical Exam:  General:  Awake and interactive. Writing on communication board. Frustrated with speed of communication but this is understandable. Eyes:  Sclera anicteric. Pupils equally round and reactive to light.    Mouth/Throat: Mucous membranes normal, oral pharynx clear   Neck: Supple, tracheostomy tube in place   Lungs:    Good air entry bilaterally, fine crepitation   CV:  Regular rate and rhythm,no murmur, click, rub or gallop   Abdomen:   Soft, non-tender. bowel sounds normal. non-distended   Extremities: No cyanosis , evolving edema   Skin: Skin color, texture, turgor normal. no acute rash or lesions   Lymph nodes: Cervical and supraclavicular normal   Musculoskeletal: No swelling or deformity   Lines/Devices:  Intact, no erythema, drainage or tenderness   Psych:  conscious and alert, follows command, anxious, still weak on right arm but able to move minimally against gravity        LABS AND  DATA: Personally reviewed  Recent Labs     09/25/20 0420 09/24/20 0347   WBC 7.5 7.3   HGB 10.1* 10.2*   HCT 32.1* 32.3*    286     Recent Labs     09/25/20 0420 09/24/20 0347    137   K 3.3* 3.4*   CL 98 101   CO2 29 27   BUN 9 15   CREA 0.54* 0.52*   * 88   CA 9.8 9.9   MG 1.8 1.9   PHOS 5.0* 4.9*     Recent Labs     09/25/20 0420 09/24/20 0347   AP 98 93   TP 7.8 7.6   ALB 3.4* 3.3*   GLOB 4.4* 4.3*     No results for input(s): INR, PTP, APTT, INREXT, INREXT in the last 72 hours. No results for input(s): PHI, PCO2I, PO2I, FIO2I in the last 72 hours. No results for input(s): CPK, CKMB, TROIQ, BNPP in the last 72 hours. Hemodynamics:   PAP:   CO:     Wedge:   CI:     CVP:    SVR:       PVR:       Ventilator Settings:  Mode Rate Tidal Volume Pressure FiO2 PEEP   Pressure support   12 ml  8 cm H2O 35 % 5 cm H20     Peak airway pressure: 14 cm H2O    Minute ventilation: 11.2 l/min        MEDS: Reviewed    Chest X-Ray:  CXR Results  (Last 48 hours)               09/25/20 0917  XR CHEST PORT Final result    Impression:  IMPRESSION: Diffuse interstitial and airspace opacities with some interval   increase in the right upper lobe. Narrative:  EXAM: XR CHEST PORT       INDICATION: PNA       COMPARISON: 9/22/2020       FINDINGS: A portable AP radiograph of the chest was obtained at 910 hours. Patient's on cardiac monitor. A tracheostomy tube is noted. . Low lung volumes   are present with diffuse interstitial and airspace opacities slightly increased   on the right. . Heart size is borderline. .  Gaseous distention of the gastric   lumen is present. .                  Assessment and Plan:   -Bilateral pneumonia due to COVID-19 infection  -Acute right arm weakness:  Patient already on aspirin and moderate dose anticoagulation.  Still pending CTA head and neck, CT head showed no bleed. -Right arm weakness  -Fever: No clear etiology, culture remain negative, images not suggestive of acute infectious process  - Acute hypoxic respiratory failure due to COVID-19 infection status post tracheostomy on August 26  - Pneumothorax status post left chest tube insertion on August 11 without air leak  - Possible bacterial super imposed infection: Completed antibiotic course  -History of tobacco use         -Off precedex. On Xanax and trazodone @ night- Vent weaning as tolerated - doing well on SBT today - hopefully we can start TC trials soon  -Right arm/lefty leg weakness - MRI showed 1. Evolving cortical/subcortical encephalomalacia and atrophy in the bilateral  frontoparietal lobes with signal changes - this may represent evolving ischemic changes from a hypoxic-ischemic  event, vasculitic involvement from COVID, or evolving encephalomalacia from a  infectious/inflammatory process related to COVID. 2. Small area of signal abnormality with restricted diffusion in the right  posterior body of the corpus callosum, which likely represents the same process  as above, again either ischemic or infectious/inflammatory. 3. Chronic area of encephalomalacia in the right cerebellum with associated  chronic hemorrhage. 4. Complete opacification of the right sphenoid sinus and near complete  opacification of the right maxillary sinus with air-fluid level, suspicious for  acute sinusitis.  Large bilateral mastoid effusions - Treating sinusitis with Augmentin  PT/OT as tolerated  Power is improving on right arm but still significantly weaker than left    - intermittent vomiting - continue Reglan, zofran, compazine, ativan  - Completed COVID-19 treatment  - DVT prophylaxis with Lovenox.  GI prophylaxis.  Nutritional support  -once s/p PEG placement should be able to move to LTAC  -cont nystatin for oral thrush  -SBTs daily - doing better everyday      DISPOSITION  Stay in ICU while on vent    I personally spent 35 minutes of time. This is time spent actively involved in patient care as well as the coordination of care and/or discussions with the patient's family. Tee Biswas), Cardiovascular Disease; Internal Medicine  [address] [address] [place] [address]  Phone: [phone]  Fax: [phone]    Pina Ley (SUSAN), EndocrinologyMetabDiabetes  [address] [address] [place] [address]  Phone: [phone]  Fax: [phone] Tee Biswas), Cardiovascular Disease; Internal Medicine  [address] [address] [place] [address]  Phone: [phone]  Fax: [phone]    Pina Ley (SUSAN), EndocrinologyMetabDiabetes  [address] [address] [place] [address]  Phone: [phone]  Fax: [phone]    Lance Elizalde), Surgery; Vascular Surgery  [address] [place] [address]  Phone: [phone]  Fax: [phone]

## 2020-09-25 NOTE — PROGRESS NOTES
Problem: Self Care Deficits Care Plan (Adult)  Goal: *Acute Goals and Plan of Care (Insert Text)  Description:   FUNCTIONAL STATUS PRIOR TO ADMISSION: Patient unable to provide history at OT evaluation. Per chart review, patient was fully independent    HOME SUPPORT: Per chart review, patient lived alone    Occupational Therapy Goals  Initiated 9/14/2020, continued 9/21/2020  1. Patient will perform grooming with minimum assistance within 7 day(s). 2.  Patient will perform bathing with moderate assistance  within 7 day(s). 3.  Patient will perform upper body dressing with minimum assistance  within 7 day(s). 5.  Patient will perform lower body dressing with moderate assistance within 7 day(s). 6.  Patient will perform toilet transfers with moderate assistance  within 7 day(s). 7.  Patient will perform all aspects of toileting with moderate assistance  within 7 day(s). 8.  Patient will participate in upper extremity therapeutic exercise/activities with minimal assistance for 10 minutes within 7 day(s). 9.  Patient will utilize energy conservation techniques during functional activities with verbal cues within 7 day(s). Outcome: Progressing Towards Goal     OCCUPATIONAL THERAPY TREATMENT  Patient: Maria Alejandra Francis (89 y.o. male)  Date: 9/25/2020  Diagnosis: Acute respiratory failure ([facility] [address] [J96.00]   <principal problem not specified>  Procedure(s) (LRB):  ESOPHAGOGASTRODUODENOSCOPY (EGD) @ bedside (N/A)  ESOPHAGOGASTRODUODENAL (EGD) BIOPSY (N/A) 3 Days Post-Op  Precautions:    Chart, occupational therapy assessment, plan of care, and goals were reviewed. ASSESSMENT  Patient continues with skilled OT services and is progressing towards goals. Pt received supine in bed, HR more stable this date, reaching a max of 130 very briefly while seated EOB. Pt requiring Mod A x 2 to sit EOB and constant support during static sitting d/t impaired sitting balance - pt with R side and posterior lean.   Pt tolerated sitting EOB for ~5 minutes with VSS, pt visibly perspiring but able to wipe forehead w/washcloth using LUE. Pt performed dynamic functional reaching to targets with LUE, provided with encouragement to use RUE for stability. Pt c/o fatigue after ~5 minutes sitting, returned supine with Mod-Max A x 2, NAD. Continue to recommend d/c to IPR to maximize safety and independence with ADLs/IADLs/mobility. Current Level of Function Impacting Discharge (ADLs): Mod-Max A x 2 for bed mobility/sitting balance    Other factors to consider for discharge: independent & active PLOF (far below baseline this date)          PLAN :  Patient continues to benefit from skilled intervention to address the above impairments. Continue treatment per established plan of care. to address goals. Recommend with staff: If OOB to chair, use nhan lift; participation in ADLs as tolerated    Recommend next OT session: EOB ADLs, neuro re-ed for RUE    Recommendation for discharge: (in order for the patient to meet his/her long term goals)  Therapy 3 hours per day 5-7 days per week    This discharge recommendation:  Has been made in collaboration with the attending provider and/or case management    IF patient discharges home will need the following DME: To be determined       SUBJECTIVE:   Patient mouthed, \"I wanted to lay back down. \"     OBJECTIVE DATA SUMMARY:   Cognitive/Behavioral Status:  Neurologic State: Alert  Orientation Level: Oriented X4  Cognition: Follows commands  Perception: Appears intact  Perseveration: No perseveration noted  Safety/Judgement: Awareness of environment; Insight into deficits    Functional Mobility and Transfers for ADLs:  Bed Mobility:  Supine to Sit: Moderate assistance;Maximum assistance;Assist x2  Sit to Supine:  Moderate assistance;Assist x2  Scooting: Maximum assistance    Transfers:       Balance:  Sitting: Impaired  Sitting - Static: Poor (constant support)  Sitting - Dynamic: Poor (constant support)    ADL Intervention:       Grooming  Grooming Assistance: Set-up  Position Performed: Seated edge of bed(with constant physical support to maintain sitting balance)  Washing Face: Set-up    Cognitive Retraining  Safety/Judgement: Awareness of environment; Insight into deficits    Therapeutic Exercises:   Functional reaching while seated EOB x 5 reps to various levels. Pt requiring constant support x 1 person while dynamically reaching. Pain:  No c/o pain today    Activity Tolerance:   Fair, SpO2 stable on RA, and RR slightly elevated (low 30s) with minimal activity, pt fatigued after seated EOB x 5 minutes  Please refer to the flowsheet for vital signs taken during this treatment. After treatment patient left in no apparent distress:   Supine in bed, Heels elevated for pressure relief, Call bell within reach, Side rails x 3, and RUE propped on pillow    COMMUNICATION/COLLABORATION:   The patients plan of care was discussed with: Physical therapist and Registered nurse. Kate Stark, [facility]  Time Calculation: 27 mins    Regarding student involvement in patient care:  A student participated in this treatment session. Per CMS Medicare statements and AOTA guidelines I certify that the following was true:  1. I was present and directly observed the entire session. 2. I made all skilled judgments and clinical decisions regarding care. 3. I am the practitioner responsible for assessment, treatment, and documentation.

## 2020-09-25 NOTE — PROGRESS NOTES
Bedside shift change report given to Jen Walker RN (oncoming nurse) by Cuate Flores RN (offgoing nurse). Report included the following information SBAR, Kardex, ED Summary, Procedure Summary, Intake/Output, MAR, Recent Results, Med Rec Status, Cardiac Rhythm Sinus Tach, Alarm Parameters , Quality Measures and Dual Neuro Assessment. 0900: Dr. Marysol Bagley notified of pt's decreased breath sounds on the left, orders received for chest xray. 1725: Pt placed on trach collar per Dr. Marysol Bagley by Mela Nova RT.    1800: Pt placed back on ventilator per pt request by Mela Nova RT. Bedside shift change report given to Cuate Flores RN (oncoming nurse) by Jen Walker RN (offgoing nurse). Report included the following information SBAR, Kardex, ED Summary, Procedure Summary, Intake/Output, MAR, Recent Results, Med Rec Status, Cardiac Rhythm Sinus Tach, Alarm Parameters , Quality Measures and Dual Neuro Assessment.

## 2020-09-25 NOTE — PROGRESS NOTES
SLP Contact Note    Update @11am: Jevon Steen RT states pt is not appropriate for in-line today. Will hold. Discussed with Jevon Steen that if they try trach collar trials over the weekend that they can certainly trial PMV and ice chips. Planning to complete in-line trial hopefully around 11 am in this morning in conjunction with RT Jevon Steen.         Thank you,  SIVA Altamirano ChaEd, [id number] [facility]  Speech-Language Pathologist

## 2020-09-25 NOTE — PROGRESS NOTES
Problem: Mobility Impaired (Adult and Pediatric)  Goal: *Acute Goals and Plan of Care (Insert Text)  Description: FUNCTIONAL STATUS PRIOR TO ADMISSION: Patient was independent and active without use of DME. 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 Physical Therapy Goals  Reassessment 9/25/2020  1. Patient will move from supine to sit and sit to supine , scoot up and down, and roll side to side in bed with maximal assistance within 7 day(s). 2.  Patient will transfer from bed to chair and chair to bed with maximal assistance using the least restrictive device within 7 day(s). 3.  Patient will perform sit to stand with maximal assistance within 7 day(s). 4.  Patient will tolerate sitting EOB with moderate assistance for 10 minutes within 7 day(s). Reassessment completed 9/18/2020 and all goals remain appropriate  at this time. Initiated 9/11/2020  1. Patient will move from supine to sit and sit to supine , scoot up and down, and roll side to side in bed with maximal assistance within 7 day(s). 2.  Patient will transfer from bed to chair and chair to bed with maximal assistance using the least restrictive device within 7 day(s). 3.  Patient will perform sit to stand with maximal assistance within 7 day(s). 4.  Patient will tolerate sitting EOB with maximal assistance for 5 minutes within 7 day(s). --Goal Met 9/25    Outcome: Progressing Towards Goal   PHYSICAL THERAPY TREATMENT: WEEKLY REASSESSMENT  Patient: Eliana Tolbert (76 y.o. male)  Date: 9/25/2020  Primary Diagnosis: Acute respiratory failure ([facility] [address] [J96.00]  Procedure(s) (LRB):  ESOPHAGOGASTRODUODENOSCOPY (EGD) @ bedside (N/A)  ESOPHAGOGASTRODUODENAL (EGD) BIOPSY (N/A) 3 Days Post-Op   Precautions:          ASSESSMENT  Patient continues with skilled PT services and is progressing towards goals.  Pt received supine in bed on SBT via trach (PEEP:5, FIO2: 35%). Pt tolerated session well and continues to progress well with acute therapies. Pt continues to be limited by generalized weakness, decreased functional mobility, impaired seated balance and tolerance to activity. Pt was able to progress to seated EOB with mod A-max A x 2 and additional time and increased effort. Pt tolerated seated EOB  ~5 minutes with max A for support posteriorly, but was able to to lean forward and maintaining seated balance briefly with min A. Pt performed LE therex while seated EOB with good pacing and technique. HR elevated to 130 bpm briefly and returned to 110-118 bpm. Pt returned to supine position with all needs met. Pt will continue to benefit from PT to progress mobility as tolerated. Pt will benefit from inpatient rehab upon discharge pending progress and medical course. Patient's progression toward goals since last assessment: seated EOB goal were met    Current Level of Function Impacting Discharge (mobility/balance): mod-max A x 2 supine<>sit EOB, sat EOB ~5 minutes      Other factors to consider for discharge: previously independent and active         PLAN :  Goals have been updated based on progression since last assessment. Patient continues to benefit from skilled intervention to address the above impairments. Recommendations and Planned Interventions: bed mobility training, transfer training, gait training, therapeutic exercises, neuromuscular re-education, patient and family training/education, and therapeutic activities      Frequency/Duration: Patient will be followed by physical therapy:  5 times a week to address goals.     Recommendation for discharge: (in order for the patient to meet his/her long term goals)  Therapy 3 hours per day 5-7 days per week    This discharge recommendation:  Has been made in collaboration with the attending provider and/or case management    IF patient discharges home will need the following DME: to be determined (TBD)         SUBJECTIVE:   Patient stated It feels weird\"  while patient sitting EOB    OBJECTIVE DATA SUMMARY:   HISTORY:    Past Medical History:   Diagnosis Date    History of vascular access device 08/10/2020    5 Fr triple PICC, hemodynamically unstable, 45 cm L basilic     Past Surgical History:   Procedure Laterality Date    IR INSERT GASTROSTOMY TUBE PERC  9/23/2020    IR PERCUT GASTROSTROMY TUBE  9/23/2020         IR THORA/INS CHEST TUBE(PNEUMO) WO IMAGE  8/11/2020    IR THORA/INS CHEST TUBE(PNEUMO) WO IMAGE  8/11/2020    ID TRACHEOSTOMY, PLANNED  8/26/2020            Personal factors and/or comorbidities impacting plan of care:     Home Situation  Home Environment: Private residence  # Steps to Enter: 0  One/Two Story Residence: One story  Living Alone: Yes  Support Systems: Family member(s)  Patient Expects to be Discharged to[de-identified] Rehabilitation facility  Current DME Used/Available at Home: None    EXAMINATION/PRESENTATION/DECISION MAKING:   Critical Behavior:  Neurologic State: Alert  Orientation Level: Oriented X4  Cognition: Follows commands  Safety/Judgement: Awareness of environment, Insight into deficits  Hearing: Auditory  Auditory Impairment: None  Skin:    Edema:   Range Of Motion:  AROM: Generally decreased, functional(RUE LLE most affected)                       Strength:    Strength: Generally decreased, functional(RUE, LLE mostly affected)                    Tone & Sensation:                                  Coordination:     Vision:      Functional Mobility:  Bed Mobility:     Supine to Sit: Moderate assistance;Maximum assistance;Assist x2  Sit to Supine:  Moderate assistance;Assist x2  Scooting: Maximum assistance  Transfers:          Balance:   Sitting: Impaired  Sitting - Static: Poor (constant support)  Sitting - Dynamic: Poor (constant support)    Pt practiced reaching with LUE to target within ANNA in all directions  Ambulation/Gait Training:      Therapeutic Exercises: LAQ, ankle pumps, knee flexion        Pain Rating:  Pt denied pain    Activity Tolerance:   Fair  Please refer to the flowsheet for vital signs taken during this treatment. After treatment patient left in no apparent distress:   Supine in bed, Call bell within reach, and Side rails x 3    COMMUNICATION/EDUCATION:   The patients plan of care was discussed with: Occupational therapist and Registered nurse. Fall prevention education was provided and the patient/caregiver indicated understanding., Patient/family have participated as able in goal setting and plan of care. , and Patient/family agree to work toward stated goals and plan of care.     Thank you for this referral.  Haresh Ignacio, PT, DPT   Time Calculation: 25 mins

## 2020-09-26 LAB
ALBUMIN SERPL-MCNC: 3.3 G/DL (ref 3.5–5)
ALBUMIN/GLOB SERPL: 0.7 {RATIO} (ref 1.1–2.2)
ALP SERPL-CCNC: 94 U/L (ref 45–117)
ALT SERPL-CCNC: 40 U/L (ref 12–78)
ANION GAP SERPL CALC-SCNC: 6 MMOL/L (ref 5–15)
AST SERPL-CCNC: 14 U/L (ref 15–37)
BASOPHILS # BLD: 0 K/UL (ref 0–0.1)
BASOPHILS NFR BLD: 0 % (ref 0–1)
BILIRUB SERPL-MCNC: 0.4 MG/DL (ref 0.2–1)
BUN SERPL-MCNC: 11 MG/DL (ref 6–20)
BUN/CREAT SERPL: 19 (ref 12–20)
CALCIUM SERPL-MCNC: 9.4 MG/DL (ref 8.5–10.1)
CHLORIDE SERPL-SCNC: 99 MMOL/L (ref 97–108)
CO2 SERPL-SCNC: 31 MMOL/L (ref 21–32)
CREAT SERPL-MCNC: 0.57 MG/DL (ref 0.7–1.3)
DIFFERENTIAL METHOD BLD: ABNORMAL
EOSINOPHIL # BLD: 0.2 K/UL (ref 0–0.4)
EOSINOPHIL NFR BLD: 2 % (ref 0–7)
ERYTHROCYTE [DISTWIDTH] IN BLOOD BY AUTOMATED COUNT: 14.3 % (ref 11.5–14.5)
GLOBULIN SER CALC-MCNC: 4.5 G/DL (ref 2–4)
GLUCOSE SERPL-MCNC: 119 MG/DL (ref 65–100)
HCT VFR BLD AUTO: 34.3 % (ref 36.6–50.3)
HGB BLD-MCNC: 10.9 G/DL (ref 12.1–17)
IMM GRANULOCYTES # BLD AUTO: 0.1 K/UL (ref 0–0.04)
IMM GRANULOCYTES NFR BLD AUTO: 1 % (ref 0–0.5)
LYMPHOCYTES # BLD: 1.5 K/UL (ref 0.8–3.5)
LYMPHOCYTES NFR BLD: 19 % (ref 12–49)
MAGNESIUM SERPL-MCNC: 1.8 MG/DL (ref 1.6–2.4)
MCH RBC QN AUTO: 27.9 PG (ref 26–34)
MCHC RBC AUTO-ENTMCNC: 31.8 G/DL (ref 30–36.5)
MCV RBC AUTO: 87.9 FL (ref 80–99)
MONOCYTES # BLD: 0.8 K/UL (ref 0–1)
MONOCYTES NFR BLD: 10 % (ref 5–13)
NEUTS SEG # BLD: 5.4 K/UL (ref 1.8–8)
NEUTS SEG NFR BLD: 68 % (ref 32–75)
NRBC # BLD: 0 K/UL (ref 0–0.01)
NRBC BLD-RTO: 0 PER 100 WBC
PHOSPHATE SERPL-MCNC: 5 MG/DL (ref 2.6–4.7)
PLATELET # BLD AUTO: 283 K/UL (ref 150–400)
PMV BLD AUTO: 10.6 FL (ref 8.9–12.9)
POTASSIUM SERPL-SCNC: 3.5 MMOL/L (ref 3.5–5.1)
PROT SERPL-MCNC: 7.8 G/DL (ref 6.4–8.2)
RBC # BLD AUTO: 3.9 M/UL (ref 4.1–5.7)
SODIUM SERPL-SCNC: 136 MMOL/L (ref 136–145)
WBC # BLD AUTO: 8 K/UL (ref 4.1–11.1)

## 2020-09-26 PROCEDURE — 80053 COMPREHEN METABOLIC PANEL: CPT

## 2020-09-26 PROCEDURE — 74011250637 HC RX REV CODE- 250/637: Performed by: INTERNAL MEDICINE

## 2020-09-26 PROCEDURE — 74011250637 HC RX REV CODE- 250/637: Performed by: EMERGENCY MEDICINE

## 2020-09-26 PROCEDURE — 74011250636 HC RX REV CODE- 250/636: Performed by: EMERGENCY MEDICINE

## 2020-09-26 PROCEDURE — 84100 ASSAY OF PHOSPHORUS: CPT

## 2020-09-26 PROCEDURE — 36415 COLL VENOUS BLD VENIPUNCTURE: CPT

## 2020-09-26 PROCEDURE — 83735 ASSAY OF MAGNESIUM: CPT

## 2020-09-26 PROCEDURE — 85025 COMPLETE CBC W/AUTO DIFF WBC: CPT

## 2020-09-26 PROCEDURE — 74011250637 HC RX REV CODE- 250/637: Performed by: NURSE PRACTITIONER

## 2020-09-26 PROCEDURE — 74011000250 HC RX REV CODE- 250: Performed by: EMERGENCY MEDICINE

## 2020-09-26 PROCEDURE — 94003 VENT MGMT INPAT SUBQ DAY: CPT

## 2020-09-26 PROCEDURE — 74011250637 HC RX REV CODE- 250/637: Performed by: HOSPITALIST

## 2020-09-26 PROCEDURE — 74011250636 HC RX REV CODE- 250/636: Performed by: NURSE PRACTITIONER

## 2020-09-26 PROCEDURE — 65610000006 HC RM INTENSIVE CARE

## 2020-09-26 RX ORDER — METOPROLOL TARTRATE 25 MG/1
50 TABLET, FILM COATED ORAL 2 TIMES DAILY
Status: DISCONTINUED | OUTPATIENT
Start: 2020-09-26 | End: 2020-09-27

## 2020-09-26 RX ORDER — LORAZEPAM 2 MG/ML
1 INJECTION INTRAMUSCULAR ONCE
Status: COMPLETED | OUTPATIENT
Start: 2020-09-26 | End: 2020-09-26

## 2020-09-26 RX ADMIN — Medication 10 ML: at 07:34

## 2020-09-26 RX ADMIN — NYSTATIN 500000 UNITS: 500000 SUSPENSION ORAL at 21:43

## 2020-09-26 RX ADMIN — LORAZEPAM 1 MG: 2 INJECTION INTRAMUSCULAR; INTRAVENOUS at 13:43

## 2020-09-26 RX ADMIN — Medication 10 ML: at 21:54

## 2020-09-26 RX ADMIN — LOPERAMIDE HYDROCHLORIDE 2 MG: 2 CAPSULE ORAL at 22:28

## 2020-09-26 RX ADMIN — METOCLOPRAMIDE HYDROCHLORIDE 5 MG: 5 SOLUTION ORAL at 11:17

## 2020-09-26 RX ADMIN — Medication: at 17:00

## 2020-09-26 RX ADMIN — ALPRAZOLAM 1 MG: 0.5 TABLET ORAL at 21:43

## 2020-09-26 RX ADMIN — ALPRAZOLAM 1 MG: 0.5 TABLET ORAL at 07:34

## 2020-09-26 RX ADMIN — CHLORHEXIDINE GLUCONATE 15 ML: 0.12 RINSE ORAL at 21:44

## 2020-09-26 RX ADMIN — FAMOTIDINE 20 MG: 40 POWDER, FOR SUSPENSION ORAL at 21:44

## 2020-09-26 RX ADMIN — ALPRAZOLAM 1 MG: 0.5 TABLET ORAL at 13:27

## 2020-09-26 RX ADMIN — ASPIRIN 81 MG CHEWABLE TABLET 81 MG: 81 TABLET CHEWABLE at 08:02

## 2020-09-26 RX ADMIN — Medication 10 ML: at 13:28

## 2020-09-26 RX ADMIN — AMOXICILLIN AND CLAVULANATE POTASSIUM 1 TABLET: 875; 125 TABLET, FILM COATED ORAL at 08:02

## 2020-09-26 RX ADMIN — NYSTATIN 500000 UNITS: 500000 SUSPENSION ORAL at 13:00

## 2020-09-26 RX ADMIN — METOCLOPRAMIDE HYDROCHLORIDE 5 MG: 5 SOLUTION ORAL at 17:01

## 2020-09-26 RX ADMIN — ALUMINUM HYDROXIDE AND MAGNESIUM HYDROXIDE 15 ML: 200; 200 SUSPENSION ORAL at 11:16

## 2020-09-26 RX ADMIN — METOPROLOL TARTRATE 37.5 MG: 25 TABLET, FILM COATED ORAL at 08:03

## 2020-09-26 RX ADMIN — LOPERAMIDE HYDROCHLORIDE 2 MG: 2 CAPSULE ORAL at 12:07

## 2020-09-26 RX ADMIN — METOCLOPRAMIDE HYDROCHLORIDE 5 MG: 5 SOLUTION ORAL at 00:21

## 2020-09-26 RX ADMIN — POTASSIUM BICARBONATE 40 MEQ: 782 TABLET, EFFERVESCENT ORAL at 08:02

## 2020-09-26 RX ADMIN — MICONAZOLE NITRATE 2 % TOPICAL POWDER: at 09:00

## 2020-09-26 RX ADMIN — Medication: at 08:03

## 2020-09-26 RX ADMIN — ENOXAPARIN SODIUM 30 MG: 30 INJECTION SUBCUTANEOUS at 11:17

## 2020-09-26 RX ADMIN — NYSTATIN 500000 UNITS: 500000 SUSPENSION ORAL at 15:55

## 2020-09-26 RX ADMIN — NYSTATIN 500000 UNITS: 500000 SUSPENSION ORAL at 08:00

## 2020-09-26 RX ADMIN — METOPROLOL TARTRATE 50 MG: 25 TABLET, FILM COATED ORAL at 17:01

## 2020-09-26 RX ADMIN — TRAZODONE HYDROCHLORIDE 100 MG: 100 TABLET ORAL at 21:43

## 2020-09-26 RX ADMIN — METOCLOPRAMIDE HYDROCHLORIDE 5 MG: 5 SOLUTION ORAL at 07:34

## 2020-09-26 RX ADMIN — AMOXICILLIN AND CLAVULANATE POTASSIUM 1 TABLET: 875; 125 TABLET, FILM COATED ORAL at 21:43

## 2020-09-26 RX ADMIN — LORAZEPAM 1 MG: 2 INJECTION INTRAMUSCULAR; INTRAVENOUS at 22:28

## 2020-09-26 RX ADMIN — MICONAZOLE NITRATE 2 % TOPICAL POWDER: at 18:00

## 2020-09-26 RX ADMIN — LORAZEPAM 1 MG: 2 INJECTION INTRAMUSCULAR; INTRAVENOUS at 16:55

## 2020-09-26 RX ADMIN — LOPERAMIDE HYDROCHLORIDE 2 MG: 2 CAPSULE ORAL at 17:01

## 2020-09-26 RX ADMIN — FAMOTIDINE 20 MG: 40 POWDER, FOR SUSPENSION ORAL at 08:02

## 2020-09-26 RX ADMIN — ENOXAPARIN SODIUM 30 MG: 30 INJECTION SUBCUTANEOUS at 22:28

## 2020-09-26 RX ADMIN — ACETAZOLAMIDE SODIUM 500 MG: 500 INJECTION, POWDER, LYOPHILIZED, FOR SOLUTION INTRAVENOUS at 11:17

## 2020-09-26 RX ADMIN — ACETAMINOPHEN ORAL SOLUTION 650 MG: 650 SOLUTION ORAL at 13:47

## 2020-09-26 RX ADMIN — CHLORHEXIDINE GLUCONATE 15 ML: 0.12 RINSE ORAL at 08:00

## 2020-09-26 RX ADMIN — ACETAZOLAMIDE SODIUM 500 MG: 500 INJECTION, POWDER, LYOPHILIZED, FOR SOLUTION INTRAVENOUS at 21:50

## 2020-09-26 RX ADMIN — LOPERAMIDE HYDROCHLORIDE 2 MG: 2 CAPSULE ORAL at 08:02

## 2020-09-26 NOTE — PROGRESS NOTES
[facility]    ICU TEAM Progress Note    Name: Eliana Tolbert   : 1990   MRN: [id number]   Date: 2020      I  Subjective:   Progress Note: 2020      Reason for ICU Admission: Respiratory failure due to COVID-19 infection    Interval history:  77-year-old male with no history of significant past medical history except smoking half pack per day. [facility] was admitted on 2020 at [facility] with acute hypoxemic respiratory failure from COVID pneumonia. [facility] continued to deteriorate and got intubated on 8/10.  Due to worsening hypoxemia, he is transferred to [facility] for ECMO evaluation. [facility] has bee given convalescent plasma twice on  and  and treated with Remdesivir which was completed on 8/10. [facility] is also on dexamethasone. [facility] completed course of empiric antibiotics including azithromycin which was completed on  and cefepime was completed on .  During his hospital course he also developed pneumothorax and has left-sided chest tube since .  Has a tracheostomy tube placed on .  On September 3 patient had significant worsening on his right arm, CT head was unrevealing.  He is already on high-dose Lovenox and aspirin.  On  he had acute weakness on the left arm code stroke was called and again CTA was unrevealing.  Since then left arm is back to baseline, showed some improvement on right arm but still weak compared to left with minimal ability to move against gravity. Mental status continues to improve but now he is very anxious with recurrent episode of nausea and vomiting associated with tachycardia.  Now s/p PEG     Overnight Events:   No major events ON    Active Problem List:     Problem List  Date Reviewed: 2020          Codes Class    [facility] ICD-10-CM: J96.00  ICD-9-CM: 518.81         Pneumothorax on left ICD-10-CM: J93.9  ICD-9-CM: 512.89         Pneumonia due to severe acute respiratory syndrome coronavirus 2 (SARS-CoV-2) ICD-10-CM: U07.1, J12.89  ICD-9-CM: 480.8         Respiratory failure with hypoxia (HCC) ICD-10-CM: J96.91  ICD-9-CM: 518.81               Past Medical History:      has a past medical history of History of vascular access device (08/10/2020). Past Surgical History:      has a past surgical history that includes ir thora/ins chest tube(pneumo) wo image (2020); ir thora/ins chest tube(pneumo) wo image (2020); pr tracheostomy, planned (2020); ir percut gastrostromy tube (2020); and ir insert gastrostomy tube perc (2020). Home Medications:     Prior to Admission medications    Medication Sig Start Date End Date Taking? Authorizing Provider   benzonatate (Tessalon Perles) 100 mg capsule Take 100 mg by mouth three (3) times daily as needed for Cough. Provider, Historical       Allergies/Social/Family History:     No Known Allergies   Social History     Tobacco Use    Smoking status: Current Some Day Smoker    Smokeless tobacco: Never Used   Substance Use Topics    Alcohol use: Not on file      History reviewed. No pertinent family history. Objective:   Vital Signs:  Visit Vitals  BP (!) 140/91   Pulse 99   Temp 98.6 °F (37 °C)   Resp 22   Ht 5' 11\" (1.803 m)   Wt 89.5 kg (197 lb 5 oz)   SpO2 97%   BMI 27.52 kg/m²    O2 Flow Rate (L/min): 10 l/min O2 Device: Tracheostomy, Ventilator Temp (24hrs), Av.3 °F (37.4 °C), Min:98.6 °F (37 °C), Max:99.8 °F (37.7 °C)           Intake/Output:     Intake/Output Summary (Last 24 hours) at 2020 1130  Last data filed at 2020 1122  Gross per 24 hour   Intake 2660 ml   Output 1231 ml   Net 1429 ml       Physical Exam:  General:  Awake and interactive. Writing on communication board. Frustrated with speed of communication but this is understandable. Eyes:  Sclera anicteric. Pupils equally round and reactive to light.    Mouth/Throat: Mucous membranes normal, oral pharynx clear   Neck: Supple, tracheostomy tube in place   Lungs:    Good air entry bilaterally, fine crepitation   CV:  Regular rate and rhythm,no murmur, click, rub or gallop   Abdomen:   Soft, non-tender. bowel sounds normal. non-distended   Extremities: No cyanosis , evolving edema   Skin: Skin color, texture, turgor normal. no acute rash or lesions   Lymph nodes: Cervical and supraclavicular normal   Musculoskeletal: No swelling or deformity   Lines/Devices:  Intact, no erythema, drainage or tenderness   Psych:  conscious and alert, follows command, anxious, still weak on right arm but able to move minimally against gravity        LABS AND  DATA: Personally reviewed  Recent Labs     09/26/20 0439 09/25/20 0420   WBC 8.0 7.5   HGB 10.9* 10.1*   HCT 34.3* 32.1*    283     Recent Labs     09/26/20 0439 09/25/20 0420    136   K 3.5 3.3*   CL 99 98   CO2 31 29   BUN 11 9   CREA 0.57* 0.54*   * 110*   CA 9.4 9.8   MG 1.8 1.8   PHOS 5.0* 5.0*     Recent Labs     09/26/20 0439 09/25/20 0420   AP 94 98   TP 7.8 7.8   ALB 3.3* 3.4*   GLOB 4.5* 4.4*     No results for input(s): INR, PTP, APTT, INREXT, INREXT in the last 72 hours. No results for input(s): PHI, PCO2I, PO2I, FIO2I in the last 72 hours. No results for input(s): CPK, CKMB, TROIQ, BNPP in the last 72 hours. Hemodynamics:   PAP:   CO:     Wedge:   CI:     CVP:    SVR:       PVR:       Ventilator Settings:  Mode Rate Tidal Volume Pressure FiO2 PEEP   Spontaneous   12 ml  12 cm H2O 35 % 5 cm H20     Peak airway pressure: 19 cm H2O    Minute ventilation: 8.33 l/min        MEDS: Reviewed    Chest X-Ray:  CXR Results  (Last 48 hours)               09/25/20 0917  XR CHEST PORT Final result    Impression:  IMPRESSION: Diffuse interstitial and airspace opacities with some interval   increase in the right upper lobe.        Narrative:  EXAM: XR CHEST PORT       INDICATION: PNA       COMPARISON: 9/22/2020       FINDINGS: A portable AP radiograph of the chest was obtained at 910 hours. Patient's on cardiac monitor. A tracheostomy tube is noted. . Low lung volumes   are present with diffuse interstitial and airspace opacities slightly increased   on the right. . Heart size is borderline. .  Gaseous distention of the gastric   lumen is present. .                  Assessment and Plan:   -Bilateral pneumonia due to COVID-19 infection  -Acute right arm weakness:  Patient already on aspirin and moderate dose anticoagulation.  Still pending CTA head and neck, CT head showed no bleed. -Right arm weakness  -Fever: No clear etiology, culture remain negative, images not suggestive of acute infectious process  - Acute hypoxic respiratory failure due to COVID-19 infection status post tracheostomy on August 26  - Pneumothorax status post left chest tube insertion on August 11 without air leak  - Possible bacterial super imposed infection: Completed antibiotic course  -History of tobacco use         -Off precedex. On Xanax and trazodone @ night- Vent weaning as tolerated  -Right arm/lefty leg weakness - MRI showed 1. Evolving cortical/subcortical encephalomalacia and atrophy in the bilateral  frontoparietal lobes with signal changes - this may represent evolving ischemic changes from a hypoxic-ischemic  event, vasculitic involvement from COVID, or evolving encephalomalacia from a  infectious/inflammatory process related to COVID. 2. Small area of signal abnormality with restricted diffusion in the right  posterior body of the corpus callosum, which likely represents the same process  as above, again either ischemic or infectious/inflammatory. 3. Chronic area of encephalomalacia in the right cerebellum with associated  chronic hemorrhage. 4. Complete opacification of the right sphenoid sinus and near complete  opacification of the right maxillary sinus with air-fluid level, suspicious for  acute sinusitis.  Large bilateral mastoid effusions - completed augmentin  PT/OT as tolerated  Power is improving on right arm but still significantly weaker than left    - intermittent vomiting - continue Reglan, zofran, compazine, ativan  - Completed COVID-19 treatment  - DVT prophylaxis with Lovenox.  GI prophylaxis.  Nutritional support  -placement planning  -cont nystatin for oral thrush  -SBTs/TC trials daily - doing better everyday      DISPOSITION  Stay in ICU while on vent    I personally spent 35 minutes of time. This is time spent actively involved in patient care as well as the coordination of care and/or discussions with the patient's family.

## 2020-09-26 NOTE — PROGRESS NOTES
Bedside shift change report given to Lizet Meade RN (oncoming nurse) by Omaira Velasquez RN (offgoing nurse). Report included the following information SBAR, Kardex, ED Summary, Procedure Summary, Intake/Output, MAR, Recent Results, Med Rec Status, Cardiac Rhythm Sinus Tach, Alarm Parameters , Quality Measures and Dual Neuro Assessment. Primary Nurse Paola Lundberg. Aaron Batres RN and Omaira Velasquez RN performed a dual skin assessment on this patient Impairment noted- see wound doc flow sheet  Enrique score is 15    -2 PIV  -Trach-abrasions surrounding trach  -rash to back    1246: Pt placed on trach collar by Maura RT.    1330: Pt placed in recliner chair using nhan lift by mobility team.    1546: Pt placed back in ventilator per pt request by Maura RT.     1800: Pt nhan lifted back in to bed by mobility team.     Bedside shift change report given to Arianna (oncoming nurse) by Lizet Meade RN (offgoing nurse). Report included the following information SBAR, Kardex, ED Summary, Procedure Summary, Intake/Output, MAR, Recent Results, Med Rec Status, Cardiac Rhythm Sinus Tach, Alarm Parameters , Quality Measures and Dual Neuro Assessment.

## 2020-09-26 NOTE — PROGRESS NOTES
Problem: Ventilator Management  Goal: *Adequate oxygenation and ventilation  Outcome: Progressing Towards Goal  Goal: *Patient maintains clear airway/free of aspiration  Outcome: Progressing Towards Goal

## 2020-09-27 LAB
ALBUMIN SERPL-MCNC: 3.6 G/DL (ref 3.5–5)
ALBUMIN/GLOB SERPL: 0.7 {RATIO} (ref 1.1–2.2)
ALP SERPL-CCNC: 107 U/L (ref 45–117)
ALT SERPL-CCNC: 41 U/L (ref 12–78)
ANION GAP SERPL CALC-SCNC: 8 MMOL/L (ref 5–15)
AST SERPL-CCNC: 19 U/L (ref 15–37)
BASOPHILS # BLD: 0.1 K/UL (ref 0–0.1)
BASOPHILS NFR BLD: 1 % (ref 0–1)
BILIRUB SERPL-MCNC: 0.5 MG/DL (ref 0.2–1)
BUN SERPL-MCNC: 10 MG/DL (ref 6–20)
BUN/CREAT SERPL: 16 (ref 12–20)
CALCIUM SERPL-MCNC: 9.9 MG/DL (ref 8.5–10.1)
CHLORIDE SERPL-SCNC: 99 MMOL/L (ref 97–108)
CO2 SERPL-SCNC: 27 MMOL/L (ref 21–32)
CREAT SERPL-MCNC: 0.62 MG/DL (ref 0.7–1.3)
DIFFERENTIAL METHOD BLD: ABNORMAL
EOSINOPHIL # BLD: 0.3 K/UL (ref 0–0.4)
EOSINOPHIL NFR BLD: 3 % (ref 0–7)
ERYTHROCYTE [DISTWIDTH] IN BLOOD BY AUTOMATED COUNT: 14.3 % (ref 11.5–14.5)
GLOBULIN SER CALC-MCNC: 5.2 G/DL (ref 2–4)
GLUCOSE SERPL-MCNC: 97 MG/DL (ref 65–100)
HCT VFR BLD AUTO: 39.1 % (ref 36.6–50.3)
HGB BLD-MCNC: 12.4 G/DL (ref 12.1–17)
IMM GRANULOCYTES # BLD AUTO: 0.1 K/UL (ref 0–0.04)
IMM GRANULOCYTES NFR BLD AUTO: 1 % (ref 0–0.5)
LYMPHOCYTES # BLD: 2.1 K/UL (ref 0.8–3.5)
LYMPHOCYTES NFR BLD: 22 % (ref 12–49)
MAGNESIUM SERPL-MCNC: 2.1 MG/DL (ref 1.6–2.4)
MCH RBC QN AUTO: 27.7 PG (ref 26–34)
MCHC RBC AUTO-ENTMCNC: 31.7 G/DL (ref 30–36.5)
MCV RBC AUTO: 87.5 FL (ref 80–99)
MONOCYTES # BLD: 0.8 K/UL (ref 0–1)
MONOCYTES NFR BLD: 9 % (ref 5–13)
NEUTS SEG # BLD: 6.2 K/UL (ref 1.8–8)
NEUTS SEG NFR BLD: 64 % (ref 32–75)
NRBC # BLD: 0 K/UL (ref 0–0.01)
NRBC BLD-RTO: 0 PER 100 WBC
PHOSPHATE SERPL-MCNC: 4.4 MG/DL (ref 2.6–4.7)
PLATELET # BLD AUTO: 324 K/UL (ref 150–400)
PMV BLD AUTO: 11 FL (ref 8.9–12.9)
POTASSIUM SERPL-SCNC: 3.6 MMOL/L (ref 3.5–5.1)
PROT SERPL-MCNC: 8.8 G/DL (ref 6.4–8.2)
RBC # BLD AUTO: 4.47 M/UL (ref 4.1–5.7)
SODIUM SERPL-SCNC: 134 MMOL/L (ref 136–145)
WBC # BLD AUTO: 9.6 K/UL (ref 4.1–11.1)

## 2020-09-27 PROCEDURE — 74011250637 HC RX REV CODE- 250/637: Performed by: NURSE PRACTITIONER

## 2020-09-27 PROCEDURE — 85025 COMPLETE CBC W/AUTO DIFF WBC: CPT

## 2020-09-27 PROCEDURE — 65610000006 HC RM INTENSIVE CARE

## 2020-09-27 PROCEDURE — 74011250637 HC RX REV CODE- 250/637: Performed by: INTERNAL MEDICINE

## 2020-09-27 PROCEDURE — 74011250636 HC RX REV CODE- 250/636: Performed by: EMERGENCY MEDICINE

## 2020-09-27 PROCEDURE — 94640 AIRWAY INHALATION TREATMENT: CPT

## 2020-09-27 PROCEDURE — 74011250637 HC RX REV CODE- 250/637: Performed by: HOSPITALIST

## 2020-09-27 PROCEDURE — 5A1955Z RESPIRATORY VENTILATION, GREATER THAN 96 CONSECUTIVE HOURS: ICD-10-PCS | Performed by: INTERNAL MEDICINE

## 2020-09-27 PROCEDURE — 84100 ASSAY OF PHOSPHORUS: CPT

## 2020-09-27 PROCEDURE — 74011000250 HC RX REV CODE- 250: Performed by: HOSPITALIST

## 2020-09-27 PROCEDURE — 36415 COLL VENOUS BLD VENIPUNCTURE: CPT

## 2020-09-27 PROCEDURE — 83735 ASSAY OF MAGNESIUM: CPT

## 2020-09-27 PROCEDURE — 74011250637 HC RX REV CODE- 250/637: Performed by: EMERGENCY MEDICINE

## 2020-09-27 PROCEDURE — 80053 COMPREHEN METABOLIC PANEL: CPT

## 2020-09-27 PROCEDURE — 94003 VENT MGMT INPAT SUBQ DAY: CPT

## 2020-09-27 RX ORDER — METOPROLOL TARTRATE 50 MG/1
100 TABLET ORAL 2 TIMES DAILY
Status: DISCONTINUED | OUTPATIENT
Start: 2020-09-27 | End: 2020-10-12 | Stop reason: HOSPADM

## 2020-09-27 RX ORDER — TEMAZEPAM 15 MG/1
15 CAPSULE ORAL
Status: DISCONTINUED | OUTPATIENT
Start: 2020-09-27 | End: 2020-10-02

## 2020-09-27 RX ADMIN — LOPERAMIDE HYDROCHLORIDE 2 MG: 2 CAPSULE ORAL at 17:27

## 2020-09-27 RX ADMIN — Medication 10 ML: at 05:35

## 2020-09-27 RX ADMIN — MICONAZOLE NITRATE 2 % TOPICAL POWDER: at 17:27

## 2020-09-27 RX ADMIN — METOPROLOL TARTRATE 50 MG: 25 TABLET, FILM COATED ORAL at 08:56

## 2020-09-27 RX ADMIN — NYSTATIN 500000 UNITS: 500000 SUSPENSION ORAL at 22:13

## 2020-09-27 RX ADMIN — LORAZEPAM 1 MG: 2 INJECTION INTRAMUSCULAR; INTRAVENOUS at 17:25

## 2020-09-27 RX ADMIN — TRAZODONE HYDROCHLORIDE 100 MG: 100 TABLET ORAL at 22:13

## 2020-09-27 RX ADMIN — NYSTATIN 500000 UNITS: 500000 SUSPENSION ORAL at 08:56

## 2020-09-27 RX ADMIN — ENOXAPARIN SODIUM 30 MG: 30 INJECTION SUBCUTANEOUS at 22:13

## 2020-09-27 RX ADMIN — LORAZEPAM 1 MG: 2 INJECTION INTRAMUSCULAR; INTRAVENOUS at 05:34

## 2020-09-27 RX ADMIN — ACETAMINOPHEN ORAL SOLUTION 650 MG: 650 SOLUTION ORAL at 10:22

## 2020-09-27 RX ADMIN — MICONAZOLE NITRATE 2 % TOPICAL POWDER: at 08:57

## 2020-09-27 RX ADMIN — ASPIRIN 81 MG CHEWABLE TABLET 81 MG: 81 TABLET CHEWABLE at 08:56

## 2020-09-27 RX ADMIN — METOCLOPRAMIDE HYDROCHLORIDE 5 MG: 5 SOLUTION ORAL at 05:34

## 2020-09-27 RX ADMIN — ALPRAZOLAM 1 MG: 0.5 TABLET ORAL at 20:25

## 2020-09-27 RX ADMIN — METOCLOPRAMIDE HYDROCHLORIDE 5 MG: 5 SOLUTION ORAL at 17:27

## 2020-09-27 RX ADMIN — ALPRAZOLAM 1 MG: 0.5 TABLET ORAL at 05:34

## 2020-09-27 RX ADMIN — POTASSIUM BICARBONATE 40 MEQ: 782 TABLET, EFFERVESCENT ORAL at 08:56

## 2020-09-27 RX ADMIN — LOPERAMIDE HYDROCHLORIDE 2 MG: 2 CAPSULE ORAL at 20:26

## 2020-09-27 RX ADMIN — ALUMINUM HYDROXIDE AND MAGNESIUM HYDROXIDE 15 ML: 200; 200 SUSPENSION ORAL at 13:16

## 2020-09-27 RX ADMIN — NYSTATIN 500000 UNITS: 500000 SUSPENSION ORAL at 12:07

## 2020-09-27 RX ADMIN — CHLORHEXIDINE GLUCONATE 15 ML: 0.12 RINSE ORAL at 08:57

## 2020-09-27 RX ADMIN — IPRATROPIUM BROMIDE AND ALBUTEROL SULFATE 3 ML: .5; 3 SOLUTION RESPIRATORY (INHALATION) at 20:42

## 2020-09-27 RX ADMIN — ENOXAPARIN SODIUM 30 MG: 30 INJECTION SUBCUTANEOUS at 10:22

## 2020-09-27 RX ADMIN — LOPERAMIDE HYDROCHLORIDE 2 MG: 2 CAPSULE ORAL at 08:56

## 2020-09-27 RX ADMIN — Medication: at 08:57

## 2020-09-27 RX ADMIN — METOCLOPRAMIDE HYDROCHLORIDE 5 MG: 5 SOLUTION ORAL at 00:31

## 2020-09-27 RX ADMIN — NYSTATIN 500000 UNITS: 500000 SUSPENSION ORAL at 15:58

## 2020-09-27 RX ADMIN — OXYCODONE HYDROCHLORIDE 5 MG: 5 SOLUTION ORAL at 20:29

## 2020-09-27 RX ADMIN — TRAZODONE HYDROCHLORIDE 100 MG: 100 TABLET ORAL at 20:27

## 2020-09-27 RX ADMIN — ALPRAZOLAM 1 MG: 0.5 TABLET ORAL at 13:16

## 2020-09-27 RX ADMIN — ONDANSETRON 4 MG: 2 INJECTION INTRAMUSCULAR; INTRAVENOUS at 20:27

## 2020-09-27 RX ADMIN — ALUMINUM HYDROXIDE AND MAGNESIUM HYDROXIDE 15 ML: 200; 200 SUSPENSION ORAL at 07:09

## 2020-09-27 RX ADMIN — FAMOTIDINE 20 MG: 40 POWDER, FOR SUSPENSION ORAL at 08:56

## 2020-09-27 RX ADMIN — METOCLOPRAMIDE HYDROCHLORIDE 5 MG: 5 SOLUTION ORAL at 12:07

## 2020-09-27 RX ADMIN — LOPERAMIDE HYDROCHLORIDE 2 MG: 2 CAPSULE ORAL at 13:16

## 2020-09-27 RX ADMIN — METOPROLOL TARTRATE 100 MG: 25 TABLET, FILM COATED ORAL at 17:27

## 2020-09-27 RX ADMIN — TEMAZEPAM 15 MG: 15 CAPSULE ORAL at 20:26

## 2020-09-27 RX ADMIN — Medication: at 17:28

## 2020-09-27 RX ADMIN — CHLORHEXIDINE GLUCONATE 15 ML: 0.12 RINSE ORAL at 21:00

## 2020-09-27 RX ADMIN — FAMOTIDINE 20 MG: 40 POWDER, FOR SUSPENSION ORAL at 21:00

## 2020-09-27 NOTE — PROGRESS NOTES
Bedside shift change report given to Joselin Barrientos RN (oncoming nurse) by Malissa Chowdhury RN (offgoing nurse). Report included the following information SBAR, Kardex, ED Summary, Procedure Summary, Intake/Output, MAR, Recent Results, Med Rec Status, Cardiac Rhythm Sinus Tach, Alarm Parameters , Quality Measures and Dual Neuro Assessment. 1200: Pt nhan lifted to recliner chair. 1525: Pt placed on trach collar by Beatriz RT.     1545: Pt nhan lifted back to bed to have a BM. 1715: Pt placed back on ventilator by Beatriz RT, due to pt feeling anxious that his family is going home for the day. Bedside shift change report given to Ana Laura Allen RN (oncoming nurse) by Joselin Barrientos RN (offgoing nurse). Report included the following information SBAR, Kardex, ED Summary, Procedure Summary, Intake/Output, MAR, Recent Results, Med Rec Status, Cardiac Rhythm NSR, Alarm Parameters , Quality Measures and Dual Neuro Assessment.

## 2020-09-27 NOTE — PROGRESS NOTES
Problem: Ventilator Management  Goal: *Adequate oxygenation and ventilation  Outcome: Progressing Towards Goal  Goal: *Patient maintains clear airway/free of aspiration  Outcome: Progressing Towards Goal  Goal: *Absence of infection signs and symptoms  Outcome: Progressing Towards Goal  Goal: *Normal spontaneous ventilation  Outcome: Progressing Towards Goal     Problem: Pressure Injury - Risk of  Goal: *Prevention of pressure injury  Description: Document Enrique Scale and appropriate interventions in the flowsheet. Outcome: Progressing Towards Goal  Note: Pressure Injury Interventions:  Sensory Interventions: Assess changes in LOC, Avoid rigorous massage over bony prominences, Check visual cues for pain, Float heels, Keep linens dry and wrinkle-free, Minimize linen layers, Pressure redistribution bed/mattress (bed type), Turn and reposition approx. every two hours (pillows and wedges if needed)    Moisture Interventions: Absorbent underpads, Apply protective barrier, creams and emollients, Check for incontinence Q2 hours and as needed, Internal/External urinary devices, Minimize layers, Offer toileting Q_hr    Activity Interventions: Pressure redistribution bed/mattress(bed type)    Mobility Interventions: Assess need for specialty bed, Pressure redistribution bed/mattress (bed type), Turn and reposition approx. every two hours(pillow and wedges)    Nutrition Interventions: Document food/fluid/supplement intake    Friction and Shear Interventions: Lift sheet, Minimize layers                Problem: Falls - Risk of  Goal: *Absence of Falls  Description: Document Adama Fall Risk and appropriate interventions in the flowsheet.   Outcome: Progressing Towards Goal  Note: Fall Risk Interventions:  Mobility Interventions: Communicate number of staff needed for ambulation/transfer    Mentation Interventions: Adequate sleep, hydration, pain control, Door open when patient unattended, Evaluate medications/consider consulting pharmacy, Eyeglasses and hearing aids, More frequent rounding, Reorient patient, Room close to nurse's station, Toileting rounds, Update white board    Medication Interventions: Evaluate medications/consider consulting pharmacy    Elimination Interventions: Call light in reach, Toileting schedule/hourly rounds    History of Falls Interventions: Door open when patient unattended, Evaluate medications/consider consulting pharmacy         Problem: Nutrition Deficit  Goal: *Optimize nutritional status  Outcome: Progressing Towards Goal     Problem: Breathing Pattern - Ineffective  Goal: *Absence of hypoxia  Outcome: Progressing Towards Goal  Goal: *Use of effective breathing techniques  Outcome: Progressing Towards Goal     Problem: Breathing Pattern - Ineffective  Goal: *Absence of hypoxia  Outcome: Progressing Towards Goal  Goal: *Use of effective breathing techniques  Outcome: Progressing Towards Goal  Goal: *PALLIATIVE CARE:  Alleviation of Dyspnea  Outcome: Progressing Towards Goal     Problem: Breathing Pattern - Ineffective  Goal: *Absence of hypoxia  Outcome: Progressing Towards Goal  Goal: *Use of effective breathing techniques  Outcome: Progressing Towards Goal  Goal: *PALLIATIVE CARE:  Alleviation of Dyspnea  Outcome: Progressing Towards Goal     Problem: Breathing Pattern - Ineffective  Goal: *Absence of hypoxia  Outcome: Progressing Towards Goal  Goal: *Use of effective breathing techniques  Outcome: Progressing Towards Goal  Goal: *PALLIATIVE CARE:  Alleviation of Dyspnea  Outcome: Progressing Towards Goal     Problem: Breathing Pattern - Ineffective  Goal: *Absence of hypoxia  Outcome: Progressing Towards Goal  Goal: *Use of effective breathing techniques  Outcome: Progressing Towards Goal  Goal: *PALLIATIVE CARE:  Alleviation of Dyspnea  Outcome: Progressing Towards Goal

## 2020-09-27 NOTE — PROGRESS NOTES
1930: Bedside shift change report given to [facility] (oncoming nurse) by Melanie Woods (offgoing nurse). Report included the following information SBAR, Kardex, Intake/Output, MAR, Accordion, Recent Results, Med Rec Status, Cardiac Rhythm ST and Dual Neuro Assessment. 2000: Assumed care of patient    0000: Reassesment completed    0400: Reassessment completed    2145: Patient requested to be on SBT, RT at bedside, patient now on SBT tolerating well. 0730: Bedside shift change report given to RN (oncoming nurse) by [facility] (offgoing nurse). Report included the following information SBAR, Kardex, Intake/Output, MAR, Accordion, Recent Results, Med Rec Status, Cardiac Rhythm ST and Dual Neuro Assessment. Shift summary: Patient on SBT for 11 hours during night, patient increasingly anxious, prn Ativan given twice, patient having frequent watery stool, prn immodium given once.

## 2020-09-27 NOTE — PROGRESS NOTES
[facility]    ICU TEAM Progress Note    Name: Francesca Walton   : 1990   MRN: [id number]   Date: 2020      I  Subjective:   Progress Note: 2020      Reason for ICU Admission: Respiratory failure due to COVID-19 infection    Interval history:  44-year-old male with no history of significant past medical history except smoking half pack per day. [facility] was admitted on 2020 at [facility] with acute hypoxemic respiratory failure from COVID pneumonia. [facility] continued to deteriorate and got intubated on 8/10.  Due to worsening hypoxemia, he is transferred to [facility] for ECMO evaluation. [facility] has bee given convalescent plasma twice on  and  and treated with Remdesivir which was completed on 8/10. [facility] is also on dexamethasone. [facility] completed course of empiric antibiotics including azithromycin which was completed on  and cefepime was completed on .  During his hospital course he also developed pneumothorax and has left-sided chest tube since .  Has a tracheostomy tube placed on .  On September 3 patient had significant worsening on his right arm, CT head was unrevealing.  He is already on high-dose Lovenox and aspirin.  On  he had acute weakness on the left arm code stroke was called and again CTA was unrevealing.  Since then left arm is back to baseline, showed some improvement on right arm but still weak compared to left with minimal ability to move against gravity. Mental status continues to improve but now he is very anxious with recurrent episode of nausea and vomiting associated with tachycardia.  Now s/p PEG     Overnight Events:   No major events ON    Active Problem List:     Problem List  Date Reviewed: 2020          Codes Class    Acute respiratory failure ([facility] ICD-10-CM: J96.00  ICD-9-CM: 518.81         Pneumothorax on left ICD-10-CM: J93.9  ICD-9-CM: 512.89         Pneumonia due to severe acute respiratory syndrome coronavirus 2 (SARS-CoV-2) ICD-10-CM: U07.1, J12.89  ICD-9-CM: 480.8         Respiratory failure with hypoxia (HCC) ICD-10-CM: J96.91  ICD-9-CM: 518.81               Past Medical History:      has a past medical history of History of vascular access device (08/10/2020). Past Surgical History:      has a past surgical history that includes ir thora/ins chest tube(pneumo) wo image (2020); ir thora/ins chest tube(pneumo) wo image (2020); pr tracheostomy, planned (2020); ir percut gastrostromy tube (2020); and ir insert gastrostomy tube perc (2020). Home Medications:     Prior to Admission medications    Medication Sig Start Date End Date Taking? Authorizing Provider   benzonatate (Tessalon Perles) 100 mg capsule Take 100 mg by mouth three (3) times daily as needed for Cough. Provider, Historical       Allergies/Social/Family History:     No Known Allergies   Social History     Tobacco Use    Smoking status: Current Some Day Smoker    Smokeless tobacco: Never Used   Substance Use Topics    Alcohol use: Not on file      History reviewed. No pertinent family history. Objective:   Vital Signs:  Visit Vitals  /83   Pulse (!) 118   Temp 98.8 °F (37.1 °C)   Resp 26   Ht 5' 11\" (1.803 m)   Wt 89.5 kg (197 lb 5 oz)   SpO2 98%   BMI 27.52 kg/m²    O2 Flow Rate (L/min): 10 l/min O2 Device: Tracheostomy, Tracheal collar Temp (24hrs), Av.6 °F (37 °C), Min:98.4 °F (36.9 °C), Max:98.8 °F (37.1 °C)           Intake/Output:     Intake/Output Summary (Last 24 hours) at 2020 1601  Last data filed at 2020 1200  Gross per 24 hour   Intake 1745 ml   Output 1510 ml   Net 235 ml       Physical Exam:  General:  Awake and interactive. Writing on communication board. Frustrated with speed of communication but this is understandable. Eyes:  Sclera anicteric. Pupils equally round and reactive to light.    Mouth/Throat: Mucous membranes normal, oral pharynx clear Neck: Supple, tracheostomy tube in place   Lungs:    Good air entry bilaterally, fine crepitation   CV:  Regular rate and rhythm,no murmur, click, rub or gallop   Abdomen:   Soft, non-tender. bowel sounds normal. non-distended   Extremities: No cyanosis , evolving edema   Skin: Skin color, texture, turgor normal. no acute rash or lesions   Lymph nodes: Cervical and supraclavicular normal   Musculoskeletal: No swelling or deformity   Lines/Devices:  Intact, no erythema, drainage or tenderness   Psych:  conscious and alert, follows command, anxious, still weak on right arm but able to move minimally against gravity        LABS AND  DATA: Personally reviewed  Recent Labs     09/27/20 0548 09/26/20 0439   WBC 9.6 8.0   HGB 12.4 10.9*   HCT 39.1 34.3*    283     Recent Labs     09/27/20 0548 09/26/20 0439   * 136   K 3.6 3.5   CL 99 99   CO2 27 31   BUN 10 11   CREA 0.62* 0.57*   GLU 97 119*   CA 9.9 9.4   MG 2.1 1.8   PHOS 4.4 5.0*     Recent Labs     09/27/20 0548 09/26/20 0439    94   TP 8.8* 7.8   ALB 3.6 3.3*   GLOB 5.2* 4.5*     No results for input(s): INR, PTP, APTT, INREXT, INREXT in the last 72 hours. No results for input(s): PHI, PCO2I, PO2I, FIO2I in the last 72 hours. No results for input(s): CPK, CKMB, TROIQ, BNPP in the last 72 hours. Hemodynamics:   PAP:   CO:     Wedge:   CI:     CVP:    SVR:       PVR:       Ventilator Settings:  Mode Rate Tidal Volume Pressure FiO2 PEEP   Spontaneous   12 ml  12 cm H2O 50 % 5 cm H20     Peak airway pressure: 18 cm H2O    Minute ventilation: 9.47 l/min        MEDS: Reviewed    Chest X-Ray:  CXR Results  (Last 48 hours)    None          Assessment and Plan:   -Bilateral pneumonia due to COVID-19 infection  -Acute right arm weakness:  Patient already on aspirin and moderate dose anticoagulation.  Still pending CTA head and neck, CT head showed no bleed.   -Right arm weakness  -Fever: No clear etiology, culture remain negative, images not suggestive of acute infectious process  - Acute hypoxic respiratory failure due to COVID-19 infection status post tracheostomy on August 26  - Pneumothorax status post left chest tube insertion on August 11 without air leak  - Possible bacterial super imposed infection: Completed antibiotic course  -History of tobacco use         -Off precedex. On Xanax and trazodone @ night- Vent weaning as tolerated  -Right arm/lefty leg weakness - MRI showed 1. Evolving cortical/subcortical encephalomalacia and atrophy in the bilateral  frontoparietal lobes with signal changes - this may represent evolving ischemic changes from a hypoxic-ischemic  event, vasculitic involvement from COVID, or evolving encephalomalacia from a  infectious/inflammatory process related to COVID. 2. Small area of signal abnormality with restricted diffusion in the right  posterior body of the corpus callosum, which likely represents the same process  as above, again either ischemic or infectious/inflammatory. 3. Chronic area of encephalomalacia in the right cerebellum with associated  chronic hemorrhage. 4. Complete opacification of the right sphenoid sinus and near complete  opacification of the right maxillary sinus with air-fluid level, suspicious for  acute sinusitis. Large bilateral mastoid effusions - completed augmentin  PT/OT as tolerated  Power is improving on right arm but still significantly weaker than left    - intermittent vomiting - continue Reglan, zofran, compazine, ativan as needed  - Completed COVID-19 treatment  - DVT prophylaxis with Lovenox.  GI prophylaxis.  Nutritional support  -placement planning  -cont nystatin for oral thrush  -SBTs/TC trials daily - doing better everyday      DISPOSITION  Stay in ICU while on vent    I personally spent 35 minutes of time. This is time spent actively involved in patient care as well as the coordination of care and/or discussions with the patient's family.        Signed By: Seamus Ireland Marisol Stiles MD     September 27, 2020

## 2020-09-28 PROCEDURE — 74011250637 HC RX REV CODE- 250/637: Performed by: EMERGENCY MEDICINE

## 2020-09-28 PROCEDURE — 74011250637 HC RX REV CODE- 250/637: Performed by: NURSE PRACTITIONER

## 2020-09-28 PROCEDURE — 74011250637 HC RX REV CODE- 250/637: Performed by: HOSPITALIST

## 2020-09-28 PROCEDURE — 65610000006 HC RM INTENSIVE CARE

## 2020-09-28 PROCEDURE — 74011250636 HC RX REV CODE- 250/636: Performed by: INTERNAL MEDICINE

## 2020-09-28 PROCEDURE — 97530 THERAPEUTIC ACTIVITIES: CPT

## 2020-09-28 PROCEDURE — 74011000250 HC RX REV CODE- 250: Performed by: INTERNAL MEDICINE

## 2020-09-28 PROCEDURE — 74011250636 HC RX REV CODE- 250/636: Performed by: EMERGENCY MEDICINE

## 2020-09-28 PROCEDURE — 94003 VENT MGMT INPAT SUBQ DAY: CPT

## 2020-09-28 RX ADMIN — MICONAZOLE NITRATE 2 % TOPICAL POWDER: at 17:07

## 2020-09-28 RX ADMIN — NYSTATIN 500000 UNITS: 500000 SUSPENSION ORAL at 13:31

## 2020-09-28 RX ADMIN — CHLORHEXIDINE GLUCONATE 15 ML: 0.12 RINSE ORAL at 20:13

## 2020-09-28 RX ADMIN — METOCLOPRAMIDE HYDROCHLORIDE 5 MG: 5 SOLUTION ORAL at 17:03

## 2020-09-28 RX ADMIN — OXYCODONE HYDROCHLORIDE 5 MG: 5 SOLUTION ORAL at 10:42

## 2020-09-28 RX ADMIN — METOCLOPRAMIDE HYDROCHLORIDE 5 MG: 5 SOLUTION ORAL at 00:05

## 2020-09-28 RX ADMIN — ENOXAPARIN SODIUM 30 MG: 30 INJECTION SUBCUTANEOUS at 10:42

## 2020-09-28 RX ADMIN — OXYCODONE HYDROCHLORIDE 5 MG: 5 SOLUTION ORAL at 23:40

## 2020-09-28 RX ADMIN — SODIUM CHLORIDE 10 MG: 9 INJECTION INTRAMUSCULAR; INTRAVENOUS; SUBCUTANEOUS at 12:17

## 2020-09-28 RX ADMIN — NYSTATIN 500000 UNITS: 500000 SUSPENSION ORAL at 21:35

## 2020-09-28 RX ADMIN — ALPRAZOLAM 1 MG: 0.5 TABLET ORAL at 06:09

## 2020-09-28 RX ADMIN — Medication: at 10:51

## 2020-09-28 RX ADMIN — CHLORHEXIDINE GLUCONATE 15 ML: 0.12 RINSE ORAL at 10:42

## 2020-09-28 RX ADMIN — TRAZODONE HYDROCHLORIDE 100 MG: 100 TABLET ORAL at 21:35

## 2020-09-28 RX ADMIN — ASPIRIN 81 MG CHEWABLE TABLET 81 MG: 81 TABLET CHEWABLE at 10:42

## 2020-09-28 RX ADMIN — Medication 10 ML: at 21:35

## 2020-09-28 RX ADMIN — ENOXAPARIN SODIUM 30 MG: 30 INJECTION SUBCUTANEOUS at 23:40

## 2020-09-28 RX ADMIN — METOCLOPRAMIDE HYDROCHLORIDE 5 MG: 5 SOLUTION ORAL at 12:17

## 2020-09-28 RX ADMIN — LORAZEPAM 1 MG: 2 INJECTION INTRAMUSCULAR; INTRAVENOUS at 05:25

## 2020-09-28 RX ADMIN — METOPROLOL TARTRATE 100 MG: 25 TABLET, FILM COATED ORAL at 10:42

## 2020-09-28 RX ADMIN — Medication 10 ML: at 06:09

## 2020-09-28 RX ADMIN — Medication 10 ML: at 06:08

## 2020-09-28 RX ADMIN — Medication 10 ML: at 13:31

## 2020-09-28 RX ADMIN — Medication 10 ML: at 13:32

## 2020-09-28 RX ADMIN — ALPRAZOLAM 1 MG: 0.5 TABLET ORAL at 13:31

## 2020-09-28 RX ADMIN — ALUMINUM HYDROXIDE AND MAGNESIUM HYDROXIDE 15 ML: 200; 200 SUSPENSION ORAL at 17:03

## 2020-09-28 RX ADMIN — FAMOTIDINE 20 MG: 40 POWDER, FOR SUSPENSION ORAL at 20:13

## 2020-09-28 RX ADMIN — METOPROLOL TARTRATE 100 MG: 25 TABLET, FILM COATED ORAL at 17:03

## 2020-09-28 RX ADMIN — POTASSIUM BICARBONATE 40 MEQ: 782 TABLET, EFFERVESCENT ORAL at 10:42

## 2020-09-28 RX ADMIN — NYSTATIN 500000 UNITS: 500000 SUSPENSION ORAL at 17:03

## 2020-09-28 RX ADMIN — TEMAZEPAM 15 MG: 15 CAPSULE ORAL at 20:13

## 2020-09-28 RX ADMIN — METOCLOPRAMIDE HYDROCHLORIDE 5 MG: 5 SOLUTION ORAL at 06:09

## 2020-09-28 RX ADMIN — METOCLOPRAMIDE HYDROCHLORIDE 5 MG: 5 SOLUTION ORAL at 23:40

## 2020-09-28 RX ADMIN — LORAZEPAM 1 MG: 2 INJECTION INTRAMUSCULAR; INTRAVENOUS at 15:06

## 2020-09-28 RX ADMIN — ALPRAZOLAM 1 MG: 0.5 TABLET ORAL at 21:35

## 2020-09-28 RX ADMIN — MICONAZOLE NITRATE 2 % TOPICAL POWDER: at 10:42

## 2020-09-28 RX ADMIN — Medication: at 17:09

## 2020-09-28 NOTE — PROGRESS NOTES
09/28/20 1532   Weaning Parameters   Spontaneous Breathing Trial Complete No (Comments)   Resp Rate Observed 25   Ve 9.6      RSBI 71   Called by RN stating that \"patient wanted to return to his rate on the vent\". On observation patient using accessory muscles & experiencing increased WOB. I asked patient was it difficult to for him to breathe, he knods \"yes\", even though the SBT results are within range (see flowsheet above). Patients mother asked was it difficult for him to breathe, he knods \"yes\". Patients mom then asked did patient have a trach collar trail today, I explained post SBT we would have trailed trach collar, but due to his difficulty breathing we will attempt at a later time. Patient returned to previous vent settings at this time. RN Notified. Will continue to monitor patient.

## 2020-09-28 NOTE — PROGRESS NOTES
Problem: Mobility Impaired (Adult and Pediatric)  Goal: *Acute Goals and Plan of Care (Insert Text)  Description: FUNCTIONAL STATUS PRIOR TO ADMISSION: Patient was independent and active without use of DME. 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 Physical Therapy Goals  Reassessment 9/25/2020  1. Patient will move from supine to sit and sit to supine , scoot up and down, and roll side to side in bed with maximal assistance within 7 day(s). 2.  Patient will transfer from bed to chair and chair to bed with maximal assistance using the least restrictive device within 7 day(s). 3.  Patient will perform sit to stand with maximal assistance within 7 day(s). 4.  Patient will tolerate sitting EOB with moderate assistance for 10 minutes within 7 day(s). Reassessment completed 9/18/2020 and all goals remain appropriate  at this time. Initiated 9/11/2020  1. Patient will move from supine to sit and sit to supine , scoot up and down, and roll side to side in bed with maximal assistance within 7 day(s). 2.  Patient will transfer from bed to chair and chair to bed with maximal assistance using the least restrictive device within 7 day(s). 3.  Patient will perform sit to stand with maximal assistance within 7 day(s). 4.  Patient will tolerate sitting EOB with maximal assistance for 5 minutes within 7 day(s). --Goal Met 9/25      Outcome: Progressing Towards Goal   PHYSICAL THERAPY TREATMENT  Patient: Conor Salazar (30 y.o. male)  Date: 9/28/2020  Diagnosis: Acute respiratory failure ([facility]) [J96.00]   <principal problem not specified>  Procedure(s) (LRB):  ESOPHAGOGASTRODUODENOSCOPY (EGD) @ bedside (N/A)  ESOPHAGOGASTRODUODENAL (EGD) BIOPSY (N/A) 6 Days Post-Op  Precautions:    Chart, physical therapy assessment, plan of care and goals were reviewed.     ASSESSMENT  Patient continues with skilled PT services and is progressing towards goals. Pt received supine in bed on ventilator support via trach (PEEP: 5, FiO2: 35%, assist control) and agreeable to therapy. Pt tolerated session fairly well, but remains limited by generalized weakness, decreased functional mobility, impaired seated balance and decreased tolerance limited by anxiety. Pt's HR stable today during activity (108 bpm) and spO2 (95-97%), but respiratory rate increased to 33 RPM that improved with verbal cueing to slow breathing rate. Pt able to progress to sitting EOB with mod A x 2 and max verbal cues for reaching across midline with LUE for the railing. Pt tolerated seated EOB ~ 7 minutes with min A initially, but as pt fatigued and became anxious required max A and requested to lay back down. Pt educated on the importance of progressing mobility and tolerance to activity. Pt will continue to benefit from PT to progress mobility as tolerated. Pt will still need extensive therapy upon discharge in the rehab setting. Current Level of Function Impacting Discharge (mobility/balance): mod A x 2 supine<>sit, mod-max A scooting hips forward while seated EOB    Other factors to consider for discharge: previously independent, active         PLAN :  Patient continues to benefit from skilled intervention to address the above impairments. Continue treatment per established plan of care. to address goals. Recommendation for discharge: (in order for the patient to meet his/her long term goals)  To be determined: Rehab (vent weaning setting) versus inpatient rehab setting pending progress and hospital course    This discharge recommendation:  Has been made in collaboration with the attending provider and/or case management         SUBJECTIVE:   Patient mouthed \"Come early.  If not, I am too tired to talk to my mom when she comes in the afternoon\"   pt using communication board and mouthing words to make needs known    OBJECTIVE DATA SUMMARY:   Critical Behavior:  Neurologic State: Alert  Orientation Level: Oriented X4  Cognition: Appropriate decision making, Appropriate for age attention/concentration, Appropriate safety awareness  Safety/Judgement: Awareness of environment, Insight into deficits  Functional Mobility Training:  Bed Mobility:  Rolling: Moderate assistance;Assist x2  Supine to Sit: Moderate assistance;Assist x2  Sit to Supine: Moderate assistance;Assist x2  Scooting: Moderate assistance;Maximum assistance(scoot hips forward; max verbal cues)        Transfers:   NT          Balance:  Sitting: Impaired  Sitting - Static: Fair (occasional)(progressed to poor as pt fatigued/anxious)  Sitting - Dynamic: Fair (occasional); Poor (constant support)  Ambulation/Gait Training:         Therapeutic Exercises:     Pain Rating:  Pt denied pain    Activity Tolerance:   Fair and requires rest breaks  Please refer to the flowsheet for vital signs taken during this treatment. After treatment patient left in no apparent distress:   Supine in bed, Call bell within reach, and Side rails x 3    COMMUNICATION/COLLABORATION:   The patients plan of care was discussed with: Occupational therapist, Registered nurse, and Case management.      Raheem Berry, PT, DPT   Time Calculation: 28 mins

## 2020-09-28 NOTE — PROGRESS NOTES
SLP Contact Note    Update: Pt now on SBT and RT working to get him on TCT. Therefore, SLP will hold in-line for now. SLP will follow-up tomorrow and hopefully complete PMV trials tomorrow when pt is on TCT.        Thank you,  SIVA GodinezEd, [id number] [facility], [place]  Speech-Language Pathologist

## 2020-09-28 NOTE — PROGRESS NOTES
SLP Contact Note    Update: will plan to complete in-line in conjunction with RT at 1500. Discussed patient with RT. Will plan complete in-line PMV trial vs PMV trial on TCT this afternoon dependent on how patient's respiratory status is the morning.       Thank you,  SIVA GodinezEd, [id number] [facility]  Speech-Language Pathologist

## 2020-09-28 NOTE — PROGRESS NOTES
Problem: Self Care Deficits Care Plan (Adult)  Goal: *Acute Goals and Plan of Care (Insert Text)  Description:   FUNCTIONAL STATUS PRIOR TO ADMISSION: Patient unable to provide history at OT evaluation. Per chart review, patient was fully independent    HOME SUPPORT: Per chart review, patient lived alone    Occupational Therapy Goals  Initiated 9/14/2020, continued 9/21/2020, continued 9/28/2020  1. Patient will perform grooming with minimum assistance within 7 day(s). 2.  Patient will perform bathing with moderate assistance  within 7 day(s). 3.  Patient will perform upper body dressing with minimum assistance  within 7 day(s). 5.  Patient will perform lower body dressing with moderate assistance within 7 day(s). 6.  Patient will perform toilet transfers with moderate assistance  within 7 day(s). 7.  Patient will perform all aspects of toileting with moderate assistance  within 7 day(s). 8.  Patient will participate in upper extremity therapeutic exercise/activities with minimal assistance for 10 minutes within 7 day(s). 9.  Patient will utilize energy conservation techniques during functional activities with verbal cues within 7 day(s). 9/28/2020 1406 by Romero Reeder OT  Outcome: Progressing Towards Goal    OCCUPATIONAL THERAPY TREATMENT/WEEKLY RE-ASSESSMENT  Patient: Tawana Wallace (78 y.o. male)  Date: 9/28/2020  Diagnosis: Acute respiratory failure ([facility] [J96.00]   <principal problem not specified>  Procedure(s) (LRB):  ESOPHAGOGASTRODUODENOSCOPY (EGD) @ bedside (N/A)  ESOPHAGOGASTRODUODENAL (EGD) BIOPSY (N/A) 6 Days Post-Op  Precautions:  fall  Chart, occupational therapy assessment, plan of care, and goals were reviewed. ASSESSMENT  Patient continues with skilled OT services and is progressing towards goals but remains limited by impaired cardiopulmonary tolerance, general weakness + focal RUE and LLE weakness, and impaired sitting balance.   Patient seen on vent via trach (FIO2 35%, PEEP 5, assist control). Patient followed all commands and was eager to participate. Progressed mobility to moderate assistance x2 for supine-sit. Patient required total assistance to don socks but performed simple grooming with set-up (washed face with washcloth in LUE). Patient tolerated sitting EOB for ~7 min with posterior lean increasing with fatigue. Limited by anxiety and fatigue but VSS with RR 20s-30, HR 90s-120, SPO2 stable >94%. Discharge recommendation TBD pending progress/ vent weaning, anticipate extensive rehab needs (inpatient rehab vs. LTAC). Current Level of Function Impacting Discharge (ADLs): infer set-up to total assistance UB ADLs, total assistance LB ADLs           PLAN :  Goals have been updated based on progression since last assessment. Patient continues to benefit from skilled intervention to address the above impairments. Continue to follow patient 5 times a week to address goals. Recommendation for discharge: (in order for the patient to meet his/her long term goals)  Discharge recommendation TBD pending progress/ vent weaning, anticipate extensive rehab needs (inpatient rehab vs. LTAC). This discharge recommendation:  Has been made in collaboration with the attending provider and/or case management       SUBJECTIVE:   Patient stated I'm trying (spelled on communication board)    OBJECTIVE DATA SUMMARY:     Functional Mobility and Transfers for ADLs:  Bed Mobility:  Rolling: Moderate assistance;Assist x2  Supine to Sit: Moderate assistance;Assist x2  Sit to Supine: Moderate assistance;Assist x2  Scooting: Moderate assistance;Maximum assistance(scoot hips forward; max verbal cues)        Balance:  Sitting: Impaired  Sitting - Static: Fair (occasional)(progressed to poor as pt fatigued/anxious)  Sitting - Dynamic: Fair (occasional); Poor (constant support)    ADL Intervention:       Grooming  Washing Face: Set-up(using LUE)       Lower body dressing: total assistance to don socks    Pain:  Patient did not report pain    Activity Tolerance:   Fair, VSS    After treatment patient left in no apparent distress:   Supine in bed and Call bell within reach    COMMUNICATION/COLLABORATION:   The patients plan of care was discussed with: Physical therapist and Registered nurse.      Kevin Taylor OT  Time Calculation: 31 mins

## 2020-09-28 NOTE — PROGRESS NOTES
[facility]    ICU TEAM Progress Note    Name: Juan Manuel Serna   : 1990   MRN: [id number]   Date: 2020      I  Subjective:   Progress Note: 2020      Reason for ICU Admission: Respiratory failure due to COVID-19 infection    Interval history:  49-year-old male with no history of significant past medical history except smoking half pack per day. Joseph Doran was admitted on 2020 at [facility] with acute hypoxemic respiratory failure from COVID pneumonia. Joseph Doran continued to deteriorate and got intubated on 8/10.  Due to worsening hypoxemia, he is transferred to [facility] for ECMO evaluation. Joseph Doran has bee given convalescent plasma twice on  and  and treated with Remdesivir which was completed on 8/10. Joseph Doran is also on dexamethasone. Joseph Doran completed course of empiric antibiotics including azithromycin which was completed on  and cefepime was completed on .  During his hospital course he also developed pneumothorax and has left-sided chest tube since .  Has a tracheostomy tube placed on .  On September 3 patient had significant worsening on his right arm, CT head was unrevealing.  He is already on high-dose Lovenox and aspirin.  On  he had acute weakness on the left arm code stroke was called and again CTA was unrevealing.  Since then left arm is back to baseline, showed some improvement on right arm but still weak compared to left with minimal ability to move against gravity. Mental status continues to improve but now he is very anxious with recurrent episode of nausea and vomiting associated with tachycardia.  Now s/p PEG  Overnight Events:   No acute event, no fever    Active Problem List:     Problem List  Date Reviewed: 2020          Codes Class    [facility] respiratory failure [facility] ICD-10-CM: J96.00  ICD-9-CM: 518.81         Pneumothorax on left ICD-10-CM: J93.9  ICD-9-CM: 512.89         Pneumonia due to severe acute respiratory syndrome coronavirus 2 (SARS-CoV-2) ICD-10-CM: U07.1, J12.89  ICD-9-CM: 480.8         Respiratory failure with hypoxia ([facility]) ICD-10-CM: J96.91  ICD-9-CM: 518.81               Past Medical History:      has a past medical history of History of vascular access device (08/10/2020). Past Surgical History:      has a past surgical history that includes ir thora/ins chest tube(pneumo) wo image (2020); ir thora/ins chest tube(pneumo) wo image (2020); pr tracheostomy, planned (2020); ir percut gastrostromy tube (2020); and ir insert gastrostomy tube perc (2020). Home Medications:     Prior to Admission medications    Medication Sig Start Date End Date Taking? Authorizing Provider   benzonatate (Tessalon Perles) 100 mg capsule Take 100 mg by mouth three (3) times daily as needed for Cough. Provider, Historical       Allergies/Social/Family History:     No Known Allergies   Social History     Tobacco Use    Smoking status: Current Some Day Smoker    Smokeless tobacco: Never Used   Substance Use Topics    Alcohol use: Not on file      History reviewed. No pertinent family history. Review of Systems:     Not able to obtain due to his medical condition    Objective:   Vital Signs:  Visit Vitals  BP (!) 131/91   Pulse (!) 127   Temp 98.3 °F (36.8 °C)   Resp 22   Ht 5' 11\" (1.803 m)   Wt 86.6 kg (190 lb 14.7 oz)   SpO2 98%   BMI 26.63 kg/m²    O2 Flow Rate (L/min): 10 l/min O2 Device: Tracheostomy, Ventilator Temp (24hrs), Av.6 °F (37 °C), Min:98.3 °F (36.8 °C), Max:98.8 °F (37.1 °C)           Intake/Output:     Intake/Output Summary (Last 24 hours) at 2020 0922  Last data filed at 2020 0630  Gross per 24 hour   Intake 1470 ml   Output 1085 ml   Net 385 ml       Physical Exam:    General:  Awake and interactive. Writing on communication board. Frustrated with speed of communication but this is understandable. Eyes:  Sclera anicteric.  Pupils equally round and reactive to light.   Mouth/Throat: Mucous membranes normal, oral pharynx clear   Neck: Supple, tracheostomy tube in place   Lungs:    Good air entry bilaterally, fine crepitation   CV:  Regular rate and rhythm,no murmur, click, rub or gallop   Abdomen:   Soft, non-tender. bowel sounds normal. non-distended   Extremities: No cyanosis , evolving edema   Skin: Skin color, texture, turgor normal. no acute rash or lesions   Lymph nodes: Cervical and supraclavicular normal   Musculoskeletal: No swelling or deformity   Lines/Devices:  Intact, no erythema, drainage or tenderness   Psych:  conscious and alert, follows command, anxious, still weak on right arm but able to move minimally against gravity         LABS AND  DATA: Personally reviewed  Recent Labs     09/27/20 0548 09/26/20 0439   WBC 9.6 8.0   HGB 12.4 10.9*   HCT 39.1 34.3*    283     Recent Labs     09/27/20 0548 09/26/20 0439   * 136   K 3.6 3.5   CL 99 99   CO2 27 31   BUN 10 11   CREA 0.62* 0.57*   GLU 97 119*   CA 9.9 9.4   MG 2.1 1.8   PHOS 4.4 5.0*     Recent Labs     09/27/20 0548 09/26/20 0439    94   TP 8.8* 7.8   ALB 3.6 3.3*   GLOB 5.2* 4.5*     No results for input(s): INR, PTP, APTT, INREXT in the last 72 hours. No results for input(s): PHI, PCO2I, PO2I, FIO2I in the last 72 hours. No results for input(s): CPK, CKMB, TROIQ, BNPP in the last 72 hours.     Hemodynamics:   PAP:   CO:     Wedge:   CI:     CVP:    SVR:       PVR:       Ventilator Settings:  Mode Rate Tidal Volume Pressure FiO2 PEEP   Assist control, Pressure control   12 ml  12 cm H2O 35 % 5 cm H20     Peak airway pressure: 22 cm H2O    Minute ventilation: 6.82 l/min        MEDS: Reviewed    Chest X-Ray:  CXR Results  (Last 48 hours)    None          Assessment and Plan:   -Bilateral pneumonia due to COVID-19 infection  -Right arm weakness:   -Debility  - Acute hypoxic respiratory failure due to COVID-19 infection status post tracheostomy on August 26  - Possible bacterial super imposed infection: Completed antibiotic course  -History of tobacco use    - Wean ventilator as tolerated. Likely will need long-term facility for prolonged weaning process  - Anxiety management with anxiolytic antidepressant and sedative and pain management  - Still not clear what caused his acute right arm weakness, multiple possibilities including hypoxic event or vasculitis related to COVID. He is on aspirin and Lovenox no further deterioration  -Completed COVID-19 treatment  - Ventilator bundle, GI prophylaxis and DVT prophylaxis  -PT OT    DISPOSITION  Stay in ICU    CRITICAL CARE CONSULTANT NOTE  I had a face to face encounter with the patient, reviewed and interpreted patient data including clinical events, labs, images, vital signs, I/O's, and examined patient. I have discussed the case and the plan and management of the patient's care with the consulting services, the bedside nurses and the respiratory therapist.      NOTE OF PERSONAL INVOLVEMENT IN CARE   This patient has a high probability of imminent, clinically significant deterioration, which requires the highest level of preparedness to intervene urgently. I participated in the decision-making and personally managed or directed the management of the following life and organ supporting interventions that required my frequent assessment to treat or prevent imminent deterioration. I personally spent 35 minutes of critical care time. This is time spent at this critically ill patient's bedside actively involved in patient care as well as the coordination of care and discussions with the patient's family. This does not include any procedural time which has been billed separately. Elgin Fowler M.D.   Staff Intensivist/Pulmonologist  [facility]  9/28/2020

## 2020-09-29 LAB
ALBUMIN SERPL-MCNC: 3.5 G/DL (ref 3.5–5)
ALBUMIN/GLOB SERPL: 0.7 {RATIO} (ref 1.1–2.2)
ALP SERPL-CCNC: 125 U/L (ref 45–117)
ALT SERPL-CCNC: 64 U/L (ref 12–78)
ANION GAP SERPL CALC-SCNC: 9 MMOL/L (ref 5–15)
AST SERPL-CCNC: 24 U/L (ref 15–37)
BASOPHILS # BLD: 0 K/UL (ref 0–0.1)
BASOPHILS NFR BLD: 0 % (ref 0–1)
BILIRUB SERPL-MCNC: 0.6 MG/DL (ref 0.2–1)
BUN SERPL-MCNC: 19 MG/DL (ref 6–20)
BUN/CREAT SERPL: 29 (ref 12–20)
CALCIUM SERPL-MCNC: 9.9 MG/DL (ref 8.5–10.1)
CHLORIDE SERPL-SCNC: 97 MMOL/L (ref 97–108)
CO2 SERPL-SCNC: 27 MMOL/L (ref 21–32)
CREAT SERPL-MCNC: 0.65 MG/DL (ref 0.7–1.3)
DIFFERENTIAL METHOD BLD: ABNORMAL
EOSINOPHIL # BLD: 0.3 K/UL (ref 0–0.4)
EOSINOPHIL NFR BLD: 3 % (ref 0–7)
ERYTHROCYTE [DISTWIDTH] IN BLOOD BY AUTOMATED COUNT: 14.3 % (ref 11.5–14.5)
GLOBULIN SER CALC-MCNC: 4.9 G/DL (ref 2–4)
GLUCOSE SERPL-MCNC: 89 MG/DL (ref 65–100)
HCT VFR BLD AUTO: 37.3 % (ref 36.6–50.3)
HGB BLD-MCNC: 12.2 G/DL (ref 12.1–17)
IMM GRANULOCYTES # BLD AUTO: 0.1 K/UL (ref 0–0.04)
IMM GRANULOCYTES NFR BLD AUTO: 1 % (ref 0–0.5)
LYMPHOCYTES # BLD: 2.4 K/UL (ref 0.8–3.5)
LYMPHOCYTES NFR BLD: 23 % (ref 12–49)
MAGNESIUM SERPL-MCNC: 2.5 MG/DL (ref 1.6–2.4)
MCH RBC QN AUTO: 28.2 PG (ref 26–34)
MCHC RBC AUTO-ENTMCNC: 32.7 G/DL (ref 30–36.5)
MCV RBC AUTO: 86.1 FL (ref 80–99)
MONOCYTES # BLD: 1.1 K/UL (ref 0–1)
MONOCYTES NFR BLD: 11 % (ref 5–13)
NEUTS SEG # BLD: 6.7 K/UL (ref 1.8–8)
NEUTS SEG NFR BLD: 62 % (ref 32–75)
NRBC # BLD: 0 K/UL (ref 0–0.01)
NRBC BLD-RTO: 0 PER 100 WBC
PHOSPHATE SERPL-MCNC: 5.1 MG/DL (ref 2.6–4.7)
PLATELET # BLD AUTO: 318 K/UL (ref 150–400)
PMV BLD AUTO: 11.1 FL (ref 8.9–12.9)
POTASSIUM SERPL-SCNC: 3.9 MMOL/L (ref 3.5–5.1)
PROT SERPL-MCNC: 8.4 G/DL (ref 6.4–8.2)
RBC # BLD AUTO: 4.33 M/UL (ref 4.1–5.7)
SODIUM SERPL-SCNC: 133 MMOL/L (ref 136–145)
WBC # BLD AUTO: 10.6 K/UL (ref 4.1–11.1)

## 2020-09-29 PROCEDURE — 97530 THERAPEUTIC ACTIVITIES: CPT

## 2020-09-29 PROCEDURE — 36415 COLL VENOUS BLD VENIPUNCTURE: CPT

## 2020-09-29 PROCEDURE — 94003 VENT MGMT INPAT SUBQ DAY: CPT

## 2020-09-29 PROCEDURE — 94760 N-INVAS EAR/PLS OXIMETRY 1: CPT

## 2020-09-29 PROCEDURE — 85025 COMPLETE CBC W/AUTO DIFF WBC: CPT

## 2020-09-29 PROCEDURE — 74011250636 HC RX REV CODE- 250/636: Performed by: INTERNAL MEDICINE

## 2020-09-29 PROCEDURE — 84100 ASSAY OF PHOSPHORUS: CPT

## 2020-09-29 PROCEDURE — 74011250637 HC RX REV CODE- 250/637: Performed by: NURSE PRACTITIONER

## 2020-09-29 PROCEDURE — 80053 COMPREHEN METABOLIC PANEL: CPT

## 2020-09-29 PROCEDURE — 92526 ORAL FUNCTION THERAPY: CPT

## 2020-09-29 PROCEDURE — 65610000006 HC RM INTENSIVE CARE

## 2020-09-29 PROCEDURE — 74011250637 HC RX REV CODE- 250/637: Performed by: EMERGENCY MEDICINE

## 2020-09-29 PROCEDURE — 74011250636 HC RX REV CODE- 250/636: Performed by: EMERGENCY MEDICINE

## 2020-09-29 PROCEDURE — 74011250637 HC RX REV CODE- 250/637: Performed by: INTERNAL MEDICINE

## 2020-09-29 PROCEDURE — 74011000250 HC RX REV CODE- 250: Performed by: INTERNAL MEDICINE

## 2020-09-29 PROCEDURE — 92507 TX SP LANG VOICE COMM INDIV: CPT

## 2020-09-29 PROCEDURE — 83735 ASSAY OF MAGNESIUM: CPT

## 2020-09-29 PROCEDURE — 74011250637 HC RX REV CODE- 250/637: Performed by: HOSPITALIST

## 2020-09-29 RX ADMIN — Medication 10 ML: at 14:00

## 2020-09-29 RX ADMIN — NYSTATIN 500000 UNITS: 500000 SUSPENSION ORAL at 08:00

## 2020-09-29 RX ADMIN — METOCLOPRAMIDE HYDROCHLORIDE 5 MG: 5 SOLUTION ORAL at 05:23

## 2020-09-29 RX ADMIN — FAMOTIDINE 20 MG: 40 POWDER, FOR SUSPENSION ORAL at 20:29

## 2020-09-29 RX ADMIN — METOCLOPRAMIDE HYDROCHLORIDE 5 MG: 5 SOLUTION ORAL at 13:21

## 2020-09-29 RX ADMIN — METOCLOPRAMIDE HYDROCHLORIDE 5 MG: 5 SOLUTION ORAL at 17:02

## 2020-09-29 RX ADMIN — LORAZEPAM 1 MG: 2 INJECTION INTRAMUSCULAR; INTRAVENOUS at 17:02

## 2020-09-29 RX ADMIN — ALPRAZOLAM 1 MG: 0.5 TABLET ORAL at 21:02

## 2020-09-29 RX ADMIN — SODIUM CHLORIDE 10 MG: 9 INJECTION INTRAMUSCULAR; INTRAVENOUS; SUBCUTANEOUS at 09:08

## 2020-09-29 RX ADMIN — MICONAZOLE NITRATE 2 % TOPICAL POWDER: at 08:00

## 2020-09-29 RX ADMIN — FENTANYL CITRATE 25 MCG: 50 INJECTION, SOLUTION INTRAMUSCULAR; INTRAVENOUS at 20:30

## 2020-09-29 RX ADMIN — Medication 10 ML: at 21:03

## 2020-09-29 RX ADMIN — METOPROLOL TARTRATE 100 MG: 25 TABLET, FILM COATED ORAL at 17:02

## 2020-09-29 RX ADMIN — FENTANYL CITRATE 25 MCG: 50 INJECTION, SOLUTION INTRAMUSCULAR; INTRAVENOUS at 00:43

## 2020-09-29 RX ADMIN — SODIUM CHLORIDE 10 MG: 9 INJECTION INTRAMUSCULAR; INTRAVENOUS; SUBCUTANEOUS at 18:33

## 2020-09-29 RX ADMIN — NYSTATIN 500000 UNITS: 500000 SUSPENSION ORAL at 16:41

## 2020-09-29 RX ADMIN — METOPROLOL TARTRATE 100 MG: 25 TABLET, FILM COATED ORAL at 08:00

## 2020-09-29 RX ADMIN — METOCLOPRAMIDE HYDROCHLORIDE 5 MG: 5 SOLUTION ORAL at 23:20

## 2020-09-29 RX ADMIN — NYSTATIN 500000 UNITS: 500000 SUSPENSION ORAL at 21:02

## 2020-09-29 RX ADMIN — ALPRAZOLAM 1 MG: 0.5 TABLET ORAL at 05:23

## 2020-09-29 RX ADMIN — FAMOTIDINE 20 MG: 40 POWDER, FOR SUSPENSION ORAL at 08:00

## 2020-09-29 RX ADMIN — ENOXAPARIN SODIUM 30 MG: 30 INJECTION SUBCUTANEOUS at 23:20

## 2020-09-29 RX ADMIN — NYSTATIN 500000 UNITS: 500000 SUSPENSION ORAL at 13:21

## 2020-09-29 RX ADMIN — Medication 10 ML: at 05:24

## 2020-09-29 RX ADMIN — Medication: at 17:45

## 2020-09-29 RX ADMIN — OXYCODONE HYDROCHLORIDE 5 MG: 5 SOLUTION ORAL at 23:20

## 2020-09-29 RX ADMIN — OXYCODONE HYDROCHLORIDE 5 MG: 5 SOLUTION ORAL at 03:42

## 2020-09-29 RX ADMIN — POTASSIUM BICARBONATE 40 MEQ: 782 TABLET, EFFERVESCENT ORAL at 08:00

## 2020-09-29 RX ADMIN — CHLORHEXIDINE GLUCONATE 15 ML: 0.12 RINSE ORAL at 08:00

## 2020-09-29 RX ADMIN — ASPIRIN 81 MG CHEWABLE TABLET 81 MG: 81 TABLET CHEWABLE at 08:00

## 2020-09-29 RX ADMIN — TRAZODONE HYDROCHLORIDE 100 MG: 100 TABLET ORAL at 21:02

## 2020-09-29 RX ADMIN — MICONAZOLE NITRATE 2 % TOPICAL POWDER: at 17:45

## 2020-09-29 RX ADMIN — ALPRAZOLAM 1 MG: 0.5 TABLET ORAL at 13:22

## 2020-09-29 RX ADMIN — CHLORHEXIDINE GLUCONATE 15 ML: 0.12 RINSE ORAL at 20:27

## 2020-09-29 RX ADMIN — FENTANYL CITRATE 25 MCG: 50 INJECTION, SOLUTION INTRAMUSCULAR; INTRAVENOUS at 10:06

## 2020-09-29 RX ADMIN — LORAZEPAM 1 MG: 2 INJECTION INTRAMUSCULAR; INTRAVENOUS at 03:24

## 2020-09-29 RX ADMIN — TEMAZEPAM 15 MG: 15 CAPSULE ORAL at 21:02

## 2020-09-29 RX ADMIN — ENOXAPARIN SODIUM 30 MG: 30 INJECTION SUBCUTANEOUS at 10:06

## 2020-09-29 RX ADMIN — Medication: at 08:00

## 2020-09-29 NOTE — PROGRESS NOTES
Problem: Self Care Deficits Care Plan (Adult)  Goal: *Acute Goals and Plan of Care (Insert Text)  Description:   FUNCTIONAL STATUS PRIOR TO ADMISSION: Patient unable to provide history at OT evaluation. Per chart review, patient was fully independent    HOME SUPPORT: Per chart review, patient lived alone    Occupational Therapy Goals  Initiated 9/14/2020, continued 9/21/2020, continued 9/28/2020  1. Patient will perform grooming with minimum assistance within 7 day(s). 2.  Patient will perform bathing with moderate assistance  within 7 day(s). 3.  Patient will perform upper body dressing with minimum assistance  within 7 day(s). 5.  Patient will perform lower body dressing with moderate assistance within 7 day(s). 6.  Patient will perform toilet transfers with moderate assistance  within 7 day(s). 7.  Patient will perform all aspects of toileting with moderate assistance  within 7 day(s). 8.  Patient will participate in upper extremity therapeutic exercise/activities with minimal assistance for 10 minutes within 7 day(s). 9.  Patient will utilize energy conservation techniques during functional activities with verbal cues within 7 day(s)      Outcome: Progressing Towards Goal    OCCUPATIONAL THERAPY TREATMENT  Patient: Jenise Giles (27 y.o. male)  Date: 9/29/2020  Diagnosis: [facility] [J96.00]   <principal problem not specified>  Procedure(s) (LRB):  ESOPHAGOGASTRODUODENOSCOPY (EGD) @ bedside (N/A)  ESOPHAGOGASTRODUODENAL (EGD) BIOPSY (N/A) 7 Days Post-Op  Precautions:  fall  Chart, occupational therapy assessment, plan of care, and goals were reviewed. ASSESSMENT  Patient continues with skilled OT services and is progressing towards goals but remains limited by impaired cardiopulmonary tolerance, general weakness + focal LLE and RUE weakness (RUE overall 3- to 3+/5), and impaired sitting + standing balance. Patient with good effort and excellent progress today. Patient seen on vent via trach (assist control, FIO2 35%, PEEP 5) with VSS throughout session. Progressed supine-sit with minimum assistance x2, performed RUE AAROM exercises and progressed to standing for first time since admission with moderate assistance x2 (2 trials, although unable to achieve full upright posture + very limited standing tolerance). Patient remains significantly below independent functional baseline and would benefit from inpatient rehab if able to be weaned from ventilator (vs LTAC if discharged on ventilator). Current Level of Function Impacting Discharge (ADLs):  Set-up to moderate assistance UB ADLs, total assistance LB ADLs         PLAN :  Patient continues to benefit from skilled intervention to address the above impairments. Continue treatment per established plan of care. to address goals. Recommendation for discharge: (in order for the patient to meet his/her long term goals)  Patient remains significantly below independent functional baseline and would benefit from inpatient rehab if able to be weaned from ventilator (vs LTAC if discharged on ventilator). This discharge recommendation:  Has been made in collaboration with the attending provider and/or case management       SUBJECTIVE:   Patient stated My right arm is still weaker.     OBJECTIVE DATA SUMMARY:   Cognitive/Behavioral Status:  Neurologic State: Alert;Eyes open spontaneously  Orientation Level: Oriented X4  Cognition: Follows commands             Functional Mobility and Transfers for ADLs:  Bed Mobility:  Rolling: Minimum assistance;Assist x2  Supine to Sit: Minimum assistance;Assist x2  Sit to Supine: Moderate assistance;Assist x2  Scooting: Moderate assistance(supine bridging, scoot hips forward to EOB)    Transfers:  Sit to Stand:  Moderate assistance;Assist x2(x 2 trials)          Balance:  Sitting: Impaired  Sitting - Static: Fair (occasional)  Sitting - Dynamic: Poor (constant support)  Standing: Impaired; With support  Standing - Static: Constant support;Poor  Standing - Dynamic : (NT)    ADL Intervention:          Lower Body Dressing Assistance  Socks: Total assistance (dependent)  Shoes with Cloth Laces: Total assistance (dependent)         Pain:  Patient did not report pain    Activity Tolerance:   Fair, VSS    After treatment patient left in no apparent distress:   Supine in bed and Call bell within reach    COMMUNICATION/COLLABORATION:   The patients plan of care was discussed with: Physical therapist and Registered nurse.      Consuella Severe, OT  Time Calculation: 29 mins

## 2020-09-29 NOTE — PROGRESS NOTES
SLP Contact Note    Noted that attempted SBT. Appears that patient's anxiety is significantly limiting pt's ability to tolerate SBT and thus trach collar trials, as pt is tolerating these trials per vital signs. Furthermore, noted that pt has been perseverating with PT/OT on his cuff being deflated (even when it is inflated). He does not allow for in-line PMV placement for longer than 5-10 minutes 2/2 anxiety despite all of his vital signs being stable and no evidence of back pressure. Suspect that he would tolerate trach collar trial and PMV if pt's and family's anxiety were better controlled given excellent tolerance of in-line PMV for the several minutes that he has tolerated it. Therefore, will continue to confer with nursing and respiratory therapy to determine appropriateness of participating in in-line vs TCT PMV trials.      Thank you,  SIVA GodinezEd, [id number] [facility]  Speech-Language Pathologist

## 2020-09-29 NOTE — PROGRESS NOTES
0730: Bedside and Verbal shift change report given to Richardson Foster RN (oncoming nurse) by Dianna Cerda RN (offgoing nurse). Report included the following information SBAR, Kardex, Intake/Output, MAR, Recent Results, Med Rec Status, Cardiac Rhythm NSR and Alarm Parameters . Primary Nurse Yana Moss and Kaci Steinberg RN performed a dual skin assessment on this patient Impairment noted- see wound doc flow sheet  Enrique score is 14.    1500: Patient's mother at bedside at this time. Patient states he would like to be placed on tracheal collar at this time and moved to the chair while his mother is here. 1530: Patient placed on trach collar by RT.     1545: Patient moved to chair with nhan lift and assistance of the turn team.     [phone]: Patient moved back to bed via nhan lift and assistance of turn team. Chang Fire back onto ventilator by RT.     1930: Bedside and Verbal shift change report given to Dianna Cerda RN (oncoming nurse) by Richardson Foster RN (offgoing nurse). Report included the following information SBAR, Kardex, Intake/Output, MAR, Recent Results, Med Rec Status, Cardiac Rhythm NSR/ST and Alarm Parameters .

## 2020-09-29 NOTE — PROGRESS NOTES
Problem: Ventilator Management  Goal: *Adequate oxygenation and ventilation  Outcome: Progressing Towards Goal  Goal: *Patient maintains clear airway/free of aspiration  Outcome: Progressing Towards Goal  Goal: *Absence of infection signs and symptoms  Outcome: Progressing Towards Goal  Goal: *Normal spontaneous ventilation  Outcome: Progressing Towards Goal     Problem: Patient Education: Go to Patient Education Activity  Goal: Patient/Family Education  Outcome: Progressing Towards Goal     Problem: Pressure Injury - Risk of  Goal: *Prevention of pressure injury  Description: Document Enrique Scale and appropriate interventions in the flowsheet. Outcome: Progressing Towards Goal  Note: Pressure Injury Interventions:  Sensory Interventions: Assess changes in LOC, Assess need for specialty bed, Float heels, Keep linens dry and wrinkle-free, Minimize linen layers, Turn and reposition approx. every two hours (pillows and wedges if needed)    Moisture Interventions: Absorbent underpads, Apply protective barrier, creams and emollients, Assess need for specialty bed, Check for incontinence Q2 hours and as needed, Internal/External urinary devices, Limit adult briefs, Maintain skin hydration (lotion/cream), Minimize layers    Activity Interventions: Assess need for specialty bed, Pressure redistribution bed/mattress(bed type)    Mobility Interventions: Assess need for specialty bed, Float heels, HOB 30 degrees or less, Pressure redistribution bed/mattress (bed type), Turn and reposition approx.  every two hours(pillow and wedges)    Nutrition Interventions: Document food/fluid/supplement intake    Friction and Shear Interventions: Apply protective barrier, creams and emollients, Foam dressings/transparent film/skin sealants, Lift sheet, Lift team/patient mobility team, Minimize layers                Problem: Patient Education: Go to Patient Education Activity  Goal: Patient/Family Education  Outcome: Progressing Towards Goal     Problem: Falls - Risk of  Goal: *Absence of Falls  Description: Document Krish Horn Fall Risk and appropriate interventions in the flowsheet.   Outcome: Progressing Towards Goal  Note: Fall Risk Interventions:  Mobility Interventions: Communicate number of staff needed for ambulation/transfer    Mentation Interventions: Adequate sleep, hydration, pain control, Evaluate medications/consider consulting pharmacy, Reorient patient, More frequent rounding, Toileting rounds, Update white board, Room close to nurse's station    Medication Interventions: Evaluate medications/consider consulting pharmacy    Elimination Interventions: Patient to call for help with toileting needs, Call light in reach    History of Falls Interventions: Evaluate medications/consider consulting pharmacy         Problem: Patient Education: Go to Patient Education Activity  Goal: Patient/Family Education  Outcome: Progressing Towards Goal     Problem: Nutrition Deficit  Goal: *Optimize nutritional status  Outcome: Progressing Towards Goal     Problem: Breathing Pattern - Ineffective  Goal: *Absence of hypoxia  Outcome: Progressing Towards Goal  Goal: *Use of effective breathing techniques  Outcome: Progressing Towards Goal     Problem: Breathing Pattern - Ineffective  Goal: *Absence of hypoxia  Outcome: Progressing Towards Goal  Goal: *Use of effective breathing techniques  Outcome: Progressing Towards Goal  Goal: *PALLIATIVE CARE:  Alleviation of Dyspnea  Outcome: Progressing Towards Goal     Problem: Patient Education: Go to Patient Education Activity  Goal: Patient/Family Education  Outcome: Progressing Towards Goal     Problem: Breathing Pattern - Ineffective  Goal: *Absence of hypoxia  Outcome: Progressing Towards Goal  Goal: *Use of effective breathing techniques  Outcome: Progressing Towards Goal  Goal: *PALLIATIVE CARE:  Alleviation of Dyspnea  Outcome: Progressing Towards Goal     Problem: Patient Education: Go to Patient Education Activity  Goal: Patient/Family Education  Outcome: Progressing Towards Goal     Problem: Breathing Pattern - Ineffective  Goal: *Absence of hypoxia  Outcome: Progressing Towards Goal  Goal: *Use of effective breathing techniques  Outcome: Progressing Towards Goal  Goal: *PALLIATIVE CARE:  Alleviation of Dyspnea  Outcome: Progressing Towards Goal     Problem: Patient Education: Go to Patient Education Activity  Goal: Patient/Family Education  Outcome: Progressing Towards Goal     Problem: Breathing Pattern - Ineffective  Goal: *Absence of hypoxia  Outcome: Progressing Towards Goal  Goal: *Use of effective breathing techniques  Outcome: Progressing Towards Goal  Goal: *PALLIATIVE CARE:  Alleviation of Dyspnea  Outcome: Progressing Towards Goal     Problem: Patient Education: Go to Patient Education Activity  Goal: Patient/Family Education  Outcome: Progressing Towards Goal     Problem: Patient Education: Go to Patient Education Activity  Goal: Patient/Family Education  Outcome: Progressing Towards Goal     Problem: Breathing Pattern - Ineffective  Goal: *Absence of hypoxia  Outcome: Progressing Towards Goal  Goal: *Use of effective breathing techniques  Outcome: Progressing Towards Goal  Goal: *PALLIATIVE CARE:  Alleviation of Dyspnea  Outcome: Progressing Towards Goal     Problem: Patient Education: Go to Patient Education Activity  Goal: Patient/Family Education  Outcome: Progressing Towards Goal     Problem: Breathing Pattern - Ineffective  Goal: *Absence of hypoxia  Outcome: Progressing Towards Goal  Goal: *Use of effective breathing techniques  Outcome: Progressing Towards Goal  Goal: *PALLIATIVE CARE:  Alleviation of Dyspnea  Outcome: Progressing Towards Goal     Problem: Patient Education: Go to Patient Education Activity  Goal: Patient/Family Education  Outcome: Progressing Towards Goal     Problem: Breathing Pattern - Ineffective  Goal: *Absence of hypoxia  Outcome: Progressing Towards Goal  Goal: *Use of effective breathing techniques  Outcome: Progressing Towards Goal  Goal: *PALLIATIVE CARE:  Alleviation of Dyspnea  Outcome: Progressing Towards Goal     Problem: Patient Education: Go to Patient Education Activity  Goal: Patient/Family Education  Outcome: Progressing Towards Goal     Problem: Patient Education: Go to Patient Education Activity  Goal: Patient/Family Education  Outcome: Progressing Towards Goal     Problem: Patient Education: Go to Patient Education Activity  Goal: Patient/Family Education  Outcome: Progressing Towards Goal     Problem: Patient Education: Go to Patient Education Activity  Goal: Patient/Family Education  Outcome: Progressing Towards Goal

## 2020-09-29 NOTE — PROGRESS NOTES
Problem: Mobility Impaired (Adult and Pediatric)  Goal: *Acute Goals and Plan of Care (Insert Text)  Description: FUNCTIONAL STATUS PRIOR TO ADMISSION: Patient was independent and active without use of DME. 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 Physical Therapy Goals  Reassessment 9/25/2020  1. Patient will move from supine to sit and sit to supine , scoot up and down, and roll side to side in bed with maximal assistance within 7 day(s). 2.  Patient will transfer from bed to chair and chair to bed with maximal assistance using the least restrictive device within 7 day(s). 3.  Patient will perform sit to stand with maximal assistance within 7 day(s). 4.  Patient will tolerate sitting EOB with moderate assistance for 10 minutes within 7 day(s). Reassessment completed 9/18/2020 and all goals remain appropriate  at this time. Initiated 9/11/2020  1. Patient will move from supine to sit and sit to supine , scoot up and down, and roll side to side in bed with maximal assistance within 7 day(s). 2.  Patient will transfer from bed to chair and chair to bed with maximal assistance using the least restrictive device within 7 day(s). 3.  Patient will perform sit to stand with maximal assistance within 7 day(s). 4.  Patient will tolerate sitting EOB with maximal assistance for 5 minutes within 7 day(s). --Goal Met 9/25      Outcome: Progressing Towards Goal   PHYSICAL THERAPY TREATMENT  Patient: Marylee Isaac (74 y.o. male)  Date: 9/29/2020  Diagnosis: [facility] [J96.00]   <principal problem not specified>  Procedure(s) (LRB):  ESOPHAGOGASTRODUODENOSCOPY (EGD) @ bedside (N/A)  ESOPHAGOGASTRODUODENAL (EGD) BIOPSY (N/A) 7 Days Post-Op  Precautions:    Chart, physical therapy assessment, plan of care and goals were reviewed.     ASSESSMENT  Patient continues with skilled PT services and is progressing towards goals. Pt cleared by RN to mobilize with therapy. Pt received supine in bed intubated on ventilator via trach (assist control, PEEP: 5, FiO2: 35%). VSS during session with HR max 115 bpm, spO2: 93-97%. Pt continues to be limited by generalized weakness, decreased tolerance to activity, impaired seated and standing balance after prolonged hospitalization and complex medical course. Pt demonstrated improvement in all aspects of mobility this date. Pt performed supine bridging with assistance for LE placement in prep for scooting hips close to the edge of bed. Pt able to complete rolling and supine to sit EOB with min A x 2 and noted improvement in pt's ability to reach across midline for the railing and pushing up with both UEs. Pt tolerated seated EOB ~10 minutes today with min-max A for support. Pt attempted sit<>stand x 2 trials with mod A x 2; first trial, pt only able to clear buttocks from the bed ~3-4 inches, but on second attempt pt able to clear buttocks completely and stood ~ 5 seconds. Pt returned to supine position after pt requested a bed pan to have  BM. Pt educated on the importance of progressive therapy and tolerating increased levels of activity to improve respiratory status and efforts to wean from the ventilator. Pt will benefit from inpatient rehab setting upon discharge. Current Level of Function Impacting Discharge (mobility/balance): min A x 2 supine to sit, mod A x 2 sit<>stand x 2 trials    Other factors to consider for discharge: previously independent and active         PLAN :  Patient continues to benefit from skilled intervention to address the above impairments. Continue treatment per established plan of care. to address goals.     Recommendation for discharge: (in order for the patient to meet his/her long term goals)  Therapy 3 hours per day 5-7 days per week pending progress and medical course    This discharge recommendation:  Has been made in collaboration with the attending provider and/or case management         SUBJECTIVE:   Patient stated I have been working on lifting my legs.     OBJECTIVE DATA SUMMARY:   Critical Behavior:  Neurologic State: Alert, Eyes open spontaneously  Orientation Level: Oriented X4  Cognition: Follows commands  Safety/Judgement: Awareness of environment, Insight into deficits  Functional Mobility Training:  Bed Mobility:  Rolling: Minimum assistance;Assist x2  Supine to Sit: Minimum assistance;Assist x2  Sit to Supine: Moderate assistance;Assist x2  Scooting: Moderate assistance(supine bridging, scoot hips forward to EOB)         Transfers:  Sit to Stand: Moderate assistance;Assist x2(x 2 trials)  Stand to Sit: Moderate assistance;Assist x2                 Balance:  Sitting: Impaired  Sitting - Static: Fair (occasional)  Sitting - Dynamic: Poor (constant support)  Standing: Impaired; With support  Standing - Static: Constant support;Poor  Standing - Dynamic : (NT)  Ambulation/Gait Training:          Therapeutic Exercises:   Pt tolerated seated EOB x 10 minutes with min-max A for support at trunk  Pain Rating:  Pt without complaints of pain    Activity Tolerance:   Good and Fair  Please refer to the flowsheet for vital signs taken during this treatment. After treatment patient left in no apparent distress:   Supine in bed, Call bell within reach, and Side rails x 3    COMMUNICATION/COLLABORATION:   The patients plan of care was discussed with: Occupational therapist, Speech therapist, and Registered nurse.      Maciel Bhat, PT, DPT   Time Calculation: 29 mins

## 2020-09-29 NOTE — PROGRESS NOTES
1930 Bedside and Verbal shift change report given to ZAC Carlos RN (oncoming nurse) by Diego Otoole RN (offgoing nurse). Report included the following information SBAR, Kardex, Intake/Output, MAR, Recent Results, Cardiac Rhythm NSR/ST and Alarm Parameters . 0730 Bedside and Verbal shift change report given to Terrence Haines RN (oncoming nurse) by Nick Flynn RN (offgoing nurse). Report included the following information SBAR, Kardex, Intake/Output, MAR, Recent Results, Cardiac Rhythm NSR/ST and Alarm Parameters .

## 2020-09-29 NOTE — PROGRESS NOTES
Problem: Dysphagia (Adult)  Goal: *Acute Goals and Plan of Care (Insert Text)  Description: Speech Therapy Goals  Initiated 9/23/2020    1. Patient will tolerate ice chips only when on PMV without adverse effects within 7 days. Outcome: Progressing Towards Goal     Problem: Voice Impaired (Adult)  Goal: *Acute Goals and Plan of Care (Insert Text)  Description: Speech Therapy Goals  Initiated 9/23/2020    1. Patient will tolerate in-line PMV for 10 minute trials with RT/SLP/RN without adverse effects within 7 days. 2. Patient will utilize in-line PMV to work towards ventilator weaning within 7 days. Outcome: Progressing Towards Goal     SPEECH LANGUAGE PATHOLOGY DYSPHAGIA TREATMENT  Patient: Sherry Whiting (79 y.o. male)  Date: 9/29/2020  Diagnosis: [facility] [J96.00]   <principal problem not specified>  Procedure(s) (LRB):  ESOPHAGOGASTRODUODENOSCOPY (EGD) @ bedside (N/A)  ESOPHAGOGASTRODUODENAL (EGD) BIOPSY (N/A) 7 Days Post-Op  Precautions: Fall      ASSESSMENT:  Patient tolerated PMV on trach collar trials for approximately 10 minutes on this date. Patient with improvement in O2 sats, RR, and stable heart rate throughout. Doffed PMV at end of 10 minutes and no evidence of back pressure nor breath stacking indicative of CO2 retention. At the end of 10 minutes RT arrived and noted that pt appeared to have more labored breathing and placed pt back on vent. Given that, from SLP standpoint pt is tolerating speaking valve will defer to RT, RN, and MD regarding prolonged placement. However, literature states that PMV can help to support lung health and improve secretion management and thus would recommend placing pt on PMV whenever he is able. Patient also seen for swallow function. Patient with delayed weak coughs x2 following thin liquid trials. Hyolaryngeal excursion subjectively appeared adequate as felt through palpation. No overt s/s of aspiration noted with puree. Patient is at elevated risk for silent prandial aspiration given acute respiratory failure necessitating prolonged ventilator use and trach placement causing decreased pharyngeal sensations and pressures. . Recommend pt only be allowed small amounts of ice chips after good oral care and only when on PMV. Swallow prognosis dependent upon pt's ability to tolerate PMV on trach collar, as pt will require MBS prior to diet initiation. PLAN:  Recommendations and Planned Interventions:  -- NPO with alternate means of nutrition   -- Few ice chips only after good oral care and only on TCT and PMV  -- PMV whenever on TCT  -- MBS when pt able to tolerate TCT for more than a few hours   Patient continues to benefit from skilled intervention to address the above impairments. Continue treatment per established plan of care. Speaking Valve Placement:  SLP / RT / RN only  Recommended Speaking Valve Wearing Schedule:  [x]    As tolerated whenever on trach collar (O2 > 90, RR <35, stable heart rate)  Discharge Recommendations:  Inpatient Rehab     SUBJECTIVE:   Patient stated Erin Aburto we put this back on when my mom gets here in regards to [employer]. OBJECTIVE:   Cognitive and Communication Status:  Neurologic State: Alert  Orientation Level: Oriented X4  Cognition: Follows commands  Perception: Appears intact  Perseveration: No perseveration noted  Safety/Judgement: Awareness of environment  Tracheostomy:  #6 Shiley Cuff(ed/less) Trach    Vital Signs on the Ventilator:  O2: 94 - 97  RR: 28 - 33  HR: 108    Vital Signs Prior to PMV Placement once on trach collar:  O2: 94  RR: 33  HR: 108    Vital Signs During PMV Placement  O2: 98 - 100  RR: 21 - 33  HR: 104 - 110     Oxygen Therapy  O2 Sat (%): 97 %  Pulse via Oximetry: 95 beats per minute  O2 Device: Tracheostomy; Ventilator  FIO2 (%): 35 %  Dysphagia Treatment:  Tracheostomy:  Airway Clearance  Suction: Trach  Suction Device: Inline suction catheter  Sputum Method Obtained: Tracheal  Sputum Amount: Moderate  Sputum Color/Odor: White  Sputum Consistency: Thick     PMV Trial   Vocal Quality: Hoarse(but with improvement from last trials)  Oral Assessment:  Oral Assessment  Labial: No impairment  Dentition: Intact; Natural  Oral Hygiene: moist oral mucosa clear of secretions   Lingual: No impairment  Velum: No impairment  Mandible: No impairment  P.O. Trials:  Patient Position: upright in bed  Vocal quality prior to P.O.: Hoarse(but with improvement from last trials)  Consistency Presented: Ice chips;Puree; Thin liquid  How Presented: Self-fed/presented;SLP-fed/presented;Cup/sip;Spoon     Bolus Acceptance: No impairment  Bolus Formation/Control: No impairment     Propulsion: No impairment  Oral Residue: None  Initiation of Swallow: No impairment  Laryngeal Elevation: Functional  Aspiration Signs/Symptoms: Delayed cough/throat clear  Pharyngeal Phase Characteristics: No impairment, issues, or problems (however at risk for silent aspiration )           Oral Phase Severity: No impairment  Pharyngeal Phase Severity : Other (comment)(at risk for silent prandial aspiration )  Pain:  Pain Scale 1: Numeric (0 - 10)  Pain Intensity 1: 0       After treatment:   Patient left in no apparent distress in bed, Call bell within reach, and Nursing notified    COMMUNICATION/EDUCATION:   Patient was educated regarding his deficit(s) of dysphagia as this relates to his trach. He demonstrated Fair understanding as evidenced by agreement and appropriate questions. The patient's plan of care including recommendations, planned interventions, and recommended diet changes were discussed with: Registered nurse. Education was provided to patient, family, and staff regarding speaking valve placement, wearing schedule, safety precautions including cuff deflation and removal when sleeping, and care/cleaning guidelines.       Karmen Neri  Time Calculation: 20 mins         Regarding student involvement in patient care:  A student participated in this treatment session. Per CMS Medicare statements and [employer] guidelines I certify that the following was true:  1. I was present and directly observed the entire session. 2. I made all skilled judgments and clinical decisions regarding care. 3. I am the practitioner responsible for assessment, treatment, and documentation.

## 2020-09-29 NOTE — PROGRESS NOTES
[facility]    ICU TEAM Progress Note    Name: Ann Marie Kelly   : 1990   MRN: [id number]   Date: 2020      I  Subjective:   Progress Note: 2020      Reason for ICU Admission: Respiratory failure due to COVID-19 infection    Interval history:  44-year-old male with no history of significant past medical history except smoking half pack per day. Jeanne Brown was admitted on 2020 at [facility] with acute hypoxemic respiratory failure from COVID pneumonia. Jeanne Brown continued to deteriorate and got intubated on 8/10.  Due to worsening hypoxemia, he is transferred to [address] [place] [address] for ECMO evaluation. Jeanne Brown has bee given convalescent plasma twice on  and  and treated with Remdesivir which was completed on 8/10. Jeanne Brown is also on dexamethasone. Jeanne Brown completed course of empiric antibiotics including azithromycin which was completed on  and cefepime was completed on .  During his hospital course he also developed pneumothorax and has left-sided chest tube since .  Has a tracheostomy tube placed on .  On September 3 patient had significant worsening on his right arm, CT head was unrevealing.  He is already on high-dose Lovenox and aspirin.  On  he had acute weakness on the left arm code stroke was called and again CTA was unrevealing.  Since then left arm is back to baseline, showed some improvement on right arm but still weak compared to left with minimal ability to move against gravity.  Mental status continues to improve but now he is very anxious with recurrent episode of nausea and vomiting associated with tachycardia. Now s/p PEG    Overnight Events:   No acute event overnight, no fever, still episode of anxiety but improving, tolerating tube feeding.   Not doing well on SBT trial.    Active Problem List:     Problem List  Date Reviewed: 2020          Codes Class    [facility] ([facility] ICD-10-CM: J96.00  ICD-9-CM: 447.97 Pneumothorax on left ICD-10-CM: J93.9  ICD-9-CM: 512.89         Pneumonia due to severe acute respiratory syndrome coronavirus 2 (SARS-CoV-2) ICD-10-CM: U07.1, J12.89  ICD-9-CM: 480.8         Respiratory failure with hypoxia (HCC) ICD-10-CM: J96.91  ICD-9-CM: 518.81               Past Medical History:      has a past medical history of History of vascular access device (08/10/2020). Past Surgical History:      has a past surgical history that includes ir thora/ins chest tube(pneumo) wo image (2020); ir thora/ins chest tube(pneumo) wo image (2020); pr tracheostomy, planned (2020); ir percut gastrostromy tube (2020); and ir insert gastrostomy tube perc (2020). Home Medications:     Prior to Admission medications    Medication Sig Start Date End Date Taking? Authorizing Provider   benzonatate (Tessalon Perles) 100 mg capsule Take 100 mg by mouth three (3) times daily as needed for Cough. Provider, Historical       Allergies/Social/Family History:     No Known Allergies   Social History     Tobacco Use    Smoking status: Current Some Day Smoker    Smokeless tobacco: Never Used   Substance Use Topics    Alcohol use: Not on file      History reviewed. No pertinent family history. Review of Systems:     Not able to obtain due to his medical condition    Objective:   Vital Signs:  Visit Vitals  BP (!) 120/92   Pulse 97   Temp 98 °F (36.7 °C)   Resp 18   Ht 5' 11\" (1.803 m)   Wt 86.6 kg (190 lb 14.7 oz)   SpO2 98%   BMI 26.63 kg/m²    O2 Flow Rate (L/min): 10 l/min O2 Device: Tracheostomy, Ventilator Temp (24hrs), Av.6 °F (37 °C), Min:98 °F (36.7 °C), Max:99.8 °F (37.7 °C)           Intake/Output:     Intake/Output Summary (Last 24 hours) at 2020 1049  Last data filed at 2020 0800  Gross per 24 hour   Intake 1950 ml   Output 1375 ml   Net 575 ml       Physical Exam:  General:  Awake and interactive. Writing on communication board.  Frustrated at times   Eyes:  Sclera anicteric. Pupils equally round and reactive to light. Mouth/Throat: Mucous membranes normal, oral pharynx clear   Neck: Supple, tracheostomy tube in place   Lungs:    Good air entry bilaterally, fine crepitation   CV:  Regular rate and rhythm,no murmur, click, rub or gallop   Abdomen:   Soft, non-tender. bowel sounds normal. non-distended   Extremities: No cyanosis , evolving edema   Skin: Skin color, texture, turgor normal. no acute rash or lesions   Lymph nodes: Cervical and supraclavicular normal   Musculoskeletal: No swelling or deformity   Lines/Devices:  Intact, no erythema, drainage or tenderness   Psych:  conscious and alert, follows command, anxious, still weak on right arm but able to move minimally against gravity           LABS AND  DATA: Personally reviewed  Recent Labs     09/29/20 0323 09/27/20  0548   WBC 10.6 9.6   HGB 12.2 12.4   HCT 37.3 39.1    324     Recent Labs     09/29/20 0323 09/27/20  0548   * 134*   K 3.9 3.6   CL 97 99   CO2 27 27   BUN 19 10   CREA 0.65* 0.62*   GLU 89 97   CA 9.9 9.9   MG 2.5* 2.1   PHOS 5.1* 4.4     Recent Labs     09/29/20 0323 09/27/20  0548   * 107   TP 8.4* 8.8*   ALB 3.5 3.6   GLOB 4.9* 5.2*     No results for input(s): INR, PTP, APTT, INREXT in the last 72 hours. No results for input(s): PHI, PCO2I, PO2I, FIO2I in the last 72 hours. No results for input(s): CPK, CKMB, TROIQ, BNPP in the last 72 hours.     Hemodynamics:   PAP:   CO:     Wedge:   CI:     CVP:    SVR:       PVR:       Ventilator Settings:  Mode Rate Tidal Volume Pressure FiO2 PEEP   Assist control   12 ml  5 cm H2O 35 % 5 cm H20     Peak airway pressure: 22 cm H2O    Minute ventilation: 7.45 l/min        MEDS: Reviewed    Chest X-Ray:  CXR Results  (Last 48 hours)    None        Assessment and Plan:   -Bilateral pneumonia due to COVID-19 infection  -Right arm weakness:   -Debility  - Acute hypoxic respiratory failure due to COVID-19 infection status post tracheostomy on August 26  - Possible bacterial super imposed infection: Completed antibiotic course  -History of tobacco use     - Wean ventilator as tolerated. Likely will need long-term facility for prolonged weaning process  - Anxiety management with anxiolytic antidepressant and sedative and pain management  - Still not clear what caused his acute right arm weakness, multiple possibilities including hypoxic event or vasculitis related to COVID. He is on aspirin and Lovenox no further deterioration  -Completed COVID-19 treatment  - Ventilator bundle, GI prophylaxis and DVT prophylaxis  -PT OT    DISPOSITION  Stay in ICU    CRITICAL CARE CONSULTANT NOTE  I had a face to face encounter with the patient, reviewed and interpreted patient data including clinical events, labs, images, vital signs, I/O's, and examined patient. I have discussed the case and the plan and management of the patient's care with the consulting services, the bedside nurses and the respiratory therapist.      NOTE OF PERSONAL INVOLVEMENT IN CARE   This patient has a high probability of imminent, clinically significant deterioration, which requires the highest level of preparedness to intervene urgently. I participated in the decision-making and personally managed or directed the management of the following life and organ supporting interventions that required my frequent assessment to treat or prevent imminent deterioration. I personally spent 40 minutes of critical care time. This is time spent at this critically ill patient's bedside actively involved in patient care as well as the coordination of care and discussions with the patient's family. This does not include any procedural time which has been billed separately. Jarek Wagner M.D.   Staff Intensivist/Pulmonologist  [facility]  9/29/2020

## 2020-09-29 NOTE — PROGRESS NOTES
MIGUELINA  Patient presented to [address] [place] [address] ED from home with increased shortness of breath, diaphoresis and fever. Transferred to [facility] for possible ECMO placement   RUR: 25%  Disposition: LTAC   Patient remains in ICU , tolerating SBT. Patient is s/p peg tube placement 9/23/2020. Care manager spoke with patient's mother regarding transitions of care. CM provided mother with a list of LTAC's previously. Per mother she would like to talk this over with the patient. Per mother she does not want her son to go to [facility], [facility] Aris Galan notified [facility]  liaison of mothers decision. Per mother she is considering [facility] in [facility]. LIZET spoke with mother to get approval to make referral to [facility] and she requested that CM speak to her when she arrives.  Claudine Flores RN,Care Manager

## 2020-09-29 NOTE — PROGRESS NOTES
1930 Bedside and Verbal shift change report given to ZAC Carlos RN (oncoming nurse) by Amrit Horan RN (offgoing nurse). Report included the following information SBAR, Kardex, Intake/Output, MAR, Recent Results, Cardiac Rhythm SR/ST and Alarm Parameters . 0730 Bedside and Verbal shift change report given to Calista Flanagan RN (oncoming nurse) by Manoj Lo RN (offgoing nurse). Report included the following information SBAR, Kardex, Intake/Output, MAR, Recent Results, Cardiac Rhythm SR/ST and Alarm Parameters .

## 2020-09-30 LAB
ALBUMIN SERPL-MCNC: 3.5 G/DL (ref 3.5–5)
ALBUMIN/GLOB SERPL: 0.8 {RATIO} (ref 1.1–2.2)
ALP SERPL-CCNC: 117 U/L (ref 45–117)
ALT SERPL-CCNC: 62 U/L (ref 12–78)
ANION GAP SERPL CALC-SCNC: 8 MMOL/L (ref 5–15)
AST SERPL-CCNC: 22 U/L (ref 15–37)
BASOPHILS # BLD: 0 K/UL (ref 0–0.1)
BASOPHILS NFR BLD: 1 % (ref 0–1)
BILIRUB SERPL-MCNC: 0.5 MG/DL (ref 0.2–1)
BUN SERPL-MCNC: 17 MG/DL (ref 6–20)
BUN/CREAT SERPL: 26 (ref 12–20)
CALCIUM SERPL-MCNC: 10.2 MG/DL (ref 8.5–10.1)
CHLORIDE SERPL-SCNC: 96 MMOL/L (ref 97–108)
CO2 SERPL-SCNC: 27 MMOL/L (ref 21–32)
CREAT SERPL-MCNC: 0.66 MG/DL (ref 0.7–1.3)
DIFFERENTIAL METHOD BLD: ABNORMAL
EOSINOPHIL # BLD: 0.3 K/UL (ref 0–0.4)
EOSINOPHIL NFR BLD: 3 % (ref 0–7)
ERYTHROCYTE [DISTWIDTH] IN BLOOD BY AUTOMATED COUNT: 14.3 % (ref 11.5–14.5)
GLOBULIN SER CALC-MCNC: 4.6 G/DL (ref 2–4)
GLUCOSE SERPL-MCNC: 105 MG/DL (ref 65–100)
HCT VFR BLD AUTO: 36.5 % (ref 36.6–50.3)
HGB BLD-MCNC: 11.9 G/DL (ref 12.1–17)
IMM GRANULOCYTES # BLD AUTO: 0.1 K/UL (ref 0–0.04)
IMM GRANULOCYTES NFR BLD AUTO: 1 % (ref 0–0.5)
LYMPHOCYTES # BLD: 2.3 K/UL (ref 0.8–3.5)
LYMPHOCYTES NFR BLD: 29 % (ref 12–49)
MAGNESIUM SERPL-MCNC: 2.3 MG/DL (ref 1.6–2.4)
MCH RBC QN AUTO: 27.7 PG (ref 26–34)
MCHC RBC AUTO-ENTMCNC: 32.6 G/DL (ref 30–36.5)
MCV RBC AUTO: 85.1 FL (ref 80–99)
MONOCYTES # BLD: 0.9 K/UL (ref 0–1)
MONOCYTES NFR BLD: 11 % (ref 5–13)
NEUTS SEG # BLD: 4.3 K/UL (ref 1.8–8)
NEUTS SEG NFR BLD: 55 % (ref 32–75)
NRBC # BLD: 0 K/UL (ref 0–0.01)
NRBC BLD-RTO: 0 PER 100 WBC
PHOSPHATE SERPL-MCNC: 5.5 MG/DL (ref 2.6–4.7)
PLATELET # BLD AUTO: 287 K/UL (ref 150–400)
PMV BLD AUTO: 10.9 FL (ref 8.9–12.9)
POTASSIUM SERPL-SCNC: 3.8 MMOL/L (ref 3.5–5.1)
PROT SERPL-MCNC: 8.1 G/DL (ref 6.4–8.2)
RBC # BLD AUTO: 4.29 M/UL (ref 4.1–5.7)
SODIUM SERPL-SCNC: 131 MMOL/L (ref 136–145)
WBC # BLD AUTO: 7.9 K/UL (ref 4.1–11.1)

## 2020-09-30 PROCEDURE — 36415 COLL VENOUS BLD VENIPUNCTURE: CPT

## 2020-09-30 PROCEDURE — 97530 THERAPEUTIC ACTIVITIES: CPT

## 2020-09-30 PROCEDURE — 74011250636 HC RX REV CODE- 250/636: Performed by: EMERGENCY MEDICINE

## 2020-09-30 PROCEDURE — 74011250637 HC RX REV CODE- 250/637: Performed by: INTERNAL MEDICINE

## 2020-09-30 PROCEDURE — 74011250637 HC RX REV CODE- 250/637: Performed by: NURSE PRACTITIONER

## 2020-09-30 PROCEDURE — 74011250637 HC RX REV CODE- 250/637: Performed by: HOSPITALIST

## 2020-09-30 PROCEDURE — 74011250637 HC RX REV CODE- 250/637: Performed by: EMERGENCY MEDICINE

## 2020-09-30 PROCEDURE — 80053 COMPREHEN METABOLIC PANEL: CPT

## 2020-09-30 PROCEDURE — 65620000000 HC RM CCU GENERAL

## 2020-09-30 PROCEDURE — 83735 ASSAY OF MAGNESIUM: CPT

## 2020-09-30 PROCEDURE — 97112 NEUROMUSCULAR REEDUCATION: CPT

## 2020-09-30 PROCEDURE — 85025 COMPLETE CBC W/AUTO DIFF WBC: CPT

## 2020-09-30 PROCEDURE — 94003 VENT MGMT INPAT SUBQ DAY: CPT

## 2020-09-30 PROCEDURE — 84100 ASSAY OF PHOSPHORUS: CPT

## 2020-09-30 RX ADMIN — Medication 10 ML: at 20:23

## 2020-09-30 RX ADMIN — ALPRAZOLAM 1 MG: 0.5 TABLET ORAL at 13:33

## 2020-09-30 RX ADMIN — FAMOTIDINE 20 MG: 40 POWDER, FOR SUSPENSION ORAL at 09:36

## 2020-09-30 RX ADMIN — ONDANSETRON 4 MG: 2 INJECTION INTRAMUSCULAR; INTRAVENOUS at 09:57

## 2020-09-30 RX ADMIN — Medication: at 09:36

## 2020-09-30 RX ADMIN — Medication 10 ML: at 05:34

## 2020-09-30 RX ADMIN — TEMAZEPAM 15 MG: 15 CAPSULE ORAL at 23:53

## 2020-09-30 RX ADMIN — ACETAMINOPHEN ORAL SOLUTION 650 MG: 650 SOLUTION ORAL at 17:17

## 2020-09-30 RX ADMIN — CHLORHEXIDINE GLUCONATE 15 ML: 0.12 RINSE ORAL at 20:25

## 2020-09-30 RX ADMIN — METOCLOPRAMIDE HYDROCHLORIDE 5 MG: 5 SOLUTION ORAL at 05:33

## 2020-09-30 RX ADMIN — NYSTATIN 500000 UNITS: 500000 SUSPENSION ORAL at 13:33

## 2020-09-30 RX ADMIN — TRAZODONE HYDROCHLORIDE 100 MG: 100 TABLET ORAL at 20:25

## 2020-09-30 RX ADMIN — FAMOTIDINE 20 MG: 40 POWDER, FOR SUSPENSION ORAL at 20:25

## 2020-09-30 RX ADMIN — OXYCODONE HYDROCHLORIDE 5 MG: 5 SOLUTION ORAL at 09:57

## 2020-09-30 RX ADMIN — CHLORHEXIDINE GLUCONATE 15 ML: 0.12 RINSE ORAL at 09:36

## 2020-09-30 RX ADMIN — METOCLOPRAMIDE HYDROCHLORIDE 5 MG: 5 SOLUTION ORAL at 17:17

## 2020-09-30 RX ADMIN — OXYCODONE HYDROCHLORIDE 5 MG: 5 SOLUTION ORAL at 23:53

## 2020-09-30 RX ADMIN — FENTANYL CITRATE 25 MCG: 50 INJECTION, SOLUTION INTRAMUSCULAR; INTRAVENOUS at 22:01

## 2020-09-30 RX ADMIN — MICONAZOLE NITRATE 2 % TOPICAL POWDER: at 19:28

## 2020-09-30 RX ADMIN — LORAZEPAM 1 MG: 2 INJECTION INTRAMUSCULAR; INTRAVENOUS at 20:22

## 2020-09-30 RX ADMIN — ALPRAZOLAM 1 MG: 0.5 TABLET ORAL at 05:33

## 2020-09-30 RX ADMIN — NYSTATIN 500000 UNITS: 500000 SUSPENSION ORAL at 09:35

## 2020-09-30 RX ADMIN — METOPROLOL TARTRATE 100 MG: 25 TABLET, FILM COATED ORAL at 20:22

## 2020-09-30 RX ADMIN — ASPIRIN 81 MG CHEWABLE TABLET 81 MG: 81 TABLET CHEWABLE at 09:35

## 2020-09-30 RX ADMIN — METOPROLOL TARTRATE 100 MG: 25 TABLET, FILM COATED ORAL at 09:35

## 2020-09-30 RX ADMIN — OXYCODONE HYDROCHLORIDE 5 MG: 5 SOLUTION ORAL at 17:17

## 2020-09-30 RX ADMIN — FENTANYL CITRATE 25 MCG: 50 INJECTION, SOLUTION INTRAMUSCULAR; INTRAVENOUS at 04:41

## 2020-09-30 RX ADMIN — ENOXAPARIN SODIUM 30 MG: 30 INJECTION SUBCUTANEOUS at 10:00

## 2020-09-30 RX ADMIN — METOCLOPRAMIDE HYDROCHLORIDE 5 MG: 5 SOLUTION ORAL at 11:16

## 2020-09-30 RX ADMIN — FENTANYL CITRATE 25 MCG: 50 INJECTION, SOLUTION INTRAMUSCULAR; INTRAVENOUS at 18:12

## 2020-09-30 RX ADMIN — MICONAZOLE NITRATE 2 % TOPICAL POWDER: at 09:36

## 2020-09-30 RX ADMIN — ENOXAPARIN SODIUM 30 MG: 30 INJECTION SUBCUTANEOUS at 22:01

## 2020-09-30 RX ADMIN — Medication: at 19:28

## 2020-09-30 RX ADMIN — ALPRAZOLAM 1 MG: 0.5 TABLET ORAL at 20:22

## 2020-09-30 RX ADMIN — LORAZEPAM 1 MG: 2 INJECTION INTRAMUSCULAR; INTRAVENOUS at 03:58

## 2020-09-30 RX ADMIN — FENTANYL CITRATE 25 MCG: 50 INJECTION, SOLUTION INTRAMUSCULAR; INTRAVENOUS at 00:39

## 2020-09-30 RX ADMIN — OXYCODONE HYDROCHLORIDE 5 MG: 5 SOLUTION ORAL at 13:33

## 2020-09-30 RX ADMIN — ALUMINUM HYDROXIDE AND MAGNESIUM HYDROXIDE 15 ML: 200; 200 SUSPENSION ORAL at 17:27

## 2020-09-30 RX ADMIN — POTASSIUM BICARBONATE 40 MEQ: 782 TABLET, EFFERVESCENT ORAL at 09:35

## 2020-09-30 RX ADMIN — NYSTATIN 500000 UNITS: 500000 SUSPENSION ORAL at 18:12

## 2020-09-30 RX ADMIN — NYSTATIN 500000 UNITS: 500000 SUSPENSION ORAL at 20:23

## 2020-09-30 RX ADMIN — OXYCODONE HYDROCHLORIDE 5 MG: 5 SOLUTION ORAL at 03:58

## 2020-09-30 NOTE — PROGRESS NOTES
[facility]    ICU TEAM Progress Note    Name: Yael Woods   : 1990   MRN: [id number]   Date: 2020      I  Subjective:   Progress Note: 2020      Reason for ICU Admission: Respiratory failure due to COVID-19 infection    Interval history:   80-year-old male with no history of significant past medical history except smoking half pack per day. Priyank Alvarez was admitted on 2020 at [facility] with acute hypoxemic respiratory failure from COVID pneumonia. Priyank Alvarez continued to deteriorate and got intubated on 8/10.  Due to worsening hypoxemia, he is transferred to [facility] for ECMO evaluation. Priyank Alvarez has bee given convalescent plasma twice on  and  and treated with Remdesivir which was completed on 8/10. Priyank Alvarez is also on dexamethasone. Priyank Alvarez completed course of empiric antibiotics including azithromycin which was completed on  and cefepime was completed on .  During his hospital course he also developed pneumothorax and has left-sided chest tube since .  Has a tracheostomy tube placed on .  On September 3 patient had significant worsening on his right arm, CT head was unrevealing.  He is already on high-dose Lovenox and aspirin.  On  he had acute weakness on the left arm code stroke was called and again CTA was unrevealing.  Since then left arm is back to baseline, showed some improvement on right arm but still weak compared to left with minimal ability to move against gravity.  Mental status continues to improve but now he is very anxious with recurrent episode of nausea and vomiting associated with tachycardia. Now s/p PEG  Overnight Events:   No acute event, yesterday tolerated trach collar with speech well for 2 hours, was communicating with his mother and with the staff. Was able to stay on chair with help of PT and OT.   No fever tolerating tube feeding    Active Problem List:     Problem List  Date Reviewed: 2020 Codes Class    Acute respiratory failure (HCC) ICD-10-CM: J96.00  ICD-9-CM: 518.81         Pneumothorax on left ICD-10-CM: J93.9  ICD-9-CM: 512.89         Pneumonia due to severe acute respiratory syndrome coronavirus 2 (SARS-CoV-2) ICD-10-CM: U07.1, J12.89  ICD-9-CM: 480.8         Respiratory failure with hypoxia (HCC) ICD-10-CM: J96.91  ICD-9-CM: 518.81               Past Medical History:      has a past medical history of History of vascular access device (08/10/2020). Past Surgical History:      has a past surgical history that includes ir thora/ins chest tube(pneumo) wo image (2020); ir thora/ins chest tube(pneumo) wo image (2020); pr tracheostomy, planned (2020); ir percut gastrostromy tube (2020); and ir insert gastrostomy tube perc (2020). Home Medications:     Prior to Admission medications    Medication Sig Start Date End Date Taking? Authorizing Provider   benzonatate (Tessalon Perles) 100 mg capsule Take 100 mg by mouth three (3) times daily as needed for Cough. Provider, Historical       Allergies/Social/Family History:     No Known Allergies   Social History     Tobacco Use    Smoking status: Current Some Day Smoker    Smokeless tobacco: Never Used   Substance Use Topics    Alcohol use: Not on file      History reviewed. No pertinent family history.     Review of Systems:     Not able to obtain due to his medical condition    Objective:   Vital Signs:  Visit Vitals  /80   Pulse (!) 101   Temp 98.5 °F (36.9 °C)   Resp 16   Ht 5' 11\" (1.803 m)   Wt 91.1 kg (200 lb 13.4 oz)   SpO2 99%   BMI 28.01 kg/m²    O2 Flow Rate (L/min): 10 l/min O2 Device: Tracheostomy, Ventilator Temp (24hrs), Av.3 °F (36.8 °C), Min:98 °F (36.7 °C), Max:98.5 °F (36.9 °C)           Intake/Output:     Intake/Output Summary (Last 24 hours) at 2020 1009  Last data filed at 2020 0700  Gross per 24 hour   Intake 1660 ml   Output 1000 ml   Net 660 ml       Physical Exam:    General:  Awake and interactive. Writing on communication board. Frustrated at times   Eyes:  Sclera anicteric. Pupils equally round and reactive to light. Mouth/Throat: Mucous membranes normal, oral pharynx clear   Neck: Supple, tracheostomy tube in place   Lungs:    Good air entry bilaterally, fine crepitation   CV:  Regular rate and rhythm,no murmur, click, rub or gallop   Abdomen:   Soft, non-tender. bowel sounds normal. non-distended   Extremities: No cyanosis , evolving edema   Skin: Skin color, texture, turgor normal. no acute rash or lesions   Lymph nodes: Cervical and supraclavicular normal   Musculoskeletal: No swelling or deformity   Lines/Devices:  Intact, no erythema, drainage or tenderness   Psych:  conscious and alert, follows command, anxious, still weak on right arm but able to move minimally against gravity         LABS AND  DATA: Personally reviewed  Recent Labs     09/30/20  0400 09/29/20  0323   WBC 7.9 10.6   HGB 11.9* 12.2   HCT 36.5* 37.3    318     Recent Labs     09/30/20  0400 09/29/20  0323   * 133*   K 3.8 3.9   CL 96* 97   CO2 27 27   BUN 17 19   CREA 0.66* 0.65*   * 89   CA 10.2* 9.9   MG 2.3 2.5*   PHOS 5.5* 5.1*     Recent Labs     09/30/20  0400 09/29/20  0323    125*   TP 8.1 8.4*   ALB 3.5 3.5   GLOB 4.6* 4.9*     No results for input(s): INR, PTP, APTT, INREXT in the last 72 hours. No results for input(s): PHI, PCO2I, PO2I, FIO2I in the last 72 hours. No results for input(s): CPK, CKMB, TROIQ, BNPP in the last 72 hours.     Hemodynamics:   PAP:   CO:     Wedge:   CI:     CVP:    SVR:       PVR:       Ventilator Settings:  Mode Rate Tidal Volume Pressure FiO2 PEEP   Assist control   12 ml  5 cm H2O 35 % 5 cm H20     Peak airway pressure: 22 cm H2O    Minute ventilation: 7.24 l/min        MEDS: Reviewed    Chest X-Ray:  CXR Results  (Last 48 hours)    None            Assessment and Plan:   -Bilateral pneumonia due to COVID-19 infection  -Right arm weakness:   -Debility  - Acute hypoxic respiratory failure due to COVID-19 infection status post tracheostomy on August 26  - Possible bacterial super imposed infection: Completed antibiotic course  -History of tobacco use     - Wean ventilator as tolerated. Joey Serrato will need long-term facility for prolonged weaning process. I discussed this with his mother who has concerns regarding transfer him to [facility]. I explained to her that she cannot take other facilities in the list of other facility been provided by the . We will continue with daily SBT's and trach collar trials with speech valve as tolerated  - Anxiety management with anxiolytic antidepressant and sedative and pain management  - Still not clear what caused his acute right arm weakness, multiple possibilities including hypoxic event or vasculitis related to Leyda Bullion is on aspirin and Lovenox no further deterioration  -Completed COVID-19 treatment  - Ventilator bundle, GI prophylaxis and DVT prophylaxis  -PT OT    DISPOSITION  Stay in ICU    CRITICAL CARE CONSULTANT NOTE  I had a face to face encounter with the patient, reviewed and interpreted patient data including clinical events, labs, images, vital signs, I/O's, and examined patient. I have discussed the case and the plan and management of the patient's care with the consulting services, the bedside nurses and the respiratory therapist.      NOTE OF PERSONAL INVOLVEMENT IN CARE   This patient has a high probability of imminent, clinically significant deterioration, which requires the highest level of preparedness to intervene urgently. I participated in the decision-making and personally managed or directed the management of the following life and organ supporting interventions that required my frequent assessment to treat or prevent imminent deterioration. I personally spent 40 minutes of critical care time.   This is time spent at this critically ill patient's bedside actively involved in patient care as well as the coordination of care and discussions with the patient's family. This does not include any procedural time which has been billed separately. Pako Simpson M.D.   Staff Intensivist/Pulmonologist  [facility]  9/30/2020

## 2020-09-30 NOTE — PROGRESS NOTES
SLP Contact Note    Noted patient tolerated 2 hours of TCT yesterday. SLP will follow today and complete further PMV and swallow treatment when pt is on TCT again today.     Thank you,  Avi Toribio M.Ed, Edna Garg  Speech-Language Pathologist

## 2020-09-30 NOTE — PROGRESS NOTES
Problem: Mobility Impaired (Adult and Pediatric)  Goal: *Acute Goals and Plan of Care (Insert Text)  Description: FUNCTIONAL STATUS PRIOR TO ADMISSION: Patient was independent and active without use of DME. 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 Physical Therapy Goals  Reassessment 9/25/2020  1. Patient will move from supine to sit and sit to supine , scoot up and down, and roll side to side in bed with maximal assistance within 7 day(s). 2.  Patient will transfer from bed to chair and chair to bed with maximal assistance using the least restrictive device within 7 day(s). 3.  Patient will perform sit to stand with maximal assistance within 7 day(s). 4.  Patient will tolerate sitting EOB with moderate assistance for 10 minutes within 7 day(s). Reassessment completed 9/18/2020 and all goals remain appropriate  at this time. Initiated 9/11/2020  1. Patient will move from supine to sit and sit to supine , scoot up and down, and roll side to side in bed with maximal assistance within 7 day(s). 2.  Patient will transfer from bed to chair and chair to bed with maximal assistance using the least restrictive device within 7 day(s). 3.  Patient will perform sit to stand with maximal assistance within 7 day(s). 4.  Patient will tolerate sitting EOB with maximal assistance for 5 minutes within 7 day(s). --Goal Met 9/25      Outcome: Progressing Towards Goal    PHYSICAL THERAPY TREATMENT  Patient: Francesca Walton (29 y.o. male)  Date: 9/30/2020  Diagnosis: [facility] [J96.00]   <principal problem not specified>  Procedure(s) (LRB):  ESOPHAGOGASTRODUODENOSCOPY (EGD) @ bedside (N/A)  ESOPHAGOGASTRODUODENAL (EGD) BIOPSY (N/A) 8 Days Post-Op  Precautions:    Chart, physical therapy assessment, plan of care and goals were reviewed.     ASSESSMENT  Patient continues with skilled PT services and is progressing towards goals however remains most limited by profound global weakness (L LE and R UE most), impaired balance/trunk control, and decreased cardiopulmonary tolerance to activity leading to increased dependency and falls risk from baseline level following prolonged and medically complicated admission. Received pt on trach collar, cleared for mobility by RN. Pt very motivated to participate in session this date - responded well to firm, calming reassurance. Worked on supine<>sit transitions with one step cues for sequencing to increase indep with task. Demos poor sitting balance initially, quickly progressing to fair with time and cues for improved upright posture. Progressed to completing sit<>stands x3 reps with mod A x2 fading to min A x2 with reps and facilitation, primarily at L LE to promote extension through transitional movement. In stance, worked on obtaining full upright posture and correcting from posterior lean. Able to take 2 small shuffled side steps up towards [facility]. Assisted back to bed and transitioned to chair position at end of session, ZHAO, RN aware. Continue to recommend discharge to acute IPR pending vent weaning. Will follow. Current Level of Function Impacting Discharge (mobility/balance): mod A x2 for bed mobility and sit<>stands; unable to ambulate; high falls risk; on trach collar     Other factors to consider for discharge: high falls risk; indep at baseline          PLAN :  Patient continues to benefit from skilled intervention to address the above impairments. Continue treatment per established plan of care. to address goals.     Recommendation for discharge: (in order for the patient to meet his/her long term goals)  Therapy 3 hours per day 5-7 days per week  Vs LTAC with bridge to IPR pending vent weaning     This discharge recommendation:  Has been made in collaboration with the attending provider and/or case management    IF patient discharges home will need the following DME: to be determined (TBD)       SUBJECTIVE:   Patient stated Ok.     OBJECTIVE DATA SUMMARY:   Critical Behavior:  Neurologic State: Alert  Orientation Level: Oriented X4  Cognition: Follows commands  Safety/Judgement: Awareness of environment  Functional Mobility Training:  Bed Mobility:  Rolling: Assist x2;Minimum assistance  Supine to Sit: Assist x2;Minimum assistance  Sit to Supine: Assist x2; Moderate assistance       Transfers:  Sit to Stand: Assist x2; Moderate assistance(progressing to min A x2 with reps and facilitation )  Stand to Sit: Assist x2;Minimum assistance     Balance:  Sitting: Impaired  Sitting - Static: Fair (occasional)  Sitting - Dynamic: Poor (constant support)  Standing: Impaired; With support  Standing - Static: Constant support;Poor    Activity Tolerance:   desaturates with exertion and requires oxygen, requires frequent rest breaks, and observed SOB with activity  Please refer to the flowsheet for vital signs taken during this treatment. After treatment patient left in no apparent distress:   Supine in bed, Call bell within reach, and Side rails x 3    COMMUNICATION/COLLABORATION:   The patients plan of care was discussed with: Occupational therapist and Registered nurse.      David Wiley, PT, DPT   Time Calculation: 39 mins

## 2020-09-30 NOTE — ROUTINE PROCESS
16:00 Received SBAR report from Jung Robles    16:05 [facility]  Patient transferred to CCU [facility], mother is with him

## 2020-09-30 NOTE — PROGRESS NOTES
SLP Contact Note    Discussed patient with RN Mable Gomez. Pt has been tolerating PMV and trach collar trial since 11:00 this morning without any difficulty. Recommend pt continue this as long as he is able, can continue to have ice chips, and, if patient continues tolerating trach collar trial and PMV, will plan on completing Modified Barium Swallow Study tomorrow to objectively assess safety of swallow physiology.       Thank you,  SIVA AnsariEd, [address] [facility]  Speech-Language Pathologist

## 2020-09-30 NOTE — PROGRESS NOTES
Problem: Self Care Deficits Care Plan (Adult)  Goal: *Acute Goals and Plan of Care (Insert Text)  Description:   FUNCTIONAL STATUS PRIOR TO ADMISSION: Patient unable to provide history at OT evaluation. Per chart review, patient was fully independent    HOME SUPPORT: Per chart review, patient lived alone    Occupational Therapy Goals  Initiated 9/14/2020, continued 9/21/2020, continued 9/28/2020  1. Patient will perform grooming with minimum assistance within 7 day(s). 2.  Patient will perform bathing with moderate assistance  within 7 day(s). 3.  Patient will perform upper body dressing with minimum assistance  within 7 day(s). 5.  Patient will perform lower body dressing with moderate assistance within 7 day(s). 6.  Patient will perform toilet transfers with moderate assistance  within 7 day(s). 7.  Patient will perform all aspects of toileting with moderate assistance  within 7 day(s). 8.  Patient will participate in upper extremity therapeutic exercise/activities with minimal assistance for 10 minutes within 7 day(s). 9.  Patient will utilize energy conservation techniques during functional activities with verbal cues within 7 day(s). Outcome: Progressing Towards Goal    OCCUPATIONAL THERAPY TREATMENT  Patient: Jaida Chawla (6 y.o. male)  Date: 9/30/2020  Diagnosis: Acute respiratory failure ([facility] [J96.00]   <principal problem not specified>  Procedure(s) (LRB):  ESOPHAGOGASTRODUODENOSCOPY (EGD) @ bedside (N/A)  ESOPHAGOGASTRODUODENAL (EGD) BIOPSY (N/A) 8 Days Post-Op  Precautions:    Chart, occupational therapy assessment, plan of care, and goals were reviewed. ASSESSMENT  Patient continues with skilled OT services and is progressing towards goals.   This patient on trach collar today during session with O2 sats (see vitals) staying between 87-96% during session, HR fluctuating with activity from 's bpm, recovering during rest breaks between activity with cues for pursed lip and deep breathing as well as postural cues/facilitation. Trach suctioned intermittently PRN during session. Patient completed 3 stands with min-mod A x 2 while standing for 30-90 sec with cues for anterior ws of pelvis and trunk over ANNA, increasing upright posture/hip knee extension with improved posture each standing trial. Static sitting balance EOB varying from in A to CGA/with close SBA intermittently during session, posterior LOB with facilitated LE tailor sitting for shoe donning/doffing. Patient participated with max A R UE neuro-mor to R UE with impaired coordination and functional use of R dominant UE. Will benefit from IP rehab preferred as he is weaning from vent today and tolerating trach collar with activity vs LTAC if vent-dependent. Continue per POC. Patient Vitals for the past 4 hrs:   Temp Pulse Resp BP SpO2   09/30/20 1227 -- -- 16 -- --   09/30/20 1200 98.7 °F (37.1 °C) (!) 119 24 (!) 128/102 92 %   09/30/20 1132 -- 95 -- (!) 130/94 99 %   09/30/20 1100 -- 86 16 125/85 100 %   09/30/20 1000 -- (!) 117 17 (!) 128/97 97 %   09/30/20 0900 -- (!) 122 16 120/89 100 %         Current Level of Function Impacting Discharge (ADLs): D for LB ADL/toileting, min-mod A of 2 for mobility, self limiting--needs encouragement    Other factors to consider for discharge:          PLAN :  Patient continues to benefit from skilled intervention to address the above impairments. Continue treatment per established plan of care. to address goals.     Recommend with staff: bed to chair position intermittently during day    Recommend next OT session: EOB UB bathing and/or grooming    Recommendation for discharge: (in order for the patient to meet his/her long term goals)  Therapy 3 hours per day 5-7 days per week    This discharge recommendation:  Has been made in collaboration with the attending provider and/or case management    IF patient discharges home will need the following DME: OLIVER SUBJECTIVE:   Patient stated It's my hand.     OBJECTIVE DATA SUMMARY:   Cognitive/Behavioral Status:  Neurologic State: Alert  Orientation Level: Oriented X4  Cognition: Follows commands             Functional Mobility and Transfers for ADLs:  Bed Mobility:  Rolling: Assist x2;Minimum assistance; Additional time  Supine to Sit: Minimum assistance;Assist x2; Additional time  Sit to Supine: Moderate assistance;Assist x2; Additional time    Transfers:  Sit to Stand: Minimum assistance;Assist x2; Additional time; Moderate assistance, stood x 3 trials for 30 sec to 90 sec           Balance:  Sitting: Impaired; Without support  Sitting - Static: Fair (occasional)  Sitting - Dynamic: Poor (constant support)  Standing: Impaired;Pull to stand; With support  Standing - Static: Constant support;Poor    ADL Intervention:                           Lower Body Dressing Assistance  Shoes with Cloth Laces: Maximum assistance; Total assistance (dependent)(D to lindsay, max A to doff, A with tailor sit position EOB)  Leg Crossed Method Used: Yes  Cues: Physical assistance; Tactile cues provided;Verbal cues provided;Visual cues provided;Don;Doff    Toileting  Toileting Assistance: Total assistance(dependent)(condom cath)  Bladder Hygiene: Total assistance (dependent)         Neuromuscular facilitation Exercises:   Patient educated on pelvic/thoracic positioning in sitting position on EOB and in standing to facilitate trunk control and decrease L list/lean on L UE in unsupported sitting and increase posture in standing. See above for details for A.     EXERCISE   Sets   Reps   Active Active Assist   Passive   Comments   Scapular mobs elev/depr 2 5 []           [x]           []           Max A on EOB   R GH ER 1 5 []           [x]           []           Max A-D on EOB   SROM shoulder flexion 1 5 []           [x]           []           Max A to D on EOB with facilitation at trunk to facilitate anterior pelvic tilt   Thoracic extension 1 5 [] [x]           []           Cued with min A for facilitation on EOB   Forearm supination 1 3 [x]           []           []           In bed    strengthening 1 5 [x]           []           []           Holding flashlight with cues to hold for 5 sec isometric contraction intermittently during day   R UE hand to mouth 1 5 []           [x]           []           Max A   R elbow extension 1 5 []           [x]           []           Mod A      []           []           []                 []           []           []                 []           []           []                   Pain:  No c/o    Activity Tolerance:   Fair, desaturates with exertion and requires oxygen, and observed SOB with activity with trach collar  Please refer to the flowsheet for vital signs taken during this treatment. After treatment patient left in no apparent distress:   Supine in bed with bed in chair position and  RUE supported with call bell in reach    COMMUNICATION/COLLABORATION:   The patients plan of care was discussed with: Physical therapy assistant.      Baljeet Kumar, NELYR/L  Time Calculation: 41 mins

## 2020-09-30 NOTE — PROGRESS NOTES
0730: Bedside shift change report given to Carla Blackwell (oncoming nurse) by Michael Chapa (offgoing nurse). Report included the following information SBAR, Kardex, Intake/Output and MAR. 1030: ICU multidisciplinary rounds lead by Dr Isreal Aguero  (Intensivist): The following were reviewed and discussed: current labs,  recent imaging, lines/drains, review of body systems, nutrition, cultures, mobility, length of ICU stay. The plan of care for the day is as follows  Placement (written note by RN)       [phone]: TRANSFER - OUT REPORT:    Verbal report given to [facility] (name) on [employer]  being transferred to CCU (unit) for routine progression of care       Report consisted of patients Situation, Background, Assessment and   Recommendations(SBAR). Information from the following report(s) SBAR, Kardex, MAR, Recent Results and Cardiac Rhythm NSR, ST was reviewed with the receiving nurse. Lines:   Peripheral IV 09/28/20 Anterior;Left;Proximal Forearm (Active)   Site Assessment Clean, dry, & intact 09/30/20 1200   Phlebitis Assessment 0 09/30/20 1200   Infiltration Assessment 0 09/30/20 1200   Dressing Status Clean, dry, & intact 09/30/20 1200   Dressing Type Transparent 09/30/20 1200   Hub Color/Line Status Patent; Flushed 09/30/20 1200   Action Taken Open ports on tubing capped 09/30/20 1200   Alcohol Cap Used Yes 09/30/20 1200        Opportunity for questions and clarification was provided.       Patient transported with:   Monitor  O2 @ Ventilator liters  Registered Nurse  [employer] [facility]

## 2020-10-01 ENCOUNTER — APPOINTMENT (OUTPATIENT)
Dept: GENERAL RADIOLOGY | Age: 30
DRG: 004 | End: 2020-10-01
Attending: INTERNAL MEDICINE
Payer: COMMERCIAL

## 2020-10-01 LAB
ALBUMIN SERPL-MCNC: 3.3 G/DL (ref 3.5–5)
ALBUMIN/GLOB SERPL: 0.8 {RATIO} (ref 1.1–2.2)
ALP SERPL-CCNC: 110 U/L (ref 45–117)
ALT SERPL-CCNC: 48 U/L (ref 12–78)
ANION GAP SERPL CALC-SCNC: 6 MMOL/L (ref 5–15)
AST SERPL-CCNC: 18 U/L (ref 15–37)
BASOPHILS # BLD: 0 K/UL (ref 0–0.1)
BASOPHILS NFR BLD: 0 % (ref 0–1)
BILIRUB SERPL-MCNC: 0.4 MG/DL (ref 0.2–1)
BUN SERPL-MCNC: 19 MG/DL (ref 6–20)
BUN/CREAT SERPL: 35 (ref 12–20)
CALCIUM SERPL-MCNC: 9.6 MG/DL (ref 8.5–10.1)
CHLORIDE SERPL-SCNC: 98 MMOL/L (ref 97–108)
CO2 SERPL-SCNC: 29 MMOL/L (ref 21–32)
CREAT SERPL-MCNC: 0.55 MG/DL (ref 0.7–1.3)
DIFFERENTIAL METHOD BLD: ABNORMAL
EOSINOPHIL # BLD: 0.2 K/UL (ref 0–0.4)
EOSINOPHIL NFR BLD: 3 % (ref 0–7)
ERYTHROCYTE [DISTWIDTH] IN BLOOD BY AUTOMATED COUNT: 14.1 % (ref 11.5–14.5)
GLOBULIN SER CALC-MCNC: 4.3 G/DL (ref 2–4)
GLUCOSE SERPL-MCNC: 97 MG/DL (ref 65–100)
HCT VFR BLD AUTO: 36.8 % (ref 36.6–50.3)
HGB BLD-MCNC: 11.8 G/DL (ref 12.1–17)
IMM GRANULOCYTES # BLD AUTO: 0.1 K/UL (ref 0–0.04)
IMM GRANULOCYTES NFR BLD AUTO: 1 % (ref 0–0.5)
LYMPHOCYTES # BLD: 2.2 K/UL (ref 0.8–3.5)
LYMPHOCYTES NFR BLD: 28 % (ref 12–49)
MAGNESIUM SERPL-MCNC: 2.1 MG/DL (ref 1.6–2.4)
MCH RBC QN AUTO: 27.9 PG (ref 26–34)
MCHC RBC AUTO-ENTMCNC: 32.1 G/DL (ref 30–36.5)
MCV RBC AUTO: 87 FL (ref 80–99)
MONOCYTES # BLD: 1 K/UL (ref 0–1)
MONOCYTES NFR BLD: 12 % (ref 5–13)
NEUTS SEG # BLD: 4.3 K/UL (ref 1.8–8)
NEUTS SEG NFR BLD: 56 % (ref 32–75)
NRBC # BLD: 0 K/UL (ref 0–0.01)
NRBC BLD-RTO: 0 PER 100 WBC
PHOSPHATE SERPL-MCNC: 5.5 MG/DL (ref 2.6–4.7)
PLATELET # BLD AUTO: 265 K/UL (ref 150–400)
PMV BLD AUTO: 11.1 FL (ref 8.9–12.9)
POTASSIUM SERPL-SCNC: 3.9 MMOL/L (ref 3.5–5.1)
PROT SERPL-MCNC: 7.6 G/DL (ref 6.4–8.2)
RBC # BLD AUTO: 4.23 M/UL (ref 4.1–5.7)
SODIUM SERPL-SCNC: 133 MMOL/L (ref 136–145)
WBC # BLD AUTO: 7.7 K/UL (ref 4.1–11.1)

## 2020-10-01 PROCEDURE — 74011250637 HC RX REV CODE- 250/637: Performed by: EMERGENCY MEDICINE

## 2020-10-01 PROCEDURE — 74011250637 HC RX REV CODE- 250/637: Performed by: NURSE PRACTITIONER

## 2020-10-01 PROCEDURE — 92611 MOTION FLUOROSCOPY/SWALLOW: CPT

## 2020-10-01 PROCEDURE — 74011250637 HC RX REV CODE- 250/637: Performed by: HOSPITALIST

## 2020-10-01 PROCEDURE — 97535 SELF CARE MNGMENT TRAINING: CPT

## 2020-10-01 PROCEDURE — 74011250637 HC RX REV CODE- 250/637: Performed by: INTERNAL MEDICINE

## 2020-10-01 PROCEDURE — 97530 THERAPEUTIC ACTIVITIES: CPT

## 2020-10-01 PROCEDURE — 92526 ORAL FUNCTION THERAPY: CPT

## 2020-10-01 PROCEDURE — 84100 ASSAY OF PHOSPHORUS: CPT

## 2020-10-01 PROCEDURE — 77030021668 HC NEB PREFIL KT VYRM -A

## 2020-10-01 PROCEDURE — 65620000000 HC RM CCU GENERAL

## 2020-10-01 PROCEDURE — 85025 COMPLETE CBC W/AUTO DIFF WBC: CPT

## 2020-10-01 PROCEDURE — 74011250636 HC RX REV CODE- 250/636: Performed by: EMERGENCY MEDICINE

## 2020-10-01 PROCEDURE — 80053 COMPREHEN METABOLIC PANEL: CPT

## 2020-10-01 PROCEDURE — 36415 COLL VENOUS BLD VENIPUNCTURE: CPT

## 2020-10-01 PROCEDURE — 74230 X-RAY XM SWLNG FUNCJ C+: CPT

## 2020-10-01 PROCEDURE — 92507 TX SP LANG VOICE COMM INDIV: CPT

## 2020-10-01 PROCEDURE — 83735 ASSAY OF MAGNESIUM: CPT

## 2020-10-01 RX ADMIN — LORAZEPAM 1 MG: 2 INJECTION INTRAMUSCULAR; INTRAVENOUS at 02:05

## 2020-10-01 RX ADMIN — Medication 10 ML: at 05:16

## 2020-10-01 RX ADMIN — NYSTATIN 500000 UNITS: 500000 SUSPENSION ORAL at 16:35

## 2020-10-01 RX ADMIN — FENTANYL CITRATE 25 MCG: 50 INJECTION, SOLUTION INTRAMUSCULAR; INTRAVENOUS at 11:40

## 2020-10-01 RX ADMIN — METOPROLOL TARTRATE 100 MG: 25 TABLET, FILM COATED ORAL at 17:31

## 2020-10-01 RX ADMIN — LORAZEPAM 1 MG: 2 INJECTION INTRAMUSCULAR; INTRAVENOUS at 07:16

## 2020-10-01 RX ADMIN — POTASSIUM BICARBONATE 40 MEQ: 782 TABLET, EFFERVESCENT ORAL at 08:47

## 2020-10-01 RX ADMIN — FAMOTIDINE 20 MG: 40 POWDER, FOR SUSPENSION ORAL at 21:11

## 2020-10-01 RX ADMIN — METOCLOPRAMIDE HYDROCHLORIDE 5 MG: 5 SOLUTION ORAL at 17:31

## 2020-10-01 RX ADMIN — ALPRAZOLAM 1 MG: 0.5 TABLET ORAL at 21:09

## 2020-10-01 RX ADMIN — GUAIFENESIN 400 MG: 200 SOLUTION ORAL at 21:08

## 2020-10-01 RX ADMIN — TRAZODONE HYDROCHLORIDE 100 MG: 100 TABLET ORAL at 21:09

## 2020-10-01 RX ADMIN — CHLORHEXIDINE GLUCONATE 15 ML: 0.12 RINSE ORAL at 21:08

## 2020-10-01 RX ADMIN — METOPROLOL TARTRATE 100 MG: 25 TABLET, FILM COATED ORAL at 08:47

## 2020-10-01 RX ADMIN — METOCLOPRAMIDE HYDROCHLORIDE 5 MG: 5 SOLUTION ORAL at 11:39

## 2020-10-01 RX ADMIN — METOCLOPRAMIDE HYDROCHLORIDE 5 MG: 5 SOLUTION ORAL at 00:00

## 2020-10-01 RX ADMIN — CHLORHEXIDINE GLUCONATE 15 ML: 0.12 RINSE ORAL at 08:59

## 2020-10-01 RX ADMIN — Medication: at 10:37

## 2020-10-01 RX ADMIN — OXYCODONE HYDROCHLORIDE 5 MG: 5 SOLUTION ORAL at 16:35

## 2020-10-01 RX ADMIN — FENTANYL CITRATE 25 MCG: 50 INJECTION, SOLUTION INTRAMUSCULAR; INTRAVENOUS at 02:05

## 2020-10-01 RX ADMIN — ALPRAZOLAM 1 MG: 0.5 TABLET ORAL at 16:15

## 2020-10-01 RX ADMIN — TEMAZEPAM 15 MG: 15 CAPSULE ORAL at 21:08

## 2020-10-01 RX ADMIN — LORAZEPAM 1 MG: 2 INJECTION INTRAMUSCULAR; INTRAVENOUS at 11:42

## 2020-10-01 RX ADMIN — NYSTATIN 500000 UNITS: 500000 SUSPENSION ORAL at 08:48

## 2020-10-01 RX ADMIN — Medication 10 ML: at 21:09

## 2020-10-01 RX ADMIN — FAMOTIDINE 20 MG: 40 POWDER, FOR SUSPENSION ORAL at 08:49

## 2020-10-01 RX ADMIN — OXYCODONE HYDROCHLORIDE 5 MG: 5 SOLUTION ORAL at 21:08

## 2020-10-01 RX ADMIN — ACETAMINOPHEN ORAL SOLUTION 650 MG: 650 SOLUTION ORAL at 13:17

## 2020-10-01 RX ADMIN — ASPIRIN 81 MG CHEWABLE TABLET 81 MG: 81 TABLET CHEWABLE at 08:47

## 2020-10-01 RX ADMIN — FENTANYL CITRATE 25 MCG: 50 INJECTION, SOLUTION INTRAMUSCULAR; INTRAVENOUS at 07:16

## 2020-10-01 RX ADMIN — NYSTATIN 500000 UNITS: 500000 SUSPENSION ORAL at 21:08

## 2020-10-01 RX ADMIN — OXYCODONE HYDROCHLORIDE 5 MG: 5 SOLUTION ORAL at 04:30

## 2020-10-01 RX ADMIN — ALPRAZOLAM 1 MG: 0.5 TABLET ORAL at 05:15

## 2020-10-01 RX ADMIN — MICONAZOLE NITRATE 2 % TOPICAL POWDER: at 08:59

## 2020-10-01 RX ADMIN — METOCLOPRAMIDE HYDROCHLORIDE 5 MG: 5 SOLUTION ORAL at 05:16

## 2020-10-01 RX ADMIN — METOCLOPRAMIDE HYDROCHLORIDE 5 MG: 5 SOLUTION ORAL at 23:56

## 2020-10-01 RX ADMIN — Medication: at 17:39

## 2020-10-01 RX ADMIN — MICONAZOLE NITRATE 2 % TOPICAL POWDER: at 17:39

## 2020-10-01 RX ADMIN — ALUMINUM HYDROXIDE AND MAGNESIUM HYDROXIDE 15 ML: 200; 200 SUSPENSION ORAL at 17:34

## 2020-10-01 RX ADMIN — ENOXAPARIN SODIUM 30 MG: 30 INJECTION SUBCUTANEOUS at 10:34

## 2020-10-01 RX ADMIN — ENOXAPARIN SODIUM 30 MG: 30 INJECTION SUBCUTANEOUS at 23:56

## 2020-10-01 RX ADMIN — LORAZEPAM 1 MG: 2 INJECTION INTRAMUSCULAR; INTRAVENOUS at 17:46

## 2020-10-01 NOTE — PROGRESS NOTES
Bedside shift change report given to Sara Sanchez RN (oncoming nurse) by Colton Waterman RN (offgoing nurse). Report included the following information SBAR, Kardex, ED Summary, Intake/Output, MAR and Recent Results. SHIFT SUMMARY:    0800 Initial Shift  Assessment performed           Mental Status: Trach. Mouths words. Follows commands. Anxious. Weaker on L side compared to R not new. Respiratory: Trach Coller FiO2 50%           Cardiac: NSR           GI/: Condom Catheter          1110 Patient placed back on CPAP mode on ventilator 5/5. WOB had increased. Sats remained 99%. Plan to take patient back off for barium swallow around 1300.   1332 Patient taken down for barium swallow on monitor. 1430 patient back from barium swallow. Hooked back to bedside monitor. Sats 100%. 1500 Patient tachypneic at this time. Patient wanted to keep speaking valve on but explained needing to cath breath and try again later. Mom at bedside with patient. 1635 Patient complaining of right shoulder pain. Gave roxicodone per MAR.   1700 Pt resting quietly at this time. Bedside shift change report given to Jyoti Christensen (oncoming nurse) by Sara Sanchez RN (offgoing nurse). Report included the following information SBAR, Kardex, ED Summary, Intake/Output, MAR and Recent Results.

## 2020-10-01 NOTE — CARDIO/PULMONARY
Cardiac Rehab: Per EMR, patient is a current smoker.  Smoking Cessation Program information placed on the AVS.    Horace Ignacio RN

## 2020-10-01 NOTE — PROGRESS NOTES
0730 Bedside and Verbal shift change report given to Lindsey Singletary (oncoming nurse) by Quin Christiansen (offgoing nurse). Report included the following information SBAR, Kardex, ED Summary, Procedure Summary, Intake/Output, MAR and Recent Results.

## 2020-10-01 NOTE — PROGRESS NOTES
0730 Bedside and Verbal shift change report given to Maximiliano Campoverde and Samantha Mcgill (oncoming nurse) by Jordan Vizcarra (offgoing nurse). Report included the following information SBAR, Kardex, ED Summary, Procedure Summary, Intake/Output, MAR, Accordion and Recent Results.

## 2020-10-01 NOTE — INTERDISCIPLINARY ROUNDS
Multidisciplinary rounds were held. Today's plan/goal includes (but not limited to): tolerate trach collar. Barium Swallow.

## 2020-10-01 NOTE — PROGRESS NOTES
Problem: Dysphagia (Adult)  Goal: *Acute Goals and Plan of Care (Insert Text)  Description: Speech Therapy Goals  Initiated 9/23/2020    1. Patient will tolerate ice chips only when on PMV without adverse effects within 7 days. Added 10/1/2020  2. Patient will tolerate nectar thick liquids only when on PMV with no adverse effects within 7 days. Outcome: Progressing Towards Goal     Problem: Voice Impaired (Adult)  Goal: *Acute Goals and Plan of Care (Insert Text)  Description: Speech Therapy Goals  Initiated 9/23/2020    1. Patient will tolerate in-line PMV for 10 minute trials with RT/SLP/RN without adverse effects within 7 days. MET  2. Patient will utilize in-line PMV to work towards ventilator weaning within 7 days. Outcome: Progressing Towards Goal     SPEECH PATHOLOGY MODIFIED BARIUM SWALLOW STUDY/EDUCATION  Patient: Debi Munoz (27 y.o. male)  Date: 10/1/2020  Primary Diagnosis: Acute respiratory failure ([address] [J96.00]  Procedure(s) (LRB):  ESOPHAGOGASTRODUODENOSCOPY (EGD) @ bedside (N/A)  ESOPHAGOGASTRODUODENAL (EGD) BIOPSY (N/A) 9 Days Post-Op   Precautions: Fall       ASSESSMENT :  Based on the objective data described below, the patient presents with mild oral and moderate-severe pharyngeal dysphagia. Oral phase characterized by adequate bolus acceptance, but mild discoordination with piecemeal deglutition with puree trial. Pharyngeal phase characterized by generalized weakness including  reduced hyolaryngeal excursion (and, as a result, epiglottic inversion), reduced base of tongue retraction and pharyngeal stripping wave. The combination of these deficits, results in mild diffuse pharyngeal residue that increased with puree, as well as decreased airway protection. Cued additional dry swallows aided but did not completely clear pharyngeal residue. Two instances of flash penetration occurred with nectar thick trials that were cleared with the force of the swallow.  Silent moderate amount of aspiration occurred during the swallow with thin and puree trials also a result of this pharyngeal weakness. Cued coughs and throat clears aided in clearing but did not completely clear aspirated material. Chin tuck trial ineffective in airway protection, and ultimately increased the amount of aspiration that occurred. Pharyngeal weakness appeared to slightly reduce ease of bolus flow through UES, however, no significant deficits appreciated. Of note, PMV donned for all PO trials on this date. Suspect dysphagia is due to pt's overall weakness and debility from complex and prolonged hospital course, with prolonged intubation necessitating trach placement impacting pharyngeal sensation and pressure. Recommend nectar thick liquids only with cues to implement effortful swallow to help build up strength. Swallow prognosis is good given pt's age, expect swallow function to improve as sensation and strength improve with time and with utilization of swallow muscles on modified diet. Due to impaired sensation resulting in silent aspiration, patient will require additional MBS prior to further diet initiation. Patient was educated regarding the results of the MBS, diet recommendation and the need to build up strength with images utilized in education. Patient expressed good understanding as evidenced by appropriate questions and engagement. Patient will benefit from skilled intervention to address the above impairments.   Patients rehabilitation potential is considered to be Good     PLAN :  Recommendations and Planned Interventions:  -- Nectar-thick clear liquid diet for pt comfort and to work against swallow muscle atrophy  -- Cue patient to take effortful swallows   --Good oral care  --PMV with all PO  --Will require MBS prior to diet advancement   --Ice chips between meals after vigilant oral care    Frequency/Duration: Patient will be followed by speech-language pathology 3 times a week to address goals.  Discharge Recommendations: Inpatient Rehab     SUBJECTIVE:   Patient stated I want to keep this on in regards to the PMV following MBS. OBJECTIVE:     Past Medical History:   Diagnosis Date    History of vascular access device 08/10/2020    5 Fr triple PICC, hemodynamically unstable, 45 cm L basilic     Past Surgical History:   Procedure Laterality Date    IR INSERT GASTROSTOMY TUBE PERC  9/23/2020    IR PERCUT GASTROSTROMY TUBE  9/23/2020         IR THORA/INS CHEST TUBE(PNEUMO) WO IMAGE  8/11/2020    IR THORA/INS CHEST TUBE(PNEUMO) WO IMAGE  8/11/2020    OR TRACHEOSTOMY, PLANNED  8/26/2020          Prior Level of Function/Home Situation:   Home Situation  Home Environment: Private residence  # Steps to Enter: 0  One/Two Story Residence: One story  Living Alone: Yes  Support Systems: Family member(s)  Patient Expects to be Discharged to[de-identified] Rehabilitation facility  Current DME Used/Available at Home: None  Diet prior to admission: Regular diet/Thin liquids  Current Diet:  NPO   Radiologist: Dr. Alka Britt: Lateral  Patient Position: Upright in housted chair    Trial 1: Trial 2:   Consistency Presented: Nectar thick liquid Consistency Presented:  Thin liquid   How Presented: SLP-fed/presented;Cup/sip;Straw How Presented: SLP-fed/presented;Cup/sip;Straw   Consistency Amount: 200(cc) Consistency Amount: 50(cc)   Bolus Acceptance: No impairment Bolus Acceptance: No impairment   Bolus Formation/Control: No impairment:   Bolus Formation/Control: No impairment:     Propulsion: No impairment Propulsion: No impairment   Oral Residue: None Oral Residue: None   Initiation of Swallow: Triggered at vallecula Initiation of Swallow: Triggered at valleculae   Timing: No impairment Timing: No impairment   Penetration: Trace;During swallow Penetration: None   Aspiration/Timing: No evidence of aspiration Aspiration/Timing: Silent ;During   Pharyngeal Clearance: Vallecular residue;Pyriform residue ;10-50% Pharyngeal Clearance: Vallecular residue;Pyriform residue ;10-50%   Attempted Modifications: Chin tuck                         Trial 3:   Consistency Presented: Puree   How Presented: SLP-fed/presented;Spoon   Consistency Amount: 1(tsp)   Bolus Acceptance: No impairment   Bolus Formation/Control: Impaired: Piecemeal   Propulsion: Discoordination   Oral Residue: Lingual   Initiation of Swallow: Triggered at valleculae   Timing: No impairment   Penetration: None   Aspiration/Timing: During;Silent    Pharyngeal Clearance: Vallecular residue;Pyriform residue ;Greater than 50%                     Decreased Tongue Base Retraction?: Yes  Laryngeal Elevation: Inadequate epiglottic inversion; Incomplete laryngeal closure; Reduced excursion with laryngeal vestibule gap  Aspiration/Penetration Score: 2 (Penetration/No residue-Contrast enters the airway penetrates, remains above the folds/cords, and is cleared)  Pharyngeal Symmetry: Not assessed  Pharyngeal-Esophageal Segment: Decreased relaxation of upper esophageal segment  Pharyngeal Dysfunction: Decreased strength;Decreased elevation/closure    Oral Phase Severity: Mild  Pharyngeal Phase Severity: Moderately severe  NOMS:   The NOMS functional outcome measure was used to quantify this patient's level of swallowing impairment. Based on the NOMS, the patient was determined to be at level 3 for swallow function         NOMS Swallowing Levels:  Level 1 (CN): NPO  Level 2 (CM): NPO but takes consistency in therapy  Level 3 (CL): Takes less than 50% of nutrition p.o. and continues with nonoral feedings; and/or safe with mod cues; and/or max diet restriction  Level 4 (CK):  Safe swallow but needs mod cues; and/or mod diet restriction; and/or still requires some nonoral feeding/supplements  Level 5 (CJ): Safe swallow with min diet restriction; and/or needs min cues  Level 6 (CI): Independent with p.o.; rare cues; usually self cues; may need to avoid some foods or needs extra time  Level 7 ([address] [place] [address]): Independent for all p.o.  ANDREINA. (2003). National Outcomes Measurement System (NOMS): Adult Speech-Language Pathology User's Guide. COMMUNICATION/EDUCATION:   Patient was educated regarding His deficit(s) of dysphagia as this relates to His prolonged intubation. He demonstrated Good understanding as evidenced by agreement and appropriate engagement. The patients plan of care including findings from [facility], recommendations, planned interventions, and recommended diet changes were discussed with: Registered nurse. Patient/family have participated as able in goal setting and plan of care. Patient/family agree to work toward stated goals and plan of care. Thank you for this referral.  Leo DAVIS Speech Language Pathology Student   Time Calculation: 50 mins          Regarding student involvement in patient care:  A student participated in this treatment session. Per CMS Medicare statements and ANDREINA guidelines I certify that the following was true:  1. I was present and directly observed the entire session. 2. I made all skilled judgments and clinical decisions regarding care. 3. I am the practitioner responsible for assessment, treatment, and documentation.

## 2020-10-01 NOTE — PROGRESS NOTES
Problem: Mobility Impaired (Adult and Pediatric)  Goal: *Acute Goals and Plan of Care (Insert Text)  Description: FUNCTIONAL STATUS PRIOR TO ADMISSION: Patient was independent and active without use of DME. 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 Physical Therapy Goals  Reassessment 9/25/2020  1. Patient will move from supine to sit and sit to supine , scoot up and down, and roll side to side in bed with maximal assistance within 7 day(s). 2.  Patient will transfer from bed to chair and chair to bed with maximal assistance using the least restrictive device within 7 day(s). 3.  Patient will perform sit to stand with maximal assistance within 7 day(s). 4.  Patient will tolerate sitting EOB with moderate assistance for 10 minutes within 7 day(s). Reassessment completed 9/18/2020 and all goals remain appropriate  at this time. Initiated 9/11/2020  1. Patient will move from supine to sit and sit to supine , scoot up and down, and roll side to side in bed with maximal assistance within 7 day(s). 2.  Patient will transfer from bed to chair and chair to bed with maximal assistance using the least restrictive device within 7 day(s). 3.  Patient will perform sit to stand with maximal assistance within 7 day(s). 4.  Patient will tolerate sitting EOB with maximal assistance for 5 minutes within 7 day(s). --Goal Met 9/25      Outcome: Progressing Towards Goal   PHYSICAL THERAPY TREATMENT  Patient: Jose Manuel García (89 y.o. male)  Date: 10/1/2020  Diagnosis: [facility] [J96.00]   <principal problem not specified>  Procedure(s) (LRB):  ESOPHAGOGASTRODUODENOSCOPY (EGD) @ bedside (N/A)  ESOPHAGOGASTRODUODENAL (EGD) BIOPSY (N/A) 9 Days Post-Op  Precautions:    Chart, physical therapy assessment, plan of care and goals were reviewed.     ASSESSMENT  Patient continues with skilled PT services and is progressing towards goals. Pt tolerated session well and making good progress towards goals, but continues to be limited by significant global weakness (RUE, LLE, trunk), decreased tolerance to activity, impaired functional mobility, balance and gait from baseline mobility status after prolonged and complicated medical course. Pt's session was coordinated with OT and Speech therapist to assist with transfers to and from the chair for completion of modified barium swallow study and seen post test at bedside. Pt remained on trach collar on 8L, FiO2:50% and for majority of session kept PMV on. Pt completed bed mobility with mod A x 2 with emphasis on reaching across midline for bed railing to assist with rolling. Pt still needs assistance to initiate movements of BLEs and BUEs to assume sitting EOB. Pt performed sit<>Stand x 3 trials with mod A x 2 and cues to facilitate upright posture. Pt needed max assist x 2 to initiate stepping on first bed to chair transfer, but weight shifting and stepping sequence improved on 2nd and 3rd trials. Pt remained seated in the chair at end of session with all needs met (nhan pad underneath patient) and notified RN. Pt will continue to benefit from PT to progress mobility, improve tolerance to activity and reach highest level of independence. Continue to recommend inpatient rehab pending vent weaning. Current Level of Function Impacting Discharge (mobility/balance): mod A x 2 supine<>sit, mod A x 2 sit<>stand, max A x 2 stand pivot (3 trials of each)    Other factors to consider for discharge: previously independent         PLAN :  Patient continues to benefit from skilled intervention to address the above impairments. Continue treatment per established plan of care. to address goals.     Recommendation for discharge: (in order for the patient to meet his/her long term goals)  Therapy 3 hours per day 5-7 days per week  Vs LTAC with bridge to IPR pending vent weaning     This discharge recommendation:  Has been made in collaboration with the attending provider and/or case management         SUBJECTIVE:   Patient stated These shoes went all the way to [place]     OBJECTIVE DATA SUMMARY:   Critical Behavior:  Neurologic State: Alert  Orientation Level: Oriented X4  Cognition: Follows commands  Safety/Judgement: Awareness of environment  Functional Mobility Training:  Bed Mobility:  Rolling: Moderate assistance  Supine to Sit: Moderate assistance;Assist x2  Sit to Supine: Moderate assistance;Maximum assistance;Assist x2  Scooting: Moderate assistance        Transfers:  Sit to Stand: Moderate assistance;Assist x2 ( x 3 trials throughout session)  Stand to Sit: Moderate assistance;Assist x2        Bed to Chair: Maximum assistance;Assist x2                    Balance:  Sitting: Impaired  Sitting - Static: Fair (occasional)  Sitting - Dynamic: Fair (occasional); Poor (constant support)  Standing: Impaired; With support  Standing - Static: Constant support;Poor  Standing - Dynamic : Constant support;Poor  Ambulation/Gait Training:  Distance (ft): 1 Feet (ft)(steps to pivot from bed to chair x 3 trials)  Assistive Device: Gait belt(bilateral HHA)  Ambulation - Level of Assistance: Maximum assistance;Assist x2        Gait Abnormalities: Decreased step clearance; Step to gait;Trunk sway increased        Base of Support: Center of gravity altered;Narrowed     Speed/Veronica: Pace decreased (<100 feet/min)  Step Length: Left shortened;Right shortened       Pain Rating:  Pt denied pain during today's session    Activity Tolerance:   Good--VSS on trach collar 8L, 50% Fi O2  Please refer to the flowsheet for vital signs taken during this treatment.     After treatment patient left in no apparent distress:   Sitting in chair and Call bell within reach    COMMUNICATION/COLLABORATION:   The patients plan of care was discussed with: Occupational therapist, Speech therapist, Registered nurse, and Rehabilitation technician.      Radha Bonilla, PT, DPT   Time Calculation: 45 mins

## 2020-10-01 NOTE — PROGRESS NOTES
[facility]    ICU TEAM Progress Note    Name: Bella Wang   : 1990   MRN: [id number]   Date: 10/1/2020      I  Subjective:   Progress Note: 10/1/2020      Reason for ICU Admission: Respiratory failure due to COVID-19 infection    Interval history:   80-year-old male with no history of significant past medical history except smoking half pack per day. Vladimir Ahn was admitted on 2020 at [facility] with acute hypoxemic respiratory failure from COVID pneumonia. Vladimir Ahn continued to deteriorate and got intubated on 8/10.  Due to worsening hypoxemia, he is transferred to [facility] for ECMO evaluation. Vladimir Ahn has bee given convalescent plasma twice on  and  and treated with Remdesivir which was completed on 8/10. Vladimir Ahn is also on dexamethasone. Vladimir Ahn completed course of empiric antibiotics including azithromycin which was completed on  and cefepime was completed on .  During his hospital course he also developed pneumothorax and has left-sided chest tube since .  Has a tracheostomy tube placed on .  On September 3 patient had significant worsening on his right arm, CT head was unrevealing.  He is already on high-dose Lovenox and aspirin.  On  he had acute weakness on the left arm code stroke was called and again CTA was unrevealing.  Since then left arm is back to baseline, showed some improvement on right arm but still weak compared to left with minimal ability to move against gravity.  Mental status continues to improve but now he is very anxious with recurrent episode of nausea and vomiting associated with tachycardia. Now s/p PEG  Overnight Events:   No major o/n events reported. Giselle TC almost 24h, but WOB increased some.      Active Problem List:     Problem List  Date Reviewed: 2020          Codes Class    Acute respiratory failure ([facility] ICD-10-CM: J96.00  ICD-9-CM: 518.81         Pneumothorax on left ICD-10-CM: J93.9  ICD-9-CM: 512.89 Pneumonia due to severe acute respiratory syndrome coronavirus 2 (SARS-CoV-2) ICD-10-CM: U07.1, J12.89  ICD-9-CM: 480.8         Respiratory failure with hypoxia (HCC) ICD-10-CM: J96.91  ICD-9-CM: 518.81               Past Medical History:      has a past medical history of History of vascular access device (08/10/2020). Past Surgical History:      has a past surgical history that includes ir thora/ins chest tube(pneumo) wo image (2020); ir thora/ins chest tube(pneumo) wo image (2020); pr tracheostomy, planned (2020); ir percut gastrostromy tube (2020); and ir insert gastrostomy tube perc (2020). Home Medications:     Prior to Admission medications    Medication Sig Start Date End Date Taking? Authorizing Provider   benzonatate (Tessalon Perles) 100 mg capsule Take 100 mg by mouth three (3) times daily as needed for Cough. Provider, Historical       Allergies/Social/Family History:     No Known Allergies   Social History     Tobacco Use    Smoking status: Current Some Day Smoker    Smokeless tobacco: Never Used   Substance Use Topics    Alcohol use: Not on file      History reviewed. No pertinent family history. Review of Systems:     Not able to obtain due to his medical condition    Objective:   Vital Signs:  Visit Vitals  /62   Pulse 96   Temp 98 °F (36.7 °C)   Resp 25   Ht 5' 11\" (1.803 m)   Wt 90.9 kg (200 lb 6.4 oz)   SpO2 100%   BMI 27.95 kg/m²    O2 Flow Rate (L/min): 10 l/min O2 Device: Endotracheal tube, Ventilator Temp (24hrs), Av °F (36.7 °C), Min:97.8 °F (36.6 °C), Max:98.2 °F (36.8 °C)           Intake/Output:     Intake/Output Summary (Last 24 hours) at 10/1/2020 1418  Last data filed at 10/1/2020 0000  Gross per 24 hour   Intake 250 ml   Output 1060 ml   Net -810 ml       Physical Exam:    General:  Awake and interactive. Writing on communication board. Frustrated at times   Eyes:  Sclera anicteric. Pupils equally round and reactive to light. Mouth/Throat: Mucous membranes normal, oral pharynx clear   Neck: Supple, tracheostomy tube in place   Lungs:    Good air entry bilaterally, fine crepitation   CV:  Regular rate and rhythm,no murmur, click, rub or gallop   Abdomen:   Soft, non-tender. bowel sounds normal. non-distended   Extremities: No cyanosis , evolving edema   Skin: Skin color, texture, turgor normal. no acute rash or lesions   Lymph nodes: Cervical and supraclavicular normal   Musculoskeletal: No swelling or deformity   Lines/Devices:  Intact, no erythema, drainage or tenderness   Psych:  conscious and alert, follows command, anxious, still weak on right arm but able to move minimally against gravity         LABS AND  DATA: Personally reviewed  Recent Labs     10/01/20  0528 09/30/20  0400   WBC 7.7 7.9   HGB 11.8* 11.9*   HCT 36.8 36.5*    287     Recent Labs     10/01/20  0528 09/30/20  0400   * 131*   K 3.9 3.8   CL 98 96*   CO2 29 27   BUN 19 17   CREA 0.55* 0.66*   GLU 97 105*   CA 9.6 10.2*   MG 2.1 2.3   PHOS 5.5* 5.5*     Recent Labs     10/01/20  0528 09/30/20  0400    117   TP 7.6 8.1   ALB 3.3* 3.5   GLOB 4.3* 4.6*     No results for input(s): INR, PTP, APTT, INREXT, INREXT in the last 72 hours. No results for input(s): PHI, PCO2I, PO2I, FIO2I in the last 72 hours. No results for input(s): CPK, CKMB, TROIQ, BNPP in the last 72 hours.     Hemodynamics:   PAP:   CO:     Wedge:   CI:     CVP:    SVR:       PVR:       Ventilator Settings:  Mode Rate Tidal Volume Pressure FiO2 PEEP   Assist control   12 ml  5 cm H2O 50 % 5 cm H20     Peak airway pressure: 22 cm H2O    Minute ventilation: 6.53 l/min        MEDS: Reviewed    Chest X-Ray:  CXR Results  (Last 48 hours)    None            Assessment and Plan:   -Bilateral pneumonia due to COVID-19 infection  -Right arm weakness:   -Debility  - Acute hypoxic respiratory failure due to COVID-19 infection status post tracheostomy on August 26  - Possible bacterial super imposed infection: Completed antibiotic course  -History of tobacco use     - will continue with daily SBT's and trach collar trials with speech valve as tolerated  - PT/OT/rehab  - Dispo planning   - Anxiety management with anxiolytic antidepressant and sedative and pain management  - Still not clear what caused his acute right arm weakness, multiple possibilities including hypoxic event or vasculitis related to Killian Matthews is on aspirin and Lovenox no further deterioration  -Completed COVID-19 treatment  - Ventilator bundle, GI prophylaxis and DVT prophylaxis        DISPOSITION  Stay in ICU    CRITICAL CARE CONSULTANT NOTE  I had a face to face encounter with the patient, reviewed and interpreted patient data including clinical events, labs, images, vital signs, I/O's, and examined patient. I have discussed the case and the plan and management of the patient's care with the consulting services, the bedside nurses and the respiratory therapist.      NOTE OF PERSONAL INVOLVEMENT IN CARE   This patient has a high probability of imminent, clinically significant deterioration, which requires the highest level of preparedness to intervene urgently. I participated in the decision-making and personally managed or directed the management of the following life and organ supporting interventions that required my frequent assessment to treat or prevent imminent deterioration. I personally spent 31 minutes of critical care time. This is time spent at this critically ill patient's bedside actively involved in patient care as well as the coordination of care and discussions with the patient's family. This does not include any procedural time which has been billed separately.     [address] [place] [address]  10/1/2020

## 2020-10-01 NOTE — PROGRESS NOTES
Problem: Self Care Deficits Care Plan (Adult)  Goal: *Acute Goals and Plan of Care (Insert Text)  Description:   FUNCTIONAL STATUS PRIOR TO ADMISSION: Patient unable to provide history at OT evaluation. Per chart review, patient was fully independent    HOME SUPPORT: Per chart review, patient lived alone    Occupational Therapy Goals  Initiated 9/14/2020, continued 9/21/2020, continued 9/28/2020  1. Patient will perform grooming with minimum assistance within 7 day(s). 2.  Patient will perform bathing with moderate assistance  within 7 day(s). 3.  Patient will perform upper body dressing with minimum assistance  within 7 day(s). 5.  Patient will perform lower body dressing with moderate assistance within 7 day(s). 6.  Patient will perform toilet transfers with moderate assistance  within 7 day(s). 7.  Patient will perform all aspects of toileting with moderate assistance  within 7 day(s). 8.  Patient will participate in upper extremity therapeutic exercise/activities with minimal assistance for 10 minutes within 7 day(s). 9.  Patient will utilize energy conservation techniques during functional activities with verbal cues within 7 day(s). Outcome: Progressing Towards Goal  OCCUPATIONAL THERAPY TREATMENT  Patient: Elsie Khoury (18 y.o. male)  Date: 10/1/2020  Diagnosis: [facility] [J96.00]   <principal problem not specified>  Procedure(s) (LRB):  ESOPHAGOGASTRODUODENOSCOPY (EGD) @ bedside (N/A)  ESOPHAGOGASTRODUODENAL (EGD) BIOPSY (N/A) 9 Days Post-Op  Precautions:    Chart, occupational therapy assessment, plan of care, and goals were reviewed. ASSESSMENT  Patient continues with skilled OT services and is progressing towards goals. This patient continues to tolerate trach collar with intermittent vent support today prior to MBS. Patient completed 3 SPT with mod A x 2  and difficulty with L LE placement with L knee blocked with transfers.  Patient requires cues for encouragement for tasks. He demonstrates LOB posterior with poor pelvic alignment when sitting EOB, requires physical A and cues for upright trunk and anterior pelvic tilt EOB--requires max A for trunk control with tailor sitting for partial assist with sliding on shoes EOB. Patient, once in chair, completed grooming tasks as below with cues and education for R hand gross assist for opening/closing ADL items with R [address] deficits noted. Patient will benefit from IP rehab when stable if he stays off ventilator vs LTAC if vent dependent. He continues to benefit from gentle push/encouragement for participation and requires rest breaks PRN during session. Current Level of Function Impacting Discharge (ADLs): mod A x 2 for SPT, varies with CGA-max A for trunk control EOB    Other factors to consider for discharge:          PLAN :  Patient continues to benefit from skilled intervention to address the above impairments. Continue treatment per established plan of care. to address goals. Recommend with staff: up to chair 2-3 x day    Recommend next OT session: sitting balanance/UE bathing/grooming EOB or in chair    Recommendation for discharge: (in order for the patient to meet his/her long term goals)  Therapy 3 hours per day 5-7 days per week    This discharge recommendation:  Has not yet been discussed the attending provider and/or case management    IF patient discharges home will need the following DME: TBD       SUBJECTIVE:   Patient stated I guess I'll try.     OBJECTIVE DATA SUMMARY:   Cognitive/Behavioral Status:  Neurologic State: Alert  Orientation Level: Oriented X4  Cognition: Follows commands  Perception: Appears intact  Perseveration: No perseveration noted  Safety/Judgement: Awareness of environment    Functional Mobility and Transfers for ADLs:  Bed Mobility:  Rolling: Moderate assistance  Supine to Sit: Moderate assistance;Assist x1;Additional time  Sit to Supine:  Moderate assistance; Additional time;Assist x2  Scooting: Moderate assistance    Transfers:  Sit to Stand: Moderate assistance; Additional time;Assist x2     Bed to Chair: Maximum assistance;Assist x2; Additional time    Balance:  Sitting: Impaired  Sitting - Static: Fair (occasional); Poor (constant support)  Sitting - Dynamic: Poor (constant support)  Standing: Impaired;Pull to stand; With support  Standing - Static: Constant support;Poor  Standing - Dynamic : Constant support;Poor    ADL Intervention:       Grooming  Grooming Assistance: (min-mod A to apply lip balm)  Position Performed: Seated in chair  Washing Face: Set-up  Brushing Teeth: Moderate assistance                   Lower Body Dressing Assistance  Shoes with Cloth Laces: Maximum assistance(max A trunk control with tailor sit)  Leg Crossed Method Used: Yes  Position Performed: Seated edge of bed  Cues: Doff;Don;Tactile cues provided;Verbal cues provided;Visual cues provided;Physical assistance         Cognitive Retraining  Safety/Judgement: Awareness of environment    Therapeutic Exercises:       Pain:  No c/o    Activity Tolerance:   Fair, desaturates with exertion and requires oxygen, and requires frequent rest breaks  Please refer to the flowsheet for vital signs taken during this treatment. After treatment patient left in no apparent distress:   Sitting in chair and Call bell within reach    COMMUNICATION/COLLABORATION:   The patients plan of care was discussed with: Physical therapist and Registered nurse.      Juan Pablo Puga OTR/L  Time Calculation: 85 mins

## 2020-10-02 ENCOUNTER — APPOINTMENT (OUTPATIENT)
Dept: CT IMAGING | Age: 30
DRG: 004 | End: 2020-10-02
Attending: NURSE PRACTITIONER
Payer: COMMERCIAL

## 2020-10-02 ENCOUNTER — APPOINTMENT (OUTPATIENT)
Dept: MRI IMAGING | Age: 30
DRG: 004 | End: 2020-10-02
Attending: NURSE PRACTITIONER
Payer: COMMERCIAL

## 2020-10-02 ENCOUNTER — APPOINTMENT (OUTPATIENT)
Dept: GENERAL RADIOLOGY | Age: 30
DRG: 004 | End: 2020-10-02
Attending: NURSE PRACTITIONER
Payer: COMMERCIAL

## 2020-10-02 PROBLEM — E87.1 HYPONATREMIA: Status: ACTIVE | Noted: 2020-10-02

## 2020-10-02 PROBLEM — Z93.0 TRACHEOSTOMY DEPENDENT (HCC): Status: ACTIVE | Noted: 2020-10-02

## 2020-10-02 PROBLEM — R00.0 TACHYCARDIA: Status: ACTIVE | Noted: 2020-10-02

## 2020-10-02 LAB
ALBUMIN SERPL-MCNC: 3.5 G/DL (ref 3.5–5)
ALBUMIN/GLOB SERPL: 0.8 {RATIO} (ref 1.1–2.2)
ALP SERPL-CCNC: 119 U/L (ref 45–117)
ALT SERPL-CCNC: 55 U/L (ref 12–78)
ANION GAP SERPL CALC-SCNC: 7 MMOL/L (ref 5–15)
AST SERPL-CCNC: 24 U/L (ref 15–37)
BASOPHILS # BLD: 0 K/UL (ref 0–0.1)
BASOPHILS NFR BLD: 1 % (ref 0–1)
BILIRUB SERPL-MCNC: 0.4 MG/DL (ref 0.2–1)
BUN SERPL-MCNC: 16 MG/DL (ref 6–20)
BUN/CREAT SERPL: 25 (ref 12–20)
CALCIUM SERPL-MCNC: 10 MG/DL (ref 8.5–10.1)
CHLORIDE SERPL-SCNC: 96 MMOL/L (ref 97–108)
CO2 SERPL-SCNC: 31 MMOL/L (ref 21–32)
CREAT SERPL-MCNC: 0.63 MG/DL (ref 0.7–1.3)
DIFFERENTIAL METHOD BLD: ABNORMAL
EOSINOPHIL # BLD: 0.3 K/UL (ref 0–0.4)
EOSINOPHIL NFR BLD: 4 % (ref 0–7)
ERYTHROCYTE [DISTWIDTH] IN BLOOD BY AUTOMATED COUNT: 13.9 % (ref 11.5–14.5)
GLOBULIN SER CALC-MCNC: 4.3 G/DL (ref 2–4)
GLUCOSE SERPL-MCNC: 114 MG/DL (ref 65–100)
HCT VFR BLD AUTO: 37 % (ref 36.6–50.3)
HGB BLD-MCNC: 11.7 G/DL (ref 12.1–17)
IMM GRANULOCYTES # BLD AUTO: 0.1 K/UL (ref 0–0.04)
IMM GRANULOCYTES NFR BLD AUTO: 1 % (ref 0–0.5)
LYMPHOCYTES # BLD: 2.4 K/UL (ref 0.8–3.5)
LYMPHOCYTES NFR BLD: 30 % (ref 12–49)
MAGNESIUM SERPL-MCNC: 2 MG/DL (ref 1.6–2.4)
MCH RBC QN AUTO: 27.7 PG (ref 26–34)
MCHC RBC AUTO-ENTMCNC: 31.6 G/DL (ref 30–36.5)
MCV RBC AUTO: 87.5 FL (ref 80–99)
MONOCYTES # BLD: 1 K/UL (ref 0–1)
MONOCYTES NFR BLD: 13 % (ref 5–13)
NEUTS SEG # BLD: 4.1 K/UL (ref 1.8–8)
NEUTS SEG NFR BLD: 51 % (ref 32–75)
NRBC # BLD: 0 K/UL (ref 0–0.01)
NRBC BLD-RTO: 0 PER 100 WBC
PHOSPHATE SERPL-MCNC: 5.2 MG/DL (ref 2.6–4.7)
PLATELET # BLD AUTO: 274 K/UL (ref 150–400)
PMV BLD AUTO: 10.8 FL (ref 8.9–12.9)
POTASSIUM SERPL-SCNC: 4.1 MMOL/L (ref 3.5–5.1)
PROT SERPL-MCNC: 7.8 G/DL (ref 6.4–8.2)
RBC # BLD AUTO: 4.23 M/UL (ref 4.1–5.7)
SODIUM SERPL-SCNC: 134 MMOL/L (ref 136–145)
WBC # BLD AUTO: 7.9 K/UL (ref 4.1–11.1)

## 2020-10-02 PROCEDURE — 92507 TX SP LANG VOICE COMM INDIV: CPT | Performed by: SPEECH-LANGUAGE PATHOLOGIST

## 2020-10-02 PROCEDURE — 70551 MRI BRAIN STEM W/O DYE: CPT

## 2020-10-02 PROCEDURE — 70450 CT HEAD/BRAIN W/O DYE: CPT

## 2020-10-02 PROCEDURE — 97530 THERAPEUTIC ACTIVITIES: CPT

## 2020-10-02 PROCEDURE — 80053 COMPREHEN METABOLIC PANEL: CPT

## 2020-10-02 PROCEDURE — 71275 CT ANGIOGRAPHY CHEST: CPT

## 2020-10-02 PROCEDURE — 74011250637 HC RX REV CODE- 250/637: Performed by: NURSE PRACTITIONER

## 2020-10-02 PROCEDURE — 65660000001 HC RM ICU INTERMED STEPDOWN

## 2020-10-02 PROCEDURE — 97112 NEUROMUSCULAR REEDUCATION: CPT

## 2020-10-02 PROCEDURE — 74011250636 HC RX REV CODE- 250/636: Performed by: EMERGENCY MEDICINE

## 2020-10-02 PROCEDURE — 84100 ASSAY OF PHOSPHORUS: CPT

## 2020-10-02 PROCEDURE — 36415 COLL VENOUS BLD VENIPUNCTURE: CPT

## 2020-10-02 PROCEDURE — 73030 X-RAY EXAM OF SHOULDER: CPT

## 2020-10-02 PROCEDURE — 97535 SELF CARE MNGMENT TRAINING: CPT

## 2020-10-02 PROCEDURE — 77010033678 HC OXYGEN DAILY

## 2020-10-02 PROCEDURE — 74011250637 HC RX REV CODE- 250/637: Performed by: EMERGENCY MEDICINE

## 2020-10-02 PROCEDURE — 83735 ASSAY OF MAGNESIUM: CPT

## 2020-10-02 PROCEDURE — 74011000636 HC RX REV CODE- 636: Performed by: RADIOLOGY

## 2020-10-02 PROCEDURE — 85025 COMPLETE CBC W/AUTO DIFF WBC: CPT

## 2020-10-02 PROCEDURE — 97110 THERAPEUTIC EXERCISES: CPT

## 2020-10-02 PROCEDURE — 92526 ORAL FUNCTION THERAPY: CPT | Performed by: SPEECH-LANGUAGE PATHOLOGIST

## 2020-10-02 PROCEDURE — 74011250637 HC RX REV CODE- 250/637: Performed by: HOSPITALIST

## 2020-10-02 PROCEDURE — 74011250636 HC RX REV CODE- 250/636: Performed by: NURSE PRACTITIONER

## 2020-10-02 RX ORDER — SODIUM CHLORIDE 0.9 % (FLUSH) 0.9 %
10 SYRINGE (ML) INJECTION
Status: DISPENSED | OUTPATIENT
Start: 2020-10-02 | End: 2020-10-03

## 2020-10-02 RX ORDER — HYDROXYZINE 25 MG/1
25 TABLET, FILM COATED ORAL
Status: DISCONTINUED | OUTPATIENT
Start: 2020-10-02 | End: 2020-10-12 | Stop reason: HOSPADM

## 2020-10-02 RX ORDER — ALPRAZOLAM 0.5 MG/1
0.5 TABLET ORAL
Status: DISPENSED | OUTPATIENT
Start: 2020-10-02 | End: 2020-10-03

## 2020-10-02 RX ORDER — ONDANSETRON 2 MG/ML
4 INJECTION INTRAMUSCULAR; INTRAVENOUS EVERY 4 HOURS
Status: DISPENSED | OUTPATIENT
Start: 2020-10-02 | End: 2020-10-03

## 2020-10-02 RX ADMIN — FAMOTIDINE 20 MG: 40 POWDER, FOR SUSPENSION ORAL at 08:41

## 2020-10-02 RX ADMIN — METOCLOPRAMIDE HYDROCHLORIDE 5 MG: 5 SOLUTION ORAL at 17:48

## 2020-10-02 RX ADMIN — ALPRAZOLAM 0.5 MG: 0.5 TABLET ORAL at 21:16

## 2020-10-02 RX ADMIN — ACETAMINOPHEN ORAL SOLUTION 1000 MG: 650 SOLUTION ORAL at 17:48

## 2020-10-02 RX ADMIN — OXYCODONE HYDROCHLORIDE 5 MG: 5 SOLUTION ORAL at 10:44

## 2020-10-02 RX ADMIN — ALUMINUM HYDROXIDE AND MAGNESIUM HYDROXIDE 15 ML: 200; 200 SUSPENSION ORAL at 10:43

## 2020-10-02 RX ADMIN — OXYCODONE HYDROCHLORIDE 5 MG: 5 SOLUTION ORAL at 01:05

## 2020-10-02 RX ADMIN — METOCLOPRAMIDE HYDROCHLORIDE 5 MG: 5 SOLUTION ORAL at 05:40

## 2020-10-02 RX ADMIN — ONDANSETRON 4 MG: 2 INJECTION INTRAMUSCULAR; INTRAVENOUS at 21:16

## 2020-10-02 RX ADMIN — NYSTATIN 500000 UNITS: 500000 SUSPENSION ORAL at 08:41

## 2020-10-02 RX ADMIN — Medication 10 ML: at 05:35

## 2020-10-02 RX ADMIN — ENOXAPARIN SODIUM 30 MG: 30 INJECTION SUBCUTANEOUS at 10:43

## 2020-10-02 RX ADMIN — OXYCODONE HYDROCHLORIDE 5 MG: 5 SOLUTION ORAL at 05:41

## 2020-10-02 RX ADMIN — NYSTATIN 500000 UNITS: 500000 SUSPENSION ORAL at 17:29

## 2020-10-02 RX ADMIN — NYSTATIN 500000 UNITS: 500000 SUSPENSION ORAL at 12:10

## 2020-10-02 RX ADMIN — PROMETHAZINE HYDROCHLORIDE 25 MG: 25 INJECTION INTRAMUSCULAR; INTRAVENOUS at 17:15

## 2020-10-02 RX ADMIN — CHLORHEXIDINE GLUCONATE 15 ML: 0.12 RINSE ORAL at 08:44

## 2020-10-02 RX ADMIN — POTASSIUM BICARBONATE 40 MEQ: 782 TABLET, EFFERVESCENT ORAL at 08:41

## 2020-10-02 RX ADMIN — Medication 10 ML: at 05:36

## 2020-10-02 RX ADMIN — MICONAZOLE NITRATE 2 % TOPICAL POWDER: at 17:52

## 2020-10-02 RX ADMIN — METOCLOPRAMIDE HYDROCHLORIDE 5 MG: 5 SOLUTION ORAL at 12:10

## 2020-10-02 RX ADMIN — MICONAZOLE NITRATE 2 % TOPICAL POWDER: at 08:44

## 2020-10-02 RX ADMIN — ASPIRIN 81 MG CHEWABLE TABLET 81 MG: 81 TABLET CHEWABLE at 08:41

## 2020-10-02 RX ADMIN — Medication: at 08:44

## 2020-10-02 RX ADMIN — ALPRAZOLAM 1 MG: 0.5 TABLET ORAL at 14:20

## 2020-10-02 RX ADMIN — LORAZEPAM 1 MG: 2 INJECTION INTRAMUSCULAR; INTRAVENOUS at 10:44

## 2020-10-02 RX ADMIN — METOPROLOL TARTRATE 100 MG: 25 TABLET, FILM COATED ORAL at 08:41

## 2020-10-02 RX ADMIN — METOPROLOL TARTRATE 100 MG: 25 TABLET, FILM COATED ORAL at 17:47

## 2020-10-02 RX ADMIN — IOPAMIDOL 80 ML: 755 INJECTION, SOLUTION INTRAVENOUS at 18:00

## 2020-10-02 RX ADMIN — Medication: at 17:52

## 2020-10-02 RX ADMIN — OXYCODONE HYDROCHLORIDE 5 MG: 5 SOLUTION ORAL at 17:50

## 2020-10-02 RX ADMIN — ALPRAZOLAM 1 MG: 0.5 TABLET ORAL at 05:40

## 2020-10-02 RX ADMIN — LORAZEPAM 1 MG: 2 INJECTION INTRAMUSCULAR; INTRAVENOUS at 00:19

## 2020-10-02 NOTE — PROGRESS NOTES
Problem: Dysphagia (Adult)  Goal: *Acute Goals and Plan of Care (Insert Text)  Description: Speech Therapy Goals  Initiated 9/23/2020    1. Patient will tolerate ice chips only when on PMV without adverse effects within 7 days. Added 10/1/2020  2. Patient will tolerate nectar thick liquids only when on PMV with no adverse effects within 7 days. Outcome: Progressing Towards Goal     Problem: Voice Impaired (Adult)  Goal: *Acute Goals and Plan of Care (Insert Text)  Description: Speech Therapy Goals  Initiated 9/23/2020    1. Patient will tolerate in-line PMV for 10 minute trials with RT/SLP/RN without adverse effects within 7 days. MET  2. Patient will utilize in-line PMV to work towards ventilator weaning within 7 days. Outcome: Progressing Towards Goal     SPEECH LANGUAGE PATHOLOGY DYSPHAGIA AND SPEECH TREATMENT  Patient: Julio Mojica (82 y.o. male)  Date: 10/2/2020  Diagnosis: Acute respiratory failure ([facility] [J96.00]   <principal problem not specified>  Procedure(s) (LRB):  ESOPHAGOGASTRODUODENOSCOPY (EGD) @ bedside (N/A)  ESOPHAGOGASTRODUODENAL (EGD) BIOPSY (N/A) 10 Days Post-Op  Precautions:       ASSESSMENT:  Patient with PMV placed with RN prior to session. Patient tolerating with good SpO2, RR mildly elevated at 28 and mildly increased work of breathing. Patient voicing and able to communicate at sentence level. No back pressure noted with PMV removed briefly. Patient reports tolerating nectar liquids with coughing noted x 1. Patient completed effortful swallow x 30 facilitated th nectar liquids via teaspoon. He tolerated all trails with stable vital signs and no overt s/s aspiration. PLAN:  Recommendations and Planned Interventions:  Nectar liquids only  Patient continues to benefit from skilled intervention to address the above impairments. Continue treatment per established plan of care. Discharge Recommendations:   To Be Determined     SUBJECTIVE:   Patient stated I have to do the nectar. Patient alert and agreeable to session. PMV placed by RN prior to session. OBJECTIVE:   Cognitive and Communication Status:  Neurologic State: Alert  Orientation Level: Oriented X4(Mouths words)  Cognition: Follows commands  Perception: Appears intact  Perseveration: No perseveration noted  Safety/Judgement: Awareness of environment    Dysphagia Treatment and Interventions:  Oral Assessment:     P.O. Trials:     Vocal quality prior to P.O.: No impairment  Consistency Presented: Nectar thick liquid  How Presented: Spoon;SLP-fed/presented     Bolus Acceptance: No impairment  Bolus Formation/Control: No impairment     Propulsion: Delayed (# of seconds)  Oral Residue: None  Initiation of Swallow: Delayed (# of seconds)  Laryngeal Elevation: Decreased  Aspiration Signs/Symptoms: None  Pharyngeal Phase Characteristics: Double swallow  Effective Modifications: Double swallow                Exercises:  Laryngeal Exercises:                    Effortful Swallow: Yes  Sets : 1  Reps : Other (comment)(30)                                                                                                              After treatment:   Patient left in no apparent distress in bed, Call bell within reach, and Nursing notified    COMMUNICATION/EDUCATION:   Patient was educated regarding role of SLP, MBS results and recommendations, swallow exercises and POC. The patient's plan of care including recommendations, planned interventions, and recommended diet changes were discussed with: Registered nurse.        BAYRON Pa  Time Calculation: 25 mins

## 2020-10-02 NOTE — PROGRESS NOTES
1745hrs: TRANSFER - IN REPORT:  Verbal report received from [address] [place] [address], RN(name) on [employer]  being received from CCU(unit) for routine progression of care  Report consisted of patients Situation, Background, Assessment and Recommendations(SBAR). Information from the following report(s) SBAR, Procedure Summary, Intake/Output, Recent Results, Med Rec Status and Cardiac Rhythm NSR/ST was reviewed with the receiving nurse. Opportunity for questions and clarification was provided. Assessment completed upon patients arrival to unit and care assumed.

## 2020-10-02 NOTE — PROGRESS NOTES
1857: critical result received from [address], radiologist. L parietal infarct suspected, radiologist recommends MRI which would be definitive. Sophie DAVID paged at [phone].  Kannan Morley RN notified    7097: spoke with Dr. Kathrin Ramirez MD will place order for MRI

## 2020-10-02 NOTE — PROGRESS NOTES
Problem: Self Care Deficits Care Plan (Adult)  Goal: *Acute Goals and Plan of Care (Insert Text)  Description:   FUNCTIONAL STATUS PRIOR TO ADMISSION: Patient unable to provide history at OT evaluation. Per chart review, patient was fully independent    HOME SUPPORT: Per chart review, patient lived alone    Occupational Therapy Goals  Initiated 9/14/2020, continued 9/21/2020, continued 9/28/2020  1. Patient will perform grooming with minimum assistance within 7 day(s). 2.  Patient will perform bathing with moderate assistance  within 7 day(s). 3.  Patient will perform upper body dressing with minimum assistance  within 7 day(s). 5.  Patient will perform lower body dressing with moderate assistance within 7 day(s). 6.  Patient will perform toilet transfers with moderate assistance  within 7 day(s). 7.  Patient will perform all aspects of toileting with moderate assistance  within 7 day(s). 8.  Patient will participate in upper extremity therapeutic exercise/activities with minimal assistance for 10 minutes within 7 day(s). 9.  Patient will utilize energy conservation techniques during functional activities with verbal cues within 7 day(s). Outcome: Progressing Towards Goal    OCCUPATIONAL THERAPY TREATMENT  Patient: Francesca Walton (50 y.o. male)  Date: 10/2/2020  Diagnosis: Acute respiratory failure ([facility] [J96.00]   <principal problem not specified>  Procedure(s) (LRB):  ESOPHAGOGASTRODUODENOSCOPY (EGD) @ bedside (N/A)  ESOPHAGOGASTRODUODENAL (EGD) BIOPSY (N/A) 10 Days Post-Op  Precautions:    Chart, occupational therapy assessment, plan of care, and goals were reviewed. ASSESSMENT  Patient continues with skilled OT services and is progressing towards goals.   This patient, per nurse, tolerated sitting up for 1 hour yesterday after session--he completed tasks as below with focus on increasing ant pelvic tilt, thoracic extension to increase trunk /postural control and facilitate sitting balance in prep for EOB ADL tasks. He requires min-max A for dynamic sitting balance varying with tasks EOB, consistently sacral sits with posterior lean/LOB when EOB. He completed SPT from EOB to chair as below with less L knee blocking and improved stepping to R side today. Completed SROM and AROM R UE as below. He is progressing towards goals--on trach collar today with no noted dips in O2 sats below 90% today! HR varies with activity--see vitals for details. Visit Vitals  /63 (BP 1 Location: Left arm, BP Patient Position: At rest)   Pulse 94   Temp 98.3 °F (36.8 °C)   Resp 20   Ht 5' 11\" (1.803 m)   Wt 86.6 kg (190 lb 14.7 oz)   SpO2 98%   BMI 26.63 kg/m²        Current Level of Function Impacting Discharge (ADLs): D for ADL, mod A x 2 for transfers    Other factors to consider for discharge:          PLAN :  Patient continues to benefit from skilled intervention to address the above impairments. Continue treatment per established plan of care. to address goals. Recommend with staff: bed in chair position intermittently during day    Recommend next OT session: chair level ADL tasks     Recommendation for discharge: (in order for the patient to meet his/her long term goals)  Therapy 3 hours per day 5-7 days per week    This discharge recommendation:  Has not yet been discussed the attending provider and/or case management    IF patient discharges home will need the following DME: TBD       SUBJECTIVE:   Patient stated I need the valve.     OBJECTIVE DATA SUMMARY:   Cognitive/Behavioral Status:  Neurologic State: Alert  Orientation Level: Oriented X4(Mouths words)  Cognition: Follows commands             Functional Mobility and Transfers for ADLs:  Bed Mobility:  Rolling: Moderate assistance  Supine to Sit: Moderate assistance;Assist x1;Additional time  Scooting: Moderate assistance;Assist x1    Transfers:  Sit to Stand:  Moderate assistance;Assist x1;Contact guard assistance;Assist x2 Bed to Chair: Moderate assistance;Assist x2; Additional time    Balance:  Sitting: Impaired; With support  Sitting - Static: Fair (occasional); Poor (constant support); Prop sitting  Sitting - Dynamic: Poor (constant support)  Standing: Impaired;Pull to stand  Standing - Static: Constant support;Poor  Standing - Dynamic : Constant support;Poor    ADL Intervention:                           Lower Body Dressing Assistance  Socks: Maximum assistance; Compensatory technique training(mod-max A for trunk control, ed on one handed technique)  Shoes with Cloth Laces: Maximum assistance  Leg Crossed Method Used: Yes  Position Performed: Seated edge of bed  Cues: Don;Tactile cues provided;Verbal cues provided;Visual cues provided;Physical assistance    Toileting  Bladder Hygiene:  Total assistance (dependent)(catheter use)         Therapeutic Exercises:     EXERCISE   Sets   Reps   Active Active Assist   Passive   Comments   Tray table pushes to increase thoracic ext and ant pelvic tilt 5 1 []           [x]           []              B UE GH elevation SROM 1 5 []           [x]           []           Max A, increased A with eccentric lowering with cues and increased time   B UE elbow flexion with hand to ear then contralateral knee, alternating sides with A at trunk for task 1 5 []           [x]           []           Mod A to facilitate trunk flexion from supported start position in chair   R forearm supination/pronation 1 5 [x]           [x]           []           With prolonged stretch to increase forearm supination   R forearm supination/pronation holding bath soap cylinder 1 10 [x]           []           []           Min cues for increasing  on item and increase ROM   Wrist extension holding bath soap cylinder 1 10 [x]           []           []           Min cues to increase  and wrist extension      []           []           []                 []           []           []                 []           []           [] []           []           []                 []           []           []                   Pain:  No c/o    Activity Tolerance:   Good, desaturates with exertion and requires oxygen, and requires frequent rest breaks  Please refer to the flowsheet for vital signs taken during this treatment. After treatment patient left in no apparent distress:   Sitting in chair and Call bell within reach    COMMUNICATION/COLLABORATION:   The patients plan of care was discussed with: Physical therapist and Registered nurse.      Hedwig Libman, OTR/L  Time Calculation: 54 mins

## 2020-10-02 NOTE — PROGRESS NOTES
Bedside shift change report given to Josefina Chaudhary RN (oncoming nurse) by Rashmi Jaffe RN (offgoing nurse). Report included the following information SBAR, Kardex, ED Summary, Intake/Output, MAR and Recent Results. SHIFT SUMMARY:    0800 Initial Shift  Assessment performed           Mental Status: Oriented. Mouths words. Follows commands. Respiratory:Trach Coller FiO2 40%           Cardiac: Sinus           GI/: Continent/ Condom cath  0730 Deep suctioned patient upon his request. Small amount of secretions obtained. Patient has productive cough and able to cough up most of his secretions with encouragement. 1245 PT/ OT at bedside and assisted patient to chair. 1420 Patients mom at bedside with patient. 1530 Patient downgraded for intermediate level care. 1600 Patient remains up in chair with speaking valve on talking on the phone. 1754 Report given to Mauri Bernheim, RN on [facility]. Will take patient to CT then transfer to [facility] 44 Patient taken to [facility].

## 2020-10-02 NOTE — PROGRESS NOTES
Problem: Mobility Impaired (Adult and Pediatric)  Goal: *Acute Goals and Plan of Care (Insert Text)  Description: FUNCTIONAL STATUS PRIOR TO ADMISSION: Patient was independent and active without use of DME. 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 Physical Therapy Goals  Reassessment 10/2/2020, goals upgraded. 1.  Patient will move from supine to sit and sit to supine , scoot up and down, and roll side to side in bed with minimal within 7 day(s). 2.  Patient will transfer from bed to chair and chair to bed with minimal assist x2 using the least restrictive device within 7 day(s). 3.  Patient will perform sit to stand with minimal assistance within 7 day(s). 4.  Patient will demo good sitting balance in unsupported sitting x5min in prep for mobility progression in 7 days. 5. Patient will ambulate 10ft with RW and mod A x2 within 7 days. Reassessment 9/25/2020  1. Patient will move from supine to sit and sit to supine , scoot up and down, and roll side to side in bed with maximal assistance within 7 day(s). 2.  Patient will transfer from bed to chair and chair to bed with maximal assistance using the least restrictive device within 7 day(s). 3.  Patient will perform sit to stand with maximal assistance within 7 day(s). 4.  Patient will tolerate sitting EOB with moderate assistance for 10 minutes within 7 day(s). Reassessment completed 9/18/2020 and all goals remain appropriate  at this time. Initiated 9/11/2020  1. Patient will move from supine to sit and sit to supine , scoot up and down, and roll side to side in bed with maximal assistance within 7 day(s). 2.  Patient will transfer from bed to chair and chair to bed with maximal assistance using the least restrictive device within 7 day(s). 3.  Patient will perform sit to stand with maximal assistance within 7 day(s).   4. Patient will tolerate sitting EOB with maximal assistance for 5 minutes within 7 day(s). --Goal Met 9/25      Outcome: Progressing Towards Goal    PHYSICAL THERAPY TREATMENT: WEEKLY REASSESSMENT  Patient: Jordyn Booth (22 y.o. male)  Date: 10/2/2020  Primary Diagnosis: Acute respiratory failure ([facility] [J96.00]  Procedure(s) (LRB):  ESOPHAGOGASTRODUODENOSCOPY (EGD) @ bedside (N/A)  ESOPHAGOGASTRODUODENAL (EGD) BIOPSY (N/A) 10 Days Post-Op   Precautions:          ASSESSMENT  Patient continues with skilled PT services and is progressing towards goals - remains most limited by profound global weakness (L LE and R UE most), impaired balance/trunk control, and decreased cardiopulmonary tolerance to activity leading to increased dependency and falls risk from baseline level following prolonged and medically complicated admission. Received pt on trach collar, cleared for mobility by RN. Pt very motivated to participate in session this date - responded well to firm, calming reassurance. Worked on supine<>sit transitions with one step cues for sequencing to increase indep with task and up to mod A. In sitting, worked on attaining/maintaining midline postural control with facilitation provided to cervical and trunk extensors and cues to correct from extreme posterior pelvic tilt/sacral sit. Progressed to completing sit<>stands x3 reps with mod A - less facilitation for L LE extension this date. Able to then take several small shuffled steps to transfer to chair (towards R)  with assist x2 and for L LE advancement. Continue to recommend discharge to acute IPR pending vent weaning. Will follow. For the weekend, recommend patient to complete as able in order to maintain strength, endurance and independence with nursing: OOB to chair daily via nhan. PT to follow-up with patient after the weekend.      Patient's progression toward goals since last assessment: goals met and upgraded     Current Level of Function Impacting Discharge (mobility/balance): mod A x2 for transfers; limited ambulation; on trach collar     Other factors to consider for discharge: indep and active at baseline; progressing well and motivated          PLAN :  Goals have been updated based on progression since last assessment. Patient continues to benefit from skilled intervention to address the above impairments. Recommendations and Planned Interventions: bed mobility training, transfer training, gait training, therapeutic exercises, neuromuscular re-education, patient and family training/education, and therapeutic activities      Frequency/Duration: Patient will be followed by physical therapy:  5 times a week to address goals.     Recommendation for discharge: (in order for the patient to meet his/her long term goals)  Therapy 3 hours per day 5-7 days per week  Vs LTAC with bridge to IPR pending vent weaning     This discharge recommendation:  A follow-up discussion with the attending provider and/or case management is planned    IF patient discharges home will need the following DME: to be determined (TBD)         SUBJECTIVE:   Patient stated Miranda Ramirez was ready to take a nap    OBJECTIVE DATA SUMMARY:   HISTORY:    Past Medical History:   Diagnosis Date    History of vascular access device 08/10/2020    5 Fr triple PICC, hemodynamically unstable, 45 cm L basilic     Past Surgical History:   Procedure Laterality Date    IR INSERT GASTROSTOMY TUBE PERC  9/23/2020    IR PERCUT GASTROSTROMY TUBE  9/23/2020         IR THORA/INS CHEST TUBE(PNEUMO) WO IMAGE  8/11/2020    IR THORA/INS CHEST TUBE(PNEUMO) WO IMAGE  8/11/2020    FL TRACHEOSTOMY, PLANNED  8/26/2020            Personal factors and/or comorbidities impacting plan of care: PMHx    Home Situation  Home Environment: Private residence  # Steps to Enter: 0  One/Two Story Residence: One story  Living Alone: Yes  Support Systems: Family member(s)  Patient Expects to be Discharged to[de-identified] Rehabilitation facility  Current DME Used/Available at Home: None    EXAMINATION/PRESENTATION/DECISION MAKING:   Critical Behavior:  Neurologic State: Alert  Orientation Level: Oriented X4(Mouths words)  Cognition: Follows commands  Safety/Judgement: Awareness of environment  Hearing: Auditory  Auditory Impairment: None     Functional Mobility:  Bed Mobility:  Rolling: Moderate assistance  Supine to Sit: Moderate assistance  Scooting: Moderate assistance  Transfers:  Sit to Stand: Moderate assistance  Stand to Sit: Moderate assistance  Bed to Chair: Assist x2; Moderate assistance(towards R)         Balance:   Sitting: Impaired; With support  Sitting - Static: Fair (occasional); Poor (constant support)  Sitting - Dynamic: Poor (constant support)  Standing: Impaired; With support  Standing - Static: Constant support;Poor  Standing - Dynamic : Poor;Constant support      Activity Tolerance:   Good, desaturates with exertion and requires oxygen, requires rest breaks, and observed SOB with activity  Please refer to the flowsheet for vital signs taken during this treatment. After treatment patient left in no apparent distress:   Sitting in chair, Call bell within reach, and Caregiver / family present    COMMUNICATION/EDUCATION:   The patients plan of care was discussed with: Occupational therapist and Registered nurse. Fall prevention education was provided and the patient/caregiver indicated understanding., Patient/family have participated as able in goal setting and plan of care. , and Patient/family agree to work toward stated goals and plan of care.     Thank you for this referral.  Kinsey Fulton, PT, DPT   Time Calculation: 39 mins

## 2020-10-02 NOTE — PROGRESS NOTES
Comprehensive Nutrition Assessment    Type and Reason for Visit: Reassess    Nutrition Recommendations/Plan:      1. Adjust tube feedings to allow a few hours off of the pump, using Osmolite 1.5:   --- Day 1: Increase rate to 50 ml/hr x 18 hours/day ([id number])   --- Day 2: Increase rate to 60 ml/hr x 18 hours/day   --- Day 3: Increase rate to 65 ml/hr x 18 hours/day (this is goal)    2. Please continue to give x2 packets Liquid Prosource BID (so total of 4 packets/day)    3. Give 100 ml water flush every 4 hours around the clock     4. Please obtain new weight on pt when able    5. The above goal would provide a total of: 1995 kcals, 133 gm pro, 1500 ml free fluid (not including water flushes given with Prosource)      Nutrition Assessment:    Pt admitted to [facility] d/t Respiratory failure with hypoxia. No PMHx except smoking. Noted: Acuter hypoxic respiratory failure d/t COVID 19 PNA,  intubated 8/10. ARDS. Transferred to [facility] for possible ECMO support-not indicated. S/P convalescent plasma x 2, Remdisivir. B/L PTX-s/p left CT-removed. 8/26 Perc trach placed; SBT x 2 hr today. Neurology consulted-?acute/subacute CVA. 9/23: Pt had pulled out Dobbhoff 9/22. PEG placement was attempted but unsuccessful. Awaiting IR attempt for G-tube placement today (9/23) around 1-2pm. Pt vomited 4 x over night per note 9/21-9/22. Unsure cause. Pt is down 10 lbs over the last 7 days. IVF LR @ 50 mL/hr. Calcium up to 10.2,  mg/dL. 10/2:  Pt is s/p PEG placement on 9/23. Per pt's nurse, pt has had no vomiting for her, and she was unaware that pt had so much trouble with N/V while in the ICU. Very glad that thus far, pt is doing well on feeds.   His rate of 40 ml/hr of Osmolite 1.5 does not meet his nutritional needs; spoke with his nurse about possibly transitioning pt to bolus schedule to give him a more \"physiologic\" schedule, but she was not confident that pt would tolerate boluses right now (he complains often of GERD symptoms). Will try to transition him to 18 hours/day, to at least give him 6 hours off of feeds. Please see above recommendations for schedule. Pt passed MBS for nectar thick clear liquids, but taking very little per his nurse--pt says his swallowing muscles are very weak and he feels like things \"get stuck\". Per SLP, pt should have another MBS prior to advancing his diet. RD continues to follow. Malnutrition Assessment:  Malnutrition Status:  Insufficient data      Nutritionally Significant Medications:   Colace, Miralax, reglan    Estimated Daily Nutrient Needs:  Energy (kcal):  2275 (25 kcal/kg)  Protein (g):  156 (2g/kg IBW)       Fluid (ml/day):  1 ml/kcal    Nutrition Related Findings:  Last BM 10/1, edema 1+ genitals, trace on extremeties    Wounds:  None       Current Nutrition Therapies:   Diet: nectar thick clear liquids  Tube Feeding: see recommendations    Anthropometric Measures:  · Height:  5' 11\" (180.3 cm)  · Current Body Wt:  89.4 kg (197 lb)   · Admission Body Wt:  242 lb 8.1 oz      · Ideal Body Wt:  172#  :  120.7 %   · BMI Categories:  Obese class 1 (BMI 30.0-34. 9)     Wt Readings from Last 10 Encounters:   10/02/20 86.6 kg (190 lb 14.7 oz)   09/21/20 91.4 kg (201 lb 8 oz)   08/18/20 100.7 kg (222 lb 0.1 oz)     Nutrition Diagnosis:   · Swallowing difficulty related to catabolic illness as evidenced by nutrition support-enteral nutrition    Nutrition Interventions:   Food and/or Nutrient Delivery: Modify tube feeding  Nutrition Education and Counseling: No recommendations at this time  Coordination of Nutrition Care: Continued inpatient monitoring, Interdisciplinary rounds    Goals:  Pt will tolerate new feeding schedule without N/V over the next 3-5 days       Nutrition Monitoring and Evaluation:   Food/Nutrient Intake Outcomes: Enteral nutrition intake/tolerance, Diet advancement/tolerance  Physical Signs/Symptoms Outcomes: Biochemical data, Chewing or swallowing, Nutrition focused physical findings, Weight    Discharge Planning:     Too soon to determine     Ozzie Norris, [address] [place], [facility]  Contact: [phone]

## 2020-10-02 NOTE — PROGRESS NOTES
[facility]  Hospitalist Group     ICU Transfer/Accept Summary     This patient is being transferred OUT OF ICU  DATE OF TRANSFER: 10/2/2020       PATIENT ID: Jenise Giles  MRN: [id number]   YOB: 1990    PRIMARY CARE PROVIDER: Silas Akers MD   DATE OF ADMISSION: 8/18/2020  9:33 AM    ATTENDING PHYSICIAN: Diana Montero NP  CONSULTATIONS:   IP CONSULT TO INTENSIVIST  IP CONSULT TO THORACIC SURGERY  IP CONSULT TO NEUROLOGY  IP CONSULT TO GASTROENTEROLOGY  IP CONSULT TO INTERVENTIONAL RADIOLOGY  IP CONSULT TO NEUROLOGY  IP CONSULT TO CARDIOLOGY  IP CONSULT TO ORTHOPEDIC SURGERY  IP CONSULT TO ORTHOPEDIC SURGERY    PROCEDURES/SURGERIES:   Procedure(s):  ESOPHAGOGASTRODUODENOSCOPY (EGD) @ bedside  ESOPHAGOGASTRODUODENAL (EGD) BIOPSY    REASON FOR ADMISSION: Pneumonia due to severe acute respiratory syndrome coronavirus 2 (SARS-CoV-2)     HOSPITAL PROBLEM LIST:  Patient Active Problem List   Diagnosis Code    Respiratory failure with hypoxia ([address] J96.91    Pneumonia due to severe acute respiratory syndrome coronavirus 2 (SARS-CoV-2) U07.1, J12.89    Pneumothorax on left J93.9    Acute respiratory failure ([address] J96.00    Tracheostomy dependent ([address] Z93.0    Tachycardia R00.0    Hyponatremia E87.1         Brief HPI and Hospital Course:      From H&P 8/5/2020:   \"27year-old gentleman with no significant past medical history was brought to the emergency room from home via EMS for complaints of an approximately two- to three-day history of progressively worsening cough, shortness of breath, chills and fevers as high as 103 degrees Fahrenheit. Of note, patient stated that he tested positive for COVID-19 virus about nine days prior to this emergency room presentation of 08/05/2020. Further, patient stated that despite taking the prescribed Zithromax, prednisone, and other respiratory therapies, did not have any significant improvement in symptoms.  Patient denied associated headaches, dizziness, visual deficits, chest pains, palpitations, sore throat, nausea, vomiting, abdominal pain, bladder or bowel irregularities. \"    ICU Note 10/2/20:  \"Assessment and Plan:  -Bilateral pneumonia due to COVID-19 infection - resolved   -Right arm weakness:   -Debility  - Acute hypoxic respiratory failure due to COVID-19 infection status post tracheostomy on August 26  - Possible bacterial super imposed infection: Completed antibiotic course  -History of tobacco use  - continue trach collar with speech valve as tolerated - plan for continuous TC   - PT/OT/rehab  - Dispo planning   - Anxiety management with anxiolytic antidepressant and pain management  - Still not clear what caused his acute right arm weakness, multiple possibilities including hypoxic event or vasculitis related to [address] is on aspirin and Lovenox, no further deterioration - PT/OT   -Completed COVID-19 treatment  - Ventilator bundle, GI prophylaxis and DVT prophylaxis  - working w/ SLP - cleared for thickened liquid po\"    Nutrition Note 10/2/20:  \"10/2:  Pt is s/p PEG placement on 9/23. Per pt's nurse, pt has had no vomiting for her, and she was unaware that pt had so much trouble with N/V while in the ICU. Very glad that thus far, pt is doing well on feeds. His rate of 40 ml/hr of Osmolite 1.5 does not meet his nutritional needs; spoke with his nurse about possibly transitioning pt to bolus schedule to give him a more \"physiologic\" schedule, but she was not confident that pt would tolerate boluses right now (he complains often of GERD symptoms). Will try to transition him to 18 hours/day, to at least give him 6 hours off of feeds. Please see above recommendations for schedule. Pt passed MBS for nectar thick clear liquids, but taking very little per his nurse--pt says his swallowing muscles are very weak and he feels like things \"get stuck\". Per SLP, pt should have another MBS prior to advancing his diet.   ERIKA continues to follow. \"     MRI brain 9/22/2020:  IMPRESSION:   1. Evolving cortical/subcortical encephalomalacia and atrophy in the bilateral  frontoparietal lobes with signal changes as described above. Given clinical  history, this may represent evolving ischemic changes from a hypoxic-ischemic  event, vasculitic involvement from COVID, or evolving encephalomalacia from a  infectious/inflammatory process related to [facility]. 2. Small area of signal abnormality with restricted diffusion in the right  posterior body of the corpus callosum, which likely represents the same process  as above, again either ischemic or infectious/inflammatory. 3. Chronic area of encephalomalacia in the right cerebellum with associated  chronic hemorrhage. 4. Complete opacification of the right sphenoid sinus and near complete  opacification of the right maxillary sinus with air-fluid level, suspicious for  acute sinusitis. Large bilateral mastoid effusions. CT Head 10/2/2020:  \"IMPRESSION: Possible ischemic left parietal infarct. \"    Patient seen sitting up in chair in ICU no acute distress with his mother at the bedside. Patient is using some accessory muscles to breathe. He is able to talk using the [place] valve. He has been talking a lot on the oxygen saturation has been mid 90s on 50% trach collar. Had to go back on PSVT for couple hours yesterday but so far has been just over 24 hours on trach collar at night. He is still quite weak and has not yet walking but is taking small steps sidesteps with PT. He apparently was determined ready to go to [facility] but his mother declined this because she was not going to be able to visit him. I discussed with the patient and his mother at length that he would not likely be able to go directly to inpatient rehab due to his significant fatigue and oxygen requirements with minimal exertion. He has been negative x2.   He does report other than his right arm having difficulty lifting, he reports left face and as well as foot numbness. The foot numbness he said started a while ago after he \"woke up. \"  The face and tongue numbness on the left side started approximately 3 to 4 days ago. He reports that he did not mention this to staff. Otherwise patient denies dizziness, visual changes, HA, CP, abd pain, n/v/d or dysuria. Discussed with Dr. Rene Pérez. Assessment and Plan:    Bilateral PNA: d/t COVID19  -resolved    Acute Hypoxic Resp Failure: s/p Trach  -now on trach collar (50%) >24h  -started using PM valve today to speak  -still producing a lot of mucous; able to cough up  -Keep Sats greater than 92%  -PRN robitussin    Possible ischemic L parietal infarct  -seen on CT 10/2/2020; not seen on MRI 9/22  -spoke with Dr. Harding Closs MRI - no hep gtt  -MRI brain ordered  -on ASA, lovenox - continue    Dysphagia  -has Peg, on TF  -ST following; upgraded today to CLD 2 [place]/2 Mildly Thick    Tachycardia  -Unclear etiology  -CTA chest neg for PE  -does have some anxiety; inc work of breathing  -EKG showed ST 8/26; repeat  -check tsh  -continue metoprolol    RUE Weakness  -unable to lift off bed  -check right shoulder xray  -consult ortho    GERD  -continue pepcid, reglan, zosyn    Anxiety  -wean benzos  -add atarax prn  -consult psych for recs    DVTppx: SCDs, lovenox  Gippx: Pepcid, reglan  Code Status: Full code  Diet: CLD 2 [place]/2 Mildly Thick; PEG w/ TF  Activity: OOB to chair TID and PRN  Discharge: TBD - case mgt consult for SNF           PHYSICAL EXAMINATION:  Visit Vitals  /82 (BP 1 Location: Left arm, BP Patient Position: At rest)   Pulse 92   Temp 98.5 °F (36.9 °C)   Resp 30 Comment: appears anxious   Ht 5' 11\" (1.803 m)   Wt 86.6 kg (190 lb 14.7 oz)   SpO2 100%   BMI 26.63 kg/m²       General:  Awake, alert, working some to breathe, NAD, obese   Eyes:  Sclera anicteric. Pupils equally round and reactive to light.    Mouth/Throat: Mucous membranes normal, oral pharynx clear   Neck: Supple, tracheostomy tube in place   Lungs:    Good air entry bilaterally, fine crepitation   CV:  Regular rate and rhythm,no murmur, click, rub or gallop   Abdomen:   Soft, non-tender.  bowel sounds normal. non-distended; +peg tube   Extremities: No cyanosis , No edema   Skin: Skin color, texture, turgor normal. no acute rash or lesions   Musculoskeletal: RUE w/ limited ROM; unable to lift   Neuro:  CNII-XII grossly intact; A&Ox3; ROY, RUE 2/5 strength; left face/tongue and left foot numbness/dec sensation       CODE STATUS:    X Full Code    DNR    Partial    Comfort Care     Signed:   Clara Pereyra NP  Date of Service:  10/2/2020  4:18 PM

## 2020-10-02 NOTE — PROGRESS NOTES
[facility]    ICU TEAM Progress Note    Name: Bella Wang   : 1990   MRN: [id number]   Date: 10/2/2020      I  Subjective:   Progress Note: 10/2/2020      Reason for ICU Admission: Respiratory failure due to COVID-19 infection    Interval history:   80-year-old male with no history of significant past medical history except smoking half pack per day. Vladimir Ahn was admitted on 2020 at [facility] with acute hypoxemic respiratory failure from COVID pneumonia. Vladimir Ahn continued to deteriorate and got intubated on 8/10.  Due to worsening hypoxemia, he is transferred to [facility] for ECMO evaluation. Vladimir Ahn has bee given convalescent plasma twice on  and  and treated with Remdesivir which was completed on 8/10. Vladimir Ahn is also on dexamethasone. Vladimir Ahn completed course of empiric antibiotics including azithromycin which was completed on  and cefepime was completed on .  During his hospital course he also developed pneumothorax and has left-sided chest tube since .  Has a tracheostomy tube placed on .  On September 3 patient had significant worsening on his right arm, CT head was unrevealing.  He is already on high-dose Lovenox and aspirin.  On  he had acute weakness on the left arm code stroke was called and again CTA was unrevealing.  Since then left arm is back to baseline, showed some improvement on right arm but still weak compared to left with minimal ability to move against gravity.  Mental status continues to improve but now he is very anxious with recurrent episode of nausea and vomiting associated with tachycardia. Now s/p PEG  Overnight Events:   No major o/n events reported. Giselle TC almost 24h, but WOB increased some, needed PSV for a few hours. Able then to go back to [facility], now again for almost 24h. SLP following, doing well w/ PMV. Still needing frequent suctioning.      Active Problem List:     Problem List  Date Reviewed: 2020 Codes Class    Acute respiratory failure (HCC) ICD-10-CM: J96.00  ICD-9-CM: 518.81         Pneumothorax on left ICD-10-CM: J93.9  ICD-9-CM: 512.89         Pneumonia due to severe acute respiratory syndrome coronavirus 2 (SARS-CoV-2) ICD-10-CM: U07.1, J12.89  ICD-9-CM: 480.8         Respiratory failure with hypoxia (HCC) ICD-10-CM: J96.91  ICD-9-CM: 518.81               Past Medical History:      has a past medical history of History of vascular access device (08/10/2020). Past Surgical History:      has a past surgical history that includes ir thora/ins chest tube(pneumo) wo image (2020); ir thora/ins chest tube(pneumo) wo image (2020); pr tracheostomy, planned (2020); ir percut gastrostromy tube (2020); and ir insert gastrostomy tube perc (2020). Home Medications:     Prior to Admission medications    Medication Sig Start Date End Date Taking? Authorizing Provider   benzonatate (Tessalon Perles) 100 mg capsule Take 100 mg by mouth three (3) times daily as needed for Cough. Provider, Historical       Allergies/Social/Family History:     No Known Allergies   Social History     Tobacco Use    Smoking status: Current Some Day Smoker    Smokeless tobacco: Never Used   Substance Use Topics    Alcohol use: Not on file      History reviewed. No pertinent family history.     Review of Systems:     Not able to obtain due to his medical condition    Objective:   Vital Signs:  Visit Vitals  /80   Pulse (!) 110   Temp 98.1 °F (36.7 °C)   Resp 15   Ht 5' 11\" (1.803 m)   Wt 86.6 kg (190 lb 14.7 oz)   SpO2 100%   BMI 26.63 kg/m²    O2 Flow Rate (L/min): 10 l/min O2 Device: Tracheal collar Temp (24hrs), Av.1 °F (36.7 °C), Min:98 °F (36.7 °C), Max:98.2 °F (36.8 °C)           Intake/Output:     Intake/Output Summary (Last 24 hours) at 10/2/2020 0741  Last data filed at 10/2/2020 0700  Gross per 24 hour   Intake 1160 ml   Output 1050 ml   Net 110 ml       Physical Exam:    General:  Awake and interactive. Writing on communication board. Frustrated at times   Eyes:  Sclera anicteric. Pupils equally round and reactive to light. Mouth/Throat: Mucous membranes normal, oral pharynx clear   Neck: Supple, tracheostomy tube in place   Lungs:    Good air entry bilaterally, fine crepitation   CV:  Regular rate and rhythm,no murmur, click, rub or gallop   Abdomen:   Soft, non-tender. bowel sounds normal. non-distended   Extremities: No cyanosis , evolving edema   Skin: Skin color, texture, turgor normal. no acute rash or lesions   Lymph nodes: Cervical and supraclavicular normal   Musculoskeletal: No swelling or deformity   Lines/Devices:  Intact, no erythema, drainage or tenderness   Psych:  conscious and alert, follows command, anxious, still weak on right arm but able to move minimally against gravity         LABS AND  DATA: Personally reviewed  Recent Labs     10/02/20  0534 10/01/20  0528   WBC 7.9 7.7   HGB 11.7* 11.8*   HCT 37.0 36.8    265     Recent Labs     10/02/20  0534 10/01/20  0528   * 133*   K 4.1 3.9   CL 96* 98   CO2 31 29   BUN 16 19   CREA 0.63* 0.55*   * 97   CA 10.0 9.6   MG 2.0 2.1   PHOS 5.2* 5.5*     Recent Labs     10/02/20  0534 10/01/20  0528   * 110   TP 7.8 7.6   ALB 3.5 3.3*   GLOB 4.3* 4.3*     No results for input(s): INR, PTP, APTT, INREXT, INREXT in the last 72 hours. No results for input(s): PHI, PCO2I, PO2I, FIO2I in the last 72 hours. No results for input(s): CPK, CKMB, TROIQ, BNPP in the last 72 hours.     Hemodynamics:   PAP:   CO:     Wedge:   CI:     CVP:    SVR:       PVR:       Ventilator Settings:  Mode Rate Tidal Volume Pressure FiO2 PEEP   Assist control   12 ml  5 cm H2O 50 % 5 cm H20     Peak airway pressure: 22 cm H2O    Minute ventilation: 6.53 l/min        MEDS: Reviewed    Chest X-Ray:  CXR Results  (Last 48 hours)    None            Assessment and Plan:   -Bilateral pneumonia due to COVID-19 infection - resolved   -Right arm weakness:   -Debility  - Acute hypoxic respiratory failure due to COVID-19 infection status post tracheostomy on August 26  - Possible bacterial super imposed infection: Completed antibiotic course  -History of tobacco use     - continue trach collar with speech valve as tolerated - plan for continuous TC   - PT/OT/rehab  - Dispo planning   - Anxiety management with anxiolytic antidepressant and pain management  - Still not clear what caused his acute right arm weakness, multiple possibilities including hypoxic event or vasculitis related to [place] is on aspirin and Lovenox, no further deterioration - PT/OT   -Completed COVID-19 treatment  - Ventilator bundle, GI prophylaxis and DVT prophylaxis  - working w/ SLP - cleared for thickened liquid po         DISPOSITION  Stay in ICU - may be able to downgrade if able to tolerate continuous trach collar     CRITICAL CARE CONSULTANT NOTE  I had a face to face encounter with the patient, reviewed and interpreted patient data including clinical events, labs, images, vital signs, I/O's, and examined patient. I have discussed the case and the plan and management of the patient's care with the consulting services, the bedside nurses and the respiratory therapist.      NOTE OF PERSONAL INVOLVEMENT IN CARE   This patient has a high probability of imminent, clinically significant deterioration, which requires the highest level of preparedness to intervene urgently. I participated in the decision-making and personally managed or directed the management of the following life and organ supporting interventions that required my frequent assessment to treat or prevent imminent deterioration. I personally spent 31 minutes of critical care time. This is time spent at this critically ill patient's bedside actively involved in patient care as well as the coordination of care and discussions with the patient's family.   This does not include any procedural time which has been billed separately.     [address] [place] [address]  10/2/2020

## 2020-10-03 LAB
ALBUMIN SERPL-MCNC: 3.3 G/DL (ref 3.5–5)
ALBUMIN/GLOB SERPL: 0.8 {RATIO} (ref 1.1–2.2)
ALP SERPL-CCNC: 111 U/L (ref 45–117)
ALT SERPL-CCNC: 54 U/L (ref 12–78)
ANION GAP SERPL CALC-SCNC: 4 MMOL/L (ref 5–15)
AST SERPL-CCNC: 29 U/L (ref 15–37)
ATRIAL RATE: 88 BPM
BASOPHILS # BLD: 0 K/UL (ref 0–0.1)
BASOPHILS NFR BLD: 1 % (ref 0–1)
BILIRUB SERPL-MCNC: 0.5 MG/DL (ref 0.2–1)
BUN SERPL-MCNC: 13 MG/DL (ref 6–20)
BUN/CREAT SERPL: 20 (ref 12–20)
CALCIUM SERPL-MCNC: 9.7 MG/DL (ref 8.5–10.1)
CALCULATED P AXIS, ECG09: 35 DEGREES
CALCULATED R AXIS, ECG10: -24 DEGREES
CALCULATED T AXIS, ECG11: -7 DEGREES
CHLORIDE SERPL-SCNC: 96 MMOL/L (ref 97–108)
CO2 SERPL-SCNC: 33 MMOL/L (ref 21–32)
CREAT SERPL-MCNC: 0.65 MG/DL (ref 0.7–1.3)
DIAGNOSIS, 93000: NORMAL
DIFFERENTIAL METHOD BLD: ABNORMAL
EOSINOPHIL # BLD: 0.3 K/UL (ref 0–0.4)
EOSINOPHIL NFR BLD: 5 % (ref 0–7)
ERYTHROCYTE [DISTWIDTH] IN BLOOD BY AUTOMATED COUNT: 14.1 % (ref 11.5–14.5)
GLOBULIN SER CALC-MCNC: 4.3 G/DL (ref 2–4)
GLUCOSE SERPL-MCNC: 123 MG/DL (ref 65–100)
HCT VFR BLD AUTO: 34.8 % (ref 36.6–50.3)
HGB BLD-MCNC: 11 G/DL (ref 12.1–17)
IMM GRANULOCYTES # BLD AUTO: 0 K/UL (ref 0–0.04)
IMM GRANULOCYTES NFR BLD AUTO: 1 % (ref 0–0.5)
LYMPHOCYTES # BLD: 1.5 K/UL (ref 0.8–3.5)
LYMPHOCYTES NFR BLD: 23 % (ref 12–49)
MAGNESIUM SERPL-MCNC: 2 MG/DL (ref 1.6–2.4)
MCH RBC QN AUTO: 27.7 PG (ref 26–34)
MCHC RBC AUTO-ENTMCNC: 31.6 G/DL (ref 30–36.5)
MCV RBC AUTO: 87.7 FL (ref 80–99)
MONOCYTES # BLD: 0.7 K/UL (ref 0–1)
MONOCYTES NFR BLD: 11 % (ref 5–13)
NEUTS SEG # BLD: 3.9 K/UL (ref 1.8–8)
NEUTS SEG NFR BLD: 59 % (ref 32–75)
NRBC # BLD: 0 K/UL (ref 0–0.01)
NRBC BLD-RTO: 0 PER 100 WBC
P-R INTERVAL, ECG05: 174 MS
PHOSPHATE SERPL-MCNC: 4.7 MG/DL (ref 2.6–4.7)
PLATELET # BLD AUTO: 248 K/UL (ref 150–400)
PMV BLD AUTO: 11.1 FL (ref 8.9–12.9)
POTASSIUM SERPL-SCNC: 3.7 MMOL/L (ref 3.5–5.1)
PROT SERPL-MCNC: 7.6 G/DL (ref 6.4–8.2)
Q-T INTERVAL, ECG07: 364 MS
QRS DURATION, ECG06: 104 MS
QTC CALCULATION (BEZET), ECG08: 440 MS
RBC # BLD AUTO: 3.97 M/UL (ref 4.1–5.7)
SODIUM SERPL-SCNC: 133 MMOL/L (ref 136–145)
T4 FREE SERPL-MCNC: 1.1 NG/DL (ref 0.8–1.5)
TSH SERPL DL<=0.05 MIU/L-ACNC: 2.28 UIU/ML (ref 0.36–3.74)
VENTRICULAR RATE, ECG03: 88 BPM
WBC # BLD AUTO: 6.5 K/UL (ref 4.1–11.1)

## 2020-10-03 PROCEDURE — 65660000001 HC RM ICU INTERMED STEPDOWN

## 2020-10-03 PROCEDURE — 74011250636 HC RX REV CODE- 250/636: Performed by: EMERGENCY MEDICINE

## 2020-10-03 PROCEDURE — 36415 COLL VENOUS BLD VENIPUNCTURE: CPT

## 2020-10-03 PROCEDURE — 80053 COMPREHEN METABOLIC PANEL: CPT

## 2020-10-03 PROCEDURE — 99233 SBSQ HOSP IP/OBS HIGH 50: CPT | Performed by: PSYCHIATRY & NEUROLOGY

## 2020-10-03 PROCEDURE — 74011250637 HC RX REV CODE- 250/637: Performed by: INTERNAL MEDICINE

## 2020-10-03 PROCEDURE — 93005 ELECTROCARDIOGRAM TRACING: CPT

## 2020-10-03 PROCEDURE — 74011250637 HC RX REV CODE- 250/637: Performed by: HOSPITALIST

## 2020-10-03 PROCEDURE — 83735 ASSAY OF MAGNESIUM: CPT

## 2020-10-03 PROCEDURE — 74011250637 HC RX REV CODE- 250/637: Performed by: EMERGENCY MEDICINE

## 2020-10-03 PROCEDURE — 74011250637 HC RX REV CODE- 250/637: Performed by: NURSE PRACTITIONER

## 2020-10-03 PROCEDURE — 85025 COMPLETE CBC W/AUTO DIFF WBC: CPT

## 2020-10-03 PROCEDURE — 84443 ASSAY THYROID STIM HORMONE: CPT

## 2020-10-03 PROCEDURE — 84100 ASSAY OF PHOSPHORUS: CPT

## 2020-10-03 PROCEDURE — 74011000250 HC RX REV CODE- 250: Performed by: INTERNAL MEDICINE

## 2020-10-03 PROCEDURE — 84439 ASSAY OF FREE THYROXINE: CPT

## 2020-10-03 RX ORDER — LANOLIN ALCOHOL/MO/W.PET/CERES
3 CREAM (GRAM) TOPICAL
Status: DISCONTINUED | OUTPATIENT
Start: 2020-10-03 | End: 2020-10-12 | Stop reason: HOSPADM

## 2020-10-03 RX ORDER — LIDOCAINE 4 G/100G
1 PATCH TOPICAL EVERY 24 HOURS
Status: DISCONTINUED | OUTPATIENT
Start: 2020-10-03 | End: 2020-10-08

## 2020-10-03 RX ADMIN — ACETAMINOPHEN ORAL SOLUTION 1000 MG: 650 SOLUTION ORAL at 10:02

## 2020-10-03 RX ADMIN — OXYCODONE HYDROCHLORIDE 5 MG: 5 SOLUTION ORAL at 15:24

## 2020-10-03 RX ADMIN — METOCLOPRAMIDE HYDROCHLORIDE 5 MG: 5 SOLUTION ORAL at 11:54

## 2020-10-03 RX ADMIN — METOCLOPRAMIDE HYDROCHLORIDE 5 MG: 5 SOLUTION ORAL at 00:10

## 2020-10-03 RX ADMIN — Medication 10 ML: at 00:12

## 2020-10-03 RX ADMIN — TRAZODONE HYDROCHLORIDE 100 MG: 100 TABLET ORAL at 20:29

## 2020-10-03 RX ADMIN — ENOXAPARIN SODIUM 30 MG: 30 INJECTION SUBCUTANEOUS at 10:07

## 2020-10-03 RX ADMIN — Medication: at 17:52

## 2020-10-03 RX ADMIN — MICONAZOLE NITRATE 2 % TOPICAL POWDER: at 17:52

## 2020-10-03 RX ADMIN — ONDANSETRON 4 MG: 2 INJECTION INTRAMUSCULAR; INTRAVENOUS at 00:12

## 2020-10-03 RX ADMIN — HYDROXYZINE HYDROCHLORIDE 25 MG: 25 TABLET, FILM COATED ORAL at 20:29

## 2020-10-03 RX ADMIN — ACETAMINOPHEN ORAL SOLUTION 1000 MG: 650 SOLUTION ORAL at 06:31

## 2020-10-03 RX ADMIN — MICONAZOLE NITRATE 2 % TOPICAL POWDER: at 09:57

## 2020-10-03 RX ADMIN — NYSTATIN 500000 UNITS: 500000 SUSPENSION ORAL at 20:28

## 2020-10-03 RX ADMIN — POTASSIUM BICARBONATE 40 MEQ: 782 TABLET, EFFERVESCENT ORAL at 09:51

## 2020-10-03 RX ADMIN — NYSTATIN 500000 UNITS: 500000 SUSPENSION ORAL at 09:54

## 2020-10-03 RX ADMIN — ALPRAZOLAM 0.5 MG: 0.5 TABLET ORAL at 09:51

## 2020-10-03 RX ADMIN — FAMOTIDINE 20 MG: 40 POWDER, FOR SUSPENSION ORAL at 00:10

## 2020-10-03 RX ADMIN — HYDROXYZINE HYDROCHLORIDE 25 MG: 25 TABLET, FILM COATED ORAL at 01:33

## 2020-10-03 RX ADMIN — ACETAMINOPHEN ORAL SOLUTION 1000 MG: 650 SOLUTION ORAL at 00:08

## 2020-10-03 RX ADMIN — Medication: at 09:56

## 2020-10-03 RX ADMIN — ACETAMINOPHEN ORAL SOLUTION 1000 MG: 650 SOLUTION ORAL at 23:46

## 2020-10-03 RX ADMIN — NYSTATIN 500000 UNITS: 500000 SUSPENSION ORAL at 13:35

## 2020-10-03 RX ADMIN — METOCLOPRAMIDE HYDROCHLORIDE 5 MG: 5 SOLUTION ORAL at 06:31

## 2020-10-03 RX ADMIN — NYSTATIN 500000 UNITS: 500000 SUSPENSION ORAL at 00:10

## 2020-10-03 RX ADMIN — NYSTATIN 500000 UNITS: 500000 SUSPENSION ORAL at 15:24

## 2020-10-03 RX ADMIN — ACETAMINOPHEN ORAL SOLUTION 1000 MG: 650 SOLUTION ORAL at 17:52

## 2020-10-03 RX ADMIN — Medication 10 ML: at 13:44

## 2020-10-03 RX ADMIN — METOCLOPRAMIDE HYDROCHLORIDE 5 MG: 5 SOLUTION ORAL at 17:32

## 2020-10-03 RX ADMIN — METOPROLOL TARTRATE 100 MG: 25 TABLET, FILM COATED ORAL at 17:30

## 2020-10-03 RX ADMIN — METOPROLOL TARTRATE 100 MG: 25 TABLET, FILM COATED ORAL at 09:50

## 2020-10-03 RX ADMIN — GUAIFENESIN 400 MG: 200 SOLUTION ORAL at 06:31

## 2020-10-03 RX ADMIN — FAMOTIDINE 20 MG: 40 POWDER, FOR SUSPENSION ORAL at 10:07

## 2020-10-03 RX ADMIN — ASPIRIN 81 MG CHEWABLE TABLET 81 MG: 81 TABLET CHEWABLE at 09:50

## 2020-10-03 RX ADMIN — METOCLOPRAMIDE HYDROCHLORIDE 5 MG: 5 SOLUTION ORAL at 23:46

## 2020-10-03 RX ADMIN — Medication 10 ML: at 20:30

## 2020-10-03 RX ADMIN — Medication 3 MG: at 20:29

## 2020-10-03 RX ADMIN — FAMOTIDINE 20 MG: 40 POWDER, FOR SUSPENSION ORAL at 20:30

## 2020-10-03 RX ADMIN — ENOXAPARIN SODIUM 30 MG: 30 INJECTION SUBCUTANEOUS at 23:48

## 2020-10-03 RX ADMIN — TRAZODONE HYDROCHLORIDE 100 MG: 100 TABLET ORAL at 01:33

## 2020-10-03 RX ADMIN — GUAIFENESIN 400 MG: 200 SOLUTION ORAL at 20:29

## 2020-10-03 NOTE — PROGRESS NOTES
[address] [facility]  Hospitalist Group                                                                                          Hospitalist Progress Note  Elissa Collins MD  Answering service: [phone] from in house phone        Date of Service:  10/3/2020  NAME:  Michael Bland  :  1990  MRN:  [id number]      Admission Summary:     \"27year-old gentleman with no significant past medical history was brought to the emergency room from home via EMS for complaints of an approximately two- to three-day history of progressively worsening cough, shortness of breath, chills and fevers as high as 103 degrees Fahrenheit.  Of note, patient stated that he tested positive for COVID-19 virus about nine days prior to this emergency room presentation of 2020.  Further, patient stated that despite taking the prescribed Zithromax, prednisone, and other respiratory therapies, did not have any significant improvement in symptoms. Patient denied associated headaches, dizziness, visual deficits, chest pains, palpitations, sore throat, nausea, vomiting, abdominal pain, bladder or bowel irregularities. \"    Interval history / Subjective:       Cont out of icu on step down,  Pt's 02 requirement down,      Assessment & Plan:           -Bilateral pneumonia due to COVID-19 infection - resolved  But on trach collar 02 3 LPM this 10/3/2020 am  ( 10 LPM on 10/1 )  -Right arm weakness: under eval, MRI reviewed,   -Debility  - Acute hypoxic respiratory failure due to COVID-19 infection status post tracheostomy on ; see above   - Possible bacterial super imposed infection: Completed antibiotic course  -History of tobacco use  - continue trach collar with speech valve as tolerated - plan for continuous TC   - PT/OT/rehab  - Dispo planning   - Anxiety management with anxiolytic antidepressant and pain management  - Still not clear what caused his acute right arm weakness, multiple possibilities including hypoxic event or vasculitis related to Maranda Valeri is on aspirin and Lovenox, no further deterioration - PT/OT   -Completed COVID-19 treatment   , GI prophylaxis and DVT prophylaxis =Pepcid/ Lovenox   - working w/ SLP - cleared for thickened liquid po\"            Hospital Problems  Date Reviewed: 8/20/2020          Codes Class Noted POA    Tracheostomy dependent ([facility] [address] ICD-10-CM: Z93.0  ICD-9-CM: V44.0  10/2/2020 Yes        Tachycardia ICD-10-CM: R00.0  ICD-9-CM: 785.0  10/2/2020 Yes        Hyponatremia ICD-10-CM: E87.1  ICD-9-CM: 276.1  10/2/2020 Yes        Acute respiratory failure ([facility] [address] ICD-10-CM: J96.00  ICD-9-CM: 518.81  8/18/2020 Yes        * (Principal) Pneumonia due to severe acute respiratory syndrome coronavirus 2 (SARS-CoV-2) ICD-10-CM: G57.3, J12.89  ICD-9-CM: 480.8  8/8/2020 Yes                Review of Systems:   gen weakness, no n/v, no fever, some better day over day       Vital Signs:    Last 24hrs VS reviewed since prior progress note. Most recent are:  Visit Vitals  /81 (BP 1 Location: Left arm, BP Patient Position: At rest)   Pulse (!) 105   Temp 98 °F (36.7 °C)   Resp 19   Ht 5' 11\" (1.803 m)   Wt 97 kg (213 lb 13.5 oz)   SpO2 99%   BMI 29.00 kg/m²         Intake/Output Summary (Last 24 hours) at 10/3/2020 0950  Last data filed at 10/3/2020 3376  Gross per 24 hour   Intake 660 ml   Output 900 ml   Net -240 ml        Physical Examination:             Constitutional:  + on going  acute distress, cooperative, pleasant    ENT:  Oral mucosa moist, oropharynx benign. Resp:  CTA bilaterally. No wheezing/rhonchi/rales. +accessory muscle use; trach collar    CV:  Regular rhythm, normal rate, no murmurs, gallops, rubs    GI:  Soft, non distended, non tender. normoactive bowel sounds, no hepatosplenomegaly     Musculoskeletal:  No edema, warm, 2+ pulses throughout    Neurologic:  paresis generalized rt > lt      Psych:  Good insight, Not anxious nor agitated.        Data Review:    Review and/or order of clinical lab test      Labs:     Recent Labs     10/03/20  0345 10/02/20  0534   WBC 6.5 7.9   HGB 11.0* 11.7*   HCT 34.8* 37.0    274     Recent Labs     10/03/20  0345 10/02/20  0534 10/01/20  0528   * 134* 133*   K 3.7 4.1 3.9   CL 96* 96* 98   CO2 33* 31 29   BUN 13 16 19   CREA 0.65* 0.63* 0.55*   * 114* 97   CA 9.7 10.0 9.6   MG 2.0 2.0 2.1   PHOS 4.7 5.2* 5.5*     Recent Labs     10/03/20  0345 10/02/20  0534 10/01/20  0528   ALT 54 55 48    119* 110   TBILI 0.5 0.4 0.4   TP 7.6 7.8 7.6   ALB 3.3* 3.5 3.3*   GLOB 4.3* 4.3* 4.3*     No results for input(s): INR, PTP, APTT, INREXT in the last 72 hours. No results for input(s): FE, TIBC, PSAT, FERR in the last 72 hours. No results found for: FOL, RBCF   No results for input(s): PH, PCO2, PO2 in the last 72 hours. No results for input(s): CPK, CKNDX, TROIQ in the last 72 hours.     No lab exists for component: CPKMB  Lab Results   Component Value Date/Time    Cholesterol, total 156 09/05/2020 06:06 PM    HDL Cholesterol 26 09/05/2020 06:06 PM    LDL, calculated 92.4 09/05/2020 06:06 PM    Triglyceride 188 (H) 09/05/2020 06:06 PM    CHOL/HDL Ratio 6.0 (H) 09/05/2020 06:06 PM     Lab Results   Component Value Date/Time    Glucose (POC) 119 (H) 09/06/2020 01:06 PM    Glucose (POC) 113 (H) 09/05/2020 06:49 PM    Glucose (POC) 146 (H) 09/04/2020 11:36 AM    Glucose (POC) 122 (H) 08/27/2020 11:28 AM    Glucose (POC) 107 (H) 08/27/2020 06:33 AM     No results found for: COLOR, APPRN, SPGRU, REFSG, RAE, PROTU, GLUCU, KETU, BILU, UROU, ROBLES, LEUKU, GLUKE, EPSU, BACTU, WBCU, RBCU, CASTS, UCRY      Medications Reviewed:     Current Facility-Administered Medications   Medication Dose Route Frequency    ALPRAZolam (XANAX) tablet 0.5 mg  0.5 mg Per G Tube Q8H PRN    acetaminophen (TYLENOL) solution 1,000 mg  1,000 mg Per G Tube Q6H    hydrOXYzine HCL (ATARAX) tablet 25 mg  25 mg Oral TID PRN    metoprolol tartrate (LOPRESSOR) tablet 100 mg  100 mg Oral BID    potassium bicarb-citric acid (EFFER-K) tablet 40 mEq  40 mEq Oral DAILY    loperamide (IMODIUM) capsule 2 mg  2 mg Oral Q4H PRN    famotidine (PEPCID) 40 mg/5 mL (8 mg/mL) oral suspension 20 mg  20 mg Per G Tube Q12H    metoclopramide (REGLAN) 5 mg/5 mL oral syrup 5 mg  5 mg Per G Tube Q6H    traZODone (DESYREL) tablet 100 mg  100 mg Oral QHS    lidocaine (XYLOCAINE) 2 % jelly   Mucous Membrane PRN    enoxaparin (LOVENOX) injection 30 mg  30 mg SubCUTAneous Q12H    ondansetron (ZOFRAN) injection 4 mg  4 mg IntraVENous Q4H PRN    nystatin (MYCOSTATIN) 100,000 unit/mL oral suspension 500,000 Units  500,000 Units Oral QID    aluminum-magnesium hydroxide (MAALOX) oral suspension 15 mL  15 mL Oral QID PRN    prochlorperazine (COMPAZINE) with saline injection 10 mg  10 mg IntraVENous Q6H PRN    miconazole (MICOTIN) 2 % powder   Topical BID    oxyCODONE (ROXICODONE) 5 mg/5 mL oral solution 5 mg  5 mg Oral Q4H PRN    guaiFENesin (ROBITUSSIN) 100 mg/5 mL oral liquid 400 mg  400 mg Per NG tube Q4H PRN    balsam peru-castor oiL (VENELEX) ointment   Topical BID    acetaminophen (TYLENOL) suppository 650 mg  650 mg Rectal Q6H PRN    alteplase (CATHFLO) 1 mg in sterile water (preservative free) 1 mL injection  1 mg InterCATHeter PRN    polyvinyl alcohol-povidone (NATURAL TEARS) 0.5-0.6 % ophthalmic solution 1 Drop  1 Drop Both Eyes PRN    dextrose 10% infusion 0-250 mL  0-250 mL IntraVENous PRN    glucagon (GLUCAGEN) injection 1 mg  1 mg IntraMUSCular PRN    glucose chewable tablet 16 g  4 Tab Oral PRN    sodium chloride (NS) flush 5-40 mL  5-40 mL IntraVENous Q8H    sodium chloride (NS) flush 5-40 mL  5-40 mL IntraVENous PRN    aspirin chewable tablet 81 mg  81 mg Per NG tube DAILY    albuterol-ipratropium (DUO-NEB) 2.5 MG-0.5 MG/3 ML  3 mL Nebulization Q6H PRN     ______________________________________________________________________  EXPECTED LENGTH OF STAY: 12d 0h  ACTUAL LENGTH OF STAY:          55                 Man Villatoro MD

## 2020-10-03 NOTE — PROGRESS NOTES
[id number]: OOB to chair via nhan lift. 1900: Trach deep suction x 3 today per pt request. Scant to small clear secretions obtained. 1930:Bedside and Verbal shift change report given to Roberto rn (oncoming nurse) by brian macias rn (offgoing nurse). Report included the following information SBAR, I&O, ED summary. Problem: Pressure Injury - Risk of  Goal: *Prevention of pressure injury  Description: Document Enrique Scale and appropriate interventions in the flowsheet. Outcome: Progressing Towards Goal  Note: Pressure Injury Interventions:  Sensory Interventions: Assess changes in LOC    Moisture Interventions: Absorbent underpads, Apply protective barrier, creams and emollients, Assess need for specialty bed    Activity Interventions: Pressure redistribution bed/mattress(bed type)    Mobility Interventions: HOB 30 degrees or less, Pressure redistribution bed/mattress (bed type), PT/OT evaluation    Nutrition Interventions: Document food/fluid/supplement intake, Discuss nutritional consult with provider, Offer support with meals,snacks and hydration    Friction and Shear Interventions: Lift sheet, HOB 30 degrees or less, Apply protective barrier, creams and emollients, Feet elevated on foot rest, Lift team/patient mobility team, Minimize layers                Problem: Patient Education: Go to Patient Education Activity  Goal: Patient/Family Education  Outcome: Progressing Towards Goal     Problem: Falls - Risk of  Goal: *Absence of Falls  Description: Document Adama Fall Risk and appropriate interventions in the flowsheet.   Outcome: Progressing Towards Goal  Note: Fall Risk Interventions:  Mobility Interventions: PT Consult for mobility concerns    Mentation Interventions: Door open when patient unattended    Medication Interventions: Evaluate medications/consider consulting pharmacy    Elimination Interventions: Call light in reach    History of Falls Interventions: Door open when patient unattended Problem: Patient Education: Go to Patient Education Activity  Goal: Patient/Family Education  Outcome: Progressing Towards Goal     Problem: Nutrition Deficit  Goal: *Optimize nutritional status  Outcome: Progressing Towards Goal     Problem: Breathing Pattern - Ineffective  Goal: *Absence of hypoxia  Outcome: Progressing Towards Goal  Goal: *Use of effective breathing techniques  Outcome: Progressing Towards Goal

## 2020-10-03 NOTE — CONSULTS
This is not a new consult. This patient has been seen by neurology during this admission. Progress note to follow.

## 2020-10-03 NOTE — PROGRESS NOTES
Neurology Progress Note     NAME: Josette Enciso   :  1990   MRN:  [id number]   DATE:  10/3/2020    Assessment:     Principal Problem:    Pneumonia due to severe acute respiratory syndrome coronavirus 2 (SARS-CoV-2) (2020)    Active Problems:    Acute respiratory failure ([facility] [address] (2020)      Tracheostomy dependent ([facility] [address] (10/2/2020)      Tachycardia (10/2/2020)      Hyponatremia (10/2/2020)      Pt is a 32yo male admitted on 2020 with COVID-19 pneumonia with respiratory failure, now s/p trach and weaned off the ventilator, and s/p PEG 20, transferred out of the ICU 10/2/20. Pt is well known to me from consult 20 for bilateral arm weakness, with CTH at that time that was unremarkable and CTA H/N with gyriform enhancement bilaterally in superior frontoparietal lobes. Exam in Sept revealed 4/5 strength on the left hand, no movement seen on the right, moving his toes equally. Stroke was a concern, but management would not change as he is already on treatment dose of anticoagulation and on aspirin, differential included hypoxic ischemic event, inflammatory or infectious etiology including direct infection with COVID-19, and lower on differential ICU or steroid myopathy/neuropathy. MRI brain 20 revealed cortical/subcortical encephalomalacia and atrophy in the bilateral frontoparietal lobes felt to represent evolving ischemic changes from a hypoxic-ischemic event, vasculitic involvement from COVID, or evolving encephalomalacia from a infectious/inflammatory process related to COVID.  There was a small area of signal abnormality with restricted diffusion in the right posterior body of the corpus callosum, which likely represents the same process, again either ischemic or infectious/inflammatory, and chronic area of encephalomalacia in the right cerebellum with associated chronic hemorrhage. Yesterday, 10/2/20 pt c/o left face and tongue numbness and CTH revealed a new hypodensity in the left high parietal region. MRI brain wo contrast 10/2/20 revealed no significant changes compared to 9/22/20 imaging. Pt is on treatment dose Lovenox and ASA 81mg daily. Exam is improved compared to 9/4/20 evaluation. Bilateral R>L UE weakness, LE are equal  Exam congruent with MRI findings and debility from prolonged illness and hospitalization  Plan:   Continue current therapy  PT/OT/ST/Rehab  Subjective:    Pt with trach, able to mouth words.      Objective:   Chart reviewed since last seen    Current Facility-Administered Medications   Medication Dose Route Frequency    ALPRAZolam (XANAX) tablet 0.5 mg  0.5 mg Per G Tube Q8H PRN    acetaminophen (TYLENOL) solution 1,000 mg  1,000 mg Per G Tube Q6H    hydrOXYzine HCL (ATARAX) tablet 25 mg  25 mg Oral TID PRN    metoprolol tartrate (LOPRESSOR) tablet 100 mg  100 mg Oral BID    potassium bicarb-citric acid (EFFER-K) tablet 40 mEq  40 mEq Oral DAILY    loperamide (IMODIUM) capsule 2 mg  2 mg Oral Q4H PRN    famotidine (PEPCID) 40 mg/5 mL (8 mg/mL) oral suspension 20 mg  20 mg Per G Tube Q12H    metoclopramide (REGLAN) 5 mg/5 mL oral syrup 5 mg  5 mg Per G Tube Q6H    traZODone (DESYREL) tablet 100 mg  100 mg Oral QHS    lidocaine (XYLOCAINE) 2 % jelly   Mucous Membrane PRN    enoxaparin (LOVENOX) injection 30 mg  30 mg SubCUTAneous Q12H    ondansetron (ZOFRAN) injection 4 mg  4 mg IntraVENous Q4H PRN    nystatin (MYCOSTATIN) 100,000 unit/mL oral suspension 500,000 Units  500,000 Units Oral QID    aluminum-magnesium hydroxide (MAALOX) oral suspension 15 mL  15 mL Oral QID PRN    prochlorperazine (COMPAZINE) with saline injection 10 mg  10 mg IntraVENous Q6H PRN    miconazole (MICOTIN) 2 % powder   Topical BID    oxyCODONE (ROXICODONE) 5 mg/5 mL oral solution 5 mg  5 mg Oral Q4H PRN    guaiFENesin (ROBITUSSIN) 100 mg/5 mL oral liquid 400 mg  400 mg Per NG tube Q4H PRN    balsam peru-castor oiL (VENELEX) ointment   Topical BID    acetaminophen (TYLENOL) suppository 650 mg  650 mg Rectal Q6H PRN    alteplase (CATHFLO) 1 mg in sterile water (preservative free) 1 mL injection  1 mg InterCATHeter PRN    polyvinyl alcohol-povidone (NATURAL TEARS) 0.5-0.6 % ophthalmic solution 1 Drop  1 Drop Both Eyes PRN    dextrose 10% infusion 0-250 mL  0-250 mL IntraVENous PRN    glucagon (GLUCAGEN) injection 1 mg  1 mg IntraMUSCular PRN    glucose chewable tablet 16 g  4 Tab Oral PRN    sodium chloride (NS) flush 5-40 mL  5-40 mL IntraVENous Q8H    sodium chloride (NS) flush 5-40 mL  5-40 mL IntraVENous PRN    aspirin chewable tablet 81 mg  81 mg Per NG tube DAILY    albuterol-ipratropium (DUO-NEB) 2.5 MG-0.5 MG/3 ML  3 mL Nebulization Q6H PRN       Visit Vitals  /86 (BP 1 Location: Left arm, BP Patient Position: At rest)   Pulse 87   Temp 97.6 °F (36.4 °C)   Resp 17   Ht 5' 11\" (1.803 m)   Wt 97 kg (213 lb 13.5 oz)   SpO2 100%   BMI 29.00 kg/m²     Temp (24hrs), Av.9 °F (36.6 °C), Min:97.5 °F (36.4 °C), Max:98.5 °F (36.9 °C)      10/03 0701 - 10/03 1900  In: 510   Out: 350 [Urine:350]  10/01 1901 - 10/03 0700  In: 1400 [P.O.:40; I.V.:50]  Out: 1325 [Urine:1325]      Physical Exam:  General: Well developed well nourished patient in no apparent distress. Cardiac: Regular rate and rhythm with no murmurs. Extremities: 2+ Radial pulses    Neurological Exam:  Mental Status: Oriented to person, hospital, follows all commands   Cranial Nerves:   VFF, PERRL, EOMI, no nystagmus, no ptosis. Facial sensation is normal. Facial movement is symmetric. Tongue is midline. Hearing is intact.    Motor:  Diffuse dec bulk in all extremities, UE>LE, minimal movement seen in right UE, with encouragement has good , 4/5 in left UE, moves bilateral equally, but minimally, 4/5 D/PF   Reflexes:   Deep tendon reflexes 2+ and symmetric. Sensory:   Intact to LT and PP   Gait:  Non-ambulatory   Cerebellar:  Unable to assess due to pt factors         Lab Review   Recent Results (from the past 24 hour(s))   EKG, 12 LEAD, INITIAL    Collection Time: 10/03/20 12:22 AM   Result Value Ref Range    Ventricular Rate 88 BPM    Atrial Rate 88 BPM    P-R Interval 174 ms    QRS Duration 104 ms    Q-T Interval 364 ms    QTC Calculation (Bezet) 440 ms    Calculated P Axis 35 degrees    Calculated R Axis -24 degrees    Calculated T Axis -7 degrees    Diagnosis       Normal sinus rhythm  Voltage criteria for left ventricular hypertrophy  When compared with ECG of 26-AUG-2020 02:36,  Non-specific change in ST segment in Inferior leads  ST elevation now present in Lateral leads  Confirmed by Jovi Lacy ([id number]) on 10/3/2020 12:59:19 PM     CBC WITH AUTOMATED DIFF    Collection Time: 10/03/20  3:45 AM   Result Value Ref Range    WBC 6.5 4.1 - 11.1 K/uL    RBC 3.97 (L) 4.10 - 5.70 M/uL    HGB 11.0 (L) 12.1 - 17.0 g/dL    HCT 34.8 (L) 36.6 - 50.3 %    MCV 87.7 80.0 - 99.0 FL    MCH 27.7 26.0 - 34.0 PG    MCHC 31.6 30.0 - 36.5 g/dL    RDW 14.1 11.5 - 14.5 %    PLATELET 151 200 - 377 K/uL    MPV 11.1 8.9 - 12.9 FL    NRBC 0.0 0  WBC    ABSOLUTE NRBC 0.00 0.00 - 0.01 K/uL    NEUTROPHILS 59 32 - 75 %    LYMPHOCYTES 23 12 - 49 %    MONOCYTES 11 5 - 13 %    EOSINOPHILS 5 0 - 7 %    BASOPHILS 1 0 - 1 %    IMMATURE GRANULOCYTES 1 (H) 0.0 - 0.5 %    ABS. NEUTROPHILS 3.9 1.8 - 8.0 K/UL    ABS. LYMPHOCYTES 1.5 0.8 - 3.5 K/UL    ABS. MONOCYTES 0.7 0.0 - 1.0 K/UL    ABS. EOSINOPHILS 0.3 0.0 - 0.4 K/UL    ABS. BASOPHILS 0.0 0.0 - 0.1 K/UL    ABS. IMM.  GRANS. 0.0 0.00 - 0.04 K/UL    DF AUTOMATED     MAGNESIUM    Collection Time: 10/03/20  3:45 AM   Result Value Ref Range    Magnesium 2.0 1.6 - 2.4 mg/dL   METABOLIC PANEL, COMPREHENSIVE    Collection Time: 10/03/20  3:45 AM   Result Value Ref Range    Sodium 133 (L) 136 - 145 mmol/L    Potassium 3.7 3.5 - 5.1 mmol/L Chloride 96 (L) 97 - 108 mmol/L    CO2 33 (H) 21 - 32 mmol/L    Anion gap 4 (L) 5 - 15 mmol/L    Glucose 123 (H) 65 - 100 mg/dL    BUN 13 6 - 20 MG/DL    Creatinine 0.65 (L) 0.70 - 1.30 MG/DL    BUN/Creatinine ratio 20 12 - 20      GFR est AA >60 >60 ml/min/1.73m2    GFR est non-AA >60 >60 ml/min/1.73m2    Calcium 9.7 8.5 - 10.1 MG/DL    Bilirubin, total 0.5 0.2 - 1.0 MG/DL    ALT (SGPT) 54 12 - 78 U/L    AST (SGOT) 29 15 - 37 U/L    Alk. phosphatase 111 45 - 117 U/L    Protein, total 7.6 6.4 - 8.2 g/dL    Albumin 3.3 (L) 3.5 - 5.0 g/dL    Globulin 4.3 (H) 2.0 - 4.0 g/dL    A-G Ratio 0.8 (L) 1.1 - 2.2     PHOSPHORUS    Collection Time: 10/03/20  3:45 AM   Result Value Ref Range    Phosphorus 4.7 2.6 - 4.7 MG/DL   TSH 3RD GENERATION    Collection Time: 10/03/20  3:45 AM   Result Value Ref Range    TSH 2.28 0.36 - 3.74 uIU/mL   T4, FREE    Collection Time: 10/03/20  3:45 AM   Result Value Ref Range    T4, Free 1.1 0.8 - 1.5 NG/DL       Additional comments:  I have reviewed the patient's new clinical lab test results. I have personally reviewed the patient's radiographs. MRI   MRI Results (most recent):  Results from Hospital Encounter encounter on 08/18/20   MRI BRAIN WO CONT    Narrative INDICATION:   CVA    EXAMINATION:  MRI BRAIN WO CONTRAST    COMPARISON:  CT head earlier today and CT September 22, 2020    TECHNIQUE:  Multiplanar multisequence acquisition without contrast of the brain. FINDINGS:      Ventricles:  Midline, no hydrocephalus. Brain Parenchyma/Brainstem:  No significant change in multiple areas of cortical  and subcortical FLAIR/T2 hyperintensity in the bilateral frontal and parietal  lobes at the vertex as well as the medial right parietal lobe, with mild  peripheral diffusion signal abnormality. No definite new areas of involvement. A  small focal diffusion restricting lesion in the right posterior body of the  corpus callosum has not changed significantly.  Chronic infarction right inferior  cerebellum. No acute infarction. Intracranial Hemorrhage:  No acute hemorrhage. Hemosiderin staining along the  surface of the bilateral frontal and parietal lobes at the vertex consistent  with prior subarachnoid hemorrhage. Parenchymal hemosiderin deposition inferior  right cerebellum. Basal Cisterns:  Normal.   Flow Voids:  Normal.  Additional Comments:  Bilateral mastoid effusions. Signal abnormality throughout  the right maxillary sinus is unchanged. Impression IMPRESSION:    1. Minimal if any change in areas of cortical and subcortical encephalomalacia  bilateral frontal and parietal lobe superiorly, again likely due to subacute to  chronic infarction or hypoxic ischemic event. Small areas of hemosiderin  staining along the vertex also raise possibility of vasculopathy. No new  infarction. 2. Chronic right cerebellar hemorrhagic infarction, unchanged. 3. 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 3. Bilateral mastoid effusions. Right maxillary sinus signal abnormality,  unchanged. Care Plan discussed with:  Patient x   Family    RN    Care Manager    Consultant/Specialist:  x     Signed: Phil Spears MD

## 2020-10-04 ENCOUNTER — APPOINTMENT (OUTPATIENT)
Dept: GENERAL RADIOLOGY | Age: 30
DRG: 004 | End: 2020-10-04
Attending: INTERNAL MEDICINE
Payer: COMMERCIAL

## 2020-10-04 LAB
ALBUMIN SERPL-MCNC: 3.4 G/DL (ref 3.5–5)
ALBUMIN/GLOB SERPL: 0.8 {RATIO} (ref 1.1–2.2)
ALP SERPL-CCNC: 115 U/L (ref 45–117)
ALT SERPL-CCNC: 55 U/L (ref 12–78)
ANION GAP SERPL CALC-SCNC: 6 MMOL/L (ref 5–15)
AST SERPL-CCNC: 25 U/L (ref 15–37)
BASOPHILS # BLD: 0 K/UL (ref 0–0.1)
BASOPHILS NFR BLD: 0 % (ref 0–1)
BILIRUB SERPL-MCNC: 0.3 MG/DL (ref 0.2–1)
BUN SERPL-MCNC: 19 MG/DL (ref 6–20)
BUN/CREAT SERPL: 30 (ref 12–20)
CALCIUM SERPL-MCNC: 9.9 MG/DL (ref 8.5–10.1)
CHLORIDE SERPL-SCNC: 96 MMOL/L (ref 97–108)
CO2 SERPL-SCNC: 34 MMOL/L (ref 21–32)
CREAT SERPL-MCNC: 0.64 MG/DL (ref 0.7–1.3)
DIFFERENTIAL METHOD BLD: ABNORMAL
EOSINOPHIL # BLD: 0.3 K/UL (ref 0–0.4)
EOSINOPHIL NFR BLD: 5 % (ref 0–7)
ERYTHROCYTE [DISTWIDTH] IN BLOOD BY AUTOMATED COUNT: 13.8 % (ref 11.5–14.5)
GLOBULIN SER CALC-MCNC: 4.4 G/DL (ref 2–4)
GLUCOSE SERPL-MCNC: 118 MG/DL (ref 65–100)
HCT VFR BLD AUTO: 36 % (ref 36.6–50.3)
HGB BLD-MCNC: 11.3 G/DL (ref 12.1–17)
IMM GRANULOCYTES # BLD AUTO: 0.1 K/UL (ref 0–0.04)
IMM GRANULOCYTES NFR BLD AUTO: 1 % (ref 0–0.5)
LYMPHOCYTES # BLD: 1.7 K/UL (ref 0.8–3.5)
LYMPHOCYTES NFR BLD: 28 % (ref 12–49)
MAGNESIUM SERPL-MCNC: 2.1 MG/DL (ref 1.6–2.4)
MCH RBC QN AUTO: 27.6 PG (ref 26–34)
MCHC RBC AUTO-ENTMCNC: 31.4 G/DL (ref 30–36.5)
MCV RBC AUTO: 88 FL (ref 80–99)
MONOCYTES # BLD: 0.8 K/UL (ref 0–1)
MONOCYTES NFR BLD: 13 % (ref 5–13)
NEUTS SEG # BLD: 3.3 K/UL (ref 1.8–8)
NEUTS SEG NFR BLD: 53 % (ref 32–75)
NRBC # BLD: 0 K/UL (ref 0–0.01)
NRBC BLD-RTO: 0 PER 100 WBC
PHOSPHATE SERPL-MCNC: 6 MG/DL (ref 2.6–4.7)
PLATELET # BLD AUTO: 276 K/UL (ref 150–400)
PMV BLD AUTO: 10.7 FL (ref 8.9–12.9)
POTASSIUM SERPL-SCNC: 3.8 MMOL/L (ref 3.5–5.1)
PROT SERPL-MCNC: 7.8 G/DL (ref 6.4–8.2)
RBC # BLD AUTO: 4.09 M/UL (ref 4.1–5.7)
SODIUM SERPL-SCNC: 136 MMOL/L (ref 136–145)
WBC # BLD AUTO: 6.1 K/UL (ref 4.1–11.1)

## 2020-10-04 PROCEDURE — 36415 COLL VENOUS BLD VENIPUNCTURE: CPT

## 2020-10-04 PROCEDURE — 71045 X-RAY EXAM CHEST 1 VIEW: CPT

## 2020-10-04 PROCEDURE — 51798 US URINE CAPACITY MEASURE: CPT

## 2020-10-04 PROCEDURE — 74011000250 HC RX REV CODE- 250: Performed by: INTERNAL MEDICINE

## 2020-10-04 PROCEDURE — 84100 ASSAY OF PHOSPHORUS: CPT

## 2020-10-04 PROCEDURE — 74011250637 HC RX REV CODE- 250/637: Performed by: EMERGENCY MEDICINE

## 2020-10-04 PROCEDURE — 83735 ASSAY OF MAGNESIUM: CPT

## 2020-10-04 PROCEDURE — 74011250636 HC RX REV CODE- 250/636: Performed by: EMERGENCY MEDICINE

## 2020-10-04 PROCEDURE — 80053 COMPREHEN METABOLIC PANEL: CPT

## 2020-10-04 PROCEDURE — 85025 COMPLETE CBC W/AUTO DIFF WBC: CPT

## 2020-10-04 PROCEDURE — 65660000001 HC RM ICU INTERMED STEPDOWN

## 2020-10-04 PROCEDURE — 74011250637 HC RX REV CODE- 250/637: Performed by: NURSE PRACTITIONER

## 2020-10-04 PROCEDURE — 74011250637 HC RX REV CODE- 250/637: Performed by: HOSPITALIST

## 2020-10-04 RX ADMIN — FAMOTIDINE 20 MG: 40 POWDER, FOR SUSPENSION ORAL at 11:18

## 2020-10-04 RX ADMIN — HYDROXYZINE HYDROCHLORIDE 25 MG: 25 TABLET, FILM COATED ORAL at 17:51

## 2020-10-04 RX ADMIN — OXYCODONE HYDROCHLORIDE 5 MG: 5 SOLUTION ORAL at 21:42

## 2020-10-04 RX ADMIN — HYDROXYZINE HYDROCHLORIDE 25 MG: 25 TABLET, FILM COATED ORAL at 05:48

## 2020-10-04 RX ADMIN — ONDANSETRON 4 MG: 2 INJECTION INTRAMUSCULAR; INTRAVENOUS at 08:03

## 2020-10-04 RX ADMIN — METOCLOPRAMIDE HYDROCHLORIDE 5 MG: 5 SOLUTION ORAL at 23:55

## 2020-10-04 RX ADMIN — ASPIRIN 81 MG CHEWABLE TABLET 81 MG: 81 TABLET CHEWABLE at 08:43

## 2020-10-04 RX ADMIN — METOCLOPRAMIDE HYDROCHLORIDE 5 MG: 5 SOLUTION ORAL at 11:14

## 2020-10-04 RX ADMIN — MICONAZOLE NITRATE 2 % TOPICAL POWDER: at 17:48

## 2020-10-04 RX ADMIN — ACETAMINOPHEN ORAL SOLUTION 1000 MG: 650 SOLUTION ORAL at 11:14

## 2020-10-04 RX ADMIN — Medication: at 17:48

## 2020-10-04 RX ADMIN — NYSTATIN 500000 UNITS: 500000 SUSPENSION ORAL at 08:43

## 2020-10-04 RX ADMIN — METOPROLOL TARTRATE 100 MG: 25 TABLET, FILM COATED ORAL at 08:43

## 2020-10-04 RX ADMIN — ENOXAPARIN SODIUM 30 MG: 30 INJECTION SUBCUTANEOUS at 11:15

## 2020-10-04 RX ADMIN — Medication: at 08:44

## 2020-10-04 RX ADMIN — NYSTATIN 500000 UNITS: 500000 SUSPENSION ORAL at 11:14

## 2020-10-04 RX ADMIN — FAMOTIDINE 20 MG: 40 POWDER, FOR SUSPENSION ORAL at 21:23

## 2020-10-04 RX ADMIN — METOCLOPRAMIDE HYDROCHLORIDE 5 MG: 5 SOLUTION ORAL at 17:47

## 2020-10-04 RX ADMIN — METOPROLOL TARTRATE 100 MG: 25 TABLET, FILM COATED ORAL at 17:47

## 2020-10-04 RX ADMIN — HYDROXYZINE HYDROCHLORIDE 25 MG: 25 TABLET, FILM COATED ORAL at 09:33

## 2020-10-04 RX ADMIN — POTASSIUM BICARBONATE 40 MEQ: 782 TABLET, EFFERVESCENT ORAL at 08:43

## 2020-10-04 RX ADMIN — NYSTATIN 500000 UNITS: 500000 SUSPENSION ORAL at 17:46

## 2020-10-04 RX ADMIN — ACETAMINOPHEN ORAL SOLUTION 1000 MG: 650 SOLUTION ORAL at 05:47

## 2020-10-04 RX ADMIN — MICONAZOLE NITRATE 2 % TOPICAL POWDER: at 08:44

## 2020-10-04 RX ADMIN — ACETAMINOPHEN ORAL SOLUTION 1000 MG: 650 SOLUTION ORAL at 17:47

## 2020-10-04 RX ADMIN — ONDANSETRON 4 MG: 2 INJECTION INTRAMUSCULAR; INTRAVENOUS at 14:42

## 2020-10-04 RX ADMIN — Medication 10 ML: at 05:48

## 2020-10-04 RX ADMIN — METOCLOPRAMIDE HYDROCHLORIDE 5 MG: 5 SOLUTION ORAL at 05:45

## 2020-10-04 NOTE — PROGRESS NOTES
[facility]  Hospitalist Group                                                                                          Hospitalist Progress Note  Emigdio Nobles MD  Answering service: [phone] from in house phone        Date of Service:  10/4/2020  NAME:  Eliana Tolbert  :  1990  MRN:  [id number]      Admission Summary:     \"27year-old gentleman with no significant past medical history was brought to the emergency room from home via EMS for complaints of an approximately two- to three-day history of progressively worsening cough, shortness of breath, chills and fevers as high as 103 degrees Fahrenheit.  Of note, patient stated that he tested positive for COVID-19 virus about nine days prior to this emergency room presentation of 2020.  Further, patient stated that despite taking the prescribed Zithromax, prednisone, and other respiratory therapies, did not have any significant improvement in symptoms. Patient denied associated headaches, dizziness, visual deficits, chest pains, palpitations, sore throat, nausea, vomiting, abdominal pain, bladder or bowel irregularities. \"    Interval history / Subjective:       Table overnight no new problems. Having right shoulder discomfort. Lidocaine patch not that helpful. Normal range of motion no exacerbation pain with that range of motion of the right shoulder  Had some nausea vomiting yesterday. Restarting tube feeds today at a lower rate increased slowly back to prior nutritional rate.   Case discussed with RN at bedside     Assessment & Plan:           -Bilateral pneumonia due to COVID-19 infection - resolved  But on trach collar 02 3 LPM this 10/3/2020 am  ( 10 LPM on 10/1 )  -Right arm weakness: under eval, MRI reviewed,   -Debility  - Acute hypoxic respiratory failure due to COVID-19 infection status post tracheostomy on ; see above   - Possible bacterial super imposed infection: Completed antibiotic course  -History of tobacco use  - continue trach collar with speech valve as tolerated - plan for continuous TC   - PT/OT/rehab  - Dispo planning   - Anxiety management with anxiolytic antidepressant and pain management  - Still not clear what caused his acute right arm weakness, multiple possibilities including hypoxic event or vasculitis related to Elisabet Lopez is on aspirin and Lovenox, no further deterioration - PT/OT   -Completed COVID-19 treatment   , GI prophylaxis and DVT prophylaxis =Pepcid/ Lovenox   - working w/ SLP - cleared for thickened liquid po\"            Hospital Problems  Date Reviewed: 8/20/2020          Codes Class Noted POA    [facility] ICD-10-CM: Z93.0  ICD-9-CM: V44.0  10/2/2020 Yes        Tachycardia ICD-10-CM: R00.0  ICD-9-CM: 785.0  10/2/2020 Yes        Hyponatremia ICD-10-CM: E87.1  ICD-9-CM: 276.1  10/2/2020 Yes        Acute respiratory failure ([facility] [place] [facility] ICD-10-CM: J96.00  ICD-9-CM: 518.81  8/18/2020 Yes        * (Principal) Pneumonia due to severe acute respiratory syndrome coronavirus 2 (SARS-CoV-2) ICD-10-CM: A84.8, J12.89  ICD-9-CM: 480.8  8/8/2020 Yes                Review of Systems:   gen weakness, no n/v, no fever, some better day over day       Vital Signs:    Last 24hrs VS reviewed since prior progress note. Most recent are:  Visit Vitals  BP (!) 150/99 (BP 1 Location: Left arm, BP Patient Position: At rest)   Pulse (!) 114   Temp 97.7 °F (36.5 °C)   Resp 26   Ht 5' 11\" (1.803 m)   Wt 98 kg (216 lb 0.8 oz)   SpO2 100%   BMI 29.30 kg/m²         Intake/Output Summary (Last 24 hours) at 10/4/2020 1119  Last data filed at 10/4/2020 0900  Gross per 24 hour   Intake 1640 ml   Output 1125 ml   Net 515 ml        Physical Examination:             Constitutional:  + on going  acute distress, cooperative, pleasant    ENT:  Oral mucosa moist, oropharynx benign. Resp:  CTA bilaterally. No wheezing/rhonchi/rales.  +accessory muscle use; trach collar    CV:  Regular rhythm, normal rate, no murmurs, gallops, rubs    GI:  Soft, non distended, non tender. normoactive bowel sounds, no hepatosplenomegaly     Musculoskeletal:  No edema, warm, 2+ pulses throughout    Neurologic:  paresis generalized rt > lt      Psych:  Good insight, Not anxious nor agitated. Data Review:    Review and/or order of clinical lab test      Labs:     Recent Labs     10/04/20  0121 10/03/20  0345   WBC 6.1 6.5   HGB 11.3* 11.0*   HCT 36.0* 34.8*    248     Recent Labs     10/04/20  0121 10/03/20  0345 10/02/20  0534    133* 134*   K 3.8 3.7 4.1   CL 96* 96* 96*   CO2 34* 33* 31   BUN 19 13 16   CREA 0.64* 0.65* 0.63*   * 123* 114*   CA 9.9 9.7 10.0   MG 2.1 2.0 2.0   PHOS 6.0* 4.7 5.2*     Recent Labs     10/04/20  0121 10/03/20  0345 10/02/20  0534   ALT 55 54 55    111 119*   TBILI 0.3 0.5 0.4   TP 7.8 7.6 7.8   ALB 3.4* 3.3* 3.5   GLOB 4.4* 4.3* 4.3*     No results for input(s): INR, PTP, APTT, INREXT, INREXT in the last 72 hours. No results for input(s): FE, TIBC, PSAT, FERR in the last 72 hours. No results found for: FOL, RBCF   No results for input(s): PH, PCO2, PO2 in the last 72 hours. No results for input(s): CPK, CKNDX, TROIQ in the last 72 hours.     No lab exists for component: CPKMB  Lab Results   Component Value Date/Time    Cholesterol, total 156 09/05/2020 06:06 PM    HDL Cholesterol 26 09/05/2020 06:06 PM    LDL, calculated 92.4 09/05/2020 06:06 PM    Triglyceride 188 (H) 09/05/2020 06:06 PM    CHOL/HDL Ratio 6.0 (H) 09/05/2020 06:06 PM     Lab Results   Component Value Date/Time    Glucose (POC) 119 (H) 09/06/2020 01:06 PM    Glucose (POC) 113 (H) 09/05/2020 06:49 PM    Glucose (POC) 146 (H) 09/04/2020 11:36 AM    Glucose (POC) 122 (H) 08/27/2020 11:28 AM    Glucose (POC) 107 (H) 08/27/2020 06:33 AM     No results found for: COLOR, APPRN, SPGRU, REFSG, RAE, PROTU, GLUCU, KETU, BILU, UROU, ROBLES, LEUKU, GLUKE, EPSU, BACTU, WBCU, RBCU, CASTS, UCRY      Medications Reviewed:     Current Facility-Administered Medications   Medication Dose Route Frequency    melatonin tablet 3 mg  3 mg Oral QHS    lidocaine 4 % patch 1 Patch  1 Patch TransDERmal Q24H    acetaminophen (TYLENOL) solution 1,000 mg  1,000 mg Per G Tube Q6H    hydrOXYzine HCL (ATARAX) tablet 25 mg  25 mg Oral TID PRN    metoprolol tartrate (LOPRESSOR) tablet 100 mg  100 mg Oral BID    potassium bicarb-citric acid (EFFER-K) tablet 40 mEq  40 mEq Oral DAILY    loperamide (IMODIUM) capsule 2 mg  2 mg Oral Q4H PRN    famotidine (PEPCID) 40 mg/5 mL (8 mg/mL) oral suspension 20 mg  20 mg Per G Tube Q12H    metoclopramide (REGLAN) 5 mg/5 mL oral syrup 5 mg  5 mg Per G Tube Q6H    traZODone (DESYREL) tablet 100 mg  100 mg Oral QHS    lidocaine (XYLOCAINE) 2 % jelly   Mucous Membrane PRN    enoxaparin (LOVENOX) injection 30 mg  30 mg SubCUTAneous Q12H    ondansetron (ZOFRAN) injection 4 mg  4 mg IntraVENous Q4H PRN    nystatin (MYCOSTATIN) 100,000 unit/mL oral suspension 500,000 Units  500,000 Units Oral QID    aluminum-magnesium hydroxide (MAALOX) oral suspension 15 mL  15 mL Oral QID PRN    prochlorperazine (COMPAZINE) with saline injection 10 mg  10 mg IntraVENous Q6H PRN    miconazole (MICOTIN) 2 % powder   Topical BID    oxyCODONE (ROXICODONE) 5 mg/5 mL oral solution 5 mg  5 mg Oral Q4H PRN    guaiFENesin (ROBITUSSIN) 100 mg/5 mL oral liquid 400 mg  400 mg Per NG tube Q4H PRN    balsam peru-castor oiL (VENELEX) ointment   Topical BID    acetaminophen (TYLENOL) suppository 650 mg  650 mg Rectal Q6H PRN    alteplase (CATHFLO) 1 mg in sterile water (preservative free) 1 mL injection  1 mg InterCATHeter PRN    polyvinyl alcohol-povidone (NATURAL TEARS) 0.5-0.6 % ophthalmic solution 1 Drop  1 Drop Both Eyes PRN    dextrose 10% infusion 0-250 mL  0-250 mL IntraVENous PRN    glucagon (GLUCAGEN) injection 1 mg  1 mg IntraMUSCular PRN    glucose chewable tablet 16 g  4 Tab Oral PRN    sodium chloride (NS) flush 5-40 mL  5-40 mL IntraVENous Q8H    sodium chloride (NS) flush 5-40 mL  5-40 mL IntraVENous PRN    aspirin chewable tablet 81 mg  81 mg Per NG tube DAILY    albuterol-ipratropium (DUO-NEB) 2.5 MG-0.5 MG/3 ML  3 mL Nebulization Q6H PRN     ______________________________________________________________________  EXPECTED LENGTH OF STAY: 12d 0h  ACTUAL LENGTH OF STAY:          [address] Cornelius MD

## 2020-10-04 NOTE — PROGRESS NOTES
Pt requiring multiple trach suctions during shift. At least 7 per shift. Cannula was changed and dressing was changed. Stoma has  yellow/brown mucousy drainage small-moderate amount, changed dressing twice per shift. Respiratory weaned down to 35% fi02, tolerating well. Sucked a moderate sized mucous plug out around 0630am and pts blood pressure and heart rate elevated per documented. Heart rate trending down and will recheck blood pressure soon. Continuing to monitor. All needed supplies are at bedside. 0700-pt needed to void, and has stomach ache. Paused tube feed. Pressure still elevated but not sure how accurate of reading given his restlessness and need to void. 0800-gave bedside report, respiratory therapist in room. Nauseated, declines suction bc feels like he is going to vomit. Zofran IV given. To recheck blood pressure once settles down.

## 2020-10-04 NOTE — PROGRESS NOTES
Problem: Ventilator Management  Goal: *Adequate oxygenation and ventilation  Outcome: Progressing Towards Goal  Goal: *Patient maintains clear airway/free of aspiration  Outcome: Progressing Towards Goal  Goal: *Normal spontaneous ventilation  Outcome: Progressing Towards Goal     Problem: Pressure Injury - Risk of  Goal: *Prevention of pressure injury  Description: Document Enrique Scale and appropriate interventions in the flowsheet. Outcome: Progressing Towards Goal  Note: Pressure Injury Interventions:  Sensory Interventions: Minimize linen layers    Moisture Interventions: Minimize layers    Activity Interventions: Pressure redistribution bed/mattress(bed type)    Mobility Interventions: PT/OT evaluation    Nutrition Interventions: Document food/fluid/supplement intake    Friction and Shear Interventions: Minimize layers                Problem: Patient Education: Go to Patient Education Activity  Goal: Patient/Family Education  Outcome: Progressing Towards Goal     Problem: Falls - Risk of  Goal: *Absence of Falls  Description: Document Adama Fall Risk and appropriate interventions in the flowsheet.   Outcome: Progressing Towards Goal  Note: Fall Risk Interventions:  Mobility Interventions: PT Consult for mobility concerns    Mentation Interventions: Increase mobility    Medication Interventions: Evaluate medications/consider consulting pharmacy    Elimination Interventions: Call light in reach    History of Falls Interventions: Door open when patient unattended         Problem: Patient Education: Go to Patient Education Activity  Goal: Patient/Family Education  Outcome: Progressing Towards Goal     Problem: Breathing Pattern - Ineffective  Goal: *Absence of hypoxia  Outcome: Progressing Towards Goal

## 2020-10-04 NOTE — PROGRESS NOTES
Bedside and Verbal shift change report given to [facility] (oncoming nurse) by Adelaide Tena (offgoing nurse). Report included the following information SBAR, Kardex, Procedure Summary, Intake/Output, MAR, Recent Results, and Cardiac Rhythm NSR/ST .          0800: Night shift RN paused TF d/t nausea/vomiting. 1000: After speaking with MD Ruther Eisenmenger, restarted TF at 30 mL/hr. Will titrate rate up Q8Hr per verbal order. 1500:Pt OOB to chair via nhan. 1630: Pt back to bed from chair via nhan. Bedside and Verbal shift change report given to [address] [place] [address] (oncoming nurse) by Randi Oro RN (offgoing nurse). Report included the following information SBAR, Kardex, Procedure Summary, Intake/Output, MAR, Recent Results and Cardiac Rhythm NSR/ST. Problem: Pressure Injury - Risk of  Goal: *Prevention of pressure injury  Description: Document Enrique Scale and appropriate interventions in the flowsheet. Outcome: Progressing Towards Goal  Note: Pressure Injury Interventions:  Sensory Interventions: Minimize linen layers    Moisture Interventions: Absorbent underpads, Apply protective barrier, creams and emollients    Activity Interventions: Chair cushion, Increase time out of bed, Pressure redistribution bed/mattress(bed type), PT/OT evaluation    Mobility Interventions: Chair cushion, Float heels, HOB 30 degrees or less, Pressure redistribution bed/mattress (bed type), PT/OT evaluation, Turn and reposition approx. every two hours(pillow and wedges)    Nutrition Interventions: Document food/fluid/supplement intake, Discuss nutritional consult with provider    Friction and Shear Interventions: Foam dressings/transparent film/skin sealants, HOB 30 degrees or less, Lift sheet, Lift team/patient mobility team                Problem: Falls - Risk of  Goal: *Absence of Falls  Description: Document Adama Fall Risk and appropriate interventions in the flowsheet.   Outcome: Progressing Towards Goal  Note: Fall Risk Interventions:  Mobility Interventions: Communicate number of staff needed for ambulation/transfer, Mechanical lift, OT consult for ADLs, Patient to call before getting OOB, PT Consult for mobility concerns, PT Consult for assist device competence    Mentation Interventions: Adequate sleep, hydration, pain control, Evaluate medications/consider consulting pharmacy, Increase mobility, More frequent rounding, Toileting rounds, Update white board    Medication Interventions: Evaluate medications/consider consulting pharmacy, Patient to call before getting OOB, Teach patient to arise slowly    Elimination Interventions: Call light in reach, Patient to call for help with toileting needs, Urinal in reach, Toileting schedule/hourly rounds    History of Falls Interventions: Evaluate medications/consider consulting pharmacy, Room close to nurse's station         Problem: Nutrition Deficit  Goal: *Optimize nutritional status  Outcome: Progressing Towards Goal     Problem: Breathing Pattern - Ineffective  Goal: *Absence of hypoxia  Outcome: Progressing Towards Goal     Problem: Breathing Pattern - Ineffective  Goal: *Absence of hypoxia  Outcome: Progressing Towards Goal  Goal: *Use of effective breathing techniques  Outcome: Progressing Towards Goal  Goal: *PALLIATIVE CARE:  Alleviation of Dyspnea  Outcome: Progressing Towards Goal

## 2020-10-05 LAB
ALBUMIN SERPL-MCNC: 3.5 G/DL (ref 3.5–5)
ALBUMIN/GLOB SERPL: 0.8 {RATIO} (ref 1.1–2.2)
ALP SERPL-CCNC: 115 U/L (ref 45–117)
ALT SERPL-CCNC: 51 U/L (ref 12–78)
ANION GAP SERPL CALC-SCNC: 6 MMOL/L (ref 5–15)
AST SERPL-CCNC: 27 U/L (ref 15–37)
BASOPHILS # BLD: 0 K/UL (ref 0–0.1)
BASOPHILS NFR BLD: 0 % (ref 0–1)
BILIRUB SERPL-MCNC: 0.3 MG/DL (ref 0.2–1)
BUN SERPL-MCNC: 12 MG/DL (ref 6–20)
BUN/CREAT SERPL: 16 (ref 12–20)
CALCIUM SERPL-MCNC: 10.2 MG/DL (ref 8.5–10.1)
CHLORIDE SERPL-SCNC: 97 MMOL/L (ref 97–108)
CO2 SERPL-SCNC: 32 MMOL/L (ref 21–32)
CREAT SERPL-MCNC: 0.73 MG/DL (ref 0.7–1.3)
DIFFERENTIAL METHOD BLD: ABNORMAL
EOSINOPHIL # BLD: 0.1 K/UL (ref 0–0.4)
EOSINOPHIL NFR BLD: 2 % (ref 0–7)
ERYTHROCYTE [DISTWIDTH] IN BLOOD BY AUTOMATED COUNT: 13.8 % (ref 11.5–14.5)
GLOBULIN SER CALC-MCNC: 4.5 G/DL (ref 2–4)
GLUCOSE BLD STRIP.AUTO-MCNC: 113 MG/DL (ref 65–100)
GLUCOSE SERPL-MCNC: 125 MG/DL (ref 65–100)
HCT VFR BLD AUTO: 36.6 % (ref 36.6–50.3)
HGB BLD-MCNC: 11.7 G/DL (ref 12.1–17)
IMM GRANULOCYTES # BLD AUTO: 0 K/UL (ref 0–0.04)
IMM GRANULOCYTES NFR BLD AUTO: 0 % (ref 0–0.5)
LYMPHOCYTES # BLD: 2.2 K/UL (ref 0.8–3.5)
LYMPHOCYTES NFR BLD: 31 % (ref 12–49)
MAGNESIUM SERPL-MCNC: 2.2 MG/DL (ref 1.6–2.4)
MCH RBC QN AUTO: 27.6 PG (ref 26–34)
MCHC RBC AUTO-ENTMCNC: 32 G/DL (ref 30–36.5)
MCV RBC AUTO: 86.3 FL (ref 80–99)
MONOCYTES # BLD: 0.8 K/UL (ref 0–1)
MONOCYTES NFR BLD: 11 % (ref 5–13)
NEUTS SEG # BLD: 4.1 K/UL (ref 1.8–8)
NEUTS SEG NFR BLD: 56 % (ref 32–75)
NRBC # BLD: 0 K/UL (ref 0–0.01)
NRBC BLD-RTO: 0 PER 100 WBC
PHOSPHATE SERPL-MCNC: 4.3 MG/DL (ref 2.6–4.7)
PLATELET # BLD AUTO: 301 K/UL (ref 150–400)
PMV BLD AUTO: 10.8 FL (ref 8.9–12.9)
POTASSIUM SERPL-SCNC: 3.6 MMOL/L (ref 3.5–5.1)
PROT SERPL-MCNC: 8 G/DL (ref 6.4–8.2)
RBC # BLD AUTO: 4.24 M/UL (ref 4.1–5.7)
SERVICE CMNT-IMP: ABNORMAL
SODIUM SERPL-SCNC: 135 MMOL/L (ref 136–145)
WBC # BLD AUTO: 7.3 K/UL (ref 4.1–11.1)

## 2020-10-05 PROCEDURE — 74011250637 HC RX REV CODE- 250/637: Performed by: HOSPITALIST

## 2020-10-05 PROCEDURE — 65660000001 HC RM ICU INTERMED STEPDOWN

## 2020-10-05 PROCEDURE — 83735 ASSAY OF MAGNESIUM: CPT

## 2020-10-05 PROCEDURE — 74011250637 HC RX REV CODE- 250/637: Performed by: NURSE PRACTITIONER

## 2020-10-05 PROCEDURE — 74011000250 HC RX REV CODE- 250: Performed by: INTERNAL MEDICINE

## 2020-10-05 PROCEDURE — 74011250637 HC RX REV CODE- 250/637: Performed by: INTERNAL MEDICINE

## 2020-10-05 PROCEDURE — 84100 ASSAY OF PHOSPHORUS: CPT

## 2020-10-05 PROCEDURE — 74011250637 HC RX REV CODE- 250/637: Performed by: EMERGENCY MEDICINE

## 2020-10-05 PROCEDURE — 36415 COLL VENOUS BLD VENIPUNCTURE: CPT

## 2020-10-05 PROCEDURE — 97530 THERAPEUTIC ACTIVITIES: CPT

## 2020-10-05 PROCEDURE — 80053 COMPREHEN METABOLIC PANEL: CPT

## 2020-10-05 PROCEDURE — 85025 COMPLETE CBC W/AUTO DIFF WBC: CPT

## 2020-10-05 PROCEDURE — 97535 SELF CARE MNGMENT TRAINING: CPT

## 2020-10-05 PROCEDURE — 77010033678 HC OXYGEN DAILY

## 2020-10-05 PROCEDURE — 97110 THERAPEUTIC EXERCISES: CPT

## 2020-10-05 PROCEDURE — 74011250636 HC RX REV CODE- 250/636: Performed by: INTERNAL MEDICINE

## 2020-10-05 PROCEDURE — 74011250636 HC RX REV CODE- 250/636: Performed by: EMERGENCY MEDICINE

## 2020-10-05 PROCEDURE — 97116 GAIT TRAINING THERAPY: CPT

## 2020-10-05 PROCEDURE — 82962 GLUCOSE BLOOD TEST: CPT

## 2020-10-05 RX ORDER — SIMETHICONE 80 MG
80 TABLET,CHEWABLE ORAL
Status: COMPLETED | OUTPATIENT
Start: 2020-10-05 | End: 2020-10-05

## 2020-10-05 RX ORDER — ALPRAZOLAM 1 MG/1
1 TABLET ORAL 2 TIMES DAILY
Status: DISCONTINUED | OUTPATIENT
Start: 2020-10-05 | End: 2020-10-06

## 2020-10-05 RX ORDER — LORAZEPAM 2 MG/ML
1 INJECTION INTRAMUSCULAR ONCE
Status: COMPLETED | OUTPATIENT
Start: 2020-10-05 | End: 2020-10-05

## 2020-10-05 RX ADMIN — METOPROLOL TARTRATE 100 MG: 25 TABLET, FILM COATED ORAL at 17:59

## 2020-10-05 RX ADMIN — METOCLOPRAMIDE HYDROCHLORIDE 5 MG: 5 SOLUTION ORAL at 13:10

## 2020-10-05 RX ADMIN — Medication 3 MG: at 00:17

## 2020-10-05 RX ADMIN — ENOXAPARIN SODIUM 30 MG: 30 INJECTION SUBCUTANEOUS at 00:17

## 2020-10-05 RX ADMIN — ONDANSETRON 4 MG: 2 INJECTION INTRAMUSCULAR; INTRAVENOUS at 21:01

## 2020-10-05 RX ADMIN — NYSTATIN 500000 UNITS: 500000 SUSPENSION ORAL at 00:16

## 2020-10-05 RX ADMIN — FAMOTIDINE 20 MG: 40 POWDER, FOR SUSPENSION ORAL at 22:00

## 2020-10-05 RX ADMIN — Medication: at 09:17

## 2020-10-05 RX ADMIN — Medication 10 ML: at 21:03

## 2020-10-05 RX ADMIN — Medication 20 ML: at 00:18

## 2020-10-05 RX ADMIN — ACETAMINOPHEN ORAL SOLUTION 1000 MG: 650 SOLUTION ORAL at 06:33

## 2020-10-05 RX ADMIN — TRAZODONE HYDROCHLORIDE 100 MG: 100 TABLET ORAL at 21:01

## 2020-10-05 RX ADMIN — ALPRAZOLAM 1 MG: 1 TABLET ORAL at 15:47

## 2020-10-05 RX ADMIN — ENOXAPARIN SODIUM 30 MG: 30 INJECTION SUBCUTANEOUS at 10:25

## 2020-10-05 RX ADMIN — NYSTATIN 500000 UNITS: 500000 SUSPENSION ORAL at 09:16

## 2020-10-05 RX ADMIN — METOCLOPRAMIDE HYDROCHLORIDE 5 MG: 5 SOLUTION ORAL at 00:00

## 2020-10-05 RX ADMIN — METOCLOPRAMIDE HYDROCHLORIDE 5 MG: 5 SOLUTION ORAL at 06:34

## 2020-10-05 RX ADMIN — ACETAMINOPHEN ORAL SOLUTION 1000 MG: 650 SOLUTION ORAL at 00:16

## 2020-10-05 RX ADMIN — TRAZODONE HYDROCHLORIDE 100 MG: 100 TABLET ORAL at 00:17

## 2020-10-05 RX ADMIN — ASPIRIN 81 MG CHEWABLE TABLET 81 MG: 81 TABLET CHEWABLE at 09:17

## 2020-10-05 RX ADMIN — METOCLOPRAMIDE HYDROCHLORIDE 5 MG: 5 SOLUTION ORAL at 17:58

## 2020-10-05 RX ADMIN — ACETAMINOPHEN ORAL SOLUTION 1000 MG: 650 SOLUTION ORAL at 17:59

## 2020-10-05 RX ADMIN — SIMETHICONE CHEW TAB 80 MG 80 MG: 80 TABLET ORAL at 06:37

## 2020-10-05 RX ADMIN — NYSTATIN 500000 UNITS: 500000 SUSPENSION ORAL at 13:10

## 2020-10-05 RX ADMIN — MICONAZOLE NITRATE 2 % TOPICAL POWDER: at 09:18

## 2020-10-05 RX ADMIN — Medication 3 MG: at 21:01

## 2020-10-05 RX ADMIN — ACETAMINOPHEN ORAL SOLUTION 1000 MG: 650 SOLUTION ORAL at 10:27

## 2020-10-05 RX ADMIN — MICONAZOLE NITRATE 2 % TOPICAL POWDER: at 17:58

## 2020-10-05 RX ADMIN — NYSTATIN 500000 UNITS: 500000 SUSPENSION ORAL at 15:46

## 2020-10-05 RX ADMIN — METOPROLOL TARTRATE 100 MG: 25 TABLET, FILM COATED ORAL at 09:17

## 2020-10-05 RX ADMIN — Medication 20 ML: at 06:34

## 2020-10-05 RX ADMIN — Medication 10 ML: at 15:46

## 2020-10-05 RX ADMIN — ONDANSETRON 4 MG: 2 INJECTION INTRAMUSCULAR; INTRAVENOUS at 05:16

## 2020-10-05 RX ADMIN — HYDROXYZINE HYDROCHLORIDE 25 MG: 25 TABLET, FILM COATED ORAL at 03:54

## 2020-10-05 RX ADMIN — POTASSIUM BICARBONATE 40 MEQ: 782 TABLET, EFFERVESCENT ORAL at 09:16

## 2020-10-05 RX ADMIN — Medication: at 17:57

## 2020-10-05 RX ADMIN — FAMOTIDINE 20 MG: 40 POWDER, FOR SUSPENSION ORAL at 09:16

## 2020-10-05 RX ADMIN — LORAZEPAM 1 MG: 2 INJECTION INTRAMUSCULAR; INTRAVENOUS at 08:42

## 2020-10-05 RX ADMIN — ONDANSETRON 4 MG: 2 INJECTION INTRAMUSCULAR; INTRAVENOUS at 00:11

## 2020-10-05 NOTE — PROGRESS NOTES
10/05/20 1531   Vital Signs   Temp 98.1 °F (36.7 °C)   Temp Source Oral   Pulse (Heart Rate) 98   Heart Rate Source Monitor   Cardiac Rhythm NSR   Resp Rate 30   O2 Sat (%) 98 %   Level of Consciousness Alert   BP (!) 136/97   MAP (Monitor) 108   MAP (Calculated) 110   BP 1 Method Automatic   BP 1 Location Left arm   BP Patient Position At rest   MEWS Score 3       Mew score: 3, r/t elevated tachypneic and slightly elevated bp. Temperature and o2 wnl plus NSR. Xanax administered.

## 2020-10-05 NOTE — PROGRESS NOTES
1930: Bedside and Verbal shift change report given to Lawrence Christian (oncoming nurse) by Sade Wong (offgoing nurse). Report included the following information SBAR, Kardex, Intake/Output, MAR, Recent Results and Cardiac Rhythm NSR. Patient received resting in bed. Complains of shoulder pain, repositioned with pillow. 2050: Increased tube feeding to 40 ml/hour as no complaints of nausea    2345: patient states he has nausea caused by movement and turning. He said that when his bed was bumped inadvertantly, he felt a wave nausea. Zofran IV administered per PRN order. 0400: patient states he \"feels strange\". Noted to have RR 24-32 with HR 120s. /92. Blood glucose 113. He states he has felt this way before but is unable to recall what helped him. Repositioned in the bed and treated with Zofran IV per PRN order. 0600:suctioned patient's trach and he vomited immediately afterward. 5999; patient requesting something for gas; hospitalist paged and orders received. 0730: Bedside and Verbal shift change report given to Nick Stephen and Miriam Schmitt (oncoming nurse) by Lawrence Christian (offgoing nurse).  Report included the following information SBAR, Kardex, Intake/Output, MAR, Recent Results and Cardiac Rhythm ST.

## 2020-10-05 NOTE — PROGRESS NOTES
[facility] Adult  Hospitalist Group                                                                                          Hospitalist Progress Note  Araceli Gomez MD  Answering service: [phone] or [phone] from in house phone        Date of Service:  10/5/2020  NAME:  Noel Christianson  :  1990  MRN:  [id number]      Admission Summary:     \"27year-old gentleman with no significant past medical history was brought to the emergency room from home via EMS for complaints of an approximately two- to three-day history of progressively worsening cough, shortness of breath, chills and fevers as high as 103 degrees Fahrenheit.  Of note, patient stated that he tested positive for COVID-19 virus about nine days prior to this emergency room presentation of 2020.  Further, patient stated that despite taking the prescribed Zithromax, prednisone, and other respiratory therapies, did not have any significant improvement in symptoms. Patient denied associated headaches, dizziness, visual deficits, chest pains, palpitations, sore throat, nausea, vomiting, abdominal pain, bladder or bowel irregularities. \"    Interval history / Subjective:       10/5/2020 :  Pt anxious , cxr /lab imrproved, stable   For ativan now then resuming xanax      Assessment & Plan:           -Bilateral pneumonia due to COVID-19 infection - resolved  But on trach collar 02 3 LPM this 10/3/2020 am  ( 10 LPM on 10/1 )  -Right arm weakness: under eval, MRI reviewed,   -Debility  - Acute hypoxic respiratory failure due to COVID-19 infection status post tracheostomy on ; see above   - Possible bacterial super imposed infection: Completed antibiotic course  -History of tobacco use  - continue trach collar with speech valve as tolerated - plan for continuous TC   - PT/OT/rehab  - Dispo planning   - Anxiety management with anxiolytic antidepressant and pain management  - Still not clear what caused his acute right arm weakness, multiple possibilities including hypoxic event or vasculitis related to Margarita Itz is on aspirin and Lovenox, no further deterioration - PT/OT   -Completed COVID-19 treatment   , GI prophylaxis and DVT prophylaxis =Pepcid/ Lovenox   - working w/ SLP - cleared for thickened liquid po\"            Hospital Problems  Date Reviewed: 8/20/2020          Codes Class Noted POA    Tracheostomy dependent ([facility] [address] ICD-10-CM: Z93.0  ICD-9-CM: V44.0  10/2/2020 Yes        Tachycardia ICD-10-CM: R00.0  ICD-9-CM: 785.0  10/2/2020 Yes        Hyponatremia ICD-10-CM: E87.1  ICD-9-CM: 276.1  10/2/2020 Yes        Acute respiratory failure ([facility] ICD-10-CM: J96.00  ICD-9-CM: 518.81  8/18/2020 Yes        * (Principal) Pneumonia due to severe acute respiratory syndrome coronavirus 2 (SARS-CoV-2) ICD-10-CM: V68.4, J12.89  ICD-9-CM: 480.8  8/8/2020 Yes                Review of Systems:   gen weakness, no n/v, no fever, some better day over day       Vital Signs:    Last 24hrs VS reviewed since prior progress note. Most recent are:  Visit Vitals  BP (!) 145/103 (BP 1 Location: Left arm, BP Patient Position: At rest)   Pulse (!) 118   Temp 98 °F (36.7 °C)   Resp 28   Ht 5' 11\" (1.803 m)   Wt 98 kg (216 lb 0.8 oz)   SpO2 98%   BMI 29.30 kg/m²         Intake/Output Summary (Last 24 hours) at 10/5/2020 2099  Last data filed at 10/5/2020 0347  Gross per 24 hour   Intake 1370 ml   Output 925 ml   Net 445 ml        Physical Examination:             Constitutional:  + on going  acute distress, cooperative, pleasant    ENT:  Oral mucosa moist, oropharynx benign. Resp:  CTA bilaterally. No wheezing/rhonchi/rales. +accessory muscle use; trach collar    CV:  Regular rhythm, normal rate, no murmurs, gallops, rubs    GI:  Soft, non distended, non tender.  normoactive bowel sounds, no hepatosplenomegaly     Musculoskeletal:  No edema, warm, 2+ pulses throughout    Neurologic:  paresis generalized rt > lt      Psych:  Good insight, +t anxious -agitated. Data Review:    Review and/or order of clinical lab test      Labs:     Recent Labs     10/05/20  0350 10/04/20  0121   WBC 7.3 6.1   HGB 11.7* 11.3*   HCT 36.6 36.0*    276     Recent Labs     10/05/20  0350 10/04/20  0121 10/03/20  0345   * 136 133*   K 3.6 3.8 3.7   CL 97 96* 96*   CO2 32 34* 33*   BUN 12 19 13   CREA 0.73 0.64* 0.65*   * 118* 123*   CA 10.2* 9.9 9.7   MG 2.2 2.1 2.0   PHOS 4.3 6.0* 4.7     Recent Labs     10/05/20  0350 10/04/20  0121 10/03/20  0345   ALT 51 55 54    115 111   TBILI 0.3 0.3 0.5   TP 8.0 7.8 7.6   ALB 3.5 3.4* 3.3*   GLOB 4.5* 4.4* 4.3*     No results for input(s): INR, PTP, APTT, INREXT, INREXT in the last 72 hours. No results for input(s): FE, TIBC, PSAT, FERR in the last 72 hours. No results found for: FOL, RBCF   No results for input(s): PH, PCO2, PO2 in the last 72 hours. No results for input(s): CPK, CKNDX, TROIQ in the last 72 hours.     No lab exists for component: CPKMB  Lab Results   Component Value Date/Time    Cholesterol, total 156 09/05/2020 06:06 PM    HDL Cholesterol 26 09/05/2020 06:06 PM    LDL, calculated 92.4 09/05/2020 06:06 PM    Triglyceride 188 (H) 09/05/2020 06:06 PM    CHOL/HDL Ratio 6.0 (H) 09/05/2020 06:06 PM     Lab Results   Component Value Date/Time    Glucose (POC) 113 (H) 10/05/2020 05:15 AM    Glucose (POC) 119 (H) 09/06/2020 01:06 PM    Glucose (POC) 113 (H) 09/05/2020 06:49 PM    Glucose (POC) 146 (H) 09/04/2020 11:36 AM    Glucose (POC) 122 (H) 08/27/2020 11:28 AM     No results found for: COLOR, APPRN, SPGRU, REFSG, RAE, PROTU, GLUCU, KETU, BILU, UROU, ROBLES, LEUKU, GLUKE, EPSU, BACTU, WBCU, RBCU, CASTS, UCRY      Medications Reviewed:     Current Facility-Administered Medications   Medication Dose Route Frequency    LORazepam (ATIVAN) injection 1 mg  1 mg IntraVENous ONCE    ALPRAZolam (XANAX) tablet 1 mg  1 mg Per G Tube BID    melatonin tablet 3 mg  3 mg Oral QHS    lidocaine 4 % patch 1 Patch  1 Patch TransDERmal Q24H    acetaminophen (TYLENOL) solution 1,000 mg  1,000 mg Per G Tube Q6H    hydrOXYzine HCL (ATARAX) tablet 25 mg  25 mg Oral TID PRN    metoprolol tartrate (LOPRESSOR) tablet 100 mg  100 mg Oral BID    potassium bicarb-citric acid (EFFER-K) tablet 40 mEq  40 mEq Oral DAILY    loperamide (IMODIUM) capsule 2 mg  2 mg Oral Q4H PRN    famotidine (PEPCID) 40 mg/5 mL (8 mg/mL) oral suspension 20 mg  20 mg Per G Tube Q12H    metoclopramide (REGLAN) 5 mg/5 mL oral syrup 5 mg  5 mg Per G Tube Q6H    traZODone (DESYREL) tablet 100 mg  100 mg Oral QHS    lidocaine (XYLOCAINE) 2 % jelly   Mucous Membrane PRN    enoxaparin (LOVENOX) injection 30 mg  30 mg SubCUTAneous Q12H    ondansetron (ZOFRAN) injection 4 mg  4 mg IntraVENous Q4H PRN    nystatin (MYCOSTATIN) 100,000 unit/mL oral suspension 500,000 Units  500,000 Units Oral QID    aluminum-magnesium hydroxide (MAALOX) oral suspension 15 mL  15 mL Oral QID PRN    prochlorperazine (COMPAZINE) with saline injection 10 mg  10 mg IntraVENous Q6H PRN    miconazole (MICOTIN) 2 % powder   Topical BID    oxyCODONE (ROXICODONE) 5 mg/5 mL oral solution 5 mg  5 mg Oral Q4H PRN    guaiFENesin (ROBITUSSIN) 100 mg/5 mL oral liquid 400 mg  400 mg Per NG tube Q4H PRN    balsam peru-castor oiL (VENELEX) ointment   Topical BID    acetaminophen (TYLENOL) suppository 650 mg  650 mg Rectal Q6H PRN    alteplase (CATHFLO) 1 mg in sterile water (preservative free) 1 mL injection  1 mg InterCATHeter PRN    polyvinyl alcohol-povidone (NATURAL TEARS) 0.5-0.6 % ophthalmic solution 1 Drop  1 Drop Both Eyes PRN    dextrose 10% infusion 0-250 mL  0-250 mL IntraVENous PRN    glucagon (GLUCAGEN) injection 1 mg  1 mg IntraMUSCular PRN    glucose chewable tablet 16 g  4 Tab Oral PRN    sodium chloride (NS) flush 5-40 mL  5-40 mL IntraVENous Q8H    sodium chloride (NS) flush 5-40 mL  5-40 mL IntraVENous PRN    aspirin chewable tablet 81 mg  81 mg Per NG tube DAILY    albuterol-ipratropium (DUO-NEB) 2.5 MG-0.5 MG/3 ML  3 mL Nebulization Q6H PRN     ______________________________________________________________________  EXPECTED LENGTH OF STAY: 12d 0h  ACTUAL LENGTH OF STAY:          [address] Hua Vasques MD

## 2020-10-05 NOTE — INTERDISCIPLINARY ROUNDS
Bedside and Verbal shift change report given to [address] [place] [address] (oncoming nurse) by Ravi Bravo RN (offgoing nurse). Report included the following information SBAR, Kardex, Intake/Output and MAR.

## 2020-10-05 NOTE — PROGRESS NOTES
10/05/20 0816   Vital Signs   Temp 97.8 °F (36.6 °C)   Temp Source Oral   Pulse (Heart Rate) (!) 126   Heart Rate Source Monitor   Cardiac Rhythm Sinus Tach   Resp Rate (!) 36   O2 Sat (%) 98 %   Level of Consciousness Alert   BP (!) 136/94   MAP (Monitor) 108   MAP (Calculated) 108   BP 1 Method Automatic   BP 1 Location Left arm   BP Patient Position At rest   MEWS Score 5   Pain 1   Pain Scale 1 Numeric (0 - 10)   Pain Intensity 1 8   Patient Stated Pain Goal 0   Pain Location 1 Abdomen   Pain Orientation 1 Lower   Pain Description 1 Other (comment)  (Discomfort)   Pain Intervention(s) 1 Medication (see MAR)   Oxygen Therapy   O2 Device Hi flow nasal cannula   O2 Flow Rate (L/min) 8 l/min   Patient Observation   Repositioned Semi fowlers   Patient Turned Turn on left side   Ambulate No (Comment)   Activity In bed   Airway Clearance   Suction Trach   Suction Device Meño;Suction catheter   Suction Catheter Size 14 Fr       Mews of 5, sinus tach, tycpneic, hospitalist at bedside and aware. Ordered ativan.

## 2020-10-05 NOTE — PROGRESS NOTES
Problem: Mobility Impaired (Adult and Pediatric)  Goal: *Acute Goals and Plan of Care (Insert Text)  Description: FUNCTIONAL STATUS PRIOR TO ADMISSION: Patient was independent and active without use of DME. 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 Physical Therapy Goals  Reassessment 10/2/2020, goals upgraded. 1.  Patient will move from supine to sit and sit to supine , scoot up and down, and roll side to side in bed with minimal within 7 day(s). 2.  Patient will transfer from bed to chair and chair to bed with minimal assist x2 using the least restrictive device within 7 day(s). 3.  Patient will perform sit to stand with minimal assistance within 7 day(s). 4.  Patient will demo good sitting balance in unsupported sitting x5min in prep for mobility progression in 7 days. 5. Patient will ambulate 10ft with RW and mod A x2 within 7 days. Reassessment 9/25/2020  1. Patient will move from supine to sit and sit to supine , scoot up and down, and roll side to side in bed with maximal assistance within 7 day(s). 2.  Patient will transfer from bed to chair and chair to bed with maximal assistance using the least restrictive device within 7 day(s). 3.  Patient will perform sit to stand with maximal assistance within 7 day(s). 4.  Patient will tolerate sitting EOB with moderate assistance for 10 minutes within 7 day(s). Reassessment completed 9/18/2020 and all goals remain appropriate  at this time. Initiated 9/11/2020  1. Patient will move from supine to sit and sit to supine , scoot up and down, and roll side to side in bed with maximal assistance within 7 day(s). 2.  Patient will transfer from bed to chair and chair to bed with maximal assistance using the least restrictive device within 7 day(s). 3.  Patient will perform sit to stand with maximal assistance within 7 day(s).   4. Patient will tolerate sitting EOB with maximal assistance for 5 minutes within 7 day(s). --Goal Met 9/25      Outcome: Progressing Towards Goal   PHYSICAL THERAPY TREATMENT  Patient: Juan Manuel Serna (32 y.o. male)  Date: 10/5/2020  Diagnosis: Acute respiratory failure ([facility] [address] [J96.00]   Pneumonia due to severe acute respiratory syndrome coronavirus 2 (SARS-CoV-2)  Procedure(s) (LRB):  ESOPHAGOGASTRODUODENOSCOPY (EGD) @ bedside (N/A)  ESOPHAGOGASTRODUODENAL (EGD) BIOPSY (N/A) 13 Days Post-Op  Precautions: Fall(trach collar)  Chart, physical therapy assessment, plan of care and goals were reviewed. ASSESSMENT  Patient continues with skilled PT services and is progressing towards goals. Patient received supine in bed, agreeable to activity with PT/OT co-treatment. Noted great improvement since Friday's session with trunk control/abdominal strength and sitting balance at EOB. Intermittently requiring min A to steady during dynamic task (donning socks with OT) and moderate cues for shoulder retraction and thoracic extension to improve posture. With any activity, HR in 110's-120's and RR in 30's. Required multiple rest breaks. Able to utilize RW for transfer today with max cues for transfer technique, sequencing, and max A with RW management. Intermittent mod A for stabilizing  with R hand and tactile cues for triceps activation on R for support of body weight. Impulsive with transfer (attempting to sit too soon) but able to follow cues to lower safely. Able to recover HR and RR within 2 minutes once seated. Encouraged that he could transfer via stand pivot back to bed with nursing assist x2, gait belt, and RW. Suggested to RN to ensure chair is directly beside bed to minimize additional activity other than transfer.       Current Level of Function Impacting Discharge (mobility/balance): min-mod Ax2 for transfers and gait    Other factors to consider for discharge: previously independent, prolonged hospital stay, trach and peg         PLAN :  Patient continues to benefit from skilled intervention to address the above impairments. Continue treatment per established plan of care. to address goals. Recommendation for discharge: (in order for the patient to meet his/her long term goals)  Therapy 3 hours per day 5-7 days per week    This discharge recommendation:  Has been made in collaboration with the attending provider and/or case management    IF patient discharges home will need the following DME: to be determined (TBD)       SUBJECTIVE:   Patient stated That sh*t is heavy. 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    OBJECTIVE DATA SUMMARY:   Critical Behavior:  Neurologic State: Alert  Orientation Level: Oriented X4  Cognition: Appropriate for age attention/concentration  Safety/Judgement: Awareness of environment  Functional Mobility Training:  Bed Mobility:  Supine to Sit: Minimum assistance  Transfers:  Sit to Stand: Minimum assistance; Moderate assistance;Assist x2  Stand to Sit: Minimum assistance;Assist x2(verbal cues )  Bed to Chair: Minimum assistance; Moderate assistance;Assist x2; Additional time(max verbal cues, assist with RW management)  Balance:  Sitting: Impaired; Without support  Sitting - Static: Fair (occasional)  Sitting - Dynamic: Fair (occasional)  Standing: Impaired; With support  Standing - Static: Fair  Standing - Dynamic : Fair;Poor  Ambulation/Gait Training:  Distance (ft): 4 Feet (ft)  Assistive Device: Gait belt;Walker, rolling  Ambulation - Level of Assistance: Minimal assistance; Moderate assistance;Assist x2(max A with RW management)  Gait Abnormalities: Shuffling gait;Trunk sway increased  Base of Support: Center of gravity altered(intermittent posterior lean)  Speed/Veronica: Slow;Shuffled  Therapeutic Exercises:   Encouraged to spend time initiating forward sit away from support of chair back and to focus on scapular retraction and thoracic extension  Pain Rating:  Denied pain    Activity Tolerance:   Fair, requires frequent rest breaks, and observed SOB with activity  Please refer to the flowsheet for vital signs taken during this treatment. After treatment patient left in no apparent distress:   Sitting in chair and Call bell within reach    COMMUNICATION/COLLABORATION:   The patients plan of care was discussed with: Occupational therapist and Registered nurse.      Adeola Khan, PT, DPT   Time Calculation: 26 mins

## 2020-10-05 NOTE — PROGRESS NOTES
Problem: Patient Education: Go to Patient Education Activity  Goal: Patient/Family Education  Outcome: Progressing Towards Goal     Problem: Pressure Injury - Risk of  Goal: *Prevention of pressure injury  Description: Document Enrique Scale and appropriate interventions in the flowsheet. Outcome: Progressing Towards Goal  Note: Pressure Injury Interventions:  Sensory Interventions: Minimize linen layers    Moisture Interventions: Offer toileting Q_hr, Minimize layers, Check for incontinence Q2 hours and as needed    Activity Interventions: Chair cushion, Increase time out of bed, Pressure redistribution bed/mattress(bed type)    Mobility Interventions: Chair cushion, PT/OT evaluation    Nutrition Interventions: Document food/fluid/supplement intake    Friction and Shear Interventions: Foam dressings/transparent film/skin sealants, HOB 30 degrees or less, Lift sheet, Lift team/patient mobility team       Problem: Patient Education: Go to Patient Education Activity  Goal: Patient/Family Education  Outcome: Progressing Towards Goal     Problem: Falls - Risk of  Goal: *Absence of Falls  Description: Document Adama Fall Risk and appropriate interventions in the flowsheet.   Outcome: Progressing Towards Goal  Note: Fall Risk Interventions:  Mobility Interventions: Communicate number of staff needed for ambulation/transfer, OT consult for ADLs, Patient to call before getting OOB, PT Consult for mobility concerns, Strengthening exercises (ROM-active/passive), Mechanical lift    Mentation Interventions: Adequate sleep, hydration, pain control, Increase mobility, Reorient patient    Medication Interventions: Evaluate medications/consider consulting pharmacy, Patient to call before getting OOB, Teach patient to arise slowly    Elimination Interventions: Call light in reach, Patient to call for help with toileting needs, Toileting schedule/hourly rounds, Urinal in reach    History of Falls Interventions: Evaluate medications/consider consulting pharmacy, Room close to nurse's station    Call bell and personal effects within reach. Bed locked and in low position. Non skid footwear in place.        Problem: Breathing Pattern - Ineffective  Goal: *Absence of hypoxia  Outcome: Progressing Towards Goal  Goal: *Use of effective breathing techniques  Outcome: Progressing Towards Goal

## 2020-10-05 NOTE — PROGRESS NOTES
10/05/20 1117   Vital Signs   Temp 97.5 °F (36.4 °C)   Temp Source Oral   Pulse (Heart Rate) (!) 107   Heart Rate Source Monitor   Cardiac Rhythm Sinus Tach   Resp Rate (!) 34   O2 Sat (%) 98 %   Level of Consciousness Alert   BP (!) 132/98   MAP (Monitor) 107   MAP (Calculated) 109   BP 1 Method Automatic   BP 1 Location Left arm   BP Patient Position During activity   MEWS Score 4       Mews score 4, ST, tycpneic,   HR improved,

## 2020-10-05 NOTE — PROGRESS NOTES
0730: PRECEPTOR REVIEW OF RN YESSENIA'S WORK:    10/5/2020    Shift times- 0730 to 2000    The RN mayoee's documentation of patient care for [facility] has been reviewed and approved. All medications have been administered under the direct supervision of the preceptor. Lyssa Fernandez RN    8730: psych consult called. 1745: second call to psych consultation line, was informed by call center provider would be calling back.

## 2020-10-05 NOTE — PROGRESS NOTES
Problem: Pressure Injury - Risk of  Goal: *Prevention of pressure injury  Description: Document Enrique Scale and appropriate interventions in the flowsheet. Outcome: Progressing Towards Goal  Note: Pressure Injury Interventions:  Sensory Interventions: Assess changes in LOC, Float heels, Keep linens dry and wrinkle-free, Turn and reposition approx. every two hours (pillows and wedges if needed)    Moisture Interventions: Absorbent underpads, Apply protective barrier, creams and emollients, Check for incontinence Q2 hours and as needed, Limit adult briefs, Maintain skin hydration (lotion/cream), Minimize layers, Moisture barrier, Offer toileting Q_hr    Activity Interventions: Pressure redistribution bed/mattress(bed type)    Mobility Interventions: Turn and reposition approx. every two hours(pillow and wedges)    Nutrition Interventions: Document food/fluid/supplement intake    Friction and Shear Interventions: Apply protective barrier, creams and emollients, Feet elevated on foot rest, Lift team/patient mobility team, Transferring/repositioning devices                Problem: Falls - Risk of  Goal: *Absence of Falls  Description: Document Adama Fall Risk and appropriate interventions in the flowsheet.   Outcome: Progressing Towards Goal  Note: Fall Risk Interventions:  Mobility Interventions: Communicate number of staff needed for ambulation/transfer, Patient to call before getting OOB, Utilize walker, cane, or other assistive device, Utilize gait belt for transfers/ambulation    Mentation Interventions: Adequate sleep, hydration, pain control, Family/sitter at bedside, More frequent rounding, Reorient patient, Toileting rounds, Update white board    Medication Interventions: Evaluate medications/consider consulting pharmacy    Elimination Interventions: Call light in reach, Patient to call for help with toileting needs, Stay With Me (per policy), Toileting schedule/hourly rounds    History of Falls Interventions: Evaluate medications/consider consulting pharmacy, Room close to nurse's station         Problem: Nutrition Deficit  Goal: *Optimize nutritional status  Outcome: Progressing Towards Goal     Problem: Breathing Pattern - Ineffective  Goal: *Absence of hypoxia  Outcome: Progressing Towards Goal  Note: Patient progressing aeb one deep tach suction within 12 hour shift.

## 2020-10-06 LAB
ALBUMIN SERPL-MCNC: 3.3 G/DL (ref 3.5–5)
ALBUMIN/GLOB SERPL: 0.8 {RATIO} (ref 1.1–2.2)
ALP SERPL-CCNC: 100 U/L (ref 45–117)
ALT SERPL-CCNC: 49 U/L (ref 12–78)
ANION GAP SERPL CALC-SCNC: 8 MMOL/L (ref 5–15)
AST SERPL-CCNC: 28 U/L (ref 15–37)
BASOPHILS # BLD: 0.1 K/UL (ref 0–0.1)
BASOPHILS NFR BLD: 1 % (ref 0–1)
BILIRUB SERPL-MCNC: 0.3 MG/DL (ref 0.2–1)
BUN SERPL-MCNC: 17 MG/DL (ref 6–20)
BUN/CREAT SERPL: 29 (ref 12–20)
CALCIUM SERPL-MCNC: 9.7 MG/DL (ref 8.5–10.1)
CHLORIDE SERPL-SCNC: 97 MMOL/L (ref 97–108)
CO2 SERPL-SCNC: 31 MMOL/L (ref 21–32)
CREAT SERPL-MCNC: 0.59 MG/DL (ref 0.7–1.3)
DIFFERENTIAL METHOD BLD: ABNORMAL
EOSINOPHIL # BLD: 0.1 K/UL (ref 0–0.4)
EOSINOPHIL NFR BLD: 2 % (ref 0–7)
ERYTHROCYTE [DISTWIDTH] IN BLOOD BY AUTOMATED COUNT: 14 % (ref 11.5–14.5)
GLOBULIN SER CALC-MCNC: 4.2 G/DL (ref 2–4)
GLUCOSE SERPL-MCNC: 118 MG/DL (ref 65–100)
HCT VFR BLD AUTO: 35.6 % (ref 36.6–50.3)
HGB BLD-MCNC: 11.4 G/DL (ref 12.1–17)
IMM GRANULOCYTES # BLD AUTO: 0 K/UL (ref 0–0.04)
IMM GRANULOCYTES NFR BLD AUTO: 0 % (ref 0–0.5)
LYMPHOCYTES # BLD: 1.6 K/UL (ref 0.8–3.5)
LYMPHOCYTES NFR BLD: 26 % (ref 12–49)
MAGNESIUM SERPL-MCNC: 2.2 MG/DL (ref 1.6–2.4)
MCH RBC QN AUTO: 28 PG (ref 26–34)
MCHC RBC AUTO-ENTMCNC: 32 G/DL (ref 30–36.5)
MCV RBC AUTO: 87.5 FL (ref 80–99)
MONOCYTES # BLD: 0.6 K/UL (ref 0–1)
MONOCYTES NFR BLD: 10 % (ref 5–13)
NEUTS SEG # BLD: 3.6 K/UL (ref 1.8–8)
NEUTS SEG NFR BLD: 61 % (ref 32–75)
NRBC # BLD: 0 K/UL (ref 0–0.01)
NRBC BLD-RTO: 0 PER 100 WBC
PHOSPHATE SERPL-MCNC: 5 MG/DL (ref 2.6–4.7)
PLATELET # BLD AUTO: 277 K/UL (ref 150–400)
PMV BLD AUTO: 11.2 FL (ref 8.9–12.9)
POTASSIUM SERPL-SCNC: 3.3 MMOL/L (ref 3.5–5.1)
PROT SERPL-MCNC: 7.5 G/DL (ref 6.4–8.2)
RBC # BLD AUTO: 4.07 M/UL (ref 4.1–5.7)
SODIUM SERPL-SCNC: 136 MMOL/L (ref 136–145)
WBC # BLD AUTO: 5.9 K/UL (ref 4.1–11.1)

## 2020-10-06 PROCEDURE — 80053 COMPREHEN METABOLIC PANEL: CPT

## 2020-10-06 PROCEDURE — 97110 THERAPEUTIC EXERCISES: CPT

## 2020-10-06 PROCEDURE — 36415 COLL VENOUS BLD VENIPUNCTURE: CPT

## 2020-10-06 PROCEDURE — 74011000250 HC RX REV CODE- 250: Performed by: INTERNAL MEDICINE

## 2020-10-06 PROCEDURE — 85025 COMPLETE CBC W/AUTO DIFF WBC: CPT

## 2020-10-06 PROCEDURE — 74011250637 HC RX REV CODE- 250/637: Performed by: INTERNAL MEDICINE

## 2020-10-06 PROCEDURE — 74011250637 HC RX REV CODE- 250/637: Performed by: HOSPITALIST

## 2020-10-06 PROCEDURE — 84100 ASSAY OF PHOSPHORUS: CPT

## 2020-10-06 PROCEDURE — 74011250636 HC RX REV CODE- 250/636: Performed by: INTERNAL MEDICINE

## 2020-10-06 PROCEDURE — 83735 ASSAY OF MAGNESIUM: CPT

## 2020-10-06 PROCEDURE — 97116 GAIT TRAINING THERAPY: CPT

## 2020-10-06 PROCEDURE — 77030018836 HC SOL IRR NACL ICUM -A

## 2020-10-06 PROCEDURE — 74011250637 HC RX REV CODE- 250/637: Performed by: EMERGENCY MEDICINE

## 2020-10-06 PROCEDURE — 74011250636 HC RX REV CODE- 250/636: Performed by: EMERGENCY MEDICINE

## 2020-10-06 PROCEDURE — 74011250637 HC RX REV CODE- 250/637: Performed by: NURSE PRACTITIONER

## 2020-10-06 PROCEDURE — 65660000001 HC RM ICU INTERMED STEPDOWN

## 2020-10-06 PROCEDURE — 99232 SBSQ HOSP IP/OBS MODERATE 35: CPT | Performed by: NURSE PRACTITIONER

## 2020-10-06 RX ORDER — ALPRAZOLAM 0.5 MG/1
0.5 TABLET ORAL 2 TIMES DAILY
Status: DISCONTINUED | OUTPATIENT
Start: 2020-10-06 | End: 2020-10-12 | Stop reason: HOSPADM

## 2020-10-06 RX ORDER — HYDROXYZINE 25 MG/1
50 TABLET, FILM COATED ORAL
Status: DISCONTINUED | OUTPATIENT
Start: 2020-10-06 | End: 2020-10-12 | Stop reason: HOSPADM

## 2020-10-06 RX ORDER — SERTRALINE HYDROCHLORIDE 50 MG/1
25 TABLET, FILM COATED ORAL DAILY
Status: DISCONTINUED | OUTPATIENT
Start: 2020-10-06 | End: 2020-10-12 | Stop reason: HOSPADM

## 2020-10-06 RX ADMIN — ALPRAZOLAM 1 MG: 1 TABLET ORAL at 00:16

## 2020-10-06 RX ADMIN — NYSTATIN 500000 UNITS: 500000 SUSPENSION ORAL at 13:22

## 2020-10-06 RX ADMIN — SERTRALINE HYDROCHLORIDE 25 MG: 50 TABLET ORAL at 10:13

## 2020-10-06 RX ADMIN — METOCLOPRAMIDE HYDROCHLORIDE 5 MG: 5 SOLUTION ORAL at 17:44

## 2020-10-06 RX ADMIN — ONDANSETRON 4 MG: 2 INJECTION INTRAMUSCULAR; INTRAVENOUS at 06:39

## 2020-10-06 RX ADMIN — ENOXAPARIN SODIUM 30 MG: 30 INJECTION SUBCUTANEOUS at 23:33

## 2020-10-06 RX ADMIN — METOPROLOL TARTRATE 100 MG: 25 TABLET, FILM COATED ORAL at 10:13

## 2020-10-06 RX ADMIN — NYSTATIN 500000 UNITS: 500000 SUSPENSION ORAL at 10:33

## 2020-10-06 RX ADMIN — NYSTATIN 500000 UNITS: 500000 SUSPENSION ORAL at 17:44

## 2020-10-06 RX ADMIN — Medication: at 10:21

## 2020-10-06 RX ADMIN — ACETAMINOPHEN ORAL SOLUTION 1000 MG: 650 SOLUTION ORAL at 00:17

## 2020-10-06 RX ADMIN — SODIUM CHLORIDE 10 MG: 9 INJECTION INTRAMUSCULAR; INTRAVENOUS; SUBCUTANEOUS at 22:03

## 2020-10-06 RX ADMIN — ALUMINUM HYDROXIDE AND MAGNESIUM HYDROXIDE 15 ML: 200; 200 SUSPENSION ORAL at 06:01

## 2020-10-06 RX ADMIN — Medication 40 ML: at 06:39

## 2020-10-06 RX ADMIN — ACETAMINOPHEN ORAL SOLUTION 1000 MG: 650 SOLUTION ORAL at 18:06

## 2020-10-06 RX ADMIN — ENOXAPARIN SODIUM 30 MG: 30 INJECTION SUBCUTANEOUS at 00:16

## 2020-10-06 RX ADMIN — ALPRAZOLAM 0.5 MG: 0.5 TABLET ORAL at 10:13

## 2020-10-06 RX ADMIN — MICONAZOLE NITRATE 2 % TOPICAL POWDER: at 17:48

## 2020-10-06 RX ADMIN — ENOXAPARIN SODIUM 30 MG: 30 INJECTION SUBCUTANEOUS at 10:34

## 2020-10-06 RX ADMIN — Medication 10 ML: at 13:22

## 2020-10-06 RX ADMIN — FAMOTIDINE 20 MG: 40 POWDER, FOR SUSPENSION ORAL at 10:22

## 2020-10-06 RX ADMIN — ASPIRIN 81 MG CHEWABLE TABLET 81 MG: 81 TABLET CHEWABLE at 10:13

## 2020-10-06 RX ADMIN — POTASSIUM BICARBONATE 40 MEQ: 782 TABLET, EFFERVESCENT ORAL at 10:11

## 2020-10-06 RX ADMIN — Medication 3 MG: at 21:59

## 2020-10-06 RX ADMIN — NYSTATIN 500000 UNITS: 500000 SUSPENSION ORAL at 21:59

## 2020-10-06 RX ADMIN — METOCLOPRAMIDE HYDROCHLORIDE 5 MG: 5 SOLUTION ORAL at 06:03

## 2020-10-06 RX ADMIN — NYSTATIN 500000 UNITS: 500000 SUSPENSION ORAL at 00:16

## 2020-10-06 RX ADMIN — Medication: at 17:48

## 2020-10-06 RX ADMIN — METOPROLOL TARTRATE 100 MG: 25 TABLET, FILM COATED ORAL at 17:44

## 2020-10-06 RX ADMIN — MICONAZOLE NITRATE 2 % TOPICAL POWDER: at 10:22

## 2020-10-06 RX ADMIN — Medication 10 ML: at 22:17

## 2020-10-06 RX ADMIN — METOCLOPRAMIDE HYDROCHLORIDE 5 MG: 5 SOLUTION ORAL at 13:22

## 2020-10-06 RX ADMIN — ALPRAZOLAM 0.5 MG: 0.5 TABLET ORAL at 17:45

## 2020-10-06 RX ADMIN — METOCLOPRAMIDE HYDROCHLORIDE 5 MG: 5 SOLUTION ORAL at 23:33

## 2020-10-06 RX ADMIN — FAMOTIDINE 20 MG: 40 POWDER, FOR SUSPENSION ORAL at 22:12

## 2020-10-06 RX ADMIN — ACETAMINOPHEN ORAL SOLUTION 1000 MG: 650 SOLUTION ORAL at 06:01

## 2020-10-06 RX ADMIN — ACETAMINOPHEN ORAL SOLUTION 1000 MG: 650 SOLUTION ORAL at 10:29

## 2020-10-06 NOTE — PROGRESS NOTES
SLP Contact Note    Noted pt has been on continuous trach collar and tolerating PMV. Would recommend, if RT and MD in agreement, red-capping trials to work on trach weaning.         Thank you,  SIVA MiguelEd, [id number] [facility], [place]  Speech-Language Pathologist

## 2020-10-06 NOTE — PROGRESS NOTES
Problem: Mobility Impaired (Adult and Pediatric)  Goal: *Acute Goals and Plan of Care (Insert Text)  Description: FUNCTIONAL STATUS PRIOR TO ADMISSION: Patient was independent and active without use of DME. 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 Physical Therapy Goals  Reassessment 10/2/2020, goals upgraded. 1.  Patient will move from supine to sit and sit to supine , scoot up and down, and roll side to side in bed with minimal within 7 day(s). 2.  Patient will transfer from bed to chair and chair to bed with minimal assist x2 using the least restrictive device within 7 day(s). 3.  Patient will perform sit to stand with minimal assistance within 7 day(s). 4.  Patient will demo good sitting balance in unsupported sitting x5min in prep for mobility progression in 7 days. 5. Patient will ambulate 10ft with RW and mod A x2 within 7 days. Reassessment 9/25/2020  1. Patient will move from supine to sit and sit to supine , scoot up and down, and roll side to side in bed with maximal assistance within 7 day(s). 2.  Patient will transfer from bed to chair and chair to bed with maximal assistance using the least restrictive device within 7 day(s). 3.  Patient will perform sit to stand with maximal assistance within 7 day(s). 4.  Patient will tolerate sitting EOB with moderate assistance for 10 minutes within 7 day(s). Reassessment completed 9/18/2020 and all goals remain appropriate  at this time. Initiated 9/11/2020  1. Patient will move from supine to sit and sit to supine , scoot up and down, and roll side to side in bed with maximal assistance within 7 day(s). 2.  Patient will transfer from bed to chair and chair to bed with maximal assistance using the least restrictive device within 7 day(s). 3.  Patient will perform sit to stand with maximal assistance within 7 day(s).   4. Patient will tolerate sitting EOB with maximal assistance for 5 minutes within 7 day(s). --Goal Met 9/25      Outcome: Progressing Towards Goal   PHYSICAL THERAPY TREATMENT  Patient: Marylee Isaac (20 y.o. male)  Date: 10/6/2020  Diagnosis: Acute respiratory failure ([facility] [address] [J96.00]   Pneumonia due to severe acute respiratory syndrome coronavirus 2 (SARS-CoV-2)  Procedure(s) (LRB):  ESOPHAGOGASTRODUODENOSCOPY (EGD) @ bedside (N/A)  ESOPHAGOGASTRODUODENAL (EGD) BIOPSY (N/A) 14 Days Post-Op  Precautions: Fall  Chart, physical therapy assessment, plan of care and goals were reviewed. ASSESSMENT  Patient continues with skilled PT services and is progressing towards goals. Patient received supine in bed, agreeable to therapy with encouragement. Requires firm and positive encouragement and reinforcement throughout session. With min Ax2 and RW support, he was able to step up onto scale. Initially requested mother to remove PMV for therapy but agreeable to wearing with transfer to chair. Lengthy education about wearing PMV in order to control breathing via PLB, and importance of wearing in order to tolerate . Overall CGA-min Ax2 for all mobility. Required most assist with backing up to chair with RW (retropulsion). Able to calm RR from 36 to 28 and increase sats from 85% to 94% with PLB'ing. Max education about participating in exercises outside of therapy session in order to build tolerance for upcoming rehab. Current Level of Function Impacting Discharge (mobility/balance): CGA-min A, limited endurance    Other factors to consider for discharge: decreased endurance, anxiety         PLAN :  Patient continues to benefit from skilled intervention to address the above impairments. Continue treatment per established plan of care. to address goals.     Recommendation for discharge: (in order for the patient to meet his/her long term goals)  Therapy 3 hours per day 5-7 days per week    This discharge recommendation:  Has been made in collaboration with the attending provider and/or case management    IF patient discharges home will need the following DME: to be determined (TBD)       SUBJECTIVE:   Patient stated I think it sounds hard.  re: increasing ambulation tomorrow    OBJECTIVE DATA SUMMARY:   Critical Behavior:  Neurologic State: Alert  Orientation Level: Oriented X4  Cognition: Appropriate decision making, Appropriate safety awareness, Appropriate for age attention/concentration, Follows commands  Safety/Judgement: Awareness of environment  Functional Mobility Training:  Bed Mobility:  Supine to Sit: Supervision  Scooting: Contact guard assistance  Transfers:  Sit to Stand: Contact guard assistance;Minimum assistance;Assist x2  Stand to Sit: Minimum assistance;Assist x2  Bed to Chair: Contact guard assistance;Minimum assistance;Assist x2  Balance:  Sitting: Intact  Standing: Impaired; With support  Standing - Static: Fair  Standing - Dynamic : Fair  Ambulation/Gait Training:  Distance (ft): 5 Feet (ft)  Assistive Device: Gait belt;Walker, rolling  Ambulation - Level of Assistance: Contact guard assistance;Minimal assistance;Assist x2  Gait Abnormalities: Shuffling gait;Trunk sway increased  Base of Support: Center of gravity altered     Speed/Veronica: Slow;Shuffled          Therapeutic Exercises:   Educated on AROM R UE, has sponge present (instructed in 2 exercises,  and pinching)  Instructed in marching and LAQ  Pain Rating:  Denied pain    Activity Tolerance:   Fair, desaturates with exertion and requires oxygen, requires frequent rest breaks, and observed SOB with activity  Please refer to the flowsheet for vital signs taken during this treatment. After treatment patient left in no apparent distress:   Sitting in chair, Call bell within reach, and Caregiver / family present    COMMUNICATION/COLLABORATION:   The patients plan of care was discussed with: Registered nurse.      [id number] [facility] PT, DPT   Time Calculation: 34 mins

## 2020-10-06 NOTE — CONSULTS
[address]    Name:  Evelia Pruitt  MR#:  [id number]  :  1990  ACCOUNT #:  [de-identified]  DATE OF SERVICE:  10/06/2020    CHIEF COMPLAINT:  Medication management. HISTORY OF PRESENTING ILLNESS:  The patient is a 61-year-old male who is currently being seen at [facility] for psychiatric consultation for anxiety, long-term medication recommendation for anxiolytics and trying to be off benzodiazepine. He has had quite a challenging stay. He initially presented to [facility] on 2020 due to worsening shortness of breath with diaphoresis, cough, and fever. Nine days prior to that admission, he tested positive for COVID-19. He then was sent here to the [address] [place] [address] for possible ECMO and had been here in the hospital for two months. He is currently being treated for pneumonia. He is currently on Xanax that was started in the hospital.  He tells me that he has had issues with anxiety most of his adult life, but has never seen a psychiatrist or any general practitioner for it and has never taken any medication for it. He states that the anxiety got worse when he was admitted in the hospital and also has been feeling slightly depressed. He is open in trying other anxiolytic medications for long-term use. He currently denies suicidal ideation, homicidal ideation, auditory or visual hallucinations. PAST MEDICAL HISTORY:  See H and P.     Past Medical History:   Diagnosis Date    History of vascular access device 08/10/2020    5 Fr triple PICC, hemodynamically unstable, 45 cm L basilic    Tracheostomy dependent ([facility] [address] 10/2/2020       Labs: (reviewed/updated 10/8/2020)  Patient Vitals for the past 8 hrs:   BP Temp Pulse Resp SpO2 Weight   10/08/20 0825 (!) 136/93 99.1 °F (37.3 °C) (!) 119 22 98 %    10/08/20 0541   97      10/08/20 0329      83.3 kg (183 lb 10.3 oz)   10/08/20 0317 132/85 98.2 °F (36.8 °C) (!) 110 22 98 %        Lab Results   Component Value Date/Time    Sodium 137 10/07/2020 04:24 AM    Potassium 3.7 10/07/2020 04:24 AM    Chloride 98 10/07/2020 04:24 AM    CO2 31 10/07/2020 04:24 AM    Anion gap 8 10/07/2020 04:24 AM    Glucose 118 (H) 10/07/2020 04:24 AM    BUN 17 10/07/2020 04:24 AM    Creatinine 0.60 (L) 10/07/2020 04:24 AM    BUN/Creatinine ratio 28 (H) 10/07/2020 04:24 AM    GFR est AA >60 10/07/2020 04:24 AM    GFR est non-AA >60 10/07/2020 04:24 AM    Calcium 9.8 10/07/2020 04:24 AM    Bilirubin, total 0.3 10/07/2020 04:24 AM    Alk. phosphatase 105 10/07/2020 04:24 AM    Protein, total 7.7 10/07/2020 04:24 AM    Albumin 3.6 10/07/2020 04:24 AM    Globulin 4.1 (H) 10/07/2020 04:24 AM    A-G Ratio 0.9 (L) 10/07/2020 04:24 AM    ALT (SGPT) 44 10/07/2020 04:24 AM     No results displayed because visit has over 200 results. Vitals:    10/08/20 0317 10/08/20 0329 10/08/20 0541 10/08/20 0825   BP: 132/85   (!) 136/93   Pulse: (!) 110  97 (!) 119   Resp: 22   22   Temp: 98.2 °F (36.8 °C)   99.1 °F (37.3 °C)   SpO2: 98%   98%   Weight:  83.3 kg (183 lb 10.3 oz)     Height:         No results found for this or any previous visit (from the past 24 hour(s)). RADIOLOGY REPORTS:  Results from Hospital Encounter encounter on 08/18/20   XR CHEST PORT    Narrative INDICATION: Increased secretions    COMPARISON: September 25, 2020    FINDINGS: AP portable imaging of the chest performed at 1:37 PM demonstrates a  stable cardiomediastinal silhouette. Tracheostomy tube is unchanged in position. Lung volumes remain low. Bilateral interstitial and alveolar opacities are  slightly improved. No significant osseous abnormalities are seen. Impression IMPRESSION: Persistent low lung volumes with slightly improved bilateral  interstitial and alveolar opacities. Xr Shoulder Rt Ap/lat Min 2 V    Result Date: 10/2/2020  EXAM: XR SHOULDER RT AP/LAT MIN 2 V INDICATION: R shoulder pain, inability to lift arm. COMPARISON: None. FINDINGS: 2 views of the right shoulder demonstrate no fracture, dislocation. There is extremely limited motion of the shoulder. Of interest, there is diffuse coarsening of the interstitial markings in the right lung. IMPRESSION: Limited volitional right shoulder motion Diffuse right lung interstitial disease. Please correlate with clinical factors. Consider pneumonia. Xr Abd (kub)    Result Date: 9/22/2020  CLINICAL HISTORY: Dobbhoff placement Comparison to 9/13/2020 Single KUB demonstrates diffuse bowel distention. Dobbhoff tube has been placed with the tip projecting over the gastric cardia. IMPRESSION: Diffuse bowel distention. Dobbhoff tube should be advanced. Xr Abd (kub)    Result Date: 9/7/2020  EXAM: XR ABD (KUB) INDICATION: NGT Placement COMPARISON: 9/1/2020. FINDINGS: A supine radiograph of the abdomen shows a Dobbhoff tube tip in the distal stomach. The small bowel is slightly less distended. IMPRESSION: Interval placement of a Dobbhoff tube     Xr Abd (kub)    Result Date: 8/29/2020  INDICATION: verify NGT placement. EXAM: SINGLE VIEW ABDOMEN RADIOGRAPH. COMPARISON: 8/26/2020. FINDINGS: A supine radiograph of the abdomen shows diffuse dilatation of what appeared to be small bowel loops. . No soft tissue masses or pathologic calcifications are identified. The bones and soft tissues are within normal limits. A feeding tube projects over the upper abdomen with its tip in the expected location of the first duodenum. .     IMPRESSION: 1. Feeding tube position as described. 2. Mild small bowel distention is suspected. Aliza Diop Func Video    Result Date: 10/1/2020  EXAM: XR SWALLOW FUNC VIDEO INDICATION: Dysphagia. Chronic respiratory failure due to COVID19 severe pneumonia. Fluoroscopy dose (air kerma): 8.47 mGy. FINDINGS: Fluoroscopic assistance and image interpretation services were provided to speech therapy for performance of a modified barium swallow.  The patient ingested liquid barium of varying consistencies as well as pudding consistency in the lateral position with continuous fluoroscopy. With all consistencies there is delayed oral processing and delayed initiation of the swallow and a slow pharyngeal stripping wave with limited clearing of the bolus through the pharynx. Moderate residue in the vallecula and hypopharynx. Aspiration of thin liquids. Aspiration of residue while eating pudding consistency. Penetration with nectar. IMPRESSION: Aspiration and penetration. Oropharyngeal delay and hypopharyngeal residue. See speech pathology report for details. Ir Insert Gastrostomy Tube Perc    Result Date: 9/24/2020  Clinical Data: A 27year-old patient presents for fluoroscopically guided gastrostomy tube placement, for feeding. Date: September 23, 2020 : Radha Eugene M.D. Estimated Blood Loss: Minimal. Specimens Removed: None. Complications: None. Procedure: 1. Ultrasound-guided gastropexy with 3 T-fasteners. 2. Ultrasound-guided 18-gauge needle access into the distended stomach 3. Serial dilatation and placement of 18 [place] gastrostomy tube through peel-away sheath 4. Patient arrived sedated from ICU FLUOROSCOPY TIME: 2.7 min FLUOROSCOPY DOSE: 36.4 mGy Technique: Informed verbal and written consent was obtained from the the patient's mother following discussion of risks, benefits and alternatives to this procedure. The patient was positioned supine on the angiography table. The mid and left upper abdomen was cleaned and draped in sterile fashion. Under live fluoroscopic guidance, 3 gastropexy needles were advanced into the stomach with T-fasteners, which were deployed. A skin nick was made in the selected location, and under live fluoroscopic guidance, an 18-gauge needle was advanced between the T-fasteners into the stomach. Small amount of contrast injected under fluoroscopic observation confirmed positioning in the stomach.  A superstiff Amplatz wire was advanced through the wire, and curled within the stomach. Over the wire, serial dilatation was performed up to a 22 [place] peel-away sheath. Through the peel-away sheath, a 25 [place] gastrostomy tube was advanced into the stomach. The peel-away sheath was removed, the retention balloon was inflated with 9 cc of saline, and retracted to the anterior abdominal wall. The external retaining bumper was advanced to the skin. Contrast injection confirmed positioning. The patient tolerated the procedure well. There were no immediate complications. The patient left the radiology suite in stable condition. Impression: Successful fluoroscopically guided placement of a 25 [place] gastrostomy tube. Mri Brain Wo Cont    Result Date: 10/2/2020  INDICATION:   CVA EXAMINATION:  MRI BRAIN WO CONTRAST COMPARISON:  CT head earlier today and CT September 22, 2020 TECHNIQUE:  Multiplanar multisequence acquisition without contrast of the brain. FINDINGS:  Ventricles:  Midline, no hydrocephalus. Brain Parenchyma/Brainstem:  No significant change in multiple areas of cortical and subcortical FLAIR/T2 hyperintensity in the bilateral frontal and parietal lobes at the vertex as well as the medial right parietal lobe, with mild peripheral diffusion signal abnormality. No definite new areas of involvement. A small focal diffusion restricting lesion in the right posterior body of the corpus callosum has not changed significantly. Chronic infarction right inferior cerebellum. No acute infarction. Intracranial Hemorrhage:  No acute hemorrhage. Hemosiderin staining along the surface of the bilateral frontal and parietal lobes at the vertex consistent with prior subarachnoid hemorrhage. Parenchymal hemosiderin deposition inferior right cerebellum. Basal Cisterns:  Normal. Flow Voids:  Normal. Additional Comments:  Bilateral mastoid effusions. Signal abnormality throughout the right maxillary sinus is unchanged. IMPRESSION: 1.  Minimal if any change in areas of cortical and subcortical encephalomalacia bilateral frontal and parietal lobe superiorly, again likely due to subacute to chronic infarction or hypoxic ischemic event. Small areas of hemosiderin staining along the vertex also raise possibility of vasculopathy. No new infarction. 2. Chronic right cerebellar hemorrhagic infarction, unchanged. 3. 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 3. Bilateral mastoid effusions. Right maxillary sinus signal abnormality, unchanged. Mri Brain Wo Cont    Result Date: 9/22/2020  PRELIMINARY REPORTScattered areas of T2/FLAIR signal with associated restricted diffusion in the supratentorial cortex and right aspect of the corpus callosum can be seen with nonspecific infection or inflammation. Preliminary report was provided by Dr. Kattie Castleman, the on-call radiologist, on 9/22/2020 at 0440 hours. Final report to follow. END PRELIMINARY REPORTEXAM:  MRI BRAIN WO CONT INDICATION:    focal deficits. History of COVID. COMPARISON:  CTA head and neck 9/4/2020. CONTRAST: None. TECHNIQUE:  Multiplanar multisequence acquisition without contrast of the brain. FINDINGS: There is extensive cortical/subcortical T2/FLAIR hyperintensity noted in the bilateral frontoparietal lobes with associated atrophy and areas of cortical T1 shortening likely representing laminar necrosis. These areas are accompanied by mild DWI hyperintensity. There is an additional area of DWI hyperintensity in the right posterior body of the corpus callosum with associated T2/FLAIR hyperintensity. There is a chronic area of encephalomalacia with associated T1 hyperintensity and hemosiderin staining in the right cerebellum. The ventricles are normal in size and position. There is no extra-axial fluid collection or mass effect. A small chronic microhemorrhages is noted in the left lateral cerebellum. There is no cerebellar tonsillar herniation.  Expected arterial flow-voids are present. Complete opacification of the right sphenoid sinus and near-complete aspiration of the right maxillary sinus with air-fluid level. Large bilateral mastoid effusions. The orbital contents are within normal limits. No significant osseous or scalp lesions are identified. IMPRESSION: 1. Evolving cortical/subcortical encephalomalacia and atrophy in the bilateral frontoparietal lobes with signal changes as described above. Given clinical history, this may represent evolving ischemic changes from a hypoxic-ischemic event, vasculitic involvement from COVID, or evolving encephalomalacia from a infectious/inflammatory process related to [facility]. 2. Small area of signal abnormality with restricted diffusion in the right posterior body of the corpus callosum, which likely represents the same process as above, again either ischemic or infectious/inflammatory. 3. Chronic area of encephalomalacia in the right cerebellum with associated chronic hemorrhage. 4. Complete opacification of the right sphenoid sinus and near complete opacification of the right maxillary sinus with air-fluid level, suspicious for acute sinusitis. Large bilateral mastoid effusions. 23X     Ct Head Wo Cont    Result Date: 10/2/2020  EXAM: Head CT INDICATION: Left-sided facial and tongue numbness. COMPARISON: 9/22/2020. TECHNIQUE: Unenhanced CT of the head was performed using 5 mm images. Brain and bone windows were generated. CT dose reduction was achieved through use of a standardized protocol tailored for this examination and automatic exposure control for dose modulation. FINDINGS: The ventricles and sulci are normal in size, shape and configuration and midline. There appears to be a new hypodensity involving the left high parietal region with loss of the gray-white junction (series 3, image 27 and coronal image 51). . There is no intracranial hemorrhage, extra-axial collection, mass or midline shift. The basilar cisterns are open.  There is calcification in the right cerebellar hemisphere. The bone windows demonstrate no abnormalities. The visualized portions of the paranasal sinuses are clear with exception of opacification of the right maxillary sinus. The mastoid air cells are opacified bilaterally. A right farrah bullosa is noted. IMPRESSION: Possible ischemic left parietal infarct. Chandra Vernon was given this result at 1859 hours. [id number] [employer]    Result Date: 9/3/2020  INDICATION: loss of power over right arm Exam: Noncontrast CT of the brain is performed with 5 mm collimation. CT dose reduction was achieved with the use of the standardized protocol tailored for this examination and automatic exposure control for dose modulation. Adaptive statistical iterative reconstruction (ASIR) was utilized. FINDINGS: There is no acute intracranial hemorrhage, mass, mass effect or herniation. Ventricular system is normal. The gray-white matter differentiation is well-preserved. Mastoid air cells are opacified. The right maxillary sinus is opacified and there is partial opacification of the right ethmoid air cells. The right sphenoid sinus is opacified. There is no air-fluid levels in the left sphenoid sinus. Left maxillary sinus antrectomy postoperative changes are noted. There is near total opacification of the right frontal sinus. IMPRESSION: 1. No acute intracranial hemorrhage, mass or infarct. 2. Bilateral mastoid effusions. 3. Paranasal sinus disease, as described above. Ct Chest Wo Cont    Result Date: 9/16/2020  EXAM: CT CHEST WO CONT, CT ABD PELV WO CONT INDICATION: fibrosis/hypoxia  Persistent vomiting and high proximal hip. Covid 19. Sepsis. Right hemiaplasia. Altered mental status. Patient has tracheostomy. COMPARISON: Chest x-ray 9/16/20 CONTRAST:  None. TECHNIQUE: Thin axial images were obtained through the chest, abdomen and pelvis. Coronal and sagittal reconstructions were generated. Oral contrast was not administered.  CT dose reduction was achieved through use of a standardized protocol tailored for this examination and automatic exposure control for dose modulation. The absence of intravenous contrast material reduces the sensitivity for evaluation of the mediastinum and solid parenchymal organs of the abdomen. FINDINGS: LOWER NECK: Tracheostomy tube in place. THYROID: No nodule. MEDIASTINUM: No mass or lymphadenopathy. NIMISHA: Perihilar consolidation greater on the right. THORACIC AORTA: No aneurysm. MAIN PULMONARY ARTERY: Normal in caliber. TRACHEA/BRONCHI: Tracheostomy tube. ESOPHAGUS: No wall thickening or dilatation. Nasogastric tube traverses the thorax. HEART: Normal in size. PLEURA: No effusion or pneumothorax. LUNGS: Bilateral right greater than left patchy and confluent airspace disease lower greater than upper lobes. LIVER: No mass or biliary dilatation. Respiratory artifact. GALLBLADDER: Not distended. SPLEEN: No mass. PANCREAS: No mass or ductal dilatation. ADRENALS: Unremarkable. KIDNEYS/URETERS: No mass, calculus, or hydronephrosis. STOMACH: Unremarkable. Feeding tube tip is in the proximal duodenum. SMALL BOWEL: No dilatation or wall thickening. COLON: No dilatation or wall thickening. APPENDIX: Unremarkable. PERITONEUM: No ascites or pneumoperitoneum. RETROPERITONEUM: No lymphadenopathy or aortic aneurysm. REPRODUCTIVE ORGANS: Prostate is not enlarged. URINARY BLADDER: Empty with Mcwilliams catheter in place. BONES: No destructive bone lesion. IMPRESSION: 1. Bilateral parenchymal lung disease consistent with history Covid 19. 2. No pleural effusion, abscess, peritoneal fluid or other acute abnormality demonstrated. Cta Chest W Or W Wo Cont    Result Date: 10/2/2020  EXAM: CTA CHEST W OR W WO CONT INDICATION: tachycardia, elevated d-dimer COMPARISON: 9/16/2020. CONTRAST: 80 mL of Isovue-370. TECHNIQUE: Precontrast  images were obtained to localize the volume for acquisition.  Multislice helical CT arteriography was performed from the diaphragm to the thoracic inlet during uneventful rapid bolus intravenous contrast administration. Lung and soft tissue windows were generated. Coronal and sagittal images were generated and 3D post processing consisting of coronal maximum intensity images was performed. CT dose reduction was achieved through use of a standardized protocol tailored for this examination and automatic exposure control for dose modulation. FINDINGS: There is stable diffuse bilateral airspace disease. A tracheostomy tube extends to the thoracic inlet. The lung volumes are low. The pulmonary arteries are well enhanced and no pulmonary emboli are identified. There is no mediastinal or hilar adenopathy or mass. The aorta enhances normally without evidence of aneurysm or dissection. The visualized portions of the upper abdominal organs are normal.     IMPRESSION: Stable diffuse airspace disease compatible with multilobar pneumonia. No evidence for pulmonary embolism    Ct Abd Pelv Wo Cont    Result Date: 9/16/2020  EXAM: CT CHEST WO CONT, CT ABD PELV WO CONT INDICATION: fibrosis/hypoxia  Persistent vomiting and high proximal hip. Covid 19. Sepsis. Right hemiaplasia. Altered mental status. Patient has tracheostomy. COMPARISON: Chest x-ray 9/16/20 CONTRAST:  None. TECHNIQUE: Thin axial images were obtained through the chest, abdomen and pelvis. Coronal and sagittal reconstructions were generated. Oral contrast was not administered. CT dose reduction was achieved through use of a standardized protocol tailored for this examination and automatic exposure control for dose modulation. The absence of intravenous contrast material reduces the sensitivity for evaluation of the mediastinum and solid parenchymal organs of the abdomen. FINDINGS: LOWER NECK: Tracheostomy tube in place. THYROID: No nodule. MEDIASTINUM: No mass or lymphadenopathy. NIMISHA: Perihilar consolidation greater on the right. THORACIC AORTA: No aneurysm. MAIN PULMONARY ARTERY: Normal in caliber. TRACHEA/BRONCHI: Tracheostomy tube. ESOPHAGUS: No wall thickening or dilatation. Nasogastric tube traverses the thorax. HEART: Normal in size. PLEURA: No effusion or pneumothorax. LUNGS: Bilateral right greater than left patchy and confluent airspace disease lower greater than upper lobes. LIVER: No mass or biliary dilatation. Respiratory artifact. GALLBLADDER: Not distended. SPLEEN: No mass. PANCREAS: No mass or ductal dilatation. ADRENALS: Unremarkable. KIDNEYS/URETERS: No mass, calculus, or hydronephrosis. STOMACH: Unremarkable. Feeding tube tip is in the proximal duodenum. SMALL BOWEL: No dilatation or wall thickening. COLON: No dilatation or wall thickening. APPENDIX: Unremarkable. PERITONEUM: No ascites or pneumoperitoneum. RETROPERITONEUM: No lymphadenopathy or aortic aneurysm. REPRODUCTIVE ORGANS: Prostate is not enlarged. URINARY BLADDER: Empty with Mcwilliams catheter in place. BONES: No destructive bone lesion. IMPRESSION: 1. Bilateral parenchymal lung disease consistent with history Covid 19. 2. No pleural effusion, abscess, peritoneal fluid or other acute abnormality demonstrated. Xr Chest Port    Result Date: 10/4/2020  INDICATION: Increased secretions COMPARISON: September 25, 2020 FINDINGS: AP portable imaging of the chest performed at 1:37 PM demonstrates a stable cardiomediastinal silhouette. Tracheostomy tube is unchanged in position. Lung volumes remain low. Bilateral interstitial and alveolar opacities are slightly improved. No significant osseous abnormalities are seen. IMPRESSION: Persistent low lung volumes with slightly improved bilateral interstitial and alveolar opacities. Xr Chest Port    Result Date: 9/25/2020  EXAM: XR CHEST PORT INDICATION: PNA COMPARISON: 9/22/2020 FINDINGS: A portable AP radiograph of the chest was obtained at 910 hours. Patient's on cardiac monitor. A tracheostomy tube is noted. . Low lung volumes are present with diffuse interstitial and airspace opacities slightly increased on the right. . Heart size is borderline. .  Gaseous distention of the gastric lumen is present. .     IMPRESSION: Diffuse interstitial and airspace opacities with some interval increase in the right upper lobe. Xr Chest Port    Result Date: 9/22/2020  EXAM: XR CHEST PORT INDICATION: feeding tube placement COMPARISON: 9/14/2020 FINDINGS: A portable AP radiograph of the chest was obtained at 853 hours. The patient is on a cardiac monitor. Diffuse interstitial and airspace opacities with interval improvement. Lung volumes remain low. Tubes and lines are stable. . The cardiac and mediastinal contours and pulmonary vascularity are normal. Interval gastric distention. Dilated loops of bowel in the upper abdomen are not well evaluated. Esme Hernandez IMPRESSION: 1. Improved interstitial and airspace opacities with persistent low lung volumes. 2.  Dilated gastric lumen. Possible dilated loops of bowel in the abdomen. Consider dedicated abdominal    Xr Chest Port    Result Date: 9/14/2020  EXAM:  XR CHEST PORT INDICATION: Aspiration COMPARISON: 9/9/2020 TECHNIQUE: Portable AP semiupright chest view at 2249 hours FINDINGS: The tracheostomy tube and enteric tube are stable. Cardiac monitoring wires overlie the thorax. The cardiomediastinal contours are stable. There is stable patchy bilateral lung opacities and low lung volumes. There is no pleural effusion or pneumothorax. The bones and upper abdomen are stable. IMPRESSION: Stable patchy bilateral lung opacities. Xr Chest Port    Result Date: 9/9/2020  EXAM: XR CHEST PORT INDICATION: Multifocal airspace disease COMPARISON: 9/6/2020 FINDINGS: A portable AP radiograph of the chest was obtained at 1227 hours. The patient is on a cardiac monitor. There is multifocal airspace disease with air bronchograms, asymmetric to the left and unchanged. The tracheostomy tube is in place.  The Dobbhoff extends below the hemidiaphragm. IMPRESSION: Unchanged multifocal airspace disease compatible with pneumonia    Xr Chest Port    Result Date: 9/6/2020  Chest portable AP History: Respiratory failure Comparison: 9/3/2020 Findings:  A tracheostomy tube is in place. An enteric tube is seen within stomach. Cardiac monitoring leads overlie the chest. The heart is normal size, unchanged. There is diffuse patchy opacification lungs again seen. Impression: Unchanged diffuse airspace disease    Xr Chest Port    Result Date: 9/3/2020  INDICATION:  Respiratory failure EXAM: CXR Portable. FINDINGS: Portable chest shows support lines/devices show no significant change since yesterday including left arm PICC line tip in the right subclavian vein. There is no apparent pneumothorax. Lungs show no volumes with diffuse airspace disease/edema. Heart size is obscured. There is no midline shift. IMPRESSION: No significant change including left arm PICC line tip in the right subclavian vein. Xr Chest Port    Result Date: 9/2/2020  INDICATION:  Tube Placement COMPARISON:  9/1/20 FINDINGS: A portable AP radiograph of the chest was obtained at 0414 hours. The patient is on a cardiac monitor. Tracheostomy tube is adequately positioned and Dobbhoff type feeding tube is again noted traversing the thorax. Continued bilateral interstitial and alveolar infiltrates. No pneumothorax. IMPRESSION: No significant change in diffuse interstitial and alveolar lung disease. Xr Chest Port    Result Date: 9/1/2020  EXAM: XR CHEST PORT INDICATION: Evaluate tube positions COMPARISON: Prior day FINDINGS: A portable AP radiograph of the chest was obtained at 0458 hours. The patient is on a cardiac monitor. The Dobbhoff tube extends below the hemidiaphragm. A tracheostomy tube is in place. The PICC line extends to the caval atrial junction. There is diffuse bilateral airspace disease, unchanged allowing for diminution in lung volumes.      IMPRESSION: Stable diffuse airspace disease. Low lung volumes. Xr Chest Port    Result Date: 8/31/2020  EXAM: XR CHEST PORT INDICATION: Tube Placement COMPARISON: Prior day FINDINGS: A portable AP radiograph of the chest was obtained at 0352 hours. The patient is on a cardiac monitor. A tracheostomy tube is in place. The Dobbhoff extends below the hemidiaphragm. The left PICC line terminates at the cavoatrial junction. There is diffuse patchy bilateral airspace disease, accentuated by low lung volumes. IMPRESSION: No change in tube and line positions. No change in diffuse bilateral pneumonia    Xr Chest Port    Result Date: 8/30/2020  EXAM: XR CHEST PORT INDICATION: Tube Placement COMPARISON: Prior day FINDINGS: A portable AP radiograph of the chest was obtained at 0403 hours. The patient is on a cardiac monitor. A tracheostomy tube is in place. The Dobbhoff tip is in the distal stomach. Diffuse patchy bilateral airspace disease is unchanged. A PICC line terminates at the caval atrial junction. IMPRESSION: Stable diffuse bilateral airspace disease. Xr Chest Port    Result Date: 8/29/2020  INDICATION:  possible aspiration EXAM: Chest single view. COMPARISON: Earlier same day. FINDINGS: A single frontal view of the chest at 0949 hours shows slight increase in lung volumes/inspiratory effort but with stable diffuse lung infiltrates left greater than right. Tracheostomy device, feeding tube and left PICC line all stable. No pneumothorax. .  The heart, mediastinum and pulmonary vasculature are stable . The bony thorax is unremarkable for age. Joni Waterman IMPRESSION: Slight increase in lung volumes/inspiratory effort but stable diffuse lung infiltrates left greater than right moderately severe. Lines and tubes stable. . . Xr Chest Port    Result Date: 8/29/2020  INDICATION:  Tube Placement EXAM: Chest single view. COMPARISON: 8/28/2020.  FINDINGS: A single frontal view of the chest at 03 50 hours shows poor inspiratory effort with diffuse lung infiltrates similar to the prior study. . The heart, mediastinum and pulmonary vasculature are stable . The bony thorax is unremarkable for age. . Tracheostomy device and feeding tube are stable. IMPRESSION: Stable diffuse lung infiltrates and poor inspiration. .  . Xr Chest Port    Result Date: 8/28/2020  EXAM:  XR CHEST PORT INDICATION: Bilateral lung opacities. COMPARISON: 8/27/2020 at 0449 hours TECHNIQUE: Portable AP semiupright chest view at 0446 hours FINDINGS: The tracheostomy tube, left PICC, and enteric tube are stable. Cardiac monitoring wires overlie the thorax. The cardiomediastinal contours are stable. There are stable bilateral interstitial and patchy airspace opacities. There is no pleural effusion or pneumothorax. The bones and upper abdomen are stable. A left upper abdomen catheter is noted. IMPRESSION: Stable bilateral interstitial and patchy airspace opacities. Xr Chest Port    Result Date: 8/27/2020  EXAM:  XR CHEST PORT INDICATION: Bilateral interstitial and patchy airspace opacities. COMPARISON: 8/26/2020 at 0410 hours TECHNIQUE: Portable AP semiupright chest view at 0449 hours FINDINGS: The endotracheal tube has been removed. A tracheostomy tube terminates above the victor hugo. The enteric tube and catheter in the left upper abdomen are stable. Cardiac monitoring wires overlie the thorax. The cardiomediastinal contours are stable. Bilateral interstitial and patchy airspace opacities are unchanged. There is no pleural effusion or pneumothorax. The bones and upper abdomen are stable. IMPRESSION: Stable bilateral interstitial and patchy airspace opacities. Xr Chest Port    Result Date: 8/26/2020  EXAM:  XR CHEST PORT INDICATION: Bilateral lung opacities. COMPARISON: 8/25/2020 at 0429 hours TECHNIQUE: Portable AP semiupright chest view at 0410 hours FINDINGS: The endotracheal tube and enteric tube are stable. The left PICC is stable.  Cardiac monitoring wires overlie the thorax. The cardiomediastinal contours are stable. Bilateral interstitial and patchy airspace opacities are overall unchanged. There is no pleural effusion or pneumothorax. The bones and upper abdomen are stable. IMPRESSION: Stable bilateral interstitial and patchy airspace opacities. Xr Chest Port    Result Date: 8/25/2020  EXAM: XR CHEST PORT INDICATION: Tube Placement COMPARISON: 8/24/2020 FINDINGS: A portable AP radiograph of the chest was obtained at 0429 hours. The patient is on a cardiac monitor. The ET tube is in satisfactory position. PICC line overlies the SVC near the cavoatrial junction. NG tube courses into the stomach. Left chest tube is in place. Bilateral airspace disease consistent with pneumonia left greater than right has decreased slightly. IMPRESSION: 1. Bilateral airspace disease overall has slightly decreased especially in the right lung. Xr Chest Port    Result Date: 8/24/2020  EXAM: XR CHEST PORT INDICATION: Tube Placement COMPARISON: 8/23/2020 FINDINGS: A portable AP radiograph of the chest was obtained at 0415 hours. The patient is on a cardiac monitor. The ET tube is in satisfactory position. The left-sided PICC line overlies the right atrium near the cavoatrial junction. The NG tube courses into the stomach. Left chest tube remains in place. Bilateral airspace disease consistent with pneumonia most pronounced in the left lower lobe and left perihilar region has minimally decreased. No pneumothorax. There is decreasing subcutaneous emphysema. IMPRESSION: 1. Mild decrease in the fairly severe bilateral pneumonia. Xr Chest Port    Result Date: 8/23/2020  EXAM:  XR CHEST PORT INDICATION:  Tube Placement COMPARISON:  8/22/2020 FINDINGS: A portable AP radiograph of the chest was obtained at 428 hours. ET tube is in satisfactory position. NG tube and left PICC catheter tip are unchanged. .  There is diffuse airspace disease which is improved in the left midlung and slightly progressed at the right lung base. Subcutaneous emphysema is again noted. Pneumomediastinum is less apparent. Lyssa Messing Heart size is stable. Pigtail catheter overlying left base is unchanged. .  Bony structures are unchanged. IMPRESSION: 1. There is diffuse bilateral airspace disease which is improved in the left midlung and slightly progressed in the right lower lobe. Pneumomediastinum is less apparent. Subcutaneous emphysema is again noted. There is no pneumothorax. Xr Chest Port    Result Date: 8/22/2020  EXAM: Portable CXR. 1505 hours. COMPARISON: 0348 hours. INDICATION: Pneumo FINDINGS: No significant change. No pneumothorax. Questionable pneumomediastinum. Extensive subcutaneous emphysema. ET tube tip remains at the victor hugo. Left arm PICC line tip remains in the SVC. NG tube remains in the stomach. Lungs are stable with dense consolidation left greater than right. Lung volumes are low. Heart size is normal.     IMPRESSION: Stable. Xr Chest Port    Result Date: 8/22/2020  EXAM: XR CHEST PORT INDICATION: Tube Placement COMPARISON: Previous day FINDINGS: A portable AP radiograph of the chest was obtained at 0348 hours. The patient is on a cardiac monitor. There is diffuse bilateral airspace opacification as seen previously. There is new pneumomediastinum. The endotracheal tube and nasogastric tube are stable. The PICC line is stable. The cardiac and mediastinal contours and pulmonary vascularity are normal.  The bones and soft tissues are grossly within normal limits. IMPRESSION: Persistent bilateral diffuse airspace opacification. New pneumomediastinum. Xr Chest Port    Result Date: 8/21/2020  EXAM: XR CHEST PORT INDICATION: Worsening subcutaneous air. need to eval chest tube. COMPARISON: Previous day FINDINGS: A portable AP radiograph of the chest was obtained at 0450 hours. The endotracheal tube and central line are stable. There is persistent subcutaneous emphysema.  The nasogastric tube side port is in the region of the distal esophagus. The patient is on a cardiac monitor. There is persistent bilateral diffuse airspace opacification. The cardiac and mediastinal contours and pulmonary vascularity are normal.  The bones and soft tissues are grossly within normal limits. IMPRESSION: 1. No significant interval change in the appearance of the chest. 2. The nasogastric tube side port is in the region of the distal esophagus and should be advanced. Xr Chest Port    Result Date: 8/20/2020  EXAM:  XR CHEST PORT INDICATION: Bilateral lung opacities. COMPARISON: 8/20/2020 at 0440 hours TECHNIQUE: Portable AP semiupright chest view at 0845 hours FINDINGS: The support devices are stable. Cardiac monitoring wires overlie the thorax. The cardiomediastinal contours are stable. There are stable bilateral interstitial and patchy airspace opacities. There is no pleural effusion or pneumothorax. There is stable diffuse chest wall subcutaneous emphysema. The bones are stable. IMPRESSION: Stable bilateral interstitial and patchy airspace opacities. Stable diffuse chest wall subcutaneous emphysema. Xr Chest Port    Result Date: 8/20/2020  EXAM:  XR CHEST PORT INDICATION: Bilateral lung opacities. COMPARISON: 8/19/2020 at 2227 hours TECHNIQUE: Portable AP semiupright chest view at 0440 hours FINDINGS: The endotracheal tube and enteric tube are stable. Cardiac monitoring wires overlie the thorax. The cardiac mediastinal contours are stable. There is stable bilateral interstitial and patchy airspace opacities. There is no pleural effusion or pneumothorax. The bones and upper abdomen are stable. Diffuse chest wall subcutaneous emphysema is again noted. IMPRESSION: Stable bilateral interstitial and patchy airspace opacities. Xr Chest Port    Addendum Date: 8/20/2020    Addendum: The impression should read interval development of subcutaneous emphysema.     Result Date: 8/20/2020  EXAM: XR CHEST PORT INDICATION: hypoxia, suspected aspiration COMPARISON: Earlier in the day FINDINGS: A portable AP radiograph of the chest was obtained at 2227 hours. The endotracheal tube, nasogastric tube, and central line are stable. The patient is on a cardiac monitor. There is opacification throughout the lung fields bilaterally. There is new significant subcutaneous emphysema. A pneumothorax is not seen. The cardiac and mediastinal contours and pulmonary vascularity are normal.  The bones and soft tissues are grossly within normal limits. IMPRESSION: 1. Interval development of continuous emphysema. 2. Persistent patchy opacification throughout the lungs bilaterally. Xr Chest Port    Result Date: 8/19/2020  INDICATION:  Chest tube evaluation EXAM: Portable chest 0841 hours. Comparison earlier same day FINDINGS: Endotracheal tube and gastric tube not significantly changed. Left arm PICC line has been pulled back to the level of the right mainstem bronchus. Heart size is normal. Diffuse interstitial and airspace disease again noted not changed. No pneumothorax post left-sided pigtail catheter placement which is at the left base. No right pleural effusion     IMPRESSION: 1. No complicating features post left-sided chest tube placement. Diffuse bilateral interstitial and alveolar opacities unchanged     Xr Chest Port    Result Date: 8/19/2020  EXAM:  XR CHEST PORT INDICATION: Diffuse interstitial opacities. COMPARISON: 8/18/2020 at 1318 hours. TECHNIQUE: Portable AP semiupright chest view at 0446 hours FINDINGS: The endotracheal tube terminates above the victor hugo. The enteric tube terminates in the stomach. The left PICC terminates in profile with the superior cavoatrial junction. Cardiac monitoring wires overlie the thorax. The cardiomediastinal contours are stable. Diffuse interstitial opacities are stable. Focal airspace opacity in the right lung base is decreased. There is no pleural effusion or pneumothorax. The bones and upper abdomen are stable. IMPRESSION: Stable diffuse interstitial opacities. Decreased focal airspace opacity in the right lung base. Xr Chest Port    Result Date: 8/18/2020  history: Respiratory failure COMPARISON: 8/18/2020 FINDINGS: Frontal chest radiograph submitted for review. Endotracheal tube extends to the right mainstem bronchus origin and should be pulled back 1 to 2 cm. Stable diffuse interstitial opacities. No significant pleural effusion. No evidence for pneumothorax. Feeding tube extends to the gastric fundus. Focal right basilar airspace disease. IMPRESSION: Endotracheal tube extends to the right mainstem bronchus origin and should be pulled back 1 to 2 cm. Slightly increased focal right basilar airspace disease. No other change. Findings discussed with patient's nurse Artem at 2:00 PM 8/18/2020    Xr Chest Port    Result Date: 8/18/2020  Portable chest single view dated 8/18/2020 Comparison chest dated 8/17/2020 History is tube placement. A single frontal view of the chest was obtained. There continues to be hazy density involving both lungs. There has been slight interval improvement of the aeration of the lungs since the previous examination. The endotracheal tube, nasogastric tube, pigtail catheter in the region of the left lung base and the PICC line are unchanged in position. IMPRESSION: Slight interval improvement of the bilateral infiltration. Xr Chest Port    Result Date: 8/17/2020  Portable chest single view dated 8/17/2020 Comparison chest dated 8/16/2020 History is respiratory failure A single frontal view of the chest was obtained. The endotracheal tube, nasogastric tube and PICC line on the left remain unchanged in position. The cardiac silhouette is normal in size. There has been interval improvement of the aeration of the lungs. Specifically, less hazy density is noted bilaterally.      IMPRESSION: Interval improvement of the bilateral infiltration. Xr Chest Port    Result Date: 8/16/2020  Indication: Chest tube placement Comparison to earlier the same day. Portable exam obtained at [phone] demonstrates interval placement of a left-sided chest tube. No pneumothorax. Stable bilateral pulmonary infiltrates. Impression: Interval placement of a left-sided chest tube without pneumothorax. Xr Chest Port    Result Date: 8/16/2020  Indication: Respiratory failure Comparison to 8/15/2020. Portable exam obtained at 446 demonstrates a slight interval increase in the bilateral interstitial infiltrates compared to prior examination. Life-support lines and tubes are unchanged in position. Impression: Slight interval increase in the bilateral pulmonary infiltrates. Xr Chest Port    Result Date: 8/15/2020  Clinical indication: Respiratory failure. Portable AP upright view of the chest obtained, comparison 8/14/2020. Support devices in diffuse bilateral infiltrates once again seen. IMPRESSION: No change. Xr Chest Port    Result Date: 8/14/2020  EXAM: XR CHEST PORT INDICATION: respiratory failure COMPARISON: 8/13/2020 FINDINGS: A portable AP radiograph of the chest was obtained at 1050 hours. Endotracheal tube is 3.5 cm above the victor hugo. The patient is on a cardiac monitor. Patchy diffuse interstitial and airspace opacities are again noted without significant change. . The cardiac and mediastinal contours and pulmonary vascularity are normal.  The bones and soft tissues are grossly within normal limits. IMPRESSION: Interstitial and airspace opacities without significant change. Xr Chest Port    Result Date: 8/13/2020  Portable chest single view dated 8/13/2020 Comparison chest dated 8/12/2020 History is respiratory failure/pneumothorax follow-up A single portable AP upright view of the chest was obtained. The endotracheal tube, nasogastric tube and PICC line on the left are unchanged in position.  The pigtail catheter which projected over the left lung base remains unchanged in position. It is difficult to accurately assess the size of the cardiac silhouette. There continues be diffuse hazy density involving both lungs. It is difficult to visualize the previously described left-sided pneumothorax. There has been a decrease in the amount of subcutaneous emphysema associated with the supraclavicular regions. IMPRESSION: 1. Continued evidence of diffuse hazy density involving both lungs. Nonvisualization of the previously described left sided pneumothorax. 2. Interval decrease in the amount of subcutaneous emphysema in the supraclavicular region bilaterally. Xr Chest Port    Result Date: 8/12/2020  EXAM: XR CHEST PORT INDICATION: Respiratory failure, respiratory distress, intubation. Coronavirus infection. COMPARISON: Portable chest on 11/20/2020 and 8/10/2020. TECHNIQUE: Upright portable chest AP view FINDINGS: Endotracheal tube terminates proximal to the victor hugo. Left PICC line terminates in the distal superior vena cava. Enteric tube terminates in the stomach. Left pleural pigtail catheter is unchanged since yesterday. Cardiac monitoring wires overlie the thorax. Cardiac silhouette is within normal limits. There may be pneumomediastinum. The pulmonary vasculature is within normal limits. Bilateral pulmonary opacities are similar to yesterday but improved since 2 days ago. Left pneumothorax is difficult to visualize but likely unchanged from yesterday. Subcutaneous gas is unchanged. IMPRESSION: No significant change since one day ago. Improved lung aeration since 2 days ago. Bilateral pulmonary opacities and probable left pneumothorax. Xr Chest Port    Result Date: 8/11/2020  Indication: Chest tube placement Comparison to earlier the same day. Portable exam obtained at 1702 demonstrates interval placement of a left-sided chest tube.  Tiny left apical pneumothorax persists, improved compared to the prior exam. Pneumomediastinum again noted. Extensive bilateral pulmonary infiltrates and subcutaneous emphysema unchanged. Impression: Improved tiny left apical pneumothorax with chest tube in position. Otherwise stable. Xr Chest Port    Result Date: 8/11/2020  EXAM:  XR CHEST PORT INDICATION:   verify placement of chest tube COMPARISON: Chest radiograph 8/11/2020. FINDINGS: AP radiograph of the chest was obtained. Interval placement of left basilar pigtail catheter. Remaining tubes and lines are stable. There is a small left apical pneumothorax, which appears to be mildly decreased in size since prior exam. There is extensive bilateral heterogeneous opacities again noted. There is prominent pneumomediastinum and deep cervical emphysema noted, as well as subcutaneous emphysema in the right chest.     IMPRESSION: 1. Interval placement of left basilar pigtail chest tube. Small left apical pneumothorax, which appears mildly decreased in size since prior exam. 2. Extensive bilateral airspace disease is similar to prior exam. 3. Prominent pneumomediastinum, subcutaneous and deep cervical emphysema similar to prior exam.     Xr Chest Port    Result Date: 8/11/2020  Portable chest single view dated 8/11/2020. Comparison chest dated 1020 History is pneumothorax follow-up A single portable AP semierect view of the chest was obtained. The patient is rotated towards right and this film was obtained during a less than optimal degree of inspiration. It is difficult to accurately assess the size of the cardiac silhouette. There continues to be abnormal alveolar opacity involving both lungs (right greater than left). There has been slight interval improvement since the previous examination. There continues to be evidence of bilateral pneumothoraces, pneumomediastinum and subcutaneous emphysema. There has been interval removal of the nasogastric tube. The endotracheal tube tip is situated at the level of the victor hugo. IMPRESSION: 1.  Persistence of the bilateral pneumothoraces, pneumomediastinum, subcutaneous emphysema and bilateral airspace disease (right greater than left). 2. Interval removal of the nasogastric tube. 3. No change in the position of the endotracheal tube. Xr Chest Port    Result Date: 8/10/2020  INDICATION: ET tube repositioning COMPARISON: 8/10/2020 at 3:14 AM FINDINGS: AP portable imaging of the chest performed at 4:03 AM demonstrates endotracheal tube tip at the level of the clavicular heads and mediastinal silhouette is stable. Diffuse bilateral airspace disease is significantly increased. Lung volumes are diminished. Pneumomediastinum, small biapical pneumothoraces, and subcutaneous emphysema are not significantly changed. IMPRESSION: Endotracheal tube now appears to be in satisfactory position. Diminished lung volumes with significantly increased diffuse bilateral airspace disease. Persistent pneumomediastinum, small biapical pneumothoraces, and subcutaneous emphysema. Xr Chest Port    Result Date: 8/10/2020  INDICATION: Intubated COMPARISON: 8/10/2020 at 1:07 AM FINDINGS: Single AP portable view of the chest obtained at 3:14 AM demonstrates endotracheal tube tip overlying the lower neck. The cardiomediastinal silhouette is stable. Small pneumomediastinum, small biapical pneumothoraces, and subcutaneous emphysema are not significantly changed. Patchy bilateral airspace disease is unchanged. IMPRESSION: Malpositioned endotracheal tube; advance 8 cm for optimal positioning. Otherwise stable appearance of the chest. The findings were called to the ICU nurse on August 10, 2020 at 3:33 AM by Dr. Jolie Gautam. [address] [facility]    Result Date: 8/10/2020  INDICATION: Respiratory distress COMPARISON: 8/9/2020 FINDINGS: AP portable imaging of the chest performed at 1:07 AM demonstrates a stable cardiomediastinal silhouette. There is unchanged pneumomediastinum. Small biapical pneumothoraces are unchanged.  Patchy bilateral airspace disease persists. Subcutaneous emphysema is again seen in the chest wall and neck bilaterally. IMPRESSION: Unchanged pneumomediastinum, small biapical pneumothoraces, subcutaneous emphysema, and patchy bilateral airspace disease. Xr Chest Port    Result Date: 8/9/2020  EXAM: XR CHEST PORT INDICATION: +COVID, eval pneumomediastinum/pneumothoraces COMPARISON: 8/9/2020 at 4:08 PM FINDINGS: A portable AP radiograph of the chest was obtained at 1516 hours. The patient is on a cardiac monitor. Diffuse interstitial and airspace opacities are again noted without significant change. Small biapical pneumothoraces are present without significant change. Soft tissue gas within the neck. . The cardiac and mediastinal contours and pulmonary vascularity are normal.  The bones and soft tissues are grossly within normal limits. IMPRESSION: Diffuse interstitial and airspace opacities without significant change. Small biapical pneumothoraces are again noted. Xr Chest Port    Result Date: 8/9/2020  EXAM: XR CHEST PORT INDICATION: SOB COMPARISON: 8/8/2020 FINDINGS: A portable AP radiograph of the chest was obtained at 413 hours. The patient is on a cardiac monitor. Diffuse chest wall emphysema. Possible small biapical pneumothoraces are again noted. . Lung volumes are low. Interstitial and airspace opacities are present diffusely. .  The bones and soft tissues are grossly within normal limits. IMPRESSION: Chest wall emphysema with biapical pneumothoraces. The pneumothorax is slightly decreased on the right. Diffuse interstitial and airspace opacities are again noted. Xr Chest Port    Result Date: 8/8/2020  EXAM: XR CHEST PORT INDICATION: eval pneumomediastinum COMPARISON: Chest x-ray 8/7/2020 and chest x-ray 8/5/2020. FINDINGS: A portable AP radiograph of the chest was obtained at 15:20 hours. The patient is on a cardiac monitor.  There is redemonstration of extensive soft tissue emphysema overlying the chest wall, slightly increased from prior examination. There redemonstration of bilateral interstitial and patchy airspace opacities which appear slightly worse, particularly in the left lateral midlung. Small pneumomediastinum and suspected small pneumothoraces are seen bilaterally. The cardiac and mediastinal contours and pulmonary vascularity are normal. Osseous structures are unremarkable. IMPRESSION: Extensive soft tissue emphysema with pneumomediastinum and suspected small bilateral pneumothoraces. The findings were called to ICU nurse on 8/8/2020 at 18:40 by myself. [address]    Result Date: 8/7/2020  INDICATION: Shortness of breath. COVID19 positive. Portable AP upright view of the chest. Direct comparison made to prior chest x-ray dated August 5, 2020. Cardiomediastinal silhouette is stable. There is worsening diffuse bilateral interstitial and patchy airspace disease. There is new bilateral supraclavicular subcutaneous emphysema as well as pneumomediastinum. No pneumothorax is visualized. No pleural fluid is visualized. IMPRESSION: 1. Worsening diffuse bilateral interstitial and patchy airspace disease. 2. New, bilateral supraclavicular subcutaneous emphysema and pneumomediastinum. Findings were discussed with the nurse caring for the patient at the time of interpretation. Xr Chest Port    Result Date: 8/5/2020  PORTABLE CHEST RADIOGRAPH/S: 8/5/2020 8:47 PM INDICATION: Dyspnea. COMPARISON: None. TECHNIQUE: Portable frontal upright radiograph/s of the chest. FINDINGS: The lungs are hypoinflated, and there is hazy interstitial and airspace opacity. This can be seen with COVID-19 pneumonia or other atypical infection. Pulmonary edema is also possible. The central airways are patent. No pneumothorax or pleural effusion. IMPRESSION: COVID-19 pneumonia suspected. The findings were called to Jud Vincent on 8/5/2020 8:54 PM by Dr. Bernard Patton. The patient is known COVID-19 positive.  [facility] 128 Xr Abd Port  1 V    Result Date: 9/22/2020  History: Dobbhoff placement. An AP radiograph of the abdomen and 1821 hours demonstrates a feeding tube tip in the region of the distal stomach. The bowel gas pattern is unremarkable. The osseous structures appear normal.     IMPRESSION: The feeding tube tip is in the region of the stomach. Xr Abd Port  1 V    Result Date: 9/14/2020  EXAM: XR ABD PORT  1 V INDICATION: concern that dobhoff is coiled COMPARISON: September 7. FINDINGS: A supine radiograph of the abdomen shows a Dobbhoff catheter, tip in the second portion of the duodenum directed inferiorly. The visualized portions are not coiled. No soft tissue masses or pathologic calcifications are identified. The bones and soft tissues are within normal limits. IMPRESSION: Dobbhoff catheter, tip in the second portion of the duodenum, appearing intact. Xr Abd Port  1 V    Result Date: 9/1/2020  EXAM: XR ABD PORT  1 V INDICATION: Abdominal distention. Hospitalization for respiratory failure, COVID19 infection. COMPARISON: Abdomen view on 8/29/2020. TECHNIQUE: AP portable supine abdomen view FINDINGS: Enteric tube terminates to the right of midline, unchanged. Small bowel distention is unchanged and measures up to 4 cm. No pneumatosis. No pathologic calcification. IMPRESSION: Ileus versus small bowel obstruction. Enteric tube termination in the pylorus or first portion of the duodenum. Xr Abd Port  1 V    Result Date: 8/26/2020  EXAM: XR ABD PORT  1 V INDICATION: dobhoff placment COMPARISON: 8/10/2020. FINDINGS: A supine radiograph of the abdomen shows a Dobbhoff tube in the gastric antrum. A pigtail catheter is seen in the left upper quadrant. . No soft tissue masses or pathologic calcifications are identified. The bones and soft tissues are within normal limits. IMPRESSION: Dobbhoff tube in the gastric antrum.      Xr Abd Port  1 V    Result Date: 8/10/2020  Portable abdomen single view dated 8/10/2020. History is tube placement A single frontal view of the lower chest/upper abdomen was obtained. A nasogastric tube is noted with its tip situated over the body/fundus of the stomach. IMPRESSION: Nasogastric tube tip positioned as described above. Xr Us Guided Vascular Access    Result Date: 8/10/2020  INDICATION:   NO VENOUS ACCESS  EXAMINATION:  US GUIDE VAS ACCESS FINDINGS: Ultrasound was provided for PICC line placement. IMPRESSION: Ultrasound guidance for PICC line  placement. GBP    Cta Code Neuro Head And Neck W Cont    Result Date: 9/4/2020  EXAM:  CTA CODE NEURO HEAD AND NECK W CONT, CT CODE NEURO PERF W CBF INDICATION:   loss of power over right arm COMPARISON:  CT head 9/4/2020. CONTRAST:  120 mL of Isovue-370. TECHNIQUE:  Unenhanced  images were obtained to localize the volume for acquisition. Multislice helical axial CT angiography was performed from the aortic arch to the top of the head during uneventful rapid bolus intravenous contrast administration. Coronal and sagittal reformations and 3D post processing was performed. CT dose reduction was achieved through use of a standardized protocol tailored for this examination and automatic exposure control for dose modulation. CT brain perfusion was performed with generation of hemodynamic maps of multiple parameters, including cerebral blood flow, cerebral blood volume, and MTT (mean transit time). CT dose reduction was achieved through use of a standardized protocol tailored for this examination and automatic exposure control for dose modulation. FINDINGS: CTA Head: There is no evidence of large vessel occlusion or flow-limiting stenosis of the intracranial internal carotid, anterior cerebral, and middle cerebral arteries. The anterior communicating artery is patent. There is no evidence of large vessel occlusion or flow-limiting stenosis of the intracranial vertebral arteries, basilar artery, or posterior cerebral arteries. The posterior communicating arteries are patent. There is no evidence of aneurysm or vascular malformation. The dural venous sinuses and deep cerebral venous system are patent. On delayed phase images, there is abnormal gyriform cortical enhancement seen in the bilateral superior frontoparietal lobes. A coarse calcification is again noted within the right cerebellum. CTA NECK: NASCET method was utilized for calculating stenosis. The aortic arch is unremarkable. The common carotid arteries demonstrate no significant stenosis. There is no evidence of significant stenosis in the cervical right internal carotid artery. There is no evidence of significant stenosis in the cervical left internal carotid artery. There is a codominant vertebrobasilar arterial system. The cervical vertebral arteries are normal in course, size and contour without significant stenosis. Extensive right-sided paranasal sinus mucosal thickening is again noted with complete opacification of the right frontal, maxillary, and sphenoid sinuses. Status post tracheostomy, with NG tube also in place. Extensive groundglass and consolidative opacities in the bilateral upper lungs. No acute fracture or aggressive osseous lesion. CT Brain Perfusion: Perfusion images are degraded by motion, which make color maps unreliable. There are no regional areas of elevated Tmax, decreased cerebral blood flow or blood volume. A small area of indicated elevated Tmax in the left temporal lobe is favored to be artifactual. rCBF < 30% = 0 cc. Tmax > 6 seconds = 3 cc. IMPRESSION: CTA Head: 1. No evidence of significant stenosis or aneurysm. 2. Abnormal gyriform cortical enhancement in the bilateral superior frontoparietal lobes. Differential considerations include subacute ischemia from a hypoxic-ischemic event, or an infectious or inflammatory cerebritis/encephalitis. 3. Extensive right-sided paranasal sinus disease. CTA Neck: 1. No evidence of significant stenosis.  2. Extensive consolidative and groundglass opacities in the bilateral upper lungs, consistent with multifocal pneumonia and/or ARDS. CT Brain Perfusion: 1. No definite acute abnormality. Motion degraded perfusion images, which make color maps unreliable. The findings were called to ICU doctor on 9/4/2020 at 1:42 PM by Dr. Alla Miller. [id number]     Ct Code Neuro Head Wo Contrast    Result Date: 9/4/2020  EXAM: CT CODE NEURO HEAD WO CONTRAST INDICATION: Acute neuro changes COMPARISON: 9/3/2020. CONTRAST: None. TECHNIQUE: Unenhanced CT of the head was performed using 5 mm images. Brain and bone windows were generated. Coronal and sagittal reformats. CT dose reduction was achieved through use of a standardized protocol tailored for this examination and automatic exposure control for dose modulation. FINDINGS: The ventricles and sulci are normal in size, shape and configuration. . There is no significant white matter disease. There is no intracranial hemorrhage, extra-axial collection, or mass effect. The basilar cisterns are open. No CT evidence of acute infarct. The bone windows demonstrate no abnormalities. There is complete opacification of the right maxillary and sphenoid sinus along with opacification of the right ethmoidal air cells. IMPRESSION: Chronic sinus disease. No acute process or change compared to the prior exam.     Ir Thora/ins Chest Tube(pneumo) Wo Image    Result Date: 8/14/2020  CLINICAL HISTORY: Reposition pleural drainage catheter requested. Trace left pneumothorax as residual after earlier left-sided pleural drainage catheter placement. INDICATION: Reposition catheter FINDINGS: The patient's family was informed of the risks and benefits of the procedure including bleeding ,pneumothorax, bowel injury etc. The need for possible additional intervention to control bleeding was also discussed. Informed consent was obtained and placed in the patient's medical record.  An Amplatz wire and inner component of an 8 [place] catheter was advanced into the left-sided pleural drainage catheter. The pleural drainage catheter was advanced superiorly in the left pleural space. : Lincoln Ellis MD. PROCEDURE: Successful repositioning of left pleural drainage catheter ESTIMATED BLOOD LOSS: None. SPECIMENS REMOVED: 100 mL of serous fluid. COMPLICATIONS: None. IMPRESSION: Successful repositioning of left pleural drainage catheter. Ir Thora/ins Chest Tube(pneumo) Wo Image    Result Date: 8/11/2020  CLINICAL HISTORY: Symptomatic pneumothorax on the left. INDICATION: Symptomatic pneumothorax on the left. FINDINGS: The patient's family member was informed of the risks and benefits of the procedure including bleeding ,pneumothorax, bowel injury etc. The need for possible additional intervention to control bleeding was also discussed. Informed consent was obtained and placed in the patient's medical record. The skin overlying the pleural space was prepped and draped utilizing maximal sterile barrier technique. First 1% percent lidocaine was injected into the subcutaneous tissues overlying the pleural space Next, a micropuncture needle was advanced into the pleural space. After air was aspirated. A Mix wire was advanced via the needle into the left pleural space. Over the [facility] wire serial dilatations were performed. After serial dilatation a 8 [place] pigtail was advanced into the pleural space. The drain was then placed to Pleur-evac/low wall suction drainage. A follow-up chest x-ray was obtained. The chest x-ray obtained approximately 1 hour after placement of the drainage catheter to evaluate for drainage/residual collection. Postprocedural instructions were provided to the nurse caring for the patient. No immediate complications were observed at the conclusion of the procedure. : Lincoln Ellis MD. PROCEDURE: Successful left-sided pleural drainage catheter placement PREPROCEDURAL DIAGNOSIS: Symptomatic left sided pneumothorax POSTPROCEDURAL DIAGNOSIS: Symptomatic left sided pneumothorax ESTIMATED BLOOD LOSS: None. SPECIMENS REMOVED: None COMPLICATIONS: None. IMPRESSION: Successful left sided pleural drainage catheter placement for pneumothorax. Ct Code Neuro Perf W Cbf    Result Date: 9/4/2020  EXAM:  CTA CODE NEURO HEAD AND NECK W CONT, CT CODE NEURO PERF W CBF INDICATION:   loss of power over right arm COMPARISON:  CT head 9/4/2020. CONTRAST:  120 mL of Isovue-370. TECHNIQUE:  Unenhanced  images were obtained to localize the volume for acquisition. Multislice helical axial CT angiography was performed from the aortic arch to the top of the head during uneventful rapid bolus intravenous contrast administration. Coronal and sagittal reformations and 3D post processing was performed. CT dose reduction was achieved through use of a standardized protocol tailored for this examination and automatic exposure control for dose modulation. CT brain perfusion was performed with generation of hemodynamic maps of multiple parameters, including cerebral blood flow, cerebral blood volume, and MTT (mean transit time). CT dose reduction was achieved through use of a standardized protocol tailored for this examination and automatic exposure control for dose modulation. FINDINGS: CTA Head: There is no evidence of large vessel occlusion or flow-limiting stenosis of the intracranial internal carotid, anterior cerebral, and middle cerebral arteries. The anterior communicating artery is patent. There is no evidence of large vessel occlusion or flow-limiting stenosis of the intracranial vertebral arteries, basilar artery, or posterior cerebral arteries. The posterior communicating arteries are patent. There is no evidence of aneurysm or vascular malformation. The dural venous sinuses and deep cerebral venous system are patent.  On delayed phase images, there is abnormal gyriform cortical enhancement seen in the bilateral superior frontoparietal lobes. A coarse calcification is again noted within the right cerebellum. CTA NECK: NASCET method was utilized for calculating stenosis. The aortic arch is unremarkable. The common carotid arteries demonstrate no significant stenosis. There is no evidence of significant stenosis in the cervical right internal carotid artery. There is no evidence of significant stenosis in the cervical left internal carotid artery. There is a codominant vertebrobasilar arterial system. The cervical vertebral arteries are normal in course, size and contour without significant stenosis. Extensive right-sided paranasal sinus mucosal thickening is again noted with complete opacification of the right frontal, maxillary, and sphenoid sinuses. Status post tracheostomy, with NG tube also in place. Extensive groundglass and consolidative opacities in the bilateral upper lungs. No acute fracture or aggressive osseous lesion. CT Brain Perfusion: Perfusion images are degraded by motion, which make color maps unreliable. There are no regional areas of elevated Tmax, decreased cerebral blood flow or blood volume. A small area of indicated elevated Tmax in the left temporal lobe is favored to be artifactual. rCBF < 30% = 0 cc. Tmax > 6 seconds = 3 cc. IMPRESSION: CTA Head: 1. No evidence of significant stenosis or aneurysm. 2. Abnormal gyriform cortical enhancement in the bilateral superior frontoparietal lobes. Differential considerations include subacute ischemia from a hypoxic-ischemic event, or an infectious or inflammatory cerebritis/encephalitis. 3. Extensive right-sided paranasal sinus disease. CTA Neck: 1. No evidence of significant stenosis. 2. Extensive consolidative and groundglass opacities in the bilateral upper lungs, consistent with multifocal pneumonia and/or ARDS. CT Brain Perfusion: 1. No definite acute abnormality. Motion degraded perfusion images, which make color maps unreliable.  The findings were called to ICU doctor on 9/4/2020 at 1:42 PM by Dr. Nidhi Matamoros. [id number] [facility] HISTORY:  He has never been admitted to any psychiatric facility. He also has never been diagnosed with any sort of mental illness, but has had issues with anxiety as described above. He has never tried any antidepressants or anxiolytic other than Xanax and that was prescribed in the hospital and also trazodone and melatonin. PAST PSYCHOSOCIAL HISTORY:  He is single and has no children. MENTAL STATUS EXAM:  He is currently lying in bed, dressed in hospital apparel. He is alert and oriented in all spheres. He is calm and pleasant. He reports his mood is okay. Affect is blunted. Speech:  Normal rate and rhythm. Thought process:  Logical and goal directed. He denies suicidal ideation, homicidal ideation, auditory or visual hallucinations. Memory seems intact. Intelligence seems average. Insight is partial.  Judgment is fair. ASSESSMENT AND PLANNING:  The patient meets the criteria for unspecified anxiety disorder. We will decrease Xanax to 0.5 twice a day x3 days and then 0.5 mg daily x3 days and then 0.25 mg and then discontinue. We will start the patient on Zoloft 25 mg daily that can be increased to 50 mg after two doses. I will also take the liberty to start the patient on hydroxyzine 50 mg every six hours as needed. He needs to follow up with the psychiatrist after discharge. He is worried about the cost of seeing a psychiatrist and so I recommend for him to see [facility] after discharge. I also informed him that the mainstay of treatment for anxiety is to see a therapist.  There is no indication for inpatient psychiatric admission. Please discharge to home once medically stable. Thank you for this consult. Please call with questions.     YANA PENALOZA NP      [id number]  D:  10/06/2020 8:18  T:  10/06/2020 11:48  JOB #:  [id number]

## 2020-10-06 NOTE — PROGRESS NOTES
[facility]ov[facility] Adult  Hospitalist Group                                                                                          Hospitalist Progress Note  Rabia Mijares MD  Answering service: [phone] OR [phone] from in house phone        Date of Service:  10/6/2020  NAME:  Donita Vásquez  :  1990  MRN:  [id number]      Admission Summary:     \"27year-old gentleman with no significant past medical history was brought to the emergency room from home via EMS for complaints of an approximately two- to three-day history of progressively worsening cough, shortness of breath, chills and fevers as high as 103 degrees Fahrenheit.  Of note, patient stated that he tested positive for COVID-19 virus about nine days prior to this emergency room presentation of 2020.  Further, patient stated that despite taking the prescribed Zithromax, prednisone, and other respiratory therapies, did not have any significant improvement in symptoms. Patient denied associated headaches, dizziness, visual deficits, chest pains, palpitations, sore throat, nausea, vomiting, abdominal pain, bladder or bowel irregularities. \"    Interval history / Subjective:       10/6/2020 : Anxious this am. C/o being hungry, 02 sats 100's but resp up to 30, is tachy,   Discussed w rn       Assessment & Plan:        - Tachycardia, multifactorial, anxiolytics help to some degree. - Bilateral pneumonia due to COVID-19 infection - resolved  But on trach collar 02 3 LPM this 10/3/2020 am  ( 10 LPM on 10/1 );  Cont on trach collar, 02 sats 100% recurrenlty       - Right arm weakness: under eval, MRI reviewed, ;; cont to encourage in bed activity, and PT OT involvement/     - Debility for pt/ot as jennifer    - Acute hypoxic respiratory failure due to COVID-19 infection status post tracheostomy on ; see above     - Possible bacterial super imposed infection: Completed antibiotic course,     - A nxiety : per psy note 10/6/2020 : - Dx: Unspecified anxiety disorder. 1. Decrease xanax to 0.5mg bid x3 days, then 0.5mg daily x3 days then 0.25mg daily x3 days then dc. 2. Zoloft 25mg po daily x 2 doses then increase to 50mg. 3. Atarax 50mg prn.  4. Follow up with [facility] after dc.      - History of tobacco use    - Continue trach collar with speech valve as tolerated - plan for continuous TC     -  PT/OT/rehab    - Dispo planning     - Still not clear what caused his acute right arm weakness, multiple possibilities including hypoxic event or vasculitis related to Caity Edwards is on aspirin and Lovenox, no further deterioration - PT/OT   -Completed COVID-19 treatment   , GI prophylaxis and DVT prophylaxis =Pepcid/ Lovenox   - working w/ SLP - cleared for thickened liquid po\"            Hospital Problems  Date Reviewed: 8/20/2020          Codes Class Noted POA    Tracheostomy dependent ([facility] ICD-10-CM: Z93.0  ICD-9-CM: V44.0  10/2/2020 Yes        Tachycardia ICD-10-CM: R00.0  ICD-9-CM: 785.0  10/2/2020 Yes        Hyponatremia ICD-10-CM: E87.1  ICD-9-CM: 276.1  10/2/2020 Yes        Acute respiratory failure ([facility] ICD-10-CM: J96.00  ICD-9-CM: 518.81  8/18/2020 Yes        * (Principal) Pneumonia due to severe acute respiratory syndrome coronavirus 2 (SARS-CoV-2) ICD-10-CM: E19.1, J12.89  ICD-9-CM: 480.8  8/8/2020 Yes                Review of Systems:   gen weakness, no n/v, no fever, some better day over day       Vital Signs:    Last 24hrs VS reviewed since prior progress note.  Most recent are:  Visit Vitals  BP (!) 151/102 (BP 1 Location: Left arm, BP Patient Position: At rest)   Pulse (!) 131   Temp 97.5 °F (36.4 °C)   Resp 22   Ht 5' 11\" (1.803 m)   Wt 98 kg (216 lb 0.8 oz)   SpO2 99%   BMI 29.30 kg/m²         Intake/Output Summary (Last 24 hours) at 10/6/2020 0939  Last data filed at 10/6/2020 0700  Gross per 24 hour   Intake 1610 ml   Output 730 ml   Net 880 ml        Physical Examination:             Constitutional:  + on going  acute distress, cooperative, pleasant    ENT:  Oral mucosa moist, oropharynx benign. Resp:  CTA bilaterally. No wheezing/rhonchi/rales. +accessory muscle use; trach collar    CV:  Regular rhythm, normal rate, no murmurs, gallops, rubs    GI:  Soft, non distended, non tender. normoactive bowel sounds, no hepatosplenomegaly     Musculoskeletal:  No edema, warm, 2+ pulses throughout    Neurologic:  paresis generalized rt > lt      Psych:  Good insight, +t anxious -agitated. Data Review:    Review and/or order of clinical lab test      Labs:     Recent Labs     10/06/20  0407 10/05/20  0350   WBC 5.9 7.3   HGB 11.4* 11.7*   HCT 35.6* 36.6    301     Recent Labs     10/06/20  0407 10/05/20  0350 10/04/20  0121    135* 136   K 3.3* 3.6 3.8   CL 97 97 96*   CO2 31 32 34*   BUN 17 12 19   CREA 0.59* 0.73 0.64*   * 125* 118*   CA 9.7 10.2* 9.9   MG 2.2 2.2 2.1   PHOS 5.0* 4.3 6.0*     Recent Labs     10/06/20  0407 10/05/20  0350 10/04/20  0121   ALT 49 51 55    115 115   TBILI 0.3 0.3 0.3   TP 7.5 8.0 7.8   ALB 3.3* 3.5 3.4*   GLOB 4.2* 4.5* 4.4*     No results for input(s): INR, PTP, APTT, INREXT, INREXT in the last 72 hours. No results for input(s): FE, TIBC, PSAT, FERR in the last 72 hours. No results found for: FOL, RBCF   No results for input(s): PH, PCO2, PO2 in the last 72 hours. No results for input(s): CPK, CKNDX, TROIQ in the last 72 hours.     No lab exists for component: CPKMB  Lab Results   Component Value Date/Time    Cholesterol, total 156 09/05/2020 06:06 PM    HDL Cholesterol 26 09/05/2020 06:06 PM    LDL, calculated 92.4 09/05/2020 06:06 PM    Triglyceride 188 (H) 09/05/2020 06:06 PM    CHOL/HDL Ratio 6.0 (H) 09/05/2020 06:06 PM     Lab Results   Component Value Date/Time    Glucose (POC) 113 (H) 10/05/2020 05:15 AM    Glucose (POC) 119 (H) 09/06/2020 01:06 PM    Glucose (POC) 113 (H) 09/05/2020 06:49 PM    Glucose (POC) 146 (H) 09/04/2020 11:36 AM    Glucose (POC) 122 (H) 08/27/2020 11:28 AM     No results found for: COLOR, APPRN, SPGRU, REFSG, RAE, PROTU, GLUCU, KETU, BILU, UROU, ROBLES, LEUKU, GLUKE, EPSU, BACTU, WBCU, RBCU, CASTS, UCRY      Medications Reviewed:     Current Facility-Administered Medications   Medication Dose Route Frequency    ALPRAZolam (XANAX) tablet 0.5 mg  0.5 mg Per G Tube BID    sertraline (ZOLOFT) tablet 25 mg  25 mg Oral DAILY    hydrOXYzine HCL (ATARAX) tablet 50 mg  50 mg Oral Q6H PRN    melatonin tablet 3 mg  3 mg Oral QHS    lidocaine 4 % patch 1 Patch  1 Patch TransDERmal Q24H    acetaminophen (TYLENOL) solution 1,000 mg  1,000 mg Per G Tube Q6H    hydrOXYzine HCL (ATARAX) tablet 25 mg  25 mg Oral TID PRN    metoprolol tartrate (LOPRESSOR) tablet 100 mg  100 mg Oral BID    potassium bicarb-citric acid (EFFER-K) tablet 40 mEq  40 mEq Oral DAILY    loperamide (IMODIUM) capsule 2 mg  2 mg Oral Q4H PRN    famotidine (PEPCID) 40 mg/5 mL (8 mg/mL) oral suspension 20 mg  20 mg Per G Tube Q12H    metoclopramide (REGLAN) 5 mg/5 mL oral syrup 5 mg  5 mg Per G Tube Q6H    traZODone (DESYREL) tablet 100 mg  100 mg Oral QHS    lidocaine (XYLOCAINE) 2 % jelly   Mucous Membrane PRN    enoxaparin (LOVENOX) injection 30 mg  30 mg SubCUTAneous Q12H    ondansetron (ZOFRAN) injection 4 mg  4 mg IntraVENous Q4H PRN    nystatin (MYCOSTATIN) 100,000 unit/mL oral suspension 500,000 Units  500,000 Units Oral QID    aluminum-magnesium hydroxide (MAALOX) oral suspension 15 mL  15 mL Oral QID PRN    prochlorperazine (COMPAZINE) with saline injection 10 mg  10 mg IntraVENous Q6H PRN    miconazole (MICOTIN) 2 % powder   Topical BID    oxyCODONE (ROXICODONE) 5 mg/5 mL oral solution 5 mg  5 mg Oral Q4H PRN    guaiFENesin (ROBITUSSIN) 100 mg/5 mL oral liquid 400 mg  400 mg Per NG tube Q4H PRN    balsam peru-castor oiL (VENELEX) ointment   Topical BID    acetaminophen (TYLENOL) suppository 650 mg  650 mg Rectal Q6H PRN    alteplase (CATHFLO) 1 mg in sterile water (preservative free) 1 mL injection  1 mg InterCATHeter PRN    polyvinyl alcohol-povidone (NATURAL TEARS) 0.5-0.6 % ophthalmic solution 1 Drop  1 Drop Both Eyes PRN    dextrose 10% infusion 0-250 mL  0-250 mL IntraVENous PRN    glucagon (GLUCAGEN) injection 1 mg  1 mg IntraMUSCular PRN    glucose chewable tablet 16 g  4 Tab Oral PRN    sodium chloride (NS) flush 5-40 mL  5-40 mL IntraVENous Q8H    sodium chloride (NS) flush 5-40 mL  5-40 mL IntraVENous PRN    aspirin chewable tablet 81 mg  81 mg Per NG tube DAILY    albuterol-ipratropium (DUO-NEB) 2.5 MG-0.5 MG/3 ML  3 mL Nebulization Q6H PRN     ______________________________________________________________________  EXPECTED LENGTH OF STAY: 12d 0h  ACTUAL LENGTH OF STAY:          [id number], [place]

## 2020-10-06 NOTE — PROGRESS NOTES
0730: Bedside and Verbal shift change report given to Nicholas Lam RN and [facility] (oncoming nurse) by Mechelle Velasco (offgoing nurse). Report included the following information SBAR, Kardex, Intake/Output, MAR, Recent Results and Cardiac Rhythm NSR/ST. 1548: Patient worked with PT and sat up in the chair. 1730: Patient returned to bed.     1620: Restarted tube feed at 65 mL/hr after 6 hr break per order. 1930: Bedside and Verbal shift change report given to Arianna Martinez RN (oncoming nurse) by Codey Crabtree and CATINA Chisholm (offgoing nurse). Report included the following information SBAR, Kardex, Intake/Output, MAR, Recent Results and Cardiac Rhythm NSR/ST.

## 2020-10-06 NOTE — CONSULTS
Dx: Unspecified anxiety disorder. 1. Decrease xanax to 0.5mg bid x3 days, then 0.5mg daily x3 days then 0.25mg daily x3 days then dc. 2. Zoloft 25mg po daily x 2 doses then increase to 50mg. 3. Atarax 50mg prn.  4. Follow up with [facility] after dc. Full consult dictated. Thank you for this kind consult.

## 2020-10-06 NOTE — PROGRESS NOTES
Neurology Progress Note    Patient ID:  Jordyn Booth  [id number]  63 y.o.  1990         Subjective:   No events overnight. He notes that he is very anxious when he awakes at night causing his resp rate to go up. Also unable to sleep. He denies headache, dizziness, vision changes, acute weakness. Objective:    All records in [facility] reviewed and noted    ROS:  Per HPI  All other 12 pt ROS negative    Meds:  Current Facility-Administered Medications   Medication Dose Route Frequency    ALPRAZolam (XANAX) tablet 0.5 mg  0.5 mg Per G Tube BID    sertraline (ZOLOFT) tablet 25 mg  25 mg Oral DAILY    hydrOXYzine HCL (ATARAX) tablet 50 mg  50 mg Oral Q6H PRN    melatonin tablet 3 mg  3 mg Oral QHS    lidocaine 4 % patch 1 Patch  1 Patch TransDERmal Q24H    acetaminophen (TYLENOL) solution 1,000 mg  1,000 mg Per G Tube Q6H    hydrOXYzine HCL (ATARAX) tablet 25 mg  25 mg Oral TID PRN    metoprolol tartrate (LOPRESSOR) tablet 100 mg  100 mg Oral BID    potassium bicarb-citric acid (EFFER-K) tablet 40 mEq  40 mEq Oral DAILY    loperamide (IMODIUM) capsule 2 mg  2 mg Oral Q4H PRN    famotidine (PEPCID) 40 mg/5 mL (8 mg/mL) oral suspension 20 mg  20 mg Per G Tube Q12H    metoclopramide (REGLAN) 5 mg/5 mL oral syrup 5 mg  5 mg Per G Tube Q6H    traZODone (DESYREL) tablet 100 mg  100 mg Oral QHS    lidocaine (XYLOCAINE) 2 % jelly   Mucous Membrane PRN    enoxaparin (LOVENOX) injection 30 mg  30 mg SubCUTAneous Q12H    ondansetron (ZOFRAN) injection 4 mg  4 mg IntraVENous Q4H PRN    nystatin (MYCOSTATIN) 100,000 unit/mL oral suspension 500,000 Units  500,000 Units Oral QID    aluminum-magnesium hydroxide (MAALOX) oral suspension 15 mL  15 mL Oral QID PRN    prochlorperazine (COMPAZINE) with saline injection 10 mg  10 mg IntraVENous Q6H PRN    miconazole (MICOTIN) 2 % powder   Topical BID    oxyCODONE (ROXICODONE) 5 mg/5 mL oral solution 5 mg  5 mg Oral Q4H PRN    guaiFENesin (ROBITUSSIN) 100 mg/5 mL oral liquid 400 mg  400 mg Per NG tube Q4H PRN    balsam peru-castor oiL (VENELEX) ointment   Topical BID    acetaminophen (TYLENOL) suppository 650 mg  650 mg Rectal Q6H PRN    alteplase (CATHFLO) 1 mg in sterile water (preservative free) 1 mL injection  1 mg InterCATHeter PRN    polyvinyl alcohol-povidone (NATURAL TEARS) 0.5-0.6 % ophthalmic solution 1 Drop  1 Drop Both Eyes PRN    dextrose 10% infusion 0-250 mL  0-250 mL IntraVENous PRN    glucagon (GLUCAGEN) injection 1 mg  1 mg IntraMUSCular PRN    glucose chewable tablet 16 g  4 Tab Oral PRN    sodium chloride (NS) flush 5-40 mL  5-40 mL IntraVENous Q8H    sodium chloride (NS) flush 5-40 mL  5-40 mL IntraVENous PRN    aspirin chewable tablet 81 mg  81 mg Per NG tube DAILY    albuterol-ipratropium (DUO-NEB) 2.5 MG-0.5 MG/3 ML  3 mL Nebulization Q6H PRN       Imaging:  MRI Results (most recent):  Results from Hospital Encounter encounter on 08/18/20   MRI BRAIN WO CONT    Narrative INDICATION:   CVA    EXAMINATION:  MRI BRAIN WO CONTRAST    COMPARISON:  CT head earlier today and CT September 22, 2020    TECHNIQUE:  Multiplanar multisequence acquisition without contrast of the brain. FINDINGS:      Ventricles:  Midline, no hydrocephalus. Brain Parenchyma/Brainstem:  No significant change in multiple areas of cortical  and subcortical FLAIR/T2 hyperintensity in the bilateral frontal and parietal  lobes at the vertex as well as the medial right parietal lobe, with mild  peripheral diffusion signal abnormality. No definite new areas of involvement. A  small focal diffusion restricting lesion in the right posterior body of the  corpus callosum has not changed significantly. Chronic infarction right inferior  cerebellum. No acute infarction. Intracranial Hemorrhage:  No acute hemorrhage. Hemosiderin staining along the  surface of the bilateral frontal and parietal lobes at the vertex consistent  with prior subarachnoid hemorrhage. Parenchymal hemosiderin deposition inferior  right cerebellum. Basal Cisterns:  Normal.   Flow Voids:  Normal.  Additional Comments:  Bilateral mastoid effusions. Signal abnormality throughout  the right maxillary sinus is unchanged. Impression IMPRESSION:    1. Minimal if any change in areas of cortical and subcortical encephalomalacia  bilateral frontal and parietal lobe superiorly, again likely due to subacute to  chronic infarction or hypoxic ischemic event. Small areas of hemosiderin  staining along the vertex also raise possibility of vasculopathy. No new  infarction. 2. Chronic right cerebellar hemorrhagic infarction, unchanged. 3. 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 3. Bilateral mastoid effusions. Right maxillary sinus signal abnormality,  unchanged. Lab Review   Recent Results (from the past 24 hour(s))   CBC WITH AUTOMATED DIFF    Collection Time: 10/06/20  4:07 AM   Result Value Ref Range    WBC 5.9 4.1 - 11.1 K/uL    RBC 4.07 (L) 4.10 - 5.70 M/uL    HGB 11.4 (L) 12.1 - 17.0 g/dL    HCT 35.6 (L) 36.6 - 50.3 %    MCV 87.5 80.0 - 99.0 FL    MCH 28.0 26.0 - 34.0 PG    MCHC 32.0 30.0 - 36.5 g/dL    RDW 14.0 11.5 - 14.5 %    PLATELET 507 697 - 180 K/uL    MPV 11.2 8.9 - 12.9 FL    NRBC 0.0 0  WBC    ABSOLUTE NRBC 0.00 0.00 - 0.01 K/uL    NEUTROPHILS 61 32 - 75 %    LYMPHOCYTES 26 12 - 49 %    MONOCYTES 10 5 - 13 %    EOSINOPHILS 2 0 - 7 %    BASOPHILS 1 0 - 1 %    IMMATURE GRANULOCYTES 0 0.0 - 0.5 %    ABS. NEUTROPHILS 3.6 1.8 - 8.0 K/UL    ABS. LYMPHOCYTES 1.6 0.8 - 3.5 K/UL    ABS. MONOCYTES 0.6 0.0 - 1.0 K/UL    ABS. EOSINOPHILS 0.1 0.0 - 0.4 K/UL    ABS. BASOPHILS 0.1 0.0 - 0.1 K/UL    ABS. IMM.  GRANS. 0.0 0.00 - 0.04 K/UL    DF AUTOMATED     MAGNESIUM    Collection Time: 10/06/20  4:07 AM   Result Value Ref Range    Magnesium 2.2 1.6 - 2.4 mg/dL   METABOLIC PANEL, COMPREHENSIVE    Collection Time: 10/06/20  4:07 AM   Result Value Ref Range    Sodium 136 136 - 145 mmol/L    Potassium 3.3 (L) 3.5 - 5.1 mmol/L    Chloride 97 97 - 108 mmol/L    CO2 31 21 - 32 mmol/L    Anion gap 8 5 - 15 mmol/L    Glucose 118 (H) 65 - 100 mg/dL    BUN 17 6 - 20 MG/DL    Creatinine 0.59 (L) 0.70 - 1.30 MG/DL    BUN/Creatinine ratio 29 (H) 12 - 20      GFR est AA >60 >60 ml/min/1.73m2    GFR est non-AA >60 >60 ml/min/1.73m2    Calcium 9.7 8.5 - 10.1 MG/DL    Bilirubin, total 0.3 0.2 - 1.0 MG/DL    ALT (SGPT) 49 12 - 78 U/L    AST (SGOT) 28 15 - 37 U/L    Alk. phosphatase 100 45 - 117 U/L    Protein, total 7.5 6.4 - 8.2 g/dL    Albumin 3.3 (L) 3.5 - 5.0 g/dL    Globulin 4.2 (H) 2.0 - 4.0 g/dL    A-G Ratio 0.8 (L) 1.1 - 2.2     PHOSPHORUS    Collection Time: 10/06/20  4:07 AM   Result Value Ref Range    Phosphorus 5.0 (H) 2.6 - 4.7 MG/DL       Exam:  Visit Vitals  BP (!) 143/103   Pulse 91   Temp 97.7 °F (36.5 °C)   Resp 27   Ht 5' 11\" (1.803 m)   Wt 98 kg (216 lb 0.8 oz)   SpO2 99%   BMI 29.30 kg/m²     Gen: Well developed  CV: RRR  Lungs: trach : SOB when talking  Abd: non distending  Neuro: A&O x 3, no dysarthria or aphasia  CN II-XII: PERRL, EOMI, face symmetric, tongue/palate midline  Motor: strength 5/5 LUE 4/5 on the RUE, LLE 2/5 , RLE, 3/5  Sensory: intact to LT  Gait: deferred     Assessment:     Patient Active Problem List   Diagnosis Code    Respiratory failure with hypoxia ([facility]) J96.91    Pneumonia due to severe acute respiratory syndrome coronavirus 2 (SARS-CoV-2) U07.1, J12.89    Pneumothorax on left J93.9    Acute respiratory failure (HCC) J96.00    Tracheostomy dependent ([address] Z93.0    Tachycardia R00.0    Hyponatremia E87.1     Plan:   Pt is a 32yo male admitted on 8/05/2020 with COVID-19 pneumonia with respiratory failure, now s/p trach and weaned off the ventilator, and s/p PEG 9/23/20, transferred out of the ICU 10/2/20. differential included hypoxic ischemic event, inflammatory or infectious etiology including direct infection with COVID-19, and lower on differential ICU or steroid myopathy/neuropathy. MRI brain 9/22/20 revealed cortical/subcortical encephalomalacia and atrophy in the bilateral frontoparietal lobes felt to represent evolving ischemic changes from a hypoxic-ischemic event, vasculitic involvement from COVID, or evolving encephalomalacia from a infectious/inflammatory process related to COVID. There was a small area of signal abnormality with restricted diffusion in the right posterior body of the corpus callosum, which likely represents the same process, again either ischemic or infectious/inflammatory, and chronic area of encephalomalacia in the right cerebellum with associated chronic hemorrhage. MRI brain wo contrast 10/2/20 revealed no significant changes compared to 9/22/20 imaging. Pt is on treatment dose Lovenox and ASA 81mg daily. On exam, he still has weakness  Right greater than left. He is having a difficult with time falling asleep in addition he has anxiety once awaken. He may benefit from a low dose of trazodone to help with sleep.   Continue current therapy  PT/OT/ST/Rehab    Signed:  Saadia García NP  10/6/2020  2:05 PM

## 2020-10-06 NOTE — PROGRESS NOTES
8114 and 21 : Mobitz II beats noted on telemetry. Strip on chart. 0400: patient awakened and requesting assistance to go back to sleep. He said he is [place]. Chart reviewed and confirmed Nectar thick liquids. Offered patient apple sauce which he agreed to. Tolerated consumption well.     0500: Discussed with patient ways he could distract himself when he is unable to fall back asleep including using an ipad to listen to podcasts or music.    0630: with RT assistance, adjusted FiO2 from 35% to 30%. Within 10 minutes, patient states that he is having a hard time breathing. RR is at baseline and SpO2 is 96-99%. Changed FiO2 back to 35% per patient request.    0730: Bedside and Verbal shift change report given to Emanuel and Frankey Herbert (oncoming nurse) by Dangelo Gnozales (offgoing nurse). Report included the following information SBAR, Kardex, Intake/Output, MAR, Recent Results and Cardiac Rhythm ST. Problem: Pressure Injury - Risk of  Goal: *Prevention of pressure injury  Description: Document Enrique Scale and appropriate interventions in the flowsheet. Outcome: Progressing Towards Goal  Note: Pressure Injury Interventions:  Sensory Interventions: Assess changes in LOC, Float heels, Keep linens dry and wrinkle-free, Turn and reposition approx. every two hours (pillows and wedges if needed)    Moisture Interventions: Apply protective barrier, creams and emollients, Internal/External urinary devices, Minimize layers, Moisture barrier, Offer toileting Q_hr    Activity Interventions: Increase time out of bed, Pressure redistribution bed/mattress(bed type), PT/OT evaluation    Mobility Interventions: HOB 30 degrees or less, Pressure redistribution bed/mattress (bed type), PT/OT evaluation, Turn and reposition approx.  every two hours(pillow and wedges)    Nutrition Interventions: Document food/fluid/supplement intake    Friction and Shear Interventions: Apply protective barrier, creams and emollients, Feet elevated on foot rest, Lift team/patient mobility team, Transferring/repositioning devices  Turned approximately Q2H; skin assessed Q4H; blood glucose controlled       Problem: Falls - Risk of  Goal: *Absence of Falls  Description: Document Adama Fall Risk and appropriate interventions in the flowsheet. Note: Fall Risk Interventions:  Mobility Interventions: Communicate number of staff needed for ambulation/transfer, OT consult for ADLs, Patient to call before getting OOB, PT Consult for mobility concerns, Strengthening exercises (ROM-active/passive), Utilize gait belt for transfers/ambulation    Mentation Interventions: Adequate sleep, hydration, pain control, Gait belt with transfers/ambulation, More frequent rounding    Medication Interventions: Evaluate medications/consider consulting pharmacy, Patient to call before getting OOB, Teach patient to arise slowly    Elimination Interventions: Call light in reach, Patient to call for help with toileting needs, Urinal in reach    History of Falls Interventions: Evaluate medications/consider consulting pharmacy, Room close to nurse's station    Call bell and personal effects within reach. Bed locked and in low position. Non skid footwear in place.

## 2020-10-06 NOTE — PROGRESS NOTES
SLP Contact Note    Noted documentation stating that pt receiving applesauce. Called day shift RN to explain that pt has silent aspiration of puree consistencies and is only allowed to have nectar-thick liquids. RN expressed understanding.       Thank you,  SIVA GuillenEd, [id number] [facility]  Speech-Language Pathologist

## 2020-10-06 NOTE — PROGRESS NOTES
0730: Bedside and Verbal shift change report given to Susanne Diaz RN and [facility] ([facility]) by Asif Desai (offgoing nurse). Report included the following information SBAR, Kardex, Intake/Output, MAR, Accordion, Recent Results and Cardiac Rhythm NSR/ Sinus Tach. 1930: Bedside and Verbal shift change report given to Arianna Martinez RN (oncoming nurse) by Susanne Diaz RN and Andrea Melvin (offgoing nurse). Report included the following information SBAR, Kardex, Intake/Output, MAR, Accordion, Recent Results and Cardiac Rhythm NSR. Faculty or Preceptor Review of RN Work    10/6/2020  - Shift times - 0730 to 1930    The RN documentation of patient care for [employer] by Susanne Diaz RN has been reviewed and approved. All medications have been administered under the direct supervision of the faculty or preceptor. Joanna Herr RN and preceptor    Problem: Patient Education: Go to Patient Education Activity  Goal: Patient/Family Education  Outcome: Progressing Towards Goal     Problem: Pressure Injury - Risk of  Goal: *Prevention of pressure injury  Description: Document Enrique Scale and appropriate interventions in the flowsheet. Outcome: Progressing Towards Goal  Note: Pressure Injury Interventions:  Sensory Interventions: Assess changes in LOC, Float heels, Keep linens dry and wrinkle-free, Turn and reposition approx. every two hours (pillows and wedges if needed)    Moisture Interventions: Apply protective barrier, creams and emollients, Internal/External urinary devices, Minimize layers, Moisture barrier, Offer toileting Q_hr    Activity Interventions: Increase time out of bed, Pressure redistribution bed/mattress(bed type), PT/OT evaluation    Mobility Interventions: HOB 30 degrees or less, Pressure redistribution bed/mattress (bed type), PT/OT evaluation, Turn and reposition approx.  every two hours(pillow and wedges)    Nutrition Interventions: Document food/fluid/supplement intake    Friction and Shear Interventions: Apply protective barrier, creams and emollients, Feet elevated on foot rest, Lift team/patient mobility team, Transferring/repositioning devices                Problem: Patient Education: Go to Patient Education Activity  Goal: Patient/Family Education  Outcome: Progressing Towards Goal     Problem: Falls - Risk of  Goal: *Absence of Falls  Description: Document Nimisha Bello Fall Risk and appropriate interventions in the flowsheet.   Outcome: Progressing Towards Goal  Note: Fall Risk Interventions:  Mobility Interventions: Communicate number of staff needed for ambulation/transfer, OT consult for ADLs, Patient to call before getting OOB, PT Consult for mobility concerns, Strengthening exercises (ROM-active/passive), Utilize gait belt for transfers/ambulation    Mentation Interventions: Adequate sleep, hydration, pain control, Gait belt with transfers/ambulation, More frequent rounding    Medication Interventions: Evaluate medications/consider consulting pharmacy, Patient to call before getting OOB, Teach patient to arise slowly    Elimination Interventions: Call light in reach, Patient to call for help with toileting needs, Urinal in reach    History of Falls Interventions: Evaluate medications/consider consulting pharmacy, Room close to nurse's station         Problem: Patient Education: Go to Patient Education Activity  Goal: Patient/Family Education  Outcome: Progressing Towards Goal     Problem: Nutrition Deficit  Goal: *Optimize nutritional status  Outcome: Progressing Towards Goal     Problem: Breathing Pattern - Ineffective  Goal: *Absence of hypoxia  Outcome: Progressing Towards Goal  Goal: *Use of effective breathing techniques  Outcome: Progressing Towards Goal     Problem: Breathing Pattern - Ineffective  Goal: *Absence of hypoxia  Outcome: Progressing Towards Goal  Goal: *Use of effective breathing techniques  Outcome: Progressing Towards Goal     Problem: Patient Education: Go to Patient Education Activity  Goal: Patient/Family Education  Outcome: Progressing Towards Goal     Problem: Breathing Pattern - Ineffective  Goal: *Absence of hypoxia  Outcome: Progressing Towards Goal  Goal: *Use of effective breathing techniques  Outcome: Progressing Towards Goal  Goal: *PALLIATIVE CARE:  Alleviation of Dyspnea  Outcome: Progressing Towards Goal     Problem: Patient Education: Go to Patient Education Activity  Goal: Patient/Family Education  Outcome: Progressing Towards Goal     Problem: Patient Education: Go to Patient Education Activity  Goal: Patient/Family Education  Outcome: Progressing Towards Goal     Problem: Patient Education: Go to Patient Education Activity  Goal: Patient/Family Education  Outcome: Progressing Towards Goal     Problem: Patient Education: Go to Patient Education Activity  Goal: Patient/Family Education  Outcome: Progressing Towards Goal     Problem: Patient Education: Go to Patient Education Activity  Goal: Patient/Family Education  Outcome: Progressing Towards Goal

## 2020-10-07 LAB
ALBUMIN SERPL-MCNC: 3.6 G/DL (ref 3.5–5)
ALBUMIN/GLOB SERPL: 0.9 {RATIO} (ref 1.1–2.2)
ALP SERPL-CCNC: 105 U/L (ref 45–117)
ALT SERPL-CCNC: 44 U/L (ref 12–78)
ANION GAP SERPL CALC-SCNC: 8 MMOL/L (ref 5–15)
AST SERPL-CCNC: 18 U/L (ref 15–37)
BASOPHILS # BLD: 0 K/UL (ref 0–0.1)
BASOPHILS NFR BLD: 0 % (ref 0–1)
BILIRUB SERPL-MCNC: 0.3 MG/DL (ref 0.2–1)
BUN SERPL-MCNC: 17 MG/DL (ref 6–20)
BUN/CREAT SERPL: 28 (ref 12–20)
CALCIUM SERPL-MCNC: 9.8 MG/DL (ref 8.5–10.1)
CHLORIDE SERPL-SCNC: 98 MMOL/L (ref 97–108)
CO2 SERPL-SCNC: 31 MMOL/L (ref 21–32)
CREAT SERPL-MCNC: 0.6 MG/DL (ref 0.7–1.3)
DIFFERENTIAL METHOD BLD: ABNORMAL
EOSINOPHIL # BLD: 0.1 K/UL (ref 0–0.4)
EOSINOPHIL NFR BLD: 1 % (ref 0–7)
ERYTHROCYTE [DISTWIDTH] IN BLOOD BY AUTOMATED COUNT: 14 % (ref 11.5–14.5)
GLOBULIN SER CALC-MCNC: 4.1 G/DL (ref 2–4)
GLUCOSE SERPL-MCNC: 118 MG/DL (ref 65–100)
HCT VFR BLD AUTO: 37.8 % (ref 36.6–50.3)
HGB BLD-MCNC: 12.1 G/DL (ref 12.1–17)
IMM GRANULOCYTES # BLD AUTO: 0 K/UL (ref 0–0.04)
IMM GRANULOCYTES NFR BLD AUTO: 1 % (ref 0–0.5)
LYMPHOCYTES # BLD: 2.5 K/UL (ref 0.8–3.5)
LYMPHOCYTES NFR BLD: 32 % (ref 12–49)
MAGNESIUM SERPL-MCNC: 2.1 MG/DL (ref 1.6–2.4)
MCH RBC QN AUTO: 27.6 PG (ref 26–34)
MCHC RBC AUTO-ENTMCNC: 32 G/DL (ref 30–36.5)
MCV RBC AUTO: 86.3 FL (ref 80–99)
MONOCYTES # BLD: 0.8 K/UL (ref 0–1)
MONOCYTES NFR BLD: 11 % (ref 5–13)
NEUTS SEG # BLD: 4.2 K/UL (ref 1.8–8)
NEUTS SEG NFR BLD: 55 % (ref 32–75)
NRBC # BLD: 0 K/UL (ref 0–0.01)
NRBC BLD-RTO: 0 PER 100 WBC
PHOSPHATE SERPL-MCNC: 5 MG/DL (ref 2.6–4.7)
PLATELET # BLD AUTO: 280 K/UL (ref 150–400)
PMV BLD AUTO: 10.2 FL (ref 8.9–12.9)
POTASSIUM SERPL-SCNC: 3.7 MMOL/L (ref 3.5–5.1)
PROT SERPL-MCNC: 7.7 G/DL (ref 6.4–8.2)
RBC # BLD AUTO: 4.38 M/UL (ref 4.1–5.7)
SODIUM SERPL-SCNC: 137 MMOL/L (ref 136–145)
WBC # BLD AUTO: 7.7 K/UL (ref 4.1–11.1)

## 2020-10-07 PROCEDURE — 97112 NEUROMUSCULAR REEDUCATION: CPT

## 2020-10-07 PROCEDURE — 74011250637 HC RX REV CODE- 250/637: Performed by: INTERNAL MEDICINE

## 2020-10-07 PROCEDURE — 74011250637 HC RX REV CODE- 250/637: Performed by: NURSE PRACTITIONER

## 2020-10-07 PROCEDURE — 97116 GAIT TRAINING THERAPY: CPT

## 2020-10-07 PROCEDURE — 74011000250 HC RX REV CODE- 250: Performed by: INTERNAL MEDICINE

## 2020-10-07 PROCEDURE — 97110 THERAPEUTIC EXERCISES: CPT

## 2020-10-07 PROCEDURE — 90686 IIV4 VACC NO PRSV 0.5 ML IM: CPT | Performed by: INTERNAL MEDICINE

## 2020-10-07 PROCEDURE — 85025 COMPLETE CBC W/AUTO DIFF WBC: CPT

## 2020-10-07 PROCEDURE — 83735 ASSAY OF MAGNESIUM: CPT

## 2020-10-07 PROCEDURE — 84100 ASSAY OF PHOSPHORUS: CPT

## 2020-10-07 PROCEDURE — 65660000000 HC RM CCU STEPDOWN

## 2020-10-07 PROCEDURE — 80053 COMPREHEN METABOLIC PANEL: CPT

## 2020-10-07 PROCEDURE — 97530 THERAPEUTIC ACTIVITIES: CPT

## 2020-10-07 PROCEDURE — 97535 SELF CARE MNGMENT TRAINING: CPT

## 2020-10-07 PROCEDURE — 92507 TX SP LANG VOICE COMM INDIV: CPT

## 2020-10-07 PROCEDURE — 65210000002 HC RM PRIVATE GYN

## 2020-10-07 PROCEDURE — 74011250637 HC RX REV CODE- 250/637: Performed by: HOSPITALIST

## 2020-10-07 PROCEDURE — 74011250636 HC RX REV CODE- 250/636: Performed by: INTERNAL MEDICINE

## 2020-10-07 PROCEDURE — 90471 IMMUNIZATION ADMIN: CPT

## 2020-10-07 PROCEDURE — 92526 ORAL FUNCTION THERAPY: CPT

## 2020-10-07 PROCEDURE — 74011250637 HC RX REV CODE- 250/637: Performed by: EMERGENCY MEDICINE

## 2020-10-07 PROCEDURE — 36415 COLL VENOUS BLD VENIPUNCTURE: CPT

## 2020-10-07 PROCEDURE — 74011250636 HC RX REV CODE- 250/636: Performed by: EMERGENCY MEDICINE

## 2020-10-07 RX ADMIN — ACETAMINOPHEN ORAL SOLUTION 1000 MG: 650 SOLUTION ORAL at 18:22

## 2020-10-07 RX ADMIN — NYSTATIN 500000 UNITS: 500000 SUSPENSION ORAL at 09:25

## 2020-10-07 RX ADMIN — NYSTATIN 500000 UNITS: 500000 SUSPENSION ORAL at 21:48

## 2020-10-07 RX ADMIN — HYDROXYZINE HYDROCHLORIDE 50 MG: 50 TABLET, FILM COATED ORAL at 04:32

## 2020-10-07 RX ADMIN — ENOXAPARIN SODIUM 30 MG: 30 INJECTION SUBCUTANEOUS at 12:48

## 2020-10-07 RX ADMIN — FAMOTIDINE 20 MG: 40 POWDER, FOR SUSPENSION ORAL at 14:18

## 2020-10-07 RX ADMIN — ALPRAZOLAM 0.5 MG: 0.5 TABLET ORAL at 18:18

## 2020-10-07 RX ADMIN — METOCLOPRAMIDE HYDROCHLORIDE 5 MG: 5 SOLUTION ORAL at 06:43

## 2020-10-07 RX ADMIN — TRAZODONE HYDROCHLORIDE 100 MG: 100 TABLET ORAL at 21:48

## 2020-10-07 RX ADMIN — METOCLOPRAMIDE HYDROCHLORIDE 5 MG: 5 SOLUTION ORAL at 18:19

## 2020-10-07 RX ADMIN — METOCLOPRAMIDE HYDROCHLORIDE 5 MG: 5 SOLUTION ORAL at 12:49

## 2020-10-07 RX ADMIN — POTASSIUM BICARBONATE 40 MEQ: 782 TABLET, EFFERVESCENT ORAL at 09:25

## 2020-10-07 RX ADMIN — Medication 3 MG: at 21:48

## 2020-10-07 RX ADMIN — Medication 10 ML: at 06:44

## 2020-10-07 RX ADMIN — ACETAMINOPHEN ORAL SOLUTION 1000 MG: 650 SOLUTION ORAL at 12:48

## 2020-10-07 RX ADMIN — SERTRALINE HYDROCHLORIDE 25 MG: 50 TABLET ORAL at 09:24

## 2020-10-07 RX ADMIN — Medication 10 ML: at 19:36

## 2020-10-07 RX ADMIN — ASPIRIN 81 MG CHEWABLE TABLET 81 MG: 81 TABLET CHEWABLE at 09:25

## 2020-10-07 RX ADMIN — METOPROLOL TARTRATE 100 MG: 25 TABLET, FILM COATED ORAL at 09:24

## 2020-10-07 RX ADMIN — INFLUENZA VIRUS VACCINE 0.5 ML: 15; 15; 15; 15 SUSPENSION INTRAMUSCULAR at 15:30

## 2020-10-07 RX ADMIN — METOPROLOL TARTRATE 100 MG: 25 TABLET, FILM COATED ORAL at 18:18

## 2020-10-07 RX ADMIN — ACETAMINOPHEN ORAL SOLUTION 1000 MG: 650 SOLUTION ORAL at 02:08

## 2020-10-07 RX ADMIN — MICONAZOLE NITRATE 2 % TOPICAL POWDER: at 19:32

## 2020-10-07 RX ADMIN — Medication: at 09:26

## 2020-10-07 RX ADMIN — ACETAMINOPHEN ORAL SOLUTION 1000 MG: 650 SOLUTION ORAL at 06:43

## 2020-10-07 RX ADMIN — TRAZODONE HYDROCHLORIDE 100 MG: 100 TABLET ORAL at 02:00

## 2020-10-07 RX ADMIN — ALPRAZOLAM 0.5 MG: 0.5 TABLET ORAL at 09:24

## 2020-10-07 RX ADMIN — Medication: at 19:31

## 2020-10-07 RX ADMIN — NYSTATIN 500000 UNITS: 500000 SUSPENSION ORAL at 17:46

## 2020-10-07 RX ADMIN — NYSTATIN 500000 UNITS: 500000 SUSPENSION ORAL at 12:49

## 2020-10-07 NOTE — PROGRESS NOTES
[facility]  Hospitalist Group                                                                                          Hospitalist Progress Note  Walt Mensah MD  Answering service: [phone] OR [phone] from in house phone        Date of Service:  10/7/2020  NAME:  Lori Sandhu  :  1990  MRN:  [id number]      Admission Summary:     \"27year-old gentleman with no significant past medical history was brought to the emergency room from home via EMS for complaints of an approximately two- to three-day history of progressively worsening cough, shortness of breath, chills and fevers as high as 103 degrees Fahrenheit.  Of note, patient stated that he tested positive for COVID-19 virus about nine days prior to this emergency room presentation of 2020.  Further, patient stated that despite taking the prescribed Zithromax, prednisone, and other respiratory therapies, did not have any significant improvement in symptoms. Patient denied associated headaches, dizziness, visual deficits, chest pains, palpitations, sore throat, nausea, vomiting, abdominal pain, bladder or bowel irregularities. \"    Interval history / Subjective:       10/7/2020 :  Heart rate up to 130, else no distress, usually anxiety assoc      Assessment & Plan:        - Tachycardia, multifactorial, anxiolytics help to some degree. - Bilateral pneumonia due to COVID-19 infection - resolved  But on trach collar 02 3 LPM this 10/3/2020 am  ( 10 LPM on 10/1 );  Cont on trach collar, 02 sats 100% recurrenlty   Off abx, on trach collar, suctioning clear on 10/7/2020 am rounds, last cXR much clearer 10/4      - Right arm weakness: under eval, MRI reviewed, ;; cont to encourage in bed activity, and PT OT involvement/     - Debility for pt/ot as jennifer    - Acute hypoxic respiratory failure due to COVID-19 infection status post tracheostomy on ; see above     - Possible bacterial super imposed infection: Completed antibiotic course,     - A nxiety : per psy note 10/7/2020 :   - Dx: Unspecified anxiety disorder. 1. Decrease xanax to 0.5mg bid x3 days, then 0.5mg daily x3 days then 0.25mg daily x3 days then dc. 2. Zoloft 25mg po daily x 2 doses then increase to 50mg. 3. Atarax 50mg prn.  4. Follow up with [facility] after dc.      - History of tobacco use    - Continue trach collar with speech valve as tolerated - plan for continuous TC     -  PT/OT/rehab    - Dispo planning     - Still not clear what caused his acute right arm weakness, multiple possibilities including hypoxic event or vasculitis related to Jacques Ioana is on aspirin and Lovenox, no further deterioration - PT/OT   -Completed COVID-19 treatment   , GI prophylaxis and DVT prophylaxis =Pepcid/ Lovenox   - working w/ SLP - cleared for thickened liquid po\"            Hospital Problems  Date Reviewed: 8/20/2020          Codes Class Noted POA    Tracheostomy dependent ([facility] ICD-10-CM: Z93.0  ICD-9-CM: V44.0  10/2/2020 Yes        Tachycardia ICD-10-CM: R00.0  ICD-9-CM: 785.0  10/2/2020 Yes        Hyponatremia ICD-10-CM: E87.1  ICD-9-CM: 276.1  10/2/2020 Yes        Acute respiratory failure ([facility] ICD-10-CM: J96.00  ICD-9-CM: 518.81  8/18/2020 Yes        * (Principal) Pneumonia due to severe acute respiratory syndrome coronavirus 2 (SARS-CoV-2) ICD-10-CM: I78.4, J12.89  ICD-9-CM: 480.8  8/8/2020 Yes                Review of Systems:   gen weakness, no n/v, no fever, some better day over day       Vital Signs:    Last 24hrs VS reviewed since prior progress note.  Most recent are:  Visit Vitals  BP (!) 140/94 (BP 1 Location: Left arm, BP Patient Position: At rest)   Pulse (!) 132   Temp 98.1 °F (36.7 °C)   Resp 24   Ht 5' 11\" (1.803 m)   Wt 83.3 kg (183 lb 10.3 oz)   SpO2 98%   BMI 24.91 kg/m²         Intake/Output Summary (Last 24 hours) at 10/7/2020 0846  Last data filed at 10/7/2020 0636  Gross per 24 hour   Intake 715 ml   Output 650 ml   Net 65 ml Physical Examination:             Constitutional:  + on going  acute distress, cooperative, pleasant    ENT:  Oral mucosa moist, oropharynx benign. Resp:  CTA bilaterally. No wheezing/rhonchi/rales. +accessory muscle use; trach collar    CV:  Regular rhythm, normal rate, no murmurs, gallops, rubs    GI:  Soft, non distended, non tender. normoactive bowel sounds, no hepatosplenomegaly     Musculoskeletal:  No edema, warm, 2+ pulses throughout    Neurologic:  paresis generalized rt > lt      Psych:  Good insight, +t anxious -agitated. Data Review:    Review and/or order of clinical lab test      Labs:     Recent Labs     10/07/20  0424 10/06/20  0407   WBC 7.7 5.9   HGB 12.1 11.4*   HCT 37.8 35.6*    277     Recent Labs     10/07/20  0424 10/06/20  0407 10/05/20  0350    136 135*   K 3.7 3.3* 3.6   CL 98 97 97   CO2 31 31 32   BUN 17 17 12   CREA 0.60* 0.59* 0.73   * 118* 125*   CA 9.8 9.7 10.2*   MG 2.1 2.2 2.2   PHOS 5.0* 5.0* 4.3     Recent Labs     10/07/20  0424 10/06/20  0407 10/05/20  0350   ALT 44 49 51    100 115   TBILI 0.3 0.3 0.3   TP 7.7 7.5 8.0   ALB 3.6 3.3* 3.5   GLOB 4.1* 4.2* 4.5*     No results for input(s): INR, PTP, APTT, INREXT, INREXT in the last 72 hours. No results for input(s): FE, TIBC, PSAT, FERR in the last 72 hours. No results found for: FOL, RBCF   No results for input(s): PH, PCO2, PO2 in the last 72 hours. No results for input(s): CPK, CKNDX, TROIQ in the last 72 hours.     No lab exists for component: CPKMB  Lab Results   Component Value Date/Time    Cholesterol, total 156 09/05/2020 06:06 PM    HDL Cholesterol 26 09/05/2020 06:06 PM    LDL, calculated 92.4 09/05/2020 06:06 PM    Triglyceride 188 (H) 09/05/2020 06:06 PM    CHOL/HDL Ratio 6.0 (H) 09/05/2020 06:06 PM     Lab Results   Component Value Date/Time    Glucose (POC) 113 (H) 10/05/2020 05:15 AM    Glucose (POC) 119 (H) 09/06/2020 01:06 PM    Glucose (POC) 113 (H) 09/05/2020 06:49 PM Glucose (POC) 146 (H) 09/04/2020 11:36 AM    Glucose (POC) 122 (H) 08/27/2020 11:28 AM     No results found for: COLOR, APPRN, SPGRU, REFSG, RAE, PROTU, GLUCU, KETU, BILU, UROU, ROBLES, LEUKU, GLUKE, EPSU, BACTU, WBCU, RBCU, CASTS, UCRY      Medications Reviewed:     Current Facility-Administered Medications   Medication Dose Route Frequency    ALPRAZolam (XANAX) tablet 0.5 mg  0.5 mg Per G Tube BID    sertraline (ZOLOFT) tablet 25 mg  25 mg Oral DAILY    hydrOXYzine HCL (ATARAX) tablet 50 mg  50 mg Oral Q6H PRN    melatonin tablet 3 mg  3 mg Oral QHS    lidocaine 4 % patch 1 Patch  1 Patch TransDERmal Q24H    acetaminophen (TYLENOL) solution 1,000 mg  1,000 mg Per G Tube Q6H    hydrOXYzine HCL (ATARAX) tablet 25 mg  25 mg Oral TID PRN    metoprolol tartrate (LOPRESSOR) tablet 100 mg  100 mg Oral BID    potassium bicarb-citric acid (EFFER-K) tablet 40 mEq  40 mEq Oral DAILY    loperamide (IMODIUM) capsule 2 mg  2 mg Oral Q4H PRN    famotidine (PEPCID) 40 mg/5 mL (8 mg/mL) oral suspension 20 mg  20 mg Per G Tube Q12H    metoclopramide (REGLAN) 5 mg/5 mL oral syrup 5 mg  5 mg Per G Tube Q6H    traZODone (DESYREL) tablet 100 mg  100 mg Oral QHS    lidocaine (XYLOCAINE) 2 % jelly   Mucous Membrane PRN    enoxaparin (LOVENOX) injection 30 mg  30 mg SubCUTAneous Q12H    ondansetron (ZOFRAN) injection 4 mg  4 mg IntraVENous Q4H PRN    nystatin (MYCOSTATIN) 100,000 unit/mL oral suspension 500,000 Units  500,000 Units Oral QID    aluminum-magnesium hydroxide (MAALOX) oral suspension 15 mL  15 mL Oral QID PRN    prochlorperazine (COMPAZINE) with saline injection 10 mg  10 mg IntraVENous Q6H PRN    miconazole (MICOTIN) 2 % powder   Topical BID    oxyCODONE (ROXICODONE) 5 mg/5 mL oral solution 5 mg  5 mg Oral Q4H PRN    guaiFENesin (ROBITUSSIN) 100 mg/5 mL oral liquid 400 mg  400 mg Per NG tube Q4H PRN    balsam peru-castor oiL (VENELEX) ointment   Topical BID    acetaminophen (TYLENOL) suppository 650 mg  650 mg Rectal Q6H PRN    alteplase (CATHFLO) 1 mg in sterile water (preservative free) 1 mL injection  1 mg InterCATHeter PRN    polyvinyl alcohol-povidone (NATURAL TEARS) 0.5-0.6 % ophthalmic solution 1 Drop  1 Drop Both Eyes PRN    dextrose 10% infusion 0-250 mL  0-250 mL IntraVENous PRN    glucagon (GLUCAGEN) injection 1 mg  1 mg IntraMUSCular PRN    glucose chewable tablet 16 g  4 Tab Oral PRN    sodium chloride (NS) flush 5-40 mL  5-40 mL IntraVENous Q8H    sodium chloride (NS) flush 5-40 mL  5-40 mL IntraVENous PRN    aspirin chewable tablet 81 mg  81 mg Per NG tube DAILY    albuterol-ipratropium (DUO-NEB) 2.5 MG-0.5 MG/3 ML  3 mL Nebulization Q6H PRN     ______________________________________________________________________  EXPECTED LENGTH OF STAY: 12d 0h  ACTUAL LENGTH OF STAY:          Aries Hugo MD

## 2020-10-07 NOTE — PROGRESS NOTES
Problem: Pressure Injury - Risk of  Goal: *Prevention of pressure injury  Description: Document Enrique Scale and appropriate interventions in the flowsheet. Outcome: Progressing Towards Goal  Note: Pressure Injury Interventions:  Sensory Interventions: Float heels, Keep linens dry and wrinkle-free, Minimize linen layers    Moisture Interventions: Absorbent underpads, Check for incontinence Q2 hours and as needed    Activity Interventions: Assess need for specialty bed, Increase time out of bed, Pressure redistribution bed/mattress(bed type), PT/OT evaluation    Mobility Interventions: HOB 30 degrees or less, Pressure redistribution bed/mattress (bed type)    Nutrition Interventions: Document food/fluid/supplement intake    Friction and Shear Interventions: HOB 30 degrees or less, Apply protective barrier, creams and emollients, Minimize layers                Problem: Falls - Risk of  Goal: *Absence of Falls  Description: Document Adama Fall Risk and appropriate interventions in the flowsheet.   Outcome: Progressing Towards Goal  Note: Fall Risk Interventions:  Mobility Interventions: Communicate number of staff needed for ambulation/transfer, Patient to call before getting OOB, Utilize walker, cane, or other assistive device    Mentation Interventions: Door open when patient unattended, Adequate sleep, hydration, pain control    Medication Interventions: Evaluate medications/consider consulting pharmacy, Patient to call before getting OOB, Teach patient to arise slowly    Elimination Interventions: Call light in reach, Patient to call for help with toileting needs, Urinal in reach    History of Falls Interventions: Door open when patient unattended, Investigate reason for fall, Room close to nurse's station         Problem: Nutrition Deficit  Goal: *Optimize nutritional status  Outcome: Progressing Towards Goal     Problem: Breathing Pattern - Ineffective  Goal: *Absence of hypoxia  Outcome: Progressing Towards Goal     Problem: Breathing Pattern - Ineffective  Goal: *Absence of hypoxia  Outcome: Progressing Towards Goal

## 2020-10-07 NOTE — PROGRESS NOTES
Problem: Dysphagia (Adult)  Goal: *Acute Goals and Plan of Care (Insert Text)  Description: Speech Therapy Goals  Initiated 9/23/2020    1. Patient will tolerate ice chips only when on PMV without adverse effects within 7 days. Added 10/1/2020  2. Patient will tolerate nectar thick liquids only when on PMV with no adverse effects within 7 days. Outcome: Progressing Towards Goal     Problem: Voice Impaired (Adult)  Goal: *Acute Goals and Plan of Care (Insert Text)  Description: Speech Therapy Goals  Initiated 9/23/2020    1. Patient will tolerate in-line PMV for 10 minute trials with RT/SLP/RN without adverse effects within 7 days. MET  2. Patient will utilize in-line PMV to work towards ventilator weaning within 7 days. Outcome: Progressing Towards Goal    SPEECH LANGUAGE PATHOLOGY DYSPHAGIA AND PMV TREATMENT  Patient: Tawana Wallace (27 y.o. male)  Date: 10/7/2020  Diagnosis: Acute respiratory failure ([facility] [J96.00]   Pneumonia due to severe acute respiratory syndrome coronavirus 2 (SARS-CoV-2)  Procedure(s) (LRB):  ESOPHAGOGASTRODUODENOSCOPY (EGD) @ bedside (N/A)  ESOPHAGOGASTRODUODENAL (EGD) BIOPSY (N/A) 15 Days Post-Op  Precautions: Fall    ASSESSMENT:  Patient was voicing around trach without PMV upon SLP arrival. SLP placed PMV. Patient tolerated PMV with stable O2 and RR with slightly increased WOB 2/2 anxiety, as pt WOB reduced when guided through relaxation. Patient reported doing trial of  with RT and that it felt \"different\" but otherwise was well tolerated. Given tolerance of PMV for all waking hours and tolerance of  trial, recommend patient  with option of supplemental nasal O2 if necessary, to work towards decannulation. Patient tolerated nectar thick liquid trials with no s/s of aspiration or penetration noted. Effortful swallows were implemented to aid in strengthening swallow muscles.  Patient was able to independently and effectively implement effortful swallow with min cues. If patient is able to stay on  for 24 hours and be decannulated, will recommend another MBS to reassess swallow function and hopefully upgrade diet. Education provided to patient's mom over the phone regarding goal for 24 hour  donning to deccanulate and then complete MBS reevaluation for diet reccomendations. She expressed agreement and understanding. Furthermore, spoke with RT regarding . Decision was made that pt can be placed on nasal cannula with red capping trial so that patient can work toward decannulation, and still have supplemental oxygen support. All in agreement with this plan. PLAN:  Recommendations and Planned Interventions:  -- PMV (or ) donned for all PO  -- Nectar thick liquids only  -- No puree (applesauce)  -- Vigilant oral care  -- Red capping for for 24 hours; hopeful ultimately for decanulation  Patient continues to benefit from skilled intervention to address the above impairments. Continue treatment per established plan of care. Discharge Recommendations:  Inpatient Rehab     SUBJECTIVE:   Patient stated Brandon Honeycutt makes me feel better when told he could still get supplemental oxygen nasally if needed when on the . OBJECTIVE:   Cognitive and Communication Status:  Neurologic State: Alert  Orientation Level: Oriented X4  Cognition: Follows commands  Perception: Appears intact  Perseveration: No perseveration noted  Safety/Judgement: Awareness of environment  Dysphagia Treatment:  Oral Assessment:  Oral Assessment  Labial: No impairment  Dentition: Intact; Natural  Oral Hygiene: moist oral mucosa  Lingual: No impairment  Velum: No impairment  Mandible: No impairment  P.O.  Trials:  Patient Position: upright in bed  Vocal quality prior to P.O.: No impairment  Consistency Presented: Nectar thick liquid  How Presented: Self-fed/presented;Cup/sip        Bolus Formation/Control: No impairment     Propulsion: No impairment  Oral Residue: None  Initiation of Swallow: No impairment  Laryngeal Elevation: Functional  Aspiration Signs/Symptoms: None  Pharyngeal Phase Characteristics: No impairment, issues, or problems              Oral Phase Severity: No impairment  Pharyngeal Phase Severity : Other (comment)(at risk)  Exercises:  Laryngeal Exercises:  Effortful Swallow: Yes  Sets : 1  Reps : Other (comment)(10)      Tracheostomy:     #6 Shiley Cuffed Trach    Vital Signs Prior to PMV Placement:  O2: 94  RR: 24  HR: 110     Vital Signs During PMV Placement  O2: 98 - 100  RR: 22 - 24  HR: 104 - 113      Pain:  Pain Scale 1: Numeric (0 - 10)  Pain Intensity 1: 0       After treatment:   Patient left in no apparent distress in bed, Call bell within reach, and Nursing notified    COMMUNICATION/EDUCATION:   Patient was educated regarding his deficit(s) of dysphagia as this relates to his complex medial course. He demonstrated Good understanding as evidenced by agreement and teachback. The patient's plan of care including recommendations, planned interventions, and recommended diet changes were discussed with: Registered nurse. Wilber DAVIS Speech Language Pathology Student   Time Calculation: 25 mins         Regarding student involvement in patient care:  A student participated in this treatment session. Per CMS Medicare statements and [employer] guidelines I certify that the following was true:  1. I was present and directly observed the entire session. 2. I made all skilled judgments and clinical decisions regarding care. 3. I am the practitioner responsible for assessment, treatment, and documentation.

## 2020-10-07 NOTE — PROGRESS NOTES
TRANSFER - IN REPORT:    Verbal report received from Gerhardt Earthly , RN(name) on [employer]  being received from [facility](unit) for routine progression of care      Report consisted of patients Situation, Background, Assessment and   Recommendations(SBAR). Information from the following report(s) SBAR, ED Summary, Recent Results and Cardiac Rhythm NSR/ST was reviewed with the receiving nurse. Opportunity for questions and clarification was provided. Assessment completed upon patients arrival to unit and care assumed.

## 2020-10-07 NOTE — PROGRESS NOTES
SLP Contact Note    Pt trialed brief red-cap this morning with RT. Pt became anxious and thus red-cap removed. Upon speaking further with RT and patient, decision made that pt can be placed on nasal cannula with red capping trial so that patient can work toward decannulation, and still have supplemental oxygen support. All in agreement with this plan. Full mnote to follow.       Thank you,  GEOVANY Mae.Ed, [id number] [facility]  Speech-Language Pathologist

## 2020-10-07 NOTE — PROGRESS NOTES
Problem: Self Care Deficits Care Plan (Adult)  Goal: *Acute Goals and Plan of Care (Insert Text)  Description:   FUNCTIONAL STATUS PRIOR TO ADMISSION: Patient unable to provide history at OT evaluation. Per chart review, patient was fully independent    HOME SUPPORT: Per chart review, patient lived alone    Occupational Therapy Goals  ALL GOALS Reviewed and remain appropriate on 10/5/20  Initiated 9/14/2020, continued 9/21/2020, continued 9/28/2020  1. Patient will perform grooming with minimum assistance within 7 day(s). 2.  Patient will perform bathing with moderate assistance  within 7 day(s). 3.  Patient will perform upper body dressing with minimum assistance  within 7 day(s). 5.  Patient will perform lower body dressing with moderate assistance within 7 day(s). 6.  Patient will perform toilet transfers with moderate assistance  within 7 day(s). 7.  Patient will perform all aspects of toileting with moderate assistance  within 7 day(s). 8.  Patient will participate in upper extremity therapeutic exercise/activities with minimal assistance for 10 minutes within 7 day(s). 9.  Patient will utilize energy conservation techniques during functional activities with verbal cues within 7 day(s). Outcome: Progressing Towards Goal    OCCUPATIONAL THERAPY TREATMENT  Patient: Jaida Chawla (1 y.o. male)  Date: 10/7/2020  [facility]osis[facility] [J96.00]   Pneumonia due to severe acute respiratory syndrome coronavirus 2 (SARS-CoV-2)  Procedure(s) (LRB):  ESOPHAGOGASTRODUODENOSCOPY (EGD) @ bedside (N/A)  ESOPHAGOGASTRODUODENAL (EGD) BIOPSY (N/A) 15 Days Post-Op  Precautions: Fall  Chart, occupational therapy assessment, plan of care, and goals were reviewed. ASSESSMENT  Patient continues with skilled OT services and is progressing towards goals. This patient had his trach capped today during OT session and placed on 2 lpm nasal cannula.  O2 sats remained 94-98% during session with intermittent moments of tachycardia into 130's with activity requiring rest breaks between sets and increased time for all tasks. He progressed to performing UB and periarea bathing from chair today, standing to bath periarea as below. He continues to require cues for thoracic extension, UE use and UE positioning to compensate for R proximal weakness which is improved today. He will benefit from IP rehab when appropriate. Continue per POC. Visit Vitals  BP (!) (P) 130/90 (BP 1 Location: Left arm, BP Patient Position: During activity; Sitting)   Pulse 100   Temp 97.7 °F (36.5 °C)   Resp 22   Ht 5' 11\" (1.803 m)   Wt 83.3 kg (183 lb 10.3 oz)   SpO2 (P) 97%   BMI 24.91 kg/m²        Current Level of Function Impacting Discharge (ADLs): D to min A for ADL tasks, just starting ADl related to bathing tasks today    Other factors to consider for discharge: was I PTA, [facility]         PLAN :  Patient continues to benefit from skilled intervention to address the above impairments. Continue treatment per established plan of care. to address goals. Recommend with staff: up to chair 3xday, use BSC, encourage to bathe UB in chair    Recommend next OT session: amb to bathroom potentially, standing for one grooming task    Recommendation for discharge: (in order for the patient to meet his/her long term goals)  Therapy 3 hours per day 5-7 days per week    This discharge recommendation:  Has not yet been discussed the attending provider and/or case management    IF patient discharges home will need the following DME: defer to IP rehab       SUBJECTIVE:   Patient stated I don't think I can do it (stand to wash periarea at [facility]).     OBJECTIVE DATA SUMMARY:   Cognitive/Behavioral Status:  Neurologic State: Alert  Orientation Level: Oriented X4  Cognition: Follows commands  Perception: Appears intact  Perseveration: No perseveration noted  Safety/Judgement: Awareness of environment    Functional Mobility and Transfers for ADLs:  Bed Mobility:  Supine to Sit: Supervision  Scooting: Stand-by assistance    Transfers:  Sit to Stand: Minimum assistance; Additional time;Assist x1     Bed to Chair: Minimum assistance    Balance:  Sitting: Impaired; Without support  Sitting - Static: Good (unsupported)  Sitting - Dynamic: Fair (occasional)  Standing: Impaired; With support;Pull to stand  Standing - Static: Fair;Constant support  Standing - Dynamic : Fair;Constant support    ADL Intervention:            Upper Body Bathing  Bathing Assistance: Minimum assistance  Position Performed: Seated in chair  Cues: Tactile cues provided;Physical assistance;Verbal cues provided;Visual cues provided    Lower Body Bathing  Perineal  : Minimum assistance; Moderate assistance  Position Performed: Standing  Cues: Tactile cues provided;Physical assistance pants up;Verbal cues provided;Visual cues provided    Upper Body [address]: Minimum  assistance         Toileting  Bladder Hygiene: Minimum assistance(with urinal sitting in recliner chair, A to position in prep)    Cognitive Retraining  Safety/Judgement: Awareness of environment    Therapeutic Exercises:     EXERCISE   Sets   Reps   Active Active Assist   Passive   Comments   R [address] elevation 3 1 [x]           []           []           To 70 degrees elevation today!    R  elbow flexion 2 3 []           [x]           []           AROM elbow flexionto 100 degrees, A for hand to shoulder with task   B SROM to 90 degrees GH flexion 5 1 []           [x]           []           With isometric hold or eccentric lowering for 5 sec count once Assisted to position G GH at 90 degrees of flexion (lowered or held in this position for count of 5)   R tricep extension 2 3 [x]           []           []           Very slight slight resistance to strengthen tricep   Thoracic extension   X          []           []           Constant cues for increasing with tasks aobe      []           [] []                 []           []           []                 []           []           []                 []           []           []                 []           []           []                 []           []           []                   Pain:  No c/o    Activity Tolerance:   Fair, desaturates with exertion and requires oxygen, requires rest breaks, and tachycardia with tasks  Please refer to the flowsheet for vital signs taken during this treatment. After treatment patient left in no apparent distress:   Sitting in chair and Call bell within reach    COMMUNICATION/COLLABORATION:   The patients plan of care was discussed with: Registered nurse, SLP, respiratory.      Nicolas Machado, OTR/L  Time Calculation: 62 mins

## 2020-10-07 NOTE — PROGRESS NOTES
Problem: Mobility Impaired (Adult and Pediatric)  Goal: *Acute Goals and Plan of Care (Insert Text)  Description: FUNCTIONAL STATUS PRIOR TO ADMISSION: Patient was independent and active without use of DME. 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 Physical Therapy Goals  Reassessment 10/2/2020, goals upgraded. 1.  Patient will move from supine to sit and sit to supine , scoot up and down, and roll side to side in bed with minimal within 7 day(s). 2.  Patient will transfer from bed to chair and chair to bed with minimal assist x2 using the least restrictive device within 7 day(s). 3.  Patient will perform sit to stand with minimal assistance within 7 day(s). 4.  Patient will demo good sitting balance in unsupported sitting x5min in prep for mobility progression in 7 days. 5. Patient will ambulate 10ft with RW and mod A x2 within 7 days. Reassessment 9/25/2020  1. Patient will move from supine to sit and sit to supine , scoot up and down, and roll side to side in bed with maximal assistance within 7 day(s). 2.  Patient will transfer from bed to chair and chair to bed with maximal assistance using the least restrictive device within 7 day(s). 3.  Patient will perform sit to stand with maximal assistance within 7 day(s). 4.  Patient will tolerate sitting EOB with moderate assistance for 10 minutes within 7 day(s). Reassessment completed 9/18/2020 and all goals remain appropriate  at this time. Initiated 9/11/2020  1. Patient will move from supine to sit and sit to supine , scoot up and down, and roll side to side in bed with maximal assistance within 7 day(s). 2.  Patient will transfer from bed to chair and chair to bed with maximal assistance using the least restrictive device within 7 day(s). 3.  Patient will perform sit to stand with maximal assistance within 7 day(s).   4. Patient will tolerate sitting EOB with maximal assistance for 5 minutes within 7 day(s). --Goal Met 9/25      Outcome: Progressing Towards Goal   PHYSICAL THERAPY TREATMENT  Patient: Michelle Mcgill (36 y.o. male)  Date: 10/7/2020  Diagnosis: Acute respiratory failure ([facility] [J96.00]   Pneumonia due to severe acute respiratory syndrome coronavirus 2 (SARS-CoV-2)  Procedure(s) (LRB):  ESOPHAGOGASTRODUODENOSCOPY (EGD) @ bedside (N/A)  ESOPHAGOGASTRODUODENAL (EGD) BIOPSY (N/A) 15 Days Post-Op  Precautions: Fall  Chart, physical therapy assessment, plan of care and goals were reviewed. ASSESSMENT  Patient continues with skilled PT services and is progressing towards goals. Pt limited by anxiety and fixation on O2 saturations. Completed all mobility with PMV on trach and 8L/min at 35% FiO2. Saturations 90-94% with activity. Pt with increased anxiety d/t moving floors and  trial this AM. Had planned to transfer to portable O2 tank today to walk but patient requesting to wait until tomorrow. Pt did engage with multiple rounds of standing, standing marching and transfer to from [facility] and EOB. Noted poor dynamic balance today with heavy use of RW and up to Min A x 2 to maintain balance with first two transfers. Improved balance on subsequent transfers. Left up in chair at end of session with all needs in reach. Added additional UE exercises with good performance(increased assistance needed with R UE). Will continue to progress as pt tolerates. Current Level of Function Impacting Discharge (mobility/balance): Min A x 2 to transfer and mobilize short distances only    Other factors to consider for discharge: new trach and PEG tube, prolonged admission, independent and working PTA         PLAN :  Patient continues to benefit from skilled intervention to address the above impairments. Continue treatment per established plan of care. to address goals.     Recommendation for discharge: (in order for the patient to meet his/her long term goals)  Therapy 3 hours per day 5-7 days per week    This discharge recommendation:  Has been made in collaboration with the attending provider and/or case management    IF patient discharges home will need the following DME: hospital bed, portable oxygen, rolling walker, and wheelchair       SUBJECTIVE:   Patient stated I am just so tired.     OBJECTIVE DATA SUMMARY:   Critical Behavior:  Neurologic State: Alert  Orientation Level: Oriented X4  Cognition: Follows commands  Safety/Judgement: Awareness of environment  Functional Mobility Training:  Bed Mobility:     Supine to Sit: Supervision     Scooting: Stand-by assistance        Transfers:  Sit to Stand: Minimum assistance;Contact guard assistance  Stand to Sit: Minimum assistance        Bed to Chair: Minimum assistance                    Balance:  Sitting: Intact  Standing: Impaired; With support  Standing - Static: Fair;Constant support  Standing - Dynamic : Fair;Constant support  Ambulation/Gait Training:  Distance (ft): 5 Feet (ft)(x 3)  Assistive Device: Gait belt;Walker, rolling  Ambulation - Level of Assistance: Minimal assistance        Gait Abnormalities: Decreased step clearance;Shuffling gait; Step to gait;Trunk sway increased        Base of Support: Widened;Center of gravity altered     Speed/Veronica: Slow;Shuffled  Step Length: Right shortened;Left shortened                    Stairs: Therapeutic Exercises:   Rows with assistance to elevated R UE, shoulder shrugs, shoulder rolls, LAQ, seated marching, heel raise, toe raise  Pain Rating:  None reported    Activity Tolerance:   Fair, desaturates with exertion and requires oxygen, requires rest breaks, and observed SOB with activity  Please refer to the flowsheet for vital signs taken during this treatment.     After treatment patient left in no apparent distress:   Sitting in chair and Call bell within reach    COMMUNICATION/COLLABORATION:   The patients plan of care was discussed with: Registered nurse.      Juan Carlos Chilel, PT   Time Calculation: 51 mins

## 2020-10-07 NOTE — PROGRESS NOTES
Bedside shift change report given to Adonis Jain RN (oncoming nurse) by Viv Dawson RN (offgoing nurse). Report included the following information SBAR, Kardex, Recent Results and Cardiac Rhythm NSR/ST.

## 2020-10-07 NOTE — PROGRESS NOTES
10/7/2020 -   MIGUELINA:  - RUR: 32%  - Disposition is TBD  - Complex Case CMs are following patient    - Patient has had elevated HR today, 10/7  - Patient is trialing capping of trach with trach collar  - Goal is for patient to be decannulated prior to discharge  CRM: Melissa Ellison, MPH, [address] [facility]; Z: [phone]

## 2020-10-07 NOTE — PROGRESS NOTES
10/07/20 0940   Oxygen Therapy   O2 Sat (%) 95 %   Pulse via Oximetry 133 beats per minute   O2 Device Tracheal collar   O2 Flow Rate (L/min) 8 l/min   FIO2 (%) 35 %     On 1st attempt for red - capping patient very anxious lasting for about 10 mins. 1600: 2nd attempt but this time placed on [facility]. Tolerating well. Speech and Occupational  therapists present. Mom at the bedside. Respiratory will continue to monitor as needed.

## 2020-10-08 LAB
GLUCOSE BLD STRIP.AUTO-MCNC: 114 MG/DL (ref 65–100)
SERVICE CMNT-IMP: ABNORMAL

## 2020-10-08 PROCEDURE — 74011250637 HC RX REV CODE- 250/637: Performed by: HOSPITALIST

## 2020-10-08 PROCEDURE — 87635 SARS-COV-2 COVID-19 AMP PRB: CPT

## 2020-10-08 PROCEDURE — 65210000002 HC RM PRIVATE GYN

## 2020-10-08 PROCEDURE — 74011250637 HC RX REV CODE- 250/637: Performed by: NURSE PRACTITIONER

## 2020-10-08 PROCEDURE — 74011250637 HC RX REV CODE- 250/637: Performed by: EMERGENCY MEDICINE

## 2020-10-08 PROCEDURE — 74011000250 HC RX REV CODE- 250: Performed by: INTERNAL MEDICINE

## 2020-10-08 PROCEDURE — 74011250637 HC RX REV CODE- 250/637: Performed by: INTERNAL MEDICINE

## 2020-10-08 PROCEDURE — 97530 THERAPEUTIC ACTIVITIES: CPT

## 2020-10-08 PROCEDURE — 74011250636 HC RX REV CODE- 250/636: Performed by: EMERGENCY MEDICINE

## 2020-10-08 PROCEDURE — 77030018798 HC PMP KT ENTRL FED COVD -A

## 2020-10-08 PROCEDURE — 97116 GAIT TRAINING THERAPY: CPT

## 2020-10-08 PROCEDURE — 65660000000 HC RM CCU STEPDOWN

## 2020-10-08 PROCEDURE — 77010033678 HC OXYGEN DAILY

## 2020-10-08 PROCEDURE — 82962 GLUCOSE BLOOD TEST: CPT

## 2020-10-08 RX ORDER — LIDOCAINE 4 G/100G
2 PATCH TOPICAL EVERY 24 HOURS
Status: DISCONTINUED | OUTPATIENT
Start: 2020-10-09 | End: 2020-10-08

## 2020-10-08 RX ORDER — LIDOCAINE 4 G/100G
2 PATCH TOPICAL EVERY 24 HOURS
Status: DISCONTINUED | OUTPATIENT
Start: 2020-10-08 | End: 2020-10-12 | Stop reason: HOSPADM

## 2020-10-08 RX ADMIN — METOCLOPRAMIDE HYDROCHLORIDE 5 MG: 5 SOLUTION ORAL at 00:09

## 2020-10-08 RX ADMIN — ONDANSETRON 4 MG: 2 INJECTION INTRAMUSCULAR; INTRAVENOUS at 04:29

## 2020-10-08 RX ADMIN — ENOXAPARIN SODIUM 30 MG: 30 INJECTION SUBCUTANEOUS at 22:57

## 2020-10-08 RX ADMIN — NYSTATIN 500000 UNITS: 500000 SUSPENSION ORAL at 13:29

## 2020-10-08 RX ADMIN — FAMOTIDINE 20 MG: 40 POWDER, FOR SUSPENSION ORAL at 00:09

## 2020-10-08 RX ADMIN — Medication 5 ML: at 22:57

## 2020-10-08 RX ADMIN — ENOXAPARIN SODIUM 30 MG: 30 INJECTION SUBCUTANEOUS at 11:37

## 2020-10-08 RX ADMIN — NYSTATIN 500000 UNITS: 500000 SUSPENSION ORAL at 22:57

## 2020-10-08 RX ADMIN — POTASSIUM BICARBONATE 40 MEQ: 782 TABLET, EFFERVESCENT ORAL at 08:34

## 2020-10-08 RX ADMIN — ACETAMINOPHEN ORAL SOLUTION 1000 MG: 650 SOLUTION ORAL at 22:56

## 2020-10-08 RX ADMIN — Medication: at 17:53

## 2020-10-08 RX ADMIN — Medication 3 MG: at 22:57

## 2020-10-08 RX ADMIN — NYSTATIN 500000 UNITS: 500000 SUSPENSION ORAL at 17:37

## 2020-10-08 RX ADMIN — NYSTATIN 500000 UNITS: 500000 SUSPENSION ORAL at 08:34

## 2020-10-08 RX ADMIN — METOPROLOL TARTRATE 100 MG: 25 TABLET, FILM COATED ORAL at 08:34

## 2020-10-08 RX ADMIN — METOPROLOL TARTRATE 100 MG: 25 TABLET, FILM COATED ORAL at 17:37

## 2020-10-08 RX ADMIN — METOCLOPRAMIDE HYDROCHLORIDE 5 MG: 5 SOLUTION ORAL at 17:36

## 2020-10-08 RX ADMIN — FAMOTIDINE 20 MG: 40 POWDER, FOR SUSPENSION ORAL at 13:29

## 2020-10-08 RX ADMIN — ACETAMINOPHEN ORAL SOLUTION 1000 MG: 650 SOLUTION ORAL at 04:50

## 2020-10-08 RX ADMIN — Medication 10 ML: at 14:00

## 2020-10-08 RX ADMIN — METOCLOPRAMIDE HYDROCHLORIDE 5 MG: 5 SOLUTION ORAL at 11:37

## 2020-10-08 RX ADMIN — ASPIRIN 81 MG CHEWABLE TABLET 81 MG: 81 TABLET CHEWABLE at 08:34

## 2020-10-08 RX ADMIN — ALPRAZOLAM 0.5 MG: 0.5 TABLET ORAL at 08:34

## 2020-10-08 RX ADMIN — METOCLOPRAMIDE HYDROCHLORIDE 5 MG: 5 SOLUTION ORAL at 23:29

## 2020-10-08 RX ADMIN — ACETAMINOPHEN ORAL SOLUTION 1000 MG: 650 SOLUTION ORAL at 08:35

## 2020-10-08 RX ADMIN — ALUMINUM HYDROXIDE AND MAGNESIUM HYDROXIDE 15 ML: 200; 200 SUSPENSION ORAL at 04:50

## 2020-10-08 RX ADMIN — Medication: at 09:00

## 2020-10-08 RX ADMIN — MICONAZOLE NITRATE 2 % TOPICAL POWDER: at 08:36

## 2020-10-08 RX ADMIN — MICONAZOLE NITRATE 2 % TOPICAL POWDER: at 17:54

## 2020-10-08 RX ADMIN — ALPRAZOLAM 0.5 MG: 0.5 TABLET ORAL at 17:37

## 2020-10-08 RX ADMIN — ACETAMINOPHEN ORAL SOLUTION 1000 MG: 650 SOLUTION ORAL at 17:36

## 2020-10-08 RX ADMIN — ENOXAPARIN SODIUM 30 MG: 30 INJECTION SUBCUTANEOUS at 00:09

## 2020-10-08 RX ADMIN — METOCLOPRAMIDE HYDROCHLORIDE 5 MG: 5 SOLUTION ORAL at 05:01

## 2020-10-08 RX ADMIN — SERTRALINE HYDROCHLORIDE 25 MG: 50 TABLET ORAL at 08:34

## 2020-10-08 RX ADMIN — TRAZODONE HYDROCHLORIDE 100 MG: 100 TABLET ORAL at 22:57

## 2020-10-08 NOTE — PROGRESS NOTES
Transition of Care Plan:    RUR:  32%  LOS:  46 Days    1553  Received message from [facility] that patient has been accepted pending insurance Authorization. Requested COVID test from Attending. Bed should be available on Monday. CCM Will continue to follow. 1358  [facility]. Patient admitted to [facility] as transfer from [facility] as referral for possible VV ECMO. Initially diagnosed with COVID-19 which now shows negative test results; COVID-19 and sepsis; respiratory failure, trach and PEG placements. Patient now has capped trach on 2lpm O2 via NC; participating well with PT/OT/ST who are recommending IPR for continued therapy to achieve maximum independence with ADL's. Appreciate Psych consult and recommendations. Discussed disposition options with patient's mother, Reuben Hill ([phone]) who visits patient daily at [facility]. Ms. Veronica Lam stated that she and patient have discussed rehab and would like for him to transfer to [facility] for continued care. Ms. Veronica Lam reports that she and her  will be available to assist patient post rehab services. 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 His employer is paying the premiums for his continued insurance coverage at this time. Provided mother with this writer's contact information and will continue to follow for disposition planning until he is transitioned to the next level of care. Referral initiated to [facility] today. Continue to follow with medical team for appropriate disposition plans.     Jossie Doyle, ERIC, CCM, ACM-  Complex CM Coordinator/[place] [facility]  [phone]

## 2020-10-08 NOTE — PROGRESS NOTES
Problem: Pressure Injury - Risk of  Goal: *Prevention of pressure injury  Description: Document Enrique Scale and appropriate interventions in the flowsheet. Outcome: Progressing Towards Goal  Note: Pressure Injury Interventions:  Sensory Interventions: Keep linens dry and wrinkle-free, Maintain/enhance activity level    Moisture Interventions: Absorbent underpads    Activity Interventions: Increase time out of bed    Mobility Interventions: HOB 30 degrees or less, Pressure redistribution bed/mattress (bed type), PT/OT evaluation    Nutrition Interventions: Document food/fluid/supplement intake    Friction and Shear Interventions: Lift sheet, Minimize layers                Problem: Patient Education: Go to Patient Education Activity  Goal: Patient/Family Education  Outcome: Progressing Towards Goal     Problem: Falls - Risk of  Goal: *Absence of Falls  Description: Document Adama Fall Risk and appropriate interventions in the flowsheet.   Outcome: Progressing Towards Goal  Note: Fall Risk Interventions:  Mobility Interventions: Patient to call before getting OOB    Mentation Interventions: Adequate sleep, hydration, pain control, Door open when patient unattended, Reorient patient, Toileting rounds, Room close to nurse's station, Increase mobility, More frequent rounding    Medication Interventions: Patient to call before getting OOB    Elimination Interventions: Patient to call for help with toileting needs    History of Falls Interventions: Door open when patient unattended, Investigate reason for fall, Room close to nurse's station         Problem: Patient Education: Go to Patient Education Activity  Goal: Patient/Family Education  Outcome: Progressing Towards Goal     Problem: Breathing Pattern - Ineffective  Goal: *Absence of hypoxia  Outcome: Progressing Towards Goal  Goal: *Use of effective breathing techniques  Outcome: Progressing Towards Goal     Problem: Patient Education: Go to Patient Education Activity  Goal: Patient/Family Education  Outcome: Progressing Towards Goal

## 2020-10-08 NOTE — PROGRESS NOTES
[facility]  Hospitalist Group                                                                                          Hospitalist Progress Note  Mariah Widsom MD  Answering service: [phone] OR [phone] from in house phone        Date of Service:  10/8/2020  NAME:  Josette Enciso  :  1990  MRN:  [id number]      Admission Summary:     \"27year-old gentleman with no significant past medical history was brought to the emergency room from home via EMS for complaints of an approximately two- to three-day history of progressively worsening cough, shortness of breath, chills and fevers as high as 103 degrees Fahrenheit.  Of note, patient stated that he tested positive for COVID-19 virus about nine days prior to this emergency room presentation of 2020.  Further, patient stated that despite taking the prescribed Zithromax, prednisone, and other respiratory therapies, did not have any significant improvement in symptoms. Patient denied associated headaches, dizziness, visual deficits, chest pains, palpitations, sore throat, nausea, vomiting, abdominal pain, bladder or bowel irregularities. \"    Interval history / Subjective:      Patient seen and examined this afternoon. Patient feeling better. Seems anxious. Has a lot of questions.      Assessment & Plan:     # Acute Hypoxic respiratory failure due to pneumonia   # Bilateral pneumonia due to COVID-19 infection - resolved    - s/p tracheostomy  and PEG 20  - Cont on trach collar, suctioning clear, 02 sats 100% recurrenlty   - Continue trach collar with speech valve as tolerated - plan for continuous TC   - Completed COVID-19 treatment  - completed antibiotic course, Off abx now  - last cXR much clearer 10/4  - plan to remove trach tomorrow     # Right arm weakness  # Debility   - Still not clear what caused his acute right arm weakness, multiple possibilities including hypoxic ischemic event, inflammatory vs infectious or vasculitis related to Burnadette Eladio is on aspirin and Lovenox, no further deterioration   - MRI reviewed   - cont to encourage in bed activity  - PT/OT       # Anxiety : per psyh   - Decrease xanax to 0.5mg bid x3 days, then 0.5mg daily x3 days then 0.25mg daily x3 days then dc. - Zoloft 25mg po daily x 2 doses then increase to 50mg. - Atarax 50mg prn.  - Follow up with [facility] mental health after dc. # Tachycardia: multifactorial, anxiolytics help to some degree. # History of tobacco use    Diet: working w/ SLP - cleared for thickened liquid po      GI prophylaxis and DVT prophylaxis =Pepcid/ Lovenox     Code: Full           Hospital Problems  Date Reviewed: 8/20/2020          Codes Class Noted POA    Tracheostomy dependent ([facility] [address] ICD-10-CM: Z93.0  ICD-9-CM: V44.0  10/2/2020 Yes        Tachycardia ICD-10-CM: R00.0  ICD-9-CM: 785.0  10/2/2020 Yes        Hyponatremia ICD-10-CM: E87.1  ICD-9-CM: 276.1  10/2/2020 Yes        Acute respiratory failure ([facility] [address] ICD-10-CM: J96.00  ICD-9-CM: 518.81  8/18/2020 Yes        * (Principal) Pneumonia due to severe acute respiratory syndrome coronavirus 2 (SARS-CoV-2) ICD-10-CM: D52.5, J12.89  ICD-9-CM: 480.8  8/8/2020 Yes                Review of Systems:   gen weakness, no n/v, no fever, some better day over day       Vital Signs:    Last 24hrs VS reviewed since prior progress note. Most recent are:  Visit Vitals  /85   Pulse 97   Temp 98.2 °F (36.8 °C)   Resp 22   Ht 5' 11\" (1.803 m)   Wt 83.3 kg (183 lb 10.3 oz)   SpO2 98%   BMI 24.91 kg/m²         Intake/Output Summary (Last 24 hours) at 10/8/2020 0804  Last data filed at 10/8/2020 0022  Gross per 24 hour   Intake 100 ml   Output 200 ml   Net -100 ml        Physical Examination:             Constitutional:  + on going  acute distress, cooperative, pleasant    ENT:  Oral mucosa moist, oropharynx benign. Resp:  CTA bilaterally. No wheezing/rhonchi/rales.  +accessory muscle use; trach collar    CV:  Regular rhythm, normal rate, no murmurs, gallops, rubs    GI:  Soft, non distended, non tender. normoactive bowel sounds, no hepatosplenomegaly     Musculoskeletal:  No edema, warm, 2+ pulses throughout    Neurologic:  paresis generalized rt > lt      Psych:  Good insight, +t anxious -agitated. Data Review:    Review and/or order of clinical lab test      Labs:     Recent Labs     10/07/20  0424 10/06/20  0407   WBC 7.7 5.9   HGB 12.1 11.4*   HCT 37.8 35.6*    277     Recent Labs     10/07/20  0424 10/06/20  0407    136   K 3.7 3.3*   CL 98 97   CO2 31 31   BUN 17 17   CREA 0.60* 0.59*   * 118*   CA 9.8 9.7   MG 2.1 2.2   PHOS 5.0* 5.0*     Recent Labs     10/07/20  0424 10/06/20  0407   ALT 44 49    100   TBILI 0.3 0.3   TP 7.7 7.5   ALB 3.6 3.3*   GLOB 4.1* 4.2*     No results for input(s): INR, PTP, APTT, INREXT, INREXT in the last 72 hours. No results for input(s): FE, TIBC, PSAT, FERR in the last 72 hours. No results found for: FOL, RBCF   No results for input(s): PH, PCO2, PO2 in the last 72 hours. No results for input(s): CPK, CKNDX, TROIQ in the last 72 hours.     No lab exists for component: CPKMB  Lab Results   Component Value Date/Time    Cholesterol, total 156 09/05/2020 06:06 PM    HDL Cholesterol 26 09/05/2020 06:06 PM    LDL, calculated 92.4 09/05/2020 06:06 PM    Triglyceride 188 (H) 09/05/2020 06:06 PM    CHOL/HDL Ratio 6.0 (H) 09/05/2020 06:06 PM     Lab Results   Component Value Date/Time    Glucose (POC) 113 (H) 10/05/2020 05:15 AM    Glucose (POC) 119 (H) 09/06/2020 01:06 PM    Glucose (POC) 113 (H) 09/05/2020 06:49 PM    Glucose (POC) 146 (H) 09/04/2020 11:36 AM    Glucose (POC) 122 (H) 08/27/2020 11:28 AM     No results found for: COLOR, APPRN, SPGRU, REFSG, RAE, PROTU, GLUCU, KETU, BILU, UROU, ROBLES, LEUKU, GLUKE, EPSU, BACTU, WBCU, RBCU, CASTS, UCRY      Medications Reviewed:     Current Facility-Administered Medications   Medication Dose Route Frequency    ALPRAZolam Post Jungling) tablet 0.5 mg  0.5 mg Per G Tube BID    sertraline (ZOLOFT) tablet 25 mg  25 mg Oral DAILY    hydrOXYzine HCL (ATARAX) tablet 50 mg  50 mg Oral Q6H PRN    melatonin tablet 3 mg  3 mg Oral QHS    lidocaine 4 % patch 1 Patch  1 Patch TransDERmal Q24H    acetaminophen (TYLENOL) solution 1,000 mg  1,000 mg Per G Tube Q6H    hydrOXYzine HCL (ATARAX) tablet 25 mg  25 mg Oral TID PRN    metoprolol tartrate (LOPRESSOR) tablet 100 mg  100 mg Oral BID    potassium bicarb-citric acid (EFFER-K) tablet 40 mEq  40 mEq Oral DAILY    loperamide (IMODIUM) capsule 2 mg  2 mg Oral Q4H PRN    famotidine (PEPCID) 40 mg/5 mL (8 mg/mL) oral suspension 20 mg  20 mg Per G Tube Q12H    metoclopramide (REGLAN) 5 mg/5 mL oral syrup 5 mg  5 mg Per G Tube Q6H    traZODone (DESYREL) tablet 100 mg  100 mg Oral QHS    lidocaine (XYLOCAINE) 2 % jelly   Mucous Membrane PRN    enoxaparin (LOVENOX) injection 30 mg  30 mg SubCUTAneous Q12H    ondansetron (ZOFRAN) injection 4 mg  4 mg IntraVENous Q4H PRN    nystatin (MYCOSTATIN) 100,000 unit/mL oral suspension 500,000 Units  500,000 Units Oral QID    aluminum-magnesium hydroxide (MAALOX) oral suspension 15 mL  15 mL Oral QID PRN    prochlorperazine (COMPAZINE) with saline injection 10 mg  10 mg IntraVENous Q6H PRN    miconazole (MICOTIN) 2 % powder   Topical BID    oxyCODONE (ROXICODONE) 5 mg/5 mL oral solution 5 mg  5 mg Oral Q4H PRN    guaiFENesin (ROBITUSSIN) 100 mg/5 mL oral liquid 400 mg  400 mg Per NG tube Q4H PRN    balsam peru-castor oiL (VENELEX) ointment   Topical BID    acetaminophen (TYLENOL) suppository 650 mg  650 mg Rectal Q6H PRN    alteplase (CATHFLO) 1 mg in sterile water (preservative free) 1 mL injection  1 mg InterCATHeter PRN    polyvinyl alcohol-povidone (NATURAL TEARS) 0.5-0.6 % ophthalmic solution 1 Drop  1 Drop Both Eyes PRN    dextrose 10% infusion 0-250 mL  0-250 mL IntraVENous PRN    glucagon (GLUCAGEN) injection 1 mg  1 mg IntraMUSCular PRN  glucose chewable tablet 16 g  4 Tab Oral PRN    sodium chloride (NS) flush 5-40 mL  5-40 mL IntraVENous Q8H    sodium chloride (NS) flush 5-40 mL  5-40 mL IntraVENous PRN    aspirin chewable tablet 81 mg  81 mg Per NG tube DAILY    albuterol-ipratropium (DUO-NEB) 2.5 MG-0.5 MG/3 ML  3 mL Nebulization Q6H PRN     ______________________________________________________________________  EXPECTED LENGTH OF STAY: 12d 0h  ACTUAL LENGTH OF STAY:          46                 Nicolasa Negron MD

## 2020-10-08 NOTE — PROGRESS NOTES
SLP Contact Note    Pt appears to be tolerating red-cap without significant difficulty save for his episodes of anxiety. Recommend pt continue to utilize red-cap with nasal cannula as needed. If pt tolerates today (~1600 would make for 24 hours), consider decannulation and SLP can complete a repeat MBS tomorrow.       Thank you,  SIVA LewisEd, [id number] [facility]  Speech-Language Pathologist

## 2020-10-08 NOTE — PROGRESS NOTES
10/08/20 0825   Vital Signs   Temp 99.1 °F (37.3 °C)   Temp Source Oral   Pulse (Heart Rate) (!) 119   Heart Rate Source Monitor   Cardiac Rhythm Sinus Tach   Resp Rate 22   O2 Sat (%) 98 %   Level of Consciousness Alert   BP (!) 136/93   MAP (Calculated) 107   BP 1 Method Automatic   BP 1 Location Left arm   BP Patient Position At rest   MEWS Score 4   Alarms Set and Audible Cardiac alarms   Box Number 353   Electrodes Replaced No     MEWS 4 MD notified

## 2020-10-08 NOTE — PROGRESS NOTES
8382- Patient called out stating that he was having difficulty breathing, Patient was suctioned and repositioned. Respirations and pulse decreased after 30 minutes post suctioning. Pulse ox placed on patient to help relieve anxiety of not having the monitors in the room where he could see it. Patient ambulated to the bedside commode today 4 times, having small bowel movements each time. Minimal assist with walker and gait belt. After red capped placed this afternoon he stated that he felt better, had more energy. O2 sats stayed 98% since capped at 1600. No complaints of pain. Bedside shift change report given to Jorge Freire RN (oncoming nurse) by Brigitte Gloria RN (offgoing nurse). Report included the following information SBAR, Kardex, Intake/Output, MAR, Accordion, Recent Results, Med Rec Status and Cardiac Rhythm NSR; Sinus Tach.

## 2020-10-08 NOTE — PROGRESS NOTES
Problem: Pressure Injury - Risk of  Goal: *Prevention of pressure injury  Description: Document Enrique Scale and appropriate interventions in the flowsheet. Outcome: Progressing Towards Goal  Note: Pressure Injury Interventions:  Sensory Interventions: Assess changes in LOC, Chair cushion, Discuss PT/OT consult with provider, Float heels, Keep linens dry and wrinkle-free    Moisture Interventions: Absorbent underpads, Apply protective barrier, creams and emollients, Minimize layers    Activity Interventions: PT/OT evaluation, Increase time out of bed    Mobility Interventions: Assess need for specialty bed, PT/OT evaluation    Nutrition Interventions: Document food/fluid/supplement intake    Friction and Shear Interventions: Apply protective barrier, creams and emollients, Lift sheet, Minimize layers                Problem: Falls - Risk of  Goal: *Absence of Falls  Description: Document Adama Fall Risk and appropriate interventions in the flowsheet.   Outcome: Progressing Towards Goal  Note: Fall Risk Interventions:  Mobility Interventions: Bed/chair exit alarm, OT consult for ADLs, Patient to call before getting OOB, PT Consult for mobility concerns    Mentation Interventions: Adequate sleep, hydration, pain control, Bed/chair exit alarm, Evaluate medications/consider consulting pharmacy, More frequent rounding    Medication Interventions: Bed/chair exit alarm, Teach patient to arise slowly    Elimination Interventions: Bed/chair exit alarm, Call light in reach, Urinal in reach    History of Falls Interventions: Bed/chair exit alarm, Door open when patient unattended, Investigate reason for fall         Problem: Nutrition Deficit  Goal: *Optimize nutritional status  Outcome: Progressing Towards Goal     Problem: Breathing Pattern - Ineffective  Goal: *Absence of hypoxia  Outcome: Progressing Towards Goal     Problem: Patient Education: Go to Patient Education Activity  Goal: Patient/Family Education  Outcome: Progressing Towards Goal     Problem: Patient Education: Go to Patient Education Activity  Goal: Patient/Family Education  Outcome: Progressing Towards Goal

## 2020-10-08 NOTE — PROGRESS NOTES
Bedside shift change report given to 1710 Matthew Galan (oncoming nurse) by Emre Arana (offgoing nurse).  Report included the following information SBAR, Kardex, Intake/Output, MAR and Cardiac Rhythm NSR, ST.

## 2020-10-08 NOTE — PROGRESS NOTES
Problem: Mobility Impaired (Adult and Pediatric)  Goal: *Acute Goals and Plan of Care (Insert Text)  Description: FUNCTIONAL STATUS PRIOR TO ADMISSION: Patient was independent and active without use of DME. 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 Physical Therapy Goals  Reassessment 10/2/2020, goals upgraded. 1.  Patient will move from supine to sit and sit to supine , scoot up and down, and roll side to side in bed with minimal within 7 day(s). 2.  Patient will transfer from bed to chair and chair to bed with minimal assist x2 using the least restrictive device within 7 day(s). 3.  Patient will perform sit to stand with minimal assistance within 7 day(s). 4.  Patient will demo good sitting balance in unsupported sitting x5min in prep for mobility progression in 7 days. 5. Patient will ambulate 10ft with RW and mod A x2 within 7 days. Reassessment 9/25/2020  1. Patient will move from supine to sit and sit to supine , scoot up and down, and roll side to side in bed with maximal assistance within 7 day(s). 2.  Patient will transfer from bed to chair and chair to bed with maximal assistance using the least restrictive device within 7 day(s). 3.  Patient will perform sit to stand with maximal assistance within 7 day(s). 4.  Patient will tolerate sitting EOB with moderate assistance for 10 minutes within 7 day(s). Reassessment completed 9/18/2020 and all goals remain appropriate  at this time. Initiated 9/11/2020  1. Patient will move from supine to sit and sit to supine , scoot up and down, and roll side to side in bed with maximal assistance within 7 day(s). 2.  Patient will transfer from bed to chair and chair to bed with maximal assistance using the least restrictive device within 7 day(s). 3.  Patient will perform sit to stand with maximal assistance within 7 day(s).   4. Patient will tolerate sitting EOB with maximal assistance for 5 minutes within 7 day(s). --Goal Met 9/25      Outcome: Progressing Towards Goal   PHYSICAL THERAPY TREATMENT  Patient: Michelle Mcgill (73 y.o. male)  Date: 10/8/2020  Diagnosis: Acute respiratory failure ([facility] [address] [J96.00]   Pneumonia due to severe acute respiratory syndrome coronavirus 2 (SARS-CoV-2)  Procedure(s) (LRB):  ESOPHAGOGASTRODUODENOSCOPY (EGD) @ bedside (N/A)  ESOPHAGOGASTRODUODENAL (EGD) BIOPSY (N/A) 16 Days Post-Op  Precautions: Fall  Chart, physical therapy assessment, plan of care and goals were reviewed. ASSESSMENT  Patient continues with skilled PT services and is making significant progress towards goals. Pt with  on trach entire session and on 2L/min SpO2 through NC. One brief episode of desaturation to 88% during initial walk into the bathroom d/t breath holding. Otherwise, saturations remained 92-98% during mobility. Pt's anxiety reduced with constant visual feedback of O2 saturations. Pt's gait characterized by slow gait speed, decreased LE coordination, occasional scissoring with R foot drag and appearing to have decreased proprioception of R side. With verbal cueing gait mechanics and safety improved. Two trips into the bathroom today. Requires Mod A x 2 to stand from low commode height but Min A once BSC placed over top. Pt was wheeled outside to [facility] via w/c and walked 40ft continuously with upwards of Mod A on brick surface and while descending a ramp. Pt fatigued with effort though O2/HR stable and pt motivated by performance. Pt remains an excellent IP rehab candidate and can tolerate three hours of therapy a day.     Current Level of Function Impacting Discharge (mobility/balance): high fall risk, impaired balance, up to Mod A to ambulate and transfer from low surfaces    Other factors to consider for discharge: high anxiety, prolonged admission, making significant gains daily         PLAN :  Patient continues to benefit from skilled intervention to address the above impairments. Continue treatment per established plan of care. to address goals. Recommendation for discharge: (in order for the patient to meet his/her long term goals)  Therapy 3 hours per day 5-7 days per week    This discharge recommendation:  Has been made in collaboration with the attending provider and/or case management    IF patient discharges home will need the following DME: shower chair, rolling walker, and wheelchair       SUBJECTIVE:   Patient stated I think I surprised you.     OBJECTIVE DATA SUMMARY:   Critical Behavior:  Neurologic State: Alert, Eyes open spontaneously  Orientation Level: Oriented X4  Cognition: Follows commands, Appropriate safety awareness  Safety/Judgement: Awareness of environment  Functional Mobility Training:  Bed Mobility:  Rolling: Stand-by assistance  Supine to Sit: Stand-by assistance     Scooting: Stand-by assistance        Transfers:  Sit to Stand: Minimum assistance(Mod A from low commode)  Stand to Sit: Minimum assistance        Bed to Chair: Minimum assistance                    Balance:  Sitting: Intact  Sitting - Static: Good (unsupported)  Sitting - Dynamic: Good (unsupported)  Standing: Impaired  Standing - Static: Good;Constant support  Standing - Dynamic : Fair;Constant support  Ambulation/Gait Training:  Distance (ft): 40 Feet (ft)(40ft at most, several short gait distance <10ft)  Assistive Device: Gait belt;Walker, rolling  Ambulation - Level of Assistance: Minimal assistance; Moderate assistance(Mod A initially and with gait outside on bricks)        Gait Abnormalities: Ataxic;Decreased step clearance;Lurching;Path deviations;Scissoring;Trunk sway increased        Base of Support: Narrowed; Center of gravity altered     Speed/Veronica: Pace decreased (<100 feet/min)  Step Length: Right shortened;Left shortened              Activity Tolerance:   Good, desaturates with exertion and requires oxygen, and requires rest breaks  Please refer to the flowsheet for vital signs taken during this treatment. After treatment patient left in no apparent distress:   Sitting in chair and Call bell within reach    COMMUNICATION/COLLABORATION:   The patients plan of care was discussed with: Registered nurse.      Jesus Corbin, PT   Time Calculation: 52 mins

## 2020-10-09 ENCOUNTER — APPOINTMENT (OUTPATIENT)
Dept: GENERAL RADIOLOGY | Age: 30
DRG: 004 | End: 2020-10-09
Attending: INTERNAL MEDICINE
Payer: COMMERCIAL

## 2020-10-09 LAB
HEALTH STATUS, XMCV2T: NORMAL
SARS-COV-2, COV2: NOT DETECTED
SOURCE, COVRS: NORMAL
SPECIMEN SOURCE, FCOV2M: NORMAL
SPECIMEN TYPE, XMCV1T: NORMAL

## 2020-10-09 PROCEDURE — 77010033678 HC OXYGEN DAILY

## 2020-10-09 PROCEDURE — 74011250636 HC RX REV CODE- 250/636: Performed by: EMERGENCY MEDICINE

## 2020-10-09 PROCEDURE — 92611 MOTION FLUOROSCOPY/SWALLOW: CPT

## 2020-10-09 PROCEDURE — 74011000250 HC RX REV CODE- 250: Performed by: INTERNAL MEDICINE

## 2020-10-09 PROCEDURE — 65660000000 HC RM CCU STEPDOWN

## 2020-10-09 PROCEDURE — 74011250637 HC RX REV CODE- 250/637: Performed by: NURSE PRACTITIONER

## 2020-10-09 PROCEDURE — 74011250637 HC RX REV CODE- 250/637: Performed by: INTERNAL MEDICINE

## 2020-10-09 PROCEDURE — 92526 ORAL FUNCTION THERAPY: CPT

## 2020-10-09 PROCEDURE — 74011250637 HC RX REV CODE- 250/637: Performed by: EMERGENCY MEDICINE

## 2020-10-09 PROCEDURE — 97530 THERAPEUTIC ACTIVITIES: CPT

## 2020-10-09 PROCEDURE — 74011250637 HC RX REV CODE- 250/637: Performed by: HOSPITALIST

## 2020-10-09 PROCEDURE — 74230 X-RAY XM SWLNG FUNCJ C+: CPT

## 2020-10-09 RX ORDER — OXYCODONE HCL 5 MG/5 ML
5 SOLUTION, ORAL ORAL
Status: DISCONTINUED | OUTPATIENT
Start: 2020-10-09 | End: 2020-10-12 | Stop reason: HOSPADM

## 2020-10-09 RX ADMIN — METOCLOPRAMIDE HYDROCHLORIDE 5 MG: 5 SOLUTION ORAL at 06:16

## 2020-10-09 RX ADMIN — NYSTATIN 500000 UNITS: 500000 SUSPENSION ORAL at 09:38

## 2020-10-09 RX ADMIN — NYSTATIN 500000 UNITS: 500000 SUSPENSION ORAL at 21:38

## 2020-10-09 RX ADMIN — ENOXAPARIN SODIUM 30 MG: 30 INJECTION SUBCUTANEOUS at 10:00

## 2020-10-09 RX ADMIN — Medication 10 ML: at 14:50

## 2020-10-09 RX ADMIN — SERTRALINE HYDROCHLORIDE 25 MG: 50 TABLET ORAL at 09:40

## 2020-10-09 RX ADMIN — METOCLOPRAMIDE HYDROCHLORIDE 5 MG: 5 SOLUTION ORAL at 11:42

## 2020-10-09 RX ADMIN — METOPROLOL TARTRATE 100 MG: 25 TABLET, FILM COATED ORAL at 17:20

## 2020-10-09 RX ADMIN — FAMOTIDINE 20 MG: 40 POWDER, FOR SUSPENSION ORAL at 03:54

## 2020-10-09 RX ADMIN — FAMOTIDINE 20 MG: 40 POWDER, FOR SUSPENSION ORAL at 14:59

## 2020-10-09 RX ADMIN — METOPROLOL TARTRATE 100 MG: 25 TABLET, FILM COATED ORAL at 09:39

## 2020-10-09 RX ADMIN — ASPIRIN 81 MG CHEWABLE TABLET 81 MG: 81 TABLET CHEWABLE at 09:40

## 2020-10-09 RX ADMIN — ACETAMINOPHEN ORAL SOLUTION 1000 MG: 650 SOLUTION ORAL at 06:16

## 2020-10-09 RX ADMIN — ALPRAZOLAM 0.5 MG: 0.5 TABLET ORAL at 17:20

## 2020-10-09 RX ADMIN — ALPRAZOLAM 0.5 MG: 0.5 TABLET ORAL at 09:39

## 2020-10-09 RX ADMIN — Medication 3 MG: at 21:38

## 2020-10-09 RX ADMIN — Medication 10 ML: at 21:38

## 2020-10-09 RX ADMIN — ACETAMINOPHEN ORAL SOLUTION 1000 MG: 650 SOLUTION ORAL at 10:00

## 2020-10-09 RX ADMIN — TRAZODONE HYDROCHLORIDE 100 MG: 100 TABLET ORAL at 21:38

## 2020-10-09 RX ADMIN — Medication: at 09:45

## 2020-10-09 RX ADMIN — ENOXAPARIN SODIUM 30 MG: 30 INJECTION SUBCUTANEOUS at 23:04

## 2020-10-09 RX ADMIN — Medication: at 17:22

## 2020-10-09 RX ADMIN — HYDROXYZINE HYDROCHLORIDE 50 MG: 50 TABLET, FILM COATED ORAL at 03:54

## 2020-10-09 RX ADMIN — Medication 10 ML: at 06:23

## 2020-10-09 RX ADMIN — MICONAZOLE NITRATE 2 % TOPICAL POWDER: at 17:21

## 2020-10-09 RX ADMIN — NYSTATIN 500000 UNITS: 500000 SUSPENSION ORAL at 16:49

## 2020-10-09 RX ADMIN — POTASSIUM BICARBONATE 40 MEQ: 782 TABLET, EFFERVESCENT ORAL at 09:39

## 2020-10-09 RX ADMIN — MICONAZOLE NITRATE 2 % TOPICAL POWDER: at 11:46

## 2020-10-09 NOTE — ROUTINE PROCESS
13:13 Dr Jeanette Arthur notified of patient bleeding at trach site when it was removed and asked to assess site.

## 2020-10-09 NOTE — PROGRESS NOTES
SLP Contact Note    MBS planned for ~1330 today. Plan is for decannulation this morning and then will complete MBS at around 1330.     Thank you,  SVIA CarlEd, [id number] [facility]  Speech-Language Pathologist

## 2020-10-09 NOTE — PROGRESS NOTES
Transition of Care Plan:    RUR:  32%  LOS:  46 Days    * IPR with [facility]   * Possible discharge on Monday 10/13   * Ambulance transport at discharge      Patient continues to make significant progress with PT/OT/ST. Pablo decanulated today and patient tolerating well; MBS completed and patient on regular diet.     Will follow up with [facility] liaison on Monday to check on insurance approval.    Godwin Blanco, TIKAW, CCM, ACM-  Complex CM Coordinator/[facility]  [phone]

## 2020-10-09 NOTE — PROGRESS NOTES
Problem: Mobility Impaired (Adult and Pediatric)  Goal: *Acute Goals and Plan of Care (Insert Text)  Description: FUNCTIONAL STATUS PRIOR TO ADMISSION: Patient was independent and active without use of DME. 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 Physical Therapy Goals  Reassessment 10/2/2020, goals upgraded. 1.  Patient will move from supine to sit and sit to supine , scoot up and down, and roll side to side in bed with minimal within 7 day(s). 2.  Patient will transfer from bed to chair and chair to bed with minimal assist x2 using the least restrictive device within 7 day(s). 3.  Patient will perform sit to stand with minimal assistance within 7 day(s). 4.  Patient will demo good sitting balance in unsupported sitting x5min in prep for mobility progression in 7 days. 5. Patient will ambulate 10ft with RW and mod A x2 within 7 days. Reassessment 9/25/2020  1. Patient will move from supine to sit and sit to supine , scoot up and down, and roll side to side in bed with maximal assistance within 7 day(s). 2.  Patient will transfer from bed to chair and chair to bed with maximal assistance using the least restrictive device within 7 day(s). 3.  Patient will perform sit to stand with maximal assistance within 7 day(s). 4.  Patient will tolerate sitting EOB with moderate assistance for 10 minutes within 7 day(s). Reassessment completed 9/18/2020 and all goals remain appropriate  at this time. Initiated 9/11/2020  1. Patient will move from supine to sit and sit to supine , scoot up and down, and roll side to side in bed with maximal assistance within 7 day(s). 2.  Patient will transfer from bed to chair and chair to bed with maximal assistance using the least restrictive device within 7 day(s). 3.  Patient will perform sit to stand with maximal assistance within 7 day(s).   4. Patient will tolerate sitting EOB with maximal assistance for 5 minutes within 7 day(s). --Goal Met 9/25      Outcome: Progressing Towards Goal    PHYSICAL THERAPY TREATMENT  Patient: Jose Manuel García (21 y.o. male)  Date: 10/9/2020  Diagnosis: Acute respiratory failure ([facility] [address] [J96.00]   Pneumonia due to severe acute respiratory syndrome coronavirus 2 (SARS-CoV-2)  Procedure(s) (LRB):  ESOPHAGOGASTRODUODENOSCOPY (EGD) @ bedside (N/A)  ESOPHAGOGASTRODUODENAL (EGD) BIOPSY (N/A) 17 Days Post-Op  Precautions: Fall  Chart, physical therapy assessment, plan of care and goals were reviewed. ASSESSMENT  Patient continues with skilled PT services and is progressing towards goals. Pt agreeable but has just had trach removed. Pt having slight bleeding from bandage over trach site. Increased with sitting up. Aborted further mobilization. Notified RN    Current Level of Function Impacting Discharge (mobility/balance): Other factors to consider for discharge:          PLAN :  Patient continues to benefit from skilled intervention to address the above impairments. Continue treatment per established plan of care. to address goals. Recommendation for discharge: (in order for the patient to meet his/her long term goals)  Therapy 3 hours per day 5-7 days per week    This discharge recommendation:  Has not yet been discussed the attending provider and/or case management    IF patient discharges home will need the following DME: to be determined (TBD)       SUBJECTIVE:   Patient stated I feel blood dripping .     OBJECTIVE DATA SUMMARY:   Critical Behavior:  Neurologic State: Alert  Orientation Level: Oriented X4  Cognition: Follows commands  Safety/Judgement: Awareness of environment  Functional Mobility Training:  deferred further mobility due to trach site bleeding   Bed Mobility:     Supine to Sit: Stand-by assistance  Sit to Supine: Stand-by assistance           Transfers: Balance:  Sitting: Intact  Ambulation/Gait Training:                                                    Stairs: Therapeutic Exercises:     Pain Rating:  No complaints     Activity Tolerance:   Limited   Please refer to the flowsheet for vital signs taken during this treatment. After treatment patient left in no apparent distress:   Supine in bed and Call bell within reach    COMMUNICATION/COLLABORATION:   The patients plan of care was discussed with: Registered nurse.      Lo Glover PTA   Time Calculation: 8 mins

## 2020-10-09 NOTE — PROGRESS NOTES
Reviewed chart and spoke to PT who recently attempted. Patient currently undergoing procedure for trach. Will re-attempt at another time.     Gerson Gomes MS, OTR/L, CSRS

## 2020-10-09 NOTE — PROGRESS NOTES
Problem: Pressure Injury - Risk of  Goal: *Prevention of pressure injury  Description: Document Enrique Scale and appropriate interventions in the flowsheet. Outcome: Progressing Towards Goal  Note: Pressure Injury Interventions:  Sensory Interventions: Assess changes in LOC    Moisture Interventions: Absorbent underpads    Activity Interventions: PT/OT evaluation, Increase time out of bed    Mobility Interventions: Assess need for specialty bed    Nutrition Interventions: Document food/fluid/supplement intake    Friction and Shear Interventions: Apply protective barrier, creams and emollients                Problem: Falls - Risk of  Goal: *Absence of Falls  Description: Document Adama Fall Risk and appropriate interventions in the flowsheet.   Outcome: Progressing Towards Goal  Note: Fall Risk Interventions:  Mobility Interventions: Patient to call before getting OOB    Mentation Interventions: Adequate sleep, hydration, pain control, Bed/chair exit alarm, Evaluate medications/consider consulting pharmacy, More frequent rounding    Medication Interventions: Patient to call before getting OOB    Elimination Interventions: Call light in reach    History of Falls Interventions: Door open when patient unattended

## 2020-10-09 NOTE — PROGRESS NOTES
SLP Contact Note    MBS complete. Pt with significant improvement. Recommend regular diet/thin liquids. Full note to follow. Furthermore, pt appeared to be tolerating trach decannulation on 2 LPM via nasal cannula. Appreciate RT Cabello's assistance.       Thank you,  SIVA BaerEd, [id number] [facility]  Speech-Language Pathologist

## 2020-10-09 NOTE — PROGRESS NOTES
Bedside shift change report given to Jacoby Deras RN (oncoming nurse) by Ishan Torres (offgoing nurse). Report included the following information SBAR, Kardex, MAR and Cardiac Rhythm NSR.

## 2020-10-09 NOTE — PROGRESS NOTES
Bedside and Verbal shift change report given to [address] (oncoming nurse) by ROSALBA Velez RN (offgoing nurse). Report given with SBAR, Kardex, Intake/Output, MAR and Recent Results.

## 2020-10-09 NOTE — PROGRESS NOTES
Problem: Dysphagia (Adult)  Goal: *Acute Goals and Plan of Care (Insert Text)  Description: Speech Therapy Goals  Initiated 9/23/2020    1. Patient will tolerate ice chips only when on PMV without adverse effects within 7 days. Added 10/1/2020  2. Patient will tolerate nectar thick liquids only when on PMV with no adverse effects within 7 days. MET 10/9/2020  Added 10/9/2020  3. Patient will tolerate regular diet/thin liquids with no adverse effects within 7 days. Outcome: Progressing Towards Goal     Problem: Voice Impaired (Adult)  Goal: *Acute Goals and Plan of Care (Insert Text)  Description: Speech Therapy Goals  Initiated 9/23/2020    1. Patient will tolerate in-line PMV for 10 minute trials with RT/SLP/RN without adverse effects within 7 days. MET  2. Patient will utilize in-line PMV to work towards ventilator weaning within 7 days. MET 10/9/2020    Outcome: Resolved/Met     SPEECH PATHOLOGY MODIFIED BARIUM SWALLOW STUDY  Patient: Michael Bland (81 y.o. male)  Date: 10/9/2020  Primary Diagnosis: Acute respiratory failure ([address] [J96.00]  Procedure(s) (LRB):  ESOPHAGOGASTRODUODENOSCOPY (EGD) @ bedside (N/A)  ESOPHAGOGASTRODUODENAL (EGD) BIOPSY (N/A) 17 Days Post-Op   Precautions:   Fall    ASSESSMENT :  Based on the objective data described below, the patient presents with mild pharyngeal dysphagia with a functional oral phase of swallow. Oral phase characterized by appropriate bolus formulation, control and propulsion. No significant oral residue noted with any consistencies. Pharyngeal phase characterized by mild generalized weakness with timely swallow initiation at the level of the valleculae for all consistencies.  Slightly reduced anterior hyolaryngeal excursion and epiglottic inversion caused slightly reduced laryngeal vestibule closure resulting in trace penetration during the swallow that was cleared with the force of the swallow occurred with large sips of nectar and thin liquid consistencies. Good airway closure with puree and solids. No aspiration occurred during the course of the study. Pharyngeal weakness also contributed to mild residue in the vallecula and piriforms that increased with puree and solid. Liquid wash was effective in clearing residue. UES was unremarkable. Expect patient's mild pharyngeal dysphagia is the result of generalized weakness 2/2 patient's complex medical course and swallow muscle atrophy. However noted significant improvement from previous MBS. Recommend patient initiate regular diet with thin liquids. Swallow prognosis is good, expect dysphagia to resolve with time s/p decannulation and as patient regains pharyngeal and overall strength. Patient was educated regarding the results of this study, improvement in his dysphagia and diet recommendations. Patient was also educated regarding initiating diet slowly as he is comfortable given prolonged NPO status, starting with softer foods and working his way up. He demonstrated excellent understanding as evidenced by appropriate engagement and questions. Patient will benefit from skilled intervention to address the above impairments. Patients rehabilitation potential is considered to be Good     PLAN :  Recommendations and Planned Interventions:  -- Regular diet/ thin liquids  -- Small bites and sips   -- Alternate liquids and solids   -- Completely upright for all PO  -- General aspiration precautions   Frequency/Duration: Patient will be followed by speech-language pathology 2 times a week to address goals. Discharge Recommendations: To Be Determined     SUBJECTIVE:   Patient stated no, it wasn't bad at all in reference to getting his trach out earlier today.     OBJECTIVE:     Past Medical History:   Diagnosis Date    History of vascular access device 08/10/2020    5 Fr triple PICC, hemodynamically unstable, 45 cm L basilic    Tracheostomy dependent ([facility] [address] 10/2/2020     Past Surgical History: Procedure Laterality Date    IR INSERT GASTROSTOMY TUBE PERC  9/23/2020    IR PERCUT GASTROSTROMY TUBE  9/23/2020         IR THORA/INS CHEST TUBE(PNEUMO) WO IMAGE  8/11/2020    IR THORA/INS CHEST TUBE(PNEUMO) WO IMAGE  8/11/2020    TX TRACHEOSTOMY, PLANNED  8/26/2020          Prior Level of Function/Home Situation:   Home Situation  Home Environment: Private residence  # Steps to Enter: 0  One/Two Story Residence: One story  Living Alone: Yes  Support Systems: Family member(s)  Patient Expects to be Discharged to[de-identified] Rehabilitation facility  Current DME Used/Available at Home: None  Diet prior to admission: Regular diet/thin liquids  Current Diet:  Nectar thick liquids only    Radiologist: Dr. Nay Wilkins: Lateral  Patient Position: uprightin housted chair    Trial 1: Trial 2:   Consistency Presented: Nectar thick liquid Consistency Presented: Thin liquid   How Presented: SLP-fed/presented;Cup/sip How Presented: SLP-fed/presented;Cup/sip;Straw;Successive swallows   Consistency Amount: 100(cc) Consistency Amount: 100(cc)   Bolus Acceptance: No impairment Bolus Acceptance: No impairment   Bolus Formation/Control: No impairment:   Bolus Formation/Control: No impairment:     Propulsion: No impairment Propulsion: No impairment   Oral Residue: None Oral Residue: None   Initiation of Swallow: No impairment Initiation of Swallow: Triggered at valleculae   Timing: No impairment Timing: No impairment   Penetration: Flash/transient Penetration: Flash/transient   Aspiration/Timing: No evidence of aspiration Aspiration/Timing: No evidence of aspiration   Pharyngeal Clearance: Vallecular residue;Pyriform residue ; Less than 10% Pharyngeal Clearance: Vallecular residue;Pyriform residue ; Less than 10%                             Trial 3: Trial 4:   Consistency Presented: Pudding Consistency Presented: Solid   How Presented: SLP-fed/presented;Spoon How Presented: SLP-fed/presented   Consistency Amount: 1(tsp) Consistency Amount: 1(tsp barium pudding on gabby greenwood)   Bolus Acceptance: No impairment Bolus Acceptance: No impairment   Bolus Formation/Control: No impairment, issues, or problems :   Bolus Formation/Control: No impairment, issues, or problems :     Propulsion: No impairment Propulsion: No impairment   Oral Residue: None Oral Residue: None   Initiation of Swallow: Triggered at valleculae    Timing: No impairment Timing: No impairment   Penetration: None Penetration: None   Aspiration/Timing: No evidence of aspiration Aspiration/Timing: No evidence of aspiration   Pharyngeal Clearance: Vallecular residue;Pyriform residue ;10-50% Pharyngeal Clearance: Vallecular residue;Pyriform residue ;10-50%   Attempted Modifications: Alternate liquids/solids Attempted Modifications: Alternate liquids/solids   Effective Modifications: Alternate liquids/solids Effective Modifications: Alternate liquids/solids   Cues for Modifications: Minimal Cues for Modifications: Minimal           Decreased Tongue Base Retraction?: No  Laryngeal Elevation: WFL (within functional limits)  Aspiration/Penetration Score: 2 (Penetration/No residue-Contrast enters the airway penetrates, remains above the folds/cords, and is cleared)  Pharyngeal Symmetry: Not assessed  Pharyngeal-Esophageal Segment: No impairment  Pharyngeal Dysfunction: Decreased strength(mildly decreased )    Oral Phase Severity: No impairment  Pharyngeal Phase Severity: Mild  NOMS:   The NOMS functional outcome measure was used to quantify this patient's level of swallowing impairment. Based on the NOMS, the patient was determined to be at level 6 for swallow function         NOMS Swallowing Levels:  Level 1 (CN): NPO  Level 2 (CM): NPO but takes consistency in therapy  Level 3 (CL): Takes less than 50% of nutrition p.o. and continues with nonoral feedings; and/or safe with mod cues; and/or max diet restriction  Level 4 (CK):  Safe swallow but needs mod cues; and/or mod diet restriction; and/or still requires some nonoral feeding/supplements  Level 5 (CJ): Safe swallow with min diet restriction; and/or needs min cues  Level 6 (CI): Independent with p.o.; rare cues; usually self cues; may need to avoid some foods or needs extra time  Level 7 ([address] [place] [address]): Independent for all p.o.  ANDREINA. (2003). National Outcomes Measurement System (NOMS): Adult Speech-Language Pathology User's Guide. COMMUNICATION/EDUCATION:   Patient was educated regarding His deficit(s) of dysphagia as this relates to His complex medical course. He demonstrated Excellent understanding as evidenced by appropriate engagement, questions and teach back. The patients plan of care including findings from [facility], recommendations, planned interventions, and recommended diet changes were discussed with: Registered nurse. Patient/family have participated as able in goal setting and plan of care. Patient/family agree to work toward stated goals and plan of care. Thank you for this referral.  Hina DAVIS Speech Language Pathology Student  Time Calculation: 30 mins         Regarding student involvement in patient care:  A student participated in this treatment session. Per CMS Medicare statements and ANDREINA guidelines I certify that the following was true:  1. I was present and directly observed the entire session. 2. I made all skilled judgments and clinical decisions regarding care. 3. I am the practitioner responsible for assessment, treatment, and documentation.

## 2020-10-09 NOTE — PROGRESS NOTES
[facility]  Hospitalist Group                                                                                          Hospitalist Progress Note  Becky Avila MD  Answering service: [phone] OR [phone] from in house phone        Date of Service:  10/9/2020  NAME:  Michael Bland  :  1990  MRN:  [id number]      Admission Summary:     \"27year-old gentleman with no significant past medical history was brought to the emergency room from home via EMS for complaints of an approximately two- to three-day history of progressively worsening cough, shortness of breath, chills and fevers as high as 103 degrees Fahrenheit.  Of note, patient stated that he tested positive for COVID-19 virus about nine days prior to this emergency room presentation of 2020.  Further, patient stated that despite taking the prescribed Zithromax, prednisone, and other respiratory therapies, did not have any significant improvement in symptoms. Patient denied associated headaches, dizziness, visual deficits, chest pains, palpitations, sore throat, nausea, vomiting, abdominal pain, bladder or bowel irregularities. \"    Interval history / Subjective:      Patient seen and examined. He was anxious,otherwise denied any chest pain,cough. States that his breathing is good. Trach decanulated today. Assessment & Plan:     # Acute Hypoxic respiratory failure due to pneumonia -improved  # Bilateral pneumonia due to COVID-19 infection - resolved    - s/p tracheostomy  and PEG 20  - Emi Maillard was capped for 2 days, patient doing well. Trach decannulated today. - Completed COVID-19 treatment - completed antibiotic course, Off abx now  -s/p convalescent plasma and remedsivir.       # Right sided weakness  # Debility   - MRI brain- Minimal if any change in areas of cortical and subcortical encephalomalacia  bilateral frontal and parietal lobe superiorly, again likely due to subacute to chronic infarction or hypoxic ischemic event. Small areas of hemosiderin staining along the vertex also raise possibility of vasculopathy. No new infarction. Chronic right cerebellar hemorrhagic infarction, unchanged. 3. 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  -Neurology team has seen patient this admission-on aspirin. # Anxiety : per psyh   - Decrease xanax to 0.5mg bid x3 days, then 0.5mg daily x3 days then 0.25mg daily x3 days then dc. - Zoloft 25mg po daily x 2 doses then increase to 50mg. - Atarax 50mg prn.  - Follow up with [facility] after dc. # Tachycardia: multifactorial with pneumonia and ongoing medical issues, anxiolytics help to some degree. Heart rate better now.   -No PE on CT chest on 10/2. # History of tobacco use    #Dysphagia:  -MBS done today, he is on regular diet      GI prophylaxis and DVT prophylaxis =Pepcid/ Lovenox     Code: Full           Hospital Problems  Date Reviewed: 8/20/2020          Codes Class Noted POA    [facility] ([facility] ICD-10-CM: Z93.0  ICD-9-CM: V44.0  10/2/2020 Yes        Tachycardia ICD-10-CM: R00.0  ICD-9-CM: 785.0  10/2/2020 Yes        Hyponatremia ICD-10-CM: E87.1  ICD-9-CM: 276.1  10/2/2020 Yes        Acute respiratory failure ([facility] ICD-10-CM: J96.00  ICD-9-CM: 518.81  8/18/2020 Yes        * (Principal) Pneumonia due to severe acute respiratory syndrome coronavirus 2 (SARS-CoV-2) ICD-10-CM: T98.8, J12.89  ICD-9-CM: 480.8  8/8/2020 Yes                Review of Systems:   gen weakness, no n/v, no fever, some better day over day       Vital Signs:    Last 24hrs VS reviewed since prior progress note.  Most recent are:  Visit Vitals  /85 (BP 1 Location: Left arm, BP Patient Position: At rest)   Pulse 92   Temp 97.6 °F (36.4 °C)   Resp 20   Ht 5' 11\" (1.803 m)   Wt 84.2 kg (185 lb 11.1 oz)   SpO2 100%   BMI 25.18 kg/m²         Intake/Output Summary (Last 24 hours) at 10/9/2020 1612  Last data filed at 10/9/2020 1141  Gross per 24 hour   Intake 1010 ml   Output 350 ml   Net 660 ml        Physical Examination:             Constitutional:  young patient   ENT:  Oral mucosa moist, oropharynx benign. Resp:  decreased breath sounds at bases. No wheezing   CV:  Regular rhythm, normal rate    GI:  Soft, non distended, non tender. Musculoskeletal:  No LE edema       Neuro: awake and alert     Psych:   anxious        Data Review:    Review and/or order of clinical lab test      Labs:     Recent Labs     10/07/20  0424   WBC 7.7   HGB 12.1   HCT 37.8        Recent Labs     10/07/20  0424      K 3.7   CL 98   CO2 31   BUN 17   CREA 0.60*   *   CA 9.8   MG 2.1   PHOS 5.0*     Recent Labs     10/07/20  0424   ALT 44      TBILI 0.3   TP 7.7   ALB 3.6   GLOB 4.1*     No results for input(s): INR, PTP, APTT, INREXT, INREXT in the last 72 hours. No results for input(s): FE, TIBC, PSAT, FERR in the last 72 hours. No results found for: FOL, RBCF   No results for input(s): PH, PCO2, PO2 in the last 72 hours. No results for input(s): CPK, CKNDX, TROIQ in the last 72 hours.     No lab exists for component: CPKMB  Lab Results   Component Value Date/Time    Cholesterol, total 156 09/05/2020 06:06 PM    HDL Cholesterol 26 09/05/2020 06:06 PM    LDL, calculated 92.4 09/05/2020 06:06 PM    Triglyceride 188 (H) 09/05/2020 06:06 PM    CHOL/HDL Ratio 6.0 (H) 09/05/2020 06:06 PM     Lab Results   Component Value Date/Time    Glucose (POC) 114 (H) 10/08/2020 10:02 PM    Glucose (POC) 113 (H) 10/05/2020 05:15 AM    Glucose (POC) 119 (H) 09/06/2020 01:06 PM    Glucose (POC) 113 (H) 09/05/2020 06:49 PM    Glucose (POC) 146 (H) 09/04/2020 11:36 AM     No results found for: COLOR, APPRN, SPGRU, REFSG, RAE, PROTU, GLUCU, KETU, BILU, UROU, ROBLES, LEUKU, GLUKE, EPSU, BACTU, WBCU, RBCU, CASTS, UCRY      Medications Reviewed:     Current Facility-Administered Medications   Medication Dose Route Frequency    lidocaine 4 % patch 2 Patch  2 Patch TransDERmal Q24H    ALPRAZolam (XANAX) tablet 0.5 mg  0.5 mg Per G Tube BID    sertraline (ZOLOFT) tablet 25 mg  25 mg Oral DAILY    hydrOXYzine HCL (ATARAX) tablet 50 mg  50 mg Oral Q6H PRN    melatonin tablet 3 mg  3 mg Oral QHS    acetaminophen (TYLENOL) solution 1,000 mg  1,000 mg Per G Tube Q6H    hydrOXYzine HCL (ATARAX) tablet 25 mg  25 mg Oral TID PRN    metoprolol tartrate (LOPRESSOR) tablet 100 mg  100 mg Oral BID    potassium bicarb-citric acid (EFFER-K) tablet 40 mEq  40 mEq Oral DAILY    loperamide (IMODIUM) capsule 2 mg  2 mg Oral Q4H PRN    famotidine (PEPCID) 40 mg/5 mL (8 mg/mL) oral suspension 20 mg  20 mg Per G Tube Q12H    metoclopramide (REGLAN) 5 mg/5 mL oral syrup 5 mg  5 mg Per G Tube Q6H    traZODone (DESYREL) tablet 100 mg  100 mg Oral QHS    lidocaine (XYLOCAINE) 2 % jelly   Mucous Membrane PRN    enoxaparin (LOVENOX) injection 30 mg  30 mg SubCUTAneous Q12H    ondansetron (ZOFRAN) injection 4 mg  4 mg IntraVENous Q4H PRN    nystatin (MYCOSTATIN) 100,000 unit/mL oral suspension 500,000 Units  500,000 Units Oral QID    aluminum-magnesium hydroxide (MAALOX) oral suspension 15 mL  15 mL Oral QID PRN    prochlorperazine (COMPAZINE) with saline injection 10 mg  10 mg IntraVENous Q6H PRN    miconazole (MICOTIN) 2 % powder   Topical BID    oxyCODONE (ROXICODONE) 5 mg/5 mL oral solution 5 mg  5 mg Oral Q4H PRN    guaiFENesin (ROBITUSSIN) 100 mg/5 mL oral liquid 400 mg  400 mg Per NG tube Q4H PRN    balsam peru-castor oiL (VENELEX) ointment   Topical BID    acetaminophen (TYLENOL) suppository 650 mg  650 mg Rectal Q6H PRN    alteplase (CATHFLO) 1 mg in sterile water (preservative free) 1 mL injection  1 mg InterCATHeter PRN    polyvinyl alcohol-povidone (NATURAL TEARS) 0.5-0.6 % ophthalmic solution 1 Drop  1 Drop Both Eyes PRN    dextrose 10% infusion 0-250 mL  0-250 mL IntraVENous PRN    glucagon (GLUCAGEN) injection 1 mg  1 mg IntraMUSCular PRN    glucose chewable tablet 16 g  4 Tab Oral PRN    sodium chloride (NS) flush 5-40 mL  5-40 mL IntraVENous Q8H    sodium chloride (NS) flush 5-40 mL  5-40 mL IntraVENous PRN    aspirin chewable tablet 81 mg  81 mg Per NG tube DAILY    albuterol-ipratropium (DUO-NEB) 2.5 MG-0.5 MG/3 ML  3 mL Nebulization Q6H PRN     ______________________________________________________________________  EXPECTED LENGTH OF STAY: 12d 0h  ACTUAL LENGTH OF STAY:          Gris Emerson, MD

## 2020-10-10 LAB
ALBUMIN SERPL-MCNC: 3.5 G/DL (ref 3.5–5)
ALBUMIN/GLOB SERPL: 0.9 {RATIO} (ref 1.1–2.2)
ALP SERPL-CCNC: 103 U/L (ref 45–117)
ALT SERPL-CCNC: 44 U/L (ref 12–78)
ANION GAP SERPL CALC-SCNC: 5 MMOL/L (ref 5–15)
AST SERPL-CCNC: 21 U/L (ref 15–37)
BASOPHILS # BLD: 0 K/UL (ref 0–0.1)
BASOPHILS NFR BLD: 1 % (ref 0–1)
BILIRUB SERPL-MCNC: 0.6 MG/DL (ref 0.2–1)
BUN SERPL-MCNC: 11 MG/DL (ref 6–20)
BUN/CREAT SERPL: 20 (ref 12–20)
CALCIUM SERPL-MCNC: 10.1 MG/DL (ref 8.5–10.1)
CHLORIDE SERPL-SCNC: 97 MMOL/L (ref 97–108)
CO2 SERPL-SCNC: 32 MMOL/L (ref 21–32)
CREAT SERPL-MCNC: 0.56 MG/DL (ref 0.7–1.3)
DIFFERENTIAL METHOD BLD: ABNORMAL
EOSINOPHIL # BLD: 0.3 K/UL (ref 0–0.4)
EOSINOPHIL NFR BLD: 5 % (ref 0–7)
ERYTHROCYTE [DISTWIDTH] IN BLOOD BY AUTOMATED COUNT: 14.3 % (ref 11.5–14.5)
GLOBULIN SER CALC-MCNC: 4 G/DL (ref 2–4)
GLUCOSE SERPL-MCNC: 93 MG/DL (ref 65–100)
HCT VFR BLD AUTO: 38.3 % (ref 36.6–50.3)
HGB BLD-MCNC: 12.3 G/DL (ref 12.1–17)
IMM GRANULOCYTES # BLD AUTO: 0 K/UL (ref 0–0.04)
IMM GRANULOCYTES NFR BLD AUTO: 1 % (ref 0–0.5)
LYMPHOCYTES # BLD: 1.9 K/UL (ref 0.8–3.5)
LYMPHOCYTES NFR BLD: 31 % (ref 12–49)
MCH RBC QN AUTO: 28.1 PG (ref 26–34)
MCHC RBC AUTO-ENTMCNC: 32.1 G/DL (ref 30–36.5)
MCV RBC AUTO: 87.6 FL (ref 80–99)
MONOCYTES # BLD: 0.6 K/UL (ref 0–1)
MONOCYTES NFR BLD: 9 % (ref 5–13)
NEUTS SEG # BLD: 3.3 K/UL (ref 1.8–8)
NEUTS SEG NFR BLD: 53 % (ref 32–75)
NRBC # BLD: 0 K/UL (ref 0–0.01)
NRBC BLD-RTO: 0 PER 100 WBC
PLATELET # BLD AUTO: 239 K/UL (ref 150–400)
PMV BLD AUTO: 10.3 FL (ref 8.9–12.9)
POTASSIUM SERPL-SCNC: 4 MMOL/L (ref 3.5–5.1)
PROT SERPL-MCNC: 7.5 G/DL (ref 6.4–8.2)
RBC # BLD AUTO: 4.37 M/UL (ref 4.1–5.7)
SODIUM SERPL-SCNC: 134 MMOL/L (ref 136–145)
WBC # BLD AUTO: 6.3 K/UL (ref 4.1–11.1)

## 2020-10-10 PROCEDURE — 74011250637 HC RX REV CODE- 250/637: Performed by: INTERNAL MEDICINE

## 2020-10-10 PROCEDURE — 65660000000 HC RM CCU STEPDOWN

## 2020-10-10 PROCEDURE — 74011250637 HC RX REV CODE- 250/637: Performed by: EMERGENCY MEDICINE

## 2020-10-10 PROCEDURE — 74011000250 HC RX REV CODE- 250: Performed by: INTERNAL MEDICINE

## 2020-10-10 PROCEDURE — 36415 COLL VENOUS BLD VENIPUNCTURE: CPT

## 2020-10-10 PROCEDURE — 74011250637 HC RX REV CODE- 250/637: Performed by: HOSPITALIST

## 2020-10-10 PROCEDURE — 85025 COMPLETE CBC W/AUTO DIFF WBC: CPT

## 2020-10-10 PROCEDURE — 74011250637 HC RX REV CODE- 250/637: Performed by: NURSE PRACTITIONER

## 2020-10-10 PROCEDURE — 80053 COMPREHEN METABOLIC PANEL: CPT

## 2020-10-10 PROCEDURE — 74011250636 HC RX REV CODE- 250/636: Performed by: EMERGENCY MEDICINE

## 2020-10-10 RX ADMIN — ENOXAPARIN SODIUM 30 MG: 30 INJECTION SUBCUTANEOUS at 23:38

## 2020-10-10 RX ADMIN — MICONAZOLE NITRATE 2 % TOPICAL POWDER: at 08:13

## 2020-10-10 RX ADMIN — METOPROLOL TARTRATE 100 MG: 25 TABLET, FILM COATED ORAL at 17:12

## 2020-10-10 RX ADMIN — Medication 3 MG: at 21:28

## 2020-10-10 RX ADMIN — METOPROLOL TARTRATE 100 MG: 25 TABLET, FILM COATED ORAL at 08:12

## 2020-10-10 RX ADMIN — NYSTATIN 500000 UNITS: 500000 SUSPENSION ORAL at 16:45

## 2020-10-10 RX ADMIN — ONDANSETRON 4 MG: 2 INJECTION INTRAMUSCULAR; INTRAVENOUS at 08:27

## 2020-10-10 RX ADMIN — ASPIRIN 81 MG CHEWABLE TABLET 81 MG: 81 TABLET CHEWABLE at 08:12

## 2020-10-10 RX ADMIN — POTASSIUM BICARBONATE 40 MEQ: 782 TABLET, EFFERVESCENT ORAL at 08:12

## 2020-10-10 RX ADMIN — ALPRAZOLAM 0.5 MG: 0.5 TABLET ORAL at 17:12

## 2020-10-10 RX ADMIN — TRAZODONE HYDROCHLORIDE 100 MG: 100 TABLET ORAL at 21:28

## 2020-10-10 RX ADMIN — Medication 10 ML: at 06:53

## 2020-10-10 RX ADMIN — NYSTATIN 500000 UNITS: 500000 SUSPENSION ORAL at 14:11

## 2020-10-10 RX ADMIN — ONDANSETRON 4 MG: 2 INJECTION INTRAMUSCULAR; INTRAVENOUS at 16:43

## 2020-10-10 RX ADMIN — NYSTATIN 500000 UNITS: 500000 SUSPENSION ORAL at 08:12

## 2020-10-10 RX ADMIN — ENOXAPARIN SODIUM 30 MG: 30 INJECTION SUBCUTANEOUS at 14:10

## 2020-10-10 RX ADMIN — NYSTATIN 500000 UNITS: 500000 SUSPENSION ORAL at 21:28

## 2020-10-10 RX ADMIN — ALPRAZOLAM 0.5 MG: 0.5 TABLET ORAL at 08:12

## 2020-10-10 RX ADMIN — FAMOTIDINE 20 MG: 40 POWDER, FOR SUSPENSION ORAL at 02:24

## 2020-10-10 RX ADMIN — Medication 10 ML: at 21:28

## 2020-10-10 RX ADMIN — FAMOTIDINE 20 MG: 40 POWDER, FOR SUSPENSION ORAL at 17:13

## 2020-10-10 RX ADMIN — Medication: at 21:29

## 2020-10-10 RX ADMIN — SERTRALINE HYDROCHLORIDE 25 MG: 50 TABLET ORAL at 08:12

## 2020-10-10 RX ADMIN — Medication: at 08:13

## 2020-10-10 RX ADMIN — Medication 10 ML: at 17:13

## 2020-10-10 RX ADMIN — MICONAZOLE NITRATE 2 % TOPICAL POWDER: at 21:29

## 2020-10-10 NOTE — PROGRESS NOTES
Bedside shift change report given to CATINA Betts (oncoming nurse) by Beth Manzano RN (offgoing nurse). Report included the following information SBAR, Kardex, Intake/Output, MAR, Recent Results and Cardiac Rhythm NSR.

## 2020-10-10 NOTE — PROGRESS NOTES
Problem: Pressure Injury - Risk of  Goal: *Prevention of pressure injury  Description: Document Enrique Scale and appropriate interventions in the flowsheet. Outcome: Progressing Towards Goal  Note: Pressure Injury Interventions:  Sensory Interventions: Float heels, Keep linens dry and wrinkle-free, Minimize linen layers    Moisture Interventions: Absorbent underpads, Apply protective barrier, creams and emollients, Minimize layers    Activity Interventions: Increase time out of bed, Pressure redistribution bed/mattress(bed type), PT/OT evaluation    Mobility Interventions: Pressure redistribution bed/mattress (bed type), HOB 30 degrees or less, PT/OT evaluation    Nutrition Interventions: Document food/fluid/supplement intake    Friction and Shear Interventions: HOB 30 degrees or less, Minimize layers                Problem: Falls - Risk of  Goal: *Absence of Falls  Description: Document Adama Fall Risk and appropriate interventions in the flowsheet.   Outcome: Progressing Towards Goal  Note: Fall Risk Interventions:  Mobility Interventions: Patient to call before getting OOB, Utilize walker, cane, or other assistive device    Mentation Interventions: Increase mobility, Door open when patient unattended    Medication Interventions: Patient to call before getting OOB, Teach patient to arise slowly    Elimination Interventions: Call light in reach, Patient to call for help with toileting needs, Urinal in reach    History of Falls Interventions: Door open when patient unattended         Problem: Nutrition Deficit  Goal: *Optimize nutritional status  Outcome: Progressing Towards Goal     Problem: Breathing Pattern - Ineffective  Goal: *Absence of hypoxia  Outcome: Progressing Towards Goal  Goal: *Use of effective breathing techniques  Outcome: Progressing Towards Goal

## 2020-10-10 NOTE — PROGRESS NOTES
Pt OOb to halls ambulated -and OOB to chair -still very weak -on 2lnc -de sat to 90% on room air  Bedside shift change report given to Milagros Dupont (oncoming nurse) by DANIS Rashid (offgoing nurse). Report included the following information SBAR, Kardex, Procedure Summary, MAR and Recent Results.

## 2020-10-10 NOTE — PROGRESS NOTES
[facility] Adult  Hospitalist Group                                                                                          Hospitalist Progress Note  Martha Lynne MD  Answering service: [phone] OR [phone] from in house phone        Date of Service:  10/10/2020  NAME:  Stefani Caldera  :  1990  MRN:  [id number]      Admission Summary:     \"27year-old gentleman with no significant past medical history was brought to the emergency room from home via EMS for complaints of an approximately two- to three-day history of progressively worsening cough, shortness of breath, chills and fevers as high as 103 degrees Fahrenheit.  Of note, patient stated that he tested positive for COVID-19 virus about nine days prior to this emergency room presentation of 2020.  Further, patient stated that despite taking the prescribed Zithromax, prednisone, and other respiratory therapies, did not have any significant improvement in symptoms. Patient denied associated headaches, dizziness, visual deficits, chest pains, palpitations, sore throat, nausea, vomiting, abdominal pain, bladder or bowel irregularities. \"    Interval history / Subjective:      Patient seen and examined. Patient very calm compared to when I saw him on Thursday. No new complaints. Asked when he can take a shower as he is worried about the trach hole. Assessment & Plan:     # Acute Hypoxic respiratory failure due to pneumonia -improved  # Bilateral pneumonia due to COVID-19 infection - resolved    - s/p tracheostomy  and PEG 20  - Trach decannulated on 10/09  - Completed COVID-19 treatment- s/p convalescent plasma and remedsivir.   - completed antibiotic course, Off abx now      # Right sided weakness  # Debility   - MRI brain- Minimal if any change in areas of cortical and subcortical encephalomalacia  bilateral frontal and parietal lobe superiorly, again likely due to subacute to chronic infarction or hypoxic ischemic event. Small areas of hemosiderin staining along the vertex also raise possibility of vasculopathy. No new infarction. Chronic right cerebellar hemorrhagic infarction, unchanged. 3. 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  -Neurology team has seen patient this admission-on aspirin. # Anxiety : per psyh   - Decrease xanax to 0.5mg bid x3 days, then 0.5mg daily x3 days then 0.25mg daily x3 days then dc. - Zoloft 25mg po daily x 2 doses then increase to 50mg. - Atarax 50mg prn.  - Follow up with [facility] after dc. # Tachycardia: multifactorial with pneumonia and ongoing medical issues, anxiolytics help to some degree. Heart rate better now.   -No PE on CT chest on 10/2. # History of tobacco use    # Dysphagia:  - He is on regular diet  - PEG still in place        GI prophylaxis and DVT prophylaxis =Pepcid/ Lovenox     Code: Full   Discharge plan: On Monday to [facility]. Hospital Problems  Date Reviewed: 8/20/2020          Codes Class Noted POA    Tracheostomy dependent [facility]) ICD-10-CM: Z93.0  ICD-9-CM: V44.0  10/2/2020 Yes        Tachycardia ICD-10-CM: R00.0  ICD-9-CM: 785.0  10/2/2020 Yes        Hyponatremia ICD-10-CM: E87.1  ICD-9-CM: 276.1  10/2/2020 Yes        Acute respiratory failure ([facility] ICD-10-CM: J96.00  ICD-9-CM: 518.81  8/18/2020 Yes        * (Principal) Pneumonia due to severe acute respiratory syndrome coronavirus 2 (SARS-CoV-2) ICD-10-CM: C84.3, J12.89  ICD-9-CM: 480.8  8/8/2020 Yes                Review of Systems:   Pertinent positive mentioned in interval hx/HPI. Other systems reviewed and negative       Vital Signs:    Last 24hrs VS reviewed since prior progress note.  Most recent are:  Visit Vitals  /82 (BP 1 Location: Right arm, BP Patient Position: Sitting)   Pulse (!) 104   Temp 97.3 °F (36.3 °C)   Resp 20   Ht 5' 11\" (1.803 m)   Wt 97.8 kg (215 lb 9.8 oz)   SpO2 98%   BMI 29.24 kg/m² Intake/Output Summary (Last 24 hours) at 10/10/2020 1812  Last data filed at 10/10/2020 1301  Gross per 24 hour   Intake 360 ml   Output 545 ml   Net -185 ml        Physical Examination:             Constitutional:  young patient   ENT:  Oral mucosa moist, oropharynx benign. Resp:  decreased breath sounds at bases. No wheezing   CV:  Regular rhythm, normal rate    GI:  PEG in place, Soft, non distended, non tender. Musculoskeletal:  No LE edema       Neuro: awake and alert     Psych:   anxious        Data Review:    I personally reviewed labs and imaging       Labs:     Recent Labs     10/10/20  0344   WBC 6.3   HGB 12.3   HCT 38.3        Recent Labs     10/10/20  0344   *   K 4.0   CL 97   CO2 32   BUN 11   CREA 0.56*   GLU 93   CA 10.1     Recent Labs     10/10/20  0344   ALT 44      TBILI 0.6   TP 7.5   ALB 3.5   GLOB 4.0     No results for input(s): INR, PTP, APTT, INREXT, INREXT in the last 72 hours. No results for input(s): FE, TIBC, PSAT, FERR in the last 72 hours. No results found for: FOL, RBCF   No results for input(s): PH, PCO2, PO2 in the last 72 hours. No results for input(s): CPK, CKNDX, TROIQ in the last 72 hours.     No lab exists for component: CPKMB  Lab Results   Component Value Date/Time    Cholesterol, total 156 09/05/2020 06:06 PM    HDL Cholesterol 26 09/05/2020 06:06 PM    LDL, calculated 92.4 09/05/2020 06:06 PM    Triglyceride 188 (H) 09/05/2020 06:06 PM    CHOL/HDL Ratio 6.0 (H) 09/05/2020 06:06 PM     Lab Results   Component Value Date/Time    Glucose (POC) 114 (H) 10/08/2020 10:02 PM    Glucose (POC) 113 (H) 10/05/2020 05:15 AM    Glucose (POC) 119 (H) 09/06/2020 01:06 PM    Glucose (POC) 113 (H) 09/05/2020 06:49 PM    Glucose (POC) 146 (H) 09/04/2020 11:36 AM     No results found for: COLOR, APPRN, SPGRU, REFSG, RAE, PROTU, GLUCU, KETU, BILU, UROU, ROBLES, LEUKU, GLUKE, EPSU, BACTU, WBCU, RBCU, CASTS, UCRY      Medications Reviewed:     Current Facility-Administered Medications   Medication Dose Route Frequency    oxyCODONE (ROXICODONE) 5 mg/5 mL oral solution 5 mg  5 mg Oral Q6H PRN    lidocaine 4 % patch 2 Patch  2 Patch TransDERmal Q24H    ALPRAZolam (XANAX) tablet 0.5 mg  0.5 mg Per G Tube BID    sertraline (ZOLOFT) tablet 25 mg  25 mg Oral DAILY    hydrOXYzine HCL (ATARAX) tablet 50 mg  50 mg Oral Q6H PRN    melatonin tablet 3 mg  3 mg Oral QHS    hydrOXYzine HCL (ATARAX) tablet 25 mg  25 mg Oral TID PRN    metoprolol tartrate (LOPRESSOR) tablet 100 mg  100 mg Oral BID    potassium bicarb-citric acid (EFFER-K) tablet 40 mEq  40 mEq Oral DAILY    loperamide (IMODIUM) capsule 2 mg  2 mg Oral Q4H PRN    famotidine (PEPCID) 40 mg/5 mL (8 mg/mL) oral suspension 20 mg  20 mg Per G Tube Q12H    [Held by provider] metoclopramide (REGLAN) 5 mg/5 mL oral syrup 5 mg  5 mg Per G Tube Q6H    traZODone (DESYREL) tablet 100 mg  100 mg Oral QHS    lidocaine (XYLOCAINE) 2 % jelly   Mucous Membrane PRN    enoxaparin (LOVENOX) injection 30 mg  30 mg SubCUTAneous Q12H    ondansetron (ZOFRAN) injection 4 mg  4 mg IntraVENous Q4H PRN    nystatin (MYCOSTATIN) 100,000 unit/mL oral suspension 500,000 Units  500,000 Units Oral QID    aluminum-magnesium hydroxide (MAALOX) oral suspension 15 mL  15 mL Oral QID PRN    prochlorperazine (COMPAZINE) with saline injection 10 mg  10 mg IntraVENous Q6H PRN    miconazole (MICOTIN) 2 % powder   Topical BID    guaiFENesin (ROBITUSSIN) 100 mg/5 mL oral liquid 400 mg  400 mg Per NG tube Q4H PRN    balsam peru-castor oiL (VENELEX) ointment   Topical BID    acetaminophen (TYLENOL) suppository 650 mg  650 mg Rectal Q6H PRN    alteplase (CATHFLO) 1 mg in sterile water (preservative free) 1 mL injection  1 mg InterCATHeter PRN    polyvinyl alcohol-povidone (NATURAL TEARS) 0.5-0.6 % ophthalmic solution 1 Drop  1 Drop Both Eyes PRN    dextrose 10% infusion 0-250 mL  0-250 mL IntraVENous PRN    glucagon (GLUCAGEN) injection 1 mg  1 mg IntraMUSCular PRN    glucose chewable tablet 16 g  4 Tab Oral PRN    sodium chloride (NS) flush 5-40 mL  5-40 mL IntraVENous Q8H    sodium chloride (NS) flush 5-40 mL  5-40 mL IntraVENous PRN    aspirin chewable tablet 81 mg  81 mg Per NG tube DAILY    albuterol-ipratropium (DUO-NEB) 2.5 MG-0.5 MG/3 ML  3 mL Nebulization Q6H PRN     ______________________________________________________________________  EXPECTED LENGTH OF STAY: 12d 0h  ACTUAL LENGTH OF STAY:          48                 Nicolasa Negron MD

## 2020-10-11 PROCEDURE — 74011000250 HC RX REV CODE- 250: Performed by: INTERNAL MEDICINE

## 2020-10-11 PROCEDURE — 65660000000 HC RM CCU STEPDOWN

## 2020-10-11 PROCEDURE — 74011250637 HC RX REV CODE- 250/637: Performed by: HOSPITALIST

## 2020-10-11 PROCEDURE — 74011250637 HC RX REV CODE- 250/637: Performed by: INTERNAL MEDICINE

## 2020-10-11 PROCEDURE — 74011250637 HC RX REV CODE- 250/637: Performed by: EMERGENCY MEDICINE

## 2020-10-11 PROCEDURE — 74011250637 HC RX REV CODE- 250/637: Performed by: NURSE PRACTITIONER

## 2020-10-11 PROCEDURE — 74011250636 HC RX REV CODE- 250/636: Performed by: EMERGENCY MEDICINE

## 2020-10-11 RX ADMIN — NYSTATIN 500000 UNITS: 500000 SUSPENSION ORAL at 17:05

## 2020-10-11 RX ADMIN — MICONAZOLE NITRATE 2 % TOPICAL POWDER: at 17:09

## 2020-10-11 RX ADMIN — METOPROLOL TARTRATE 100 MG: 25 TABLET, FILM COATED ORAL at 09:31

## 2020-10-11 RX ADMIN — MICONAZOLE NITRATE 2 % TOPICAL POWDER: at 09:00

## 2020-10-11 RX ADMIN — ALPRAZOLAM 0.5 MG: 0.5 TABLET ORAL at 09:31

## 2020-10-11 RX ADMIN — METOPROLOL TARTRATE 100 MG: 25 TABLET, FILM COATED ORAL at 17:06

## 2020-10-11 RX ADMIN — FAMOTIDINE 20 MG: 40 POWDER, FOR SUSPENSION ORAL at 17:05

## 2020-10-11 RX ADMIN — TRAZODONE HYDROCHLORIDE 100 MG: 100 TABLET ORAL at 22:56

## 2020-10-11 RX ADMIN — Medication 3 MG: at 22:56

## 2020-10-11 RX ADMIN — Medication: at 09:31

## 2020-10-11 RX ADMIN — Medication 10 ML: at 22:57

## 2020-10-11 RX ADMIN — ENOXAPARIN SODIUM 30 MG: 30 INJECTION SUBCUTANEOUS at 12:22

## 2020-10-11 RX ADMIN — ASPIRIN 81 MG CHEWABLE TABLET 81 MG: 81 TABLET CHEWABLE at 09:31

## 2020-10-11 RX ADMIN — Medication 10 ML: at 05:30

## 2020-10-11 RX ADMIN — NYSTATIN 500000 UNITS: 500000 SUSPENSION ORAL at 09:30

## 2020-10-11 RX ADMIN — POTASSIUM BICARBONATE 40 MEQ: 782 TABLET, EFFERVESCENT ORAL at 09:31

## 2020-10-11 RX ADMIN — Medication: at 17:07

## 2020-10-11 RX ADMIN — NYSTATIN 500000 UNITS: 500000 SUSPENSION ORAL at 21:04

## 2020-10-11 RX ADMIN — Medication 10 ML: at 17:14

## 2020-10-11 RX ADMIN — NYSTATIN 500000 UNITS: 500000 SUSPENSION ORAL at 12:23

## 2020-10-11 RX ADMIN — FAMOTIDINE 20 MG: 40 POWDER, FOR SUSPENSION ORAL at 02:40

## 2020-10-11 RX ADMIN — ALPRAZOLAM 0.5 MG: 0.5 TABLET ORAL at 17:06

## 2020-10-11 RX ADMIN — SERTRALINE HYDROCHLORIDE 25 MG: 50 TABLET ORAL at 09:33

## 2020-10-11 RX ADMIN — ENOXAPARIN SODIUM 30 MG: 30 INJECTION SUBCUTANEOUS at 22:56

## 2020-10-11 NOTE — PROGRESS NOTES
[facility]  Hospitalist Group                                                                                          Hospitalist Progress Note  Evie Hurst MD  Answering service: [phone] or [phone] from in house phone        Date of Service:  10/11/2020  NAME:  Fatemeh Prince  :  1990  MRN:  [id number]      Admission Summary:     \"27year-old gentleman with no significant past medical history was brought to the emergency room from home via EMS for complaints of an approximately two- to three-day history of progressively worsening cough, shortness of breath, chills and fevers as high as 103 degrees Fahrenheit.  Of note, patient stated that he tested positive for COVID-19 virus about nine days prior to this emergency room presentation of 2020.  Further, patient stated that despite taking the prescribed Zithromax, prednisone, and other respiratory therapies, did not have any significant improvement in symptoms. Patient denied associated headaches, dizziness, visual deficits, chest pains, palpitations, sore throat, nausea, vomiting, abdominal pain, bladder or bowel irregularities. \"    Interval history / Subjective:      Patient seen and examined. He seems anxious and having shallow breathing. He stated he is anxious about leaving tomorrow. Asked how he can take a shower with the trach stoma. Discussed options and alternatives. Assessment & Plan:     # Acute Hypoxic respiratory failure due to pneumonia -improved  # Bilateral pneumonia due to COVID-19 infection - resolved    - s/p tracheostomy  and PEG 20  - Trach decannulated on 10/09  - Completed COVID-19 treatment- s/p convalescent plasma and remedsivir.   - completed antibiotic course, Off abx now      # Right sided weakness  # Debility   - MRI brain- Minimal if any change in areas of cortical and subcortical encephalomalacia  bilateral frontal and parietal lobe superiorly, again likely due to subacute to chronic infarction or hypoxic ischemic event. Small areas of hemosiderin staining along the vertex also raise possibility of vasculopathy. No new infarction. Chronic right cerebellar hemorrhagic infarction, unchanged. 3. 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  -Neurology team has seen patient this admission-on aspirin. # Anxiety : per psyh   - Decrease xanax to 0.5mg bid x3 days, then 0.5mg daily x3 days then 0.25mg daily x3 days then dc. - Zoloft 25mg po daily x 2 doses then increase to 50mg. - Atarax 50mg prn.  - Follow up with [facility] after dc. # Tachycardia: multifactorial with pneumonia and ongoing medical issues, anxiolytics help to some degree. Heart rate better now.   -No PE on CT chest on 10/2. # History of tobacco use    # Dysphagia:  - He is on regular diet  - PEG still in place        GI prophylaxis and DVT prophylaxis =Pepcid/ Lovenox     Code: Full   Discharge plan: On Monday to [facility]. 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) ICD-10-CM: Z93.0  ICD-9-CM: V44.0  10/2/2020 Yes        Tachycardia ICD-10-CM: R00.0  ICD-9-CM: 785.0  10/2/2020 Yes        Hyponatremia ICD-10-CM: E87.1  ICD-9-CM: 276.1  10/2/2020 Yes        Acute respiratory failure ([facility] [address] ICD-10-CM: J96.00  ICD-9-CM: 518.81  8/18/2020 Yes        * (Principal) Pneumonia due to severe acute respiratory syndrome coronavirus 2 (SARS-CoV-2) ICD-10-CM: B83.2, J12.89  ICD-9-CM: 480.8  8/8/2020 Yes                Review of Systems:   Pertinent positive mentioned in interval hx/HPI. Other systems reviewed and negative       Vital Signs:    Last 24hrs VS reviewed since prior progress note.  Most recent are:  Visit Vitals  BP (!) 143/84   Pulse (!) 102   Temp 98.2 °F (36.8 °C)   Resp 18   Ht 5' 11\" (1.803 m)   Wt 96.6 kg (212 lb 15.4 oz)   SpO2 95%   BMI 28.88 kg/m² Intake/Output Summary (Last 24 hours) at 10/11/2020 1153  Last data filed at 10/11/2020 0015  Gross per 24 hour   Intake 300 ml   Output 125 ml   Net 175 ml        Physical Examination:             Constitutional:  young patient   ENT:  Oral mucosa moist, oropharynx benign. Resp:  decreased breath sounds at bases. No wheezing   CV:  Regular rhythm, normal rate    GI:  PEG in place, Soft, non distended, non tender. Musculoskeletal:  No LE edema       Neuro: awake and alert     Psych:   anxious        Data Review:    I personally reviewed labs and imaging       Labs:     Recent Labs     10/10/20  0344   WBC 6.3   HGB 12.3   HCT 38.3        Recent Labs     10/10/20  0344   *   K 4.0   CL 97   CO2 32   BUN 11   CREA 0.56*   GLU 93   CA 10.1     Recent Labs     10/10/20  0344   ALT 44      TBILI 0.6   TP 7.5   ALB 3.5   GLOB 4.0     No results for input(s): INR, PTP, APTT, INREXT, INREXT in the last 72 hours. No results for input(s): FE, TIBC, PSAT, FERR in the last 72 hours. No results found for: FOL, RBCF   No results for input(s): PH, PCO2, PO2 in the last 72 hours. No results for input(s): CPK, CKNDX, TROIQ in the last 72 hours.     No lab exists for component: CPKMB  Lab Results   Component Value Date/Time    Cholesterol, total 156 09/05/2020 06:06 PM    HDL Cholesterol 26 09/05/2020 06:06 PM    LDL, calculated 92.4 09/05/2020 06:06 PM    Triglyceride 188 (H) 09/05/2020 06:06 PM    CHOL/HDL Ratio 6.0 (H) 09/05/2020 06:06 PM     Lab Results   Component Value Date/Time    Glucose (POC) 114 (H) 10/08/2020 10:02 PM    Glucose (POC) 113 (H) 10/05/2020 05:15 AM    Glucose (POC) 119 (H) 09/06/2020 01:06 PM    Glucose (POC) 113 (H) 09/05/2020 06:49 PM    Glucose (POC) 146 (H) 09/04/2020 11:36 AM     No results found for: COLOR, APPRN, SPGRU, REFSG, RAE, PROTU, GLUCU, KETU, BILU, UROU, ROBLES, LEUKU, GLUKE, EPSU, BACTU, WBCU, RBCU, CASTS, UCRY      Medications Reviewed:     Current Facility-Administered Medications   Medication Dose Route Frequency    oxyCODONE (ROXICODONE) 5 mg/5 mL oral solution 5 mg  5 mg Oral Q6H PRN    lidocaine 4 % patch 2 Patch  2 Patch TransDERmal Q24H    ALPRAZolam (XANAX) tablet 0.5 mg  0.5 mg Per G Tube BID    sertraline (ZOLOFT) tablet 25 mg  25 mg Oral DAILY    hydrOXYzine HCL (ATARAX) tablet 50 mg  50 mg Oral Q6H PRN    melatonin tablet 3 mg  3 mg Oral QHS    hydrOXYzine HCL (ATARAX) tablet 25 mg  25 mg Oral TID PRN    metoprolol tartrate (LOPRESSOR) tablet 100 mg  100 mg Oral BID    potassium bicarb-citric acid (EFFER-K) tablet 40 mEq  40 mEq Oral DAILY    loperamide (IMODIUM) capsule 2 mg  2 mg Oral Q4H PRN    famotidine (PEPCID) 40 mg/5 mL (8 mg/mL) oral suspension 20 mg  20 mg Per G Tube Q12H    [Held by provider] metoclopramide (REGLAN) 5 mg/5 mL oral syrup 5 mg  5 mg Per G Tube Q6H    traZODone (DESYREL) tablet 100 mg  100 mg Oral QHS    lidocaine (XYLOCAINE) 2 % jelly   Mucous Membrane PRN    enoxaparin (LOVENOX) injection 30 mg  30 mg SubCUTAneous Q12H    ondansetron (ZOFRAN) injection 4 mg  4 mg IntraVENous Q4H PRN    nystatin (MYCOSTATIN) 100,000 unit/mL oral suspension 500,000 Units  500,000 Units Oral QID    aluminum-magnesium hydroxide (MAALOX) oral suspension 15 mL  15 mL Oral QID PRN    prochlorperazine (COMPAZINE) with saline injection 10 mg  10 mg IntraVENous Q6H PRN    miconazole (MICOTIN) 2 % powder   Topical BID    guaiFENesin (ROBITUSSIN) 100 mg/5 mL oral liquid 400 mg  400 mg Per NG tube Q4H PRN    balsam peru-castor oiL (VENELEX) ointment   Topical BID    acetaminophen (TYLENOL) suppository 650 mg  650 mg Rectal Q6H PRN    alteplase (CATHFLO) 1 mg in sterile water (preservative free) 1 mL injection  1 mg InterCATHeter PRN    polyvinyl alcohol-povidone (NATURAL TEARS) 0.5-0.6 % ophthalmic solution 1 Drop  1 Drop Both Eyes PRN    dextrose 10% infusion 0-250 mL  0-250 mL IntraVENous PRN    glucagon (GLUCAGEN) injection 1 mg  1 mg IntraMUSCular PRN    glucose chewable tablet 16 g  4 Tab Oral PRN    sodium chloride (NS) flush 5-40 mL  5-40 mL IntraVENous Q8H    sodium chloride (NS) flush 5-40 mL  5-40 mL IntraVENous PRN    aspirin chewable tablet 81 mg  81 mg Per NG tube DAILY    albuterol-ipratropium (DUO-NEB) 2.5 MG-0.5 MG/3 ML  3 mL Nebulization Q6H PRN     ______________________________________________________________________  EXPECTED LENGTH OF STAY: 12d 0h  ACTUAL LENGTH OF STAY:          47                 Nicolasa Negron MD

## 2020-10-11 NOTE — PROGRESS NOTES
Problem: Pressure Injury - Risk of  Goal: *Prevention of pressure injury  Description: Document Enrique Scale and appropriate interventions in the flowsheet. Outcome: Progressing Towards Goal  Note: Pressure Injury Interventions:  Sensory Interventions: Float heels, Keep linens dry and wrinkle-free, Minimize linen layers    Moisture Interventions: Absorbent underpads, Minimize layers    Activity Interventions: Increase time out of bed, Pressure redistribution bed/mattress(bed type), PT/OT evaluation    Mobility Interventions: Float heels, HOB 30 degrees or less, Pressure redistribution bed/mattress (bed type), PT/OT evaluation    Nutrition Interventions: Document food/fluid/supplement intake    Friction and Shear Interventions: HOB 30 degrees or less, Minimize layers                Problem: Falls - Risk of  Goal: *Absence of Falls  Description: Document Adama Fall Risk and appropriate interventions in the flowsheet.   Outcome: Progressing Towards Goal  Note: Fall Risk Interventions:  Mobility Interventions: Communicate number of staff needed for ambulation/transfer, Patient to call before getting OOB, Utilize walker, cane, or other assistive device    Mentation Interventions: Adequate sleep, hydration, pain control, Door open when patient unattended, Room close to nurse's station    Medication Interventions: Evaluate medications/consider consulting pharmacy, Patient to call before getting OOB, Teach patient to arise slowly    Elimination Interventions: Urinal in reach, Patient to call for help with toileting needs, Call light in reach    History of Falls Interventions: Door open when patient unattended         Problem: Nutrition Deficit  Goal: *Optimize nutritional status  Outcome: Progressing Towards Goal     Problem: Breathing Pattern - Ineffective  Goal: *Absence of hypoxia  Outcome: Progressing Towards Goal  Goal: *Use of effective breathing techniques  Outcome: Progressing Towards Goal

## 2020-10-12 VITALS
BODY MASS INDEX: 29.41 KG/M2 | HEART RATE: 71 BPM | HEIGHT: 71 IN | DIASTOLIC BLOOD PRESSURE: 86 MMHG | WEIGHT: 210.1 LBS | RESPIRATION RATE: 20 BRPM | SYSTOLIC BLOOD PRESSURE: 124 MMHG | TEMPERATURE: 97.7 F | OXYGEN SATURATION: 100 %

## 2020-10-12 PROCEDURE — 74011250636 HC RX REV CODE- 250/636: Performed by: EMERGENCY MEDICINE

## 2020-10-12 PROCEDURE — 74011250637 HC RX REV CODE- 250/637: Performed by: EMERGENCY MEDICINE

## 2020-10-12 PROCEDURE — 74011250637 HC RX REV CODE- 250/637: Performed by: NURSE PRACTITIONER

## 2020-10-12 PROCEDURE — 74011250637 HC RX REV CODE- 250/637: Performed by: HOSPITALIST

## 2020-10-12 RX ORDER — TRAZODONE HYDROCHLORIDE 100 MG/1
100 TABLET ORAL
Qty: 30 TAB | Refills: 0 | Status: SHIPPED | OUTPATIENT
Start: 2020-10-12 | End: 2020-11-11

## 2020-10-12 RX ORDER — ALPRAZOLAM 0.25 MG/1
TABLET ORAL
Qty: 17 TAB | Refills: 0 | Status: SHIPPED | OUTPATIENT
Start: 2020-10-12 | End: 2020-10-20

## 2020-10-12 RX ORDER — ALPRAZOLAM 0.5 MG/1
0.5 TABLET ORAL 2 TIMES DAILY
Qty: 20 TAB | Refills: 0 | Status: SHIPPED | OUTPATIENT
Start: 2020-10-12 | End: 2020-10-12 | Stop reason: SDUPTHER

## 2020-10-12 RX ORDER — HYDROXYZINE 25 MG/1
25 TABLET, FILM COATED ORAL
Qty: 30 TAB | Refills: 0 | Status: SHIPPED | OUTPATIENT
Start: 2020-10-12 | End: 2020-10-22

## 2020-10-12 RX ORDER — GUAIFENESIN 100 MG/5ML
81 LIQUID (ML) ORAL DAILY
Qty: 30 TAB | Refills: 0 | Status: SHIPPED | OUTPATIENT
Start: 2020-10-13 | End: 2020-11-12

## 2020-10-12 RX ORDER — SERTRALINE HYDROCHLORIDE 50 MG/1
50 TABLET, FILM COATED ORAL DAILY
Qty: 30 TAB | Refills: 0 | Status: SHIPPED | OUTPATIENT
Start: 2020-10-13 | End: 2020-11-12

## 2020-10-12 RX ORDER — METOPROLOL TARTRATE 100 MG/1
100 TABLET ORAL 2 TIMES DAILY
Qty: 60 TAB | Refills: 0 | Status: SHIPPED | OUTPATIENT
Start: 2020-10-12 | End: 2020-11-11

## 2020-10-12 RX ADMIN — FAMOTIDINE 20 MG: 40 POWDER, FOR SUSPENSION ORAL at 03:08

## 2020-10-12 RX ADMIN — MICONAZOLE NITRATE 2 % TOPICAL POWDER: at 08:14

## 2020-10-12 RX ADMIN — METOPROLOL TARTRATE 100 MG: 25 TABLET, FILM COATED ORAL at 08:10

## 2020-10-12 RX ADMIN — SERTRALINE HYDROCHLORIDE 25 MG: 50 TABLET ORAL at 08:10

## 2020-10-12 RX ADMIN — FAMOTIDINE 20 MG: 40 POWDER, FOR SUSPENSION ORAL at 13:11

## 2020-10-12 RX ADMIN — POTASSIUM BICARBONATE 40 MEQ: 782 TABLET, EFFERVESCENT ORAL at 08:11

## 2020-10-12 RX ADMIN — ALPRAZOLAM 0.5 MG: 0.5 TABLET ORAL at 08:09

## 2020-10-12 RX ADMIN — NYSTATIN 500000 UNITS: 500000 SUSPENSION ORAL at 08:09

## 2020-10-12 RX ADMIN — ENOXAPARIN SODIUM 30 MG: 30 INJECTION SUBCUTANEOUS at 11:03

## 2020-10-12 RX ADMIN — ASPIRIN 81 MG CHEWABLE TABLET 81 MG: 81 TABLET CHEWABLE at 08:09

## 2020-10-12 RX ADMIN — Medication: at 08:13

## 2020-10-12 RX ADMIN — NYSTATIN 500000 UNITS: 500000 SUSPENSION ORAL at 13:11

## 2020-10-12 NOTE — PROGRESS NOTES
Problem: Falls - Risk of  Goal: *Absence of Falls  Description: Document Rebel Mitchellccasin Fall Risk and appropriate interventions in the flowsheet.   Outcome: Progressing Towards Goal  Note: Fall Risk Interventions:  Mobility Interventions: Patient to call before getting OOB    Mentation Interventions: Door open when patient unattended    Medication Interventions: Patient to call before getting OOB    Elimination Interventions: Call light in reach    History of Falls Interventions: Door open when patient unattended

## 2020-10-12 NOTE — PROGRESS NOTES
Bedside shift change report given to Fred Gallegos RN (oncoming nurse) by Berlinda Simmonds, RN (offgoing nurse). Report included the following information SBAR, Kardex, MAR and Cardiac Rhythm NSR.

## 2020-10-12 NOTE — DISCHARGE SUMMARY
Discharge Summary       PATIENT ID: Ace Phalen  MRN: [id number]   YOB: 1990    DATE OF ADMISSION: 8/18/2020  9:33 AM    DATE OF DISCHARGE: 10/12/20  PRIMARY CARE PROVIDER: Chandni Perez MD       DISCHARGING PROVIDER: Yesenia Zapata MD    To contact this individual call [phone] and ask the  to page. If unavailable ask to be transferred the Adult Hospitalist Department. CONSULTATIONS: IP CONSULT TO INTENSIVIST  IP CONSULT TO THORACIC SURGERY  IP CONSULT TO NEUROLOGY  IP CONSULT TO GASTROENTEROLOGY  IP CONSULT TO INTERVENTIONAL RADIOLOGY  IP CONSULT TO NEUROLOGY  IP CONSULT TO PSYCHIATRY  IP CONSULT TO CARDIOLOGY  IP CONSULT TO ORTHOPEDIC SURGERY      PROCEDURES/SURGERIES: Procedure(s):  ESOPHAGOGASTRODUODENOSCOPY (EGD) @ bedside  ESOPHAGOGASTRODUODENAL (EGD) BIOPSY    IMAGING  Xr Abd (kub)    Result Date: 9/7/2020  IMPRESSION: Interval placement of a Dobbhoff tube     Xr Abd (kub)    Result Date: 8/29/2020  IMPRESSION: 1. Feeding tube position as described. 2. Mild small bowel distention is suspected. .     Ct Head Wo Cont    Result Date: 9/3/2020  IMPRESSION: 1. No acute intracranial hemorrhage, mass or infarct. 2. Bilateral mastoid effusions. 3. Paranasal sinus disease, as described above. Ct Chest Wo Cont    Result Date: 9/16/2020  IMPRESSION: 1. Bilateral parenchymal lung disease consistent with history Covid 19. 2. No pleural effusion, abscess, peritoneal fluid or other acute abnormality demonstrated. Ct Abd Pelv Wo Cont    Result Date: 9/16/2020  IMPRESSION: 1. Bilateral parenchymal lung disease consistent with history Covid 19. 2. No pleural effusion, abscess, peritoneal fluid or other acute abnormality demonstrated. Xr Chest Port    Result Date: 9/14/2020  IMPRESSION: Stable patchy bilateral lung opacities.      Xr Chest Port    Result Date: 9/9/2020  IMPRESSION: Unchanged multifocal airspace disease compatible with pneumonia    Xr Chest Port    Result Date: 9/6/2020  Impression: Unchanged diffuse airspace disease    Xr Chest Port    Result Date: 9/3/2020  IMPRESSION: No significant change including left arm PICC line tip in the right subclavian vein. Xr Chest Port    Result Date: 9/2/2020  IMPRESSION: No significant change in diffuse interstitial and alveolar lung disease. Xr Chest Port    Result Date: 9/1/2020  IMPRESSION: Stable diffuse airspace disease. Low lung volumes. Xr Chest Port    Result Date: 8/31/2020  IMPRESSION: No change in tube and line positions. No change in diffuse bilateral pneumonia    Xr Chest Port    Result Date: 8/30/2020  IMPRESSION: Stable diffuse bilateral airspace disease. Xr Chest Port    Result Date: 8/29/2020  IMPRESSION: Slight increase in lung volumes/inspiratory effort but stable diffuse lung infiltrates left greater than right moderately severe. Lines and tubes stable. . . Xr Chest Port    Result Date: 8/29/2020  IMPRESSION: Stable diffuse lung infiltrates and poor inspiration. .  . Xr Chest Port    Result Date: 8/28/2020  IMPRESSION: Stable bilateral interstitial and patchy airspace opacities. Xr Chest Port    Result Date: 8/27/2020  IMPRESSION: Stable bilateral interstitial and patchy airspace opacities. Xr Chest Port    Result Date: 8/26/2020  IMPRESSION: Stable bilateral interstitial and patchy airspace opacities. Xr Chest Port    Result Date: 8/25/2020  IMPRESSION: 1. Bilateral airspace disease overall has slightly decreased especially in the right lung. Xr Chest Port    Result Date: 8/24/2020  IMPRESSION: 1. Mild decrease in the fairly severe bilateral pneumonia. Xr Chest Port    Result Date: 8/23/2020  IMPRESSION: 1. There is diffuse bilateral airspace disease which is improved in the left midlung and slightly progressed in the right lower lobe. Pneumomediastinum is less apparent. Subcutaneous emphysema is again noted.  There is no pneumothorax. Xr Chest Port    Result Date: 8/22/2020  IMPRESSION: Stable. Xr Chest Port    Result Date: 8/22/2020  IMPRESSION: Persistent bilateral diffuse airspace opacification. New pneumomediastinum. Xr Chest Port    Result Date: 8/21/2020  IMPRESSION: 1. No significant interval change in the appearance of the chest. 2. The nasogastric tube side port is in the region of the distal esophagus and should be advanced. Xr Chest Port    Result Date: 8/20/2020  IMPRESSION: Stable bilateral interstitial and patchy airspace opacities. Stable diffuse chest wall subcutaneous emphysema. Xr Chest Port    Result Date: 8/20/2020  IMPRESSION: Stable bilateral interstitial and patchy airspace opacities. Xr Chest Port    Addendum Date: 8/20/2020    Addendum: The impression should read interval development of subcutaneous emphysema. Result Date: 8/20/2020  IMPRESSION: 1. Interval development of continuous emphysema. 2. Persistent patchy opacification throughout the lungs bilaterally. Xr Chest Port    Result Date: 8/19/2020  IMPRESSION: 1. No complicating features post left-sided chest tube placement. Diffuse bilateral interstitial and alveolar opacities unchanged     Xr Chest Port    Result Date: 8/19/2020  IMPRESSION: Stable diffuse interstitial opacities. Decreased focal airspace opacity in the right lung base. Xr Chest Port    Result Date: 8/18/2020  IMPRESSION: Endotracheal tube extends to the right mainstem bronchus origin and should be pulled back 1 to 2 cm. Slightly increased focal right basilar airspace disease. No other change. Findings discussed with patient's nurse Endy Cantu at 2:00 PM 8/18/2020    Xr Chest Port    Result Date: 8/18/2020  IMPRESSION: Slight interval improvement of the bilateral infiltration. Xr Abd Port  1 V    Result Date: 9/14/2020  IMPRESSION: Dobbhoff catheter, tip in the second portion of the duodenum, appearing intact.      Xr Abd Port  1 V    Result Date: 9/1/2020  IMPRESSION: Ileus versus small bowel obstruction. Enteric tube termination in the pylorus or first portion of the duodenum. Xr Abd Port  1 V    Result Date: 8/26/2020  IMPRESSION: Dobbhoff tube in the gastric antrum. Cta Code Neuro Head And Neck W Cont    Result Date: 9/4/2020  IMPRESSION: CTA Head: 1. No evidence of significant stenosis or aneurysm. 2. Abnormal gyriform cortical enhancement in the bilateral superior frontoparietal lobes. Differential considerations include subacute ischemia from a hypoxic-ischemic event, or an infectious or inflammatory cerebritis/encephalitis. 3. Extensive right-sided paranasal sinus disease. CTA Neck: 1. No evidence of significant stenosis. 2. Extensive consolidative and groundglass opacities in the bilateral upper lungs, consistent with multifocal pneumonia and/or ARDS. CT Brain Perfusion: 1. No definite acute abnormality. Motion degraded perfusion images, which make color maps unreliable. The findings were called to ICU doctor on 9/4/2020 at 1:42 PM by Dr. Elissa Perrin. [phone]     Ct Code Neuro Head Wo Contrast    Result Date: 9/4/2020  IMPRESSION: Chronic sinus disease. No acute process or change compared to the prior exam.     Ct Code Neuro Perf W Cbf    Result Date: 9/4/2020  IMPRESSION: CTA Head: 1. No evidence of significant stenosis or aneurysm. 2. Abnormal gyriform cortical enhancement in the bilateral superior frontoparietal lobes. Differential considerations include subacute ischemia from a hypoxic-ischemic event, or an infectious or inflammatory cerebritis/encephalitis. 3. Extensive right-sided paranasal sinus disease. CTA Neck: 1. No evidence of significant stenosis. 2. Extensive consolidative and groundglass opacities in the bilateral upper lungs, consistent with multifocal pneumonia and/or ARDS. CT Brain Perfusion: 1. No definite acute abnormality. Motion degraded perfusion images, which make color maps unreliable.  The findings were called to ICU doctor on 9/4/2020 at 1:42 PM by Dr. Norbert Khalil. [address] COURSE:   # Acute Hypoxic respiratory failure due to pneumonia -improved  # Bilateral pneumonia due to COVID-19 infection - resolved    - s/p tracheostomy 8/26/2020 and PEG 9/23/2020  - Trach decannulated on 10/09/2020  - Completed COVID-19 treatment- s/p convalescent plasma and remedsivir. - completed antibiotic course, Off abx        # Right sided weakness  # Debility   - MRI brain- Minimal if any change in areas of cortical and subcortical encephalomalacia  bilateral frontal and parietal lobe superiorly, again likely due to subacute to chronic infarction or hypoxic ischemic event. Small areas of hemosiderin staining along the vertex also raise possibility of vasculopathy. No new infarction. Chronic right cerebellar hemorrhagic infarction, unchanged. 3. 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  -Neurology team has seen patient this admission put him on aspirin.     # Anxiety : per psyh   - Decrease xanax to 0.5mg bid x3 days, then 0.5mg daily x3 days then 0.25mg daily x2 days then dc. - Zoloft 25mg po daily x 2 doses then increased to 50mg. - Atarax 50mg prn.  - Follow up with [facility] after discharge      # Tachycardia: multifactorial with pneumonia and ongoing medical issues, anxiolytics help to some degree. Heart rate better now. - No PE on CT chest on 10/2/2020.     # History of tobacco use     # Dysphagia:  - He is on regular diet  - PEG still in place           Pauly Malavew / PLAN:      # Acute Hypoxic respiratory failure due to pneumonia -improved  # Bilateral pneumonia due to COVID-19 infection - resolved       # Right sided weakness  # Debility   - continue with Aspirin   - Rehab at [facility]      # Anxiety : per psyh   - Decrease xanax to 0.5mg bid x3 days, then 0.5mg daily x3 days then 0.25mg daily x2 days then discontinue.   - Zoloft 50mg   - Atarax 50mg prn.  - Follow up with [facility] after discharge      # Tachycardia: multifactorial with pneumonia and ongoing medical issues, anxiolytics help to some degree. Heart rate better now. - No PE on CT chest on 10/2/2020.     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    Tachycardia R00.0    Hyponatremia E87.1               PENDING TEST RESULTS:   At the time of discharge the following test results are still pending: None     FOLLOW UP APPOINTMENTS:    Follow-up Information     Follow up With Specialties Details Why Contact Info    Christofer Strauss MD Radiology Call Regarding your PEG tube  [address] [place] [address]  [address]he[address]  [phone]      Primary care   Schedule an appointment as soon as possible for a visit in 2 weeks Please follow with your primary care once you complete Rehab      [facility]  Schedule an appointment as soon as possible for a visit in 2 weeks Follow up  [address] [place]anna [place]  Phone [phone]    [facility]  Go today Rehab             ADDITIONAL CARE RECOMMENDATIONS:   · It is important that you take the medication exactly as they are prescribed. · Keep your medication in the bottles provided by the pharmacist and keep a list of the medication names, dosages, and times to be taken in your wallet. · Do not take other medications without consulting your doctor. · No drinking alcohol or driving car or operating machinery if you are on narcotic pain medications.  Donot take sedating mediations if you are sleepy or confused. · Fall Precautions  · Keep Well Hydrated  · Report to your medical provider if you feel you have  developed allergies to medications  · Follow up with your PCP or Consultant for medication adjustments and refills  · Monitor for signs of fevers,chills,bleeding,chest pain and seek medical attention if you do so. DIET: Regular diet     TUBE FEEDING INSTRUCTIONS: PEG in place but being used currently     OXYGEN / BiPAP SETTINGS: 2L NC    ACTIVITY: As tolerated     WOUND CARE: Trach stoma cover     EQUIPMENT needed: None     DISCHARGE MEDICATIONS:  Current Discharge Medication List      START taking these medications    Details   aspirin 81 mg chewable tablet Take 1 Tab by mouth daily for 30 days. Qty: 30 Tab, Refills: 0      hydrOXYzine HCL (ATARAX) 25 mg tablet Take 1 Tab by mouth three (3) times daily as needed for Itching or Anxiety for up to 10 days. Qty: 30 Tab, Refills: 0      metoprolol tartrate (LOPRESSOR) 100 mg IR tablet Take 1 Tab by mouth two (2) times a day for 30 days. Qty: 60 Tab, Refills: 0      sertraline (ZOLOFT) 50 mg tablet Take 1 Tab by mouth daily for 30 days. Qty: 30 Tab, Refills: 0      traZODone (DESYREL) 100 mg tablet Take 1 Tab by mouth nightly for 30 days. Qty: 30 Tab, Refills: 0      ALPRAZolam (XANAX) 0.25 mg tablet Take 2 Tabs by mouth two (2) times a day for 2 days, THEN 2 Tabs daily for 3 days, THEN 1 Tab daily for 3 days. Max Daily Amount: 1 mg. Qty: 17 Tab, Refills: 0    Associated Diagnoses: Anxiety         CONTINUE these medications which have NOT CHANGED    Details   benzonatate (Tessalon Perles) 100 mg capsule Take 100 mg by mouth three (3) times daily as needed for Cough.              All Micro Results     Procedure Component Value Units Date/Time    CULTURE, BLOOD, PAIRED [[id number]] Collected:  09/16/20 1547    Order Status:  Completed Specimen:  Blood Updated:  09/21/20 1795     Special Requests: NO SPECIAL REQUESTS        Culture result: NO GROWTH 5 DAYS CULTURE, BLOOD, PAIRED [[id number]] Collected:  09/04/20 1005    Order Status:  Completed Specimen:  Blood Updated:  09/09/20 0639     Special Requests: NO SPECIAL REQUESTS        Culture result: NO GROWTH 5 DAYS       CULTURE, Caspian Carp STAIN [[id number]]  (Abnormal) Collected:  09/04/20 0933    Order Status:  Completed Specimen:  Wound from Neck Updated:  09/06/20 1431     Special Requests: NO SPECIAL REQUESTS        GRAM STAIN OCCASIONAL WBCS SEEN         1+ GRAM NEGATIVE RODS               1+ GRAM POSITIVE COCCI IN PAIRS            FEW GRAM POSITIVE RODS        Culture result: MODERATE  MIXED RESPIRATORY LIKE KENNY         LIGHT JUA NJOSE ALBICANS       CULTURE, BLOOD, PAIRED [[id number]] Collected:  08/31/20 1211    Order Status:  Completed Specimen:  Blood Updated:  09/05/20 0555     Special Requests: NO SPECIAL REQUESTS        Culture result: NO GROWTH 5 DAYS       CULTURE, BLOOD, PAIRED [[id number]] Collected:  08/22/20 2140    Order Status:  Completed Specimen:  Blood Updated:  08/27/20 0512     Special Requests: NO SPECIAL REQUESTS        Culture result: NO GROWTH 5 DAYS       CULTURE, BLOOD, PAIRED [[id number]] Collected:  08/19/20 1347    Order Status:  Completed Specimen:  Blood Updated:  08/24/20 0528     Special Requests: NO SPECIAL REQUESTS        Culture result: NO GROWTH 5 DAYS       CULTURE, RESPIRATORY/SPUTUM/BRONCH Revonda Real STAIN [[id number]]  (Abnormal) Collected:  08/19/20 1106    Order Status:  Completed Specimen:  Sputum from Tracheal Aspirate Updated:  08/21/20 1020     Special Requests: NO SPECIAL REQUESTS        GRAM STAIN       OCCASIONAL EPITHELIAL CELLS SEEN            1+ WBCS SEEN         NO ORGANISMS SEEN        Culture result:       LIGHT NORMAL RESPIRATORY KENNY                  FEW YEAST, (APPARENT CANDIDA ALBICANS)          CULTURE, BLOOD [[id number]] Collected:  08/19/20 1106    Order Status:  Canceled Specimen:  Blood           No results found for this or any previous visit (from the past 24 hour(s)). NOTIFY YOUR PHYSICIAN FOR ANY OF THE FOLLOWING:   Fever over 101 degrees for 24 hours. Chest pain, shortness of breath, fever, chills, nausea, vomiting, diarrhea, change in mentation, falling, weakness, bleeding. Severe pain or pain not relieved by medications. Or, any other signs or symptoms that you may have questions about. DISPOSITION:    Home With:   OT  PT  HH  RN      x Long term SNF/Inpatient Rehab    Independent/assisted living    Hospice    Other:       PATIENT CONDITION AT DISCHARGE:     Functional status    Poor    x Deconditioned     Independent      Cognition     Lucid     Forgetful     Dementia      Catheters/lines (plus indication)    [place]     PICC    x PEG     None      Code status   x  Full code     DNR      PHYSICAL EXAMINATION AT DISCHARGE:  Gen:  No distress, alert, anxious, on 2L NC,   HEENT:  Normal cephalic atraumatic, extra-occular movements are intact. Neck:  trach stoma covered with dressing ,Supple, No JVD  Lungs:  Clear bilaterally, no wheeze, no rales, normal effort  Heart:  Regular Rate and Rhythm, normal S1 and S2, no edema  Abdomen:  PEG in place, Soft, non tender, normal bowel sounds, no guarding.   Extremities:  Well perfused, no cyanosis or edema  Neurological:  Awake and alert, CN's are intact, normal strength throughout extremities, need a roller walker   Skin:  No rashes or moles  Mental Status:  Normal thought process, anxious       CHRONIC MEDICAL DIAGNOSES:  Problem List as of 10/12/2020 Date Reviewed: 8/20/2020          Codes Class Noted - Resolved    Tracheostomy dependent ([facility] ICD-10-CM: Z93.0  ICD-9-CM: V44.0  10/2/2020 - Present        Tachycardia ICD-10-CM: R00.0  ICD-9-CM: 785.0  10/2/2020 - Present        Hyponatremia ICD-10-CM: E87.1  ICD-9-CM: 276.1  10/2/2020 - Present        Acute respiratory failure ([facility] ICD-10-CM: J96.00  ICD-9-CM: 518.81  8/18/2020 - Present        Pneumothorax on left ICD-10-CM: J93.9  ICD-9-CM: 512.89  8/14/2020 - Present        * (Principal) Pneumonia due to severe acute respiratory syndrome coronavirus 2 (SARS-CoV-2) ICD-10-CM: E18.4, J12.89  ICD-9-CM: 480.8  8/8/2020 - Present        Respiratory failure with hypoxia [facility]) ICD-10-CM: J96.91  ICD-9-CM: 518.81  8/5/2020 - Present                Discussed with patient and family. Explained the importance of following up, Compliance with medications and recommendations on discharge,Side effect profile of medications were explained. Safety precautions at home and while taking pain medications also explained. All questions answered to the satisfaction of the patient/family. Discussed with consultant(s) who are agreeable to the discharge. Verbal and written instructions on discharge given. Explained about Discharge medications and side effect profile. Advised patient/family to followup with their pcp for medication refills and preauthorization of medications, Home health orders. checkups,screenign programs as appropriate for age. Thank you Other, MD Silas for taking care of your patient, Please call with any questions. Greater than 35 minutes were spent with the patient on counseling and coordination of care    Signed:   Mo Mendoza MD  10/12/2020  1:19 PM    Please note that this dictation was completed with Spontaneously, the computer voice recognition software. Quite often unanticipated grammatical, syntax, homophones, and other interpretive errors are inadvertently transcribed by the computer software. Please disregard these errors. Please excuse any errors that have escaped final proofreading. Thank you.

## 2020-10-12 NOTE — DISCHARGE INSTRUCTIONS
Smoking Cessation Program: This is a free, phone/text/email based, smoking cessation program. The program is individualized to meet each patient's needs. To enroll use the link - [facility]. [email or web address]/quit or text Denisa Rivera to [phone] from any smart phone. Discharge SNF/Rehab Instructions/LTAC       PATIENT ID: Raisa Khan  MRN: [id number]   YOB: 1990    DATE OF ADMISSION: 8/18/2020  9:33 AM    DATE OF DISCHARGE: 10/12/2020    PRIMARY CARE PROVIDER: Silas Akers MD       ATTENDING PHYSICIAN: Sumit Negron MD  DISCHARGING PROVIDER: Allie Lam MD     To contact this individual call [phone] and ask the  to page. If unavailable ask to be transferred the Adult Hospitalist Department. CONSULTATIONS: IP CONSULT TO INTENSIVIST  IP CONSULT TO THORACIC SURGERY  IP CONSULT TO NEUROLOGY  IP CONSULT TO GASTROENTEROLOGY  IP CONSULT TO INTERVENTIONAL RADIOLOGY  IP CONSULT TO NEUROLOGY  IP CONSULT TO PSYCHIATRY  IP CONSULT TO CARDIOLOGY  IP CONSULT TO ORTHOPEDIC SURGERY    PROCEDURES/SURGERIES: Procedure(s):  ESOPHAGOGASTRODUODENOSCOPY (EGD) @ bedside  ESOPHAGOGASTRODUODENAL (EGD) BIOPSY    ADMITTING [address] [place] [address] COURSE:     # Acute Hypoxic respiratory failure due to pneumonia -improved  # Bilateral pneumonia due to COVID-19 infection - resolved    - s/p tracheostomy 8/26/2020 and PEG 9/23/2020  - Trach decannulated on 10/09/2020  - Completed COVID-19 treatment- s/p convalescent plasma and remedsivir. - completed antibiotic course, Off abx        # Right sided weakness  # Debility   - MRI brain- Minimal if any change in areas of cortical and subcortical encephalomalacia  bilateral frontal and parietal lobe superiorly, again likely due to subacute to chronic infarction or hypoxic ischemic event. Small areas of hemosiderin staining along the vertex also raise possibility of vasculopathy. No new infarction. Chronic right cerebellar hemorrhagic infarction, unchanged. 3. 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  -Neurology team has seen patient this admission put him on aspirin.     # Anxiety : per psyh   - Decrease xanax to 0.5mg bid x3 days, then 0.5mg daily x3 days then 0.25mg daily x2 days then dc. - Zoloft 25mg po daily x 2 doses then increased to 50mg. - Atarax 50mg prn.  - Follow up with [facility] after discharge      # Tachycardia: multifactorial with pneumonia and ongoing medical issues, anxiolytics help to some degree. Heart rate better now. - No PE on CT chest on 10/2/2020.     # History of tobacco use     # Dysphagia:  - He is on regular diet  - PEG still in place           Peewee Henry / PLAN:      # Acute Hypoxic respiratory failure due to pneumonia -improved  # Bilateral pneumonia due to COVID-19 infection - resolved       # Right sided weakness  # Debility   - continue with Aspirin   - Rehab at [facility]      # Anxiety : per psyh   - Decrease xanax to 0.5mg bid x3 days, then 0.5mg daily x3 days then 0.25mg daily x2 days then discontinue. - Zoloft 50mg   - Atarax 50mg prn.  - Follow up with [facility] after discharge      # Tachycardia: multifactorial with pneumonia and ongoing medical issues, anxiolytics help to some degree. Heart rate better now.    - No PE on CT chest on 10/2/2020.     # History of tobacco use     # Dysphagia:  - He is on regular diet  - PEG still in place                PENDING TEST RESULTS:   At the time of discharge the following test results are still pending: None     FOLLOW UP APPOINTMENTS:    Follow-up Information     Follow up With Specialties Details Why Contact Info    Jose Branard MD Radiology Call Regarding your PEG tube  [facility]  [address]hemy Pe[address] [phone]      Primary care   Schedule an appointment as soon as possible for a visit in 2 weeks Please follow with your primary care once you complete Rehab [facility]  Schedule an appointment as soon as possible for a visit in 2 weeks Follow up  [address] Arianna Mendez  Phone [phone]    [facility]  Go [facility] Rehab             ADDITIONAL CARE RECOMMENDATIONS:     DIET: Regular diet     TUBE FEEDING INSTRUCTIONS: PEG in place but being used currently     OXYGEN / BiPAP SETTINGS: 2L NC    ACTIVITY: As tolerated     WOUND CARE: Trach stoma cover     EQUIPMENT needed: None       DISCHARGE MEDICATIONS:   See Medication Reconciliation Form      NOTIFY YOUR PHYSICIAN FOR ANY OF THE FOLLOWING:   Fever over 101 degrees for 24 hours. Chest pain, shortness of breath, fever, chills, nausea, vomiting, diarrhea, change in mentation, falling, weakness, bleeding. Severe pain or pain not relieved by medications. Or, any other signs or symptoms that you may have questions about.     DISPOSITION:    Home With:   OT  PT  HH  RN      x SNF/Inpatient Rehab/LTAC    Independent/assisted living    Hospice    Other:       PATIENT CONDITION AT DISCHARGE:     Functional status    Poor    x Deconditioned     Independent      Cognition     Lucid     Forgetful     Dementia      Catheters/lines (plus indication)    Mcwilliams     PICC    x PEG     None      Code status    x Full code     DNR      PHYSICAL EXAMINATION AT DISCHARGE:   Refer to Progress Note***      CHRONIC MEDICAL DIAGNOSES:  Problem List as of 10/12/2020 Date Reviewed: 8/20/2020          Codes Class Noted - Resolved    Tracheostomy dependent ([facility] ICD-10-CM: Z93.0  ICD-9-CM: V44.0  10/2/2020 - Present        Tachycardia ICD-10-CM: R00.0  ICD-9-CM: 785.0  10/2/2020 - Present        Hyponatremia ICD-10-CM: E87.1  ICD-9-CM: 276.1  10/2/2020 - Present        Acute respiratory failure ([facility] ICD-10-CM: J96.00  ICD-9-CM: 518.81  8/18/2020 - Present        Pneumothorax on left ICD-10-CM: J93.9  ICD-9-CM: 512.89  8/14/2020 - Present        * (Principal) Pneumonia due to severe acute respiratory syndrome coronavirus 2 (SARS-CoV-2) ICD-10-CM: U07.1, J12.89  ICD-9-CM: 480.8  8/8/2020 - Present        Respiratory failure with hypoxia ([address] ICD-10-CM: J96.91  ICD-9-CM: 518.81  8/5/2020 - Present                CDMP Checked:   Yes ***     PROBLEM LIST Updated:  Yes ***         Signed:   Joshua Mcintyre MD  10/12/2020  1:09 PM

## 2020-10-12 NOTE — PROGRESS NOTES
Transition of Care Plan:    RUR:  31%  LOS:  55 Days    1223  * Patient approved at [facility] for admission today  * EMTALA Form to be completed   * Dr. Jing Phelps as accepting physician  * Room # 2026  * Nurse to call [phone] to give report. * [facility]  at [facility] both patient and patient's mother, Lisette Hood, who are in agreement with plan to transition to [facility] today. Contacted Attending via [address] [place] [address] to advise of D/C plan for today. Reviewed with Dora Ramirez, bedside RN of transfer and information above for EMTALA, calling report. [phone] with [facility] discharge today  * Pending Insurance approval  * Ambulance transport will be needed. Contacted Yuri Rich [phone], this AM and left VM regarding possible acceptance of patient today. May need additional PT/OT notes today for auth as patient not seen by rehab services over the weekend. Awaiting return call to coordinate plan for transition.     Jossie Zuleta, ERIC, CCM, ACM-SW  Complex CM Coordinator/[facility]  [phone]

## 2020-10-12 NOTE — PROGRESS NOTES
Faculty or Preceptor Review of Student Work    10/12/2020  - Shift times - 0700  to 1300    The student documentation of patient care for [employer] has been reviewed and approved. All medications have been administered under the direct supervision of the faculty or preceptor.     Amadou Miranda RN

## 2020-10-12 NOTE — PROGRESS NOTES
Problem: Pressure Injury - Risk of  Goal: *Prevention of pressure injury  Description: Document Enrique Scale and appropriate interventions in the flowsheet. Outcome: Progressing Towards Goal  Note: Pressure Injury Interventions:  Sensory Interventions: Keep linens dry and wrinkle-free, Float heels, Maintain/enhance activity level    Moisture Interventions: Absorbent underpads, Minimize layers    Activity Interventions: Increase time out of bed, Pressure redistribution bed/mattress(bed type), PT/OT evaluation    Mobility Interventions: HOB 30 degrees or less, Pressure redistribution bed/mattress (bed type)    Nutrition Interventions: Document food/fluid/supplement intake    Friction and Shear Interventions: HOB 30 degrees or less, Minimize layers, Apply protective barrier, creams and emollients                Problem: Falls - Risk of  Goal: *Absence of Falls  Description: Document Adama Fall Risk and appropriate interventions in the flowsheet.   Outcome: Progressing Towards Goal  Note: Fall Risk Interventions:  Mobility Interventions: Communicate number of staff needed for ambulation/transfer, Patient to call before getting OOB, Utilize walker, cane, or other assistive device    Mentation Interventions: Door open when patient unattended, More frequent rounding    Medication Interventions: Patient to call before getting OOB, Teach patient to arise slowly    Elimination Interventions: Call light in reach, Toileting schedule/hourly rounds, Patient to call for help with toileting needs    History of Falls Interventions: Consult care management for discharge planning, Door open when patient unattended, Investigate reason for fall, Room close to nurse's station, Utilize gait belt for transfer/ambulation         Problem: Nutrition Deficit  Goal: *Optimize nutritional status  Outcome: Progressing Towards Goal     Problem: Breathing Pattern - Ineffective  Goal: *Absence of hypoxia  Outcome: Progressing Towards Goal  Goal: *Use of effective breathing techniques  Outcome: Progressing Towards Goal     Problem: Patient Education: Go to Patient Education Activity  Goal: Patient/Family Education  Outcome: Resolved/Met

## 2020-11-16 ENCOUNTER — OFFICE VISIT (OUTPATIENT)
Dept: CARDIOLOGY CLINIC | Age: 30
End: 2020-11-16
Payer: COMMERCIAL

## 2020-11-16 ENCOUNTER — TRANSCRIBE ORDER (OUTPATIENT)
Dept: SCHEDULING | Age: 30
End: 2020-11-16

## 2020-11-16 VITALS
HEIGHT: 72 IN | WEIGHT: 187.6 LBS | OXYGEN SATURATION: 96 % | SYSTOLIC BLOOD PRESSURE: 118 MMHG | DIASTOLIC BLOOD PRESSURE: 80 MMHG | HEART RATE: 78 BPM | BODY MASS INDEX: 25.41 KG/M2

## 2020-11-16 DIAGNOSIS — J12.82 PNEUMONIA DUE TO 2019-NCOV: Primary | ICD-10-CM

## 2020-11-16 DIAGNOSIS — R00.0 TACHYCARDIA: Primary | ICD-10-CM

## 2020-11-16 DIAGNOSIS — U07.1 PNEUMONIA DUE TO 2019-NCOV: Primary | ICD-10-CM

## 2020-11-16 PROCEDURE — 99213 OFFICE O/P EST LOW 20 MIN: CPT | Performed by: INTERNAL MEDICINE

## 2020-11-16 RX ORDER — FLUTICASONE FUROATE AND VILANTEROL TRIFENATATE 100; 25 UG/1; UG/1
POWDER RESPIRATORY (INHALATION)
COMMUNITY
Start: 2020-11-13 | End: 2020-11-16

## 2020-11-16 RX ORDER — HYDROXYZINE 50 MG/1
TABLET, FILM COATED ORAL
COMMUNITY
Start: 2020-10-28

## 2020-11-16 RX ORDER — METOPROLOL TARTRATE 100 MG/1
TABLET ORAL 2 TIMES DAILY
COMMUNITY
End: 2020-11-16 | Stop reason: SDUPTHER

## 2020-11-16 RX ORDER — ASPIRIN 81 MG/1
TABLET ORAL DAILY
COMMUNITY

## 2020-11-16 RX ORDER — PANTOPRAZOLE SODIUM 40 MG/1
TABLET, DELAYED RELEASE ORAL
COMMUNITY
Start: 2020-11-09 | End: 2021-03-12 | Stop reason: SDUPTHER

## 2020-11-16 RX ORDER — TRAZODONE HYDROCHLORIDE 50 MG/1
TABLET ORAL
COMMUNITY
Start: 2020-10-28

## 2020-11-16 RX ORDER — METOPROLOL TARTRATE 100 MG/1
50 TABLET ORAL 2 TIMES DAILY
Qty: 90 TAB | Refills: 1 | Status: SHIPPED | OUTPATIENT
Start: 2020-11-16 | End: 2021-05-14

## 2020-11-16 RX ORDER — BUDESONIDE AND FORMOTEROL FUMARATE DIHYDRATE 160; 4.5 UG/1; UG/1
2 AEROSOL RESPIRATORY (INHALATION) AS NEEDED
COMMUNITY
Start: 2020-11-04

## 2020-11-16 NOTE — PROGRESS NOTES
Office Follow-up    NAME: Julio Mojica   :  1990  MRM:  [id number]    Date:  2020            Assessment:     Problem List  Date Reviewed: 2020          Codes Class Noted    Tracheostomy dependent ([facility] ICD-10-CM: Z93.0  ICD-9-CM: V44.0  10/2/2020        Tachycardia ICD-10-CM: R00.0  ICD-9-CM: 785.0  10/2/2020        Hyponatremia ICD-10-CM: E87.1  ICD-9-CM: 276.1  10/2/2020        Acute respiratory failure ([facility] ICD-10-CM: J96.00  ICD-9-CM: 518.81  2020        Pneumothorax on left ICD-10-CM: J93.9  ICD-9-CM: 512.89  2020        Pneumonia due to severe acute respiratory syndrome coronavirus 2 (SARS-CoV-2) ICD-10-CM: U07.1, J12.89  ICD-9-CM: 480.8  2020        Respiratory failure with hypoxia ([facility] ICD-10-CM: J96.91  ICD-9-CM: 518.81  2020                 Plan:     1. Tachycardia during Covid sepsis: Currently normal rate. We will start decreasing the metoprolol and ultimate goal of discontinuation in long-term. Reduce the dosage of metoprolol to 50 mg. Twice daily. 2. Bradycardia along with sinus pause was thought to be due to increased vagal tone. No need for pacemaker. 3. Covid sepsis: His cardiac function on 2020 was normal.  4. COVID encephalopathy/ TIA: ASA. 5. See Dr. Artem Umanzor in 3 months. ATTENTION:   This medical record was transcribed using an electronic medical records/speech recognition system. Although proofread, it may and can contain electronic, spelling and other errors. Corrections may be executed at a later time. Please feel free to contact us for any clarifications as needed. Subjective:     Julio Mojica, a 27y.o. year-old who presents for followup.   He was Covid positive on 2020 and was admitted with respiratory failure requiring BiPAP and further requiring ventilator status post tracheostomy and had a prolonged course in the hospital.  While at [facility] he had an episode of significant bradycardia and cardiac pauses thought to be due to increased vagal tone. Dr. Rhonda Jimenez as well as Dr. Kai Garrido had seen him and there was no need for a pacemaker. He also had episodes of tachycardia. He was started on metoprolol and was discharged on metoprolol. He spent quite many days and [facility]. He is now slowly getting back to his normal routine. Currently has no symptoms of chest pain, shortness of breath, palpitations, lightheadedness or dizziness. Exam:     Physical Exam:  Visit Vitals  /80 (BP 1 Location: Right arm, BP Patient Position: Sitting)   Pulse 78   Ht 6' (1.829 m)   Wt 187 lb 9.6 oz (85.1 kg)   SpO2 96%   BMI 25.44 kg/m²     General appearance - alert, well appearing, and in no distress  Mental status - affect appropriate to mood  Eyes - sclera anicteric, moist mucous membranes  Neck - supple, no significant adenopathy  Chest - clear to auscultation, no wheezes, rales or rhonchi  Heart - normal rate, regular rhythm, normal S1, S2, no murmurs, rubs, clicks or gallops  Abdomen - soft, nontender, nondistended, no masses or organomegaly  Extremities - peripheral pulses normal, no pedal edema  Skin - normal coloration  no rashes    Medications:     Current Outpatient Medications   Medication Sig    Symbicort 160-4.5 mcg/actuation HFAA INL 2 PFS PO BID. RINSE MOUTH AFTER USE    pantoprazole (PROTONIX) 40 mg tablet TK 1 T PO D    traZODone (DESYREL) 50 mg tablet TK 1 T PO HS    metoprolol tartrate (LOPRESSOR) 100 mg IR tablet Take  by mouth two (2) times a day.  aspirin delayed-release 81 mg tablet Take  by mouth daily.  hydrOXYzine HCL (ATARAX) 50 mg tablet TK 1 T PO QID PRA     No current facility-administered medications for this visit. Diagnostic Data Review:       No specialty comments available.       Lab Review:     Lab Results   Component Value Date/Time    Cholesterol, total 156 09/05/2020 06:06 PM    HDL Cholesterol 26 09/05/2020 06:06 PM LDL, calculated 92.4 09/05/2020 06:06 PM    Triglyceride 188 (H) 09/05/2020 06:06 PM    CHOL/HDL Ratio 6.0 (H) 09/05/2020 06:06 PM     Lab Results   Component Value Date/Time    Creatinine 0.56 (L) 10/10/2020 03:44 AM     Lab Results   Component Value Date/Time    BUN 11 10/10/2020 03:44 AM     Lab Results   Component Value Date/Time    Potassium 4.0 10/10/2020 03:44 AM     Lab Results   Component Value Date/Time    Hemoglobin A1c 5.4 09/05/2020 06:06 PM     Lab Results   Component Value Date/Time    HGB 12.3 10/10/2020 03:44 AM     Lab Results   Component Value Date/Time    PLATELET 825 27/81/2189 03:44 AM     No results for input(s): CPK, CKMB, TROIQ in the last 72 hours. No lab exists for component: CKQMB, CPKMB             ___________________________________________________    Tammy Cruz.  Kathrine Freeman MD, [facility]

## 2020-11-16 NOTE — PROGRESS NOTES
Chief Complaint   Patient presents with    New Patient     Patient was seen at [facility] & [address] [place] [address] for COVID Respiratory Failure     Visit Vitals  /80 (BP 1 Location: Right arm, BP Patient Position: Sitting)   Pulse 78   Ht 6' (1.829 m)   Wt 187 lb 9.6 oz (85.1 kg)   SpO2 96%   BMI 25.44 kg/m²         Chest pain denied  SOB - some with activity  Dizziness denied  Swelling/Edema - denied

## 2020-11-23 NOTE — PROGRESS NOTES
All orders entered per verbal orders by Dr. Jonn Harrington:      Reduce the dose of Metoprolol to 50mg twice daily. See Dr. Jonn Harrington in 3 months.

## 2020-12-28 ENCOUNTER — HOSPITAL ENCOUNTER (OUTPATIENT)
Dept: CT IMAGING | Age: 30
Discharge: HOME | End: 2020-12-28
Attending: NURSE PRACTITIONER
Payer: COMMERCIAL

## 2020-12-28 DIAGNOSIS — J12.82 PNEUMONIA DUE TO 2019-NCOV: ICD-10-CM

## 2020-12-28 DIAGNOSIS — U07.1 PNEUMONIA DUE TO 2019-NCOV: ICD-10-CM

## 2020-12-28 PROCEDURE — 71250 CT THORAX DX C-: CPT

## 2021-01-07 ENCOUNTER — TRANSCRIBE ORDER (OUTPATIENT)
Dept: SCHEDULING | Age: 31
End: 2021-01-07

## 2021-01-07 DIAGNOSIS — R93.89 ABNORMAL CHEST CT: Primary | ICD-10-CM

## 2021-01-29 ENCOUNTER — OFFICE VISIT (OUTPATIENT)
Dept: CARDIOLOGY CLINIC | Age: 31
End: 2021-01-29
Payer: COMMERCIAL

## 2021-01-29 VITALS
OXYGEN SATURATION: 98 % | DIASTOLIC BLOOD PRESSURE: 96 MMHG | HEART RATE: 79 BPM | SYSTOLIC BLOOD PRESSURE: 140 MMHG | WEIGHT: 220 LBS | BODY MASS INDEX: 29.8 KG/M2 | HEIGHT: 72 IN

## 2021-01-29 DIAGNOSIS — R00.0 TACHYCARDIA: Primary | ICD-10-CM

## 2021-01-29 PROCEDURE — 99213 OFFICE O/P EST LOW 20 MIN: CPT | Performed by: INTERNAL MEDICINE

## 2021-01-29 NOTE — PROGRESS NOTES
Catina Cruz is a 32 y.o. male    Visit Vitals  BP (!) 140/96 (BP 1 Location: Left upper arm, BP Patient Position: Sitting)   Pulse 79   Ht 6' (1.829 m)   Wt 220 lb (99.8 kg)   SpO2 98%   BMI 29.84 kg/m²       Chief Complaint   Patient presents with    Irregular Heart Beat     TACHY       Chest pain NO  SOB NO  Dizziness NO  Swelling NO  Recent hospital visit NO  Refills NO

## 2021-01-29 NOTE — PROGRESS NOTES
Office Follow-up    NAME: Tania Goldberg   :  1990  MRM:  [id number]    Date:  2021            Assessment:     Problem List  Date Reviewed: 2020          Codes Class Noted    Tracheostomy dependent ([facility] ICD-10-CM: Z93.0  ICD-9-CM: V44.0  10/2/2020        Tachycardia ICD-10-CM: R00.0  ICD-9-CM: 785.0  10/2/2020        Hyponatremia ICD-10-CM: E87.1  ICD-9-CM: 276.1  10/2/2020        Acute respiratory failure ([facility] ICD-10-CM: J96.00  ICD-9-CM: 518.81  2020        Pneumothorax on left ICD-10-CM: J93.9  ICD-9-CM: 512.89  2020        Pneumonia due to severe acute respiratory syndrome coronavirus 2 (SARS-CoV-2) ICD-10-CM: U07.1, J12.82  ICD-9-CM: 480.8  2020        Respiratory failure with hypoxia ([facility] ICD-10-CM: J96.91  ICD-9-CM: 518.81  2020                 Plan:     1. Tachycardia during Covid sepsis: Currently normal rate. Continue metoprolol and 50 mg p.o. daily. 2. Bradycardia along with sinus pause was thought to be due to increased vagal tone. No need for pacemaker. 3. Covid sepsis: His cardiac function on 2020 was normal.  4. COVID encephalopathy/ TIA: ASA. 5. BP at home has been normal.   6. See Dr. Amparo Atkins in 6 months. ATTENTION:   This medical record was transcribed using an electronic medical records/speech recognition system. Although proofread, it may and can contain electronic, spelling and other errors. Corrections may be executed at a later time. Please feel free to contact us for any clarifications as needed. Subjective:     Tania Goldberg, a 32y.o. year-old who presents for followup.   He was Covid positive on 2020 and was admitted with respiratory failure requiring BiPAP and further requiring ventilator status post tracheostomy and had a prolonged course in the hospital.  While at [facility] he had an episode of significant bradycardia and cardiac pauses thought to be due to increased vagal tone. Dr. Margareth Heart as well as Dr. Soumya Heath had seen him and there was no need for a pacemaker. He also had episodes of tachycardia. He was started on metoprolol and was discharged on metoprolol. He spent quite many days [facility]. He is now slowly getting back to his normal routine. Currently has no symptoms of chest pain, shortness of breath, palpitations, lightheadedness or dizziness. Exam:     Physical Exam:  Visit Vitals  BP (!) 140/96 (BP 1 Location: Left upper arm, BP Patient Position: Sitting)   Pulse 79   Ht 6' (1.829 m)   Wt 220 lb (99.8 kg)   SpO2 98%   BMI 29.84 kg/m²     General appearance - alert, well appearing, and in no distress  Mental status - affect appropriate to mood  Eyes - sclera anicteric, moist mucous membranes  Neck - supple, no significant adenopathy  Chest - clear to auscultation, no wheezes, rales or rhonchi  Heart - normal rate, regular rhythm, normal S1, S2, no murmurs, rubs, clicks or gallops  Abdomen - soft, nontender, nondistended, no masses or organomegaly  Extremities - peripheral pulses normal, no pedal edema  Skin - normal coloration  no rashes    Medications:     Current Outpatient Medications   Medication Sig    Symbicort 160-4.5 mcg/actuation HFAA INL 2 PFS PO BID. RINSE MOUTH AFTER USE    hydrOXYzine HCL (ATARAX) 50 mg tablet TK 1 T PO QID PRA    pantoprazole (PROTONIX) 40 mg tablet TK 1 T PO D    aspirin delayed-release 81 mg tablet Take  by mouth daily.  metoprolol tartrate (LOPRESSOR) 100 mg IR tablet Take 0.5 Tabs by mouth two (2) times a day.  traZODone (DESYREL) 50 mg tablet TK 1 T PO HS     No current facility-administered medications for this visit. Diagnostic Data Review:       No specialty comments available.       Lab Review:     Lab Results   Component Value Date/Time    Cholesterol, total 156 09/05/2020 06:06 PM    HDL Cholesterol 26 09/05/2020 06:06 PM    LDL, calculated 92.4 09/05/2020 06:06 PM    Triglyceride 188 (H) 09/05/2020 06:06 PM    CHOL/HDL Ratio 6.0 (H) 09/05/2020 06:06 PM     Lab Results   Component Value Date/Time    Creatinine 0.56 (L) 10/10/2020 03:44 AM     Lab Results   Component Value Date/Time    BUN 11 10/10/2020 03:44 AM     Lab Results   Component Value Date/Time    Potassium 4.0 10/10/2020 03:44 AM     Lab Results   Component Value Date/Time    Hemoglobin A1c 5.4 09/05/2020 06:06 PM     Lab Results   Component Value Date/Time    HGB 12.3 10/10/2020 03:44 AM     Lab Results   Component Value Date/Time    PLATELET 103 36/22/5597 03:44 AM     No results for input(s): CPK, CKMB, TROIQ in the last 72 hours. No lab exists for component: CKQMB, CPKMB             ___________________________________________________    Narvis Oleksandr.  Iván Parekh MD, [facility]

## 2021-03-12 ENCOUNTER — OFFICE VISIT (OUTPATIENT)
Dept: FAMILY MEDICINE CLINIC | Age: 31
End: 2021-03-12
Payer: COMMERCIAL

## 2021-03-12 ENCOUNTER — PATIENT MESSAGE (OUTPATIENT)
Dept: FAMILY MEDICINE CLINIC | Age: 31
End: 2021-03-12

## 2021-03-12 ENCOUNTER — TELEPHONE (OUTPATIENT)
Dept: FAMILY MEDICINE CLINIC | Age: 31
End: 2021-03-12

## 2021-03-12 VITALS
HEART RATE: 67 BPM | DIASTOLIC BLOOD PRESSURE: 77 MMHG | RESPIRATION RATE: 18 BRPM | BODY MASS INDEX: 30.34 KG/M2 | OXYGEN SATURATION: 96 % | HEIGHT: 72 IN | TEMPERATURE: 97.9 F | WEIGHT: 224 LBS | SYSTOLIC BLOOD PRESSURE: 112 MMHG

## 2021-03-12 DIAGNOSIS — Z86.73 HISTORY OF CVA (CEREBROVASCULAR ACCIDENT): Primary | ICD-10-CM

## 2021-03-12 DIAGNOSIS — G47.00 INSOMNIA, UNSPECIFIED TYPE: ICD-10-CM

## 2021-03-12 DIAGNOSIS — B35.6 TINEA CRURIS: ICD-10-CM

## 2021-03-12 DIAGNOSIS — U07.1 COVID-19: ICD-10-CM

## 2021-03-12 DIAGNOSIS — K21.9 GASTROESOPHAGEAL REFLUX DISEASE, UNSPECIFIED WHETHER ESOPHAGITIS PRESENT: ICD-10-CM

## 2021-03-12 PROCEDURE — 99204 OFFICE O/P NEW MOD 45 MIN: CPT | Performed by: FAMILY MEDICINE

## 2021-03-12 RX ORDER — KETOCONAZOLE 20 MG/G
CREAM TOPICAL DAILY
Qty: 30 G | Refills: 2 | Status: SHIPPED | OUTPATIENT
Start: 2021-03-12

## 2021-03-12 RX ORDER — PANTOPRAZOLE SODIUM 40 MG/1
TABLET, DELAYED RELEASE ORAL
Qty: 90 TAB | Refills: 4 | Status: SHIPPED | OUTPATIENT
Start: 2021-03-12 | End: 2022-05-17

## 2021-03-12 RX ORDER — CHOLECALCIFEROL (VITAMIN D3) 125 MCG
CAPSULE ORAL
COMMUNITY

## 2021-03-12 NOTE — PATIENT INSTRUCTIONS
A Healthy Lifestyle: Care Instructions  Your Care Instructions     A healthy lifestyle can help you feel good, stay at a healthy weight, and have plenty of energy for both work and play. A healthy lifestyle is something you can share with your whole family. A healthy lifestyle also can lower your risk for serious health problems, such as high blood pressure, heart disease, and diabetes. You can follow a few steps listed below to improve your health and the health of your family. Follow-up care is a key part of your treatment and safety. Be sure to make and go to all appointments, and call your doctor if you are having problems. It's also a good idea to know your test results and keep a list of the medicines you take. How can you care for yourself at home? · Do not eat too much sugar, fat, or fast foods. You can still have dessert and treats now and then. The goal is moderation. · Start small to improve your eating habits. Pay attention to portion sizes, drink less juice and soda pop, and eat more fruits and vegetables. ? Eat a healthy amount of food. A 3-ounce serving of meat, for example, is about the size of a deck of cards. Fill the rest of your plate with vegetables and whole grains. ? Limit the amount of soda and sports drinks you have every day. Drink more water when you are thirsty. ? Eat at least 5 servings of fruits and vegetables every day. It may seem like a lot, but it is not hard to reach this goal. A serving or helping is 1 piece of fruit, 1 cup of vegetables, or 2 cups of leafy, raw vegetables. Have an apple or some carrot sticks as an afternoon snack instead of a candy bar. Try to have fruits and/or vegetables at every meal.  · Make exercise part of your daily routine. You may want to start with simple activities, such as walking, bicycling, or slow swimming. Try to be active 30 to 60 minutes every day. You do not need to do all 30 to 60 minutes all at once.  For example, you can exercise 3 times a day for 10 or 20 minutes. Moderate exercise is safe for most people, but it is always a good idea to talk to your doctor before starting an exercise program.  · Keep moving. Cole Yuri the lawn, work in the garden, or [place]. Take the stairs instead of the elevator at work. · If you smoke, quit. People who smoke have an increased risk for heart attack, stroke, cancer, and other lung illnesses. Quitting is hard, but there are ways to boost your chance of quitting tobacco for good. ? Use nicotine gum, patches, or lozenges. ? Ask your doctor about stop-smoking programs and medicines. ? Keep trying. In addition to reducing your risk of diseases in the future, you will notice some benefits soon after you stop using tobacco. If you have shortness of breath or asthma symptoms, they will likely get better within a few weeks after you quit. · Limit how much alcohol you drink. Moderate amounts of alcohol (up to 2 drinks a day for men, 1 drink a day for women) are okay. But drinking too much can lead to liver problems, high blood pressure, and other health problems. Family health  If you have a family, there are many things you can do together to improve your health. · Eat meals together as a family as often as possible. · Eat healthy foods. This includes fruits, vegetables, lean meats and dairy, and whole grains. · Include your family in your fitness plan. Most people think of activities such as jogging or tennis as the way to fitness, but there are many ways you and your family can be more active. Anything that makes you breathe hard and gets your heart pumping is exercise. Here are some tips:  ? Walk to do errands or to take your child to school or the bus.  ? Go for a family bike ride after dinner instead of watching TV. Where can you learn more? Go to http://www.gray.com/  Enter E115 in the search box to learn more about \"A Healthy Lifestyle: Care Instructions. \"  Current as of: January 31, 2020               Content Version: 12.6  © 3594-5265 [employer]. Care instructions adapted under license by [employer] (which disclaims liability or warranty for this information). If you have questions about a medical condition or this instruction, always ask your healthcare professional. [address] any warranty or liability for your use of this information.

## 2021-03-12 NOTE — PROGRESS NOTES
Progress Note    he is a 32y.o. year old male who presents for evalution. Subjective:     Pt here for f/u from hospitalization for covid, he quite the complicated course including brain hemorrhage, and hypoxic brain injury as well. He went to rehab post hospital stay, did not need ECMO but there was the thought he would so was xfer to [facility] but did not. Pt has numbness on his L leg from mid thigh down and some in his buttocks. Has a small amount of sensation of L leg. Did require feeding tube and tracheostomy both are well healed. Chest tube well healed as well. Hd anxiety while admitted was being tx with xanax,vistaril and zoloft. Has not been taking anything since he was d/c. Has trazodone prn for insomnia. If he needs a refill of trazodone he can contact office for a refill. Pt with gerd stable on protonix daily. Will need PA. Pt failed otc, pepcid and prilosec and nexium. Has jock itch on R side. Right and right groin crease, itchy. Not using anything for this. Reviewed PmHx, RxHx, FmHx, SocHx, AllgHx and updated and dated in the chart. Review of Systems - negative except as listed above in the HPI    Objective:     Vitals:    03/12/21 1050   BP: 112/77   Pulse: 67   Resp: 18   Temp: 97.9 °F (36.6 °C)   TempSrc: Temporal   SpO2: 96%   Weight: 224 lb (101.6 kg)   Height: 6' (1.829 m)       Current Outpatient Medications   Medication Sig    cholecalciferol, vitamin D3, (Vitamin D3) 50 mcg (2,000 unit) tab Take  by mouth.  pantoprazole (PROTONIX) 40 mg tablet 1 tab PO qday    ketoconazole (NIZORAL) 2 % topical cream Apply  to affected area daily.  Symbicort 160-4.5 mcg/actuation HFAA INL 2 PFS PO BID. RINSE MOUTH AFTER USE    traZODone (DESYREL) 50 mg tablet TK 1 T PO HS    aspirin delayed-release 81 mg tablet Take  by mouth daily.  metoprolol tartrate (LOPRESSOR) 100 mg IR tablet Take 0.5 Tabs by mouth two (2) times a day.     hydrOXYzine HCL (ATARAX) 50 mg tablet TK 1 T PO QID PRA     No current facility-administered medications for this visit. Physical Examination: General appearance - alert, well appearing, and in no distress  Mental status - alert, oriented to person, place, and time  Chest - clear to auscultation, no wheezes, rales or rhonchi, symmetric air entry  Heart - normal rate, regular rhythm, normal S1, S2, no murmurs, rubs, clicks or gallops      Assessment/ Plan:   Diagnoses and all orders for this visit:    1. History of CVA (cerebrovascular accident)  Discussed it can take up to a year for any residual deficits to resolve but anything left after a year will likely be permanent. 2. COVID-19  Recommended he get Covid vaccine  3. Gastroesophageal reflux disease, unspecified whether esophagitis present  -     pantoprazole (PROTONIX) 40 mg tablet; 1 tab PO qday  Will need prior authorization. Failed OTC Pepcid, Prilosec, Nexium. Has been well controlled on pantoprazole 40 mg daily. 4. Insomnia, unspecified type  Trazodone as needed  5. Tinea cruris  -     ketoconazole (NIZORAL) 2 % topical cream; Apply  to affected area daily. Discussed preventative medicine with patient recommended he schedule a CPE with fasting labs in the future. Follow-up and Dispositions    · Return if symptoms worsen or fail to improve. I have discussed the diagnosis with the patient and the intended plan as seen in the above orders. The patient has received an after-visit summary and questions were answered concerning future plans. Pt conveyed understanding of plan.     Medication Side Effects and Warnings were discussed with patient    An electronic signature was used to authenticate this note  Ted Mcbride DO

## 2021-03-12 NOTE — PROGRESS NOTES
Chief Complaint   Patient presents with    New Patient   [address]     Patient presents in office today as a NP to establish care. Was admitted to the hospital from 2/3043-28/7080 from complications due to [place].   No concerns      Learning Assessment 3/12/2021   PRIMARY LEARNER Patient   PRIMARY LANGUAGE ENGLISH   LEARNER PREFERENCE PRIMARY LISTENING   ANSWERED BY self   RELATIONSHIP SELF

## 2021-03-15 NOTE — TELEPHONE ENCOUNTER
PA for pantoprazole 40 mg submitted to [employer] via cover my meds,awaiting response in 24-48 hrs.

## 2021-03-18 NOTE — TELEPHONE ENCOUNTER
PA for pantoprazole approved from 03/15/21 - 03/15/22, copy faxed to pharmacy & Copy placed in scan folder to be scanned to chart.

## 2021-04-26 NOTE — PROGRESS NOTES
Problem: Voice Impaired (Adult)  Goal: *Acute Goals and Plan of Care (Insert Text)  Description: Speech Therapy Goals  Initiated 9/23/2020    1. Patient will tolerate in-line PMV for 10 minute trials with RT/SLP/RN without adverse effects within 7 days. MET  2. Patient will utilize in-line PMV to work towards ventilator weaning within 7 days. Outcome: Progressing Towards Goal     SPEECH LANGUAGE PATHOLOGY TREATMENT  Patient: Michelle Mcgill (46 y.o. male)  Date: 10/1/2020  Diagnosis: Acute respiratory failure ([facility] [address] [J96.00]   <principal problem not specified>  Procedure(s) (LRB):  ESOPHAGOGASTRODUODENOSCOPY (EGD) @ bedside (N/A)  ESOPHAGOGASTRODUODENAL (EGD) BIOPSY (N/A) 9 Days Post-Op  Precautions:      ASSESSMENT:  Pt tolerating PMV for ~30 minutes without any changes in vital signs (stable O2 at 98, RR at 22) and no evidence of back pressure/breath stacking once PMV doffed. Pt also with good voicing, with only some mild weakness. Therefore, recommend pt utilize PMV during all waking hours when on TCT. PLAN:  Patient continues to benefit from skilled intervention to address the above impairments. Continue treatment per established plan of care. Speaking Valve Placement Recommendations:  SLP / RT / RN and patient/family    Recommended Speaking Valve Wearing Schedule:  [x]    As tolerated, all waking hours  Discharge Recommendations:  Inpatient Rehab     SUBJECTIVE:   Patient stated, \"My Mom's coming so I want to keep this on. \"    OBJECTIVE:   Treatment & Interventions:     Mental Status  Neurologic State: Alert  Orientation Level: Oriented X4  Cognition: Follows commands  Perception: Appears intact  Perseveration: No perseveration noted  Safety/Judgement: Awareness of environment     Airway Clearance  Suction: Trach  Suction Device: Suction catheter  Suction Catheter Size: 14 Fr  Sputum Method Obtained: Tracheal  Sputum Amount:  Moderate  Sputum Color/Odor: Clear  Sputum Consistency: Pt arrived to PACU bay  at 0834 for phase I recovery.   Thick    #6 Shiley cuffed trach  O2: 95-98  RR: 24-26        Oxygen Therapy  O2 Sat (%): 100 %  O2 Device: Tracheal collar  FIO2 (%): 50 %  Airway Clearance  Suction: Trach  Suction Device: Suction catheter  Suction Catheter Size: 14 Fr  Sputum Method Obtained: Tracheal  Sputum Amount: Moderate  Sputum Color/Odor: Clear  Sputum Consistency: Thick  Pain:  Pain Scale 1: Numeric (0 - 10)  Pain Intensity 1: 5  Pain Location 1: Neck    After treatment:   Call bell within reach and Nursing notified    COMMUNICATION/EDUCATION:     The patients plan of care was discussed with: Registered nurse, PT, OT, Radiology Technologist, Rehab Tech. Education was provided to patient, family, and staff regarding speaking valve placement, wearing schedule, safety precautions including cuff deflation and removal when sleeping, and care/cleaning guidelines.     BAYRON Rojas  Time Calculation: 15 mins

## 2021-05-03 ENCOUNTER — TRANSCRIBE ORDER (OUTPATIENT)
Dept: SCHEDULING | Age: 31
End: 2021-05-03

## 2021-05-03 DIAGNOSIS — J12.82 PNEUMONIA DUE TO COVID-19 VIRUS: Primary | ICD-10-CM

## 2021-05-03 DIAGNOSIS — U07.1 PNEUMONIA DUE TO COVID-19 VIRUS: Primary | ICD-10-CM

## 2021-05-14 RX ORDER — METOPROLOL TARTRATE 100 MG/1
TABLET ORAL
Qty: 90 TAB | Refills: 1 | OUTPATIENT
Start: 2021-05-14

## 2021-05-14 RX ORDER — METOPROLOL TARTRATE 50 MG/1
50 TABLET ORAL 2 TIMES DAILY
Qty: 180 TAB | Refills: 2 | Status: SHIPPED | OUTPATIENT
Start: 2021-05-14 | End: 2022-03-26 | Stop reason: SDUPTHER

## 2021-05-14 NOTE — TELEPHONE ENCOUNTER
Per VO of Dr. Rashmi Le: 1/29/2021    Future Appointments   Date Time Provider Arianna Mariana   5/15/2021  8:30 AM [facility]   7/30/2021  1:20 PM Nahed Hassan MD [facility]       Requested Prescriptions     Pending Prescriptions Disp Refills    metoprolol tartrate (LOPRESSOR) 50 mg tablet 180 Tab 2     Sig: Take 1 Tab by mouth two (2) times a day.      Refused Prescriptions Disp Refills    metoprolol tartrate (LOPRESSOR) 100 mg IR tablet [Pharmacy Med Name: METOPROLOL TARTRATE 100MG TABLETS] 90 Tab 1     Sig: TAKE 1/2 TABLET BY MOUTH TWICE DAILY

## 2021-05-15 ENCOUNTER — HOSPITAL ENCOUNTER (OUTPATIENT)
Dept: CT IMAGING | Age: 31
Discharge: HOME | End: 2021-05-15
Attending: INTERNAL MEDICINE
Payer: COMMERCIAL

## 2021-05-15 DIAGNOSIS — U07.1 PNEUMONIA DUE TO COVID-19 VIRUS: ICD-10-CM

## 2021-05-15 DIAGNOSIS — J12.82 PNEUMONIA DUE TO COVID-19 VIRUS: ICD-10-CM

## 2021-05-15 PROCEDURE — 71250 CT THORAX DX C-: CPT

## 2021-06-16 ENCOUNTER — OFFICE VISIT (OUTPATIENT)
Dept: FAMILY MEDICINE CLINIC | Age: 31
End: 2021-06-16
Payer: COMMERCIAL

## 2021-06-16 VITALS
RESPIRATION RATE: 16 BRPM | WEIGHT: 230.6 LBS | DIASTOLIC BLOOD PRESSURE: 76 MMHG | TEMPERATURE: 98.3 F | HEART RATE: 81 BPM | OXYGEN SATURATION: 97 % | SYSTOLIC BLOOD PRESSURE: 108 MMHG | BODY MASS INDEX: 30.56 KG/M2 | HEIGHT: 73 IN

## 2021-06-16 DIAGNOSIS — R19.7 DIARRHEA OF PRESUMED INFECTIOUS ORIGIN: Primary | ICD-10-CM

## 2021-06-16 DIAGNOSIS — Z86.16 PERSONAL HISTORY OF COVID-19: ICD-10-CM

## 2021-06-16 DIAGNOSIS — R53.1 RIGHT SIDED WEAKNESS: ICD-10-CM

## 2021-06-16 DIAGNOSIS — I61.4 RIGHT-SIDED NONTRAUMATIC INTRACEREBRAL HEMORRHAGE OF CEREBELLUM (HCC): ICD-10-CM

## 2021-06-16 PROCEDURE — 99214 OFFICE O/P EST MOD 30 MIN: CPT | Performed by: NURSE PRACTITIONER

## 2021-06-16 RX ORDER — DIPHENOXYLATE HYDROCHLORIDE AND ATROPINE SULFATE 2.5; .025 MG/1; MG/1
1 TABLET ORAL
Qty: 30 TABLET | Refills: 0 | Status: SHIPPED | OUTPATIENT
Start: 2021-06-16

## 2021-06-16 NOTE — PATIENT INSTRUCTIONS
Oral Rehydration: Care Instructions  Your Care Instructions     Dehydration occurs when your body loses too much water. This can happen if you do not drink enough fluids or lose a lot of fluid due to diarrhea, vomiting, or sweating. Being dehydrated can cause health problems and can even be life-threatening. To replace lost fluids, you need to drink liquid that contains special chemicals called electrolytes. Electrolytes keep your body working well. Plain water does not have electrolytes. You also need to rest to prevent more fluid loss. Replacing water and electrolytes (oral rehydration) completely takes about 36 hours. But you should feel better within a few hours. Follow-up care is a key part of your treatment and safety. Be sure to make and go to all appointments, and call your doctor if you are having problems. It's also a good idea to know your test results and keep a list of the medicines you take. How can you care for yourself at home? · Take frequent sips of a drink such as Gatorade, Powerade, or other rehydration drinks that your doctor suggests. These replace both fluid and important chemicals (electrolytes) you need for balance in your blood. · Drink 2 quarts of cool liquid over 2 to 4 hours. You should have at least 10 glasses of liquid a day to replace lost fluid. If you have kidney, heart, or liver disease and have to limit fluids, talk with your doctor before you increase the amount of fluids you drink. · Make your own drink. Measure everything carefully. The drink may not work well or may even be harmful if the amounts are off. ? 1 quart water  ? ½ teaspoon salt  ? 6 teaspoons sugar  · Do not drink liquid with caffeine, such as coffee and bhaskar. · Do not drink any alcohol. It can make you dehydrated. · Drink plenty of fluids. If you have kidney, heart, or liver disease and have to limit fluids, talk with your doctor before you increase the amount of fluids you drink.   When should you call for help? Call 911 anytime you think you may need emergency care. For example, call if:    · You have signs of severe dehydration, such as:  ? You are confused or unable to stay awake.  ? You passed out (lost consciousness). Call your doctor now or seek immediate medical care if:    · You still have signs of dehydration. You have sunken eyes, a dry mouth, and pass only a little urine.     · You are dizzy or lightheaded, or you feel like you may faint.     · You are not able to keep down fluids. Watch closely for changes in your health, and be sure to contact your doctor if:    · You do not get better as expected. Where can you learn more? Go to http://www.gray.com/  Enter I040 in the search box to learn more about \"Oral Rehydration: Care Instructions. \"  Current as of: February 26, 2020               Content Version: 12.8  © 2006-2021 [employer]. Care instructions adapted under license by [employer] (which disclaims liability or warranty for this information). If you have questions about a medical condition or this instruction, always ask your healthcare professional. [place] [address] any warranty or liability for your use of this information. Diarrhea: Care Instructions  Your Care Instructions     Diarrhea is loose, watery stools (bowel movements). The exact cause is often hard to find. Sometimes diarrhea is your body's way of getting rid of what caused an upset stomach. Viruses, food poisoning, and many medicines can cause diarrhea. Some people get diarrhea in response to emotional stress, anxiety, or certain foods. Almost everyone has diarrhea now and then. It usually isn't serious, and your stools will return to normal soon. The important thing to do is replace the fluids you have lost, so you can prevent dehydration. The doctor has checked you carefully, but problems can develop later.  If you notice any problems or new symptoms, get medical treatment right away. Follow-up care is a key part of your treatment and safety. Be sure to make and go to all appointments, and call your doctor if you are having problems. It's also a good idea to know your test results and keep a list of the medicines you take. How can you care for yourself at home? · Watch for signs of dehydration, which means your body has lost too much water. Dehydration is a serious condition and should be treated right away. Signs of dehydration are:  ? Increasing thirst and dry eyes and mouth. ? Feeling faint or lightheaded. ? A smaller amount of urine than normal.  · To prevent dehydration, drink plenty of fluids. Choose water and other caffeine-free clear liquids until you feel better. If you have kidney, heart, or liver disease and have to limit fluids, talk with your doctor before you increase the amount of fluids you drink. · Begin eating small amounts of mild foods the next day, if you feel like it. ? Try yogurt that has live cultures of Lactobacillus. (Check the label.)  ? Avoid spicy foods, fruits, alcohol, and caffeine until 48 hours after all symptoms are gone. ? Avoid chewing gum that contains sorbitol. ? Avoid dairy products (except for yogurt with Lactobacillus) while you have diarrhea and for 3 days after symptoms are gone. · The doctor may recommend that you take over-the-counter medicine, such as loperamide (Imodium), if you still have diarrhea after 6 hours. Read and follow all instructions on the label. Do not use this medicine if you have bloody diarrhea, a high fever, or other signs of serious illness. Call your doctor if you think you are having a problem with your medicine. When should you call for help? Call 911 anytime you think you may need emergency care. For example, call if:    · You passed out (lost consciousness).     · Your stools are maroon or very bloody.    Call your doctor now or seek immediate medical care if:    · You are dizzy or lightheaded, or you feel like you may faint.     · Your stools are black and look like tar, or they have streaks of blood.     · You have new or worse belly pain.     · You have symptoms of dehydration, such as:  ? Dry eyes and a dry mouth. ? Passing only a little urine. ? Feeling thirstier than usual.     · You have a new or higher fever. Watch closely for changes in your health, and be sure to contact your doctor if:    · Your diarrhea is getting worse.     · You see pus in the diarrhea.     · You are not getting better after 2 days (48 hours). Where can you learn more? Go to http://www.gray.com/  Enter [id number] in the search box to learn more about \"Diarrhea: Care Instructions. \"  Current as of: February 26, 2020               Content Version: 12.8  © 8792-9620 [employer]. Care instructions adapted under license by [employer] (which disclaims liability or warranty for this information). If you have questions about a medical condition or this instruction, always ask your healthcare professional. [address] any warranty or liability for your use of this information.

## 2021-06-16 NOTE — PROGRESS NOTES
HISTORY OF PRESENT ILLNESS  Zheng Juarez is a 32 y.o. male. Patient presents with:  Diarrhea: X Monday. Pt stated that he took imodium AD but it's not helping. C/o 48 hour hx of diarrhea, reports mild upset stomach and loose stool on Monday, following by multiple episodes of diarrhea Monday night and yesterday. Estimated 6-8 BM yesterday, 4 BM today. + diffuse lower abdominal pain. No fever or chills. No blood or mucus noted in stool. Feeling fatigued but no lightheadedness or dizziness. immodium OTC has not helped the symptoms. Symptoms are aggravated by eating. Good fluid intake. Missed work yesterday and today due to the symptoms. Patient had extended stay in hospital and rehab 6 months ago due to covid19 with severe pneumonia (intubated) and Cerebellar hemorrhage. Notes persistent right-sided weakness, worse in right leg, with some difficulty walking. Review of Systems   Constitutional: Positive for malaise/fatigue. Negative for chills, fever and weight loss. HENT: Negative. Eyes: Negative. Respiratory: Negative. Cardiovascular: Negative. Gastrointestinal: Positive for abdominal pain, constipation and diarrhea. Negative for blood in stool, heartburn, melena, nausea and vomiting. Genitourinary: Negative. Musculoskeletal: Negative. Skin: Negative. Neurological: Positive for focal weakness. Endo/Heme/Allergies: Negative. Psychiatric/Behavioral: Negative. Visit Vitals  /76 (BP 1 Location: Left upper arm, BP Patient Position: Sitting, BP Cuff Size: Adult)   Pulse 81   Temp 98.3 °F (36.8 °C) (Oral)   Resp 16   Ht 6' 1\" (1.854 m)   Wt 230 lb 9.6 oz (104.6 kg)   SpO2 97%   BMI 30.42 kg/m²       Physical Exam  Constitutional:       Appearance: Normal appearance. HENT:      Head: Normocephalic and atraumatic. Mouth/Throat:      Mouth: Mucous membranes are moist.      Pharynx: Oropharynx is clear. Eyes:      General: No scleral icterus. Conjunctiva/sclera: Conjunctivae normal.   Cardiovascular:      Rate and Rhythm: Normal rate and regular rhythm. Pulses: Normal pulses. Heart sounds: Normal heart sounds. No murmur heard. No friction rub. No gallop. Pulmonary:      Effort: Pulmonary effort is normal.      Breath sounds: Normal breath sounds. No wheezing, rhonchi or rales. Abdominal:      General: Bowel sounds are normal. There is no distension. Palpations: Abdomen is soft. There is no mass. Tenderness: There is no abdominal tenderness. There is no guarding. Hernia: No hernia is present. Musculoskeletal:         General: Normal range of motion. Skin:     General: Skin is warm and dry. Capillary Refill: Capillary refill takes less than 2 seconds. Coloration: Skin is not jaundiced. Neurological:      Mental Status: He is alert and oriented to person, place, and time. Motor: Weakness present. Comments: Right leg weakness, unchanged   Psychiatric:         Mood and Affect: Mood normal.         Behavior: Behavior normal.         Thought Content: Thought content normal.         Judgment: Judgment normal.         ASSESSMENT and PLAN  Diagnoses and all orders for this visit:    1. Diarrhea of presumed infectious origin  Viral gastroenteritis vs c diff or other infective process vs other  Check labs  Add lomotil  Add fiber supplement, probiotic  Will proceed with stool testing and/or imaging if symptoms persist  Rehydrate with pedialyte or other electrolyte solution  To ED if pain becomes severe or localized, or if develops fever  -     CBC WITH AUTOMATED DIFF; Future  -     METABOLIC PANEL, COMPREHENSIVE; Future  -     diphenoxylate-atropine (LOMOTIL) 2.5-0.025 mg per tablet; Take 1 Tablet by mouth four (4) times daily as needed for Diarrhea. Max Daily Amount: 4 Tablets. 2. 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  Right sided weakness  Unchanged   Continue secondary prevention    4. Personal history of COVID-19  Serious illness with lengthy hospitalization  Continues to recover, has returned to work full time    Follow-up and Dispositions    · Return in about 2 weeks (around 6/30/2021), or if symptoms worsen or fail to improve. I have discussed the diagnosis with the patient and the intended plan as seen in the above orders. The patient has received an after-visit summary and questions were answered concerning future plans. Patient conveyed understanding of the plan at the time of the visit.     Fabian Batista NP  6/16/2021

## 2021-06-16 NOTE — PROGRESS NOTES
El Kramer is a 32 y.o. male    Chief Complaint   Patient presents with    Diarrhea     X Monday. Pt stated that he took imodium AD but it's not helping. 1. Have you been to the ER, urgent care clinic since your last visit? Hospitalized since your last visit? No    2. Have you seen or consulted any other health care providers outside of the [address] [place] [address] since your last visit? Include any pap smears or colon screening.  No

## 2021-06-17 LAB
ALBUMIN SERPL-MCNC: 4.8 G/DL (ref 4–5)
ALBUMIN/GLOB SERPL: 2 {RATIO} (ref 1.2–2.2)
ALP SERPL-CCNC: 73 IU/L (ref 48–121)
ALT SERPL-CCNC: 15 IU/L (ref 0–44)
AST SERPL-CCNC: 15 IU/L (ref 0–40)
BASOPHILS # BLD AUTO: 0 X10E3/UL (ref 0–0.2)
BASOPHILS NFR BLD AUTO: 1 %
BILIRUB SERPL-MCNC: 0.7 MG/DL (ref 0–1.2)
BUN SERPL-MCNC: 11 MG/DL (ref 6–20)
BUN/CREAT SERPL: 11 (ref 9–20)
CALCIUM SERPL-MCNC: 9.3 MG/DL (ref 8.7–10.2)
CHLORIDE SERPL-SCNC: 104 MMOL/L (ref 96–106)
CO2 SERPL-SCNC: 21 MMOL/L (ref 20–29)
CREAT SERPL-MCNC: 0.99 MG/DL (ref 0.76–1.27)
EOSINOPHIL # BLD AUTO: 0.1 X10E3/UL (ref 0–0.4)
EOSINOPHIL NFR BLD AUTO: 1 %
ERYTHROCYTE [DISTWIDTH] IN BLOOD BY AUTOMATED COUNT: 14.4 % (ref 11.6–15.4)
GLOBULIN SER CALC-MCNC: 2.4 G/DL (ref 1.5–4.5)
GLUCOSE SERPL-MCNC: 102 MG/DL (ref 65–99)
HCT VFR BLD AUTO: 49.5 % (ref 37.5–51)
HGB BLD-MCNC: 16.9 G/DL (ref 13–17.7)
IMM GRANULOCYTES # BLD AUTO: 0 X10E3/UL (ref 0–0.1)
IMM GRANULOCYTES NFR BLD AUTO: 0 %
LYMPHOCYTES # BLD AUTO: 1.2 X10E3/UL (ref 0.7–3.1)
LYMPHOCYTES NFR BLD AUTO: 21 %
MCH RBC QN AUTO: 29.8 PG (ref 26.6–33)
MCHC RBC AUTO-ENTMCNC: 34.1 G/DL (ref 31.5–35.7)
MCV RBC AUTO: 87 FL (ref 79–97)
MONOCYTES # BLD AUTO: 0.9 X10E3/UL (ref 0.1–0.9)
MONOCYTES NFR BLD AUTO: 15 %
NEUTROPHILS # BLD AUTO: 3.6 X10E3/UL (ref 1.4–7)
NEUTROPHILS NFR BLD AUTO: 62 %
PLATELET # BLD AUTO: 193 X10E3/UL (ref 150–450)
POTASSIUM SERPL-SCNC: 4.2 MMOL/L (ref 3.5–5.2)
PROT SERPL-MCNC: 7.2 G/DL (ref 6–8.5)
RBC # BLD AUTO: 5.67 X10E6/UL (ref 4.14–5.8)
SODIUM SERPL-SCNC: 140 MMOL/L (ref 134–144)
WBC # BLD AUTO: 5.9 X10E3/UL (ref 3.4–10.8)

## 2021-06-25 NOTE — PROGRESS NOTES
Blood counts normal.  Electrolytes, liver and kidney function normal.   Please let us know if diarrhea is persisting

## 2021-07-30 ENCOUNTER — OFFICE VISIT (OUTPATIENT)
Dept: CARDIOLOGY CLINIC | Age: 31
End: 2021-07-30
Payer: COMMERCIAL

## 2021-07-30 VITALS
HEART RATE: 66 BPM | WEIGHT: 235 LBS | SYSTOLIC BLOOD PRESSURE: 120 MMHG | BODY MASS INDEX: 31.83 KG/M2 | HEIGHT: 72 IN | OXYGEN SATURATION: 97 % | DIASTOLIC BLOOD PRESSURE: 86 MMHG

## 2021-07-30 DIAGNOSIS — R00.0 TACHYCARDIA: Primary | ICD-10-CM

## 2021-07-30 DIAGNOSIS — R00.1 BRADYCARDIA: ICD-10-CM

## 2021-07-30 DIAGNOSIS — R20.0 LEG NUMBNESS: ICD-10-CM

## 2021-07-30 DIAGNOSIS — U07.1 COVID-19: ICD-10-CM

## 2021-07-30 PROCEDURE — 99213 OFFICE O/P EST LOW 20 MIN: CPT | Performed by: INTERNAL MEDICINE

## 2021-07-30 RX ORDER — CHOLECALCIFEROL (VITAMIN D3) 50 MCG
CAPSULE ORAL
COMMUNITY

## 2021-07-30 NOTE — PATIENT INSTRUCTIONS
See Dr. Damian Amezcua in 1 year.      Refer to Gordon Stubbs at [facility] at [employer]

## 2021-07-30 NOTE — PROGRESS NOTES
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 ([facility] ICD-10-CM: I61.4  ICD-9-CM: 653  2021        Right sided weakness ICD-10-CM: R53.1  ICD-9-CM: 728.87  2021        Personal history of COVID-19 ICD-10-CM: J15.09  ICD-9-CM: V12.09  2021        Tracheostomy dependent ([facility] ICD-10-CM: Z93.0  ICD-9-CM: V44.0  10/2/2020        Tachycardia ICD-10-CM: R00.0  ICD-9-CM: 785.0  10/2/2020        Hyponatremia ICD-10-CM: E87.1  ICD-9-CM: 276.1  10/2/2020        Acute respiratory failure ([facility] ICD-10-CM: J96.00  ICD-9-CM: 518.81  2020        Pneumothorax on left ICD-10-CM: J93.9  ICD-9-CM: 512.89  2020        Pneumonia due to severe acute respiratory syndrome coronavirus 2 (SARS-CoV-2) ICD-10-CM: U07.1, J12.82  ICD-9-CM: 480.8, 079.89  2020        Respiratory failure with hypoxia ([facility] ICD-10-CM: J96.91  ICD-9-CM: 518.81  2020                 Plan:     1. Tachycardia during Covid sepsis: Currently normal rate. Continue metoprolol and 50 mg p.o. twice daily. 2. Bradycardia along with sinus pause was thought to be due to increased vagal tone. No need for pacemaker. 3. Covid sepsis: His cardiac function on 2020 was normal.  4. COVID encephalopathy/ TIA: ASA. He has left leg numbness. Will refer to [address] at [address] PT.   5. BP at home has been normal.   6. See Dr. Braeden Parrish in 1 year. ATTENTION:   This medical record was transcribed using an electronic medical records/speech recognition system. Although proofread, it may and can contain electronic, spelling and other errors. Corrections may be executed at a later time. Please feel free to contact us for any clarifications as needed. Subjective:     He is returning today for followup.  He has had no symptoms of chest pain, sob. He has left leg numbness. ----  Elia Ng, a 32y.o. year-old who presents for followup. He was Covid positive on August 5, 2020 and was admitted with respiratory failure requiring BiPAP and further requiring ventilator status post tracheostomy and had a prolonged course in the hospital.  While at [facility] he had an episode of significant bradycardia and cardiac pauses thought to be due to increased vagal tone. Dr. Mikayla Dooley as well as Dr. Emory Merlin had seen him and there was no need for a pacemaker. He also had episodes of tachycardia. He was started on metoprolol and was discharged on metoprolol. He [facility]. He is now slowly getting back to his normal routine. Currently has no symptoms of chest pain, shortness of breath, palpitations, lightheadedness or dizziness. Exam:     Physical Exam:  Visit Vitals  /86 (BP 1 Location: Left upper arm, BP Patient Position: Sitting, BP Cuff Size: Adult)   Pulse 66   Ht 6' (1.829 m)   Wt 235 lb (106.6 kg)   SpO2 97%   BMI 31.87 kg/m²     General appearance - alert, well appearing, and in no distress  Mental status - affect appropriate to mood  Eyes - sclera anicteric, moist mucous membranes  Neck - supple, no significant adenopathy  Chest - clear to auscultation, no wheezes, rales or rhonchi      Medications:     Current Outpatient Medications   Medication Sig    B.infantis-B.ani-B.long-B.bifi (Probiotic 4X) 10-15 mg TbEC Take  by mouth. GUMMIES    FIBER CHOICE PO Take  by mouth.  metoprolol tartrate (LOPRESSOR) 50 mg tablet Take 1 Tab by mouth two (2) times a day.  cholecalciferol, vitamin D3, (Vitamin D3) 50 mcg (2,000 unit) tab Take  by mouth.  pantoprazole (PROTONIX) 40 mg tablet 1 tab PO qday    Symbicort 160-4.5 mcg/actuation HFAA 2 Puffs as needed.  aspirin delayed-release 81 mg tablet Take  by mouth daily.     diphenoxylate-atropine (LOMOTIL) 2.5-0.025 mg per tablet Take 1 Tablet by mouth four (4) times daily as needed for Diarrhea. Max Daily Amount: 4 Tablets. (Patient not taking: Reported on 7/30/2021)    ketoconazole (NIZORAL) 2 % topical cream Apply  to affected area daily. (Patient not taking: Reported on 7/30/2021)    hydrOXYzine HCL (ATARAX) 50 mg tablet TK 1 T PO QID PRA (Patient not taking: Reported on 7/30/2021)    traZODone (DESYREL) 50 mg tablet TK 1 T PO HS (Patient not taking: Reported on 7/30/2021)     No current facility-administered medications for this visit. Diagnostic Data Review:       No specialty comments available. Lab Review:     Lab Results   Component Value Date/Time    Cholesterol, total 156 09/05/2020 06:06 PM    HDL Cholesterol 26 09/05/2020 06:06 PM    LDL, calculated 92.4 09/05/2020 06:06 PM    Triglyceride 188 (H) 09/05/2020 06:06 PM    CHOL/HDL Ratio 6.0 (H) 09/05/2020 06:06 PM     Lab Results   Component Value Date/Time    Creatinine 0.99 06/16/2021 12:00 AM     Lab Results   Component Value Date/Time    BUN 11 06/16/2021 12:00 AM     Lab Results   Component Value Date/Time    Potassium 4.2 06/16/2021 12:00 AM     Lab Results   Component Value Date/Time    Hemoglobin A1c 5.4 09/05/2020 06:06 PM     Lab Results   Component Value Date/Time    HGB 16.9 06/16/2021 12:00 AM     Lab Results   Component Value Date/Time    PLATELET 718 32/80/1564 12:00 AM     No results for input(s): CPK, CKMB, TROIQ in the last 72 hours. No lab exists for component: CKQMB, CPKMB             ___________________________________________________    Severa Luke.  Damian Amezcua MD, [facility] [place]

## 2021-07-30 NOTE — PROGRESS NOTES
All orders entered per verbal orders of Dr. Wilkins Peers   See Dr. Cecille Shipley in 1 year. Refer to Tessie Doran at [facility] at [place]     Referral information given to the pt, and LOV notes faxed to Dr. Gay Grigsby.

## 2021-07-30 NOTE — PROGRESS NOTES
Cleopatra Sanchez is a 32 y.o. male    Visit Vitals  /86 (BP 1 Location: Left upper arm, BP Patient Position: Sitting, BP Cuff Size: Adult)   Pulse 66   Ht 6' (1.829 m)   Wt 235 lb (106.6 kg)   SpO2 97%   BMI 31.87 kg/m²       Chief Complaint   Patient presents with    Irregular Heart Beat     TACHYCARDIA       Chest pain NO  SOB YES  Dizziness NO  Swelling NO  Recent hospital visit NO  Refills NO

## 2021-08-24 ENCOUNTER — DOCUMENTATION ONLY (OUTPATIENT)
Dept: FAMILY MEDICINE CLINIC | Age: 31
End: 2021-08-24

## 2021-09-28 ENCOUNTER — TELEPHONE (OUTPATIENT)
Dept: FAMILY MEDICINE CLINIC | Age: 31
End: 2021-09-28

## 2021-09-28 NOTE — TELEPHONE ENCOUNTER
----- Message from Delbert Hua sent at 9/28/2021  5:37 PM EDT -----  Subject: Message to Provider    QUESTIONS  Information for Provider? Pt is requesting to speak with his pcp Hemant Link about taking the booster shot for the covid vaccine. No   preference on when he receives a call.  ---------------------------------------------------------------------------  --------------  CALL BACK INFO  What is the best way for the office to contact you? OK to leave message on   voicemail  Preferred Call Back Phone Number? [phone]  ---------------------------------------------------------------------------  --------------  SCRIPT ANSWERS  Relationship to Patient?  Self

## 2021-09-30 ENCOUNTER — TELEPHONE (OUTPATIENT)
Dept: FAMILY MEDICINE CLINIC | Age: 31
End: 2021-09-30

## 2021-09-30 NOTE — TELEPHONE ENCOUNTER
Given his stroke history would recommend he get it. He would not be due until December, 8 months after his 2nd.

## 2021-09-30 NOTE — TELEPHONE ENCOUNTER
Pt calling back states  on 9/28 about seeing if Dr Jerry Grossman knows anything about the booster vaccine and if pt's should get it. He can be reached at [phone] and if he does not answer you can l/m.

## 2021-10-01 NOTE — TELEPHONE ENCOUNTER
Called and spoke with patient. Advised that it is strongly recommended that he get the booster shot 8 months after the date of his second vaccine. Patient verbalized understanding.

## 2021-10-05 ENCOUNTER — OFFICE VISIT (OUTPATIENT)
Dept: FAMILY MEDICINE CLINIC | Age: 31
End: 2021-10-05
Payer: COMMERCIAL

## 2021-10-05 ENCOUNTER — DOCUMENTATION ONLY (OUTPATIENT)
Dept: FAMILY MEDICINE CLINIC | Age: 31
End: 2021-10-05

## 2021-10-05 DIAGNOSIS — Z23 NEEDS FLU SHOT: Primary | ICD-10-CM

## 2021-10-05 PROCEDURE — 90686 IIV4 VACC NO PRSV 0.5 ML IM: CPT | Performed by: FAMILY MEDICINE

## 2021-10-05 PROCEDURE — 90471 IMMUNIZATION ADMIN: CPT | Performed by: FAMILY MEDICINE

## 2021-10-05 NOTE — PROGRESS NOTES
Chief Complaint   Patient presents with    Immunization/Injection     Patient presents in office today for [place] only for flu shot. Pt / caregiver given opportunity to review vaccine information sheet prior to vaccine administration. Opportunity given for questions and concerns. No questions or concerns at this time.

## 2021-10-05 NOTE — PATIENT INSTRUCTIONS
Vaccine Information Statement    Influenza (Flu) Vaccine (Inactivated or Recombinant): What You Need to Know    Many vaccine information statements are available in Telugu and other languages. See www.immunize.org/vis. Hojas de información sobre vacunas están disponibles en español y en muchos otros idiomas. Visite www.immunize.org/vis. 1. Why get vaccinated? Influenza vaccine can prevent influenza (flu). Flu is a contagious disease that spreads around the [facility] every year, usually between October and May. Anyone can get the flu, but it is more dangerous for some people. Infants and young children, people 72 years and older, pregnant people, and people with certain health conditions or a weakened immune system are at greatest risk of flu complications. Pneumonia, bronchitis, sinus infections, and ear infections are examples of flu-related complications. If you have a medical condition, such as heart disease, cancer, or diabetes, flu can make it worse. Flu can cause fever and chills, sore throat, muscle aches, fatigue, cough, headache, and runny or stuffy nose. Some people may have vomiting and diarrhea, though this is more common in children than adults. In an average year, thousands of people in the [facility] die from flu, and many more are hospitalized. Flu vaccine prevents millions of illnesses and flu-related visits to the doctor each year. 2. Influenza vaccines     CDC recommends everyone 6 months and older get vaccinated every flu season. Children 6 months through 6years of age may need 2 doses during a single flu season. Everyone else needs only 1 dose each flu season. It takes about 2 weeks for protection to develop after vaccination. There are many flu viruses, and they are always changing. Each year a new flu vaccine is made to protect against the influenza viruses believed to be likely to cause disease in the upcoming flu season.  Even when the vaccine doesnt exactly match these viruses, it may still provide some protection. Influenza vaccine does not cause flu. Influenza vaccine may be given at the same time as other vaccines. 3. Talk with your health care provider    Tell your vaccination provider if the person getting the vaccine:   Has had an allergic reaction after a previous dose of influenza vaccine, or has any severe, life-threatening allergies    Has ever had Guillain-Barré Syndrome (also called GBS)    In some cases, your health care provider may decide to postpone influenza vaccination until a future visit. Influenza vaccine can be administered at any time during pregnancy. People who are or will be pregnant during influenza season should receive inactivated influenza vaccine. People with minor illnesses, such as a cold, may be vaccinated. People who are moderately or severely ill should usually wait until they recover before getting influenza vaccine. Your health care provider can give you more information. 4. Risks of a vaccine reaction     Soreness, redness, and swelling where the shot is given, fever, muscle aches, and headache can happen after influenza vaccination.  There may be a very small increased risk of Guillain-Barré Syndrome (GBS) after inactivated influenza vaccine (the flu shot). [facility] children who get the flu shot along with pneumococcal vaccine (PCV13) and/or DTaP vaccine at the same time might be slightly more likely to have a seizure caused by fever. Tell your health care provider if a child who is getting flu vaccine has ever had a seizure. People sometimes faint after medical procedures, including vaccination. Tell your provider if you feel dizzy or have vision changes or ringing in the ears. As with any medicine, there is a very remote chance of a vaccine causing a severe allergic reaction, other serious injury, or death. 5. What if there is a serious problem?     An allergic reaction could occur after the vaccinated person leaves the clinic. If you see signs of a severe allergic reaction (hives, swelling of the face and throat, difficulty breathing, a fast heartbeat, dizziness, or weakness), call 9-1-1 and get the person to the nearest hospital.    For other signs that concern you, call your health care provider. Adverse reactions should be reported to the Vaccine Adverse Event Reporting System (VAERS). Your health care provider will usually file this report, or you can do it yourself. Visit the VAERS website at www.vaers. [facility].gov or call 7-722.387.8391. VAERS is only for reporting reactions, and VAERS staff members do not give medical advice. 6. The National Vaccine Injury Compensation Program    The [facility] Vaccine Injury Compensation Program (VICP) is a federal program that was created to compensate people who may have been injured by certain vaccines. Claims regarding alleged injury or death due to vaccination have a time limit for filing, which may be as short as two years. Visit the VICP website at www.[facility].gov/vaccinecompensation or call 6-645.141.1594 to learn about the program and about filing a claim. 7. How can I learn more?  Ask your health care provider.  Call your local or state health department.  Visit the website of the Food and Drug Administration (FDA) for vaccine package inserts and additional information at www.fda.gov/vaccines-blood-biologics/vaccines.  Contact the Centers for Disease Control and Prevention (CDC):  - Call 9-440.274.8635 (1-800-CDC-INFO) or  - Visit CDCs influenza website at www.cdc.gov/flu. Vaccine Information Statement   Inactivated Influenza Vaccine   8/6/2021  42 U. Leisa Fees [id number]   Department of Health and Human Services  Centers for Disease Control and Prevention    Office Use Only

## 2021-10-07 ENCOUNTER — DOCUMENTATION ONLY (OUTPATIENT)
Dept: FAMILY MEDICINE CLINIC | Age: 31
End: 2021-10-07

## 2021-10-07 NOTE — PROGRESS NOTES
Certification Letter faxed to [address] [place] [address]. Fax number [phone] confirmation number [id number].

## 2022-03-18 PROBLEM — J96.91 RESPIRATORY FAILURE WITH HYPOXIA (HCC): Status: ACTIVE | Noted: 2020-08-05

## 2022-03-18 PROBLEM — Z86.16 PERSONAL HISTORY OF COVID-19: Status: ACTIVE | Noted: 2021-06-16

## 2022-03-18 PROBLEM — E87.1 HYPONATREMIA: Status: ACTIVE | Noted: 2020-10-02

## 2022-03-19 PROBLEM — Z93.0 TRACHEOSTOMY DEPENDENT (HCC): Status: ACTIVE | Noted: 2020-10-02

## 2022-03-19 PROBLEM — J93.9 PNEUMOTHORAX ON LEFT: Status: ACTIVE | Noted: 2020-08-14

## 2022-03-19 PROBLEM — J96.00 ACUTE RESPIRATORY FAILURE (HCC): Status: ACTIVE | Noted: 2020-08-18

## 2022-03-19 PROBLEM — R53.1 RIGHT SIDED WEAKNESS: Status: ACTIVE | Noted: 2021-06-16

## 2022-03-19 PROBLEM — R00.0 TACHYCARDIA: Status: ACTIVE | Noted: 2020-10-02

## 2022-03-19 PROBLEM — U07.1 PNEUMONIA DUE TO SEVERE ACUTE RESPIRATORY SYNDROME CORONAVIRUS 2 (SARS-COV-2): Status: ACTIVE | Noted: 2020-08-08

## 2022-03-19 PROBLEM — J12.82 PNEUMONIA DUE TO SEVERE ACUTE RESPIRATORY SYNDROME CORONAVIRUS 2 (SARS-COV-2): Status: ACTIVE | Noted: 2020-08-08

## 2022-03-19 PROBLEM — I61.4 RIGHT-SIDED NONTRAUMATIC INTRACEREBRAL HEMORRHAGE OF CEREBELLUM (HCC): Status: ACTIVE | Noted: 2021-06-16

## 2022-03-28 RX ORDER — METOPROLOL TARTRATE 50 MG/1
50 TABLET ORAL 2 TIMES DAILY
Qty: 180 TABLET | Refills: 1 | Status: SHIPPED | OUTPATIENT
Start: 2022-03-28 | End: 2022-09-30

## 2022-03-28 RX ORDER — METOPROLOL TARTRATE 50 MG/1
50 TABLET ORAL 2 TIMES DAILY
Qty: 180 TABLET | Refills: 2 | OUTPATIENT
Start: 2022-03-28

## 2022-03-28 NOTE — TELEPHONE ENCOUNTER
Refill per VO of Dr. Emi Flower:    Last appt: 7/30/2021    No future appointments. Requested Prescriptions     Pending Prescriptions Disp Refills    metoprolol tartrate (LOPRESSOR) 50 mg tablet 180 Tablet 2     Sig: Take 1 Tablet by mouth two (2) times a day.

## 2022-05-14 DIAGNOSIS — K21.9 GASTROESOPHAGEAL REFLUX DISEASE, UNSPECIFIED WHETHER ESOPHAGITIS PRESENT: ICD-10-CM

## 2022-05-17 RX ORDER — PANTOPRAZOLE SODIUM 40 MG/1
TABLET, DELAYED RELEASE ORAL
Qty: 90 TABLET | Refills: 3 | Status: SHIPPED | OUTPATIENT
Start: 2022-05-17

## 2022-08-14 ENCOUNTER — PATIENT MESSAGE (OUTPATIENT)
Dept: CARDIOLOGY CLINIC | Age: 32
End: 2022-08-14

## 2022-09-16 ENCOUNTER — OFFICE VISIT (OUTPATIENT)
Dept: CARDIOLOGY CLINIC | Age: 32
End: 2022-09-16
Payer: COMMERCIAL

## 2022-09-16 VITALS
WEIGHT: 260 LBS | DIASTOLIC BLOOD PRESSURE: 82 MMHG | OXYGEN SATURATION: 95 % | HEIGHT: 72 IN | HEART RATE: 65 BPM | BODY MASS INDEX: 35.21 KG/M2 | SYSTOLIC BLOOD PRESSURE: 132 MMHG

## 2022-09-16 DIAGNOSIS — R00.0 TACHYCARDIA: Primary | ICD-10-CM

## 2022-09-16 DIAGNOSIS — U07.1 COVID-19: ICD-10-CM

## 2022-09-16 DIAGNOSIS — I49.5 SICK SINUS SYNDROME (HCC): ICD-10-CM

## 2022-09-16 PROCEDURE — 99213 OFFICE O/P EST LOW 20 MIN: CPT | Performed by: INTERNAL MEDICINE

## 2022-09-16 PROCEDURE — 93000 ELECTROCARDIOGRAM COMPLETE: CPT | Performed by: INTERNAL MEDICINE

## 2022-09-16 NOTE — PROGRESS NOTES
Chief Complaint   Patient presents with    Follow-up     Annual, Tachycardia      Visit Vitals  /82 (BP 1 Location: Left upper arm, BP Patient Position: Sitting)   Pulse 65   Ht 6' (1.829 m)   Wt 260 lb (117.9 kg)   SpO2 95%   BMI 35.26 kg/m²     Chest pain denied   SOB - yes always. Feels tired all the time get worse going up and sown steps.    Palpitations denied   Swelling in hands/feet denied   Dizziness denied   Recent hospital stays denied   Refills denied

## 2022-09-16 NOTE — PROGRESS NOTES
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 ([facility] ICD-10-CM: I61.4  ICD-9-CM: 400  2021        Right sided weakness ICD-10-CM: R53.1  ICD-9-CM: 728.87  2021        Personal history of COVID-19 ICD-10-CM: N11.47  ICD-9-CM: V12.09  2021        Tracheostomy dependent ([facility] ICD-10-CM: Z93.0  ICD-9-CM: V44.0  10/2/2020        Tachycardia ICD-10-CM: R00.0  ICD-9-CM: 785.0  10/2/2020        Hyponatremia ICD-10-CM: E87.1  ICD-9-CM: 276.1  10/2/2020        Acute respiratory failure ([facility] ICD-10-CM: J96.00  ICD-9-CM: 518.81  2020        Pneumothorax on left ICD-10-CM: J93.9  ICD-9-CM: 512.89  2020        Pneumonia due to severe acute respiratory syndrome coronavirus 2 (SARS-CoV-2) ICD-10-CM: U07.1, J12.82  ICD-9-CM: 480.8, 079.89  2020        Respiratory failure with hypoxia ([facility] ICD-10-CM: J96.91  ICD-9-CM: 518.81  2020              Plan:     Tachycardia during Covid sepsis: Currently normal rate. Continue metoprolol and 50 mg p.o. twice daily. Bradycardia along with sinus pause was thought to be due to increased vagal tone. No need for pacemaker. Covid sepsis: His cardiac function on 2020 was normal.  COVID encephalopathy/ TIA: ASA. Continues to have left leg weakness  BP at home has been normal.   See Dr. Tahira Oliva in 1 year. He works for [employer] and works in . ATTENTION:   This medical record was transcribed using an electronic medical records/speech recognition system. Although proofread, it may and can contain electronic, spelling and other errors. Corrections may be executed at a later time. Please feel free to contact us for any clarifications as needed.          Subjective:     He is returning today for followup. He has had no symptoms of chest pain, sob. He has left leg numbness. He had episode of flushing and fatigue for 1 week few days ago. Wanted to check. ----  Juliette Peterson, a 28y.o. year-old who presents for followup. He was Covid positive on August 5, 2020 and was admitted with respiratory failure requiring BiPAP and further requiring ventilator status post tracheostomy and had a prolonged course in the hospital.  While at [facility] he had an episode of significant bradycardia and cardiac pauses thought to be due to increased vagal tone. Dr. Karl Bonds as well as Dr. Mack Wheeler had seen him and there was no need for a pacemaker. He also had episodes of tachycardia. He was started on metoprolol and was discharged on metoprolol. He [facility]. He is now slowly getting back to his normal routine. Currently has no symptoms of chest pain, shortness of breath, palpitations, lightheadedness or dizziness. Exam:     Physical Exam:  Visit Vitals  /82 (BP 1 Location: Left upper arm, BP Patient Position: Sitting)   Pulse 65   Ht 6' (1.829 m)   Wt 260 lb (117.9 kg)   SpO2 95%   BMI 35.26 kg/m²     General appearance - alert, well appearing, and in no distress  Mental status - affect appropriate to mood  Eyes - sclera anicteric, moist mucous membranes  Neck - supple, no significant adenopathy  Chest - clear to auscultation, no wheezes, rales or rhonchi      Medications:     Current Outpatient Medications   Medication Sig    pantoprazole (PROTONIX) 40 mg tablet TAKE 1 TABLET BY MOUTH EVERY DAY    metoprolol tartrate (LOPRESSOR) 50 mg tablet Take 1 Tablet by mouth two (2) times a day. B.infantis-B.ani-B.long-B.bifi (Probiotic 4X) 10-15 mg TbEC Take  by mouth. GUMMIES    FIBER CHOICE PO Take  by mouth. cholecalciferol, vitamin D3, 50 mcg (2,000 unit) tab Take  by mouth. Symbicort 160-4.5 mcg/actuation HFAA 2 Puffs as needed.     aspirin delayed-release 81 mg tablet Take  by mouth daily. diphenoxylate-atropine (LOMOTIL) 2.5-0.025 mg per tablet Take 1 Tablet by mouth four (4) times daily as needed for Diarrhea. Max Daily Amount: 4 Tablets. (Patient not taking: No sig reported)    ketoconazole (NIZORAL) 2 % topical cream Apply  to affected area daily. (Patient not taking: No sig reported)    hydrOXYzine HCL (ATARAX) 50 mg tablet TK 1 T PO QID PRA (Patient not taking: No sig reported)    traZODone (DESYREL) 50 mg tablet TK 1 T PO HS (Patient not taking: No sig reported)     No current facility-administered medications for this visit. Diagnostic Data Review:       No specialty comments available. Lab Review:     Lab Results   Component Value Date/Time    Cholesterol, total 156 09/05/2020 06:06 PM    HDL Cholesterol 26 09/05/2020 06:06 PM    LDL, calculated 92.4 09/05/2020 06:06 PM    Triglyceride 188 (H) 09/05/2020 06:06 PM    CHOL/HDL Ratio 6.0 (H) 09/05/2020 06:06 PM     Lab Results   Component Value Date/Time    Creatinine 0.99 06/16/2021 12:00 AM     Lab Results   Component Value Date/Time    BUN 11 06/16/2021 12:00 AM     Lab Results   Component Value Date/Time    Potassium 4.2 06/16/2021 12:00 AM     Lab Results   Component Value Date/Time    Hemoglobin A1c 5.4 09/05/2020 06:06 PM     Lab Results   Component Value Date/Time    HGB 16.9 06/16/2021 12:00 AM     Lab Results   Component Value Date/Time    PLATELET 691 04/16/3371 12:00 AM     No results for input(s): CPK, CKMB, TROIQ in the last 72 hours. No lab exists for component: CKQMB, CPKMB             ___________________________________________________    Stormy Pepin.  Pawel Dowell MD, [facility] [place]

## 2022-09-30 RX ORDER — METOPROLOL TARTRATE 50 MG/1
TABLET ORAL
Qty: 180 TABLET | Refills: 4 | Status: SHIPPED | OUTPATIENT
Start: 2022-09-30

## 2022-09-30 NOTE — TELEPHONE ENCOUNTER
Refill per VO of Dr. Maris Camargo:    Last appt: 9/16/2022    Future Appointments   Date Time Provider Arianna Mariana   9/22/2023 10:20 AM Luciano Hassan MD [facility]       Requested Prescriptions     Pending Prescriptions Disp Refills    metoprolol tartrate (LOPRESSOR) 50 mg tablet [Pharmacy Med Name: METOPROLOL TARTRATE 50 MG TAB] 180 Tablet 1     Sig: TAKE 1 TABLET BY MOUTH TWO TIMES A DAY.

## 2022-11-10 ENCOUNTER — OFFICE VISIT (OUTPATIENT)
Dept: FAMILY MEDICINE CLINIC | Age: 32
End: 2022-11-10
Payer: COMMERCIAL

## 2022-11-10 VITALS
OXYGEN SATURATION: 94 % | WEIGHT: 265.3 LBS | DIASTOLIC BLOOD PRESSURE: 84 MMHG | SYSTOLIC BLOOD PRESSURE: 128 MMHG | HEART RATE: 73 BPM | HEIGHT: 72 IN | BODY MASS INDEX: 35.93 KG/M2 | TEMPERATURE: 97.8 F | RESPIRATION RATE: 18 BRPM

## 2022-11-10 DIAGNOSIS — Z86.16 PERSONAL HISTORY OF COVID-19: ICD-10-CM

## 2022-11-10 DIAGNOSIS — J06.9 URI, ACUTE: ICD-10-CM

## 2022-11-10 DIAGNOSIS — F41.9 ANXIETY: Primary | ICD-10-CM

## 2022-11-10 PROBLEM — Z93.0 TRACHEOSTOMY DEPENDENT (HCC): Status: RESOLVED | Noted: 2020-10-02 | Resolved: 2022-11-10

## 2022-11-10 PROCEDURE — 99214 OFFICE O/P EST MOD 30 MIN: CPT | Performed by: FAMILY MEDICINE

## 2022-11-10 RX ORDER — SERTRALINE HYDROCHLORIDE 25 MG/1
25 TABLET, FILM COATED ORAL DAILY
Qty: 30 TABLET | Refills: 0 | Status: SHIPPED | OUTPATIENT
Start: 2022-11-10

## 2022-11-10 RX ORDER — BISMUTH SUBSALICYLATE 262 MG
1 TABLET,CHEWABLE ORAL DAILY
COMMUNITY

## 2022-11-10 NOTE — PROGRESS NOTES
Alta Tirado is a 28 y.o. male , id x 2(name and ). Reviewed record, history, and  medications. Chief Complaint   Patient presents with    Shortness of Breath     Experiencing sob walking up stairs, long walks. On 22 got out of the shower and heart rate was 105, bp 147/91, pulse ox 85. Since having Covid in  has been experiencing sob. Has to sit down occasionally to catch his breath when he's doing things around the house. Panic Attack     Experienced a panic attack that same morning. Couldn't focus. Most severe panic attack, didn't feel normal.        Vitals:    11/10/22 1124   BP: 128/84   Pulse: 73   Resp: 18   Temp: 97.8 °F (36.6 °C)   TempSrc: Oral   SpO2: 94%   Weight: 265 lb 4.8 oz (120.3 kg)   Height: 6' (1.829 m)       Coordination of Care Questionnaire:   1. Have you been to the ER, urgent care clinic since your last visit? Hospitalized since your last visit? No    2. Have you seen or consulted any other health care providers outside of the [address] [place] [address] since your last visit? Include any pap smears or colon screening.  No

## 2022-11-10 NOTE — PROGRESS NOTES
Zheng Cruz (: 1990) is a 28 y.o. male, established patient, here for evaluation of the following chief complaint(s):  Shortness of Breath (Experiencing sob walking up stairs, long walks./On 22 got out of the shower and heart rate was 105, bp 147/91, pulse ox 85./Since having Covid in 2020 has been experiencing sob. Has to sit down occasionally to catch his breath when he's doing things around the house. /) and Panic Attack (Experienced a panic attack that same morning. Couldn't focus. [place] severe panic attack, didn't feel normal. )         ASSESSMENT/PLAN:  Below is the assessment and plan developed based on review of pertinent history, physical exam, labs, studies, and medications. 1. 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 He is struggling with coping with physical limitations which I reassured patient is very reasonable with what he has gone through   - start zoloft which he was on in the past and has done well on   -also highly encouraged him to start seeing a counselor and I provided him with local resources   -Ok to continued trazodone PRN for sleep   -     sertraline (ZOLOFT) 25 mg tablet; Take 1 Tablet by mouth daily. , Normal, Disp-30 Tablet, R-0    2.  Personal history of COVID-19  He follows with cardiology and pulmonology for long term health diagnoses after severe COVID-19 infection in 2020   -with regular follow ups with specialists at least twice per year for indefinite future, plans to start seeing a counselor (likely once per week), and when he has URI or allergies it hits him hard secondary to chronic lung changes (scarring of lungs secondary to covid pneumonia), [facility] paperwork filled out for patient for periodic absences from work (he works at [employer]    3. URI, acute  Sx have resolved at this point, encouraged patient to stay up to date on vaccines, mask around others    Return in about 4 weeks (around 12/8/2022) for mood. SUBJECTIVE/OBJECTIVE:  Chief Complaint   Patient presents with    Shortness of Breath     Experiencing sob walking up stairs, long walks. On Tuesday 11/8/22 got out of the shower and heart rate was 105, bp 147/91, pulse ox 85. Since having Covid in 2020 has been experiencing sob. Has to sit down occasionally to catch his breath when he's doing things around the house. Panic Attack     Experienced a panic attack that same morning. Couldn't focus. Most severe panic attack, didn't feel normal.      Patient is a 26-year-old male presenting the office for anxiety related to long-haul COVID. In August 2020 he has been to the hospital for severe pneumonia secondary to COVID-19 infection, he had pneumothorax, acute respiratory failure. He had to have a trach placed for ventilation, he had a right-sided nontraumatic intracerebral hemorrhage. Since then he has also had tachycardia and takes metoprolol twice a day and follows with cardiology. He also follows with pulmonology Dr. Noelle Petit at [facility]. He was on Symbicort but does not do anything so he stopped medication. Patient reports since having COVID he has chronic shortness of breath on exertion. He has episodes where he feels like he cannot take in a full deep breath due to lung changes from the COVID-19 infection. Earlier this week he developed some nasal congestion 5 days ago, the next day he woke up with worse nasal congestion, he showered and when he got out of the shower he felt he could not take a deep breath in, O2 was 85% and heart rate was around 100. This resolved within 30 minutes and oxygen returned back up to the 90s and heart rate returned to about 70s.   He went to work and felt very tired and congested so he came home and rest for the day, he woke up the next day and felt better, nasal congestion improved. He had a sore throat 2 days ago. Today the sore throat and nasal congestion has completely resolved. Seems that episodes like that do happen to him whenever he has allergies upper respiratory bugs after having COVID-19 infection. He notes that he is having anxiety related to his COVID-19 experience as this was very extensive and traumatic for him. He was hospitalized for extended period time and then was in rehab for 3 months. He did not see a counselor after this, he was prescribed Zoloft upon discharge from the hospital but did not take this consistently. He notes in the last 3 to 6 months his anxiety about it has been worsening. He is struggling to deal with long-term health outcomes including left-sided weakness of his body and knowing that he may never fully recover and be the physical person he was prior to having COVID. He notes he gets more on edge and anxious easily, notes he is starting to have feel like panic attacks where he afraid he cannot take a deep breath in and get anxious. He is interested in starting medication. He has days where he feels down and sad about the whole experience but he denies any thoughts of hurting himself or others. Sleeps well at night, does have trazodone to take as needed has not taken this in months. In office today he has no shortness of breath, wheezing, cough, chest pain, lower extremity swelling. No hemoptysis. Current Outpatient Medications on File Prior to Visit   Medication Sig Dispense Refill    ferrous sulfate (IRON PO) Take 65 mg by mouth.      multivitamin (ONE A DAY) tablet Take 1 Tablet by mouth daily. metoprolol tartrate (LOPRESSOR) 50 mg tablet TAKE 1 TABLET BY MOUTH TWO TIMES A DAY. 180 Tablet 4    pantoprazole (PROTONIX) 40 mg tablet TAKE 1 TABLET BY MOUTH EVERY DAY 90 Tablet 3    B.infantis-B.ani-B.long-B.bifi (Probiotic 4X) 10-15 mg TbEC Take  by mouth.  Chasidy Aquino FIBER CHOICE PO Take  by mouth. cholecalciferol, vitamin D3, 50 mcg (2,000 unit) tab Take  by mouth. aspirin delayed-release 81 mg tablet Take  by mouth daily. [DISCONTINUED] diphenoxylate-atropine (LOMOTIL) 2.5-0.025 mg per tablet Take 1 Tablet by mouth four (4) times daily as needed for Diarrhea. Max Daily Amount: 4 Tablets. (Patient not taking: No sig reported) 30 Tablet 0    [DISCONTINUED] ketoconazole (NIZORAL) 2 % topical cream Apply  to affected area daily. (Patient not taking: No sig reported) 30 g 2    traZODone (DESYREL) 50 mg tablet TK 1 T PO HS (Patient not taking: No sig reported)      [DISCONTINUED] Symbicort 160-4.5 mcg/actuation HFAA 2 Puffs as needed. (Patient not taking: Reported on 11/10/2022)      [DISCONTINUED] hydrOXYzine HCL (ATARAX) 50 mg tablet TK 1 T PO QID PRA (Patient not taking: No sig reported)       No current facility-administered medications on file prior to visit.      Patient Active Problem List    Diagnosis Date Noted    Sick sinus syndrome ([address] 09/16/2022    Right-sided nontraumatic intracerebral hemorrhage of cerebellum ([address] 06/16/2021    Right sided weakness 06/16/2021    Personal history of COVID-19 06/16/2021    Tachycardia 10/02/2020    Hyponatremia 10/02/2020    Acute respiratory failure ([address] 08/18/2020    Pneumothorax on left 08/14/2020    Pneumonia due to severe acute respiratory syndrome coronavirus 2 (SARS-CoV-2) 08/08/2020    Respiratory failure with hypoxia ([address] 08/05/2020     No Known Allergies  Past Surgical History:   Procedure Laterality Date    IR INSERT GASTROSTOMY TUBE PERC  9/23/2020    IR PERCUT GASTROSTROMY TUBE  9/23/2020         IR THORA/INS CHEST TUBE(PNEUMO) WO IMAGE  8/11/2020    IR THORA/INS CHEST TUBE(PNEUMO) WO IMAGE  8/11/2020    [place] TRACHEOSTOMY, PLANNED  8/26/2020          Social History     Tobacco Use    Smoking status: Former     Packs/day: 1.00     Years: 10.00     Pack years: 10.00     Types: Cigarettes     Quit date: 7/27/2019     Years since quitting: 3.2    Smokeless tobacco: Never   Vaping Use    Vaping Use: Never used   Substance Use Topics    Alcohol use: Yes     Alcohol/week: 3.0 standard drinks     Types: 3 Cans of beer per week    Drug use: Never     Family History   Problem Relation Age of Onset    Hypertension Mother     Hypertension Father     Cancer Father     COPD Father     Hypertension Brother     Bipolar Disorder Brother     Drug Abuse Brother     Hypertension Maternal Grandmother     Diabetes Maternal Grandmother      Vitals:    11/10/22 1124   BP: 128/84   Pulse: 73   Resp: 18   Temp: 97.8 °F (36.6 °C)   TempSrc: Oral   SpO2: 94%   Weight: 265 lb 4.8 oz (120.3 kg)   Height: 6' (1.829 m)   PainSc:   0 - No pain        Physical Exam  Constitutional:       Appearance: Normal appearance. HENT:      Head: Normocephalic and atraumatic. Right Ear: Tympanic membrane, ear canal and external ear normal.      Left Ear: Tympanic membrane, ear canal and external ear normal.      Nose: Nose normal. No congestion or rhinorrhea. Mouth/Throat:      Mouth: Mucous membranes are moist.      Pharynx: Oropharynx is clear. No oropharyngeal exudate or posterior oropharyngeal erythema. Eyes:      Extraocular Movements: Extraocular movements intact. Pupils: Pupils are equal, round, and reactive to light. Cardiovascular:      Rate and Rhythm: Normal rate and regular rhythm. Pulses: Normal pulses. Heart sounds: Normal heart sounds. Pulmonary:      Effort: Pulmonary effort is normal.      Breath sounds: Normal breath sounds. Abdominal:      General: Abdomen is flat. Bowel sounds are normal.      Palpations: Abdomen is soft. Musculoskeletal:      Cervical back: Normal range of motion. No rigidity. Right lower leg: No edema. Left lower leg: No edema. Lymphadenopathy:      Cervical: No cervical adenopathy. Neurological:      General: No focal deficit present.       Mental Status: He is alert and oriented to person, place, and time. Psychiatric:         Mood and Affect: Mood normal.         Behavior: Behavior normal.           An electronic signature was used to authenticate this note.   -- Chaya Andrade PA-C

## 2022-11-25 ENCOUNTER — HOSPITAL ENCOUNTER (EMERGENCY)
Age: 32
Discharge: HOME | End: 2022-11-25
Attending: STUDENT IN AN ORGANIZED HEALTH CARE EDUCATION/TRAINING PROGRAM
Payer: COMMERCIAL

## 2022-11-25 VITALS
SYSTOLIC BLOOD PRESSURE: 131 MMHG | RESPIRATION RATE: 18 BRPM | OXYGEN SATURATION: 95 % | HEIGHT: 72 IN | BODY MASS INDEX: 35.89 KG/M2 | WEIGHT: 265 LBS | TEMPERATURE: 98.7 F | HEART RATE: 103 BPM | DIASTOLIC BLOOD PRESSURE: 87 MMHG

## 2022-11-25 DIAGNOSIS — R19.7 DIARRHEA, UNSPECIFIED TYPE: Primary | ICD-10-CM

## 2022-11-25 LAB
ALBUMIN SERPL-MCNC: 4.8 G/DL (ref 3.5–5.2)
ALBUMIN/GLOB SERPL: 1.6 {RATIO} (ref 1.1–2.2)
ALP SERPL-CCNC: 85 U/L (ref 40–129)
ALT SERPL-CCNC: 68 U/L (ref 10–50)
ANION GAP SERPL CALC-SCNC: 14 MMOL/L (ref 5–15)
APPEARANCE UR: CLEAR
AST SERPL-CCNC: 51 U/L (ref 10–50)
BACTERIA URNS QL MICRO: ABNORMAL /HPF
BASOPHILS # BLD: 0.1 K/UL (ref 0–0.1)
BASOPHILS NFR BLD: 1 % (ref 0–1)
BILIRUB SERPL-MCNC: 0.9 MG/DL (ref 0.2–1)
BILIRUB UR QL CFM: NEGATIVE
BUN SERPL-MCNC: 13 MG/DL (ref 6–20)
BUN/CREAT SERPL: 14 (ref 12–20)
CALCIUM SERPL-MCNC: 9.5 MG/DL (ref 8.6–10)
CHLORIDE SERPL-SCNC: 103 MMOL/L (ref 98–107)
CO2 SERPL-SCNC: 25 MMOL/L (ref 22–29)
COLOR UR: ABNORMAL
CREAT SERPL-MCNC: 0.95 MG/DL (ref 0.7–1.2)
DIFFERENTIAL METHOD BLD: ABNORMAL
EOSINOPHIL # BLD: 0.1 K/UL (ref 0–0.4)
EOSINOPHIL NFR BLD: 1 % (ref 0–7)
EPITH CASTS URNS QL MICRO: ABNORMAL /LPF
ERYTHROCYTE [DISTWIDTH] IN BLOOD BY AUTOMATED COUNT: 12.5 % (ref 11.5–14.5)
GLOBULIN SER CALC-MCNC: 3 G/DL (ref 2–4)
GLUCOSE SERPL-MCNC: 108 MG/DL (ref 65–100)
GLUCOSE UR STRIP.AUTO-MCNC: NEGATIVE MG/DL
HCT VFR BLD AUTO: 49.9 % (ref 36.6–50.3)
HGB BLD-MCNC: 17.8 G/DL (ref 12.1–17)
HGB UR QL STRIP: NEGATIVE
IMM GRANULOCYTES # BLD AUTO: 0 K/UL (ref 0–0.04)
IMM GRANULOCYTES NFR BLD AUTO: 0 % (ref 0–0.5)
KETONES UR QL STRIP.AUTO: ABNORMAL MG/DL
LEUKOCYTE ESTERASE UR QL STRIP.AUTO: NEGATIVE
LIPASE SERPL-CCNC: 30 U/L (ref 13–60)
LYMPHOCYTES # BLD: 0.8 K/UL (ref 0.8–3.5)
LYMPHOCYTES NFR BLD: 9 % (ref 12–49)
MCH RBC QN AUTO: 31.5 PG (ref 26–34)
MCHC RBC AUTO-ENTMCNC: 35.7 G/DL (ref 30–36.5)
MCV RBC AUTO: 88.3 FL (ref 80–99)
MONOCYTES # BLD: 0.9 K/UL (ref 0–1)
MONOCYTES NFR BLD: 10 % (ref 5–13)
NEUTS SEG # BLD: 7 K/UL (ref 1.8–8)
NEUTS SEG NFR BLD: 79 % (ref 32–75)
NITRITE UR QL STRIP.AUTO: NEGATIVE
NRBC # BLD: 0 K/UL (ref 0–0.01)
NRBC BLD-RTO: 0 PER 100 WBC
PH UR STRIP: 5.5 [PH] (ref 5–8)
PLATELET # BLD AUTO: 231 K/UL (ref 150–400)
PMV BLD AUTO: 10.4 FL (ref 8.9–12.9)
POTASSIUM SERPL-SCNC: 4 MMOL/L (ref 3.5–5.1)
PROT SERPL-MCNC: 7.8 G/DL (ref 6.4–8.3)
PROT UR STRIP-MCNC: ABNORMAL MG/DL
RBC # BLD AUTO: 5.65 M/UL (ref 4.1–5.7)
RBC #/AREA URNS HPF: ABNORMAL /HPF (ref 0–5)
RBC MORPH BLD: ABNORMAL
SODIUM SERPL-SCNC: 142 MMOL/L (ref 136–145)
SP GR UR REFRACTOMETRY: 1.02 (ref 1–1.03)
UR CULT HOLD, URHOLD: NORMAL
UROBILINOGEN UR QL STRIP.AUTO: 0.2 EU/DL (ref 0.2–1)
WBC # BLD AUTO: 8.9 K/UL (ref 4.1–11.1)
WBC URNS QL MICRO: ABNORMAL /HPF (ref 0–4)

## 2022-11-25 PROCEDURE — 99284 EMERGENCY DEPT VISIT MOD MDM: CPT

## 2022-11-25 PROCEDURE — 36415 COLL VENOUS BLD VENIPUNCTURE: CPT

## 2022-11-25 PROCEDURE — 81001 URINALYSIS AUTO W/SCOPE: CPT

## 2022-11-25 PROCEDURE — 80053 COMPREHEN METABOLIC PANEL: CPT

## 2022-11-25 PROCEDURE — 83690 ASSAY OF LIPASE: CPT

## 2022-11-25 PROCEDURE — 85025 COMPLETE CBC W/AUTO DIFF WBC: CPT

## 2022-11-25 PROCEDURE — 74011250636 HC RX REV CODE- 250/636: Performed by: NURSE PRACTITIONER

## 2022-11-25 RX ADMIN — SODIUM CHLORIDE 1000 ML: 9 INJECTION, SOLUTION INTRAVENOUS at 13:17

## 2022-11-25 NOTE — DISCHARGE INSTRUCTIONS
Your lab work is reassuring, no emergent findings. Continue to support yourself through drinking things like Pedialyte, Gatorade, Powerade. You can also take Imodium as needed to help with diarrhea, although this may prolong your symptoms as well. Please call GI on Monday, your situation is a little complex and they will likely need to do a further evaluation of your ongoing GI concerns. Return to the emergency department for any worsening or worrisome symptoms.

## 2022-11-25 NOTE — ED PROVIDER NOTES
HPI     Kobe Napoles is a 28 y.o. male with Hx of COVID who presents ambulatory to [facility] ED with cc of diarrhea. Patient reports multiple episodes of dark, nonbloody diarrhea that started on Tuesday. He took Imodium on Wednesday and symptoms resolved. He states that he had no symptoms yesterday. Diarrhea started again this morning. No recent international travel, no known sick exposures. States that he has had longstanding \"GI issues\" since he was hospitalized for COVID 2 years ago. States that he required ICU stay when he was ill, required feeding tubes. Denies any GI follow-up after his hospitalization. Denies F/C, abdominal pain, cough, congestion, CP, SOB, dysuria, urinary frequency/hesitancy, flank pain. Denies tobacco, alcohol, substance abuse. PCP: Bhupendra Hines DO    There are no other complaints, changes or physical findings at this time.             Past Medical History:   Diagnosis Date    History of vascular access device 08/10/2020    5 Fr triple PICC, hemodynamically unstable, 45 cm L basilic    Tracheostomy dependent ([facility] 10/2/2020       Past Surgical History:   Procedure Laterality Date    IR INSERT GASTROSTOMY TUBE PERC  9/23/2020    IR PERCUT GASTROSTROMY TUBE  9/23/2020         IR THORA/INS CHEST TUBE(PNEUMO) WO IMAGE  8/11/2020    IR THORA/INS CHEST TUBE(PNEUMO) WO IMAGE  8/11/2020    [place] TRACHEOSTOMY, PLANNED  8/26/2020              Family History:   Problem Relation Age of Onset    Hypertension Mother     Hypertension Father     Cancer Father     COPD Father     Hypertension Brother     Bipolar Disorder Brother     Drug Abuse Brother     Hypertension Maternal Grandmother     Diabetes Maternal Grandmother        Social History     Socioeconomic History    Marital status: SINGLE     Spouse name: Not on file    Number of children: Not on file    Years of education: Not on file    Highest education level: Not on file   Occupational History    Not on file   Tobacco Use    Smoking status: Former     Packs/day: 1.00     Years: 10.00     Pack years: 10.00     Types: Cigarettes     Quit date: 7/27/2019     Years since quitting: 3.3    Smokeless tobacco: Never   Vaping Use    Vaping Use: Never used   Substance and Sexual Activity    Alcohol use: Yes     Alcohol/week: 3.0 standard drinks     Types: 3 Cans of beer per week    Drug use: Never    Sexual activity: Yes   Other Topics Concern     Service Not Asked    Blood Transfusions Not Asked    Caffeine Concern Not Asked    Occupational Exposure Not Asked    Hobby Hazards Not Asked    Sleep Concern Not Asked    Stress Concern Not Asked    Weight Concern Not Asked    Special Diet Not Asked    Back Care Not Asked    Exercise Not Asked    Bike Helmet Not Asked    Seat Belt Not Asked    Self-Exams Not Asked   Social History Narrative    Not on file     Social Determinants of Health     Financial Resource Strain: Not on file   Food Insecurity: Not on file   Transportation Needs: Not on file   Physical Activity: Not on file   Stress: Not on file   Social Connections: Not on file   Intimate Partner Violence: Not on file   Housing Stability: Not on file         ALLERGIES: Patient has no known allergies. Review of Systems   Constitutional:  Positive for appetite change. Negative for activity change, chills and fever. HENT:  Negative for congestion, rhinorrhea and sore throat. Eyes:  Negative for visual disturbance. Respiratory:  Negative for cough and shortness of breath. Cardiovascular:  Negative for chest pain. Gastrointestinal:  Positive for diarrhea. Negative for abdominal pain. Genitourinary:  Negative for dysuria, flank pain, frequency and urgency. Musculoskeletal:  Negative for arthralgias, back pain and neck pain. Skin:  Negative for color change and rash. Neurological:  Negative for dizziness, speech difficulty, weakness, light-headedness, numbness and headaches.    Psychiatric/Behavioral:  Negative for agitation, behavioral problems and confusion. All other systems reviewed and are negative. Vitals:    11/25/22 1148   BP: 131/87   Pulse: (!) 103   Resp: 18   Temp: 98.7 °F (37.1 °C)   SpO2: 95%   Weight: 120.2 kg (265 lb)   Height: 6' (1.829 m)            Physical Exam  Vitals and nursing note reviewed. Constitutional:       General: He is not in acute distress. Appearance: He is well-developed. HENT:      Head: Normocephalic and atraumatic. Right Ear: External ear normal.      Left Ear: External ear normal.   Eyes:      Conjunctiva/sclera: Conjunctivae normal.      Pupils: Pupils are equal, round, and reactive to light. Cardiovascular:      Rate and Rhythm: Normal rate and regular rhythm. Heart sounds: Normal heart sounds. Pulmonary:      Effort: Pulmonary effort is normal.      Breath sounds: Normal breath sounds. Abdominal:      Palpations: Abdomen is soft. Tenderness: There is no abdominal tenderness. Musculoskeletal:         General: Normal range of motion. Cervical back: Normal range of motion and neck supple. Skin:     General: Skin is warm and dry. Neurological:      Mental Status: He is alert and oriented to person, place, and time. Psychiatric:         Behavior: Behavior normal.         Thought Content: Thought content normal.         Judgment: Judgment normal.        MDM  Number of Diagnoses or Management Options  Diarrhea, unspecified type  Diagnosis management comments: Differential diagnosis includes colitis, inflammatory bowel disease, infectious diarrhea, dehydration    Patient presents to the emergency department with several day history of diarrhea, denies any known sick exposures or international travel. States that he has longstanding bowel issues which likely need GI follow-up. Labs reviewed, reassuring. Told patient that he could continue taking Imodium for some relief. Encouraged outpatient GI follow-up as soon as possible.   Reasons to return to the ER were provided and reviewed. Amount and/or Complexity of Data Reviewed  Clinical lab tests: ordered and reviewed  Review and summarize past medical records: yes           Procedures    LABORATORY TESTS:  Recent Results (from the past 12 hour(s))   CBC WITH AUTOMATED DIFF    Collection Time: 11/25/22 12:39 PM   Result Value Ref Range    WBC 8.9 4.1 - 11.1 K/uL    RBC 5.65 4.10 - 5.70 M/uL    HGB 17.8 (H) 12.1 - 17.0 g/dL    HCT 49.9 36.6 - 50.3 %    MCV 88.3 80.0 - 99.0 FL    MCH 31.5 26.0 - 34.0 PG    MCHC 35.7 30.0 - 36.5 g/dL    RDW 12.5 11.5 - 14.5 %    PLATELET 840 991 - 694 K/uL    MPV 10.4 8.9 - 12.9 FL    NRBC 0.0 0  WBC    ABSOLUTE NRBC 0.00 0.00 - 0.01 K/uL    NEUTROPHILS 79 (H) 32 - 75 %    LYMPHOCYTES 9 (L) 12 - 49 %    MONOCYTES 10 5 - 13 %    EOSINOPHILS 1 0 - 7 %    BASOPHILS 1 0 - 1 %    IMMATURE GRANULOCYTES 0 0.0 - 0.5 %    ABS. NEUTROPHILS 7.0 1.8 - 8.0 K/UL    ABS. LYMPHOCYTES 0.8 0.8 - 3.5 K/UL    ABS. MONOCYTES 0.9 0.0 - 1.0 K/UL    ABS. EOSINOPHILS 0.1 0.0 - 0.4 K/UL    ABS. BASOPHILS 0.1 0.0 - 0.1 K/UL    ABS. IMM. GRANS. 0.0 0.00 - 0.04 K/UL    DF SMEAR SCANNED      RBC COMMENTS SPHEROCYTES  PRESENT       METABOLIC PANEL, COMPREHENSIVE    Collection Time: 11/25/22 12:39 PM   Result Value Ref Range    Sodium 142 136 - 145 mmol/L    Potassium 4.0 3.5 - 5.1 mmol/L    Chloride 103 98 - 107 mmol/L    CO2 25 22 - 29 mmol/L    Anion gap 14 5 - 15 mmol/L    Glucose 108 (H) 65 - 100 mg/dL    BUN 13 6 - 20 MG/DL    Creatinine 0.95 0.70 - 1.20 MG/DL    BUN/Creatinine ratio 14 12 - 20      eGFR >60 >60 ml/min/1.73m2    Calcium 9.5 8.6 - 10.0 MG/DL    Bilirubin, total 0.9 0.2 - 1.0 MG/DL    ALT (SGPT) 68 (H) 10 - 50 U/L    AST (SGOT) 51 (H) 10 - 50 U/L    Alk.  phosphatase 85 40 - 129 U/L    Protein, total 7.8 6.4 - 8.3 g/dL    Albumin 4.8 3.5 - 5.2 g/dL    Globulin 3.0 2.0 - 4.0 g/dL    A-G Ratio 1.6 1.1 - 2.2     LIPASE    Collection Time: 11/25/22 12:39 PM   Result Value Ref Range    Lipase 30 13 - 60 U/L   URINALYSIS W/MICROSCOPIC    Collection Time: 11/25/22  1:29 PM   Result Value Ref Range    Color YELLOW/STRAW      Appearance CLEAR CLEAR      Specific gravity 1.025 1.003 - 1.030      pH (UA) 5.5 5.0 - 8.0      Protein TRACE (A) NEG mg/dL    Glucose Negative NEG mg/dL    Ketone TRACE (A) NEG mg/dL    Blood Negative NEG      Urobilinogen 0.2 0.2 - 1.0 EU/dL    Nitrites Negative NEG      Leukocyte Esterase Negative NEG      WBC 0-4 0 - 4 /hpf    RBC 0-5 0 - 5 /hpf    Epithelial cells FEW FEW /lpf    Bacteria 1+ (A) NEG /hpf   URINE CULTURE HOLD SAMPLE    Collection Time: 11/25/22  1:29 PM    Specimen: Serum; Urine   Result Value Ref Range    Urine culture hold        Urine on hold in Microbiology dept for 2 days. If unpreserved urine is submitted, it cannot be used for addtional testing after 24 hours, recollection will be required. BILIRUBIN, CONFIRM    Collection Time: 11/25/22  1:29 PM   Result Value Ref Range    Bilirubin UA, confirm Negative NEG         IMAGING RESULTS:  No orders to display       MEDICATIONS GIVEN:  Medications   sodium chloride 0.9 % bolus infusion 1,000 mL (1,000 mL IntraVENous New Bag 11/25/22 1317)       IMPRESSION:  1. Diarrhea, unspecified type        PLAN:  1. Discharge Medication List as of 11/25/2022  2:15 PM        2. Follow-up Information       Follow up With Specialties Details Why Contact Info    Jessica Abel DO Family Medicine Schedule an appointment as soon as possible for a visit   [address] [place] [address] [address]      [facility] Emergency Medicine Go to  As needed, If symptoms worsen [address] [facility] [address]    Sergio Nelson MD Gastroenterology Schedule an appointment as soon as possible for a visit  for GI follow up Mike   [phone]            3.  Return to ED if worse

## 2022-11-25 NOTE — Clinical Note
[address]  [facility]ENC[facility] DEPT  [address] [id number]  [phone]    Work/School Note    Date: 11/25/2022    To Whom It May concern:    Radha Romo was seen and treated today in the emergency room by the following provider(s):  Attending Provider: Pipe Bryant MD  Nurse Practitioner: Nigel Alpers, NP. Radha Romo is excused from work/school on 11/25/2022 through 11/27/2022. He is medically clear to return to work/school on 11/28/2022.          Sincerely,          Marylen Leventhal, NP

## 2022-11-25 NOTE — ED TRIAGE NOTES
Pt arrives ambulatory via POV w/ c/c of diarrhea since Tuesday + dark stools. No anticoags. Takes baby ASA every day. No other NSAIDs.

## 2022-12-02 DIAGNOSIS — F41.9 ANXIETY: ICD-10-CM

## 2022-12-02 RX ORDER — SERTRALINE HYDROCHLORIDE 25 MG/1
25 TABLET, FILM COATED ORAL DAILY
Qty: 30 TABLET | Refills: 0 | Status: SHIPPED | OUTPATIENT
Start: 2022-12-02

## 2022-12-16 ENCOUNTER — OFFICE VISIT (OUTPATIENT)
Dept: FAMILY MEDICINE CLINIC | Age: 32
End: 2022-12-16
Payer: COMMERCIAL

## 2022-12-16 VITALS
DIASTOLIC BLOOD PRESSURE: 74 MMHG | HEART RATE: 86 BPM | RESPIRATION RATE: 18 BRPM | BODY MASS INDEX: 35.7 KG/M2 | WEIGHT: 263.6 LBS | HEIGHT: 72 IN | TEMPERATURE: 98 F | SYSTOLIC BLOOD PRESSURE: 108 MMHG | OXYGEN SATURATION: 99 %

## 2022-12-16 DIAGNOSIS — R19.7 DIARRHEA, UNSPECIFIED TYPE: ICD-10-CM

## 2022-12-16 DIAGNOSIS — R74.8 ELEVATED LIVER ENZYMES: ICD-10-CM

## 2022-12-16 DIAGNOSIS — F41.9 ANXIETY: Primary | ICD-10-CM

## 2022-12-16 NOTE — PROGRESS NOTES
[facility] Mildred (: 1990) is a 28 y.o. male, established patient, here for evaluation of the following chief complaint(s):  Follow-up (Kathy Brooks been doing fine since last visit. /Doesn't feel as tired as he was before. )         ASSESSMENT/PLAN:  Below is the assessment and plan developed based on review of pertinent history, physical exam, labs, studies, and medications. 1. Anxiety  Improvement with zoloft but not well controlled yet   Increase zoloft to 50mg, he will take 2 of 25mg and if feeling well and no SE will [employer] message me in 1 week for refill and I will send in 50mg tabs   -encouraged patient to establish with a counselor     2. Diarrhea, unspecified type  Intermittent with certain foods since COVID pneumonia in 2022, he did have PEG at that time. No diarrhea or sx of infection currently. He has GI appt in January to reestablish care with [facility]. 3. Elevated liver enzymes  Mildly elevated at ED 2 weeks ago, likely secondary to viral gastritis. Will plan to recheck with labs at 2 mo follow up     Return for 2 months, recheck lfts and mood . SUBJECTIVE/OBJECTIVE:  Chief Complaint   Patient presents with    Follow-up     Mood. Has been doing fine since last visit. Doesn't feel as tired as he was before. Patient presents to office to follow-up mood. He was started on Zoloft 25 mg about a month ago for anxiety. Much anxiety is centered around trauma forming hospitalized for almost 2 months secondary to COVID-pneumonia and severe complications. Patient reports his mood has been better since starting Zoloft 25 mg. He does not feel as tired throughout the day, he has not had any episodes where he is felt panicked with shortness of breath or racing heart rate. He does report he still gets anxious at work but it does seem more manageable. He is sleeping well at night, falling asleep easily.   He denies any thoughts of harming himself or others, no feelings of being down depressed or hopeless. He does have chronic GI issues and he saw GI when he was admitted with COVID. He did go to the ED about 3 weeks ago with diarrhea which they thought was viral in nature. This has completely resolved at this point. He does have a follow-up with GI doctor in January Dr. Adarsh Matthews for cyclical GI issues. Since having COVID he thinks his stomach becomes upset more frequently than others and he will have diarrhea once or twice a month with bloating. Denies any blood in his stool, fever, abdominal pain currently. LFTs were slightly elevated on labs about 2 weeks ago at the ER visit, he has no abdominal pain, abdominal swelling, jaundice coloring to the skin now. He has cut way down on alcohol. When he was initially in the hospital he was having several alcoholic beverages per day and this has been cut down to 1 or 2 drinks per week. He does not think that bloating or upset stomach issues have worsened at all since starting Zoloft. He does take PPI daily which controls indigestion he has not had any GERD symptoms or nausea starting Zoloft. Review of systems negative other than mentioned in HPI    Current Outpatient Medications on File Prior to Visit   Medication Sig Dispense Refill    sertraline (ZOLOFT) 25 mg tablet Take 1 Tablet by mouth daily. 30 Tablet 0    ferrous sulfate (IRON PO) Take 65 mg by mouth.      multivitamin (ONE A DAY) tablet Take 1 Tablet by mouth daily. metoprolol tartrate (LOPRESSOR) 50 mg tablet TAKE 1 TABLET BY MOUTH TWO TIMES A DAY. 180 Tablet 4    pantoprazole (PROTONIX) 40 mg tablet TAKE 1 TABLET BY MOUTH EVERY DAY 90 Tablet 3    B.infantis-B.ani-B.long-B.bifi (Probiotic 4X) 10-15 mg TbEC Take  by mouth. GUMMIES      FIBER CHOICE PO Take  by mouth. cholecalciferol, vitamin D3, 50 mcg (2,000 unit) tab Take  by mouth. aspirin delayed-release 81 mg tablet Take  by mouth daily.       traZODone (DESYREL) 50 mg tablet TK 1 T PO HS (Patient not taking: No sig reported)       No current facility-administered medications on file prior to visit. 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 Eyes:      Extraocular Movements: Extraocular movements intact. Pupils: Pupils are equal, round, and reactive to light. Cardiovascular:      Rate and Rhythm: Normal rate and regular rhythm. Pulses: Normal pulses. Heart sounds: Normal heart sounds. Pulmonary:      Effort: Pulmonary effort is normal.      Breath sounds: Normal breath sounds. Abdominal:      General: Abdomen is flat. Bowel sounds are normal.      Palpations: Abdomen is soft. Musculoskeletal:      Cervical back: Normal range of motion. No rigidity or tenderness. Right lower leg: No edema. Left lower leg: No edema. Lymphadenopathy:      Cervical: No cervical adenopathy. Neurological:      General: No focal deficit present. Mental Status: He is alert and oriented to person, place, and time. Psychiatric:         Mood and Affect: Mood normal.         Behavior: Behavior normal.       An electronic signature was used to authenticate this note.   -- Divine Avendaño PA-C

## 2022-12-16 NOTE — PROGRESS NOTES
Edmond Duque is a 28 y.o. male , id x 2(name and ). Reviewed record, history, and  medications. Chief Complaint   Patient presents with    Follow-up     Mood. Has been doing fine since last visit. Doesn't feel as tired as he was before. Vitals:    22 1001   BP: 108/74   Pulse: 86   Resp: 18   Temp: 98 °F (36.7 °C)   TempSrc: Oral   SpO2: 99%   Weight: 263 lb 9.6 oz (119.6 kg)   Height: 6' (1.829 m)       Coordination of Care Questionnaire:   1. Have you been to the ER, urgent care clinic since your last visit? Hospitalized since your last visit? Yes [facility] ED    2. Have you seen or consulted any other health care providers outside of the [address] [place] [address] since your last visit? Include any pap smears or colon screening.  No

## 2023-01-03 ENCOUNTER — PATIENT MESSAGE (OUTPATIENT)
Dept: FAMILY MEDICINE CLINIC | Age: 33
End: 2023-01-03

## 2023-01-03 DIAGNOSIS — F41.9 ANXIETY: Primary | ICD-10-CM

## 2023-01-03 RX ORDER — SERTRALINE HYDROCHLORIDE 50 MG/1
50 TABLET, FILM COATED ORAL DAILY
Qty: 30 TABLET | Refills: 0 | Status: SHIPPED | OUTPATIENT
Start: 2023-01-03

## 2023-01-03 NOTE — TELEPHONE ENCOUNTER
From: Taylor Arboleda PA-C  To: Monico Fall  Sent: 1/3/2023 10:35 AM EST  Subject: vivianofashley Calzada I saw you refill request-- did you want to go back down to 25mg or did you want me to send in the 50mg?   Taylor Arboleda PA-C

## 2023-02-01 DIAGNOSIS — F41.9 ANXIETY: ICD-10-CM

## 2023-02-01 RX ORDER — SERTRALINE HYDROCHLORIDE 50 MG/1
TABLET, FILM COATED ORAL
Qty: 30 TABLET | Refills: 0 | Status: SHIPPED | OUTPATIENT
Start: 2023-02-01

## 2023-02-17 ENCOUNTER — OFFICE VISIT (OUTPATIENT)
Dept: FAMILY MEDICINE CLINIC | Age: 33
End: 2023-02-17
Payer: COMMERCIAL

## 2023-02-17 VITALS
HEART RATE: 70 BPM | DIASTOLIC BLOOD PRESSURE: 71 MMHG | BODY MASS INDEX: 34.7 KG/M2 | TEMPERATURE: 97.8 F | WEIGHT: 256.2 LBS | RESPIRATION RATE: 18 BRPM | OXYGEN SATURATION: 95 % | HEIGHT: 72 IN | SYSTOLIC BLOOD PRESSURE: 117 MMHG

## 2023-02-17 DIAGNOSIS — R74.8 ELEVATED LIVER ENZYMES: ICD-10-CM

## 2023-02-17 DIAGNOSIS — F41.9 ANXIETY: Primary | ICD-10-CM

## 2023-02-17 DIAGNOSIS — I49.5 SICK SINUS SYNDROME (HCC): ICD-10-CM

## 2023-02-17 DIAGNOSIS — R73.9 HYPERGLYCEMIA: ICD-10-CM

## 2023-02-17 RX ORDER — SERTRALINE HYDROCHLORIDE 50 MG/1
50 TABLET, FILM COATED ORAL DAILY
Qty: 90 TABLET | Refills: 1 | Status: SHIPPED | OUTPATIENT
Start: 2023-02-17

## 2023-02-17 NOTE — PROGRESS NOTES
Joy Camara is a 35 y.o. male , id x 2(name and ). Reviewed record, history, and  medications. Chief Complaint   Patient presents with    Follow-up     Mood. Has been better. Labs       Vitals:    23 1019   BP: 117/71   Pulse: 70   Resp: 18   Temp: 97.8 °F (36.6 °C)   TempSrc: Oral   SpO2: 95%   Weight: 256 lb 3.2 oz (116.2 kg)   Height: 6' (1.829 m)       Coordination of Care Questionnaire:   1. Have you been to the ER, urgent care clinic since your last visit? Hospitalized since your last visit? No    2. Have you seen or consulted any other health care providers outside of the [address] [place] [address] since your last visit? Include any pap smears or colon screening.  No

## 2023-02-17 NOTE — PROGRESS NOTES
Ian Lynn (: 1990) is a 35 y.o. male, established patient, here for evaluation of the following chief complaint(s):  Follow-up (Geetha Gautamr been better. ) and Labs         ASSESSMENT/PLAN:  Below is the assessment and plan developed based on review of pertinent history, physical exam, labs, studies, and medications. 1. Anxiety  Significant improvement wit zoloft, continue rx   -     sertraline (ZOLOFT) 50 mg tablet; Take 1 Tablet by mouth daily. , Normal, Disp-90 Tablet, R-1    2. Sick sinus syndrome (HCC)  -occurred during covid sepsis, per cardiology note Dr. Charles Hint \"Bradycardia along with sinus pause was thought to be due to increased vagal tone. No need for pacemaker. \"     3. Elevated liver enzymes  Lab Results   Component Value Date/Time    ALT (SGPT) 68 (H) 2022 12:39 PM    AST (SGOT) 51 (H) 2022 12:39 PM    Alk. phosphatase 85 2022 12:39 PM    Bilirubin, direct 1.4 (H) 08/10/2020 05:55 AM    Bilirubin, total 0.9 2022 12:39 PM   -likely secondary to viral infection at that time   -recheck today he is feeling well and no sx of liver disease   -     METABOLIC PANEL, COMPREHENSIVE; Future  4. Hyperglycemia  Noted on recent labs check a1c today   -     HEMOGLOBIN A1C WITH EAG; Future    Return in about 6 months (around 2023). SUBJECTIVE/OBJECTIVE:  Chief Complaint   Patient presents with    Follow-up     Mood. Has been better. Labs     Patient is a 40-year-old male presenting office for follow-up anxiety and recheck LFTs. He has been tolerating Zoloft 50 mg very well. This was started for anxiety secondary to trauma over severe COVID-19 infection in  that resulted in patient being to the hospital for over a month. Since admission he has been having daily feelings of anxiousness throughout the day. Lack of motivation disorganization. Since starting Zoloft he reports he has been doing much better.   He feels better on the 50 mg and he did the 25 mg.  He is more motivation throughout the day, more motivation in the morning and finding it easier to get up to go to work, more energy throughout the day. He is sleeping well at night. He has felt a significant change in mood with this medication. He is still open to the idea of counseling to process the trauma from such a severe COVID-19 infection however he has not reached out to any counselors yet. He is to recheck LFTs today. They were elevated when he went to ED visit for viral gastritis 2 months ago. He has not had any recent nausea, vomiting, diarrhea or abdominal pain. No fevers, swelling of the lower extremities, jaundice, skin, right lower quadrant pain. He is going to have an endoscopy on March 10 with [facility]. Review of systems negative other than mentioned in HPI    Current Outpatient Medications on File Prior to Visit   Medication Sig Dispense Refill    ferrous sulfate (IRON PO) Take 65 mg by mouth.      multivitamin (ONE A DAY) tablet Take 1 Tablet by mouth daily. [DISCONTINUED] metoprolol tartrate (LOPRESSOR) 50 mg tablet TAKE 1 TABLET BY MOUTH TWO TIMES A DAY. 180 Tablet 4    pantoprazole (PROTONIX) 40 mg tablet TAKE 1 TABLET BY MOUTH EVERY DAY 90 Tablet 3    cholecalciferol, vitamin D3, 50 mcg (2,000 unit) tab Take  by mouth. aspirin delayed-release 81 mg tablet Take  by mouth daily. [DISCONTINUED] sertraline (ZOLOFT) 50 mg tablet TAKE 1 TABLET BY MOUTH EVERY DAY 30 Tablet 0    B.infantis-B.ani-B.long-B.bifi (Probiotic 4X) 10-15 mg TbEC Take  by mouth. Kenton Salazar (Patient not taking: Reported on 2/17/2023)      FIBER CHOICE PO Take  by mouth. (Patient not taking: Reported on 2/17/2023)      traZODone (DESYREL) 50 mg tablet TK 1 T PO HS (Patient not taking: No sig reported)       No current facility-administered medications on file prior to visit.      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 ([facility] [address] 06/16/2021    Right sided weakness 06/16/2021    Personal history of COVID-19 06/16/2021    Tachycardia 10/02/2020    Hyponatremia 10/02/2020    Acute respiratory failure ([facility] [address] 08/18/2020    Pneumothorax on left 08/14/2020    Pneumonia due to severe acute respiratory syndrome coronavirus 2 (SARS-CoV-2) 08/08/2020    [facility] 08/05/2020     Vitals:    02/17/23 1019   BP: 117/71   Pulse: 70   Resp: 18   Temp: 97.8 °F (36.6 °C)   TempSrc: Oral   SpO2: 95%   Weight: 256 lb 3.2 oz (116.2 kg)   Height: 6' (1.829 m)   PainSc:   3   PainLoc: Back        Physical Exam  Constitutional:       Appearance: Normal appearance. Eyes:      Extraocular Movements: Extraocular movements intact. Pupils: Pupils are equal, round, and reactive to light. Cardiovascular:      Rate and Rhythm: Normal rate and regular rhythm. Pulses: Normal pulses. Heart sounds: Normal heart sounds. Pulmonary:      Effort: Pulmonary effort is normal.      Breath sounds: Normal breath sounds. Abdominal:      General: Abdomen is flat. Bowel sounds are normal. There is no distension. Palpations: Abdomen is soft. Tenderness: There is no abdominal tenderness. Musculoskeletal:      Right lower leg: No edema. Left lower leg: No edema. Skin:     General: Skin is warm. Coloration: Skin is not jaundiced or pale. Findings: No bruising. Neurological:      General: No focal deficit present. Mental Status: He is alert and oriented to person, place, and time. Psychiatric:         Mood and Affect: Mood normal.         Behavior: Behavior normal.       An electronic signature was used to authenticate this note.   -- Fauzia Parker PA-C

## 2023-02-18 DIAGNOSIS — K21.9 GASTROESOPHAGEAL REFLUX DISEASE, UNSPECIFIED WHETHER ESOPHAGITIS PRESENT: ICD-10-CM

## 2023-02-18 LAB
ALBUMIN SERPL-MCNC: 4.8 G/DL (ref 4–5)
ALBUMIN/GLOB SERPL: 2.2 {RATIO} (ref 1.2–2.2)
ALP SERPL-CCNC: 80 IU/L (ref 44–121)
ALT SERPL-CCNC: 28 IU/L (ref 0–44)
AST SERPL-CCNC: 19 IU/L (ref 0–40)
BILIRUB SERPL-MCNC: 0.9 MG/DL (ref 0–1.2)
BUN SERPL-MCNC: 11 MG/DL (ref 6–20)
BUN/CREAT SERPL: 14 (ref 9–20)
CALCIUM SERPL-MCNC: 9.3 MG/DL (ref 8.7–10.2)
CHLORIDE SERPL-SCNC: 102 MMOL/L (ref 96–106)
CO2 SERPL-SCNC: 19 MMOL/L (ref 20–29)
CREAT SERPL-MCNC: 0.78 MG/DL (ref 0.76–1.27)
EGFRCR SERPLBLD CKD-EPI 2021: 121 ML/MIN/1.73
EST. AVERAGE GLUCOSE BLD GHB EST-MCNC: 105 MG/DL
GLOBULIN SER CALC-MCNC: 2.2 G/DL (ref 1.5–4.5)
GLUCOSE SERPL-MCNC: 99 MG/DL (ref 70–99)
HBA1C MFR BLD: 5.3 % (ref 4.8–5.6)
POTASSIUM SERPL-SCNC: 4.1 MMOL/L (ref 3.5–5.2)
PROT SERPL-MCNC: 7 G/DL (ref 6–8.5)
SODIUM SERPL-SCNC: 137 MMOL/L (ref 134–144)

## 2023-02-21 RX ORDER — PANTOPRAZOLE SODIUM 40 MG/1
TABLET, DELAYED RELEASE ORAL
Qty: 90 TABLET | Refills: 3 | Status: SHIPPED | OUTPATIENT
Start: 2023-02-21

## 2023-04-04 ENCOUNTER — DOCUMENTATION ONLY (OUTPATIENT)
Dept: FAMILY MEDICINE CLINIC | Age: 33
End: 2023-04-04

## 2023-04-19 ENCOUNTER — OFFICE VISIT (OUTPATIENT)
Dept: FAMILY MEDICINE CLINIC | Age: 33
End: 2023-04-19

## 2023-04-19 VITALS
BODY MASS INDEX: 34.1 KG/M2 | SYSTOLIC BLOOD PRESSURE: 121 MMHG | TEMPERATURE: 98.3 F | WEIGHT: 251.8 LBS | DIASTOLIC BLOOD PRESSURE: 83 MMHG | OXYGEN SATURATION: 96 % | HEIGHT: 72 IN | HEART RATE: 81 BPM

## 2023-04-19 DIAGNOSIS — Z91.09 ENVIRONMENTAL ALLERGIES: ICD-10-CM

## 2023-04-19 DIAGNOSIS — Z11.3 SCREEN FOR STD (SEXUALLY TRANSMITTED DISEASE): ICD-10-CM

## 2023-04-19 DIAGNOSIS — R53.83 FATIGUE, UNSPECIFIED TYPE: Primary | ICD-10-CM

## 2023-04-19 RX ORDER — MONTELUKAST SODIUM 10 MG/1
10 TABLET ORAL DAILY
Qty: 30 TABLET | Refills: 3 | Status: SHIPPED | OUTPATIENT
Start: 2023-04-19

## 2023-04-19 RX ORDER — FLUTICASONE PROPIONATE 50 MCG
2 SPRAY, SUSPENSION (ML) NASAL DAILY
Qty: 1 EACH | Refills: 2 | Status: SHIPPED | OUTPATIENT
Start: 2023-04-19

## 2023-04-19 RX ORDER — CHLORPHENIRAMINE MALEATE, IBUPROFEN, PSEUDOEPHEDRINE HCL 2; 200; 30 MG/1; MG/1; MG/1
TABLET, COATED ORAL
COMMUNITY

## 2023-04-19 RX ORDER — LEVOCETIRIZINE DIHYDROCHLORIDE 5 MG/1
TABLET, FILM COATED ORAL
COMMUNITY

## 2023-04-19 RX ORDER — ALBUTEROL SULFATE 90 UG/1
2 AEROSOL, METERED RESPIRATORY (INHALATION)
Qty: 18 G | Refills: 1 | Status: SHIPPED | OUTPATIENT
Start: 2023-04-19

## 2023-04-19 NOTE — PROGRESS NOTES
Jakob Funes is a 35 y.o. male , id x 2(name and ). Reviewed record, history, and  medications. Chief Complaint   Patient presents with    Labs     testosterone    Allergic Rhinitis     Had a bad cough a wheeze over the past few weeks. Throat has been dry and has had a pressure in the back of his throat that makes it feel hard to swallow       Vitals:    23 0825   BP: 121/83   Pulse: 81   Temp: 98.3 °F (36.8 °C)   SpO2: 96%   Weight: 251 lb 12.8 oz (114.2 kg)   Height: 6' (1.829 m)       Coordination of Care Questionnaire:   1. Have you been to the ER, urgent care clinic since your last visit? Hospitalized since your last visit? No    2. Have you seen or consulted any other health care providers outside of the [address] [place] [address] since your last visit? Include any pap smears or colon screening. No      3 most recent PHQ Screens 2023   Little interest or pleasure in doing things Not at all   Feeling down, depressed, irritable, or hopeless Not at all   Total Score PHQ 2 0       Social Determinants of Health     Tobacco Use: Medium Risk    Smoking Tobacco Use: Former    Smokeless Tobacco Use: Never    Passive Exposure: Not on file   Alcohol Use: Not on file   Financial Resource Strain: Not on file   Food Insecurity: Not on file   Transportation Needs: Not on file   Physical Activity: Not on file   Stress: Not on file   Social Connections: Not on file   Intimate Partner Violence: Not on file   Depression: Not at risk    PHQ-2 Score: 0   Housing Stability: Not on file       Patient is accompanied by self I have received verbal consent from Jakob Funes to discuss any/all medical information while they are present in the room.

## 2023-04-19 NOTE — PROGRESS NOTES
Progress Note    he is a 35y.o. year old male who presents for evalution. Assessment/ Plan:   {There are no diagnoses linked to this encounter. (Refresh or delete this SmartLink)}     Patient is *** fasting for labs today. I have discussed the diagnosis with the patient and the intended plan as seen in the above orders. The patient has received an after-visit summary and questions were answered concerning future plans. Pt conveyed understanding of plan. Medication Side Effects and Warnings were discussed with patient        Subjective:     Chief Complaint   Patient presents with    Labs     testosterone    Allergic Rhinitis     Had a bad cough a wheeze over the past few weeks. Throat has been dry and has had a pressure in the back of his throat that makes it feel hard to swallow           Reviewed PmHx, RxHx, FmHx, SocHx, AllgHx and updated and dated in the chart. Review of Systems - negative except as listed above in the HPI    Objective:     Vitals:    04/19/23 0825   BP: 121/83   Pulse: 81   Temp: 98.3 °F (36.8 °C)   SpO2: 96%   Weight: 251 lb 12.8 oz (114.2 kg)   Height: 6' (1.829 m)       Current Outpatient Medications   Medication Sig    levocetirizine (Xyzal) 5 mg tablet Take  by mouth. chlorphen-pseudoeph-ibuprofen (Advil Allergy Sinus) 2- mg tab Take  by mouth. sertraline (ZOLOFT) 50 mg tablet Take 1 Tablet by mouth daily. pantoprazole (PROTONIX) 40 mg tablet TAKE 1 TABLET BY MOUTH EVERY DAY    metoprolol tartrate (LOPRESSOR) 50 mg tablet Take 1 Tablet by mouth two (2) times a day. ferrous sulfate (IRON PO) Take 65 mg by mouth.    multivitamin (ONE A DAY) tablet Take 1 Tablet by mouth daily. cholecalciferol, vitamin D3, 50 mcg (2,000 unit) tab Take  by mouth. aspirin delayed-release 81 mg tablet Take  by mouth daily. B.infantis-B.ani-B.long-B.bifi (Probiotic 4X) 10-15 mg TbEC Take  by mouth.  Ken Shebly (Patient not taking: Reported on 2/17/2023)    FIBER CHOICE PO Take  by mouth. (Patient not taking: Reported on 2/17/2023)    traZODone (DESYREL) 50 mg tablet TK 1 T PO HS (Patient not taking: No sig reported)     No current facility-administered medications for this visit.        Physical Exam         Rafia Rankin MD

## 2023-04-20 LAB
ALBUMIN SERPL-MCNC: 5 G/DL (ref 4–5)
ALBUMIN/GLOB SERPL: 1.7 {RATIO} (ref 1.2–2.2)
ALP SERPL-CCNC: 108 IU/L (ref 44–121)
ALT SERPL-CCNC: 42 IU/L (ref 0–44)
AST SERPL-CCNC: 34 IU/L (ref 0–40)
BASOPHILS # BLD AUTO: 0.1 X10E3/UL (ref 0–0.2)
BASOPHILS NFR BLD AUTO: 1 %
BILIRUB SERPL-MCNC: 1 MG/DL (ref 0–1.2)
BUN SERPL-MCNC: 12 MG/DL (ref 6–20)
BUN/CREAT SERPL: 14 (ref 9–20)
CALCIUM SERPL-MCNC: 9.5 MG/DL (ref 8.7–10.2)
CHLORIDE SERPL-SCNC: 101 MMOL/L (ref 96–106)
CO2 SERPL-SCNC: 21 MMOL/L (ref 20–29)
CREAT SERPL-MCNC: 0.83 MG/DL (ref 0.76–1.27)
EGFRCR SERPLBLD CKD-EPI 2021: 119 ML/MIN/1.73
EOSINOPHIL # BLD AUTO: 0.1 X10E3/UL (ref 0–0.4)
EOSINOPHIL NFR BLD AUTO: 1 %
ERYTHROCYTE [DISTWIDTH] IN BLOOD BY AUTOMATED COUNT: 13.3 % (ref 11.6–15.4)
GLOBULIN SER CALC-MCNC: 2.9 G/DL (ref 1.5–4.5)
GLUCOSE SERPL-MCNC: 111 MG/DL (ref 70–99)
HCT VFR BLD AUTO: 49.2 % (ref 37.5–51)
HGB BLD-MCNC: 17.8 G/DL (ref 13–17.7)
HIV 1+2 AB+HIV1 P24 AG SERPL QL IA: NON REACTIVE
IMM GRANULOCYTES # BLD AUTO: 0.1 X10E3/UL (ref 0–0.1)
IMM GRANULOCYTES NFR BLD AUTO: 1 %
LYMPHOCYTES # BLD AUTO: 2.3 X10E3/UL (ref 0.7–3.1)
LYMPHOCYTES NFR BLD AUTO: 40 %
MCH RBC QN AUTO: 32.5 PG (ref 26.6–33)
MCHC RBC AUTO-ENTMCNC: 36.2 G/DL (ref 31.5–35.7)
MCV RBC AUTO: 90 FL (ref 79–97)
MONOCYTES # BLD AUTO: 0.8 X10E3/UL (ref 0.1–0.9)
MONOCYTES NFR BLD AUTO: 14 %
NEUTROPHILS # BLD AUTO: 2.5 X10E3/UL (ref 1.4–7)
NEUTROPHILS NFR BLD AUTO: 43 %
PLATELET # BLD AUTO: 154 X10E3/UL (ref 150–450)
POTASSIUM SERPL-SCNC: 4.5 MMOL/L (ref 3.5–5.2)
PROT SERPL-MCNC: 7.9 G/DL (ref 6–8.5)
RBC # BLD AUTO: 5.47 X10E6/UL (ref 4.14–5.8)
SODIUM SERPL-SCNC: 140 MMOL/L (ref 134–144)
TESTOST SERPL-MCNC: 226 NG/DL (ref 264–916)
TSH SERPL DL<=0.005 MIU/L-ACNC: 2.35 UIU/ML (ref 0.45–4.5)
WBC # BLD AUTO: 5.7 X10E3/UL (ref 3.4–10.8)

## 2023-04-27 ENCOUNTER — HOSPITAL ENCOUNTER (EMERGENCY)
Age: 33
Discharge: HOME | End: 2023-04-27
Attending: EMERGENCY MEDICINE
Payer: COMMERCIAL

## 2023-04-27 ENCOUNTER — TELEPHONE (OUTPATIENT)
Dept: FAMILY MEDICINE CLINIC | Age: 33
End: 2023-04-27

## 2023-04-27 ENCOUNTER — APPOINTMENT (OUTPATIENT)
Dept: CT IMAGING | Age: 33
End: 2023-04-27
Attending: PHYSICIAN ASSISTANT
Payer: COMMERCIAL

## 2023-04-27 VITALS
DIASTOLIC BLOOD PRESSURE: 63 MMHG | OXYGEN SATURATION: 92 % | WEIGHT: 250 LBS | BODY MASS INDEX: 33.86 KG/M2 | SYSTOLIC BLOOD PRESSURE: 120 MMHG | HEART RATE: 97 BPM | HEIGHT: 72 IN | TEMPERATURE: 97.7 F | RESPIRATION RATE: 16 BRPM

## 2023-04-27 DIAGNOSIS — R79.89 LOW SERUM TESTOSTERONE LEVEL IN MALE: Primary | ICD-10-CM

## 2023-04-27 DIAGNOSIS — J02.9 ACUTE PHARYNGITIS, UNSPECIFIED ETIOLOGY: Primary | ICD-10-CM

## 2023-04-27 DIAGNOSIS — J35.8 TONSILLAR MASS: ICD-10-CM

## 2023-04-27 DIAGNOSIS — R91.8 MULTIPLE PULMONARY NODULES: ICD-10-CM

## 2023-04-27 DIAGNOSIS — R59.0 REACTIVE CERVICAL LYMPHADENOPATHY: ICD-10-CM

## 2023-04-27 LAB
ALBUMIN SERPL-MCNC: 4.3 G/DL (ref 3.5–5)
ALBUMIN/GLOB SERPL: 1 (ref 1.1–2.2)
ALP SERPL-CCNC: 96 U/L (ref 45–117)
ALT SERPL-CCNC: 50 U/L (ref 12–78)
ANION GAP SERPL CALC-SCNC: 2 MMOL/L (ref 5–15)
AST SERPL-CCNC: 24 U/L (ref 15–37)
BASOPHILS # BLD: 0 K/UL (ref 0–0.1)
BASOPHILS NFR BLD: 0 % (ref 0–1)
BILIRUB SERPL-MCNC: 1 MG/DL (ref 0.2–1)
BUN SERPL-MCNC: 16 MG/DL (ref 6–20)
BUN/CREAT SERPL: 14 (ref 12–20)
CALCIUM SERPL-MCNC: 9.7 MG/DL (ref 8.5–10.1)
CHLORIDE SERPL-SCNC: 106 MMOL/L (ref 97–108)
CO2 SERPL-SCNC: 26 MMOL/L (ref 21–32)
COMMENT, HOLDF: NORMAL
CREAT SERPL-MCNC: 1.12 MG/DL (ref 0.7–1.3)
DIFFERENTIAL METHOD BLD: ABNORMAL
EOSINOPHIL # BLD: 0 K/UL (ref 0–0.4)
EOSINOPHIL NFR BLD: 0 % (ref 0–7)
ERYTHROCYTE [DISTWIDTH] IN BLOOD BY AUTOMATED COUNT: 13.4 % (ref 11.5–14.5)
GLOBULIN SER CALC-MCNC: 4.4 G/DL (ref 2–4)
GLUCOSE SERPL-MCNC: 116 MG/DL (ref 65–100)
HCT VFR BLD AUTO: 50.1 % (ref 36.6–50.3)
HGB BLD-MCNC: 17.2 G/DL (ref 12.1–17)
IMM GRANULOCYTES # BLD AUTO: 0 K/UL (ref 0–0.04)
IMM GRANULOCYTES NFR BLD AUTO: 0 % (ref 0–0.5)
LYMPHOCYTES # BLD: 3.5 K/UL (ref 0.8–3.5)
LYMPHOCYTES NFR BLD: 35 % (ref 12–49)
MCH RBC QN AUTO: 29.8 PG (ref 26–34)
MCHC RBC AUTO-ENTMCNC: 34.3 G/DL (ref 30–36.5)
MCV RBC AUTO: 86.7 FL (ref 80–99)
MONOCYTES # BLD: 1.1 K/UL (ref 0–1)
MONOCYTES NFR BLD: 11 % (ref 5–13)
NEUTS SEG # BLD: 5.3 K/UL (ref 1.8–8)
NEUTS SEG NFR BLD: 54 % (ref 32–75)
NRBC # BLD: 0 K/UL (ref 0–0.01)
NRBC BLD-RTO: 0 PER 100 WBC
PLATELET # BLD AUTO: 191 K/UL (ref 150–400)
PMV BLD AUTO: 10.1 FL (ref 8.9–12.9)
POTASSIUM SERPL-SCNC: 4.4 MMOL/L (ref 3.5–5.1)
PROT SERPL-MCNC: 8.7 G/DL (ref 6.4–8.2)
RBC # BLD AUTO: 5.78 M/UL (ref 4.1–5.7)
RBC MORPH BLD: ABNORMAL
SAMPLES BEING HELD,HOLD: NORMAL
SODIUM SERPL-SCNC: 134 MMOL/L (ref 136–145)
WBC # BLD AUTO: 9.9 K/UL (ref 4.1–11.1)
WBC MORPH BLD: ABNORMAL

## 2023-04-27 PROCEDURE — 74011000636 HC RX REV CODE- 636: Performed by: EMERGENCY MEDICINE

## 2023-04-27 PROCEDURE — 80053 COMPREHEN METABOLIC PANEL: CPT

## 2023-04-27 PROCEDURE — 96374 THER/PROPH/DIAG INJ IV PUSH: CPT

## 2023-04-27 PROCEDURE — 74011250636 HC RX REV CODE- 250/636: Performed by: PHYSICIAN ASSISTANT

## 2023-04-27 PROCEDURE — 96375 TX/PRO/DX INJ NEW DRUG ADDON: CPT

## 2023-04-27 PROCEDURE — 99285 EMERGENCY DEPT VISIT HI MDM: CPT

## 2023-04-27 PROCEDURE — 70491 CT SOFT TISSUE NECK W/DYE: CPT

## 2023-04-27 PROCEDURE — 85025 COMPLETE CBC W/AUTO DIFF WBC: CPT

## 2023-04-27 RX ORDER — KETOROLAC TROMETHAMINE 30 MG/ML
30 INJECTION, SOLUTION INTRAMUSCULAR; INTRAVENOUS
Status: COMPLETED | OUTPATIENT
Start: 2023-04-27 | End: 2023-04-27

## 2023-04-27 RX ORDER — DEXAMETHASONE SODIUM PHOSPHATE 10 MG/ML
10 INJECTION INTRAMUSCULAR; INTRAVENOUS ONCE
Status: COMPLETED | OUTPATIENT
Start: 2023-04-27 | End: 2023-04-27

## 2023-04-27 RX ORDER — METHYLPREDNISOLONE 4 MG/1
TABLET ORAL
Qty: 1 DOSE PACK | Refills: 0 | Status: SHIPPED | OUTPATIENT
Start: 2023-04-29

## 2023-04-27 RX ORDER — AMOXICILLIN AND CLAVULANATE POTASSIUM 875; 125 MG/1; MG/1
1 TABLET, FILM COATED ORAL 2 TIMES DAILY
Qty: 20 TABLET | Refills: 0 | Status: SHIPPED | OUTPATIENT
Start: 2023-04-27 | End: 2023-05-07

## 2023-04-27 RX ADMIN — SODIUM CHLORIDE 1000 ML: 9 INJECTION, SOLUTION INTRAVENOUS at 08:36

## 2023-04-27 RX ADMIN — KETOROLAC TROMETHAMINE 30 MG: 30 INJECTION, SOLUTION INTRAMUSCULAR at 08:36

## 2023-04-27 RX ADMIN — IOPAMIDOL 100 ML: 755 INJECTION, SOLUTION INTRAVENOUS at 08:52

## 2023-04-27 RX ADMIN — DEXAMETHASONE SODIUM PHOSPHATE 10 MG: 10 INJECTION INTRAMUSCULAR; INTRAVENOUS at 08:34

## 2023-04-27 NOTE — TELEPHONE ENCOUNTER
----- Message from Jean-Paul Mazariegos LPN sent at 0/19/2681  3:40 PM EDT -----  Regarding: FW: Question regarding TESTOSTERONE, TOTAL, ADULT MALE  Contact: [phone]  This is the pt I need to talk to you about  ----- Message -----  From: Stormy Govea MD  Sent: 4/26/2023   4:36 PM EDT  To: Jean-Paul Mazariegos LPN  Subject: FW: Question regarding TESTOSTERONE, TOTAL, #    Please contact pt for lab appt for testosterone recheck and offer appt with Chiquita Huggins    ----- Message -----  From: Pattie Calloway  Sent: 4/25/2023   9:06 AM EDT  To: Sebas Fernandez MD  Subject: Question regarding TESTOSTERONE, TOTAL, ADUL#    Please let me know when the next available date is. Can you please take another look at my throat.

## 2023-04-27 NOTE — ED PROVIDER NOTES
30yo male here with sore throat x 6 days, L tonsillar swelling, muffled voice x 2 days. Began with rhinorrhea, nasal congestion x 1 month, last Friday began with L sided sore throat. Seen at urgent care 2 days ago, tested for strep, COVID, flu which was negative. Started on amoxicillin, given PO Decadron there, told to go to ER if symptoms worsen. He felt better yesterday but today the L side of his tonsil felt tighter. No issues breathing, no drooling or trismus. Is taking abx as directed. Has never had a fever. No hx of PTA or throat surgery. Past Medical History:   Diagnosis Date    History of vascular access device 08/10/2020    5 Fr triple PICC, hemodynamically unstable, 45 cm L basilic    Tracheostomy dependent ([facility] [address] 10/2/2020       Past Surgical History:   Procedure Laterality Date    IR INSERT GASTROSTOMY TUBE PERC  9/23/2020    IR PERCUT GASTROSTROMY TUBE  9/23/2020         IR THORA/INS CHEST TUBE(PNEUMO) WO IMAGE  8/11/2020    IR THORA/INS CHEST TUBE(PNEUMO) WO IMAGE  8/11/2020    IN TRACHEOSTOMY, PLANNED  8/26/2020              Family History:   Problem Relation Age of Onset    Hypertension Mother     Hypertension Father     Cancer Father     COPD Father     Hypertension Brother     Bipolar Disorder Brother     Drug Abuse Brother     Hypertension Maternal Grandmother     Diabetes Maternal Grandmother        Social History     Socioeconomic History    Marital status: SINGLE     Spouse name: Not on file    Number of children: Not on file    Years of education: Not on file    Highest education level: Not on file   Occupational History    Not on file   Tobacco Use    Smoking status: Former     Packs/day: 1.00     Years: 10.00     Pack years: 10.00     Types: Cigarettes     Quit date: 7/27/2019     Years since quitting: 3.7    Smokeless tobacco: Never   Vaping Use    Vaping Use: Never used   Substance and Sexual Activity    Alcohol use:  Yes     Alcohol/week: 3.0 standard drinks     Types: 3 Cans of beer per week    Drug use: Never    Sexual activity: Yes   Other Topics Concern     Service Not Asked    Blood Transfusions Not Asked    Caffeine Concern Not Asked    Occupational Exposure Not Asked    Hobby Hazards Not Asked    Sleep Concern Not Asked    Stress Concern Not Asked    Weight Concern Not Asked    Special Diet Not Asked    Back Care Not Asked    Exercise Not Asked    Bike Helmet Not Asked    Seat Belt Not Asked    Self-Exams Not Asked   Social History Narrative    Not on file     Social Determinants of Health     Financial Resource Strain: Not on file   Food Insecurity: Not on file   Transportation Needs: Not on file   Physical Activity: Not on file   Stress: Not on file   Social Connections: Not on file   Intimate Partner Violence: Not on file   Housing Stability: Not on file         ALLERGIES: Patient has no known allergies. Review of Systems   Constitutional: Negative. Negative for activity change, chills, fatigue and unexpected weight change. HENT:  Positive for sore throat. Negative for trouble swallowing. Respiratory:  Negative for cough, chest tightness, shortness of breath and wheezing. Cardiovascular: Negative. Negative for chest pain and palpitations. Gastrointestinal: Negative. Negative for abdominal pain, diarrhea, nausea and vomiting. Genitourinary: Negative. Negative for dysuria, flank pain, frequency and hematuria. Musculoskeletal: Negative. Negative for arthralgias, back pain, neck pain and neck stiffness. Skin: Negative. Negative for color change and rash. Neurological: Negative. Negative for dizziness, numbness and headaches. All other systems reviewed and are negative. Vitals:    04/27/23 0750   BP: (!) 143/84   Pulse: 97   Resp: 16   Temp: 97.7 °F (36.5 °C)   SpO2: 95%   Weight: 113.4 kg (250 lb)   Height: 6' (1.829 m)            Physical Exam  Vitals and nursing note reviewed. Constitutional:       General: He is not in acute distress. Appearance: Normal appearance. He is well-developed. He is not toxic-appearing or diaphoretic. HENT:      Head: Normocephalic and atraumatic. Right Ear: Tympanic membrane normal.      Left Ear: Tympanic membrane normal.      Nose: Nose normal.      Mouth/Throat:      Pharynx: Uvula midline. Posterior oropharyngeal erythema present. No oropharyngeal exudate. Comments: Voice slightly muffled. L tonsil enlarged, round-shaped with no midline shift of the uvula. No trismus or drooling. R submandibular lymphadenopathy. Eyes:      General:         Right eye: No discharge. Left eye: No discharge. Conjunctiva/sclera: Conjunctivae normal.   Neck:      Trachea: No tracheal tenderness. Cardiovascular:      Rate and Rhythm: Normal rate and regular rhythm. Pulses: Normal pulses. Heart sounds: Normal heart sounds. No murmur heard. No friction rub. No gallop. Pulmonary:      Effort: Pulmonary effort is normal. No respiratory distress. Breath sounds: Normal breath sounds. No wheezing or rales. Chest:      Chest wall: No tenderness. Abdominal:      General: Bowel sounds are normal. There is no distension. Palpations: Abdomen is soft. Tenderness: There is no abdominal tenderness. There is no guarding or rebound. Musculoskeletal:         General: No tenderness. Normal range of motion. Cervical back: Full passive range of motion without pain and normal range of motion. Skin:     General: Skin is warm and dry. Capillary Refill: Capillary refill takes less than 2 seconds. Findings: No abrasion, erythema or rash. Neurological:      General: No focal deficit present. Mental Status: He is alert and oriented to person, place, and time. Cranial Nerves: No cranial nerve deficit. Sensory: No sensory deficit.       Coordination: Coordination normal.   Psychiatric:         Speech: Speech normal.         Behavior: Behavior normal.        Medical Decision Making    Ddx: PTA, tonsillitis, strep    Amount and/or Complexity of Data Reviewed  Labs: ordered. Radiology: ordered. Risk  Prescription drug management. Procedures      I discussed patient's PMH, exam findings as well as careplan with the ER attending who agrees with care plan. Penny Cornelius PA-C    I spoke with Dr. Khushbu Parson ENT who states patient can follow-up with Dr. Rothman Seen ENT in the next few days, to call and schedule appointment. Patient is stable, tolerating PO, no airway edema. Feels better post-Tordadol and Decadron. Will d/c amox and start Augmentin, continue steroids. Soft food/liquid diet. Dicussed all lab/CT findings and need for close ENT follow-up. Return precautions discussed. DISCHARGE NOTE:  10:08 AM  The patient has been re-evaluated and feeling much better and are stable for discharge. All available radiology and laboratory results have been reviewed with patient and/or available family. Patient and/or family verbally conveyed their understanding and agreement of the patient's signs, symptoms, diagnosis, treatment and prognosis and additionally agree to follow-up as recommended in the discharge instructions or to return to the Emergency Department should their condition change or worsen prior to their follow-up appointment. All questions have been answered and patient and/or available family express understanding. LABORATORY RESULTS:  Recent Results (from the past 24 hour(s))   SAMPLES BEING HELD    Collection Time: 04/27/23  8:00 AM   Result Value Ref Range    SAMPLES BEING HELD [email or web address].PST     COMMENT        Add-on orders for these samples will be processed based on acceptable specimen integrity and analyte stability, which may vary by analyte.    CBC WITH AUTOMATED DIFF    Collection Time: 04/27/23  8:00 AM   Result Value Ref Range    WBC 9.9 4.1 - 11.1 K/uL    RBC 5.78 (H) 4.10 - 5.70 M/uL    HGB 17.2 (H) 12.1 - 17.0 g/dL    HCT 50.1 36.6 - 50.3 %    MCV 86.7 80.0 - 99.0 FL    MCH 29.8 26.0 - 34.0 PG    MCHC 34.3 30.0 - 36.5 g/dL    RDW 13.4 11.5 - 14.5 %    PLATELET 755 160 - 376 K/uL    MPV 10.1 8.9 - 12.9 FL    NRBC 0.0 0  WBC    ABSOLUTE NRBC 0.00 0.00 - 0.01 K/uL    NEUTROPHILS 54 32 - 75 %    LYMPHOCYTES 35 12 - 49 %    MONOCYTES 11 5 - 13 %    EOSINOPHILS 0 0 - 7 %    BASOPHILS 0 0 - 1 %    IMMATURE GRANULOCYTES 0 0.0 - 0.5 %    ABS. NEUTROPHILS 5.3 1.8 - 8.0 K/UL    ABS. LYMPHOCYTES 3.5 0.8 - 3.5 K/UL    ABS. MONOCYTES 1.1 (H) 0.0 - 1.0 K/UL    ABS. EOSINOPHILS 0.0 0.0 - 0.4 K/UL    ABS. BASOPHILS 0.0 0.0 - 0.1 K/UL    ABS. IMM. GRANS. 0.0 0.00 - 0.04 K/UL    DF MANUAL      RBC COMMENTS NORMOCYTIC, NORMOCHROMIC      WBC COMMENTS REACTIVE LYMPHS     METABOLIC PANEL, COMPREHENSIVE    Collection Time: 04/27/23  8:00 AM   Result Value Ref Range    Sodium 134 (L) 136 - 145 mmol/L    Potassium 4.4 3.5 - 5.1 mmol/L    Chloride 106 97 - 108 mmol/L    CO2 26 21 - 32 mmol/L    Anion gap 2 (L) 5 - 15 mmol/L    Glucose 116 (H) 65 - 100 mg/dL    BUN 16 6 - 20 MG/DL    Creatinine 1.12 0.70 - 1.30 MG/DL    BUN/Creatinine ratio 14 12 - 20      eGFR >60 >60 ml/min/1.73m2    Calcium 9.7 8.5 - 10.1 MG/DL    Bilirubin, total 1.0 0.2 - 1.0 MG/DL    ALT (SGPT) 50 12 - 78 U/L    AST (SGOT) 24 15 - 37 U/L    Alk. phosphatase 96 45 - 117 U/L    Protein, total 8.7 (H) 6.4 - 8.2 g/dL    Albumin 4.3 3.5 - 5.0 g/dL    Globulin 4.4 (H) 2.0 - 4.0 g/dL    A-G Ratio 1.0 (L) 1.1 - 2.2         IMAGING RESULTS:  CT NECK SOFT TISSUE W CONT    Result Date: 4/27/2023  1. No soft tissue abscess or drainable collection. Enlarged heterogeneous pontine tonsils are likely reactive. 2. Bilateral cervical and upper mediastinal lymphadenopathy may be reactive, but a neoplastic process, such as lymphoma, is not excluded. A left peritonsillar mass versus enlarged lymph node is noted.  3. Stable scarring in the visualized upper lungs with new scattered lung nodules measuring up to 0.7 cm that may be infectious or neoplastic. MEDICATIONS GIVEN:  Medications   sodium chloride 0.9 % bolus infusion 1,000 mL (1,000 mL IntraVENous New Bag 4/27/23 0836)   ketorolac (TORADOL) injection 30 mg (30 mg IntraVENous Given 4/27/23 0836)   dexamethasone (PF) (DECADRON) 10 mg/mL injection 10 mg (10 mg IntraVENous Given 4/27/23 0834)   iopamidoL (ISOVUE-370) 370 mg iodine /mL (76 %) injection 100 mL (100 mL IntraVENous Given 4/27/23 0852)       IMPRESSION:  1. Acute pharyngitis, unspecified etiology    2. Tonsillar mass    3. Reactive cervical lymphadenopathy    4. Multiple pulmonary nodules        PLAN:  Follow-up Information       Follow up With Specialties Details Why Contact Info    Britney Lai MD Otolaryngology  Call today to schedule close follow-up with ENT. [address] [place] [address]  [id number] [facility]  [phone]      Christine Guevara MD Family Medicine Schedule an appointment as soon as possible for a visit   [address] [place] [id number]  [phone]            Current Discharge Medication List        START taking these medications    Details   methylPREDNISolone (Medrol, Terry,) 4 mg tablet On 4/29 start dose pack. Take with food. Qty: 1 Dose Pack, Refills: 0  Start date: 4/29/2023      amoxicillin-clavulanate (Augmentin) 875-125 mg per tablet Take 1 Tablet by mouth two (2) times a day for 10 days. Qty: 20 Tablet, Refills: 0  Start date: 4/27/2023, End date: 5/7/2023                 Please note that this dictation was completed with Dragon, computer voice recognition software. Quite often unanticipated grammatical, syntax, homophones, and other interpretive errors are inadvertently transcribed by the computer software. Please disregard these errors. Additionally, please excuse any errors that have escaped final proofreading.

## 2023-04-27 NOTE — TELEPHONE ENCOUNTER
Please find out from Dr. Annette Henderson what  additional test she wanted to be done other than the testosterone. I do not mind seeing him to order these tests and to recheck his throat. He would then need to follow-up with her for discussing treatment plan.

## 2023-04-27 NOTE — DISCHARGE INSTRUCTIONS
Stop amoxicillin, start Augmentin. Start steroid dose edmund on Saturday 4/29/23. Continue a soft food / liquid diet. Call today to follow-up with ENT for re-assessment and continued care.

## 2023-04-27 NOTE — ED NOTES
Discussed the importance of follow up care and seeking immediate medical attention for any changes or worsening in the patients condition. Patient verbalized understanding of discharge paperwork, medication use and follow up care. Patient aware he needs to drop off his paper scripts to a pharmacy and then  when ready. Discussed the importance of follow up care and patient verbalized understanding.

## 2023-04-27 NOTE — ED TRIAGE NOTES
Patient reports he has been having allergy like symptoms for a month- reports a week ago he started with a sore throat and was seen at Urgent Care on Tuesday for the sore throat. Patient reports they were concerned the patient has an abscess and recommended he come to the ED if his symptoms got worse so he presents here today    Pt reports he started amoxicillin on Tuesday and has been taking it as prescribed.

## 2023-05-01 ENCOUNTER — DOCUMENTATION ONLY (OUTPATIENT)
Dept: FAMILY MEDICINE CLINIC | Age: 33
End: 2023-05-01

## 2023-05-01 NOTE — PROGRESS NOTES
Called pt in regards to his mychart apt request for Testerone labs & er fuv with Zeb Severin. LVM to call us back, was going to offer next available.

## 2023-05-17 ENCOUNTER — PATIENT MESSAGE (OUTPATIENT)
Age: 33
End: 2023-05-17

## 2023-05-17 ENCOUNTER — OFFICE VISIT (OUTPATIENT)
Age: 33
End: 2023-05-17
Payer: COMMERCIAL

## 2023-05-17 VITALS
HEART RATE: 74 BPM | TEMPERATURE: 98.8 F | SYSTOLIC BLOOD PRESSURE: 114 MMHG | WEIGHT: 249.6 LBS | BODY MASS INDEX: 33.81 KG/M2 | HEIGHT: 72 IN | OXYGEN SATURATION: 94 % | DIASTOLIC BLOOD PRESSURE: 75 MMHG

## 2023-05-17 DIAGNOSIS — R79.89 LOW SERUM TESTOSTERONE LEVEL IN MALE: ICD-10-CM

## 2023-05-17 DIAGNOSIS — R91.8 MULTIPLE LUNG NODULES: Primary | ICD-10-CM

## 2023-05-17 DIAGNOSIS — L30.9 DERMATITIS: ICD-10-CM

## 2023-05-17 DIAGNOSIS — Z86.16 HISTORY OF SEVERE ACUTE RESPIRATORY SYNDROME CORONAVIRUS 2 (SARS-COV-2) DISEASE: ICD-10-CM

## 2023-05-17 PROCEDURE — 99214 OFFICE O/P EST MOD 30 MIN: CPT | Performed by: STUDENT IN AN ORGANIZED HEALTH CARE EDUCATION/TRAINING PROGRAM

## 2023-05-17 RX ORDER — MONTELUKAST SODIUM 10 MG/1
TABLET ORAL
COMMUNITY
Start: 2023-05-15

## 2023-05-17 RX ORDER — ALBUTEROL SULFATE 90 UG/1
2 AEROSOL, METERED RESPIRATORY (INHALATION) EVERY 6 HOURS PRN
COMMUNITY
Start: 2023-04-19

## 2023-05-17 RX ORDER — CETIRIZINE HYDROCHLORIDE 10 MG/1
10 TABLET ORAL DAILY
COMMUNITY

## 2023-05-17 RX ORDER — PANTOPRAZOLE SODIUM 40 MG/1
TABLET, DELAYED RELEASE ORAL
COMMUNITY
Start: 2023-05-08

## 2023-05-17 RX ORDER — FLUTICASONE PROPIONATE 50 MCG
2 SPRAY, SUSPENSION (ML) NASAL DAILY
COMMUNITY
Start: 2023-04-19

## 2023-05-17 RX ORDER — METOPROLOL TARTRATE 50 MG/1
50 TABLET, FILM COATED ORAL 2 TIMES DAILY
COMMUNITY
Start: 2023-02-17

## 2023-05-17 RX ORDER — LEVOCETIRIZINE DIHYDROCHLORIDE 5 MG/1
TABLET, FILM COATED ORAL
COMMUNITY
End: 2023-05-17 | Stop reason: ALTCHOICE

## 2023-05-17 RX ORDER — ASPIRIN 81 MG/1
TABLET ORAL DAILY
COMMUNITY

## 2023-05-17 ASSESSMENT — PATIENT HEALTH QUESTIONNAIRE - PHQ9
1. LITTLE INTEREST OR PLEASURE IN DOING THINGS: 0
SUM OF ALL RESPONSES TO PHQ QUESTIONS 1-9: 0
SUM OF ALL RESPONSES TO PHQ9 QUESTIONS 1 & 2: 0
2. FEELING DOWN, DEPRESSED OR HOPELESS: 0
SUM OF ALL RESPONSES TO PHQ QUESTIONS 1-9: 0

## 2023-05-21 RX ORDER — CLOTRIMAZOLE AND BETAMETHASONE DIPROPIONATE 10; .64 MG/G; MG/G
CREAM TOPICAL
Qty: 45 G | Refills: 1 | Status: SHIPPED | OUTPATIENT
Start: 2023-05-21

## 2023-05-23 RX ORDER — METHYLPREDNISOLONE 4 MG/1
TABLET ORAL
COMMUNITY
Start: 2023-04-29

## 2023-05-23 RX ORDER — MONTELUKAST SODIUM 10 MG/1
10 TABLET ORAL DAILY
COMMUNITY
Start: 2023-04-19

## 2023-05-23 RX ORDER — PANTOPRAZOLE SODIUM 40 MG/1
1 TABLET, DELAYED RELEASE ORAL DAILY
COMMUNITY
Start: 2023-02-21

## 2023-05-23 RX ORDER — METOPROLOL TARTRATE 50 MG/1
50 TABLET, FILM COATED ORAL 2 TIMES DAILY
COMMUNITY
Start: 2023-02-17

## 2023-05-24 PROBLEM — E87.1 HYPONATREMIA: Status: RESOLVED | Noted: 2020-10-02 | Resolved: 2023-05-24

## 2023-05-24 PROBLEM — J93.9 PNEUMOTHORAX ON LEFT: Status: RESOLVED | Noted: 2020-08-14 | Resolved: 2023-05-24

## 2023-05-24 PROBLEM — R00.0 TACHYCARDIA: Status: RESOLVED | Noted: 2020-10-02 | Resolved: 2023-05-24

## 2023-05-24 PROBLEM — J96.00 ACUTE RESPIRATORY FAILURE (HCC): Status: RESOLVED | Noted: 2020-08-18 | Resolved: 2023-05-24

## 2023-05-24 PROBLEM — Z86.16 HISTORY OF SEVERE ACUTE RESPIRATORY SYNDROME CORONAVIRUS 2 (SARS-COV-2) DISEASE: Status: ACTIVE | Noted: 2020-08-08

## 2023-05-24 PROBLEM — J96.91 RESPIRATORY FAILURE WITH HYPOXIA (HCC): Status: RESOLVED | Noted: 2020-08-05 | Resolved: 2023-05-24

## 2023-05-28 SDOH — ECONOMIC STABILITY: TRANSPORTATION INSECURITY
IN THE PAST 12 MONTHS, HAS LACK OF TRANSPORTATION KEPT YOU FROM MEETINGS, WORK, OR FROM GETTING THINGS NEEDED FOR DAILY LIVING?: NO

## 2023-05-28 SDOH — ECONOMIC STABILITY: HOUSING INSECURITY
IN THE LAST 12 MONTHS, WAS THERE A TIME WHEN YOU DID NOT HAVE A STEADY PLACE TO SLEEP OR SLEPT IN A SHELTER (INCLUDING NOW)?: NO

## 2023-05-28 SDOH — ECONOMIC STABILITY: INCOME INSECURITY: HOW HARD IS IT FOR YOU TO PAY FOR THE VERY BASICS LIKE FOOD, HOUSING, MEDICAL CARE, AND HEATING?: SOMEWHAT HARD

## 2023-05-28 SDOH — ECONOMIC STABILITY: FOOD INSECURITY: WITHIN THE PAST 12 MONTHS, YOU WORRIED THAT YOUR FOOD WOULD RUN OUT BEFORE YOU GOT MONEY TO BUY MORE.: SOMETIMES TRUE

## 2023-05-28 SDOH — ECONOMIC STABILITY: FOOD INSECURITY: WITHIN THE PAST 12 MONTHS, THE FOOD YOU BOUGHT JUST DIDN'T LAST AND YOU DIDN'T HAVE MONEY TO GET MORE.: SOMETIMES TRUE

## 2023-05-30 ENCOUNTER — TELEPHONE (OUTPATIENT)
Age: 33
End: 2023-05-30

## 2023-05-30 NOTE — TELEPHONE ENCOUNTER
LVM for patient that rescheduled appointment for 05/31/2023 from  8:40am to 2:00pm. Advised to call office back to see if it is ok

## 2023-06-02 ENCOUNTER — OFFICE VISIT (OUTPATIENT)
Age: 33
End: 2023-06-02

## 2023-06-02 DIAGNOSIS — R79.89 LOW SERUM TESTOSTERONE LEVEL IN MALE: Primary | ICD-10-CM

## 2023-06-03 LAB
FSH SERPL-ACNC: 1.8 MIU/ML (ref 1.5–12.4)
LH SERPL-ACNC: 2.7 MIU/ML (ref 1.7–8.6)
TESTOST SERPL-MCNC: 269 NG/DL (ref 264–916)

## 2023-07-24 ENCOUNTER — TELEMEDICINE (OUTPATIENT)
Age: 33
End: 2023-07-24
Payer: COMMERCIAL

## 2023-07-24 DIAGNOSIS — R79.89 LOW SERUM TESTOSTERONE LEVEL IN MALE: Primary | ICD-10-CM

## 2023-07-24 PROCEDURE — 99214 OFFICE O/P EST MOD 30 MIN: CPT | Performed by: STUDENT IN AN ORGANIZED HEALTH CARE EDUCATION/TRAINING PROGRAM

## 2023-07-24 NOTE — PROGRESS NOTES
Progress Note    he is a 35y.o. year old male who presents for evaluation Results (Review test results)  . Assessment/ Plan:     1. Low serum testosterone level in male  Comments:  Recheck level to confirm. Reviewed labs, strategies to increase testosterone without medication  Reviewed options, risks for replacement  Orders:  -     FSH & LH; Future  -     Testosterone, free, total; Future         Return for based on labs. I have discussed the diagnosis with the patient and the intended plan as seen in the above orders. Medication Side Effects and Warnings were discussed with patient  The patient was instructed to access after-visit summary via [facility] and questions were answered concerning future plans. Patient conveyed understanding of plan. Subjective:     Chief Complaint   Patient presents with    Results     Review test results     The patient was located at Home: [address] [place] [address]    Had labs to check testosterone early June  He has not seen [employer] on labs  \"I need to start exercising. .. diet is not good\"      PMHx, RxHx, FmHx, SocHx, AllgHx were reviewed and updated in the chart. Review of Systems - negative except as noted above      Objective: There were no vitals filed for this visit. Current Outpatient Medications   Medication Sig    metoprolol tartrate (LOPRESSOR) 50 MG tablet Take 1 tablet by mouth 2 times daily    montelukast (SINGULAIR) 10 MG tablet Take 1 tablet by mouth daily    pantoprazole (PROTONIX) 40 MG tablet Take 1 tablet by mouth daily    sertraline (ZOLOFT) 50 MG tablet Take 1 tablet by mouth daily    clotrimazole-betamethasone (LOTRISONE) 1-0.05 % cream Apply topically 2 times daily.     Iron, Ferrous Sulfate, 325 (65 Fe) MG TABS Take 65 mg by mouth    Multiple Vitamin (MULTIVITAMIN ADULT PO) Take 1 tablet by mouth daily    albuterol sulfate HFA (PROVENTIL;VENTOLIN;PROAIR) 108 (90 Base) MCG/ACT inhaler Inhale 2 puffs into the

## 2023-07-24 NOTE — PROGRESS NOTES
Clarice Mancilla is a 35 y.o. male , id x 2(name and ). Reviewed questionnaires, and  medications. Chief Complaint   Patient presents with    Results     Review test results       No data recorded       1. Have you been to the ER, urgent care clinic since your last visit? Hospitalized since your last visit? No    2. Have you seen or consulted any other health care providers outside of the [address] [place] [address] since your last visit? Include any pap smears or colon screening.  No

## 2023-08-01 ENCOUNTER — OFFICE VISIT (OUTPATIENT)
Age: 33
End: 2023-08-01
Payer: COMMERCIAL

## 2023-08-01 VITALS
BODY MASS INDEX: 34.97 KG/M2 | RESPIRATION RATE: 20 BRPM | HEART RATE: 72 BPM | SYSTOLIC BLOOD PRESSURE: 137 MMHG | HEIGHT: 72 IN | WEIGHT: 258.2 LBS | OXYGEN SATURATION: 95 % | DIASTOLIC BLOOD PRESSURE: 87 MMHG | TEMPERATURE: 98.2 F

## 2023-08-01 DIAGNOSIS — H10.32 ACUTE CONJUNCTIVITIS OF LEFT EYE, UNSPECIFIED ACUTE CONJUNCTIVITIS TYPE: Primary | ICD-10-CM

## 2023-08-01 PROCEDURE — 99213 OFFICE O/P EST LOW 20 MIN: CPT | Performed by: PHYSICIAN ASSISTANT

## 2023-08-01 RX ORDER — CIPROFLOXACIN HYDROCHLORIDE 3.5 MG/ML
SOLUTION/ DROPS TOPICAL
Qty: 10 ML | Refills: 0 | Status: SHIPPED | OUTPATIENT
Start: 2023-08-01

## 2023-08-01 SDOH — ECONOMIC STABILITY: FOOD INSECURITY: WITHIN THE PAST 12 MONTHS, YOU WORRIED THAT YOUR FOOD WOULD RUN OUT BEFORE YOU GOT MONEY TO BUY MORE.: NEVER TRUE

## 2023-08-01 SDOH — ECONOMIC STABILITY: FOOD INSECURITY: WITHIN THE PAST 12 MONTHS, THE FOOD YOU BOUGHT JUST DIDN'T LAST AND YOU DIDN'T HAVE MONEY TO GET MORE.: NEVER TRUE

## 2023-08-01 SDOH — ECONOMIC STABILITY: INCOME INSECURITY: HOW HARD IS IT FOR YOU TO PAY FOR THE VERY BASICS LIKE FOOD, HOUSING, MEDICAL CARE, AND HEATING?: NOT VERY HARD

## 2023-08-01 ASSESSMENT — ANXIETY QUESTIONNAIRES
GAD7 TOTAL SCORE: 0
2. NOT BEING ABLE TO STOP OR CONTROL WORRYING: 0
3. WORRYING TOO MUCH ABOUT DIFFERENT THINGS: 0
1. FEELING NERVOUS, ANXIOUS, OR ON EDGE: 0
5. BEING SO RESTLESS THAT IT IS HARD TO SIT STILL: 0
IF YOU CHECKED OFF ANY PROBLEMS ON THIS QUESTIONNAIRE, HOW DIFFICULT HAVE THESE PROBLEMS MADE IT FOR YOU TO DO YOUR WORK, TAKE CARE OF THINGS AT HOME, OR GET ALONG WITH OTHER PEOPLE: NOT DIFFICULT AT ALL
4. TROUBLE RELAXING: 0
7. FEELING AFRAID AS IF SOMETHING AWFUL MIGHT HAPPEN: 0
6. BECOMING EASILY ANNOYED OR IRRITABLE: 0

## 2023-08-01 ASSESSMENT — PATIENT HEALTH QUESTIONNAIRE - PHQ9
SUM OF ALL RESPONSES TO PHQ QUESTIONS 1-9: 0
2. FEELING DOWN, DEPRESSED OR HOPELESS: 0
SUM OF ALL RESPONSES TO PHQ QUESTIONS 1-9: 0
SUM OF ALL RESPONSES TO PHQ9 QUESTIONS 1 & 2: 0
SUM OF ALL RESPONSES TO PHQ QUESTIONS 1-9: 0
SUM OF ALL RESPONSES TO PHQ QUESTIONS 1-9: 0
1. LITTLE INTEREST OR PLEASURE IN DOING THINGS: 0

## 2023-08-15 RX ORDER — MONTELUKAST SODIUM 10 MG/1
TABLET ORAL
Qty: 30 TABLET | Refills: 3 | Status: SHIPPED | OUTPATIENT
Start: 2023-08-15

## 2023-08-16 ENCOUNTER — OFFICE VISIT (OUTPATIENT)
Age: 33
End: 2023-08-16
Payer: COMMERCIAL

## 2023-08-16 VITALS
SYSTOLIC BLOOD PRESSURE: 116 MMHG | HEIGHT: 72 IN | HEART RATE: 66 BPM | WEIGHT: 260.4 LBS | TEMPERATURE: 98 F | DIASTOLIC BLOOD PRESSURE: 78 MMHG | BODY MASS INDEX: 35.27 KG/M2 | OXYGEN SATURATION: 96 %

## 2023-08-16 DIAGNOSIS — R79.89 LOW SERUM TESTOSTERONE LEVEL IN MALE: ICD-10-CM

## 2023-08-16 DIAGNOSIS — Z86.16 HISTORY OF SEVERE ACUTE RESPIRATORY SYNDROME CORONAVIRUS 2 (SARS-COV-2) DISEASE: ICD-10-CM

## 2023-08-16 DIAGNOSIS — I49.5 SICK SINUS SYNDROME (HCC): ICD-10-CM

## 2023-08-16 DIAGNOSIS — R91.8 MULTIPLE LUNG NODULES: ICD-10-CM

## 2023-08-16 DIAGNOSIS — E66.9 CLASS 2 OBESITY WITH BODY MASS INDEX (BMI) OF 35.0 TO 35.9 IN ADULT, UNSPECIFIED OBESITY TYPE, UNSPECIFIED WHETHER SERIOUS COMORBIDITY PRESENT: Primary | ICD-10-CM

## 2023-08-16 PROCEDURE — 99214 OFFICE O/P EST MOD 30 MIN: CPT | Performed by: STUDENT IN AN ORGANIZED HEALTH CARE EDUCATION/TRAINING PROGRAM

## 2023-08-16 NOTE — PROGRESS NOTES
(Medical): Not on file    Lack of Transportation (Non-Medical):  No   Physical Activity: Not on file   Stress: Not on file   Social Connections: Not on file   Intimate Partner Violence: Not on file   Depression: Not at risk    PHQ-2 Score: 0   Housing Stability: Unknown    Unable to Pay for Housing in the Last Year: Not on file    Number of Places Lived in the Last Year: Not on file    Unstable Housing in the Last Year: No

## 2023-08-16 NOTE — PATIENT INSTRUCTIONS
Call pulmonology to schedule a visit to follow-up on CT changes. Have early am labs done to check testosterone and other things. See if there is availability here at checkout. Make little changes each week. Exercise recommendations  150 minutes of moderate intensity cardio exercise in the week  3 days of total body strength training  Work on stretching/flexibility as well. It's great to get outside! But you can also check out youtube for fun videos and workouts. I like yoga with tonie    Track what you are eating and drinking - everything!  :)  Use an bre such as [employer] or a paper notebook. Aim to keep your carbohydrates low, under 100, then 80, then 60 grams daily. Do your best to primarily drink water. When eating, do not multitask. Pay attention to food and drink with all your senses. When you eat out, eat just half of it and wait 10 minutes before eating any more if you are hungry.

## 2023-08-19 ENCOUNTER — PATIENT MESSAGE (OUTPATIENT)
Age: 33
End: 2023-08-19

## 2023-08-19 DIAGNOSIS — F41.1 GENERALIZED ANXIETY DISORDER: Primary | ICD-10-CM

## 2023-08-25 DIAGNOSIS — E66.9 CLASS 2 OBESITY WITH BODY MASS INDEX (BMI) OF 35.0 TO 35.9 IN ADULT, UNSPECIFIED OBESITY TYPE, UNSPECIFIED WHETHER SERIOUS COMORBIDITY PRESENT: ICD-10-CM

## 2023-08-25 DIAGNOSIS — R79.89 LOW SERUM TESTOSTERONE LEVEL IN MALE: ICD-10-CM

## 2023-08-26 LAB
ALBUMIN SERPL-MCNC: 4.7 G/DL (ref 4.1–5.1)
ALBUMIN/GLOB SERPL: 2 {RATIO} (ref 1.2–2.2)
ALP SERPL-CCNC: 67 IU/L (ref 44–121)
ALT SERPL-CCNC: 31 IU/L (ref 0–44)
AST SERPL-CCNC: 24 IU/L (ref 0–40)
BILIRUB SERPL-MCNC: 0.7 MG/DL (ref 0–1.2)
BUN SERPL-MCNC: 14 MG/DL (ref 6–20)
BUN/CREAT SERPL: 18 (ref 9–20)
CALCIUM SERPL-MCNC: 9.7 MG/DL (ref 8.7–10.2)
CHLORIDE SERPL-SCNC: 101 MMOL/L (ref 96–106)
CHOLEST SERPL-MCNC: 154 MG/DL (ref 100–199)
CO2 SERPL-SCNC: 21 MMOL/L (ref 20–29)
CREAT SERPL-MCNC: 0.8 MG/DL (ref 0.76–1.27)
EGFRCR SERPLBLD CKD-EPI 2021: 120 ML/MIN/1.73
ERYTHROCYTE [DISTWIDTH] IN BLOOD BY AUTOMATED COUNT: 13.3 % (ref 11.6–15.4)
FSH SERPL-ACNC: 2 MIU/ML (ref 1.5–12.4)
GLOBULIN SER CALC-MCNC: 2.4 G/DL (ref 1.5–4.5)
GLUCOSE SERPL-MCNC: 117 MG/DL (ref 70–99)
HBA1C MFR BLD: 5.4 % (ref 4.8–5.6)
HCT VFR BLD AUTO: 52 % (ref 37.5–51)
HDLC SERPL-MCNC: 34 MG/DL
HGB BLD-MCNC: 18 G/DL (ref 13–17.7)
IMP & REVIEW OF LAB RESULTS: NORMAL
LDLC SERPL CALC-MCNC: 74 MG/DL (ref 0–99)
LH SERPL-ACNC: 5.2 MIU/ML (ref 1.7–8.6)
MCH RBC QN AUTO: 30.5 PG (ref 26.6–33)
MCHC RBC AUTO-ENTMCNC: 34.6 G/DL (ref 31.5–35.7)
MCV RBC AUTO: 88 FL (ref 79–97)
PLATELET # BLD AUTO: 224 X10E3/UL (ref 150–450)
POTASSIUM SERPL-SCNC: 4.2 MMOL/L (ref 3.5–5.2)
PROT SERPL-MCNC: 7.1 G/DL (ref 6–8.5)
RBC # BLD AUTO: 5.91 X10E6/UL (ref 4.14–5.8)
SODIUM SERPL-SCNC: 138 MMOL/L (ref 134–144)
TRIGL SERPL-MCNC: 285 MG/DL (ref 0–149)
TSH SERPL DL<=0.005 MIU/L-ACNC: 3.06 UIU/ML (ref 0.45–4.5)
VLDLC SERPL CALC-MCNC: 46 MG/DL (ref 5–40)
WBC # BLD AUTO: 6 X10E3/UL (ref 3.4–10.8)

## 2023-08-28 PROBLEM — E78.1 HYPERTRIGLYCERIDEMIA: Status: ACTIVE | Noted: 2023-08-28

## 2023-08-28 PROBLEM — D75.1 ERYTHROCYTOSIS: Status: ACTIVE | Noted: 2023-08-28

## 2023-09-03 LAB
TESTOST FREE SERPL-MCNC: 5.3 PG/ML (ref 8.7–25.1)
TESTOST SERPL-MCNC: 148 NG/DL (ref 264–916)

## 2023-09-20 ENCOUNTER — PATIENT MESSAGE (OUTPATIENT)
Age: 33
End: 2023-09-20

## 2023-09-20 DIAGNOSIS — R79.89 LOW SERUM TESTOSTERONE LEVEL IN MALE: Primary | ICD-10-CM

## 2023-09-22 ENCOUNTER — OFFICE VISIT (OUTPATIENT)
Age: 33
End: 2023-09-22
Payer: COMMERCIAL

## 2023-09-22 VITALS
BODY MASS INDEX: 34.81 KG/M2 | HEART RATE: 64 BPM | DIASTOLIC BLOOD PRESSURE: 74 MMHG | HEIGHT: 72 IN | WEIGHT: 257 LBS | OXYGEN SATURATION: 94 % | SYSTOLIC BLOOD PRESSURE: 108 MMHG

## 2023-09-22 DIAGNOSIS — I49.5 SICK SINUS SYNDROME (HCC): ICD-10-CM

## 2023-09-22 DIAGNOSIS — R00.0 TACHYCARDIA, UNSPECIFIED: Primary | ICD-10-CM

## 2023-09-22 DIAGNOSIS — E78.1 HYPERTRIGLYCERIDEMIA: ICD-10-CM

## 2023-09-22 PROCEDURE — 99213 OFFICE O/P EST LOW 20 MIN: CPT | Performed by: INTERNAL MEDICINE

## 2023-09-22 NOTE — PROGRESS NOTES
Sebastien Wong is a 35 y.o. male    had concerns including Tachycardia and Other (SSS). Vitals:    09/22/23 1019   BP: 108/74   Site: Left Upper Arm   Position: Sitting   Pulse: 64   SpO2: 94%   Weight: 257 lb (116.6 kg)   Height: 6' (1.829 m)        Chest pain No    SOB No    Dizziness at times when stretching     Swelling No    Refills No    Covid Vaccinated yes      1. Have you been to the ER, urgent care clinic since your last visit? Hospitalized since your last visit? no    2. Have you seen or consulted any other health care providers outside of the [address] [place] [address] since your last visit? Include any pap smears or colon screening.  no
needed. aspirin delayed-release 81 mg tablet Take  by mouth daily. diphenoxylate-atropine (LOMOTIL) 2.5-0.025 mg per tablet Take 1 Tablet by mouth four (4) times daily as needed for Diarrhea. Max Daily Amount: 4 Tablets. (Patient not taking: No sig reported)    ketoconazole (NIZORAL) 2 % topical cream Apply  to affected area daily. (Patient not taking: No sig reported)    hydrOXYzine HCL (ATARAX) 50 mg tablet TK 1 T PO QID PRA (Patient not taking: No sig reported)    traZODone (DESYREL) 50 mg tablet TK 1 T PO HS (Patient not taking: No sig reported)     No current facility-administered medications for this visit. Diagnostic Data Review:       No specialty comments available. Lab Review:     Lab Results   Component Value Date/Time    Cholesterol, total 156 09/05/2020 06:06 PM    HDL Cholesterol 26 09/05/2020 06:06 PM    LDL, calculated 92.4 09/05/2020 06:06 PM    Triglyceride 188 (H) 09/05/2020 06:06 PM    CHOL/HDL Ratio 6.0 (H) 09/05/2020 06:06 PM     Lab Results   Component Value Date/Time    Creatinine 0.99 06/16/2021 12:00 AM     Lab Results   Component Value Date/Time    BUN 11 06/16/2021 12:00 AM     Lab Results   Component Value Date/Time    Potassium 4.2 06/16/2021 12:00 AM     Lab Results   Component Value Date/Time    Hemoglobin A1c 5.4 09/05/2020 06:06 PM     Lab Results   Component Value Date/Time    HGB 16.9 06/16/2021 12:00 AM     Lab Results   Component Value Date/Time    PLATELET 806 17/84/2846 12:00 AM     No results for input(s): CPK, CKMB, TROIQ in the last 72 hours. No lab exists for component: CKQMB, CPKMB             ___________________________________________________    Marda Jerod.  Sandeep Turner MD, [facility]

## 2023-10-04 RX ORDER — SYRINGE AND NEEDLE,INSULIN,1ML 31 GX5/16"
1 SYRINGE, EMPTY DISPOSABLE MISCELLANEOUS WEEKLY
Qty: 10 EACH | Refills: 3 | Status: SHIPPED | OUTPATIENT
Start: 2023-10-04 | End: 2023-10-06 | Stop reason: SDUPTHER

## 2023-10-06 RX ORDER — SYRINGE AND NEEDLE,INSULIN,1ML 31 GX5/16"
1 SYRINGE, EMPTY DISPOSABLE MISCELLANEOUS WEEKLY
Qty: 10 EACH | Refills: 3 | Status: SHIPPED | OUTPATIENT
Start: 2023-10-06

## 2023-10-17 DIAGNOSIS — R79.89 LOW SERUM TESTOSTERONE LEVEL IN MALE: Primary | ICD-10-CM

## 2023-10-17 RX ORDER — TESTOSTERONE CYPIONATE 200 MG/ML
50 INJECTION, SOLUTION INTRAMUSCULAR WEEKLY
Qty: 1 ML | Refills: 0 | Status: SHIPPED | OUTPATIENT
Start: 2023-10-17 | End: 2023-11-08

## 2023-11-26 RX ORDER — MONTELUKAST SODIUM 10 MG/1
10 TABLET ORAL DAILY
Qty: 90 TABLET | Refills: 3 | Status: SHIPPED | OUTPATIENT
Start: 2023-11-26

## 2024-01-14 ENCOUNTER — PATIENT MESSAGE (OUTPATIENT)
Age: 34
End: 2024-01-14

## 2024-01-14 DIAGNOSIS — E78.1 HYPERTRIGLYCERIDEMIA: Primary | ICD-10-CM

## 2024-01-14 DIAGNOSIS — E29.1 HYPOGONADISM IN MALE: ICD-10-CM

## 2024-01-14 DIAGNOSIS — D75.1 ERYTHROCYTOSIS: ICD-10-CM

## 2024-01-14 DIAGNOSIS — E66.9 CLASS 2 OBESITY WITH BODY MASS INDEX (BMI) OF 35.0 TO 35.9 IN ADULT, UNSPECIFIED OBESITY TYPE, UNSPECIFIED WHETHER SERIOUS COMORBIDITY PRESENT: ICD-10-CM

## 2024-02-01 LAB
FSH SERPL-ACNC: <0.3 MIU/ML (ref 1.5–12.4)
LH SERPL-ACNC: <0.3 MIU/ML (ref 1.7–8.6)

## 2024-02-07 DIAGNOSIS — F41.1 GENERALIZED ANXIETY DISORDER: ICD-10-CM

## 2024-02-10 LAB
ALBUMIN SERPL-MCNC: 4.9 G/DL (ref 4.1–5.1)
ALBUMIN/GLOB SERPL: 2 {RATIO} (ref 1.2–2.2)
ALP SERPL-CCNC: 84 IU/L (ref 44–121)
ALT SERPL-CCNC: 42 IU/L (ref 0–44)
AST SERPL-CCNC: 29 IU/L (ref 0–40)
BASOPHILS # BLD AUTO: 0 X10E3/UL (ref 0–0.2)
BASOPHILS NFR BLD AUTO: 1 %
BILIRUB SERPL-MCNC: 1.1 MG/DL (ref 0–1.2)
BUN SERPL-MCNC: 17 MG/DL (ref 6–20)
BUN/CREAT SERPL: 16 (ref 9–20)
CALCIUM SERPL-MCNC: 10.1 MG/DL (ref 8.7–10.2)
CHLORIDE SERPL-SCNC: 99 MMOL/L (ref 96–106)
CHOLEST SERPL-MCNC: 163 MG/DL (ref 100–199)
CO2 SERPL-SCNC: 23 MMOL/L (ref 20–29)
CREAT SERPL-MCNC: 1.06 MG/DL (ref 0.76–1.27)
EGFRCR SERPLBLD CKD-EPI 2021: 94 ML/MIN/1.73
EOSINOPHIL # BLD AUTO: 0.1 X10E3/UL (ref 0–0.4)
EOSINOPHIL NFR BLD AUTO: 1 %
ERYTHROCYTE [DISTWIDTH] IN BLOOD BY AUTOMATED COUNT: 12.5 % (ref 11.6–15.4)
GLOBULIN SER CALC-MCNC: 2.5 G/DL (ref 1.5–4.5)
GLUCOSE SERPL-MCNC: 111 MG/DL (ref 70–99)
HBA1C MFR BLD: 5.6 % (ref 4.8–5.6)
HCT VFR BLD AUTO: 52.6 % (ref 37.5–51)
HDLC SERPL-MCNC: 34 MG/DL
HGB BLD-MCNC: 17.7 G/DL (ref 13–17.7)
IMM GRANULOCYTES # BLD AUTO: 0 X10E3/UL (ref 0–0.1)
IMM GRANULOCYTES NFR BLD AUTO: 0 %
IMP & REVIEW OF LAB RESULTS: NORMAL
LDLC SERPL CALC-MCNC: 87 MG/DL (ref 0–99)
LYMPHOCYTES # BLD AUTO: 1.5 X10E3/UL (ref 0.7–3.1)
LYMPHOCYTES NFR BLD AUTO: 25 %
MCH RBC QN AUTO: 30.2 PG (ref 26.6–33)
MCHC RBC AUTO-ENTMCNC: 33.7 G/DL (ref 31.5–35.7)
MCV RBC AUTO: 90 FL (ref 79–97)
MONOCYTES # BLD AUTO: 0.5 X10E3/UL (ref 0.1–0.9)
MONOCYTES NFR BLD AUTO: 9 %
NEUTROPHILS # BLD AUTO: 3.7 X10E3/UL (ref 1.4–7)
NEUTROPHILS NFR BLD AUTO: 64 %
PLATELET # BLD AUTO: 210 X10E3/UL (ref 150–450)
POTASSIUM SERPL-SCNC: 4.6 MMOL/L (ref 3.5–5.2)
PROT SERPL-MCNC: 7.4 G/DL (ref 6–8.5)
RBC # BLD AUTO: 5.87 X10E6/UL (ref 4.14–5.8)
SODIUM SERPL-SCNC: 139 MMOL/L (ref 134–144)
SPECIMEN STATUS REPORT: NORMAL
TRIGL SERPL-MCNC: 249 MG/DL (ref 0–149)
TSH SERPL DL<=0.005 MIU/L-ACNC: 2.7 UIU/ML (ref 0.45–4.5)
VLDLC SERPL CALC-MCNC: 42 MG/DL (ref 5–40)
WBC # BLD AUTO: 5.8 X10E3/UL (ref 3.4–10.8)

## 2024-02-22 LAB
TESTOST FREE SERPL-MCNC: 3.7 PG/ML (ref 8.7–25.1)
TESTOST SERPL-MCNC: 52 NG/DL (ref 264–916)

## 2024-02-27 ENCOUNTER — TELEMEDICINE (OUTPATIENT)
Age: 34
End: 2024-02-27
Payer: COMMERCIAL

## 2024-02-27 DIAGNOSIS — L30.9 ECZEMA, UNSPECIFIED TYPE: Primary | ICD-10-CM

## 2024-02-27 DIAGNOSIS — R79.89 LOW SERUM TESTOSTERONE LEVEL IN MALE: ICD-10-CM

## 2024-02-27 PROCEDURE — 99214 OFFICE O/P EST MOD 30 MIN: CPT | Performed by: STUDENT IN AN ORGANIZED HEALTH CARE EDUCATION/TRAINING PROGRAM

## 2024-02-27 RX ORDER — TESTOSTERONE CYPIONATE 200 MG/ML
50 INJECTION, SOLUTION INTRAMUSCULAR WEEKLY
Qty: 1 ML | Refills: 0 | Status: SHIPPED | OUTPATIENT
Start: 2024-02-27 | End: 2024-03-20

## 2024-02-27 RX ORDER — TRIAMCINOLONE ACETONIDE 1 MG/G
OINTMENT TOPICAL 2 TIMES DAILY
Qty: 30 G | Refills: 1 | Status: SHIPPED | OUTPATIENT
Start: 2024-02-27 | End: 2024-03-12

## 2024-02-27 RX ORDER — TRIAMCINOLONE ACETONIDE 0.25 MG/G
OINTMENT TOPICAL
Qty: 80 G | Refills: 1 | Status: SHIPPED | OUTPATIENT
Start: 2024-02-27 | End: 2024-03-05

## 2024-02-27 ASSESSMENT — PATIENT HEALTH QUESTIONNAIRE - PHQ9
SUM OF ALL RESPONSES TO PHQ QUESTIONS 1-9: 0
SUM OF ALL RESPONSES TO PHQ9 QUESTIONS 1 & 2: 0
SUM OF ALL RESPONSES TO PHQ QUESTIONS 1-9: 0
1. LITTLE INTEREST OR PLEASURE IN DOING THINGS: 0
2. FEELING DOWN, DEPRESSED OR HOPELESS: 0

## 2024-02-27 NOTE — PATIENT INSTRUCTIONS
Please have blood work drawn after 2 weeks on the medicine.    If your red blood cell count is increasing, we will need to stop the testosterone.  You can also print the lab order slip through your mychart under upcoming tests and procedures.     Please monitor your blood pressure at home to make sure that the testosterone does not increase it:  Check first thing in the morning.  You should be relaxed, seated with back supported, feet flat on the floor, arm with cuff resting at heart height.  You should check your blood pressure 1-3 times.  Please write down your blood pressures and bring this record and your blood pressure cuff/machine to your follow-up visit.   I would like for your blood pressure to be less than 130 for the top number and less than 90 for the bottom number.

## 2024-02-28 NOTE — PROGRESS NOTES
Chief Complaint   Patient presents with    Rash     Patient c/o rash on his Left hand x 4 weeks ago. Now it is spreading up the arm. Very itchy. Pt has been using topical cream with little to no relief.            \"Have you been to the ER, urgent care clinic since your last visit?  Hospitalized since your last visit?\"    NO    “Have you seen or consulted any other health care providers outside of [facility] since your last visit?”    NO         
applicable) is aware that this is a billable service, which includes applicable co-pays. This Virtual Visit was conducted with patient's (and/or legal guardian's) consent. Patient identification was verified, and a caregiver was present when appropriate.     --Anna Betancourt MD on 2/27/24 at 10:24 PM EST      An electronic signature was used to authenticate this note.

## 2024-02-28 NOTE — ASSESSMENT & PLAN NOTE
Reviewed recent labs with patient.  Restart testosterone replacement  Reevaluate labs in 2 weeks, particularly borderline erythrocytosis  Encouraged home blood pressure monitoring, follow-up with cuff in office

## 2024-02-28 NOTE — ASSESSMENT & PLAN NOTE
Start topical steroids  Encouraged use of thick emollient twice daily  Assess further at follow-up in person

## 2024-03-04 DIAGNOSIS — R79.89 LOW SERUM TESTOSTERONE LEVEL IN MALE: ICD-10-CM

## 2024-03-04 RX ORDER — TESTOSTERONE CYPIONATE 200 MG/ML
50 INJECTION, SOLUTION INTRAMUSCULAR WEEKLY
Qty: 4 ML | Refills: 0 | Status: SHIPPED | OUTPATIENT
Start: 2024-03-04 | End: 2024-03-26

## 2024-03-08 RX ORDER — METOPROLOL TARTRATE 50 MG/1
50 TABLET, FILM COATED ORAL 2 TIMES DAILY
Qty: 90 TABLET | Refills: 1 | Status: SHIPPED | OUTPATIENT
Start: 2024-03-08

## 2024-03-08 NOTE — TELEPHONE ENCOUNTER
Refill per VO of Dr. Juanjo Levy:    9/22/2023    Future Appointments   Date Time Provider Department Center   3/26/2024  7:45 AM Anna Betancourt MD [id number] AMB   9/27/2024  9:00 AM Juanjo Levy MD [id number] AMB       Requested Prescriptions     Pending Prescriptions Disp Refills    metoprolol tartrate (LOPRESSOR) 50 MG tablet 90 tablet 1     Sig: Take 1 tablet by mouth 2 times daily

## 2024-03-10 NOTE — PROGRESS NOTES
Bedside and Verbal shift change report given to Mu Calderon (oncoming nurse) by Katerin Archer (offgoing nurse). Report included the following information SBAR, Kardex, Intake/Output, MAR, Recent Results, Cardiac Rhythm NSR and Dual Neuro Assessment. 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  She is now following with [facility] vascular surgery.  Duplex imaging 9/15/2023 is unchanged from prior.  Taking aspirin 81 mg daily, EC, always with food. Reviewed urgent s/s to report. Monitoring CBC given use with Xarelto.  She has reported statin intolerance and also stopped Zetia given diffuse muscle pain.  Consider Repatha, which we discussed today.  She wishes to trial Welchol first

## 2024-03-24 ENCOUNTER — PATIENT MESSAGE (OUTPATIENT)
Age: 34
End: 2024-03-24

## 2024-03-26 ENCOUNTER — OFFICE VISIT (OUTPATIENT)
Age: 34
End: 2024-03-26
Payer: COMMERCIAL

## 2024-03-26 VITALS
DIASTOLIC BLOOD PRESSURE: 85 MMHG | HEART RATE: 72 BPM | OXYGEN SATURATION: 94 % | WEIGHT: 274 LBS | BODY MASS INDEX: 37.11 KG/M2 | RESPIRATION RATE: 18 BRPM | HEIGHT: 72 IN | SYSTOLIC BLOOD PRESSURE: 121 MMHG | TEMPERATURE: 97.8 F

## 2024-03-26 DIAGNOSIS — K52.9 CHRONIC DIARRHEA: ICD-10-CM

## 2024-03-26 DIAGNOSIS — R79.89 LOW SERUM TESTOSTERONE LEVEL IN MALE: Primary | ICD-10-CM

## 2024-03-26 DIAGNOSIS — Z02.89 ENCOUNTER FOR COMPLETION OF FORM WITH PATIENT: ICD-10-CM

## 2024-03-26 DIAGNOSIS — K58.0 IRRITABLE BOWEL SYNDROME WITH DIARRHEA: ICD-10-CM

## 2024-03-26 PROCEDURE — 99214 OFFICE O/P EST MOD 30 MIN: CPT | Performed by: STUDENT IN AN ORGANIZED HEALTH CARE EDUCATION/TRAINING PROGRAM

## 2024-03-26 NOTE — PROGRESS NOTES
Chief Complaint   Patient presents with    Results     Patient states he is here to go over test results from Friday.    Blood Pressure Check     Patient states he takes his BP at home regularly with a home cuff. In comparison to in office BP his home cuff read 116/90 today.    [facility] Paperwork     Emailed completed paperwork per patient to [email or web address].         \"Have you been to the ER, urgent care clinic since your last visit?  Hospitalized since your last visit?\"    NO    “Have you seen or consulted any other health care providers outside of [facility] since your last visit?”    NO          Click Here for Release of Records Request    
pantoprazole (PROTONIX) 40 MG tablet Take 1 tablet by mouth daily    clotrimazole-betamethasone (LOTRISONE) 1-0.05 % cream Apply topically 2 times daily.    Iron, Ferrous Sulfate, 325 (65 Fe) MG TABS Take 65 mg by mouth    Multiple Vitamin (MULTIVITAMIN ADULT PO) Take 1 tablet by mouth daily    albuterol sulfate HFA (PROVENTIL;VENTOLIN;PROAIR) 108 (90 Base) MCG/ACT inhaler Inhale 2 puffs into the lungs every 6 hours as needed INHALE 2 PUFFS BY MOUTH EVERY 6 HOURS AS NEEDED FOR WHEEZING, SHORTNESS OF BREATH, RESPIRATORY DISTRESS, OR COUGH    aspirin 81 MG EC tablet Take by mouth daily    Cholecalciferol 50 MCG (2000 UT) TABS Take by mouth    fluticasone (FLONASE) 50 MCG/ACT nasal spray 2 sprays by Nasal route daily SPRAY 2 SPRAYS INTO EACH NOSTRIL ONE TIME DAILY    cetirizine (ZYRTEC) 10 MG tablet Take 1 tablet by mouth daily    ASHWAGANDHA PO Take by mouth     No current facility-administered medications for this visit.       Physical Exam  Vitals and nursing note reviewed.   Constitutional:       General: He is not in acute distress.     Appearance: Normal appearance. He is obese. He is not ill-appearing, toxic-appearing or diaphoretic.   HENT:      Head: Normocephalic and atraumatic.   Eyes:      General:         Right eye: No discharge.         Left eye: No discharge.      Conjunctiva/sclera: Conjunctivae normal.   Pulmonary:      Effort: Pulmonary effort is normal. No respiratory distress.   Musculoskeletal:      Cervical back: No rigidity.   Skin:     Findings: No rash (over visualized areas).   Neurological:      General: No focal deficit present.      Mental Status: He is alert.   Psychiatric:         Mood and Affect: Mood normal.         Behavior: Behavior normal.         Thought Content: Thought content normal.         Judgment: Judgment normal.              Anna Betancourt MD

## 2024-03-27 LAB
ALBUMIN SERPL-MCNC: 4.9 G/DL (ref 4.1–5.1)
ALBUMIN/GLOB SERPL: 2.3 {RATIO} (ref 1.2–2.2)
ALP SERPL-CCNC: 79 IU/L (ref 44–121)
ALT SERPL-CCNC: 38 IU/L (ref 0–44)
AST SERPL-CCNC: 24 IU/L (ref 0–40)
BASOPHILS # BLD AUTO: 0 X10E3/UL (ref 0–0.2)
BASOPHILS NFR BLD AUTO: 1 %
BILIRUB SERPL-MCNC: 0.6 MG/DL (ref 0–1.2)
BUN SERPL-MCNC: 11 MG/DL (ref 6–20)
BUN/CREAT SERPL: 13 (ref 9–20)
CALCIUM SERPL-MCNC: 9.6 MG/DL (ref 8.7–10.2)
CHLORIDE SERPL-SCNC: 104 MMOL/L (ref 96–106)
CO2 SERPL-SCNC: 22 MMOL/L (ref 20–29)
CREAT SERPL-MCNC: 0.83 MG/DL (ref 0.76–1.27)
EGFRCR SERPLBLD CKD-EPI 2021: 118 ML/MIN/1.73
ENDOMYSIUM IGA SER QL: NEGATIVE
EOSINOPHIL # BLD AUTO: 0.1 X10E3/UL (ref 0–0.4)
EOSINOPHIL NFR BLD AUTO: 2 %
ERYTHROCYTE [DISTWIDTH] IN BLOOD BY AUTOMATED COUNT: 12.6 % (ref 11.6–15.4)
GLOBULIN SER CALC-MCNC: 2.1 G/DL (ref 1.5–4.5)
GLUCOSE SERPL-MCNC: 111 MG/DL (ref 70–99)
HCT VFR BLD AUTO: 50.5 % (ref 37.5–51)
HGB BLD-MCNC: 17.2 G/DL (ref 13–17.7)
IGA SERPL-MCNC: 145 MG/DL (ref 90–386)
IMM GRANULOCYTES # BLD AUTO: 0 X10E3/UL (ref 0–0.1)
IMM GRANULOCYTES NFR BLD AUTO: 1 %
LYMPHOCYTES # BLD AUTO: 1.4 X10E3/UL (ref 0.7–3.1)
LYMPHOCYTES NFR BLD AUTO: 21 %
MCH RBC QN AUTO: 30 PG (ref 26.6–33)
MCHC RBC AUTO-ENTMCNC: 34.1 G/DL (ref 31.5–35.7)
MCV RBC AUTO: 88 FL (ref 79–97)
MONOCYTES # BLD AUTO: 0.7 X10E3/UL (ref 0.1–0.9)
MONOCYTES NFR BLD AUTO: 10 %
NEUTROPHILS # BLD AUTO: 4.4 X10E3/UL (ref 1.4–7)
NEUTROPHILS NFR BLD AUTO: 65 %
PLATELET # BLD AUTO: 213 X10E3/UL (ref 150–450)
POTASSIUM SERPL-SCNC: 4.5 MMOL/L (ref 3.5–5.2)
PROT SERPL-MCNC: 7 G/DL (ref 6–8.5)
RBC # BLD AUTO: 5.73 X10E6/UL (ref 4.14–5.8)
SODIUM SERPL-SCNC: 141 MMOL/L (ref 134–144)
TTG IGA SER-ACNC: <2 U/ML (ref 0–3)
WBC # BLD AUTO: 6.6 X10E3/UL (ref 3.4–10.8)

## 2024-03-27 NOTE — ASSESSMENT & PLAN NOTE
Recent labs reviewed with patient, levels improved  Recheck CMP and CBC  Consider dose increase of testosterone as long as no worsening of erythrocytosis.  Patient comfortable to continue at current dose for now

## 2024-03-29 ENCOUNTER — PATIENT MESSAGE (OUTPATIENT)
Age: 34
End: 2024-03-29

## 2024-03-29 DIAGNOSIS — K21.9 GASTROESOPHAGEAL REFLUX DISEASE, UNSPECIFIED WHETHER ESOPHAGITIS PRESENT: Primary | ICD-10-CM

## 2024-03-29 DIAGNOSIS — R79.89 LOW SERUM TESTOSTERONE LEVEL IN MALE: ICD-10-CM

## 2024-04-02 RX ORDER — PANTOPRAZOLE SODIUM 40 MG/1
40 TABLET, DELAYED RELEASE ORAL DAILY
Qty: 90 TABLET | Refills: 3 | Status: SHIPPED | OUTPATIENT
Start: 2024-04-02

## 2024-04-08 RX ORDER — TESTOSTERONE CYPIONATE 200 MG/ML
50 INJECTION, SOLUTION INTRAMUSCULAR WEEKLY
Qty: 4 ML | Refills: 2 | Status: SHIPPED | OUTPATIENT
Start: 2024-04-08 | End: 2024-07-07

## 2024-05-05 DIAGNOSIS — F41.1 GENERALIZED ANXIETY DISORDER: ICD-10-CM

## 2024-05-07 DIAGNOSIS — R79.89 LOW SERUM TESTOSTERONE LEVEL IN MALE: ICD-10-CM

## 2024-05-07 RX ORDER — METOPROLOL TARTRATE 50 MG/1
50 TABLET, FILM COATED ORAL 2 TIMES DAILY
Qty: 90 TABLET | Refills: 1 | Status: SHIPPED | OUTPATIENT
Start: 2024-05-07

## 2024-05-07 RX ORDER — TESTOSTERONE CYPIONATE 200 MG/ML
50 INJECTION, SOLUTION INTRAMUSCULAR WEEKLY
Qty: 4 ML | Refills: 2 | OUTPATIENT
Start: 2024-05-07 | End: 2024-08-05

## 2024-05-07 NOTE — TELEPHONE ENCOUNTER
Refill per VO of Dr. Juanjo Levy:    9/22/2023    Future Appointments   Date Time Provider Department Center   9/27/2024  9:00 AM Juanjo Levy MD [facility]       Requested Prescriptions     Pending Prescriptions Disp Refills    metoprolol tartrate (LOPRESSOR) 50 MG tablet 90 tablet 1     Sig: Take 1 tablet by mouth 2 times daily

## 2024-05-07 NOTE — TELEPHONE ENCOUNTER
From: Gabriel Kumar  To: Office of Dr. Anna Betancourt  Sent: 5/6/2024 7:30 PM EDT  Subject: Medication Renewal Request    Refills have been requested for the following medications:     testosterone cypionate (DEPOTESTOTERONE CYPIONATE) 200 MG/ML injection [Dr. Anna Betancourt MD]    Preferred pharmacy: [id number] [address] [facility] -  [phone] -  [phone]      Medication renewals requested in this message routed separately:     metoprolol tartrate (LOPRESSOR) 50 MG tablet [Dr. Juanjo Levy MD]

## 2024-07-17 ENCOUNTER — OFFICE VISIT (OUTPATIENT)
Age: 34
End: 2024-07-17
Payer: COMMERCIAL

## 2024-07-17 VITALS
WEIGHT: 281 LBS | DIASTOLIC BLOOD PRESSURE: 89 MMHG | BODY MASS INDEX: 38.06 KG/M2 | HEIGHT: 72 IN | HEART RATE: 70 BPM | SYSTOLIC BLOOD PRESSURE: 127 MMHG | RESPIRATION RATE: 18 BRPM | OXYGEN SATURATION: 95 %

## 2024-07-17 DIAGNOSIS — E66.9 CLASS 2 OBESITY WITH BODY MASS INDEX (BMI) OF 38.0 TO 38.9 IN ADULT, UNSPECIFIED OBESITY TYPE, UNSPECIFIED WHETHER SERIOUS COMORBIDITY PRESENT: ICD-10-CM

## 2024-07-17 DIAGNOSIS — D75.1 ERYTHROCYTOSIS: ICD-10-CM

## 2024-07-17 DIAGNOSIS — R79.89 LOW SERUM TESTOSTERONE LEVEL IN MALE: ICD-10-CM

## 2024-07-17 DIAGNOSIS — F41.1 GENERALIZED ANXIETY DISORDER: ICD-10-CM

## 2024-07-17 DIAGNOSIS — R29.818 SUSPECTED SLEEP APNEA: Primary | ICD-10-CM

## 2024-07-17 DIAGNOSIS — I49.5 SICK SINUS SYNDROME (HCC): ICD-10-CM

## 2024-07-17 PROCEDURE — 99214 OFFICE O/P EST MOD 30 MIN: CPT | Performed by: STUDENT IN AN ORGANIZED HEALTH CARE EDUCATION/TRAINING PROGRAM

## 2024-07-17 RX ORDER — TESTOSTERONE CYPIONATE 200 MG/ML
50 INJECTION, SOLUTION INTRAMUSCULAR WEEKLY
Qty: 4 ML | Refills: 3 | Status: SHIPPED | OUTPATIENT
Start: 2024-07-17 | End: 2024-11-14

## 2024-07-17 NOTE — PROGRESS NOTES
Progress Note    he is a 34 y.o. year old male who presents for evaluation of Medication Refill, Blood Pressure Check, and Blood Work (Patient is here for fasting labs today.)        Assessment/ Plan:     1. Sus[facility] sleep apnea  -     [facility]  2. Low serum testosterone level in male  -     testosterone cypionate (DEPOTESTOTERONE CYPIONATE) 200 MG/ML injection; Inject 0.25 mLs into the muscle once a week for 120 days. Max Daily Amount: 50 mg, Disp-4 mL, R-3Normal  3. Generalized anxiety disorder  4. Sick sinus syndrome (HCC)  5. Erythrocytosis  -     CBC; Future  -     Ferritin; Future  -     Iron and TIBC; Future  6. Class 2 obesity with body mass index (BMI) of 38.0 to 38.9 in adult, unspecified obesity type, unspecified whether serious comorbidity present  -     CBC; Future  -     Comprehensive Metabolic Panel; Future  -     Hemoglobin A1C; Future  -     Lipid Panel; Future  -     TSH; Future    Assessment & Plan  1. Sleep apnea.  Given his obesity and family history of sleep apnea, it's plausible that his condition could be due to obstructive sleep apnea. A home sleep study was recommended. A referral to the sleep team was provided.   He was advised to increase his physical activity, particularly in the morning, and to practice good sleep hygiene habits.    2. Tachycardia.  His CO2 level was normal in 03/2023.   He was advised to inquire about an event monitor with Dr. Levy.     3.  Hypogonadism  Blood work was ordered to update his cholesterol and blood counts to ensure his red blood cell count is not increasing while on testosterone.   An updated prescription for 25-gauge 1 inch needles was provided.   He was advised to monitor his blood pressure at least once a week and to stay well hydrated.    Follow-up  A follow-up visit is to be scheduled for 3 to 4 months from now.      I have discussed the diagnosis with the patient and the intended plan as seen in the above orders.

## 2024-07-17 NOTE — PROGRESS NOTES
Chief Complaint   Patient presents with    Medication Refill    Blood Pressure Check    Blood Work     Patient is here for fasting labs today.       Patient gave verbal consent today for [facility].    Accompanied by: n/a    Home BP cuff present today:  no  Home medication list or bottles present today: no  Forms for completion: no    Chest pain/pressure/discomfort: no  Shortness of breath:  no  If new or worsening symptoms, consider EKG.      \"Have you been to the ER, urgent care clinic since your last visit?  Hospitalized since your last visit?\"    NO    “Have you seen or consulted any other health care providers outside of [facility] since your last visit?”    NO          Click Here for Release of Records Request

## 2024-07-18 LAB
ALBUMIN SERPL-MCNC: 4.6 G/DL (ref 4.1–5.1)
ALP SERPL-CCNC: 82 IU/L (ref 44–121)
ALT SERPL-CCNC: 26 IU/L (ref 0–44)
AST SERPL-CCNC: 20 IU/L (ref 0–40)
BILIRUB SERPL-MCNC: 0.8 MG/DL (ref 0–1.2)
BUN SERPL-MCNC: 12 MG/DL (ref 6–20)
BUN/CREAT SERPL: 12 (ref 9–20)
CALCIUM SERPL-MCNC: 9.4 MG/DL (ref 8.7–10.2)
CHLORIDE SERPL-SCNC: 100 MMOL/L (ref 96–106)
CHOLEST SERPL-MCNC: 140 MG/DL (ref 100–199)
CO2 SERPL-SCNC: 19 MMOL/L (ref 20–29)
CREAT SERPL-MCNC: 0.97 MG/DL (ref 0.76–1.27)
EGFRCR SERPLBLD CKD-EPI 2021: 105 ML/MIN/1.73
ERYTHROCYTE [DISTWIDTH] IN BLOOD BY AUTOMATED COUNT: 12.3 % (ref 11.6–15.4)
FERRITIN SERPL-MCNC: 140 NG/ML (ref 30–400)
GLOBULIN SER CALC-MCNC: 2.4 G/DL (ref 1.5–4.5)
GLUCOSE SERPL-MCNC: 105 MG/DL (ref 70–99)
HBA1C MFR BLD: 5.6 % (ref 4.8–5.6)
HCT VFR BLD AUTO: 54.4 % (ref 37.5–51)
HDLC SERPL-MCNC: 30 MG/DL
HGB BLD-MCNC: 18.1 G/DL (ref 13–17.7)
IMP & REVIEW OF LAB RESULTS: NORMAL
IRON SATN MFR SERPL: 30 % (ref 15–55)
IRON SERPL-MCNC: 106 UG/DL (ref 38–169)
LDLC SERPL CALC-MCNC: 70 MG/DL (ref 0–99)
MCH RBC QN AUTO: 29.5 PG (ref 26.6–33)
MCHC RBC AUTO-ENTMCNC: 33.3 G/DL (ref 31.5–35.7)
MCV RBC AUTO: 89 FL (ref 79–97)
PLATELET # BLD AUTO: 174 X10E3/UL (ref 150–450)
POTASSIUM SERPL-SCNC: 4.6 MMOL/L (ref 3.5–5.2)
PROT SERPL-MCNC: 7 G/DL (ref 6–8.5)
RBC # BLD AUTO: 6.13 X10E6/UL (ref 4.14–5.8)
SODIUM SERPL-SCNC: 139 MMOL/L (ref 134–144)
TIBC SERPL-MCNC: 355 UG/DL (ref 250–450)
TRIGL SERPL-MCNC: 242 MG/DL (ref 0–149)
TSH SERPL DL<=0.005 MIU/L-ACNC: 2.94 UIU/ML (ref 0.45–4.5)
UIBC SERPL-MCNC: 249 UG/DL (ref 111–343)
VLDLC SERPL CALC-MCNC: 40 MG/DL (ref 5–40)
WBC # BLD AUTO: 5.6 X10E3/UL (ref 3.4–10.8)

## 2024-07-21 ENCOUNTER — PATIENT MESSAGE (OUTPATIENT)
Age: 34
End: 2024-07-21

## 2024-07-21 DIAGNOSIS — D75.1 ERYTHROCYTOSIS: Primary | ICD-10-CM

## 2024-07-28 ENCOUNTER — PATIENT MESSAGE (OUTPATIENT)
Age: 34
End: 2024-07-28

## 2024-07-28 DIAGNOSIS — R79.89 LOW SERUM TESTOSTERONE LEVEL IN MALE: Primary | ICD-10-CM

## 2024-07-30 RX ORDER — NEEDLES, SAFETY 22GX1 1/2"
1 NEEDLE, DISPOSABLE MISCELLANEOUS WEEKLY
Qty: 50 EACH | Refills: 0 | Status: SHIPPED | OUTPATIENT
Start: 2024-07-30

## 2024-08-11 DIAGNOSIS — F41.1 GENERALIZED ANXIETY DISORDER: ICD-10-CM

## 2024-08-21 DIAGNOSIS — D75.1 ERYTHROCYTOSIS: ICD-10-CM

## 2024-08-23 RX ORDER — METOPROLOL TARTRATE 50 MG/1
50 TABLET, FILM COATED ORAL 2 TIMES DAILY
Qty: 180 TABLET | Refills: 0 | Status: SHIPPED | OUTPATIENT
Start: 2024-08-23

## 2024-10-07 NOTE — PROGRESS NOTES
Damian You called to request a refill on his medication.      Last office visit : 9/25/2024   Next office visit : 3/25/2025     Requested Prescriptions     Pending Prescriptions Disp Refills    colchicine (COLCRYS) 0.6 MG tablet [Pharmacy Med Name: COLCHICINE 0.6MG TABLET] 90 tablet 1     Sig: TAKE 1 TABLET BY MOUTH DAILY            Dee Farias LPN   1930-Bedside shift change report given to [address] (oncoming nurse) by Paige Han (offgoing nurse).  Report included the following information SBAR, Kardex, ED Summary, Procedure Summary, Intake/Output, MAR, Recent Results and Cardiac Rhythm SR.

## 2024-10-11 ENCOUNTER — OFFICE VISIT (OUTPATIENT)
Age: 34
End: 2024-10-11

## 2024-10-11 VITALS
WEIGHT: 279 LBS | OXYGEN SATURATION: 94 % | RESPIRATION RATE: 18 BRPM | HEIGHT: 72 IN | SYSTOLIC BLOOD PRESSURE: 119 MMHG | DIASTOLIC BLOOD PRESSURE: 85 MMHG | HEART RATE: 73 BPM | BODY MASS INDEX: 37.79 KG/M2

## 2024-10-11 DIAGNOSIS — E78.1 HYPERTRIGLYCERIDEMIA: ICD-10-CM

## 2024-10-11 DIAGNOSIS — L30.9 DERMATITIS: Primary | ICD-10-CM

## 2024-10-11 DIAGNOSIS — R79.89 LOW SERUM TESTOSTERONE LEVEL IN MALE: ICD-10-CM

## 2024-10-11 DIAGNOSIS — Z23 NEED FOR IMMUNIZATION AGAINST INFLUENZA: ICD-10-CM

## 2024-10-11 RX ORDER — TRIAMCINOLONE ACETONIDE 0.25 MG/G
OINTMENT TOPICAL
Qty: 80 G | Refills: 1 | Status: SHIPPED | OUTPATIENT
Start: 2024-10-11 | End: 2024-10-18

## 2024-10-11 RX ORDER — TRIAMCINOLONE ACETONIDE 1 MG/G
OINTMENT TOPICAL 2 TIMES DAILY
Qty: 30 G | Refills: 1 | Status: SHIPPED | OUTPATIENT
Start: 2024-10-11

## 2024-10-11 SDOH — ECONOMIC STABILITY: FOOD INSECURITY: WITHIN THE PAST 12 MONTHS, THE FOOD YOU BOUGHT JUST DIDN'T LAST AND YOU DIDN'T HAVE MONEY TO GET MORE.: NEVER TRUE

## 2024-10-11 SDOH — ECONOMIC STABILITY: INCOME INSECURITY: HOW HARD IS IT FOR YOU TO PAY FOR THE VERY BASICS LIKE FOOD, HOUSING, MEDICAL CARE, AND HEATING?: NOT HARD AT ALL

## 2024-10-11 SDOH — ECONOMIC STABILITY: FOOD INSECURITY: WITHIN THE PAST 12 MONTHS, YOU WORRIED THAT YOUR FOOD WOULD RUN OUT BEFORE YOU GOT MONEY TO BUY MORE.: NEVER TRUE

## 2024-10-11 NOTE — PATIENT INSTRUCTIONS
Work on eliminating fragrances in your products.  Use a thick cream or ointment at least twice/day  Use steroid cream/ointment twice a day for up to 2 weeks.      Call the sleep medicine team, they are booking out so you should have something on the books.  Make sure to go for a walk after lunch      Get started!  Set small weekly goals to achieve, feel good about, and build on.  Write them down!  :)    Track what you are eating and drinking - everything!  :)  Use an bre such as [employer].  Aim to keep your carbohydrates low, under 100, then 80, then 60 grams daily.  Keto diet would generally be under 20 grams daily.  Check out diabetesfoodhub.org for good recipes.  Minimize processed food, fast food, and fried food in your diet.  Do your best to primarily drink water.  When eating, do not multitask since this will lead to overeating.  Pay attention to food and drink with all your senses.  Try to eat slowly.  When you eat out, eat just half of it and wait 10 minutes before eating any more if you are hungry.  Use a small plate, it helps your brain's perception of portions.    Exercise recommendations  150 minutes of moderate intensity cardio exercise in the week  3 days of total body strength training  Work on stretching/flexibility as well.  It's great to get outside!  But you can also check out youtube for fun videos and workouts.  I like yoga with tonie

## 2024-10-11 NOTE — PROGRESS NOTES
The patient (or guardian, if applicable) and other individuals in attendance with the patient were advised that Artificial Intelligence will be utilized during this visit to record, process the conversation to generate a clinical note, and support improvement of the AI technology. The patient (or guardian, if applicable) and other individuals in attendance at the appointment consented to the use of AI, including the recording.        Gabriel Kumar is a 34 y.o. male , id x 2(name and ). Reviewed record, history, and  medications.    Chief Complaint   Patient presents with    Blood Work     Patient is here for fasting labs.    Rash     He has a scaly rash on his hand and arm , Left arm only.    Fatigue     More specifically when driving after eating. After lunchtime.     Flu Vaccine     Flu vaccine was administered in the Left Deltoid , patient tolerated it well.        Health Maintenance Due   Topic    Varicella vaccine (1 of 2 - 13+ 2-dose series)    Hepatitis A vaccine (1 of 2 - Risk 2-dose series)    Hepatitis B vaccine (1 of 3 - 19+ 3-dose series)    DTaP/Tdap/Td vaccine (1 - Tdap)    COVID-19 Vaccine (3 - 2023-24 season)       Wt Readings from Last 1 Encounters:   10/11/24 126.6 kg (279 lb)     BP Readings from Last 3 Encounters:   10/11/24 119/85   24 127/89   24 121/85     Pulse Readings from Last 1 Encounters:   10/11/24 73         Patient is currently accompanied by n/a & I have received verbal consent from Gabriel Kumra to discuss any/all medical information while he/she is present in the room.    Home BP cuff present today:  no  Home medication list or bottles present today: yes - All medications were updated with the patient today.    Forms for completion: no    Chest pain/pressure/discomfort: no  Shortness of breath:  no      Depression Screening:  :     Depression: Not at risk (2024)    PHQ-2     PHQ-2 Score: 0          Coordination of Care Questionnaire:  : 
Therapy.  If the lab results are satisfactory, his testosterone dose will be increased from 0.25 to 0.5. He was advised to monitor for any side effects and to continue using the same pharmacy.    5. Health Maintenance.  His iron levels and thyroid function are within normal limits. Blood work was ordered to monitor his A1c and cholesterol levels. He will receive his influenza vaccine today.    He is fasting for labs today    Follow-up  Return in 3 months for a virtual follow-up visit and in 6 months for an in-office visit.      I have discussed the diagnosis with the patient and the intended plan as seen in the above orders.    The patient has received an after-visit summary and questions were answered concerning future plans.   Pt conveyed understanding of plan.    Medication Side Effects and Warnings were discussed with patient    Current Outpatient Medications   Medication Sig    triamcinolone (KENALOG) 0.025 % ointment Apply topically 2 times daily.    triamcinolone (KENALOG) 0.1 % ointment Apply topically 2 times daily    metoprolol tartrate (LOPRESSOR) 50 MG tablet TAKE 1 TABLET BY MOUTH TWO TIMES DAILY    sertraline (ZOLOFT) 50 MG tablet TAKE 1 TABLET BY MOUTH EVERY DAY    NEEDLE, DISP, 25 G (BD SAFETYGLIDE NEEDLE) 25G X 1\" MISC 1 each by Does not apply route once a week    Syringe, Disposable, 1 ML MISC 1 each by Does not apply route once a week    testosterone cypionate (DEPOTESTOTERONE CYPIONATE) 200 MG/ML injection Inject 0.25 mLs into the muscle once a week for 120 days. Max Daily Amount: 50 mg    Syringe/Needle, Disp, 25G X 1\" 1 ML MISC 1 each by Does not apply route once a week    pantoprazole (PROTONIX) 40 MG tablet Take 1 tablet by mouth daily    montelukast (SINGULAIR) 10 MG tablet Take 1 tablet by mouth daily    Insulin Syringe-Needle U-100 (COMFORT EZ INSULIN SYRINGE) 30G X 1/2\" 0.5 ML MISC 1 each by Does not apply route once a week    ciprofloxacin (CILOXAN) 0.3 % ophthalmic solution 1-2 drops to

## 2024-10-12 LAB
ALBUMIN SERPL-MCNC: 4.4 G/DL (ref 4.1–5.1)
ALP SERPL-CCNC: 75 IU/L (ref 44–121)
ALT SERPL-CCNC: 34 IU/L (ref 0–44)
AST SERPL-CCNC: 26 IU/L (ref 0–40)
BILIRUB SERPL-MCNC: 1 MG/DL (ref 0–1.2)
BUN SERPL-MCNC: 12 MG/DL (ref 6–20)
BUN/CREAT SERPL: 13 (ref 9–20)
CALCIUM SERPL-MCNC: 9.4 MG/DL (ref 8.7–10.2)
CHLORIDE SERPL-SCNC: 103 MMOL/L (ref 96–106)
CHOLEST SERPL-MCNC: 137 MG/DL (ref 100–199)
CO2 SERPL-SCNC: 19 MMOL/L (ref 20–29)
CREAT SERPL-MCNC: 0.95 MG/DL (ref 0.76–1.27)
EGFRCR SERPLBLD CKD-EPI 2021: 108 ML/MIN/1.73
ERYTHROCYTE [DISTWIDTH] IN BLOOD BY AUTOMATED COUNT: 13.6 % (ref 11.6–15.4)
GLOBULIN SER CALC-MCNC: 2.3 G/DL (ref 1.5–4.5)
GLUCOSE SERPL-MCNC: 113 MG/DL (ref 70–99)
HCT VFR BLD AUTO: 50.7 % (ref 37.5–51)
HDLC SERPL-MCNC: 30 MG/DL
HGB BLD-MCNC: 17 G/DL (ref 13–17.7)
IMP & REVIEW OF LAB RESULTS: NORMAL
LDLC SERPL CALC-MCNC: 71 MG/DL (ref 0–99)
MCH RBC QN AUTO: 30 PG (ref 26.6–33)
MCHC RBC AUTO-ENTMCNC: 33.5 G/DL (ref 31.5–35.7)
MCV RBC AUTO: 90 FL (ref 79–97)
PLATELET # BLD AUTO: 188 X10E3/UL (ref 150–450)
POTASSIUM SERPL-SCNC: 4.3 MMOL/L (ref 3.5–5.2)
PROT SERPL-MCNC: 6.7 G/DL (ref 6–8.5)
RBC # BLD AUTO: 5.66 X10E6/UL (ref 4.14–5.8)
SODIUM SERPL-SCNC: 139 MMOL/L (ref 134–144)
TRIGL SERPL-MCNC: 214 MG/DL (ref 0–149)
VLDLC SERPL CALC-MCNC: 36 MG/DL (ref 5–40)
WBC # BLD AUTO: 5.2 X10E3/UL (ref 3.4–10.8)

## 2024-10-15 ENCOUNTER — PATIENT MESSAGE (OUTPATIENT)
Age: 34
End: 2024-10-15

## 2024-10-15 DIAGNOSIS — R79.89 LOW SERUM TESTOSTERONE LEVEL IN MALE: ICD-10-CM

## 2024-10-16 RX ORDER — TESTOSTERONE CYPIONATE 200 MG/ML
100 INJECTION, SOLUTION INTRAMUSCULAR WEEKLY
Qty: 4 ML | Refills: 3 | Status: SHIPPED | OUTPATIENT
Start: 2024-10-16 | End: 2025-02-13

## 2024-10-28 ENCOUNTER — CLINICAL DOCUMENTATION (OUTPATIENT)
Facility: HOSPITAL | Age: 34
End: 2024-10-28

## 2024-10-28 DIAGNOSIS — R79.89 LOW SERUM TESTOSTERONE LEVEL IN MALE: ICD-10-CM

## 2024-10-28 RX ORDER — TESTOSTERONE CYPIONATE 200 MG/ML
100 INJECTION, SOLUTION INTRAMUSCULAR WEEKLY
Qty: 4 ML | Refills: 3 | Status: SHIPPED | OUTPATIENT
Start: 2024-10-28 | End: 2025-02-25

## 2024-10-28 NOTE — PROGRESS NOTES
[facility]  Specialty Pharmacy Update    Date: 10/28/24    Gabriel Kumar 1990    Medication: Testosterone Cypionate 200 mg/ml    Prior Authorization: through 10/28/2025    Prescription needs to be transferred to:    [id number] [place], [place] - [address] [facility] [phone]      Corinne McEwen, [facility] at [address], [facility]   [place] [address]  phone: [phone]   fax: [phone]

## 2024-11-10 DIAGNOSIS — F41.1 GENERALIZED ANXIETY DISORDER: ICD-10-CM

## 2024-11-22 RX ORDER — MONTELUKAST SODIUM 10 MG/1
10 TABLET ORAL DAILY
Qty: 90 TABLET | Refills: 3 | Status: SHIPPED | OUTPATIENT
Start: 2024-11-22

## 2024-11-22 RX ORDER — METOPROLOL TARTRATE 50 MG
50 TABLET ORAL 2 TIMES DAILY
Qty: 180 TABLET | Refills: 1 | Status: SHIPPED | OUTPATIENT
Start: 2024-11-22

## 2025-01-12 LAB
TESTOST FREE SERPL-MCNC: 9.7 PG/ML (ref 8.7–25.1)
TESTOST SERPL-MCNC: 326 NG/DL (ref 264–916)

## 2025-02-09 DIAGNOSIS — F41.1 GENERALIZED ANXIETY DISORDER: ICD-10-CM

## 2025-02-10 ENCOUNTER — TELEMEDICINE (OUTPATIENT)
Facility: CLINIC | Age: 35
End: 2025-02-10
Payer: COMMERCIAL

## 2025-02-10 DIAGNOSIS — D75.1 ERYTHROCYTOSIS: ICD-10-CM

## 2025-02-10 DIAGNOSIS — R79.89 LOW SERUM TESTOSTERONE LEVEL IN MALE: Primary | ICD-10-CM

## 2025-02-10 DIAGNOSIS — I49.5 SICK SINUS SYNDROME (HCC): ICD-10-CM

## 2025-02-10 DIAGNOSIS — E78.1 HYPERTRIGLYCERIDEMIA: ICD-10-CM

## 2025-02-10 PROCEDURE — 99214 OFFICE O/P EST MOD 30 MIN: CPT | Performed by: STUDENT IN AN ORGANIZED HEALTH CARE EDUCATION/TRAINING PROGRAM

## 2025-02-10 NOTE — PATIENT INSTRUCTIONS
Get started!  Set small weekly goals to achieve, feel good about, and build on.  Write them down!  :)    Exercise recommendations  150 minutes of moderate intensity cardio exercise in the week  3 days of total body strength training  Work on stretching/flexibility as well.  It's great to get outside!  But you can also check out youtube for fun videos and workouts.  I like yoga with tonie    Track what you are eating and drinking - everything!  :)  Use an bre such as [employer].  Aim to keep your carbohydrates low, under 100, then 80, then 60 grams daily.  Keto diet would generally be under 20 grams daily.  Check out diabetesfoodhub.org for good recipes.  Minimize processed food, fast food, and fried food in your diet.  Do your best to primarily drink water.  When eating, do not multitask since this will lead to overeating.  Pay attention to food and drink with all your senses.  Try to eat slowly.  When you eat out, eat just half of it and wait 10 minutes before eating any more if you are hungry.  Use a small plate, it helps your brain's perception of portions.

## 2025-02-10 NOTE — PROGRESS NOTES
Virtual Visit Progress Note    he is a 35 y.o. year old male who presents for evaluation Anxiety        Assessment/ Plan:     Assessment & Plan  1. Testosterone level evaluation.  His testosterone levels have normalized following the increase in dosage. He reports improved energy levels and no adverse effects from the higher dose. A comprehensive blood workup, including cholesterol and red blood cell count, will be conducted within the next month. The results will be communicated via [employer]. He is advised to continue with the current testosterone dosage as long as the blood work results are stable. If the blood work results are favorable, an adjustment in the testosterone dosage may be considered.    2. Blood pressure management.  His blood pressure readings have been consistently within the 120s range, with a noted elevation during his last visit.    Follow-up  The patient is scheduled for a follow-up visit in 04/2025.       1. Low serum testosterone level in male  2. Hypertriglyceridemia  -     CBC; Future  -     Comprehensive Metabolic Panel; Future  -     Lipid Panel; Future  3. Erythrocytosis  4. Sick sinus syndrome (HCC)  Comments:  follows with cardiology         I have discussed the diagnosis with the patient and the intended plan as seen in the above orders.    Medication Side Effects and Warnings were discussed with patient  The patient was instructed to access after-visit summary via [email or web address] and questions were answered concerning future plans.    Patient conveyed understanding of plan.       Current Outpatient Medications   Medication Sig    montelukast (SINGULAIR) 10 MG tablet TAKE 1 TABLET BY MOUTH EVERY DAY    metoprolol tartrate (LOPRESSOR) 50 MG tablet TAKE 1 TABLET BY MOUTH TWO TIMES DAILY    sertraline (ZOLOFT) 50 MG tablet TAKE 1 TABLET BY MOUTH EVERY DAY    testosterone cypionate (DEPOTESTOTERONE CYPIONATE) 200 MG/ML injection Inject 0.5 mLs into the muscle once a week for 120 days. Max Daily

## 2025-02-10 NOTE — PROGRESS NOTES
The patient (or guardian, if applicable) and other individuals in attendance with the patient were advised that Artificial Intelligence will be utilized during this visit to record, process the conversation to generate a clinical note, and support improvement of the AI technology. The patient (or guardian, if applicable) and other individuals in attendance at the appointment consented to the use of AI, including the recording.      Patient confirms they are located here in [place] for this virtual visit today.    Gabriel Kumar is a 35 y.o. male , id x 2(name and ). Reviewed record, history, and  medications.    Chief Complaint   Patient presents with    Anxiety        Health Maintenance Due   Topic    Varicella vaccine (1 of 2 - 13+ 2-dose series)    Hepatitis A vaccine (1 of 2 - Risk 2-dose series)    DTaP/Tdap/Td vaccine (1 - Tdap)    COVID-19 Vaccine ( season)       Wt Readings from Last 1 Encounters:   10/11/24 126.6 kg (279 lb)     BP Readings from Last 3 Encounters:   10/11/24 119/85   24 127/89   24 121/85     Pulse Readings from Last 1 Encounters:   10/11/24 73       Surgery within the next month: no    Forms for completion: no    Chest pain/SOB: no        Depression Screening:  :     Depression: Not at risk (2025)    PHQ-2     PHQ-2 Score: 0          Coordination of Care Questionnaire:  :     \"Have you been to the ER, urgent care clinic since your last visit?  Hospitalized since your last visit?\"    NO    “Have you seen or consulted any other health care providers outside of [facility] since your last visit?”    NO      Click Here for Release of Records Request      --Nikki Navas CMA       ------------------------------------------------

## 2025-03-10 ENCOUNTER — OFFICE VISIT (OUTPATIENT)
Age: 35
End: 2025-03-10
Payer: COMMERCIAL

## 2025-03-10 VITALS
DIASTOLIC BLOOD PRESSURE: 70 MMHG | HEART RATE: 67 BPM | SYSTOLIC BLOOD PRESSURE: 112 MMHG | OXYGEN SATURATION: 94 % | HEIGHT: 72 IN | WEIGHT: 270 LBS | BODY MASS INDEX: 36.57 KG/M2

## 2025-03-10 DIAGNOSIS — R00.0 TACHYCARDIA, UNSPECIFIED: Primary | ICD-10-CM

## 2025-03-10 DIAGNOSIS — E78.1 HYPERTRIGLYCERIDEMIA: ICD-10-CM

## 2025-03-10 DIAGNOSIS — I49.5 SICK SINUS SYNDROME (HCC): ICD-10-CM

## 2025-03-10 PROCEDURE — 99214 OFFICE O/P EST MOD 30 MIN: CPT | Performed by: INTERNAL MEDICINE

## 2025-03-10 NOTE — PROGRESS NOTES
Office Follow-up    NAME: Gabriel Kumar   :  1990  MRM:  [id number]    Date:  03/10/25             Plan:     Tachycardia during Covid sepsis: Currently normal rate.  Continue metoprolol and 50 mg p.o. twice daily.  Home monitoring with Humberto Schneider.   Bradycardia along with sinus pause was thought to be due to increased vagal tone.  No need for pacemaker.  Covid sepsis with respiratory failure and ventilator/Tracheostomy(Aug 2020): His cardiac function on 2020 was normal.  COVID encephalopathy/ TIA: ASA. Continues to have left leg weakness  BP at home has been normal.   Hypertriglyceridemia- Diet control and wt loss.   See Dr. Levy in 1 year.             He works for [employer] and works in .       ATTENTION:   This medical record was transcribed using an electronic medical records/speech recognition system.  Although proofread, it may and can contain electronic, spelling and other errors.  Corrections may be executed at a later time.  Please feel free to contact us for any clarifications as needed.         Subjective:     He is returning today for followup. He has had no symptoms of chest pain, sob. He has left leg numbness.       ----  Gabriel Kumar, a 32 y.o. year-old who presents for followup.  He was Covid positive on 2020 and was admitted with respiratory failure requiring BiPAP and further requiring ventilator status post tracheostomy and had a prolonged course in the hospital.  While at [facility] he had an episode of significant bradycardia and cardiac pauses thought to be due to increased vagal tone.  Dr. Lassiter as well as Dr. Kee had seen him and there was no need for a pacemaker.  He also had episodes of tachycardia.  He was started on metoprolol and was discharged on metoprolol.  He spent quite many days and [facility].  He is now slowly getting back to his normal routine.  Currently has no

## 2025-03-10 NOTE — PROGRESS NOTES
Chief Complaint   Patient presents with    Tachycardia    Other     SSS     Vitals:    03/10/25 1439   BP: 112/70   BP Site: Left Upper Arm   Patient Position: Sitting   BP Cuff Size: Medium Adult   Pulse: 67   SpO2: 94%   Weight: 122.5 kg (270 lb)   Height: 1.829 m (6')      /70 (BP Site: Left Upper Arm, Patient Position: Sitting, BP Cuff Size: Medium Adult)   Pulse 67   Ht 1.829 m (6')   Wt 122.5 kg (270 lb)   SpO2 94%   BMI 36.62 kg/m²

## 2025-03-11 ENCOUNTER — RESULTS FOLLOW-UP (OUTPATIENT)
Age: 35
End: 2025-03-11

## 2025-03-11 LAB
ALBUMIN SERPL-MCNC: 4.7 G/DL (ref 4.1–5.1)
ALP SERPL-CCNC: 88 IU/L (ref 44–121)
ALT SERPL-CCNC: 34 IU/L (ref 0–44)
AST SERPL-CCNC: 26 IU/L (ref 0–40)
BILIRUB SERPL-MCNC: 0.8 MG/DL (ref 0–1.2)
BUN SERPL-MCNC: 15 MG/DL (ref 6–20)
BUN/CREAT SERPL: 16 (ref 9–20)
CALCIUM SERPL-MCNC: 9.7 MG/DL (ref 8.7–10.2)
CHLORIDE SERPL-SCNC: 103 MMOL/L (ref 96–106)
CHOLEST SERPL-MCNC: 149 MG/DL (ref 100–199)
CO2 SERPL-SCNC: 21 MMOL/L (ref 20–29)
CREAT SERPL-MCNC: 0.94 MG/DL (ref 0.76–1.27)
EGFRCR SERPLBLD CKD-EPI 2021: 108 ML/MIN/1.73
ERYTHROCYTE [DISTWIDTH] IN BLOOD BY AUTOMATED COUNT: 12.6 % (ref 11.6–15.4)
FERRITIN SERPL-MCNC: 198 NG/ML (ref 30–400)
GLOBULIN SER CALC-MCNC: 2.6 G/DL (ref 1.5–4.5)
GLUCOSE SERPL-MCNC: 103 MG/DL (ref 70–99)
HBA1C MFR BLD: 5.5 % (ref 4.8–5.6)
HCT VFR BLD AUTO: 51.9 % (ref 37.5–51)
HDLC SERPL-MCNC: 31 MG/DL
HGB BLD-MCNC: 17.7 G/DL (ref 13–17.7)
IMP & REVIEW OF LAB RESULTS: NORMAL
IRON SATN MFR SERPL: 26 % (ref 15–55)
IRON SERPL-MCNC: 91 UG/DL (ref 38–169)
LDLC SERPL CALC-MCNC: 76 MG/DL (ref 0–99)
MCH RBC QN AUTO: 30.5 PG (ref 26.6–33)
MCHC RBC AUTO-ENTMCNC: 34.1 G/DL (ref 31.5–35.7)
MCV RBC AUTO: 90 FL (ref 79–97)
PLATELET # BLD AUTO: 206 X10E3/UL (ref 150–450)
POTASSIUM SERPL-SCNC: 4.5 MMOL/L (ref 3.5–5.2)
PROT SERPL-MCNC: 7.3 G/DL (ref 6–8.5)
RBC # BLD AUTO: 5.8 X10E6/UL (ref 4.14–5.8)
SODIUM SERPL-SCNC: 141 MMOL/L (ref 134–144)
TIBC SERPL-MCNC: 351 UG/DL (ref 250–450)
TRIGL SERPL-MCNC: 252 MG/DL (ref 0–149)
TSH SERPL DL<=0.005 MIU/L-ACNC: 2.95 UIU/ML (ref 0.45–4.5)
UIBC SERPL-MCNC: 260 UG/DL (ref 111–343)
VLDLC SERPL CALC-MCNC: 42 MG/DL (ref 5–40)
WBC # BLD AUTO: 5.8 X10E3/UL (ref 3.4–10.8)

## 2025-03-12 LAB
ENDOMYSIUM IGA SER QL: NEGATIVE
IGA SERPL-MCNC: 154 MG/DL (ref 90–386)
TTG IGA SER-ACNC: <2 U/ML (ref 0–3)

## 2025-03-18 ENCOUNTER — OFFICE VISIT (OUTPATIENT)
Facility: CLINIC | Age: 35
End: 2025-03-18
Payer: COMMERCIAL

## 2025-03-18 ENCOUNTER — HOSPITAL ENCOUNTER (OUTPATIENT)
Facility: HOSPITAL | Age: 35
Discharge: HOME | End: 2025-03-21
Payer: COMMERCIAL

## 2025-03-18 VITALS
OXYGEN SATURATION: 96 % | RESPIRATION RATE: 20 BRPM | SYSTOLIC BLOOD PRESSURE: 127 MMHG | TEMPERATURE: 98.5 F | HEART RATE: 78 BPM | HEIGHT: 72 IN | BODY MASS INDEX: 36.98 KG/M2 | WEIGHT: 273 LBS | DIASTOLIC BLOOD PRESSURE: 85 MMHG

## 2025-03-18 DIAGNOSIS — R79.89 LOW SERUM TESTOSTERONE LEVEL IN MALE: ICD-10-CM

## 2025-03-18 DIAGNOSIS — J06.9 VIRAL URI WITH COUGH: Primary | ICD-10-CM

## 2025-03-18 DIAGNOSIS — Z86.16 HISTORY OF SEVERE ACUTE RESPIRATORY SYNDROME CORONAVIRUS 2 (SARS-COV-2) DISEASE: ICD-10-CM

## 2025-03-18 DIAGNOSIS — J06.9 VIRAL URI WITH COUGH: ICD-10-CM

## 2025-03-18 PROCEDURE — 71046 X-RAY EXAM CHEST 2 VIEWS: CPT

## 2025-03-18 PROCEDURE — 99214 OFFICE O/P EST MOD 30 MIN: CPT | Performed by: STUDENT IN AN ORGANIZED HEALTH CARE EDUCATION/TRAINING PROGRAM

## 2025-03-18 RX ORDER — PREDNISONE 20 MG/1
40 TABLET ORAL DAILY
Qty: 10 TABLET | Refills: 0 | Status: SHIPPED | OUTPATIENT
Start: 2025-03-18 | End: 2025-03-23

## 2025-03-18 RX ORDER — PREDNISONE 20 MG/1
40 TABLET ORAL DAILY
Qty: 10 TABLET | Refills: 0 | Status: SHIPPED | OUTPATIENT
Start: 2025-03-18 | End: 2025-03-18 | Stop reason: SDUPTHER

## 2025-03-18 NOTE — PROGRESS NOTES
Chief Complaint   Patient presents with    Cough    Nasal Congestion     Patient presents in the office today for a cough and nasal congestion.  Patient stated he took a covid and flu test last night, both came back negative.   Patient stated that he started on Friday, stated he also now has a cough.  Patient stated that he coughing stated it mostly non productive.   Patient stated that he has had a stuffy nose also a runny nose.   No other concerns.    \"Have you been to the ER, urgent care clinic since your last visit?  Hospitalized since your last visit?\"    NO    “Have you seen or consulted any other health care providers outside our system since your last visit?”    NO          
upcoming appointment on 04/11/2025 to follow up on testosterone levels. He has not noticed any changes since the dose was increased. He did not take it this week because he was not feeling well.    Supplemental Information  He is not taking pantoprazole or any other acid medicines. He has recently started a K2 and D3 supplement regimen.    MEDICATIONS  Current: Albuterol, cetirizine, montelukast, metoprolol, Zoloft, Kenalog ointment (as needed), K2, D3 supplement.      Reviewed PmHx, RxHx, FmHx, SocHx, AllgHx and updated and dated in the chart.    Review of Systems - negative except as listed above in the HPI    Objective:     Vitals:    03/18/25 0803   BP: 127/85   BP Site: Left Upper Arm   Patient Position: Sitting   Pulse: 78   Resp: 20   Temp: 98.5 °F (36.9 °C)   TempSrc: Oral   SpO2: 96%   Weight: 123.8 kg (273 lb)   Height: 1.829 m (6')         Physical Exam  Vitals and nursing note reviewed.   Constitutional:       General: He is not in acute distress.     Appearance: Normal appearance. He is not ill-appearing or toxic-appearing.   HENT:      Head: Normocephalic and atraumatic.      Right Ear: Ear canal and external ear normal. No middle ear effusion. Tympanic membrane is scarred and retracted. Tympanic membrane is not injected, erythematous or bulging.      Left Ear: Ear canal and external ear normal.  No middle ear effusion. Tympanic membrane is scarred and retracted. Tympanic membrane is not injected, erythematous or bulging.      Nose: Congestion and rhinorrhea present.      Mouth/Throat:      Mouth: Mucous membranes are moist.      Pharynx: Pharyngeal swelling and posterior oropharyngeal erythema present. No oropharyngeal exudate.   Eyes:      General:         Right eye: No discharge.         Left eye: No discharge.      Conjunctiva/sclera: Conjunctivae normal.   Cardiovascular:      Rate and Rhythm: Normal rate and regular rhythm.      Pulses: Normal pulses.      Heart sounds: Normal heart sounds.

## 2025-03-19 ENCOUNTER — RESULTS FOLLOW-UP (OUTPATIENT)
Facility: HOSPITAL | Age: 35
End: 2025-03-19

## 2025-04-11 ENCOUNTER — OFFICE VISIT (OUTPATIENT)
Facility: CLINIC | Age: 35
End: 2025-04-11
Payer: COMMERCIAL

## 2025-04-11 VITALS
DIASTOLIC BLOOD PRESSURE: 72 MMHG | OXYGEN SATURATION: 94 % | SYSTOLIC BLOOD PRESSURE: 107 MMHG | HEART RATE: 67 BPM | RESPIRATION RATE: 18 BRPM | WEIGHT: 267 LBS | HEIGHT: 72 IN | BODY MASS INDEX: 36.16 KG/M2

## 2025-04-11 DIAGNOSIS — Z86.16 HISTORY OF SEVERE ACUTE RESPIRATORY SYNDROME CORONAVIRUS 2 (SARS-COV-2) DISEASE: ICD-10-CM

## 2025-04-11 DIAGNOSIS — K21.9 GASTROESOPHAGEAL REFLUX DISEASE, UNSPECIFIED WHETHER ESOPHAGITIS PRESENT: ICD-10-CM

## 2025-04-11 DIAGNOSIS — Z91.09 ENVIRONMENTAL ALLERGIES: ICD-10-CM

## 2025-04-11 DIAGNOSIS — R79.89 LOW SERUM TESTOSTERONE LEVEL IN MALE: Primary | ICD-10-CM

## 2025-04-11 PROCEDURE — 99214 OFFICE O/P EST MOD 30 MIN: CPT | Performed by: STUDENT IN AN ORGANIZED HEALTH CARE EDUCATION/TRAINING PROGRAM

## 2025-04-11 RX ORDER — PANTOPRAZOLE SODIUM 40 MG/1
40 TABLET, DELAYED RELEASE ORAL DAILY
Qty: 90 TABLET | Refills: 3 | Status: SHIPPED | OUTPATIENT
Start: 2025-04-11

## 2025-04-11 RX ORDER — TESTOSTERONE CYPIONATE 200 MG/ML
100 INJECTION, SOLUTION INTRAMUSCULAR WEEKLY
Qty: 4 ML | Refills: 5 | Status: SHIPPED | OUTPATIENT
Start: 2025-04-11 | End: 2025-08-09

## 2025-04-11 RX ORDER — ALBUTEROL SULFATE 90 UG/1
2 INHALANT RESPIRATORY (INHALATION) EVERY 6 HOURS PRN
Qty: 18 G | Refills: 5 | Status: SHIPPED | OUTPATIENT
Start: 2025-04-11

## 2025-04-11 RX ORDER — MONTELUKAST SODIUM 10 MG/1
10 TABLET ORAL DAILY
Qty: 90 TABLET | Refills: 3 | Status: SHIPPED | OUTPATIENT
Start: 2025-04-11

## 2025-04-11 RX ORDER — NEEDLES, SAFETY 22GX1 1/2"
1 NEEDLE, DISPOSABLE MISCELLANEOUS WEEKLY
Qty: 50 EACH | Refills: 0 | Status: SHIPPED | OUTPATIENT
Start: 2025-04-11

## 2025-04-11 NOTE — PROGRESS NOTES
Progress Note    he is a 35 y.o. year old male who presents for evaluation of testosterone check      Assessment/ Plan:     1. Low serum testosterone level in male  -     Comprehensive Metabolic Panel; Future  -     CBC; Future  -     testosterone cypionate (DEPOTESTOTERONE CYPIONATE) 200 MG/ML injection; Inject 0.5 mLs into the muscle once a week for 120 days. Max Daily Amount: 100 mg, Disp-4 mL, R-5Normal  -     Syringe, Disposable, 1 ML MISC; WEEKLY Starting Fri 4/11/2025, Disp-50 each, R-0, Normal  -     NEEDLE, DISP, 25 G (BD SAFETYGLIDE NEEDLE) 25G X 1\" MISC; 1 each by Does not apply route once a week, Disp-50 each, R-0Normal  -     Testosterone Total Only, Male; Future  2. History of severe acute respiratory syndrome coronavirus 2 (SARS-CoV-2) disease  -     albuterol sulfate HFA (PROVENTIL;VENTOLIN;PROAIR) 108 (90 Base) MCG/ACT inhaler; Inhale 2 puffs into the lungs every 6 hours as needed for Wheezing or Shortness of Breath INHALE 2 PUFFS BY MOUTH EVERY 6 HOURS AS NEEDED FOR WHEEZING, SHORTNESS OF BREATH, RESPIRATORY DISTRESS, OR COUGH, Disp-18 g, R-5Normal  3. Environmental allergies  -     montelukast (SINGULAIR) 10 MG tablet; Take 1 tablet by mouth daily, Disp-90 tablet, R-3Normal  4. Gastroesophageal reflux disease, unspecified whether esophagitis present  -     pantoprazole (PROTONIX) 40 MG tablet; Take 1 tablet by mouth daily, Disp-90 tablet, R-3Normal    Assessment & Plan  1. Fatigue.  - Reports persistent low energy throughout the day despite an increase in testosterone dosage to 50 mg.  - Blood counts will be monitored to ensure they do not rise excessively with the new testosterone level.  - A sleep study consultation is scheduled for 05/14/2025 to evaluate for potential sleep apnea.  - If the sleep study does not indicate sleep apnea, an increase in the testosterone dosage may be considered, contingent upon normal blood count and cholesterol levels.    2. Allergies.  - Currently taking Zyrtec

## 2025-04-11 NOTE — PATIENT INSTRUCTIONS
Allergy management...  Nasal steroid sprays: flonase (sensimist is unscented), nasacort, nasonex  Nasal antihistamine spray: astepro  Antihistamine: claritin, allegra, xyzal, zyrtec  Prescription medication: Singulair/montelukast  Generics are fine to use!    Warm salt water gargles  Nasal saline spray and neti-pot or sinus rinses.

## 2025-04-11 NOTE — PROGRESS NOTES
The patient (or guardian, if applicable) and other individuals in attendance with the patient were advised that Artificial Intelligence will be utilized during this visit to record, process the conversation to generate a clinical note, and support improvement of the AI technology. The patient (or guardian, if applicable) and other individuals in attendance at the appointment consented to the use of AI, including the recording.        Gabriel Kumar is a 35 y.o. male , id x 2(name and ). Reviewed record, history, and  medications.    Chief Complaint   Patient presents with    testosterone check        Health Maintenance Due   Topic    Varicella vaccine (1 of 2 - 13+ 2-dose series)    Hepatitis A vaccine (1 of 2 - Risk 2-dose series)    DTaP/Tdap/Td vaccine (1 - Tdap)    COVID-19 Vaccine ( season)       Wt Readings from Last 1 Encounters:   25 121.1 kg (267 lb)     BP Readings from Last 3 Encounters:   25 107/72   25 127/85   03/10/25 112/70     Pulse Readings from Last 1 Encounters:   25 67         Patient is currently accompanied by self & I have received verbal consent from Gabriel Kumar to discuss any/all medical information while he/she is present in the room.    Home BP cuff present today:  no    Home medication list or bottles present today: no  - All medications were reviewed and updated with the patient today in office.    Forms for completion: no    Chest pain/SOB no    Surgery within the next month:  no      Depression Screening:  :     Depression: Not at risk (2025)    PHQ-2     PHQ-2 Score: 0          Coordination of Care Questionnaire:  :     \"Have you been to the ER, urgent care clinic since your last visit?  Hospitalized since your last visit?\"    NO    “Have you seen or consulted any other health care providers outside of [facility] since your last visit?”    NO          Click Here for Release of Records

## 2025-04-12 LAB
ERYTHROCYTE [DISTWIDTH] IN BLOOD BY AUTOMATED COUNT: 12.8 % (ref 11.6–15.4)
HCT VFR BLD AUTO: 48.6 % (ref 37.5–51)
HGB BLD-MCNC: 16.9 G/DL (ref 13–17.7)
MCH RBC QN AUTO: 30.4 PG (ref 26.6–33)
MCHC RBC AUTO-ENTMCNC: 34.8 G/DL (ref 31.5–35.7)
MCV RBC AUTO: 87 FL (ref 79–97)
PLATELET # BLD AUTO: 212 X10E3/UL (ref 150–450)
RBC # BLD AUTO: 5.56 X10E6/UL (ref 4.14–5.8)
TESTOST SERPL-MCNC: 182 NG/DL (ref 264–916)
WBC # BLD AUTO: 5.1 X10E3/UL (ref 3.4–10.8)

## 2025-04-13 LAB
ALBUMIN SERPL-MCNC: 4.8 G/DL (ref 4.1–5.1)
ALP SERPL-CCNC: 84 IU/L (ref 44–121)
ALT SERPL-CCNC: 29 IU/L (ref 0–44)
AST SERPL-CCNC: 22 IU/L (ref 0–40)
BILIRUB SERPL-MCNC: 1.1 MG/DL (ref 0–1.2)
BUN SERPL-MCNC: 15 MG/DL (ref 6–20)
BUN/CREAT SERPL: 16 (ref 9–20)
CALCIUM SERPL-MCNC: 9.8 MG/DL (ref 8.7–10.2)
CHLORIDE SERPL-SCNC: 100 MMOL/L (ref 96–106)
CO2 SERPL-SCNC: 16 MMOL/L (ref 20–29)
CREAT SERPL-MCNC: 0.96 MG/DL (ref 0.76–1.27)
EGFRCR SERPLBLD CKD-EPI 2021: 106 ML/MIN/1.73
GLOBULIN SER CALC-MCNC: 2.1 G/DL (ref 1.5–4.5)
GLUCOSE SERPL-MCNC: 105 MG/DL (ref 70–99)
POTASSIUM SERPL-SCNC: 4.3 MMOL/L (ref 3.5–5.2)
PROT SERPL-MCNC: 6.9 G/DL (ref 6–8.5)
SODIUM SERPL-SCNC: 138 MMOL/L (ref 134–144)

## 2025-04-14 PROBLEM — Z91.09 ENVIRONMENTAL ALLERGIES: Status: ACTIVE | Noted: 2025-04-14

## 2025-04-14 PROBLEM — K21.9 GASTROESOPHAGEAL REFLUX DISEASE: Status: ACTIVE | Noted: 2025-04-14

## 2025-04-15 ENCOUNTER — RESULTS FOLLOW-UP (OUTPATIENT)
Facility: CLINIC | Age: 35
End: 2025-04-15

## 2025-05-08 DIAGNOSIS — K21.9 GASTROESOPHAGEAL REFLUX DISEASE, UNSPECIFIED WHETHER ESOPHAGITIS PRESENT: ICD-10-CM

## 2025-05-08 RX ORDER — PANTOPRAZOLE SODIUM 40 MG/1
40 TABLET, DELAYED RELEASE ORAL DAILY
Qty: 90 TABLET | Refills: 3 | Status: SHIPPED | OUTPATIENT
Start: 2025-05-08

## 2025-05-11 ASSESSMENT — SLEEP AND FATIGUE QUESTIONNAIRES
HOW LIKELY ARE YOU TO NOD OFF OR FALL ASLEEP IN A CAR, WHILE STOPPED FOR A FEW MINUTES IN TRAFFIC: WOULD NEVER DOZE
FOSQ SCORE: 16
HAS YOUR RELATIONSHIP WITH FAMILY, FRIENDS OR WORK COLLEAGUES BEEN AFFECTED BECAUSE YOU ARE SLEEPY OR TIRED: NO
HOW LIKELY ARE YOU TO NOD OFF OR FALL ASLEEP WHILE SITTING AND TALKING TO SOMEONE: WOULD NEVER DOZE
DO YOU WORK SHIFTS: NO
HOW LONG DO YOU NAP: 15 TO 30 MINUTES
SELECT ANY OF THE FOLLOWING BEHAVIORS OBSERVED WHILE PATIENT ASLEEP: LOUD SNORING;TWITCHING OF LEGS OR FEET
HAS YOUR MOOD BEEN AFFECTED BECAUSE YOU ARE SLEEPY OR TIRED: YES, LITTLE
HOW LIKELY ARE YOU TO NOD OFF OR FALL ASLEEP WHILE SITTING INACTIVE IN A PUBLIC PLACE: MODERATE CHANCE OF DOZING
HOW LIKELY ARE YOU TO NOD OFF OR FALL ASLEEP WHILE SITTING AND TALKING TO SOMEONE: WOULD NEVER DOZE
DO YOU HAVE DIFFICULTY OPERATING A MOTOR VEHICLE FOR SHORT DISTANCES (LESS THAN 100 MILES) BECAUSE YOU BECOME SLEEPY: NO
DO YOU HAVE DIFFICULTY BEING AS ACTIVE AS YOU WANT TO BE IN THE MORNING BECAUSE YOU ARE SLEEPY OR TIRED: YES, LITTLE
WHAT TIME DO YOU USUALLY WAKE UP: 06:45
HOW LIKELY ARE YOU TO NOD OFF OR FALL ASLEEP WHEN YOU ARE A PASSENGER IN A CAR FOR AN HOUR WITHOUT A BREAK: MODERATE CHANCE OF DOZING
ARE YOU BOTHERED BY WAKING UP TOO EARLY AND NOT BEING ABLE TO GET BACK TO SLEEP: NO
DO YOU GET TOO LITTLE SLEEP AT NIGHT: YES
HOW LIKELY ARE YOU TO NOD OFF OR FALL ASLEEP WHILE LYING DOWN TO REST IN THE AFTERNOON WHEN CIRCUMSTANCES PERMIT: HIGH CHANCE OF DOZING
SELECT ANY OF THE FOLLOWING BEHAVIORS OBSERVED WHILE YOU ARE ASLEEP: LOUD SNORING
DO YOU HAVE DIFFICULTY WATCHING A MOVIE OR VIDEO BECAUSE YOU BECOME SLEEPY OR TIRED: YES, A LITTLE
DO YOU HAVE PROBLEMS WITH FREQUENT AWAKENINGS AT NIGHT: NO
DO YOU TAKE NAPS: YES
HOW LIKELY ARE YOU TO NOD OFF OR FALL ASLEEP WHILE LYING DOWN TO REST IN THE AFTERNOON WHEN CIRCUMSTANCES PERMIT: HIGH CHANCE OF DOZING
HOW LIKELY ARE YOU TO NOD OFF OR FALL ASLEEP WHILE WATCHING TV: SLIGHT CHANCE OF DOZING
HOW LIKELY ARE YOU TO NOD OFF OR FALL ASLEEP WHILE WATCHING TV: SLIGHT CHANCE OF DOZING
HOW LIKELY ARE YOU TO NOD OFF OR FALL ASLEEP IN A CAR, WHILE STOPPED FOR A FEW MINUTES IN TRAFFIC: WOULD NEVER DOZE
DO YOU GENERALLY HAVE DIFFICULTY REMEMBERING THINGS BECAUSE YOU ARE SLEEPY OR TIRED: NO
ESS TOTAL SCORE: 12
DO YOU HAVE DIFFICULTY CONCENTRATING ON THE THINGS YOU DO BECAUSE YOU ARE SLEEPY OR TIRED: YES, MODERATE
HOW LIKELY ARE YOU TO NOD OFF OR FALL ASLEEP WHILE SITTING INACTIVE IN A PUBLIC PLACE: MODERATE CHANCE OF DOZING
HOW MANY NAPS DO YOU TAKE PER WEEK: 10 TO 15
DO YOU GET TOO LITTLE SLEEP AT NIGHT: YES
HOW LIKELY ARE YOU TO NOD OFF OR FALL ASLEEP WHEN YOU ARE A PASSENGER IN A CAR FOR AN HOUR WITHOUT A BREAK: MODERATE CHANCE OF DOZING
DO YOU HAVE DIFFICULTY BEING AS ACTIVE AS YOU WANT TO BE IN THE EVENING BECAUSE YOU ARE SLEEPY OR TIRED: YES, LITTLE
AVERAGE NUMBER OF SLEEP HOURS: 5
AVERAGE NUMBER OF SLEEP HOURS: 5
ARE YOU BOTHERED BY WAKING UP TOO EARLY AND NOT BEING ABLE TO GET BACK TO SLEEP: NO
DO YOU HAVE DIFFICULTY OPERATING A MOTOR VEHICLE FOR LONG DISTANCES (GREATER THAN 100 MILES) BECAUSE YOU BECOME SLEEPY: YES, MODERATE
HOW LIKELY ARE YOU TO NOD OFF OR FALL ASLEEP WHILE SITTING AND READING: MODERATE CHANCE OF DOZING
HOW LIKELY ARE YOU TO NOD OFF OR FALL ASLEEP WHILE SITTING AND READING: MODERATE CHANCE OF DOZING
DO YOU HAVE DIFFICULTY VISITING YOUR FAMILY OR FRIENDS IN THEIR HOME BECAUSE YOU BECOME SLEEPY OR TIRED: NO
HOW LIKELY ARE YOU TO NOD OFF OR FALL ASLEEP WHILE SITTING QUIETLY AFTER LUNCH WITHOUT ALCOHOL: MODERATE CHANCE OF DOZING
NUMBER OF TIMES YOU WAKE PER NIGHT: 0
HOW LIKELY ARE YOU TO NOD OFF OR FALL ASLEEP WHILE SITTING QUIETLY AFTER LUNCH WITHOUT ALCOHOL: MODERATE CHANCE OF DOZING
SELECT ANY OF THE FOLLOWING BEHAVIORS OBSERVED WHILE YOU ARE ASLEEP: TWITCHING OF LEGS OR FEET

## 2025-05-14 ENCOUNTER — OFFICE VISIT (OUTPATIENT)
Age: 35
End: 2025-05-14
Payer: COMMERCIAL

## 2025-05-14 VITALS
WEIGHT: 270.4 LBS | OXYGEN SATURATION: 93 % | HEART RATE: 64 BPM | HEIGHT: 72 IN | BODY MASS INDEX: 36.62 KG/M2 | SYSTOLIC BLOOD PRESSURE: 122 MMHG | DIASTOLIC BLOOD PRESSURE: 82 MMHG | TEMPERATURE: 96.4 F

## 2025-05-14 DIAGNOSIS — G47.33 OSA (OBSTRUCTIVE SLEEP APNEA): Primary | ICD-10-CM

## 2025-05-14 PROBLEM — E66.01 MORBID (SEVERE) OBESITY DUE TO EXCESS CALORIES (HCC): Status: ACTIVE | Noted: 2025-05-14

## 2025-05-14 PROCEDURE — 99203 OFFICE O/P NEW LOW 30 MIN: CPT | Performed by: SPECIALIST

## 2025-05-14 NOTE — PROGRESS NOTES
albuterol sulfate HFA (PROVENTIL;VENTOLIN;PROAIR) 108 (90 Base) MCG/ACT inhaler, Inhale 2 puffs into the lungs every 6 hours as needed for Wheezing or Shortness of Breath INHALE 2 PUFFS BY MOUTH EVERY 6 HOURS AS NEEDED FOR WHEEZING, SHORTNESS OF BREATH, RESPIRATORY DISTRESS, OR COUGH, Disp: 18 g, Rfl: 5    testosterone cypionate (DEPOTESTOTERONE CYPIONATE) 200 MG/ML injection, Inject 0.5 mLs into the muscle once a week for 120 days. Max Daily Amount: 100 mg, Disp: 4 mL, Rfl: 5    Syringe, Disposable, 1 ML MISC, 1 each by Does not apply route once a week, Disp: 50 each, Rfl: 0    NEEDLE, DISP, 25 G (BD SAFETYGLIDE NEEDLE) 25G X 1\" MISC, 1 each by Does not apply route once a week, Disp: 50 each, Rfl: 0    montelukast (SINGULAIR) 10 MG tablet, Take 1 tablet by mouth daily, Disp: 90 tablet, Rfl: 3    NONFORMULARY, VITAMIN K2/ VITAMIN D3 TABLET ONCE DAILY, Disp: , Rfl:     triamcinolone (KENALOG) 0.1 % ointment, Apply topically 2 times daily, Disp: 30 g, Rfl: 1    cetirizine (ZYRTEC) 10 MG tablet, Take 1 tablet by mouth daily, Disp: , Rfl:      He  has a past medical history of Anxiety, Arrhythmia, History of vascular access device, Hypertension, and Tracheostomy dependent (HCC).    He  has a past surgical history that includes IR NONTUNNELED VASCULAR CATHETER > 5 YEARS (8/11/2020); IR NONTUNNELED VASCULAR CATHETER > 5 YEARS (8/11/2020); IR NONTUNNELED VASCULAR CATHETER > 5 YEARS (8/11/2020); IR NONTUNNELED VASCULAR CATHETER > 5 YEARS (8/11/2020); and IR INSERT GASTROSTOMY TUBE PERC (9/23/2020).    He family history includes Bipolar Disorder in his brother; COPD in his father; Cancer in his father; Diabetes in his maternal grandmother; Drug Abuse in his brother; High Blood Pressure in his brother, father, maternal grandmother, and mother; Hypertension in his brother, father, maternal grandmother, and mother.    He  reports that he quit smoking about 5 years ago. His smoking use included cigarettes. He has never used

## 2025-05-22 ENCOUNTER — PATIENT MESSAGE (OUTPATIENT)
Facility: CLINIC | Age: 35
End: 2025-05-22

## 2025-05-22 DIAGNOSIS — R79.89 LOW SERUM TESTOSTERONE LEVEL IN MALE: ICD-10-CM

## 2025-05-22 DIAGNOSIS — Z86.16 HISTORY OF SEVERE ACUTE RESPIRATORY SYNDROME CORONAVIRUS 2 (SARS-COV-2) DISEASE: ICD-10-CM

## 2025-05-22 DIAGNOSIS — L30.9 DERMATITIS: ICD-10-CM

## 2025-05-22 DIAGNOSIS — F41.1 GENERALIZED ANXIETY DISORDER: Primary | ICD-10-CM

## 2025-05-22 DIAGNOSIS — K21.9 GASTROESOPHAGEAL REFLUX DISEASE, UNSPECIFIED WHETHER ESOPHAGITIS PRESENT: ICD-10-CM

## 2025-05-22 DIAGNOSIS — Z91.09 ENVIRONMENTAL ALLERGIES: ICD-10-CM

## 2025-05-27 RX ORDER — MONTELUKAST SODIUM 10 MG/1
10 TABLET ORAL DAILY
Qty: 90 TABLET | Refills: 3 | Status: SHIPPED | OUTPATIENT
Start: 2025-05-27

## 2025-05-27 RX ORDER — ALBUTEROL SULFATE 90 UG/1
2 INHALANT RESPIRATORY (INHALATION) EVERY 6 HOURS PRN
Qty: 18 G | Refills: 5 | Status: SHIPPED | OUTPATIENT
Start: 2025-05-27

## 2025-05-27 RX ORDER — TESTOSTERONE CYPIONATE 200 MG/ML
100 INJECTION, SOLUTION INTRAMUSCULAR WEEKLY
Qty: 4 ML | Refills: 5 | Status: SHIPPED | OUTPATIENT
Start: 2025-05-27 | End: 2025-09-24

## 2025-05-27 RX ORDER — TRIAMCINOLONE ACETONIDE 1 MG/G
OINTMENT TOPICAL 2 TIMES DAILY
Qty: 30 G | Refills: 1 | Status: SHIPPED | OUTPATIENT
Start: 2025-05-27

## 2025-05-27 RX ORDER — NEEDLES, SAFETY 22GX1 1/2"
1 NEEDLE, DISPOSABLE MISCELLANEOUS WEEKLY
Qty: 50 EACH | Refills: 0 | Status: SHIPPED | OUTPATIENT
Start: 2025-05-27

## 2025-05-27 RX ORDER — PANTOPRAZOLE SODIUM 40 MG/1
40 TABLET, DELAYED RELEASE ORAL DAILY
Qty: 90 TABLET | Refills: 3 | Status: SHIPPED | OUTPATIENT
Start: 2025-05-27

## 2025-05-30 RX ORDER — METOPROLOL TARTRATE 50 MG
50 TABLET ORAL 2 TIMES DAILY
Qty: 180 TABLET | Refills: 3 | Status: SHIPPED | OUTPATIENT
Start: 2025-05-30

## 2025-05-30 NOTE — TELEPHONE ENCOUNTER
Refill per VO of Dr. Juanjo Levy:    3/10/2025    Future Appointments   Date Time Provider Department Center   6/5/2025  7:30 AM [facility]   3/11/2026  3:40 PM [facility]       Requested Prescriptions     Pending Prescriptions Disp Refills    metoprolol tartrate (LOPRESSOR) 50 MG tablet 180 tablet 3     Sig: Take 1 tablet by mouth 2 times daily

## 2025-06-05 ENCOUNTER — HOSPITAL ENCOUNTER (OUTPATIENT)
Facility: HOSPITAL | Age: 35
Discharge: HOME | End: 2025-06-08
Payer: COMMERCIAL

## 2025-06-05 ENCOUNTER — PROCEDURE VISIT (OUTPATIENT)
Age: 35
End: 2025-06-05

## 2025-06-05 DIAGNOSIS — G47.33 OSA (OBSTRUCTIVE SLEEP APNEA): Primary | ICD-10-CM

## 2025-06-05 DIAGNOSIS — G47.33 OSA (OBSTRUCTIVE SLEEP APNEA): ICD-10-CM

## 2025-06-05 PROCEDURE — 95800 SLP STDY UNATTENDED: CPT | Performed by: SPECIALIST

## 2025-06-05 NOTE — PROGRESS NOTES
[address]  [place] [address]  Tel.  [phone]  Fax. [phone] [address]  [place] [address]  Tel.  [phone]  Fax. [phone] [address] [place] [address]  Tel.  [phone]  Fax. [phone]       Gabriel Kumar is seen today to receive a WatchPAT home sleep apnea testing (HSAT) device.    The WatchPAT HSAT Agreement was reviewed and endorsed by patient.  General information regarding operation of the HSAT device was provided.  Patient viewed the [employer] instructional video while in the clinic.  Patient was advised to contact patient support for any problems using the device.  Patient was advised to complete the Morning Questionnaire after awakening/ending the test.    There were no vitals taken for this visit.    Patient will return the HSAT device by noon unless otherwise approved.  Patient will be contacted with results once the study has been reviewed by the physician, typically within 15 business days.    [employer] Serial Number [id number]

## 2025-06-06 ENCOUNTER — CLINICAL DOCUMENTATION (OUTPATIENT)
Facility: HOSPITAL | Age: 35
End: 2025-06-06

## 2025-06-09 ENCOUNTER — CLINICAL DOCUMENTATION (OUTPATIENT)
Age: 35
End: 2025-06-09

## 2025-06-09 DIAGNOSIS — G47.33 OSA (OBSTRUCTIVE SLEEP APNEA): Primary | ICD-10-CM

## 2025-06-09 NOTE — PROGRESS NOTES
WatchPAT HSAT performed for potential sleep disordered breathing.  8 hours recorded of which 7 hours 28 minutes of sleep; 18.5% of sleep in rem.  All sleep stages observed.  Patient slept supine, prone and lateral.    AHI 36.4/h (4% desaturation definition).  AHI 46.1/h (3% desaturation definition) with minimal SaO2 of 79%.  Prominent snoring noted.    Impression: Severe sleep disordered breathing associated with arterial desaturation.    Recommendation: APAP 8-18 cm    Sleep technologist: Please advise patient of HSAT results.  Order has been entered for APAP 8-18 cm.  Please schedule compliance appointment.

## 2025-06-10 ENCOUNTER — CLINICAL DOCUMENTATION (OUTPATIENT)
Age: 35
End: 2025-06-10

## 2025-06-10 NOTE — PROGRESS NOTES
PAP order faxed to [employer] and patient notified via [employer]. Patient also scheduled for first adherence appointment.

## 2025-07-11 ENCOUNTER — TELEPHONE (OUTPATIENT)
Age: 35
End: 2025-07-11

## 2025-07-11 NOTE — TELEPHONE ENCOUNTER
Called patient, following up on the DME not being able to get in contact with him for his PAP set up. He will return their call this evening.

## (undated) DEVICE — TUBING HYDR IRR --

## (undated) DEVICE — CATH KT GASTMY PEG PSH 20FR --

## (undated) DEVICE — FORCEPS BX L240CM JAW DIA2.8MM L CAP W/ NDL MIC MESH TOOTH